# Patient Record
Sex: MALE | Race: BLACK OR AFRICAN AMERICAN | Employment: UNEMPLOYED | URBAN - METROPOLITAN AREA
[De-identification: names, ages, dates, MRNs, and addresses within clinical notes are randomized per-mention and may not be internally consistent; named-entity substitution may affect disease eponyms.]

---

## 2017-01-16 ENCOUNTER — HOSPITAL ENCOUNTER (INPATIENT)
Age: 42
LOS: 3 days | Discharge: HOME | DRG: 812 | End: 2017-01-19
Attending: EMERGENCY MEDICINE | Admitting: INTERNAL MEDICINE
Payer: MEDICARE

## 2017-01-16 ENCOUNTER — APPOINTMENT (OUTPATIENT)
Dept: GENERAL RADIOLOGY | Age: 42
DRG: 812 | End: 2017-01-16
Attending: EMERGENCY MEDICINE
Payer: MEDICARE

## 2017-01-16 DIAGNOSIS — D57.00 SICKLE CELL CRISIS (HCC): Primary | ICD-10-CM

## 2017-01-16 LAB
ALBUMIN SERPL BCP-MCNC: 4.1 G/DL (ref 3.5–5)
ALBUMIN/GLOB SERPL: 1 {RATIO} (ref 1.2–3.5)
ALP SERPL-CCNC: 79 U/L (ref 50–136)
ALT SERPL-CCNC: 65 U/L (ref 12–65)
ANION GAP BLD CALC-SCNC: 12 MMOL/L (ref 7–16)
AST SERPL W P-5'-P-CCNC: 49 U/L (ref 15–37)
ATRIAL RATE: 76 BPM
BILIRUB SERPL-MCNC: 1.2 MG/DL (ref 0.2–1.1)
BLASTS NFR BLD: 4 %
BUN SERPL-MCNC: 9 MG/DL (ref 6–23)
CALCIUM SERPL-MCNC: 9.1 MG/DL (ref 8.3–10.4)
CALCULATED P AXIS, ECG09: 68 DEGREES
CALCULATED R AXIS, ECG10: 32 DEGREES
CALCULATED T AXIS, ECG11: 71 DEGREES
CHLORIDE SERPL-SCNC: 107 MMOL/L (ref 98–107)
CK MB CFR SERPL CALC: ABNORMAL %
CK MB SERPL-MCNC: <0.5 NG/ML (ref 0.5–3.6)
CK SERPL-CCNC: 41 U/L (ref 21–215)
CO2 SERPL-SCNC: 23 MMOL/L (ref 21–32)
CREAT SERPL-MCNC: 0.74 MG/DL (ref 0.8–1.5)
DIAGNOSIS, 93000: NORMAL
DIASTOLIC BP, ECG02: NORMAL MMHG
DIFFERENTIAL METHOD BLD: ABNORMAL
ERYTHROCYTE [DISTWIDTH] IN BLOOD BY AUTOMATED COUNT: 30.7 % (ref 11.9–14.6)
FLUAV AG NPH QL IA: NEGATIVE
FLUBV AG NPH QL IA: NEGATIVE
GLOBULIN SER CALC-MCNC: 4.3 G/DL (ref 2.3–3.5)
GLUCOSE SERPL-MCNC: 145 MG/DL (ref 65–100)
HCT VFR BLD AUTO: 23.4 % (ref 41.1–50.3)
HGB BLD-MCNC: 7.9 G/DL (ref 13.6–17.2)
HGB RETIC QN AUTO: 43 PG (ref 29–35)
IMM RETICS NFR: 25 % (ref 2.3–13.4)
LACTATE SERPL-SCNC: 1.6 MMOL/L (ref 0.4–2)
LDH SERPL L TO P-CCNC: 413 U/L (ref 100–190)
LYMPHOCYTES # BLD: 0.8 K/UL (ref 0.5–4.6)
LYMPHOCYTES NFR BLD MANUAL: 15 % (ref 16–44)
MCH RBC QN AUTO: 33.5 PG (ref 26.1–32.9)
MCHC RBC AUTO-ENTMCNC: 33.8 G/DL (ref 31.4–35)
MCV RBC AUTO: 99.2 FL (ref 79.6–97.8)
MONOCYTES # BLD: 0.1 K/UL (ref 0.1–1.3)
MONOCYTES NFR BLD MANUAL: 1 % (ref 3–9)
NEUTS SEG # BLD: 4.5 K/UL (ref 1.7–8.2)
NEUTS SEG NFR BLD MANUAL: 80 % (ref 47–75)
NRBC BLD-RTO: 6 PER 100 WBC
P-R INTERVAL, ECG05: 168 MS
PLATELET # BLD AUTO: 275 K/UL (ref 150–450)
PLATELET COMMENTS,PCOM: ADEQUATE
PMV BLD AUTO: 9.9 FL (ref 10.8–14.1)
POTASSIUM SERPL-SCNC: 3.6 MMOL/L (ref 3.5–5.1)
PROT SERPL-MCNC: 8.4 G/DL (ref 6.3–8.2)
Q-T INTERVAL, ECG07: 386 MS
QRS DURATION, ECG06: 90 MS
QTC CALCULATION (BEZET), ECG08: 434 MS
RBC # BLD AUTO: 2.36 M/UL (ref 4.23–5.67)
RBC MORPH BLD: ABNORMAL
RETICS # AUTO: 0.1 M/UL (ref 0.03–0.1)
RETICS/RBC NFR AUTO: 4.3 % (ref 0.3–2)
SODIUM SERPL-SCNC: 142 MMOL/L (ref 136–145)
SYSTOLIC BP, ECG01: NORMAL MMHG
TROPONIN I SERPL-MCNC: <0.02 NG/ML (ref 0.02–0.05)
TROPONIN I SERPL-MCNC: <0.02 NG/ML (ref 0.02–0.05)
VENTRICULAR RATE, ECG03: 76 BPM
WBC # BLD AUTO: 5.6 K/UL (ref 4.3–11.1)

## 2017-01-16 PROCEDURE — 74011250637 HC RX REV CODE- 250/637: Performed by: INTERNAL MEDICINE

## 2017-01-16 PROCEDURE — 96374 THER/PROPH/DIAG INJ IV PUSH: CPT | Performed by: EMERGENCY MEDICINE

## 2017-01-16 PROCEDURE — 71020 XR CHEST PA LAT: CPT

## 2017-01-16 PROCEDURE — 87804 INFLUENZA ASSAY W/OPTIC: CPT | Performed by: EMERGENCY MEDICINE

## 2017-01-16 PROCEDURE — 36591 DRAW BLOOD OFF VENOUS DEVICE: CPT

## 2017-01-16 PROCEDURE — 84484 ASSAY OF TROPONIN QUANT: CPT | Performed by: EMERGENCY MEDICINE

## 2017-01-16 PROCEDURE — 36415 COLL VENOUS BLD VENIPUNCTURE: CPT | Performed by: INTERNAL MEDICINE

## 2017-01-16 PROCEDURE — 80053 COMPREHEN METABOLIC PANEL: CPT | Performed by: EMERGENCY MEDICINE

## 2017-01-16 PROCEDURE — 82550 ASSAY OF CK (CPK): CPT | Performed by: INTERNAL MEDICINE

## 2017-01-16 PROCEDURE — 74011250636 HC RX REV CODE- 250/636: Performed by: INTERNAL MEDICINE

## 2017-01-16 PROCEDURE — 96375 TX/PRO/DX INJ NEW DRUG ADDON: CPT | Performed by: EMERGENCY MEDICINE

## 2017-01-16 PROCEDURE — 83615 LACTATE (LD) (LDH) ENZYME: CPT | Performed by: INTERNAL MEDICINE

## 2017-01-16 PROCEDURE — 96361 HYDRATE IV INFUSION ADD-ON: CPT | Performed by: EMERGENCY MEDICINE

## 2017-01-16 PROCEDURE — 74011250636 HC RX REV CODE- 250/636: Performed by: EMERGENCY MEDICINE

## 2017-01-16 PROCEDURE — 81001 URINALYSIS AUTO W/SCOPE: CPT | Performed by: INTERNAL MEDICINE

## 2017-01-16 PROCEDURE — 99285 EMERGENCY DEPT VISIT HI MDM: CPT | Performed by: EMERGENCY MEDICINE

## 2017-01-16 PROCEDURE — 87086 URINE CULTURE/COLONY COUNT: CPT | Performed by: INTERNAL MEDICINE

## 2017-01-16 PROCEDURE — 74011000250 HC RX REV CODE- 250: Performed by: INTERNAL MEDICINE

## 2017-01-16 PROCEDURE — 87641 MR-STAPH DNA AMP PROBE: CPT | Performed by: INTERNAL MEDICINE

## 2017-01-16 PROCEDURE — 83605 ASSAY OF LACTIC ACID: CPT | Performed by: INTERNAL MEDICINE

## 2017-01-16 PROCEDURE — 85025 COMPLETE CBC W/AUTO DIFF WBC: CPT | Performed by: EMERGENCY MEDICINE

## 2017-01-16 PROCEDURE — 85046 RETICYTE/HGB CONCENTRATE: CPT | Performed by: EMERGENCY MEDICINE

## 2017-01-16 PROCEDURE — 96376 TX/PRO/DX INJ SAME DRUG ADON: CPT | Performed by: EMERGENCY MEDICINE

## 2017-01-16 PROCEDURE — 65660000000 HC RM CCU STEPDOWN

## 2017-01-16 PROCEDURE — 93005 ELECTROCARDIOGRAM TRACING: CPT | Performed by: EMERGENCY MEDICINE

## 2017-01-16 PROCEDURE — 87040 BLOOD CULTURE FOR BACTERIA: CPT | Performed by: INTERNAL MEDICINE

## 2017-01-16 RX ORDER — PROMETHAZINE HYDROCHLORIDE 25 MG/1
25 SUPPOSITORY RECTAL
Status: DISCONTINUED | OUTPATIENT
Start: 2017-01-16 | End: 2017-01-19 | Stop reason: HOSPADM

## 2017-01-16 RX ORDER — HYDROMORPHONE HYDROCHLORIDE 1 MG/ML
1 INJECTION, SOLUTION INTRAMUSCULAR; INTRAVENOUS; SUBCUTANEOUS
Status: DISCONTINUED | OUTPATIENT
Start: 2017-01-16 | End: 2017-01-19 | Stop reason: HOSPADM

## 2017-01-16 RX ORDER — OXYCODONE HCL 20 MG/1
80 TABLET, FILM COATED, EXTENDED RELEASE ORAL EVERY 12 HOURS
Status: DISCONTINUED | OUTPATIENT
Start: 2017-01-16 | End: 2017-01-19 | Stop reason: HOSPADM

## 2017-01-16 RX ORDER — HEPARIN SODIUM 5000 [USP'U]/ML
5000 INJECTION, SOLUTION INTRAVENOUS; SUBCUTANEOUS EVERY 8 HOURS
Status: DISCONTINUED | OUTPATIENT
Start: 2017-01-16 | End: 2017-01-19 | Stop reason: HOSPADM

## 2017-01-16 RX ORDER — HYDROMORPHONE HYDROCHLORIDE 1 MG/ML
1 INJECTION, SOLUTION INTRAMUSCULAR; INTRAVENOUS; SUBCUTANEOUS
Status: COMPLETED | OUTPATIENT
Start: 2017-01-16 | End: 2017-01-16

## 2017-01-16 RX ORDER — FAMOTIDINE 10 MG/ML
20 INJECTION INTRAVENOUS EVERY 12 HOURS
Status: DISCONTINUED | OUTPATIENT
Start: 2017-01-16 | End: 2017-01-19 | Stop reason: HOSPADM

## 2017-01-16 RX ORDER — FOLIC ACID 1 MG/1
1 TABLET ORAL DAILY
Status: DISCONTINUED | OUTPATIENT
Start: 2017-01-17 | End: 2017-01-19 | Stop reason: HOSPADM

## 2017-01-16 RX ORDER — OXYCODONE HYDROCHLORIDE 80 MG/1
80 TABLET, FILM COATED, EXTENDED RELEASE ORAL EVERY 12 HOURS
COMMUNITY
End: 2017-05-12

## 2017-01-16 RX ORDER — SODIUM CHLORIDE 9 MG/ML
150 INJECTION, SOLUTION INTRAVENOUS CONTINUOUS
Status: DISCONTINUED | OUTPATIENT
Start: 2017-01-16 | End: 2017-01-17

## 2017-01-16 RX ORDER — ACETAMINOPHEN 325 MG/1
650 TABLET ORAL
Status: DISCONTINUED | OUTPATIENT
Start: 2017-01-16 | End: 2017-01-19 | Stop reason: HOSPADM

## 2017-01-16 RX ORDER — OXYCODONE HYDROCHLORIDE 15 MG/1
30 TABLET ORAL
Status: DISCONTINUED | OUTPATIENT
Start: 2017-01-16 | End: 2017-01-19 | Stop reason: HOSPADM

## 2017-01-16 RX ORDER — HYDROXYUREA 500 MG/1
500 CAPSULE ORAL 2 TIMES DAILY
Status: DISCONTINUED | OUTPATIENT
Start: 2017-01-16 | End: 2017-01-19 | Stop reason: HOSPADM

## 2017-01-16 RX ORDER — PROMETHAZINE HYDROCHLORIDE 25 MG/ML
25 INJECTION, SOLUTION INTRAMUSCULAR; INTRAVENOUS
Status: COMPLETED | OUTPATIENT
Start: 2017-01-16 | End: 2017-01-16

## 2017-01-16 RX ADMIN — HYDROMORPHONE HYDROCHLORIDE 1 MG: 1 INJECTION, SOLUTION INTRAMUSCULAR; INTRAVENOUS; SUBCUTANEOUS at 16:43

## 2017-01-16 RX ADMIN — PROMETHAZINE HYDROCHLORIDE 25 MG: 25 INJECTION INTRAMUSCULAR; INTRAVENOUS at 15:39

## 2017-01-16 RX ADMIN — HEPARIN SODIUM 5000 UNITS: 5000 INJECTION, SOLUTION INTRAVENOUS; SUBCUTANEOUS at 21:00

## 2017-01-16 RX ADMIN — SODIUM CHLORIDE 150 ML/HR: 900 INJECTION, SOLUTION INTRAVENOUS at 20:54

## 2017-01-16 RX ADMIN — SODIUM CHLORIDE 12.5 MG: 9 INJECTION INTRAMUSCULAR; INTRAVENOUS; SUBCUTANEOUS at 20:53

## 2017-01-16 RX ADMIN — HYDROMORPHONE HYDROCHLORIDE 1 MG: 1 INJECTION, SOLUTION INTRAMUSCULAR; INTRAVENOUS; SUBCUTANEOUS at 20:53

## 2017-01-16 RX ADMIN — OXYCODONE HYDROCHLORIDE 80 MG: 20 TABLET, FILM COATED, EXTENDED RELEASE ORAL at 20:53

## 2017-01-16 RX ADMIN — SODIUM CHLORIDE 1000 ML: 900 INJECTION, SOLUTION INTRAVENOUS at 15:39

## 2017-01-16 RX ADMIN — HYDROMORPHONE HYDROCHLORIDE 1 MG: 1 INJECTION, SOLUTION INTRAMUSCULAR; INTRAVENOUS; SUBCUTANEOUS at 16:10

## 2017-01-16 RX ADMIN — FAMOTIDINE 20 MG: 10 INJECTION, SOLUTION INTRAVENOUS at 20:53

## 2017-01-16 RX ADMIN — HYDROXYUREA 500 MG: 500 CAPSULE ORAL at 21:00

## 2017-01-16 RX ADMIN — HYDROMORPHONE HYDROCHLORIDE 1 MG: 1 INJECTION, SOLUTION INTRAMUSCULAR; INTRAVENOUS; SUBCUTANEOUS at 15:39

## 2017-01-16 NOTE — ED PROVIDER NOTES
HPI Comments: Presents complaint of sickle cell crisis. Patient reports he has pain in his chest and his legs. He reports multiple episodes of nausea and vomiting and inability to keep his meds. He reports his oxycodone is not helping. He did try Phenergan without relief. This is typical of his sickle cell. He is unsure what type of sickle cell he has. He sees Dr. Viv Casas. Patient is a 35 y.o. male presenting with chest pain. The history is provided by the patient. Chest Pain (Angina)    This is a new problem. The current episode started yesterday. The problem has been gradually worsening. The problem occurs constantly. The pain is present in the substernal region. The pain is moderate. The quality of the pain is described as dull. The pain does not radiate. The symptoms are aggravated by movement. Associated symptoms include leg pain and shortness of breath. Pertinent negatives include no abdominal pain, no cough, no diaphoresis, no exertional chest pressure, no fever, no lower extremity edema, no malaise/fatigue, no nausea, no numbness and no vomiting. Treatments tried: oxycodone. The treatment provided no relief. Risk factors: sickle cell.         Past Medical History:   Diagnosis Date    Chronic pain     HTN (hypertension)     Ill-defined condition      sickle cell    Paroxysmal SVT [facility]supra[facility]ricular tachycardia) ([facility]) 7/9/2016    [facility]        Past Surgical History:   Procedure Laterality Date    Hx cholecystectomy      Hc port life sngle lumen 5013       to L CW    Hc penile impl dura ii positinble      Hx other surgical       penile inplant    Hx vascular access           Family History:   Problem Relation Age of Onset    Hypertension Other     Diabetes Father     Stroke Father     Sickle Cell Anemia Sister        Social History     Social History    Marital status:      Spouse name: N/A    Number of children: N/A    Years of education: N/A     Occupational History    Not on file. Social History Main Topics    Smoking status: Never Smoker    Smokeless tobacco: Never Used    Alcohol use No    Drug use: No    Sexual activity: Yes     Other Topics Concern    Not on file     Social History Narrative         ALLERGIES: Compazine [prochlorperazine edisylate]; Morphine; Reglan [metoclopramide]; and Zofran [ondansetron hcl (pf)]    Review of Systems   Constitutional: Negative for diaphoresis, fever and malaise/fatigue. Respiratory: Positive for shortness of breath. Negative for cough. Cardiovascular: Positive for chest pain. Gastrointestinal: Negative for abdominal pain, nausea and vomiting. Neurological: Negative for numbness. All other systems reviewed and are negative. Vitals:    01/16/17 1204   BP: 157/82   Pulse: 81   Resp: 16   Temp: 98.7 °F (37.1 °C)   SpO2: 97%   Weight: 71.2 kg (157 lb)   Height: 5' 10\" (1.778 m)            Physical Exam   Constitutional: He is oriented to person, place, and time. He appears well-developed and well-nourished. No distress. HENT:   Head: Normocephalic and atraumatic. Neck: Normal range of motion. Neck supple. Cardiovascular: Normal rate and regular rhythm. Pulmonary/Chest: Effort normal and breath sounds normal. No respiratory distress. He has no wheezes. He has no rales. Abdominal: Soft. He exhibits no distension. There is no tenderness. There is no rebound and no guarding. Musculoskeletal: Normal range of motion. He exhibits no edema. Neurological: He is alert and oriented to person, place, and time. No cranial nerve deficit. Skin: Skin is warm and dry. No rash noted. He is not diaphoretic. No erythema. Nursing note and vitals reviewed. MDM  Number of Diagnoses or Management Options  Sickle cell crisis [facility]):   Diagnosis management comments: No signs of acute chest.   He does have an elevated reticulocyte count.   I reviewed his chart here and at [facility] and he has had multiple blood transfusions in the past.  He has hemoglobin S and beta thalassemia which typically have more severe symptoms. His last admission was in July 2016. I d/w pt that he would get dilaudid 1 mg x3 doses and phenergan and then he would have to decide if he needed admission    4:58 PM  Pt still in pain.   Needs admit       Amount and/or Complexity of Data Reviewed  Clinical lab tests: ordered and reviewed  Review and summarize past medical records: yes  Discuss the patient with other providers: yes  Independent visualization of images, tracings, or specimens: yes (NSR, no ST elevation, nl QRS)    Risk of Complications, Morbidity, and/or Mortality  Presenting problems: high  Diagnostic procedures: moderate  Management options: high    Patient Progress  Patient progress: improved    ED Course       Procedures

## 2017-01-16 NOTE — IP AVS SNAPSHOT
Issac Melendez 
 
 
 [address] [facility] [address] [facility] 
[phone] Patient: Etta Garcia MRN: [id number] BIZ:[id number][id number] You are allergic to the following Allergen Reactions Compazine (Prochlorperazine Edisylate) Other (comments) Also makes him feel funny Morphine Other (comments) Makes him feel funny Reglan (Metoclopramide) Other (comments) \"feel funny\" Zofran (Ondansetron Hcl (Pf)) Other (comments) Make him feel funny Recent Documentation Height Weight BMI Smoking Status 1.778 m 72.1 kg 22.8 kg/m2 Never Smoker Unresulted Labs Order Current Status HEMOGLOBIN FRACTIONATION In process TYPE & CROSSMATCH In process CULTURE, BLOOD Preliminary result CULTURE, BLOOD Preliminary result Emergency Contacts Name Discharge Info Relation Home Work Mobile Joesph Coon  Spouse [[phone] About your hospitalization You were admitted on:  January 16, 2017 You last received care in the:  [address] You were discharged on:  January 19, 2017 Unit phone number:  [phone] Why you were hospitalized Your primary diagnosis was:  Sickle Cell Anemia With Crisis (Hcc) Your diagnoses also included:  Htn (Hypertension), Iron Overload Due To Repeated Red Blood Cell Transfusions Providers Seen During Your Hospitalizations Provider Role Specialty Primary office phone Vale Felix MD Attending Provider Emergency Medicine [phone] Elizabeth Cardoza MD Attending Provider Internal Medicine [phone] Your Primary Care Physician (PCP) Primary Care Physician Office Phone Office Fax OTHER, PHYS ** None ** ** None ** Follow-up Information Follow up With Details Comments Contact Info  Usman García, MD Dr Rui Handy hematology [facility] Patient can only remember the practice name and not the physician Current Discharge Medication List  
  
CONTINUE these medications which have NOT CHANGED Dose & Instructions Dispensing Information Comments Morning Noon Evening Bedtime  
 folic acid 1 mg tablet Commonly known as:  Google Your next dose is: Today, Tomorrow Other:  _________ Dose:  1 mg Take 1 mg by mouth daily. Refills:  0  
     
   
   
   
  
 hydroxyurea 500 mg capsule Commonly known as:  HYDREA Your next dose is: Today, Tomorrow Other:  _________ Dose:  500 mg Take 500 mg by mouth two (2) times a day. Takes 2 tablets in the morning and 1 tablet at night. Indications: Sickle Cell Anemia with Crisis Refills:  0  
     
   
   
   
  
 lisinopril 5 mg tablet Commonly known as:  Nonah Cami Your next dose is: Today, Tomorrow Other:  _________ Dose:  5 mg Take 5 mg by mouth daily. Indications: HYPERTENSION Refills:  0  
     
   
   
   
  
 metoprolol tartrate 25 mg tablet Commonly known as:  LOPRESSOR Your next dose is: Today, Tomorrow Other:  _________ Dose:  25 mg Take 25 mg by mouth two (2) times a day. Indications: HYPERTENSION Refills:  0  
     
   
   
   
  
 * OxyCONTIN 80 mg ER tablet Generic drug:  oxyCODONE ER Your next dose is: Today, Tomorrow Other:  _________ Dose:  80 mg Take 80 mg by mouth every twelve (12) hours. Refills:  0  
     
   
   
   
  
 * oxyCODONE IR 30 mg immediate release tablet Commonly known as:  OXY-IR Your next dose is: Today, Tomorrow Other:  _________ Dose:  30 mg Take 1 Tab by mouth every four (4) hours as needed for Pain. Max Daily Amount: 180 mg.  
 Quantity:  15 Tab Refills:  0  
     
   
   
   
  
 * Notice:   This list has 2 medication(s) that are the same as other medications prescribed for you. Read the directions carefully, and ask your doctor or other care provider to review them with you. Discharge Instructions DISCHARGE SUMMARY from Nurse The following personal items are in your possession at time of discharge: 
 
Dental Appliances: None Clothing: Footwear, Pants, Shirt, Undergarments PATIENT INSTRUCTIONS: 
 
After general anesthesia or intravenous sedation, for 24 hours or while taking prescription Narcotics: · Limit your activities · Do not drive and operate hazardous machinery · Do not make important personal or business decisions · Do  not drink alcoholic beverages · If you have not urinated within 8 hours after discharge, please contact your surgeon on call. Report the following to your surgeon: 
· Excessive pain, swelling, redness or odor of or around the surgical area · Temperature over 100.5 · Nausea and vomiting lasting longer than 4 hours or if unable to take medications · Any signs of decreased circulation or nerve impairment to extremity: change in color, persistent  numbness, tingling, coldness or increase pain · Any questions What to do at Home: 
Recommended activity: Activity as tolerated, per MD instructions If you experience any of the following symptoms fever > 101, nausea, vomiting, pain, chest pain, and shortness of breath please follow up with MD. 
 
 
*  Please give a list of your current medications to your Primary Care Provider. *  Please update this list whenever your medications are discontinued, doses are 
    changed, or new medications (including over-the-counter products) are added. *  Please carry medication information at all times in case of emergency situations. These are general instructions for a healthy lifestyle: No smoking/ No tobacco products/ Avoid exposure to second hand smoke Surgeon General's Warning:  Quitting smoking now greatly reduces serious risk to your health. Obesity, smoking, and sedentary lifestyle greatly increases your risk for illness A healthy diet, regular physical exercise & weight monitoring are important for maintaining a healthy lifestyle You may be retaining fluid if you have a history of heart failure or if you experience any of the following symptoms:  Weight gain of 3 pounds or more overnight or 5 pounds in a week, increased swelling in our hands or feet or shortness of breath while lying flat in bed. Please call your doctor as soon as you notice any of these symptoms; do not wait until your next office visit. Recognize signs and symptoms of STROKE: 
 
F-face looks uneven A-arms unable to move or move unevenly S-speech slurred or non-existent T-time-call 911 as soon as signs and symptoms begin-DO NOT go Back to bed or wait to see if you get better-TIME IS BRAIN. Warning Signs of HEART ATTACK Call 911 if you have these symptoms: 
? Chest discomfort. Most heart attacks involve discomfort in the center of the chest that lasts more than a few minutes, or that goes away and comes back. It can feel like uncomfortable pressure, squeezing, fullness, or pain. ? Discomfort in other areas of the upper body. Symptoms can include pain or discomfort in one or both arms, the back, neck, jaw, or stomach. ? Shortness of breath with or without chest discomfort. ? Other signs may include breaking out in a cold sweat, nausea, or lightheadedness. Don't wait more than five minutes to call [address]! Fast action can save your life. Calling 911 is almost always the fastest way to get lifesaving treatment. Emergency Medical Services staff can begin treatment when they arrive  up to an hour sooner than if someone gets to the hospital by car. The discharge information has been reviewed with the patient.   The patient verbalized understanding. Discharge medications reviewed with the patient and appropriate educational materials and side effects teaching were provided. Sickle Cell Crisis: Care Instructions Your Care Instructions Sickle cell crisis is a painful episode that may begin suddenly in a person with sickle cell disease. Sickle cell disease turns normal, round red blood cells into cells that look like kendall or crescent moons. The sickle-shaped cells can get stuck in blood vessels, blocking blood flow and causing severe pain. The pain can occur in the bones of the spine, the arms and legs, the chest, and the abdomen. An episode may be called a \"painful event\" or \"painful crisis. \" Some people who have sickle cell disease have many painful events, while others have few or none. Treatment depends on the level of pain and how long it lasts. Sometimes taking nonprescription pain relievers can help. Or you may need stronger pain relief medicine that is prescribed or given by a doctor. You may need to be treated in the hospital. 
It isn't always possible to know what sets off a painful event. But triggers include being dehydrated, cold temperatures, infection, stress, and not getting enough oxygen. Follow-up care is a key part of your treatment and safety. Be sure to make and go to all appointments, and call your doctor if you are having problems. It's also a good idea to know your test results and keep a list of the medicines you take. How can you care for yourself at home? · Create a pain management plan with your doctor. This plan should include the types of medicines you can take and other actions you can take at home to relieve pain. · Drink plenty of fluids, enough so that your urine is light yellow or clear like water. If you have kidney, heart, or liver disease and have to limit fluids, talk with your doctor before you increase the amount of fluids you drink. · Take your medicines exactly as prescribed. Call your doctor if you think you are having a problem with your medicine. · Take pain medicines exactly as directed. ¨ If the doctor gave you a prescription medicine for pain, take it as prescribed. ¨ If you are not taking a prescription pain medicine, ask your doctor if you can take an over-the-counter medicine. · Avoid alcohol. It can make you dehydrated. · Dress warmly in cold weather. The cold and windy weather can lead to severe pain. · Do not smoke. Smoking can reduce the amount of oxygen in your blood. · Get plenty of sleep. When should you call for help? Call 911 anytime you think you may need emergency care. For example, call if: 
· You passed out (lost consciousness). Call your doctor now or seek immediate medical care if: 
· You are in severe pain. · You are dizzy or lightheaded, or you feel like you may faint. · You have a fever. · You are short of breath. · Your symptoms are getting worse. Watch closely for changes in your health, and be sure to contact your doctor if you are not getting better as expected. Where can you learn more? Go to http://socorro-rosita.info/. Enter F104 in the search box to learn more about \"Sickle Cell Crisis: Care Instructions. \" Current as of: February 5, 2016 Content Version: 11.1 © 0864-8804 [employer]wise[employer]. Care instructions adapted under license by [employer] (which disclaims liability or warranty for this information). If you have questions about a medical condition or this instruction, always ask your healthcare professional. [place] [address] any warranty or liability for your use of this information. Sickle Cell Disease: Care Instructions Your Care Instructions Sickle cell disease turns normal, round red blood cells into misshaped cells that look like kendall or crescent moons.  The sickle-shaped cells can get stuck in blood vessels, blocking blood flow and causing severe pain. The sickle-shaped cells also can harm organs, muscles, and bones. It is a lifelong condition. Sickle cell disease is passed down in families. You can talk to your doctor about whether to have genetic tests to find out the chance of having a child with the disease. Your doctor also may recommend that your family members get tested for sickle cell disease. Your doctor may treat you with medicines. Some people get blood transfusions or a bone marrow transplant. Managing pain is an important part of your treatment. Follow-up care is a key part of your treatment and safety. Be sure to make and go to all appointments, and call your doctor if you are having problems. It's also a good idea to know your test results and keep a list of the medicines you take. How can you care for yourself at home? · Take your medicines exactly as prescribed. Call your doctor if you think you are having a problem with your medicine. · Take pain medicines exactly as directed. ¨ If the doctor gave you a prescription medicine for pain, take it as prescribed. ¨ If you are not taking a prescription pain medicine, ask your doctor if you can take an over-the-counter medicine. · Try to help ease pain by distracting yourself. Use guided imagery, deep breathing, and relaxation exercises. A pain specialist can teach you pain management skills. · Avoid alcohol. It can make you dehydrated. · Dress warmly in cold weather. The cold and windy weather can lead to severe pain. · Do not smoke. Smoking can reduce the amount of oxygen in your blood. If you need help quitting, talk to your doctor about stop-smoking programs and medicines. These can increase your chances of quitting for good. · Get plenty of sleep. · Get regular eye exams. Sickle cell disease can cause vision problems. · Wear medical alert jewelry that says that you have sickle cell disease. You can buy this at most drugstores. · Avoid colds and flu. Get a pneumococcal vaccine shot. If you have had one before, ask your doctor whether you need another dose. Get a flu shot every year. If you must be around people with colds or flu, wash your hands often. When should you call for help? Call 911 anytime you think you may need emergency care. For example, call if: 
· You passed out (lost consciousness). · You are in severe pain that is not helped by your pain medicines. Call your doctor now or seek immediate medical care if: 
· You have these signs of acute chest syndrome, a problem caused by sickle cell disease: ¨ Cough. ¨ Chest pain. ¨ Fever. ¨ Shortness of breath. · You have vision problems. · You have severe belly pain. · You have a severe headache. · You have less urine than normal or no urine. · You have vomiting or diarrhea that does not go away after 2 hours. · You are dizzy or lightheaded, or you feel like you may faint. · You have sudden numbness or tingling in your hands, feet, fingers, or toes (even if it goes away). · You suddenly have poor balance and coordination when you walk (even if it goes away). · You have an erection that lasts more than 2 to 3 hours or is extremely painful. Watch closely for changes in your health, and be sure to contact your doctor if you have any problems. Where can you learn more? Go to http://socorro-rosita.info/. Enter 375 1097 in the search box to learn more about \"Sickle Cell Disease: Care Instructions. \" Current as of: February 5, 2016 Content Version: 11.1 © 1222-7225 [employer]. Care instructions adapted under license by [employer] (which disclaims liability or warranty for this information). If you have questions about a medical condition or this instruction, always ask your healthcare professional. [address] any warranty or liability for your use of this information. Discharge Orders None MoPals Announcement We are excited to announce that we are making your provider's discharge notes available to you in MoPals. You will see these notes when they are completed and signed by the physician that discharged you from your recent hospital stay. If you have any questions or concerns about any information you see in MoPals, please call the Health Information Department where you were seen or reach out to your Primary Care Provider for more information about your plan of care. Introducing hospitals & HEALTH SERVICES! Tai Valente introduces MoPals patient portal. Now you can access parts of your medical record, email your doctor's office, and request medication refills online. 1. In your internet browser, go to [email or web address] 2. Click on the First Time User? Click Here link in the Sign In box. You will see the New Member Sign Up page. 3. Enter your MoPals Access Code exactly as it appears below. You will not need to use this code after youve completed the sign-up process. If you do not sign up before the expiration date, you must request a new code. · MoPals Access Code: [email or web address] Expires: 4/19/2017 11:07 AM 
 
4. Enter the last four digits of your Social Security Number (xxxx) and Date of Birth (mm/dd/yyyy) as indicated and click Submit. You will be taken to the next sign-up page. 5. Create a MoPals ID. This will be your MoPals login ID and cannot be changed, so think of one that is secure and easy to remember. 6. Create a MoPals password. You can change your password at any time. 7. Enter your Password Reset Question and Answer. This can be used at a later time if you forget your password. 8. Enter your e-mail address. You will receive e-mail notification when new information is available in [address] 9. Click Sign Up. You can now view and download portions of your medical record. 10. Click the Download Summary menu link to download a portable copy of your medical information. If you have questions, please visit the Frequently Asked Questions section of the [employer] website. Remember, MyChart is NOT to be used for urgent needs. For medical emergencies, dial 911. Now available from your iPhone and Android! General Information Please provide this summary of care documentation to your next provider. Patient Signature:  ____________________________________________________________ Date:  ____________________________________________________________  
  
Rachelle Modena Provider Signature:  ____________________________________________________________ Date:  ____________________________________________________________

## 2017-01-16 NOTE — IP AVS SNAPSHOT
Summary of Care Report The Summary of Care report has been created to help improve care coordination. Users with access to [email or web address] or [facility] [address] (Web-based application) may access additional patient information including the Discharge Summary. If you are not currently a [address] user and need more information, please call the number listed below in the [place] [address] section and ask to be connected with Medical Records. Facility Information Name Address Phone [address] [address] [address] [phone] Patient Information Patient Name Sex  Ifeanyi Ardon ([id number]) Male 1975 Discharge Information Admitting Provider Service Area Unit MD Brian / [id number]  [address] / [phone] Discharge Provider Discharge Date/Time Discharge Disposition Destination (none) 2017 (Pending) AHR (none) Patient Language Language ENGLISH [13] Problem List as of 2017  Date Reviewed: 3/5/2012 Codes Priority Class Noted - Resolved Leukocytosis - chronic reactive ICD-10-CM: P78.154 ICD-9-CM: 288.60   2011 - Present Hypokalemia ICD-10-CM: E87.6 ICD-9-CM: 276.8   2016 - Present HTN (hypertension) (Chronic) ICD-10-CM: I10 
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 
ICD-9-CM: 286.6   3/24/2012 - Present Overview Signed 3/24/2012  2:53 PM by Danielle Bobby Sickle cell with anemia RESOLVED: Other chest pain ICD-10-CM: R07.89 ICD-9-CM: 786.59   3/24/2012 - 2016 Overview Signed 3/24/2012  2:53 PM by Danielle Bobby Acute chest syndrome unlikely. leukocytosis - most likely reactive ICD-10-CM: D72.829 ICD-9-CM: 288.60   2016 - Present Essential hypertension, benign ICD-10-CM: I10 
ICD-9-CM: 401.1   6/23/2012 - Present Hyperbilirubinemia ICD-10-CM: E80.6 ICD-9-CM: 782.4   9/20/2012 - Present Overview Signed 9/20/2012  1:37 PM by Patrice Orantes Related to sickle cell crisis Sickle cell pain crisis ([address]) ICD-10-CM: D57.00 ICD-9-CM: 282.62   10/25/2012 - Present RESOLVED: Thrombocytopenia, unspecified ([address]) ICD-10-CM: D69.6 ICD-9-CM: 287.5   10/12/2013 - 1/30/2016 Diarrhea ICD-10-CM: R19.7 ICD-9-CM: 787.91   9/27/2014 - Present RESOLVED: Macrocytosis ICD-10-CM: D75.89 ICD-9-CM: 289.89   9/27/2014 - 1/30/2016 Sickle cell anemia ([facility]) ICD-10-CM: D57.1 ICD-9-CM: 282.60   4/6/2016 - Present Sickle cell crisis ([address]) ICD-10-CM: D57.00 ICD-9-CM: 282.62   4/5/2016 - Present Paroxysmal SVT (supraventricular tachycardia) ([facility]) ICD-10-CM: I47.1 ICD-9-CM: 427.0   7/9/2016 - Present Hypomagnesemia ICD-10-CM: V33.79 
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  
  
CONTINUE these medications which have NOT CHANGED Dose & Instructions Dispensing Information Comments  
 folic acid 1 mg tablet Commonly known as:  Google Dose:  1 mg Take 1 mg by mouth daily. Refills:  0  
   
 hydroxyurea 500 mg capsule Commonly known as:  HYDREA  Dose:  500 mg  
 Take 500 mg by mouth two (2) times a day. Takes 2 tablets in the morning and 1 tablet at night. Indications: Sickle Cell Anemia with Crisis Refills:  0  
   
 lisinopril 5 mg tablet Commonly known as:  Lorenza Primmer Dose:  5 mg Take 5 mg by mouth daily. Indications: HYPERTENSION Refills:  0  
   
 metoprolol tartrate 25 mg tablet Commonly known as:  LOPRESSOR Dose:  25 mg Take 25 mg by mouth two (2) times a day. Indications: HYPERTENSION Refills:  0  
   
 * OxyCONTIN 80 mg ER tablet Generic drug:  oxyCODONE ER Dose:  80 mg Take 80 mg by mouth every twelve (12) hours. Refills:  0  
   
 * oxyCODONE IR 30 mg immediate release tablet Commonly known as:  OXY-IR Dose:  30 mg Take 1 Tab by mouth every four (4) hours as needed for Pain. Max Daily Amount: 180 mg.  
 Quantity:  15 Tab Refills:  0  
   
 * Notice: This list has 2 medication(s) that are the same as other medications prescribed for you. Read the directions carefully, and ask your doctor or other care provider to review them with you. Current Immunizations Name Date Influenza Vaccine 9/10/2015 Influenza Vaccine Split 9/27/2012 Pneumococcal Vaccine (Unspecified Type) 9/21/2010 Follow-up Information Follow up With Details Comments Contact Info Phys Other, MD Dr Bisi Lazo hematology [facility] Patient can only remember the practice name and not the physician Discharge Instructions DISCHARGE SUMMARY from Nurse The following personal items are in your possession at time of discharge: 
 
Dental Appliances: None Clothing: Footwear, Pants, Shirt, Undergarments PATIENT INSTRUCTIONS: 
 
After general anesthesia or intravenous sedation, for 24 hours or while taking prescription Narcotics: · Limit your activities · Do not drive and operate hazardous machinery · Do not make important personal or business decisions · Do  not drink alcoholic beverages · If you have not urinated within 8 hours after discharge, please contact your surgeon on call. Report the following to your surgeon: 
· Excessive pain, swelling, redness or odor of or around the surgical area · Temperature over 100.5 · Nausea and vomiting lasting longer than 4 hours or if unable to take medications · Any signs of decreased circulation or nerve impairment to extremity: change in color, persistent  numbness, tingling, coldness or increase pain · Any questions What to do at Home: 
Recommended activity: Activity as tolerated, per MD instructions If you experience any of the following symptoms fever > 101, nausea, vomiting, pain, chest pain, and shortness of breath please follow up with MD. 
 
 
*  Please give a list of your current medications to your Primary Care Provider. *  Please update this list whenever your medications are discontinued, doses are 
    changed, or new medications (including over-the-counter products) are added. *  Please carry medication information at all times in case of emergency situations. These are general instructions for a healthy lifestyle: No smoking/ No tobacco products/ Avoid exposure to second hand smoke Surgeon General's Warning:  Quitting smoking now greatly reduces serious risk to your health. Obesity, smoking, and sedentary lifestyle greatly increases your risk for illness A healthy diet, regular physical exercise & weight monitoring are important for maintaining a healthy lifestyle You may be retaining fluid if you have a history of heart failure or if you experience any of the following symptoms:  Weight gain of 3 pounds or more overnight or 5 pounds in a week, increased swelling in our hands or feet or shortness of breath while lying flat in bed. Please call your doctor as soon as you notice any of these symptoms; do not wait until your next office visit. Recognize signs and symptoms of STROKE: 
 
F-face looks uneven A-arms unable to move or move unevenly S-speech slurred or non-existent T-time-call 911 as soon as signs and symptoms begin-DO NOT go Back to bed or wait to see if you get better-TIME IS BRAIN. Warning Signs of HEART ATTACK Call 911 if you have these symptoms: 
? Chest discomfort. Most heart attacks involve discomfort in the center of the chest that lasts more than a few minutes, or that goes away and comes back. It can feel like uncomfortable pressure, squeezing, fullness, or pain. ? Discomfort in other areas of the upper body. Symptoms can include pain or discomfort in one or both arms, the back, neck, jaw, or stomach. ? Shortness of breath with or without chest discomfort. ? Other signs may include breaking out in a cold sweat, nausea, or lightheadedness. Don't wait more than five minutes to call [address]! Fast action can save your life. Calling 911 is almost always the fastest way to get lifesaving treatment. Emergency Medical Services staff can begin treatment when they arrive  up to an hour sooner than if someone gets to the hospital by car. The discharge information has been reviewed with the patient. The patient verbalized understanding. Discharge medications reviewed with the patient and appropriate educational materials and side effects teaching were provided. Sickle Cell Crisis: Care Instructions Your Care Instructions Sickle cell crisis is a painful episode that may begin suddenly in a person with sickle cell disease. Sickle cell disease turns normal, round red blood cells into cells that look like kendall or crescent moons. The sickle-shaped cells can get stuck in blood vessels, blocking blood flow and causing severe pain. The pain can occur in the bones of the spine, the arms and legs, the chest, and the abdomen. An episode may be called a \"painful event\" or \"painful crisis. \" Some people who have sickle cell disease have many painful events, while others have few or none. Treatment depends on the level of pain and how long it lasts. Sometimes taking nonprescription pain relievers can help. Or you may need stronger pain relief medicine that is prescribed or given by a doctor. You may need to be treated in the hospital. 
It isn't always possible to know what sets off a painful event. But triggers include being dehydrated, cold temperatures, infection, stress, and not getting enough oxygen. Follow-up care is a key part of your treatment and safety. Be sure to make and go to all appointments, and call your doctor if you are having problems. It's also a good idea to know your test results and keep a list of the medicines you take. How can you care for yourself at home? · Create a pain management plan with your doctor. This plan should include the types of medicines you can take and other actions you can take at home to relieve pain. · Drink plenty of fluids, enough so that your urine is light yellow or clear like water. If you have kidney, heart, or liver disease and have to limit fluids, talk with your doctor before you increase the amount of fluids you drink. · Take your medicines exactly as prescribed. Call your doctor if you think you are having a problem with your medicine. · Take pain medicines exactly as directed. ¨ If the doctor gave you a prescription medicine for pain, take it as prescribed. ¨ If you are not taking a prescription pain medicine, ask your doctor if you can take an over-the-counter medicine. · Avoid alcohol. It can make you dehydrated. · Dress warmly in cold weather. The cold and windy weather can lead to severe pain. · Do not smoke. Smoking can reduce the amount of oxygen in your blood. · Get plenty of sleep. When should you call for help? Call 911 anytime you think you may need emergency care. For example, call if: 
· You passed out (lost consciousness). Call your doctor now or seek immediate medical care if: 
· You are in severe pain. · You are dizzy or lightheaded, or you feel like you may faint. · You have a fever. · You are short of breath. · Your symptoms are getting worse. Watch closely for changes in your health, and be sure to contact your doctor if you are not getting better as expected. Where can you learn more? Go to http://socorro-rosita.info/. Enter F104 in the search box to learn more about \"Sickle Cell Crisis: Care Instructions. \" Current as of: February 5, 2016 Content Version: 11.1 © 1014-5993 [employer]. Care instructions adapted under license by [employer] (which disclaims liability or warranty for this information). If you have questions about a medical condition or this instruction, always ask your healthcare professional. [place] [address] any warranty or liability for your use of this information. Sickle Cell Disease: Care Instructions Your Care Instructions Sickle cell disease turns normal, round red blood cells into misshaped cells that look like kendall or crescent moons. The sickle-shaped cells can get stuck in blood vessels, blocking blood flow and causing severe pain. The sickle-shaped cells also can harm organs, muscles, and bones. It is a lifelong condition. Sickle cell disease is passed down in families. You can talk to your doctor about whether to have genetic tests to find out the chance of having a child with the disease. Your doctor also may recommend that your family members get tested for sickle cell disease. Your doctor may treat you with medicines. Some people get blood transfusions or a bone marrow transplant. Managing pain is an important part of your treatment. Follow-up care is a key part of your treatment and safety. Be sure to make and go to all appointments, and call your doctor if you are having problems. It's also a good idea to know your test results and keep a list of the medicines you take. How can you care for yourself at home? · Take your medicines exactly as prescribed. Call your doctor if you think you are having a problem with your medicine. · Take pain medicines exactly as directed. ¨ If the doctor gave you a prescription medicine for pain, take it as prescribed. ¨ If you are not taking a prescription pain medicine, ask your doctor if you can take an over-the-counter medicine. · Try to help ease pain by distracting yourself. Use guided imagery, deep breathing, and relaxation exercises. A pain specialist can teach you pain management skills. · Avoid alcohol. It can make you dehydrated. · Dress warmly in cold weather. The cold and windy weather can lead to severe pain. · Do not smoke. Smoking can reduce the amount of oxygen in your blood. If you need help quitting, talk to your doctor about stop-smoking programs and medicines. These can increase your chances of quitting for good. · Get plenty of sleep. · Get regular eye exams. Sickle cell disease can cause vision problems. · Wear medical alert jewelry that says that you have sickle cell disease. You can buy this at most drugstores. · Avoid colds and flu. Get a pneumococcal vaccine shot. If you have had one before, ask your doctor whether you need another dose. Get a flu shot every year. If you must be around people with colds or flu, wash your hands often. When should you call for help? Call 911 anytime you think you may need emergency care. For example, call if: 
· You passed out (lost consciousness). · You are in severe pain that is not helped by your pain medicines. Call your doctor now or seek immediate medical care if: · You have these signs of acute chest syndrome, a problem caused by sickle cell disease: ¨ Cough. ¨ Chest pain. ¨ Fever. ¨ Shortness of breath. · You have vision problems. · You have severe belly pain. · You have a severe headache. · You have less urine than normal or no urine. · You have vomiting or diarrhea that does not go away after 2 hours. · You are dizzy or lightheaded, or you feel like you may faint. · You have sudden numbness or tingling in your hands, feet, fingers, or toes (even if it goes away). · You suddenly have poor balance and coordination when you walk (even if it goes away). · You have an erection that lasts more than 2 to 3 hours or is extremely painful. Watch closely for changes in your health, and be sure to contact your doctor if you have any problems. Where can you learn more? Go to http://socorro-rosita.info/. Enter 390 1812 in the search box to learn more about \"Sickle Cell Disease: Care Instructions. \" Current as of: February 5, 2016 Content Version: 11.1 © 5223-6259 [employer]. Care instructions adapted under license by [employer] (which disclaims liability or warranty for this information). If you have questions about a medical condition or this instruction, always ask your healthcare professional. [place] [address] any warranty or liability for your use of this information. Chart Review Routing History Recipient Method Report Sent By Drew Bolton MD  
Fax: [phone] Phone: [phone] Fax IP Auto Routed [employer] [[id number]] 3/28/2012 11:56 AM 03/28/2012 Anne Bolton MD  
Fax: [phone] Phone: [phone] Fax IP Auto Routed [phone] Norris Baez MD [[id number]] 11/28/2012  3:52 PM 11/28/2012 Remberto Justin MD  
Fax: [phone] Phone: [phone]  Fax IP Auto Routed [employer] [place] [[id number]] 1/17/2017 11:35 AM 01/17/2017

## 2017-01-17 LAB
ALBUMIN SERPL BCP-MCNC: 3.1 G/DL (ref 3.5–5)
ALBUMIN/GLOB SERPL: 0.9 {RATIO} (ref 1.2–3.5)
ALP SERPL-CCNC: 60 U/L (ref 50–136)
ALT SERPL-CCNC: 46 U/L (ref 12–65)
ANION GAP BLD CALC-SCNC: 8 MMOL/L (ref 7–16)
APPEARANCE UR: CLEAR
AST SERPL W P-5'-P-CCNC: 28 U/L (ref 15–37)
BACTERIA SPEC CULT: NORMAL
BACTERIA SPEC CULT: NORMAL
BACTERIA URNS QL MICRO: 0 /HPF
BASOPHILS # BLD AUTO: 0.1 K/UL (ref 0–0.2)
BASOPHILS NFR BLD MANUAL: 1 % (ref 0–2)
BILIRUB DIRECT SERPL-MCNC: 0.2 MG/DL
BILIRUB SERPL-MCNC: 0.9 MG/DL (ref 0.2–1.1)
BILIRUB UR QL: NEGATIVE
BUN SERPL-MCNC: 7 MG/DL (ref 6–23)
CALCIUM SERPL-MCNC: 7.8 MG/DL (ref 8.3–10.4)
CASTS URNS QL MICRO: ABNORMAL /LPF
CHLORIDE SERPL-SCNC: 111 MMOL/L (ref 98–107)
CK MB CFR SERPL CALC: 1.5 %
CK MB CFR SERPL CALC: ABNORMAL %
CK MB SERPL-MCNC: 0.6 NG/ML (ref 0.5–3.6)
CK MB SERPL-MCNC: <0.5 NG/ML (ref 0.5–3.6)
CK SERPL-CCNC: 39 U/L (ref 21–215)
CK SERPL-CCNC: 42 U/L (ref 21–215)
CO2 SERPL-SCNC: 25 MMOL/L (ref 21–32)
COLOR UR: YELLOW
CREAT SERPL-MCNC: 0.69 MG/DL (ref 0.8–1.5)
DIFFERENTIAL METHOD BLD: ABNORMAL
EPI CELLS #/AREA URNS HPF: ABNORMAL /HPF
FERRITIN SERPL-MCNC: 3905 NG/ML (ref 8–388)
GLOBULIN SER CALC-MCNC: 3.3 G/DL (ref 2.3–3.5)
GLUCOSE SERPL-MCNC: 129 MG/DL (ref 65–100)
GLUCOSE UR STRIP.AUTO-MCNC: NEGATIVE MG/DL
HAPTOGLOB SERPL-MCNC: <8 MG/DL (ref 30–200)
HCT VFR BLD AUTO: 17.2 % (ref 41.1–50.3)
HCT VFR BLD AUTO: 19.1 % (ref 41.1–50.3)
HGB BLD-MCNC: 5.8 G/DL (ref 13.6–17.2)
HGB BLD-MCNC: 6.4 G/DL (ref 13.6–17.2)
HGB RETIC QN AUTO: 38 PG (ref 29–35)
HGB UR QL STRIP: NEGATIVE
IMM RETICS NFR: 27.9 % (ref 2.3–13.4)
KETONES UR QL STRIP.AUTO: NEGATIVE MG/DL
LDH SERPL L TO P-CCNC: 355 U/L (ref 100–190)
LEUKOCYTE ESTERASE UR QL STRIP.AUTO: NEGATIVE
LYMPHOCYTES # BLD: 7.5 K/UL (ref 0.5–4.6)
LYMPHOCYTES NFR BLD MANUAL: 86 % (ref 16–44)
MAGNESIUM SERPL-MCNC: 2 MG/DL (ref 1.8–2.4)
MCH RBC QN AUTO: 33.7 PG (ref 26.1–32.9)
MCH RBC QN AUTO: 33.9 PG (ref 26.1–32.9)
MCHC RBC AUTO-ENTMCNC: 33.5 G/DL (ref 31.4–35)
MCHC RBC AUTO-ENTMCNC: 33.9 G/DL (ref 31.4–35)
MCV RBC AUTO: 101.1 FL (ref 79.6–97.8)
MCV RBC AUTO: 99.4 FL (ref 79.6–97.8)
NEUTS SEG # BLD: 1.1 K/UL (ref 1.7–8.2)
NEUTS SEG NFR BLD MANUAL: 13 % (ref 47–75)
NITRITE UR QL STRIP.AUTO: NEGATIVE
NRBC BLD-RTO: 20 PER 100 WBC
PH UR STRIP: 6 [PH] (ref 5–9)
PLATELET # BLD AUTO: 209 K/UL (ref 150–450)
PLATELET # BLD AUTO: 238 K/UL (ref 150–450)
PLATELET COMMENTS,PCOM: ADEQUATE
PMV BLD AUTO: 10.5 FL (ref 10.8–14.1)
PMV BLD AUTO: 10.6 FL (ref 10.8–14.1)
POTASSIUM SERPL-SCNC: 3.6 MMOL/L (ref 3.5–5.1)
PROT SERPL-MCNC: 6.4 G/DL (ref 6.3–8.2)
PROT UR STRIP-MCNC: 30 MG/DL
RBC # BLD AUTO: 1.7 M/UL (ref 4.23–5.67)
RBC # BLD AUTO: 1.89 M/UL (ref 4.23–5.67)
RBC #/AREA URNS HPF: ABNORMAL /HPF
RBC MORPH BLD: ABNORMAL
RETICS # AUTO: 0.08 M/UL (ref 0.03–0.1)
RETICS/RBC NFR AUTO: 4.6 % (ref 0.3–2)
SERVICE CMNT-IMP: NORMAL
SODIUM SERPL-SCNC: 144 MMOL/L (ref 136–145)
SP GR UR REFRACTOMETRY: 1.01 (ref 1–1.02)
TROPONIN I SERPL-MCNC: <0.02 NG/ML (ref 0.02–0.05)
TROPONIN I SERPL-MCNC: <0.02 NG/ML (ref 0.02–0.05)
UROBILINOGEN UR QL STRIP.AUTO: 0.2 EU/DL (ref 0.2–1)
WBC # BLD AUTO: 8.4 K/UL (ref 4.3–11.1)
WBC # BLD AUTO: 8.7 K/UL (ref 4.3–11.1)
WBC URNS QL MICRO: ABNORMAL /HPF

## 2017-01-17 PROCEDURE — 83010 ASSAY OF HAPTOGLOBIN QUANT: CPT | Performed by: INTERNAL MEDICINE

## 2017-01-17 PROCEDURE — 85025 COMPLETE CBC W/AUTO DIFF WBC: CPT | Performed by: INTERNAL MEDICINE

## 2017-01-17 PROCEDURE — 74011000258 HC RX REV CODE- 258: Performed by: NURSE PRACTITIONER

## 2017-01-17 PROCEDURE — 85027 COMPLETE CBC AUTOMATED: CPT | Performed by: INTERNAL MEDICINE

## 2017-01-17 PROCEDURE — 65660000000 HC RM CCU STEPDOWN

## 2017-01-17 PROCEDURE — 84484 ASSAY OF TROPONIN QUANT: CPT | Performed by: INTERNAL MEDICINE

## 2017-01-17 PROCEDURE — 36591 DRAW BLOOD OFF VENOUS DEVICE: CPT

## 2017-01-17 PROCEDURE — 82728 ASSAY OF FERRITIN: CPT | Performed by: NURSE PRACTITIONER

## 2017-01-17 PROCEDURE — 80076 HEPATIC FUNCTION PANEL: CPT | Performed by: INTERNAL MEDICINE

## 2017-01-17 PROCEDURE — 74011250636 HC RX REV CODE- 250/636: Performed by: INTERNAL MEDICINE

## 2017-01-17 PROCEDURE — 74011000250 HC RX REV CODE- 250: Performed by: INTERNAL MEDICINE

## 2017-01-17 PROCEDURE — 80048 BASIC METABOLIC PNL TOTAL CA: CPT | Performed by: INTERNAL MEDICINE

## 2017-01-17 PROCEDURE — 83615 LACTATE (LD) (LDH) ENZYME: CPT | Performed by: INTERNAL MEDICINE

## 2017-01-17 PROCEDURE — 85046 RETICYTE/HGB CONCENTRATE: CPT | Performed by: INTERNAL MEDICINE

## 2017-01-17 PROCEDURE — 83735 ASSAY OF MAGNESIUM: CPT | Performed by: INTERNAL MEDICINE

## 2017-01-17 PROCEDURE — 74011250637 HC RX REV CODE- 250/637: Performed by: INTERNAL MEDICINE

## 2017-01-17 PROCEDURE — 83021 HEMOGLOBIN CHROMOTOGRAPHY: CPT | Performed by: NURSE PRACTITIONER

## 2017-01-17 PROCEDURE — 82550 ASSAY OF CK (CPK): CPT | Performed by: INTERNAL MEDICINE

## 2017-01-17 RX ORDER — SODIUM CHLORIDE 450 MG/100ML
175 INJECTION, SOLUTION INTRAVENOUS CONTINUOUS
Status: DISCONTINUED | OUTPATIENT
Start: 2017-01-17 | End: 2017-01-19 | Stop reason: HOSPADM

## 2017-01-17 RX ORDER — MUPIROCIN 20 MG/G
OINTMENT TOPICAL 2 TIMES DAILY
Status: DISCONTINUED | OUTPATIENT
Start: 2017-01-17 | End: 2017-01-19 | Stop reason: HOSPADM

## 2017-01-17 RX ADMIN — HYDROMORPHONE HYDROCHLORIDE 1 MG: 1 INJECTION, SOLUTION INTRAMUSCULAR; INTRAVENOUS; SUBCUTANEOUS at 11:58

## 2017-01-17 RX ADMIN — SODIUM CHLORIDE 150 ML/HR: 900 INJECTION, SOLUTION INTRAVENOUS at 08:33

## 2017-01-17 RX ADMIN — HYDROMORPHONE HYDROCHLORIDE 1 MG: 1 INJECTION, SOLUTION INTRAMUSCULAR; INTRAVENOUS; SUBCUTANEOUS at 17:52

## 2017-01-17 RX ADMIN — SODIUM CHLORIDE 12.5 MG: 9 INJECTION INTRAMUSCULAR; INTRAVENOUS; SUBCUTANEOUS at 17:52

## 2017-01-17 RX ADMIN — OXYCODONE HYDROCHLORIDE 80 MG: 20 TABLET, FILM COATED, EXTENDED RELEASE ORAL at 20:39

## 2017-01-17 RX ADMIN — MUPIROCIN: 20 OINTMENT TOPICAL at 18:12

## 2017-01-17 RX ADMIN — MUPIROCIN: 20 OINTMENT TOPICAL at 07:55

## 2017-01-17 RX ADMIN — HYDROMORPHONE HYDROCHLORIDE 1 MG: 1 INJECTION, SOLUTION INTRAMUSCULAR; INTRAVENOUS; SUBCUTANEOUS at 05:13

## 2017-01-17 RX ADMIN — SODIUM CHLORIDE 12.5 MG: 9 INJECTION INTRAMUSCULAR; INTRAVENOUS; SUBCUTANEOUS at 05:13

## 2017-01-17 RX ADMIN — SODIUM CHLORIDE 12.5 MG: 9 INJECTION INTRAMUSCULAR; INTRAVENOUS; SUBCUTANEOUS at 01:12

## 2017-01-17 RX ADMIN — OXYCODONE HYDROCHLORIDE 80 MG: 20 TABLET, FILM COATED, EXTENDED RELEASE ORAL at 08:33

## 2017-01-17 RX ADMIN — SODIUM CHLORIDE 150 ML/HR: 450 INJECTION, SOLUTION INTRAVENOUS at 22:14

## 2017-01-17 RX ADMIN — SODIUM CHLORIDE 12.5 MG: 9 INJECTION INTRAMUSCULAR; INTRAVENOUS; SUBCUTANEOUS at 22:04

## 2017-01-17 RX ADMIN — FAMOTIDINE 20 MG: 10 INJECTION, SOLUTION INTRAVENOUS at 08:34

## 2017-01-17 RX ADMIN — FOLIC ACID 1 MG: 1 TABLET ORAL at 08:34

## 2017-01-17 RX ADMIN — HYDROMORPHONE HYDROCHLORIDE 1 MG: 1 INJECTION, SOLUTION INTRAMUSCULAR; INTRAVENOUS; SUBCUTANEOUS at 22:04

## 2017-01-17 RX ADMIN — HYDROXYUREA 500 MG: 500 CAPSULE ORAL at 17:57

## 2017-01-17 RX ADMIN — HEPARIN SODIUM 5000 UNITS: 5000 INJECTION, SOLUTION INTRAVENOUS; SUBCUTANEOUS at 05:13

## 2017-01-17 RX ADMIN — SODIUM CHLORIDE 150 ML/HR: 450 INJECTION, SOLUTION INTRAVENOUS at 11:18

## 2017-01-17 RX ADMIN — FAMOTIDINE 20 MG: 10 INJECTION, SOLUTION INTRAVENOUS at 20:39

## 2017-01-17 RX ADMIN — SODIUM CHLORIDE 150 ML/HR: 900 INJECTION, SOLUTION INTRAVENOUS at 01:11

## 2017-01-17 RX ADMIN — HYDROXYUREA 500 MG: 500 CAPSULE ORAL at 08:55

## 2017-01-17 RX ADMIN — HEPARIN SODIUM 5000 UNITS: 5000 INJECTION, SOLUTION INTRAVENOUS; SUBCUTANEOUS at 22:04

## 2017-01-17 RX ADMIN — HYDROMORPHONE HYDROCHLORIDE 1 MG: 1 INJECTION, SOLUTION INTRAMUSCULAR; INTRAVENOUS; SUBCUTANEOUS at 01:12

## 2017-01-17 RX ADMIN — SODIUM CHLORIDE 12.5 MG: 9 INJECTION INTRAMUSCULAR; INTRAVENOUS; SUBCUTANEOUS at 11:58

## 2017-01-17 NOTE — PROGRESS NOTES
Hospitalist Progress Note    2017  Admit Date: 2017  2:19 PM   NAME: Ammon Muro   :  1975   MRN:  [id number]   Attending: Cody Lion, *  PCP:  Usman García MD      Admitted for:sickle cell crisis    SUBJECTIVE:   Patient this morning states pain doing better, still requiring IV dilaudid. Denies any shortness of breath no chest pain no palpitations. 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  No relief initially in the ER    Review of Systems negative with exception of pertinent positives noted above  Past medical history unchanged from H&P    PHYSICAL EXAM     Visit Vitals    /84 (BP 1 Location: Right arm, BP Patient Position: At rest)    Pulse 68    Temp 98.4 °F (36.9 °C)    Resp 18    Ht 5' 10\" (1.778 m)    Wt 66.4 kg (146 lb 4.8 oz)    SpO2 100%    BMI 20.99 kg/m2      Temp (24hrs), Av.7 °F (37.1 °C), Min:98.2 °F (36.8 °C), Max:100 °F (37.8 °C)    Oxygen Therapy  O2 Sat (%): 100 % (17 0904)  Pulse via Oximetry: 81 beats per minute (17 1752)  O2 Device: Room air (patient removed NC) (17 0738)  O2 Flow Rate (L/min): 2 l/min (17 2045)    Intake/Output Summary (Last 24 hours) at 17 1200  Last data filed at 17 2347   Gross per 24 hour   Intake              113 ml   Output              600 ml   Net             -487 ml        General: Mild painful distress  mucous membranes pink and moist acyanotic anicteric  Neck:  Supple full range of motion  Lungs:  Air entry equal bilaterally, no crepitations rales rhonchi   Heart:  Regular rate and rhythm,  No murmur, rub, or gallop  Abdomen: Soft, Non distended, Non tender, no rebound guarding Positive bowel sounds  Extremities: No cyanosis, clubbing or edema  Neurologic:  No focal deficits  Musculoskeletal: no   Joint swelling tenderness erythema  Skin:  No erythema, rashes noted          LAB  No results for input(s): GLUCPOC in the last 72 hours. No lab exists for component: GLPOC   Recent Labs      01/17/17   0500   WBC  8.7   HGB  6.4*   HCT  19.1*   PLT  238     Recent Labs      01/17/17   0335   NA  144   K  3.6   CL  111*   CO2  25   GLU  129*   BUN  7   CREA  0.69*   MG  2.0   CA  7.8*   TROIQ  <0.02*   ALB  3.1*   TBILI  0.9   ALT  46   SGOT  28       EKG and imaging reviewed personally by me  Xr Chest Pa Lat    Result Date: 1/16/2017  Chest 2 view CLINICAL INDICATION: Acute moderate midline chest pain, chills, sickle cell disease COMPARISON: 12/19/2016, 6/9/2016 TECHNIQUE: Upright PA and lateral views of the chest FINDINGS: Lung volumes are well inflated. Cardiomediastinal silhouette and hilar contours are stable with the left portacatheter remaining. The lungs are clear. No acute osseous abnormalities are seen. IMPRESSION: No acute disease. Results for orders placed or performed during the hospital encounter of 01/16/17   EKG, 12 LEAD, INITIAL   Result Value Ref Range    Systolic BP  mmHg    Diastolic BP  mmHg    Ventricular Rate 76 BPM    Atrial Rate 76 BPM    P-R Interval 168 ms    QRS Duration 90 ms    Q-T Interval 386 ms    QTC Calculation (Bezet) 434 ms    Calculated P Axis 68 degrees    Calculated R Axis 32 degrees    Calculated T Axis 71 degrees    Diagnosis       Normal sinus rhythm  Minimal voltage criteria for LVH, may be normal variant  Borderline ECG  Confirmed by ST SENAIT NICHOLS MD (), YOAN SORIA ([phone]) on 1/16/2017 12:36:28 PM       XR Results (most recent):    Results from [place] encounter on 01/16/17   XR CHEST PA LAT   Narrative Chest 2 view    CLINICAL INDICATION: Acute moderate midline chest pain, chills, sickle cell  disease    COMPARISON: 12/19/2016, 6/9/2016    TECHNIQUE: Upright PA and lateral views of the chest     FINDINGS: Lung volumes are well inflated.    Cardiomediastinal silhouette and  hilar contours are stable with the left portacatheter remaining. The lungs are  clear. No acute osseous abnormalities are seen. Impression IMPRESSION: No acute disease.           Active problems  Active Hospital Problems    Diagnosis Date Noted    Sickle cell anemia with crisis ([facility] [address] 01/16/2017       ASSESSMENT  AND PLAN      · Sickle cell anemia with crisis - will continue pain medications, will continue long acting medications and IV pain medications today  · Anemia - lower than normal, with history of IRon overload will defer to hematology need for transfusion with Hb down to 6.4 with baseline of 8-9    DVT Prophylaxis: heparin  Patient remains high risk for decompensation, given     Signed By: hSerry Zamora MD     January 17, 2017

## 2017-01-17 NOTE — MED STUDENT NOTES
*ATTENTION:  This note has been created by a medical student for educational purposes only. Please do not refer to the content of this note for clinical decision-making, billing, or other purposes. Please see attending physicians note to obtain clinical information on this patient. *             Inpatient Hematology / Oncology Consult Note    Reason for Consult:  Sickle cell anemia with crisis [facility])  Referring Physician:  Mary Alice Jain, *    History of Present Illness:  Mr. Jacinto Jacobs is a 35 y.o. male admitted on 1/16/2017 with a primary diagnosis of sickle cell crisis. Patient has a PMH significant for sickle cell anemia and beta thalassemia. He sees Dr. Lio Taylor at [facility] monthly for management of sickle cell- last seen 12/30/2016. Patient was last hospitilzed for a sickle cell crisis that included chest pain syndrome in 06/2017 although he reports having multiple episodes of pain crisis over the last few months requiring ED visits. His last blood transfusion was 12/05/2016 and his baseline hemoglobin is 7-8. Per outpatient visit on 12/21: Paige Fraser is a 35 y.o. Male who presented with sickle cell and pain medication refills. He was last seen by Dr. Lio Taylor 12.05 and transfused 2 units PRBC for hgb of 6.9. His Exjade was also changed to [place]. The plan is to continue to wean down Oxycontin as tolerated. Unfortunately, Mr. Jacinto Jacobs presents today in pain crisis with pain level 8/10. He states that he has been vomiting for the past few days \"off and on\" and has been unable to take his pain medication as scheduled. \"     Patient recently presented to ED on 01/16/2017, with abdominal pain, nausea, non-bloody diarrhea, and several episodes of vomiting to which preventing him taking his PO medications. He also complained of chest pain, dyspnea, chills, and myalgias. EKG showed NSR with mild LVH and troponins were negative. Patient was managed with IVF, dilaudid for pain, and ativan and phenergan for nausea. His hemoglobin was initially 7.9 on admission with increased reticulocyte count of 4.3. He was admitted for sickle cell crisis and intractable vomiting. Review of Systems:  Constitutional Positive for chills and fatigue. Denies fever, weight loss, appetite changes, night sweats. HEENT Denies trauma, blurry vision, hearing loss, ear pain, nosebleeds, sore throat, and neck pain. Skin Denies lesions or rashes. Lungs Positive for dyspnea. Denies cough, sputum production or hemoptysis. Cardiovascular Denies chest pain, palpitations, or lower extremity edema. Gastrointestinal Positive for nausea, vomiting, non-bloody diarrhea, and abdominal pain. Denies bloody or black stools.  Denies dysuria, frequency or hesitancy of urination. Neuro Positive for mild headache and occ dizziness. Denies visual changes or ataxia. Denies tingling, tremors, sensory change, speech change, or focal weakness. Hematology Denies easy bruising or bleeding, denies gingival bleeding or epistaxis. Endo Denies heat/cold intolerance, denies diabetes or thyroid abnormalities. MSK Positive for arthralgias and myalgias. Denies back pain or frequent falls. Psychiatric/Behavioral Denies depression and substance abuse. The patient is mildly anxious.        Allergies   Allergen Reactions    Compazine [Prochlorperazine Edisylate] Other (comments)     Also makes him feel funny    Morphine Other (comments)     Makes him feel funny    Reglan [Metoclopramide] Other (comments)     \"feel funny\"    Zofran [Ondansetron Hcl (Pf)] Other (comments)     Make him feel funny     Past Medical History   Diagnosis Date    Chronic pain     HTN (hypertension)     Ill-defined condition      sickle cell    Paroxysmal SVT [facility]supraventricular tachycardia) ([facility] 7/9/2016    Sickle cell disease ([facility]      Past Surgical History   Procedure Laterality Date    Hx cholecystectomy      Hc port life sngle lumen 5013       to L CW    Hc penile impl dura ii positinble      Hx other surgical       penile inplant    Hx vascular access       Family History   Problem Relation Age of Onset    Hypertension Other     Diabetes Father     Stroke Father     Sickle Cell Anemia Sister      Social History     Social History    Marital status:      Spouse name: N/A    Number of children: N/A    Years of education: N/A     Occupational History    Not on file.      Social History Main Topics    Smoking status: Never Smoker    Smokeless tobacco: Never Used    Alcohol use No    Drug use: No    Sexual activity: Yes     Other Topics Concern    Not on file     Social History Narrative     Current Facility-Administered Medications   Medication Dose Route Frequency Provider Last Rate Last Dose    mupirocin (BACTROBAN) 2 % ointment   Both Nostrils BID Ashish Heart MD        folic acid (FOLVITE) tablet 1 mg  1 mg Oral DAILY Ashish Heart MD        hydroxyurea [facility]) chemo cap 500 mg  500 mg Oral BID Juana Razo MD   500 mg at 01/16/17 2100    oxyCODONE IR (OXY-IR) immediate release tablet 30 mg  30 mg Oral Q4H PRN Juana Razo MD        oxyCODONE ER (OxyCONTIN) tablet 80 mg  80 mg Oral Q12H Ashish Zaidi MD   80 mg at 01/16/17 2053    HYDROmorphone (PF) (DILAUDID) injection 1 mg  1 mg IntraVENous Q4H PRN Juana Razo MD   1 mg at 01/17/17 0513    0.9% sodium chloride infusion  150 mL/hr IntraVENous CONTINUOUS Juana Razo  mL/hr at 01/17/17 0111 150 mL/hr at 01/17/17 0111    promethazine (PHENERGAN) with saline injection 12.5 mg  12.5 mg IntraVENous Q4H PRN Juana Razo MD   12.5 mg at 01/17/17 0513    promethazine (PHENERGAN) suppository 25 mg  25 mg Rectal Q6H PRN Juana Razo MD        famotidine (PF) (PEPCID) injection 20 mg  20 mg IntraVENous Q12H Juana Razo MD   20 mg at 01/16/17 2053    heparin (porcine) injection 5,000 Units 5,000 Units SubCUTAneous Q8H Ashish Esposito MD   5,000 Units at 17 3369    acetaminophen (TYLENOL) tablet 650 mg  650 mg Oral Q6H PRN Ricki Dewitt MD           OBJECTIVE:  Patient Vitals for the past 8 hrs:   BP Temp Pulse Resp SpO2 Weight   17 0335 121/72 98.4 °F (36.9 °C) 92 18 98 % 146 lb 4.8 oz (66.4 kg)     Temp (24hrs), Av.7 °F (37.1 °C), Min:98.2 °F (36.8 °C), Max:100 °F (37.8 °C)         Physical Exam:  Constitutional: Well developed, well nourished male in no acute distress, lying down comfortably in the hospital bed. HEENT: Normocephalic and atraumatic. Oropharynx is clear, mucous membranes are moist.  Pupils are equal, round, and reactive to light. Extraocular muscles are intact. Sclerae anicteric. Neck supple without JVD. No thyromegaly present. Lymph node Deferred. Skin Warm and dry. No bruising and no rash noted. No erythema. No pallor. Respiratory Lungs are clear to auscultation bilaterally without wheezes, rales or rhonchi, normal air exchange without accessory muscle use. CVS Normal rate, regular rhythm and normal S1 and S2. No murmurs, gallops, or rubs. Abdomen Soft, nontender and nondistended, normoactive bowel sounds. No palpable mass. No hepatosplenomegaly. Neuro Alert and oriented. Grossly nonfocal with no obvious sensory or motor deficits. MSK Normal range of motion in general.  No edema and no tenderness. Psych Pleasant. Slightly anxious.        Labs:    Recent Results (from the past 24 hour(s))   EKG, 12 LEAD, INITIAL    Collection Time: 17 12:05 PM   Result Value Ref Range    Systolic BP  mmHg    Diastolic BP  mmHg    Ventricular Rate 76 BPM    Atrial Rate 76 BPM    P-R Interval 168 ms    QRS Duration 90 ms    Q-T Interval 386 ms    QTC Calculation (Bezet) 434 ms    Calculated P Axis 68 degrees    Calculated R Axis 32 degrees    Calculated T Axis 71 degrees    Diagnosis       Normal sinus rhythm  Minimal voltage criteria for LVH, may be normal variant  Borderline ECG  Confirmed by ST SENAIT NICHOLS MD (), YOAN SORIA ([id number]) on 1/16/2017 12:36:28 PM     CBC WITH AUTOMATED DIFF    Collection Time: 01/16/17  2:50 PM   Result Value Ref Range    WBC 5.6 4.3 - 11.1 K/uL    RBC 2.36 (L) 4.23 - 5.67 M/uL    HGB 7.9 (L) 13.6 - 17.2 g/dL    HCT 23.4 (L) 41.1 - 50.3 %    MCV 99.2 (H) 79.6 - 97.8 FL    MCH 33.5 (H) 26.1 - 32.9 PG    MCHC 33.8 31.4 - 35.0 g/dL    RDW 30.7 (H) 11.9 - 14.6 %    PLATELET 528 922 - 010 K/uL    MPV 9.9 (L) 10.8 - 14.1 FL    NEUTROPHILS 80 (H) 47 - 75 %    LYMPHOCYTES 15 (L) 16 - 44 %    MONOCYTES 1 (L) 3 - 9 %    BLASTS 4 %    NRBC 6.0  WBC    ABS. NEUTROPHILS 4.5 1.7 - 8.2 K/UL    ABS. LYMPHOCYTES 0.8 0.5 - 4.6 K/UL    ABS. MONOCYTES 0.1 0.1 - 1.3 K/UL    RBC COMMENTS SLIGHT  ANISOCYTOSIS + POIKILOCYTOSIS        RBC COMMENTS SLIGHT  TEARDROP CELLS        RBC COMMENTS SLIGHT  MACROCYTOSIS        RBC COMMENTS SLIGHT  OVALOCYTES        PLATELET COMMENTS ADEQUATE      DF AUTOMATED     METABOLIC PANEL, COMPREHENSIVE    Collection Time: 01/16/17  2:50 PM   Result Value Ref Range    Sodium 142 136 - 145 mmol/L    Potassium 3.6 3.5 - 5.1 mmol/L    Chloride 107 98 - 107 mmol/L    CO2 23 21 - 32 mmol/L    Anion gap 12 7 - 16 mmol/L    Glucose 145 (H) 65 - 100 mg/dL    BUN 9 6 - 23 MG/DL    Creatinine 0.74 (L) 0.8 - 1.5 MG/DL    GFR est AA >60 >60 ml/min/1.73m2    GFR est non-AA >60 >60 ml/min/1.73m2    Calcium 9.1 8.3 - 10.4 MG/DL    Bilirubin, total 1.2 (H) 0.2 - 1.1 MG/DL    ALT 65 12 - 65 U/L    AST 49 (H) 15 - 37 U/L    Alk.  phosphatase 79 50 - 136 U/L    Protein, total 8.4 (H) 6.3 - 8.2 g/dL    Albumin 4.1 3.5 - 5.0 g/dL    Globulin 4.3 (H) 2.3 - 3.5 g/dL    A-G Ratio 1.0 (L) 1.2 - 3.5     TROPONIN I    Collection Time: 01/16/17  2:50 PM   Result Value Ref Range    Troponin-I, Qt. <0.02 (L) 0.02 - 0.05 NG/ML   RETICULOCYTE COUNT    Collection Time: 01/16/17  3:01 PM   Result Value Ref Range    Reticulocyte count 4.3 (H) 0.3 - 2.0 %    Absolute Retic Cnt. 0.0989 (H) 0.026 - 0.095 M/ul    Immature Retic Fraction 25.0 (H) 2.3 - 13.4 %    Retic Hgb Conc. 43 (H) 29 - 35 pg   INFLUENZA A & B AG (RAPID TEST)    Collection Time: 01/16/17  5:45 PM   Result Value Ref Range    Influenza A Ag NEGATIVE  NEG      Influenza B Ag NEGATIVE  NEG     LD    Collection Time: 01/16/17  9:05 PM   Result Value Ref Range     (H) 100 - 190 U/L   TROPONIN I    Collection Time: 01/16/17  9:05 PM   Result Value Ref Range    Troponin-I, Qt. <0.02 (L) 0.02 - 0.05 NG/ML   CK WITH MB    Collection Time: 01/16/17  9:05 PM   Result Value Ref Range    CK 41 21 - 215 U/L    CK - MB <0.5 (L) 0.5 - 3.6 ng/ml    CK-MB Index CANNOT BE CALCULATED <2.5 %   MRSA SCREEN - PCR (NASAL)    Collection Time: 01/16/17  9:05 PM   Result Value Ref Range    Special Requests: NO SPECIAL REQUESTS      Culture result:        MRSA target DNA is detected (presumptive positive for MRSA colonization). Culture result:        RESULTS VERIFIED, PHONED TO AND READ BACK BY  JEANINE WEEKS AT 0666 ON 1/17/17     LACTIC ACID, PLASMA    Collection Time: 01/16/17  9:34 PM   Result Value Ref Range    Lactic acid 1.6 0.4 - 2.0 MMOL/L   URINALYSIS W/ RFLX MICROSCOPIC    Collection Time: 01/16/17 11:30 PM   Result Value Ref Range    Color YELLOW      Appearance CLEAR      Specific gravity 1.011 1.001 - 1.023      pH (UA) 6.0 5.0 - 9.0      Protein 30 (A) NEG mg/dL    Glucose NEGATIVE  mg/dL    Ketone NEGATIVE  NEG mg/dL    Bilirubin NEGATIVE  NEG      Blood NEGATIVE  NEG      Urobilinogen 0.2 0.2 - 1.0 EU/dL    Nitrites NEGATIVE  NEG      Leukocyte Esterase NEGATIVE  NEG      WBC 0-3 0 /hpf    RBC 5-10 0 /hpf    Epithelial cells 0-3 0 /hpf    Bacteria 0 0 /hpf    Casts 3-5 0 /lpf   CULTURE, URINE    Collection Time: 01/16/17 11:30 PM   Result Value Ref Range    Special Requests: NO SPECIAL REQUESTS      Culture result:        NO GROWTH AFTER SHORT PERIOD OF INCUBATION.  FURTHER RESULTS TO FOLLOW AFTER OVERNIGHT INCUBATION. TROPONIN I    Collection Time: 01/17/17  1:30 AM   Result Value Ref Range    Troponin-I, Qt. <0.02 (L) 0.02 - 0.05 NG/ML   CK WITH MB    Collection Time: 01/17/17  1:30 AM   Result Value Ref Range    CK 42 21 - 215 U/L    CK - MB <0.5 (L) 0.5 - 3.6 ng/ml    CK-MB Index CANNOT BE CALCULATED <2.5 %   LD    Collection Time: 01/17/17  3:35 AM   Result Value Ref Range     (H) 100 - 190 U/L   CBC W/O DIFF    Collection Time: 01/17/17  3:35 AM   Result Value Ref Range    WBC 8.4 4.3 - 11.1 K/uL    RBC 1.70 (L) 4.23 - 5.67 M/uL    HGB 5.8 (LL) 13.6 - 17.2 g/dL    HCT 17.2 (LL) 41.1 - 50.3 %    MCV 99.4 (H) 79.6 - 97.8 FL    MCH 33.7 (H) 26.1 - 32.9 PG    MCHC 33.9 31.4 - 35.0 g/dL    PLATELET 290 426 - 566 K/uL    MPV 10.5 (L) 10.8 - 40.4 FL   METABOLIC PANEL, BASIC    Collection Time: 01/17/17  3:35 AM   Result Value Ref Range    Sodium 144 136 - 145 mmol/L    Potassium 3.6 3.5 - 5.1 mmol/L    Chloride 111 (H) 98 - 107 mmol/L    CO2 25 21 - 32 mmol/L    Anion gap 8 7 - 16 mmol/L    Glucose 129 (H) 65 - 100 mg/dL    BUN 7 6 - 23 MG/DL    Creatinine 0.69 (L) 0.8 - 1.5 MG/DL    GFR est AA >60 >60 ml/min/1.73m2    GFR est non-AA >60 >60 ml/min/1.73m2    Calcium 7.8 (L) 8.3 - 10.4 MG/DL   MAGNESIUM    Collection Time: 01/17/17  3:35 AM   Result Value Ref Range    Magnesium 2.0 1.8 - 2.4 mg/dL   RETICULOCYTE COUNT    Collection Time: 01/17/17  3:35 AM   Result Value Ref Range    Reticulocyte count 4.6 (H) 0.3 - 2.0 %    Absolute Retic Cnt. 0.0780 0.026 - 0.095 M/ul    Immature Retic Fraction 27.9 (H) 2.3 - 13.4 %    Retic Hgb Conc. 38 (H) 29 - 35 pg   HEPATIC FUNCTION PANEL    Collection Time: 01/17/17  3:35 AM   Result Value Ref Range    Protein, total 6.4 6.3 - 8.2 g/dL    Albumin 3.1 (L) 3.5 - 5.0 g/dL    Globulin 3.3 2.3 - 3.5 g/dL    A-G Ratio 0.9 (L) 1.2 - 3.5      Bilirubin, total 0.9 0.2 - 1.1 MG/DL    Bilirubin, direct 0.2 <0.4 MG/DL    Alk.  phosphatase 60 50 - 136 U/L    AST 28 15 - 37 U/L    ALT 46 12 - 65 U/L   HAPTOGLOBIN    Collection Time: 01/17/17  3:35 AM   Result Value Ref Range    Haptoglobin <8 (L) 30 - 200 mg/dL   TROPONIN I    Collection Time: 01/17/17  3:35 AM   Result Value Ref Range    Troponin-I, Qt. <0.02 (L) 0.02 - 0.05 NG/ML   CK WITH MB    Collection Time: 01/17/17  3:35 AM   Result Value Ref Range    CK 39 21 - 215 U/L    CK - MB 0.6 0.5 - 3.6 ng/ml    CK-MB Index 1.5 <2.5 %   CBC WITH AUTOMATED DIFF    Collection Time: 01/17/17  5:00 AM   Result Value Ref Range    WBC 8.7 4.3 - 11.1 K/uL    RBC 1.89 (L) 4.23 - 5.67 M/uL    HGB 6.4 (LL) 13.6 - 17.2 g/dL    HCT 19.1 (LL) 41.1 - 50.3 %    .1 (H) 79.6 - 97.8 FL    MCH 33.9 (H) 26.1 - 32.9 PG    MCHC 33.5 31.4 - 35.0 g/dL    PLATELET 149 269 - 343 K/uL    MPV 10.6 (L) 10.8 - 14.1 FL    NEUTROPHILS 13 (L) 47 - 75 %    LYMPHOCYTES 86 (H) 16 - 44 %    BASOPHILS 1 0 - 2 %    NRBC 20.0  WBC    ABS. NEUTROPHILS 1.1 (L) 1.7 - 8.2 K/UL    ABS. LYMPHOCYTES 7.5 (H) 0.5 - 4.6 K/UL    ABS. BASOPHILS 0.1 0.0 - 0.2 K/UL    RBC COMMENTS MODERATE  ANISOCYTOSIS + POIKILOCYTOSIS        RBC COMMENTS SLIGHT  OVALOCYTES        RBC COMMENTS SLIGHT  POLYCHROMASIA        RBC COMMENTS SLIGHT  MACROCYTOSIS        PLATELET COMMENTS ADEQUATE      DF AUTOMATED         Imaging:    Chest X ray on 01/16/2017     CLINICAL INDICATION: Acute moderate midline chest pain, chills, sickle cell  disease  COMPARISON: 12/19/2016, 6/9/2016     TECHNIQUE: Upright PA and lateral views of the chest      FINDINGS: Lung volumes are well inflated. Cardiomediastinal silhouette and  hilar contours are stable with the left portacatheter remaining. The lungs are  clear.      No acute osseous abnormalities are seen.     IMPRESSION: No acute disease.     ASSESSMENT:  Problem List  Date Reviewed: 3/5/2012          Codes Class Noted    Sickle cell anemia with crisis [facility]) ICD-10-CM: D57.00  ICD-9-CM: 282.62  1/16/2017        Hypokalemia ICD-10-CM: E87.6  ICD-9-CM: 276.8 7/9/2016        Paroxysmal SVT (supraventricular tachycardia) (HCC) ICD-10-CM: I47.1  ICD-9-CM: 427.0  7/9/2016        Hypomagnesemia ICD-10-CM: E83.42  ICD-9-CM: 275.2  7/9/2016        Sickle cell anemia (HCC) ICD-10-CM: D57.1  ICD-9-CM: 282.60  4/6/2016        Sickle cell crisis ([facility] ICD-10-CM: D57.00  ICD-9-CM: 282.62  4/5/2016        leukocytosis - most likely reactive ICD-10-CM: D72.829  ICD-9-CM: 288.60  1/30/2016        Diarrhea ICD-10-CM: R19.7  ICD-9-CM: 787.91  9/27/2014        Sickle cell pain crisis ([place] [address].) ICD-10-CM: D57.00  ICD-9-CM: 282.62  10/25/2012        Hyperbilirubinemia ICD-10-CM: E80.6  ICD-9-CM: 782.4  9/20/2012    Overview Signed 9/20/2012  1:37 PM by Eula Rios     Related to sickle cell crisis             Essential hypertension, benign ICD-10-CM: I10  ICD-9-CM: 401.1  6/23/2012        Hemolytic crisis ([facility] ICD-10-CM: D65  ICD-9-CM: 286.6  3/24/2012    Overview Signed 3/24/2012  2:53 PM by Raghu Porter     Sickle cell with anemia             HTN (hypertension) (Chronic) ICD-10-CM: I10  ICD-9-CM: 401.9  3/2/2012        Leukocytosis - chronic reactive ICD-10-CM: O53.514  ICD-9-CM: 288.60  9/16/2011                RECOMMENDATIONS:    Sickle cell anemia with pain crisis  01/17: Continue hydroxyuria and folic acid. Check ferritin level and hemoglobin electrophoresis. Transfuse packed PBC's if hgb falls below 6. NC oxygen and 1/2 NS for respiratory status and hydration. Control pain with oxycodone and PRN dilaudid. Intractable vomiting and diarrhea  01/17: IV fluids, IV pepcid, and PRN phenergan suppository for supportive management. C-diff stool culture pending. DVT profy  Heparin 5,000 units TID     Lab studies and imaging studies were personally reviewed. Pertinent old records were reviewed from Dr. Drew Hoffmann at [facility]. Patient seen with and Case discussed with Dr. Symone Looney. Thank you for allowing us to participate in the care of Mr. Cabrera.             [facility]  [address] [place] [address]  Office : [phone]  Fax : [phone]     Attending Addendum:  I personally evaluated the patient with Elmer Young,  and agree with the assessment, findings and plan as documented. Please change his IV fluids to 1/2 normal saline and transfuse PRBCs if his Hemoglobin falls below 6 gm/dl. We will continue to follow this patient.               Doretha Sales MD  [address] [place] [address]  Office : [phone]  Fax : [phone]

## 2017-01-17 NOTE — PROGRESS NOTES
END OF SHIFT NOTE:    Intake/Output  01/16 1901 - 01/17 0700  In: 113 [I.V.:113]  Out: 600 [Urine:600]   Voiding: YES  Catheter: NO  Drain:              Stool:  0 occurrences. Emesis:  0 occurrences. VITAL SIGNS  Patient Vitals for the past 12 hrs:   Temp Pulse Resp BP SpO2   01/17/17 0335 98.4 °F (36.9 °C) 92 18 121/72 98 %   01/16/17 2344 98.2 °F (36.8 °C) 64 18 127/74 98 %   01/16/17 1828 100 °F (37.8 °C) 84 18 112/70 96 %       Pain Assessment  Pain 1  Pain Scale 1: Numeric (0 - 10) (01/17/17 0521)  Pain Intensity 1: 7 (01/17/17 0521)  Patient Stated Pain Goal: 0 (01/16/17 2055)  Pain Reassessment 1: Yes (01/17/17 0220)  Pain Onset 1: pta (01/17/17 0521)  Pain Location 1: Arm;Leg (01/17/17 0521)  Pain Orientation 1: Left;Right (01/17/17 0521)  Pain Description 1: Aching (01/17/17 0521)  Pain Intervention(s) 1: Medication (see MAR) (01/17/17 0521)    Ambulating  NO    Additional Information: hgb 5.9 MD notified, blood redraw bc of significant drop. New hgb is 6.4, MD notified, wants to hold off on transfusing. Shift report will be given to oncoming nurse at the bedside.     Brian Foreman RN

## 2017-01-17 NOTE — H&P
[address]   HISTORY AND PHYSICAL       Name:  Gershon Kussmaul   MR#:  [id number]   :  1975   Account #:  [de-identified]   Date of Adm:  2017       TIME OF ADMISSION: 6 p.m. CHIEF COMPLAINT: Chest pain, nausea, and vomiting. HISTORY OF PRESENT ILLNESS: This is a 49-year-old male patient   who has a past medical history of sickle cell disease and   hypertension, who came into the emergency room with a chief   complaint of chest pain, shortness of breath, nausea, and   vomiting. According to the patient, yesterday morning, he started having   persistent abdominal discomfort and multiple episodes of nausea   and vomiting. Because of that, he was unable to take the regular   dose of his pain medication and he started having generalized   pain in his joints. Eventually, he started having shortness of   breath and he developed chest pain. He describes his chest pain   as sharp in nature. He rates it as 9/10 in intensity and denies   any radiation. He says that he has been having chills, but he   denies fever. He denies abdominal pain, difficulty with   urination, cough, sputum production, but he complains of   multiple episodes of watery diarrhea, which according to him,   has happened in the past. He decided to come into the emergency   room where he was found to have a blood pressure of 157/82,   heart rate of 81, respiratory rate of 18, O2 saturation of 95%. He was awake and alert and he was complaining of retrosternal   chest pain. An EKG was done and reported sinus rhythm with   evidence of left ventricular hypertrophy, but no evidence of   acute ischemia. He received IV Dilaudid, IV fluids,   supplementary oxygen. His initial blood workup reported a   hemoglobin of 7.9, with a normal platelet count, a reticulocyte   count of 4.3. His kidney function was within normal limits and   his bilirubin was 1.2. The patient had persistent pain.  He was   considered to have a sickle cell crisis and he was presented to   the hospitalist team for admission. REVIEW OF SYSTEMS: A review of 14 systems was performed and it   was negative except for the findings that are reported in the   HPI. PAST MEDICAL HISTORY   1. 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 2. Transfusional iron overload. 3. Hypertension. PAST SURGICAL HISTORY   1. Port placement. 2. Penis implant. ALLERGIES: THE PATIENT CLAIMS TO BE ALLERGIC TO   1. MORPHINE. 2. ZOFRAN. 3. COMPAZINE. 4. REGLAN. FAMILY HISTORY: Positive for sickle cell anemia, hypertension,   diabetes, and CVA. PHYSICAL EXAMINATION   VITAL SIGNS: Blood pressure 112/70, heart rate 84, respiratory   rate of 18, temperature of 100 Fahrenheit, O2 saturation 96%. EYES: PERRLA. EARS, NOSE, MOUTH, AND THROAT: There is no evidence of   pharyngeal erythema, edema, or purulent exudates. RESPIRATORY: Clear breath sounds bilaterally. CARDIOVASCULAR: Regular rate and rhythm with no murmurs. GASTROINTESTINAL: Abdomen is soft and nontender with positive   bowel sounds. GENITOURINARY: Unremarkable. MUSCULOSKELETAL: The patient has some pain in the proximal part   of his legs   SKIN: No evidence of active skin lesions. NEUROLOGIC: The patient is alert and oriented x3, with no   evidence of focal weakness. PSYCHIATRIC: Normal mood. HEMATOLOGIC/LYMPHATIC/IMMUNOLOGIC: No evidence of active   bleeding. No abnormal lymphadenopathies. LABS AND IMAGE RESULT: White blood cell count 5.6, hemoglobin   7.9, platelet count 999. Sodium 142, potassium 3.6, chloride   107, anion gap 12, glucose 145, BUN 9, creatinine 0.74, calcium   9.1. Total bilirubin 1.2. LDH pending to be done. Troponin I   less than 0.02. Chest x-ray seen and analyzed by me: No evidence of lung   infiltrates, no cardiomegaly. EKG, seen and analyzed by me, normal sinus rhythm.  Evidence of   left ventricular hypertrophy, no evidence of acute ischemia. ASSESSMENT: This is a 42-year-old male patient with past medical   history of sickle cell disease, who came into the emergency room   complaining of chest pain, nausea, vomiting, and diarrhea. He   will be admitted to the hospital and his initial length of stay   is calculated to be more than 2 midnights. He is at high risk of   further complications. PLAN   1. Possible sickle cell crisis. The patient will receive IV   fluids, supplementary oxygen by nasal cannula and IV and his   regular dose of oral pain medication. He is using 80 mg of long-  acting oxycodone twice per day and 30 mg q.4-6 hours of short-  acting oxycodone. He is not requiring a blood transfusion at   this time and we will avoid a blood transfusion unless   completely necessary. He already has a history of iron overload. Hematology will be consulted. I will order LDH level,   haptoglobin, and a new set of blood workup for the morning. 2. Possible gastroenteritis. The patient complains of nausea,   vomiting, and diarrhea. He will receive IV Pepcid, IV Phenergan,   and I will order a stool workup. I will also check his urine to   rule out the possibility of infection. He has had a temperature   of 100.0 when he arrived to the oncology floor. We will monitor   his vital signs. 3. History of hypertension. He is taking lisinopril and   metoprolol at home. We will hold his blood pressure medication   because his vital signs are showing a borderline blood pressure   at the present time. 4. Deep vein thrombosis prophylaxis. Subcutaneous heparin.         MD Susan Alonso / IVAN   D:  01/16/2017   19:44   T:  01/16/2017   20:24   Job #:  [id number]

## 2017-01-18 PROBLEM — E83.111 IRON OVERLOAD DUE TO REPEATED RED BLOOD CELL TRANSFUSIONS: Status: ACTIVE | Noted: 2017-01-18

## 2017-01-18 LAB
ANION GAP BLD CALC-SCNC: 8 MMOL/L (ref 7–16)
BUN SERPL-MCNC: 7 MG/DL (ref 6–23)
CALCIUM SERPL-MCNC: 8 MG/DL (ref 8.3–10.4)
CHLORIDE SERPL-SCNC: 109 MMOL/L (ref 98–107)
CO2 SERPL-SCNC: 25 MMOL/L (ref 21–32)
CREAT SERPL-MCNC: 0.71 MG/DL (ref 0.8–1.5)
GLUCOSE SERPL-MCNC: 105 MG/DL (ref 65–100)
HCT VFR BLD AUTO: 18.1 % (ref 41.1–50.3)
HGB BLD-MCNC: 6.2 G/DL (ref 13.6–17.2)
MCH RBC QN AUTO: 34.6 PG (ref 26.1–32.9)
MCHC RBC AUTO-ENTMCNC: 34.3 G/DL (ref 31.4–35)
MCV RBC AUTO: 101.1 FL (ref 79.6–97.8)
PLATELET # BLD AUTO: 194 K/UL (ref 150–450)
PMV BLD AUTO: 9.9 FL (ref 10.8–14.1)
POTASSIUM SERPL-SCNC: 3.9 MMOL/L (ref 3.5–5.1)
RBC # BLD AUTO: 1.79 M/UL (ref 4.23–5.67)
SODIUM SERPL-SCNC: 142 MMOL/L (ref 136–145)
WBC # BLD AUTO: 7.3 K/UL (ref 4.3–11.1)

## 2017-01-18 PROCEDURE — 36591 DRAW BLOOD OFF VENOUS DEVICE: CPT

## 2017-01-18 PROCEDURE — 74011250637 HC RX REV CODE- 250/637: Performed by: INTERNAL MEDICINE

## 2017-01-18 PROCEDURE — 85027 COMPLETE CBC AUTOMATED: CPT | Performed by: HOSPITALIST

## 2017-01-18 PROCEDURE — 74011000258 HC RX REV CODE- 258: Performed by: NURSE PRACTITIONER

## 2017-01-18 PROCEDURE — 65270000029 HC RM PRIVATE

## 2017-01-18 PROCEDURE — 80048 BASIC METABOLIC PNL TOTAL CA: CPT | Performed by: HOSPITALIST

## 2017-01-18 PROCEDURE — 74011000250 HC RX REV CODE- 250: Performed by: INTERNAL MEDICINE

## 2017-01-18 PROCEDURE — 74011250636 HC RX REV CODE- 250/636: Performed by: INTERNAL MEDICINE

## 2017-01-18 PROCEDURE — 74011250637 HC RX REV CODE- 250/637: Performed by: NURSE PRACTITIONER

## 2017-01-18 RX ORDER — PROMETHAZINE HYDROCHLORIDE 25 MG/1
25 TABLET ORAL
Status: DISCONTINUED | OUTPATIENT
Start: 2017-01-18 | End: 2017-01-19 | Stop reason: HOSPADM

## 2017-01-18 RX ADMIN — SODIUM CHLORIDE 150 ML/HR: 450 INJECTION, SOLUTION INTRAVENOUS at 12:17

## 2017-01-18 RX ADMIN — MUPIROCIN: 20 OINTMENT TOPICAL at 16:39

## 2017-01-18 RX ADMIN — SODIUM CHLORIDE 175 ML/HR: 450 INJECTION, SOLUTION INTRAVENOUS at 19:28

## 2017-01-18 RX ADMIN — HEPARIN SODIUM 5000 UNITS: 5000 INJECTION, SOLUTION INTRAVENOUS; SUBCUTANEOUS at 22:26

## 2017-01-18 RX ADMIN — OXYCODONE HYDROCHLORIDE 80 MG: 20 TABLET, FILM COATED, EXTENDED RELEASE ORAL at 08:25

## 2017-01-18 RX ADMIN — SODIUM CHLORIDE 12.5 MG: 9 INJECTION INTRAMUSCULAR; INTRAVENOUS; SUBCUTANEOUS at 12:17

## 2017-01-18 RX ADMIN — FAMOTIDINE 20 MG: 10 INJECTION, SOLUTION INTRAVENOUS at 20:38

## 2017-01-18 RX ADMIN — HYDROXYUREA 500 MG: 500 CAPSULE ORAL at 10:19

## 2017-01-18 RX ADMIN — HYDROMORPHONE HYDROCHLORIDE 1 MG: 1 INJECTION, SOLUTION INTRAMUSCULAR; INTRAVENOUS; SUBCUTANEOUS at 08:53

## 2017-01-18 RX ADMIN — HEPARIN SODIUM 5000 UNITS: 5000 INJECTION, SOLUTION INTRAVENOUS; SUBCUTANEOUS at 16:37

## 2017-01-18 RX ADMIN — FOLIC ACID 1 MG: 1 TABLET ORAL at 08:25

## 2017-01-18 RX ADMIN — HYDROMORPHONE HYDROCHLORIDE 1 MG: 1 INJECTION, SOLUTION INTRAMUSCULAR; INTRAVENOUS; SUBCUTANEOUS at 04:00

## 2017-01-18 RX ADMIN — SODIUM CHLORIDE 150 ML/HR: 450 INJECTION, SOLUTION INTRAVENOUS at 04:00

## 2017-01-18 RX ADMIN — HYDROXYUREA 500 MG: 500 CAPSULE ORAL at 19:50

## 2017-01-18 RX ADMIN — OXYCODONE HYDROCHLORIDE 30 MG: 15 TABLET ORAL at 12:17

## 2017-01-18 RX ADMIN — OXYCODONE HYDROCHLORIDE 80 MG: 20 TABLET, FILM COATED, EXTENDED RELEASE ORAL at 20:38

## 2017-01-18 RX ADMIN — FAMOTIDINE 20 MG: 10 INJECTION, SOLUTION INTRAVENOUS at 08:25

## 2017-01-18 RX ADMIN — HEPARIN SODIUM 5000 UNITS: 5000 INJECTION, SOLUTION INTRAVENOUS; SUBCUTANEOUS at 05:35

## 2017-01-18 RX ADMIN — PROMETHAZINE HYDROCHLORIDE 25 MG: 25 TABLET ORAL at 20:38

## 2017-01-18 RX ADMIN — MUPIROCIN: 20 OINTMENT TOPICAL at 08:26

## 2017-01-18 RX ADMIN — HYDROMORPHONE HYDROCHLORIDE 1 MG: 1 INJECTION, SOLUTION INTRAMUSCULAR; INTRAVENOUS; SUBCUTANEOUS at 16:37

## 2017-01-18 RX ADMIN — SODIUM CHLORIDE 12.5 MG: 9 INJECTION INTRAMUSCULAR; INTRAVENOUS; SUBCUTANEOUS at 04:00

## 2017-01-18 RX ADMIN — HYDROMORPHONE HYDROCHLORIDE 1 MG: 1 INJECTION, SOLUTION INTRAMUSCULAR; INTRAVENOUS; SUBCUTANEOUS at 20:38

## 2017-01-18 NOTE — PROGRESS NOTES
Hospitalist Progress Note    2017  Admit Date: 2017  2:19 PM   NAME: Lily Fernandez   :  1975   MRN:  [id number]   Attending: Katey Ley, *  PCP:  Usman García MD      Admitted for:Sbeta thal crisis    SUBJECTIVE:   Patient this morning sitting up in chair states feeling better, still having some pain in arms and legs. No further chest pain, no shortness of breath. No nausea no vomiting. Review of Systems negative with exception of pertinent positives noted above  Past medical history unchanged from H&P    PHYSICAL EXAM     Visit Vitals    /88 (BP 1 Location: Right arm, BP Patient Position: Sitting)    Pulse 88    Temp 98.6 °F (37 °C)    Resp 18    Ht 5' 10\" (1.778 m)    Wt 66.4 kg (146 lb 4.8 oz)    SpO2 100%    BMI 20.99 kg/m2      Temp (24hrs), Av.2 °F (36.8 °C), Min:97.6 °F (36.4 °C), Max:98.6 °F (37 °C)    Oxygen Therapy  O2 Sat (%): 100 % (17 1208)  Pulse via Oximetry: 81 beats per minute (17 1752)  O2 Device: Room air (17 0853)  O2 Flow Rate (L/min): 2 l/min (17 2045)    Intake/Output Summary (Last 24 hours) at 17 1252  Last data filed at 17 0853   Gross per 24 hour   Intake             5380 ml   Output             1100 ml   Net             4280 ml        General: No acute distress  mucous membranes pink and moist acyanotic anicteric  Neck:  Supple full range of motion  Lungs:  Air entry equal bilaterally, no crepitations rales rhonchi   Heart:  Regular rate and rhythm,  No murmur, rub, or gallop  Abdomen: Soft, Non distended, Non tender, no rebound guarding Positive bowel sounds  Extremities: No cyanosis, clubbing or edema  Neurologic:  No focal deficits  Musculoskeletal: no   Joint swelling tenderness erythema  Skin:  No erythema, rashes noted          LAB  No results for input(s): GLUCPOC in the last 72 hours.     No lab exists for component: GLPOC   Recent Labs      17   0845   WBC  7.3   HGB  6.2*   HCT 18.1*   PLT  194     Recent Labs      01/18/17   0845  01/17/17   0335   NA  142  144   K  3.9  3.6   CL  109*  111*   CO2  25  25   GLU  105*  129*   BUN  7  7   CREA  0.71*  0.69*   MG   --   2.0   CA  8.0*  7.8*   TROIQ   --   <0.02*   ALB   --   3.1*   TBILI   --   0.9   ALT   --   46   SGOT   --   28       EKG and imaging reviewed personally by me  No results found. Results for orders placed or performed during the hospital encounter of 01/16/17   EKG, 12 LEAD, INITIAL   Result Value Ref Range    Systolic BP  mmHg    Diastolic BP  mmHg    Ventricular Rate 76 BPM    Atrial Rate 76 BPM    P-R Interval 168 ms    QRS Duration 90 ms    Q-T Interval 386 ms    QTC Calculation (Bezet) 434 ms    Calculated P Axis 68 degrees    Calculated R Axis 32 degrees    Calculated T Axis 71 degrees    Diagnosis       Normal sinus rhythm  Minimal voltage criteria for LVH, may be normal variant  Borderline ECG  Confirmed by ST SENAIT NICHOLS MD (), YOAN SORIA ([phone]) on 1/16/2017 12:36:28 PM       XR Results (most recent):    Results from [place] encounter on 01/16/17   XR CHEST PA LAT   Narrative Chest 2 view    CLINICAL INDICATION: Acute moderate midline chest pain, chills, sickle cell  disease    COMPARISON: 12/19/2016, 6/9/2016    TECHNIQUE: Upright PA and lateral views of the chest     FINDINGS: Lung volumes are well inflated. Cardiomediastinal silhouette and  hilar contours are stable with the left portacatheter remaining. The lungs are  clear. No acute osseous abnormalities are seen. Impression IMPRESSION: No acute disease. Active problems  Active Hospital Problems    Diagnosis Date Noted    Iron overload due to repeated red blood cell transfusions 01/18/2017    Sickle cell anemia with crisis ([address] 01/16/2017    HTN (hypertension) 03/02/2012       ASSESSMENT  AND PLAN      · Sickle Cell crisis - doing better slowly improving, continue pain medications.  Likely discharge tomorrow  · Anemia - as per hematology transfuse if Hb < 6 continue to monitor  ·     DVT Prophylaxis: heparin  Dispo home    Signed By: Lani Bush MD     January 18, 2017

## 2017-01-18 NOTE — PROGRESS NOTES
END OF SHIFT NOTE:    Intake/Output  01/17 1901 - 01/18 0700  In: 5203 [I.V.:3334]  Out: 700 [Urine:700]   Voiding: YES  Catheter: NO  Drain:              Stool:  0 occurrences. Emesis:  0 occurrences. VITAL SIGNS  Patient Vitals for the past 12 hrs:   Temp Pulse Resp BP SpO2   01/18/17 0513 98.5 °F (36.9 °C) 76 18 123/74 100 %   01/17/17 2333 98.3 °F (36.8 °C) 91 18 124/72 97 %   01/17/17 2040 97.6 °F (36.4 °C) 75 18 127/74 96 %       Pain Assessment  Pain 1  Pain Scale 1: Visual (01/18/17 0500)  Pain Intensity 1: 0 (01/18/17 0500)  Patient Stated Pain Goal: 0 (01/17/17 0750)  Pain Reassessment 1: Yes (01/18/17 0500)  Pain Onset 1: pta (01/18/17 0400)  Pain Location 1: Arm;Leg (01/18/17 0400)  Pain Orientation 1: Left;Right (01/18/17 0400)  Pain Description 1: Aching (01/18/17 0400)  Pain Intervention(s) 1: Medication (see MAR) (01/18/17 0400)    Ambulating  YES    Additional Information: Pt's pain level improving    Shift report given to oncoming nurse at the bedside.     Lamonte Peabody, RN

## 2017-01-18 NOTE — PROGRESS NOTES
Inpatient Hematology / Oncology Progress Note      Admission Date: 2017  2:19 PM  Reason for Admission/Hospital Course: Sickle cell anemia with crisis ([address]      24 Hour Events:  Feeling better overall. Pain improved in abdomen/chest.  Diarrhea resolved. ROS:  Constitutional: Positive for fatigue. Negative for fever, chills, weakness, malaise. CV: Negative for chest pain, palpitations, edema. Respiratory: Negative for dyspnea, cough, wheezing. GI: Negative for nausea, abdominal pain, diarrhea. MSK: Positive for pain in legs/arms - improving. 10 point review of systems is otherwise negative with the exception of the elements mentioned above in the HPI. Allergies   Allergen Reactions    Compazine [Prochlorperazine Edisylate] Other (comments)     Also makes him feel funny    Morphine Other (comments)     Makes him feel funny    Reglan [Metoclopramide] Other (comments)     \"feel funny\"    Zofran [Ondansetron Hcl (Pf)] Other (comments)     Make him feel funny       OBJECTIVE:  Patient Vitals for the past 8 hrs:   BP Temp Pulse Resp SpO2   17 1208 122/88 98.6 °F (37 °C) 88 18 100 %   17 0844 (!) 143/91 98.3 °F (36.8 °C) 81 18 98 %   17 0513 123/74 98.5 °F (36.9 °C) 76 18 100 %     Temp (24hrs), Av.2 °F (36.8 °C), Min:97.6 °F (36.4 °C), Max:98.6 °F (37 °C)    701 -  1900  In: 1926 [P.O.:237; I.V.:1689]  Out: -     Physical Exam:  Constitutional: Well developed, well nourished male in no acute distress, sitting comfortably in the bedside chair. HEENT: Normocephalic and atraumatic. Oropharynx is clear, mucous membranes are moist.   Extraocular muscles are intact. Sclerae anicteric. Neck supple without JVD. No thyromegaly present. Skin Warm and dry. No bruising and no rash noted. No erythema. No pallor. Respiratory Lungs are clear to auscultation bilaterally without wheezes, rales or rhonchi, normal air exchange without accessory muscle use. CVS Normal rate, regular rhythm and normal S1 and S2. No murmurs, gallops, or rubs. Abdomen Soft, nontender and nondistended, normoactive bowel sounds. No palpable mass. No hepatosplenomegaly. Neuro Grossly nonfocal with no obvious sensory or motor deficits. MSK Normal range of motion in general.  No edema and no tenderness. Psych Appropriate mood and affect. Labs:    Recent Labs      01/18/17   0845  01/17/17   0500  01/17/17   0335  01/16/17   1450   WBC  7.3  8.7  8.4  5.6   RBC  1.79*  1.89*  1.70*  2.36*   HGB  6.2*  6.4*  5.8*  7.9*   HCT  18.1*  19.1*  17.2*  23.4*   MCV  101.1*  101.1*  99.4*  99.2*   MCH  34.6*  33.9*  33.7*  33.5*   MCHC  34.3  33.5  33.9  33.8   RDW   --    --    --   30.7*   PLT  194  238  209  275   GRANS   --   13*   --   80*   LYMPH   --   86*   --   15*   MONOS   --    --    --   1*   BASOS   --   1   --    --    DF   --   AUTOMATED   --   AUTOMATED   ANEU   --   1.1*   --   4.5   ABL   --   7.5*   --   0.8   ABM   --    --    --   0.1   ABB   --   0.1   --    --       Recent Labs      01/18/17   0845  01/17/17   0335  01/16/17   1450   NA  142  144  142   K  3.9  3.6  3.6   CL  109*  111*  107   CO2  25  25  23   AGAP  8  8  12   GLU  105*  129*  145*   BUN  7  7  9   CREA  0.71*  0.69*  0.74*   GFRAA  >60  >60  >60   GFRNA  >60  >60  >60   CA  8.0*  7.8*  9.1   SGOT   --   28  49*   AP   --   60  79   TP   --   6.4  8.4*   ALB   --   3.1*  4.1   GLOB   --   3.3  4.3*   AGRAT   --   0.9*  1.0*   MG   --   2.0   --          Imaging:  Chest 2 view 01/16/17     CLINICAL INDICATION: Acute moderate midline chest pain, chills, sickle cell  disease     COMPARISON: 12/19/2016, 6/9/2016     TECHNIQUE: Upright PA and lateral views of the chest      FINDINGS: Lung volumes are well inflated. Cardiomediastinal silhouette and  hilar contours are stable with the left portacatheter remaining.  The lungs are  clear.      No acute osseous abnormalities are seen.        IMPRESSION: No acute disease.       ASSESSMENT:    Problem List  Date Reviewed: 3/5/2012          Codes Class Noted    Iron overload due to repeated red blood cell transfusions ICD-10-CM: E83.111  ICD-9-CM: 275.02  1/18/2017        * (Principal)Sickle cell anemia with crisis [facility]) ICD-10-CM: D57.00  ICD-9-CM: 282.62  1/16/2017        Hypokalemia ICD-10-CM: E87.6  ICD-9-CM: 276.8  7/9/2016        Paroxysmal SVT (supraventricular tachycardia) (HCC) ICD-10-CM: I47.1  ICD-9-CM: 427.0  7/9/2016        Hypomagnesemia ICD-10-CM: E83.42  ICD-9-CM: 275.2  7/9/2016        Sickle cell anemia ([facility] ICD-10-CM: D57.1  ICD-9-CM: 282.60  4/6/2016        Sickle cell crisis ([facility] ICD-10-CM: D57.00  ICD-9-CM: 282.62  4/5/2016        leukocytosis - most likely reactive ICD-10-CM: D72.829  ICD-9-CM: 288.60  1/30/2016        Diarrhea ICD-10-CM: R19.7  ICD-9-CM: 787.91  9/27/2014        Sickle cell pain crisis ([facility] ICD-10-CM: D57.00  ICD-9-CM: 282.62  10/25/2012        Hyperbilirubinemia ICD-10-CM: E80.6  ICD-9-CM: 782.4  9/20/2012    Overview Signed 9/20/2012  1:37 PM by Patrice Orantes     Related to sickle cell crisis             Essential hypertension, benign ICD-10-CM: I10  ICD-9-CM: 401.1  6/23/2012        Hemolytic crisis ([facility] ICD-10-CM: D65  ICD-9-CM: 286.6  3/24/2012    Overview Signed 3/24/2012  2:53 PM by Salvador Leo     Sickle cell with anemia             HTN (hypertension) (Chronic) ICD-10-CM: I10  ICD-9-CM: 401.9  3/2/2012        Leukocytosis - chronic reactive ICD-10-CM: D34.856  ICD-9-CM: 288.60  9/16/2011                PLAN:    Sickle cell anemia with pain crisis  01/17: Continue hydroxyuria and folic acid. Check ferritin level and hemoglobin electrophoresis. Transfuse packed PBC's if hgb falls below 6. NC oxygen and 1/2 NS for respiratory status and hydration. Control pain with oxycodone and PRN dilaudid. 01/18: Ferritin 3905 with hgb 6.2; Transfuse if hgb below 6.  Hgb 6.2 today.       Intractable vomiting and diarrhea  01/17: IV fluids, IV pepcid, and PRN phenergan suppository for supportive management. C-diff stool culture pending. 01/18: Pt denies vomiting and diarrhea; c-diff negative; continue fluids at 0.45 NS. Change IV phenergan to po for nausea control.       DVT profy  Heparin 5,000 units TID; patient ambulating.     Dispo: Anticipate for tomorrow if patient tolerates po meds.       Lab studies and imaging studies were personally reviewed. Pertinent old records were reviewed from Dr. Wojciech Gracia at [facility]. Patient seen with and Case discussed with Dr. Karan Mariscal. Corinne Sleigh, NP   [address]  Office : [phone]  Fax : [phone]       Attending Addendum:  I personally evaluated the patient with Maral Muniz N.P.,  and agree with the assessment, findings and plan as documented. He should be transfused with PRBCs only if his Hemoglobin falls below 6 gm/dl.               Candelaria Guerin MD  [place] [id number] [address]  Office : [phone]  Fax : [phone]

## 2017-01-18 NOTE — MED STUDENT NOTES
*ATTENTION:  This note has been created by a medical student for educational purposes only. Please do not refer to the content of this note for clinical decision-making, billing, or other purposes. Please see attending physicians note to obtain clinical information on this patient. *               Inpatient Hematology / Oncology Progress Note      Admission Date: 1/16/2017  2:19 PM  Reason for Admission/Hospital Course: Sickle cell anemia with crisis (HCC)      24 Hour Events:  Transfuse if Hgb falls below 6  O2 status improved; tolerating RA  Pain controlled  No episodes of diarrhea; c-diff neg    Subjective: This is 35 y.o. AA male with PMH of SSD who presented with a diagnosis of sickle cell crisis. Per nursing, patient's myalgias are improving and his oxygen requirements have decreased. Per patient, he states he is overall feeling better and denies chest pain or dyspnea. He states he continues to have leg and arm pain rated 6/10. When asked if he's ever used transdermal patches for pain control, he states he used them many years ago and that his doctor decided to take him off of them and use po meds instead. Patient complains of nausea and denies diarrhea, vomiting, chills, and fever. ROS:  Constitutional:  Positive for fatigue. Negative for fever, chills, weakness, malaise. CV: Negative for chest pain, palpitations, edema. Respiratory: Negative for dyspnea, cough, wheezing. GI: Positive for nausea and mild abdominal pain; negative vomiting or diarrhea. 10 point review of systems is otherwise negative with the exception of the elements mentioned above in the HPI.      Allergies   Allergen Reactions    Compazine [Prochlorperazine Edisylate] Other (comments)     Also makes him feel funny    Morphine Other (comments)     Makes him feel funny    Reglan [Metoclopramide] Other (comments)     \"feel funny\"    Zofran [Ondansetron Hcl (Pf)] Other (comments)     Make him feel funny OBJECTIVE:  Patient Vitals for the past 8 hrs:   BP Temp Pulse Resp SpO2   17 0844 (!) 143/91 98.3 °F (36.8 °C) 81 18 98 %   17 0513 123/74 98.5 °F (36.9 °C) 76 18 100 %     Temp (24hrs), Av.2 °F (36.8 °C), Min:97.6 °F (36.4 °C), Max:98.7 °F (37.1 °C)     0701 -  1900  In: 1028 [I.V.:1689]  Out: -     Physical Exam:  Constitutional: Well developed, well nourished male in no acute distress, laying down comfortably in the hospital bed. HEENT: Normocephalic and atraumatic. Oropharynx is clear, mucous membranes are moist.  Pupils are equal, round, and reactive to light. Extraocular muscles are intact. Sclerae anicteric. Neck supple without JVD. No thyromegaly present. Lymph node   No palpable submandibular, cervical, supraclavicular, axillary or inguinal lymph nodes. Skin Warm and dry. No bruising and no rash noted. No erythema. No pallor. Respiratory Lungs are clear to auscultation bilaterally without wheezes, rales or rhonchi, normal air exchange without accessory muscle use. CVS Normal rate, regular rhythm and normal S1 and S2. No murmurs, gallops, or rubs. Peripheral pulses intact bilaterally. Abdomen Soft, mild epigastric tenderness to palpation and nondistended, normoactive bowel sounds. No palpable mass. No hepatosplenomegaly. Neuro Grossly nonfocal with no obvious sensory or motor deficits. MSK Normal range of motion in general.  No edema and no tenderness. Psych Appropriate mood and affect. Patient is pleasant and appropriately concerned.        Labs:    Recent Labs      17   0845  17   0500  17   0335  17   1450   WBC  7.3  8.7  8.4  5.6   RBC  1.79*  1.89*  1.70*  2.36*   HGB  6.2*  6.4*  5.8*  7.9*   HCT  18.1*  19.1*  17.2*  23.4*   MCV  101.1*  101.1*  99.4*  99.2*   MCH  34.6*  33.9*  33.7*  33.5*   MCHC  34.3  33.5  33.9  33.8   RDW   --    --    --   30.7*   PLT  194  238  209  275   GRANS   --   13*   --   80*   LYMPH --   86*   --   15*   MONOS   --    --    --   1*   BASOS   --   1   --    --    DF   --   AUTOMATED   --   AUTOMATED   ANEU   --   1.1*   --   4.5   ABL   --   7.5*   --   0.8   ABM   --    --    --   0.1   ABB   --   0.1   --    --       Recent Labs      01/17/17   0335  01/16/17   1450   NA  144  142   K  3.6  3.6   CL  111*  107   CO2  25  23   AGAP  8  12   GLU  129*  145*   BUN  7  9   CREA  0.69*  0.74*   GFRAA  >60  >60   GFRNA  >60  >60   CA  7.8*  9.1   SGOT  28  49*   AP  60  79   TP  6.4  8.4*   ALB  3.1*  4.1   GLOB  3.3  4.3*   AGRAT  0.9*  1.0*   MG  2.0   --          Imaging:  Chest X ray on 01/16/2017      CLINICAL INDICATION: Acute moderate midline chest pain, chills, sickle cell  disease  COMPARISON: 12/19/2016, 6/9/2016      TECHNIQUE: Upright PA and lateral views of the chest       FINDINGS: Lung volumes are well inflated. Cardiomediastinal silhouette and  hilar contours are stable with the left portacatheter remaining. The lungs are  clear.       No acute osseous abnormalities are seen.      IMPRESSION: No acute disease.     ASSESSMENT:    Problem List  Date Reviewed: 3/5/2012          Codes Class Noted    * (Principal)Sickle cell anemia with crisis [facility]) ICD-10-CM: D57.00  ICD-9-CM: 282.62  1/16/2017        Hypokalemia ICD-10-CM: E87.6  ICD-9-CM: 276.8  7/9/2016        Paroxysmal SVT (supraventricular tachycardia) (HCC) ICD-10-CM: I47.1  ICD-9-CM: 427.0  7/9/2016        Hypomagnesemia ICD-10-CM: E83.42  ICD-9-CM: 275.2  7/9/2016        Sickle cell anemia (HCC) ICD-10-CM: D57.1  ICD-9-CM: 282.60  4/6/2016        Sick[facility] crisis ([facility] ICD-10-CM: D57.00  ICD-9-CM: 282.62  4/5/2016        leukocytosis - most likely reactive ICD-10-CM: D72.829  ICD-9-CM: 288.60  1/30/2016        Diarrhea ICD-10-CM: R19.7  ICD-9-CM: 787.91  9/27/2014        Sickle cell pain crisis ([facility] ICD-10-CM: D57.00  ICD-9-CM: 282.62  10/25/2012        Hyperbilirubinemia ICD-10-CM: E80.6  ICD-9-CM: 782.4  9/20/2012 Overview Signed 9/20/2012  1:37 PM by Yanely Marroquin     Related to sickle cell crisis             Essential hypertension, benign ICD-10-CM: I10  ICD-9-CM: 401.1  6/23/2012        Hemolytic crisis ([address] ICD-10-CM: D65  ICD-9-CM: 286.6  3/24/2012    Overview Signed 3/24/2012  2:53 PM by Gibran Pitcher     Sickle cell with anemia             HTN (hypertension) (Chronic) ICD-10-CM: I10  ICD-9-CM: 401.9  3/2/2012        Leukocytosis - chronic reactive ICD-10-CM: C58.276  ICD-9-CM: 288.60  9/16/2011                PLAN:    Sickle cell anemia with pain crisis  01/17: Continue hydroxyuria and folic acid. Check ferritin level and hemoglobin electrophoresis. Transfuse packed PBC's if hgb falls below 6. NC oxygen and 1/2 NS for respiratory status and hydration. Control pain with oxycodone and PRN dilaudid. 01/18: Ferritin 3905 with hgb 6.2; Transfuse if hgb below 6     Intractable vomiting and diarrhea  01/17: IV fluids, IV pepcid, and PRN phenergan suppository for supportive management. C-diff stool culture pending. 01/18: Pt denies vomiting and diarrhea; c-diff negative; continue fluids at 50% NS. Change IV phenergan to po phergen for nausea control.      DVT profy  Heparin 5,000 units TID; patient ambulating. Dispo: Anticipate for tomorrow if patient tolerates po meds.      Lab studies and imaging studies were personally reviewed. Pertinent old records were reviewed from Dr. Wojciech Gracia at [facility]. Patient seen with and Case discussed with Dr. Karan Mariscal.                Jordyn Patton [facility]  [address] [address]  Office : [phone]  Fax : [phone]

## 2017-01-19 VITALS
BODY MASS INDEX: 22.75 KG/M2 | OXYGEN SATURATION: 100 % | HEART RATE: 72 BPM | SYSTOLIC BLOOD PRESSURE: 125 MMHG | RESPIRATION RATE: 20 BRPM | TEMPERATURE: 98.9 F | DIASTOLIC BLOOD PRESSURE: 85 MMHG | HEIGHT: 70 IN | WEIGHT: 158.9 LBS

## 2017-01-19 LAB
ANION GAP BLD CALC-SCNC: 10 MMOL/L (ref 7–16)
BACTERIA SPEC CULT: NORMAL
BUN SERPL-MCNC: 6 MG/DL (ref 6–23)
CALCIUM SERPL-MCNC: 8.2 MG/DL (ref 8.3–10.4)
CHLORIDE SERPL-SCNC: 108 MMOL/L (ref 98–107)
CO2 SERPL-SCNC: 25 MMOL/L (ref 21–32)
CREAT SERPL-MCNC: 0.62 MG/DL (ref 0.8–1.5)
GLUCOSE SERPL-MCNC: 124 MG/DL (ref 65–100)
HCT VFR BLD AUTO: 17.7 % (ref 41.1–50.3)
HGB BLD-MCNC: 6 G/DL (ref 13.6–17.2)
HGB RETIC QN AUTO: 44 PG (ref 29–35)
IMM RETICS NFR: 26.9 % (ref 2.3–13.4)
MCH RBC QN AUTO: 34.7 PG (ref 26.1–32.9)
MCHC RBC AUTO-ENTMCNC: 33.9 G/DL (ref 31.4–35)
MCV RBC AUTO: 102.3 FL (ref 79.6–97.8)
PLATELET # BLD AUTO: 190 K/UL (ref 150–450)
PMV BLD AUTO: 10.3 FL (ref 10.8–14.1)
POTASSIUM SERPL-SCNC: 3.8 MMOL/L (ref 3.5–5.1)
RBC # BLD AUTO: 1.73 M/UL (ref 4.23–5.67)
RETICS # AUTO: 0.1 M/UL (ref 0.03–0.1)
RETICS/RBC NFR AUTO: 5.6 % (ref 0.3–2)
SERVICE CMNT-IMP: NORMAL
SODIUM SERPL-SCNC: 143 MMOL/L (ref 136–145)
WBC # BLD AUTO: 6.9 K/UL (ref 4.3–11.1)

## 2017-01-19 PROCEDURE — 80048 BASIC METABOLIC PNL TOTAL CA: CPT | Performed by: HOSPITALIST

## 2017-01-19 PROCEDURE — 74011250636 HC RX REV CODE- 250/636: Performed by: INTERNAL MEDICINE

## 2017-01-19 PROCEDURE — 74011250637 HC RX REV CODE- 250/637: Performed by: NURSE PRACTITIONER

## 2017-01-19 PROCEDURE — 74011250637 HC RX REV CODE- 250/637: Performed by: INTERNAL MEDICINE

## 2017-01-19 PROCEDURE — P9016 RBC LEUKOCYTES REDUCED: HCPCS | Performed by: HOSPITALIST

## 2017-01-19 PROCEDURE — 36430 TRANSFUSION BLD/BLD COMPNT: CPT

## 2017-01-19 PROCEDURE — 36591 DRAW BLOOD OFF VENOUS DEVICE: CPT

## 2017-01-19 PROCEDURE — 30233N1 TRANSFUSION OF NONAUTOLOGOUS RED BLOOD CELLS INTO PERIPHERAL VEIN, PERCUTANEOUS APPROACH: ICD-10-PCS | Performed by: INTERNAL MEDICINE

## 2017-01-19 PROCEDURE — 74011000250 HC RX REV CODE- 250: Performed by: INTERNAL MEDICINE

## 2017-01-19 PROCEDURE — 74011000258 HC RX REV CODE- 258: Performed by: NURSE PRACTITIONER

## 2017-01-19 PROCEDURE — 77030013131 HC IV BLD ST ICUM -A

## 2017-01-19 PROCEDURE — 86902 BLOOD TYPE ANTIGEN DONOR EA: CPT | Performed by: HOSPITALIST

## 2017-01-19 PROCEDURE — 85046 RETICYTE/HGB CONCENTRATE: CPT | Performed by: HOSPITALIST

## 2017-01-19 PROCEDURE — 85027 COMPLETE CBC AUTOMATED: CPT | Performed by: HOSPITALIST

## 2017-01-19 PROCEDURE — 86900 BLOOD TYPING SEROLOGIC ABO: CPT | Performed by: HOSPITALIST

## 2017-01-19 PROCEDURE — 86920 COMPATIBILITY TEST SPIN: CPT | Performed by: HOSPITALIST

## 2017-01-19 RX ORDER — HEPARIN 100 UNIT/ML
300 SYRINGE INTRAVENOUS
Status: DISCONTINUED | OUTPATIENT
Start: 2017-01-19 | End: 2017-01-19 | Stop reason: HOSPADM

## 2017-01-19 RX ORDER — SODIUM CHLORIDE 9 MG/ML
250 INJECTION, SOLUTION INTRAVENOUS AS NEEDED
Status: DISCONTINUED | OUTPATIENT
Start: 2017-01-19 | End: 2017-01-19 | Stop reason: HOSPADM

## 2017-01-19 RX ADMIN — HYDROMORPHONE HYDROCHLORIDE 1 MG: 1 INJECTION, SOLUTION INTRAMUSCULAR; INTRAVENOUS; SUBCUTANEOUS at 01:24

## 2017-01-19 RX ADMIN — HEPARIN SODIUM 5000 UNITS: 5000 INJECTION, SOLUTION INTRAVENOUS; SUBCUTANEOUS at 05:40

## 2017-01-19 RX ADMIN — FOLIC ACID 1 MG: 1 TABLET ORAL at 10:29

## 2017-01-19 RX ADMIN — PROMETHAZINE HYDROCHLORIDE 25 MG: 25 TABLET ORAL at 10:29

## 2017-01-19 RX ADMIN — OXYCODONE HYDROCHLORIDE 80 MG: 20 TABLET, FILM COATED, EXTENDED RELEASE ORAL at 10:29

## 2017-01-19 RX ADMIN — ACETAMINOPHEN 650 MG: 325 TABLET, FILM COATED ORAL at 16:21

## 2017-01-19 RX ADMIN — HYDROXYUREA 500 MG: 500 CAPSULE ORAL at 10:46

## 2017-01-19 RX ADMIN — FAMOTIDINE 20 MG: 10 INJECTION, SOLUTION INTRAVENOUS at 10:29

## 2017-01-19 RX ADMIN — MUPIROCIN: 20 OINTMENT TOPICAL at 10:39

## 2017-01-19 RX ADMIN — HYDROMORPHONE HYDROCHLORIDE 1 MG: 1 INJECTION, SOLUTION INTRAMUSCULAR; INTRAVENOUS; SUBCUTANEOUS at 10:29

## 2017-01-19 RX ADMIN — HYDROMORPHONE HYDROCHLORIDE 1 MG: 1 INJECTION, SOLUTION INTRAMUSCULAR; INTRAVENOUS; SUBCUTANEOUS at 06:04

## 2017-01-19 RX ADMIN — OXYCODONE HYDROCHLORIDE 30 MG: 15 TABLET ORAL at 16:57

## 2017-01-19 RX ADMIN — PROMETHAZINE HYDROCHLORIDE 25 MG: 25 TABLET ORAL at 16:57

## 2017-01-19 RX ADMIN — PROMETHAZINE HYDROCHLORIDE 25 MG: 25 TABLET ORAL at 01:24

## 2017-01-19 RX ADMIN — SODIUM CHLORIDE 175 ML/HR: 450 INJECTION, SOLUTION INTRAVENOUS at 07:00

## 2017-01-19 RX ADMIN — SODIUM CHLORIDE 175 ML/HR: 450 INJECTION, SOLUTION INTRAVENOUS at 01:10

## 2017-01-19 NOTE — PROGRESS NOTES
Inpatient Hematology / Oncology Progress Note      Admission Date: 2017  2:19 PM  Reason for Admission/Hospital Course: Sickle cell anemia with crisis ([address]      24 Hour Events:  Feeling better overall. Pain improved in abdomen/chest.  Diarrhea resolved. Ready for discharge after PRBC transfusion. ROS:  Constitutional: Positive for fatigue. Negative for fever, chills, weakness, malaise. CV: Negative for chest pain, palpitations, edema. Respiratory: Negative for dyspnea, cough, wheezing. GI: Negative for nausea, abdominal pain, diarrhea. MSK: Positive for pain in legs/arms - improving. 10 point review of systems is otherwise negative with the exception of the elements mentioned above in the HPI. Allergies   Allergen Reactions    Compazine [Prochlorperazine Edisylate] Other (comments)     Also makes him feel funny    Morphine Other (comments)     Makes him feel funny    Reglan [Metoclopramide] Other (comments)     \"feel funny\"    Zofran [Ondansetron Hcl (Pf)] Other (comments)     Make him feel funny       OBJECTIVE:  Patient Vitals for the past 8 hrs:   BP Temp Pulse Resp SpO2   17 1205 124/66 98.3 °F (36.8 °C) 77 20 98 %   17 0821 137/86 98.6 °F (37 °C) 77 20 96 %     Temp (24hrs), Av.6 °F (37 °C), Min:98.3 °F (36.8 °C), Max:98.9 °F (37.2 °C)     0701 -  1900  In: 240 [P.O.:240]  Out: -     Physical Exam:  Constitutional: Well developed, well nourished male in no acute distress, sitting comfortably in the bedside chair. HEENT: Normocephalic and atraumatic. Oropharynx is clear, mucous membranes are moist.   Extraocular muscles are intact. Sclerae anicteric. Neck supple without JVD. No thyromegaly present. Skin Warm and dry. No bruising and no rash noted. No erythema. No pallor. Respiratory Lungs are clear to auscultation bilaterally without wheezes, rales or rhonchi, normal air exchange without accessory muscle use.     CVS Normal rate, regular rhythm and normal S1 and S2. No murmurs, gallops, or rubs. Abdomen Soft, nontender and nondistended, normoactive bowel sounds. No palpable mass. No hepatosplenomegaly. Neuro Grossly nonfocal with no obvious sensory or motor deficits. MSK Normal range of motion in general.  No edema and no tenderness. Psych Appropriate mood and affect. Labs:      Recent Labs      01/19/17   0440  01/18/17   0845  01/17/17   0500   01/16/17   1450   WBC  6.9  7.3  8.7   < >  5.6   RBC  1.73*  1.79*  1.89*   < >  2.36*   HGB  6.0*  6.2*  6.4*   < >  7.9*   HCT  17.7*  18.1*  19.1*   < >  23.4*   MCV  102.3*  101.1*  101.1*   < >  99.2*   MCH  34.7*  34.6*  33.9*   < >  33.5*   MCHC  33.9  34.3  33.5   < >  33.8   RDW   --    --    --    --   30.7*   PLT  190  194  238   < >  275   GRANS   --    --   13*   --   80*   LYMPH   --    --   86*   --   15*   MONOS   --    --    --    --   1*   BASOS   --    --   1   --    --    DF   --    --   AUTOMATED   --   AUTOMATED   ANEU   --    --   1.1*   --   4.5   ABL   --    --   7.5*   --   0.8   ABM   --    --    --    --   0.1   ABB   --    --   0.1   --    --     < > = values in this interval not displayed.         Recent Labs      01/19/17   0440  01/18/17   0845  01/17/17   0335  01/16/17   1450   NA  143  142  144  142   K  3.8  3.9  3.6  3.6   CL  108*  109*  111*  107   CO2  25  25  25  23   AGAP  10  8  8  12   GLU  124*  105*  129*  145*   BUN  6  7  7  9   CREA  0.62*  0.71*  0.69*  0.74*   GFRAA  >60  >60  >60  >60   GFRNA  >60  >60  >60  >60   CA  8.2*  8.0*  7.8*  9.1   SGOT   --    --   28  49*   AP   --    --   60  79   TP   --    --   6.4  8.4*   ALB   --    --   3.1*  4.1   GLOB   --    --   3.3  4.3*   AGRAT   --    --   0.9*  1.0*   MG   --    --   2.0   --          Imaging:  Chest 2 view 01/16/17     CLINICAL INDICATION: Acute moderate midline chest pain, chills, sickle cell  disease     COMPARISON: 12/19/2016, 6/9/2016     TECHNIQUE: Upright PA and lateral views of the chest      FINDINGS: Lung volumes are well inflated. Cardiomediastinal silhouette and  hilar contours are stable with the left portacatheter remaining. The lungs are  clear.      No acute osseous abnormalities are seen.        IMPRESSION: No acute disease.       ASSESSMENT:    Problem List  Date Reviewed: 3/5/2012          Codes Class Noted    Iron overload due to repeated red blood cell transfusions ICD-10-CM: E83.111  ICD-9-CM: 275.02  1/18/2017        * (Principal)Sickle cell anemia with crisis ([facility] ICD-10-CM: D57.00  ICD-9-CM: 282.62  1/16/2017        Hypokalemia ICD-10-CM: E87.6  ICD-9-CM: 276.8  7/9/2016        Paroxysmal SVT (supraventricular tachycardia) (HCC) ICD-10-CM: I47.1  ICD-9-CM: 427.0  7/9/2016        Hypomagnesemia ICD-10-CM: E83.42  ICD-9-CM: 275.2  7/9/2016        Sickle cell anemia ([facility] ICD-10-CM: D57.1  ICD-9-CM: 282.60  4/6/2016        Sickle cell crisis ([facility] ICD-10-CM: D57.00  ICD-9-CM: 282.62  4/5/2016        leukocytosis - most likely reactive ICD-10-CM: D72.829  ICD-9-CM: 288.60  1/30/2016        Diarrhea ICD-10-CM: R19.7  ICD-9-CM: 787.91  9/27/2014        Sickle cell pain crisis ([facility] ICD-10-CM: D57.00  ICD-9-CM: 282.62  10/25/2012        Hyperbilirubinemia ICD-10-CM: E80.6  ICD-9-CM: 782.4  9/20/2012    Overview Signed 9/20/2012  1:37 PM by Ivone Wilkins     Related to sickle cell crisis             Essential hypertension, benign ICD-10-CM: I10  ICD-9-CM: 401.1  6/23/2012        Hemolytic crisis ([facility] ICD-10-CM: D65  ICD-9-CM: 286.6  3/24/2012    Overview Signed 3/24/2012  2:53 PM by Ирина Miller     Sickle cell with anemia             HTN (hypertension) (Chronic) ICD-10-CM: I10  ICD-9-CM: 401.9  3/2/2012        Leukocytosis - chronic reactive ICD-10-CM: I33.201  ICD-9-CM: 288.60  9/16/2011                PLAN:    Sickle cell anemia with pain crisis  01/17: Continue hydroxyuria and folic acid. Check ferritin level and hemoglobin electrophoresis. Transfuse packed PBC's if hgb falls below 6. NC oxygen and 1/2 NS for respiratory status and hydration. Control pain with oxycodone and PRN dilaudid. 01/18: Ferritin 3905 with hgb 6.2; Transfuse if hgb below 6. Hgb 6.2 today. 01/19: Hgb 6.0 - transfuse one unit PRBC's prior to discharge.       Intractable vomiting and diarrhea  01/17: IV fluids, IV pepcid, and PRN phenergan suppository for supportive management. C-diff stool culture pending. 01/18: Pt denies vomiting and diarrhea; c-diff negative; continue fluids at 0.45 NS. Change IV phenergan to po for nausea control.       DVT profy  Heparin 5,000 units TID; patient ambulating.     Dispo: Discharge today per primary team - follow up with Dr. Edil Moulton at discharge. Continue folic acid, Hydrea and Jadenu as prescribed.       Lab studies and imaging studies were personally reviewed. Pertinent old records were reviewed from Dr. Edil Moulton at [facility]. Patient seen with and Case discussed with Dr. Parvez Wilder. Jesika Cisneros NP   [address]  Office : [phone]  Fax : [phone]     Attending Addendum:  I personally evaluated the patient with [facility]  and agree with the assessment, findings and plan as documented. Appears stable, heart regular without murmur, lungs clear, abdomen benign, OK to transfuse 1 unit of PRBCs prior to discharge.                 Theresa Klein MD  [address]  Office : [phone]  Fax : [phone]

## 2017-01-19 NOTE — DISCHARGE INSTRUCTIONS
DISCHARGE SUMMARY from Nurse    The following personal items are in your possession at time of discharge:    Dental Appliances: None              Clothing: Footwear, Pants, Shirt, Undergarments                PATIENT INSTRUCTIONS:    After general anesthesia or intravenous sedation, for 24 hours or while taking prescription Narcotics:  · Limit your activities  · Do not drive and operate hazardous machinery  · Do not make important personal or business decisions  · Do  not drink alcoholic beverages  · If you have not urinated within 8 hours after discharge, please contact your surgeon on call. Report the following to your surgeon:  · Excessive pain, swelling, redness or odor of or around the surgical area  · Temperature over 100.5  · Nausea and vomiting lasting longer than 4 hours or if unable to take medications  · Any signs of decreased circulation or nerve impairment to extremity: change in color, persistent  numbness, tingling, coldness or increase pain  · Any questions        What to do at Home:  Recommended activity: Activity as tolerated, per MD instructions    If you experience any of the following symptoms fever > 101, nausea, vomiting, pain, chest pain, and shortness of breath please follow up with MD.      *  Please give a list of your current medications to your Primary Care Provider. *  Please update this list whenever your medications are discontinued, doses are      changed, or new medications (including over-the-counter products) are added. *  Please carry medication information at all times in case of emergency situations. These are general instructions for a healthy lifestyle:    No smoking/ No tobacco products/ Avoid exposure to second hand smoke    Surgeon General's Warning:  Quitting smoking now greatly reduces serious risk to your health.     Obesity, smoking, and sedentary lifestyle greatly increases your risk for illness    A healthy diet, regular physical exercise & weight monitoring are important for maintaining a healthy lifestyle    You may be retaining fluid if you have a history of heart failure or if you experience any of the following symptoms:  Weight gain of 3 pounds or more overnight or 5 pounds in a week, increased swelling in our hands or feet or shortness of breath while lying flat in bed. Please call your doctor as soon as you notice any of these symptoms; do not wait until your next office visit. Recognize signs and symptoms of STROKE:    F-face looks uneven    A-arms unable to move or move unevenly    S-speech slurred or non-existent    T-time-call 911 as soon as signs and symptoms begin-DO NOT go       Back to bed or wait to see if you get better-TIME IS BRAIN. Warning Signs of HEART ATTACK     Call 911 if you have these symptoms:   Chest discomfort. Most heart attacks involve discomfort in the center of the chest that lasts more than a few minutes, or that goes away and comes back. It can feel like uncomfortable pressure, squeezing, fullness, or pain.  Discomfort in other areas of the upper body. Symptoms can include pain or discomfort in one or both arms, the back, neck, jaw, or stomach.  Shortness of breath with or without chest discomfort.  Other signs may include breaking out in a cold sweat, nausea, or lightheadedness. Don't wait more than five minutes to call 911 - MINUTES MATTER! Fast action can save your life. Calling 911 is almost always the fastest way to get lifesaving treatment. Emergency Medical Services staff can begin treatment when they arrive -- up to an hour sooner than if someone gets to the hospital by car. The discharge information has been reviewed with the patient. The patient verbalized understanding. Discharge medications reviewed with the patient and appropriate educational materials and side effects teaching were provided.                Sickle Cell Crisis: Care Instructions  Your Care Instructions    Sickle cell crisis is a painful episode that may begin suddenly in a person with sickle cell disease. Sickle cell disease turns normal, round red blood cells into cells that look like kendall or crescent moons. The sickle-shaped cells can get stuck in blood vessels, blocking blood flow and causing severe pain. The pain can occur in the bones of the spine, the arms and legs, the chest, and the abdomen. An episode may be called a \"painful event\" or \"painful crisis. \" Some people who have sickle cell disease have many painful events, while others have few or none. Treatment depends on the level of pain and how long it lasts. Sometimes taking nonprescription pain relievers can help. Or you may need stronger pain relief medicine that is prescribed or given by a doctor. You may need to be treated in the hospital.  It isn't always possible to know what sets off a painful event. But triggers include being dehydrated, cold temperatures, infection, stress, and not getting enough oxygen. Follow-up care is a key part of your treatment and safety. Be sure to make and go to all appointments, and call your doctor if you are having problems. It's also a good idea to know your test results and keep a list of the medicines you take. How can you care for yourself at home? · Create a pain management plan with your doctor. This plan should include the types of medicines you can take and other actions you can take at home to relieve pain. · Drink plenty of fluids, enough so that your urine is light yellow or clear like water. If you have kidney, heart, or liver disease and have to limit fluids, talk with your doctor before you increase the amount of fluids you drink. · Take your medicines exactly as prescribed. Call your doctor if you think you are having a problem with your medicine. · Take pain medicines exactly as directed. ¨ If the doctor gave you a prescription medicine for pain, take it as prescribed.   ¨ If you are not taking a prescription pain medicine, ask your doctor if you can take an over-the-counter medicine. · Avoid alcohol. It can make you dehydrated. · Dress warmly in cold weather. The cold and windy weather can lead to severe pain. · Do not smoke. Smoking can reduce the amount of oxygen in your blood. · Get plenty of sleep. When should you call for help? Call 911 anytime you think you may need emergency care. For example, call if:  · You passed out (lost consciousness). Call your doctor now or seek immediate medical care if:  · You are in severe pain. · You are dizzy or lightheaded, or you feel like you may faint. · You have a fever. · You are short of breath. · Your symptoms are getting worse. Watch closely for changes in your health, and be sure to contact your doctor if you are not getting better as expected. Where can you learn more? Go to http://socorro-rosita.info/. Enter F104 in the search box to learn more about \"Sickle Cell Crisis: Care Instructions. \"  Current as of: February 5, 2016  Content Version: 11.1  © 1591-2548 [employer]. Care instructions adapted under license by [employer] (which disclaims liability or warranty for this information). If you have questions about a medical condition or this instruction, always ask your healthcare professional. [address] any warranty or liability for your use of this information. Sickle Cell Disease: Care Instructions  Your Care Instructions    Sickle cell disease turns normal, round red blood cells into misshaped cells that look like kendall or crescent moons. The sickle-shaped cells can get stuck in blood vessels, blocking blood flow and causing severe pain. The sickle-shaped cells also can harm organs, muscles, and bones. It is a lifelong condition. Sickle cell disease is passed down in families.  You can talk to your doctor about whether to have genetic tests to find out the chance of having a child with the disease. Your doctor also may recommend that your family members get tested for sickle cell disease. Your doctor may treat you with medicines. Some people get blood transfusions or a bone marrow transplant. Managing pain is an important part of your treatment. Follow-up care is a key part of your treatment and safety. Be sure to make and go to all appointments, and call your doctor if you are having problems. It's also a good idea to know your test results and keep a list of the medicines you take. How can you care for yourself at home? · Take your medicines exactly as prescribed. Call your doctor if you think you are having a problem with your medicine. · Take pain medicines exactly as directed. ¨ If the doctor gave you a prescription medicine for pain, take it as prescribed. ¨ If you are not taking a prescription pain medicine, ask your doctor if you can take an over-the-counter medicine. · Try to help ease pain by distracting yourself. Use guided imagery, deep breathing, and relaxation exercises. A pain specialist can teach you pain management skills. · Avoid alcohol. It can make you dehydrated. · Dress warmly in cold weather. The cold and windy weather can lead to severe pain. · Do not smoke. Smoking can reduce the amount of oxygen in your blood. If you need help quitting, talk to your doctor about stop-smoking programs and medicines. These can increase your chances of quitting for good. · Get plenty of sleep. · Get regular eye exams. Sickle cell disease can cause vision problems. · Wear medical alert jewelry that says that you have sickle cell disease. You can buy this at most drugstores. · Avoid colds and flu. Get a pneumococcal vaccine shot. If you have had one before, ask your doctor whether you need another dose. Get a flu shot every year. If you must be around people with colds or flu, wash your hands often. When should you call for help?   Call 911 anytime you think you may need emergency care. For example, call if:  · You passed out (lost consciousness). · You are in severe pain that is not helped by your pain medicines. Call your doctor now or seek immediate medical care if:  · You have these signs of acute chest syndrome, a problem caused by sickle cell disease:  ¨ Cough. ¨ Chest pain. ¨ Fever. ¨ Shortness of breath. · You have vision problems. · You have severe belly pain. · You have a severe headache. · You have less urine than normal or no urine. · You have vomiting or diarrhea that does not go away after 2 hours. · You are dizzy or lightheaded, or you feel like you may faint. · You have sudden numbness or tingling in your hands, feet, fingers, or toes (even if it goes away). · You suddenly have poor balance and coordination when you walk (even if it goes away). · You have an erection that lasts more than 2 to 3 hours or is extremely painful. Watch closely for changes in your health, and be sure to contact your doctor if you have any problems. Where can you learn more? Go to http://socorro-rosita.info/. Enter 486 0059 in the search box to learn more about \"Sickle Cell Disease: Care Instructions. \"  Current as of: February 5, 2016  Content Version: 11.1  © 8245-1291 [employer] Care instructions adapted under license by [employer] (which disclaims liability or warranty for this information). If you have questions about a medical condition or this instruction, always ask your healthcare professional. [place] [address] any warranty or liability for your use of this information.

## 2017-01-19 NOTE — PROGRESS NOTES
Discharge instructions and prescriptions given and reviewed with pt, verbalizes understanding, medication side effect sheet reviewed with pt, pt discharged home with family. Patient to receive blood products prior to discharge and has follow up appointment with Dr Fabian Le first of next week.

## 2017-01-19 NOTE — MED STUDENT NOTES
*ATTENTION:  This note has been created by a medical student for educational purposes only. Please do not refer to the content of this note for clinical decision-making, billing, or other purposes. Please see attending physicians note to obtain clinical information on this patient. *             Inpatient Hematology / Oncology Progress Note      Admission Date: 2017  2:19 PM  Reason for Admission/Hospital Course: Sickle cell anemia with crisis (HCC)      24 Hour Events:  Weakness and hgb 6  Switch to po pain meds  Abdominal complaints resolved     Subjective: This is 35 y.o. AA male with PMH of SSD who presented with a diagnosis of sickle cell crisis. Pt seen at bedside resting. He complains of weakness and expressed concerns about whether he is getting a transfusion since he states he can feel that his hgb is low. He denies cp, dyspnea, nausea, vomiting or diarrhea. He complains of leg and arm pain rated 5/10 which he states is improving.      ROS:  Constitutional: Positive for fatigue. Negative for fever, chills, weakness, malaise. CV: Negative for chest pain, palpitations, edema. Respiratory: Negative for dyspnea, cough, wheezing. GI: Negative for nausea and mild abdominal pain; negative vomiting or diarrhea. 10 point review of systems is otherwise negative with the exception of the elements mentioned above in the HPI.      Allergies   Allergen Reactions    Compazine [Prochlorperazine Edisylate] Other (comments)     Also makes him feel funny    Morphine Other (comments)     Makes him feel funny    Reglan [Metoclopramide] Other (comments)     \"feel funny\"    Zofran [Ondansetron Hcl (Pf)] Other (comments)     Make him feel funny       OBJECTIVE:  Patient Vitals for the past 8 hrs:   BP Temp Pulse Resp SpO2 Weight   17 0821 137/86 98.6 °F (37 °C) 77 20 96 % -   17 0345 118/73 98.3 °F (36.8 °C) 72 20 96 % 158 lb 14.4 oz (72.1 kg)     Temp (24hrs), Av.6 °F (37 °C), Min:98.3 °F (36.8 °C), Max:98.9 °F (37.2 °C)         Physical Exam:  Constitutional: Well developed, well nourished male in no acute distress, laying comfortably in the hospital bed. HEENT: Normocephalic and atraumatic. Oropharynx is clear, mucous membranes are moist.  Pupils are equal, round, and reactive to light. Extraocular muscles are intact. Sclerae anicteric. Neck supple without JVD. No thyromegaly present. Lymph node   No palpable submandibular, cervical, supraclavicular, axillary or inguinal lymph nodes. Skin Warm and dry. No bruising and no rash noted. No erythema. No pallor. Respiratory Lungs are clear to auscultation bilaterally without wheezes, rales or rhonchi, normal air exchange without accessory muscle use. CVS Normal rate, regular rhythm and normal S1 and S2. No murmurs, gallops, or rubs. Peripheral pulses intact bilaterally. Abdomen Soft, nontender and nondistended, normoactive bowel sounds. No palpable mass. No hepatosplenomegaly. Neuro Grossly nonfocal with no obvious sensory or motor deficits. MSK Normal range of motion in general.  No edema and no tenderness. Psych Appropriate mood and affect.         Labs:    Recent Labs      01/19/17   0440  01/18/17   0845  01/17/17   0500   01/16/17   1450   WBC  6.9  7.3  8.7   < >  5.6   RBC  1.73*  1.79*  1.89*   < >  2.36*   HGB  6.0*  6.2*  6.4*   < >  7.9*   HCT  17.7*  18.1*  19.1*   < >  23.4*   MCV  102.3*  101.1*  101.1*   < >  99.2*   MCH  34.7*  34.6*  33.9*   < >  33.5*   MCHC  33.9  34.3  33.5   < >  33.8   RDW   --    --    --    --   30.7*   PLT  190  194  238   < >  275   GRANS   --    --   13*   --   80*   LYMPH   --    --   86*   --   15*   MONOS   --    --    --    --   1*   BASOS   --    --   1   --    --    DF   --    --   AUTOMATED   --   AUTOMATED   ANEU   --    --   1.1*   --   4.5   ABL   --    --   7.5*   --   0.8   ABM   --    --    --    --   0.1   ABB   --    --   0.1   --    --     < > = values in this interval not displayed. Recent Labs      01/19/17   0440  01/18/17   0845  01/17/17   0335  01/16/17   1450   NA  143  142  144  142   K  3.8  3.9  3.6  3.6   CL  108*  109*  111*  107   CO2  25  25  25  23   AGAP  10  8  8  12   GLU  124*  105*  129*  145*   BUN  6  7  7  9   CREA  0.62*  0.71*  0.69*  0.74*   GFRAA  >60  >60  >60  >60   GFRNA  >60  >60  >60  >60   CA  8.2*  8.0*  7.8*  9.1   SGOT   --    --   28  49*   AP   --    --   60  79   TP   --    --   6.4  8.4*   ALB   --    --   3.1*  4.1   GLOB   --    --   3.3  4.3*   AGRAT   --    --   0.9*  1.0*   MG   --    --   2.0   --          Imaging:  Chest 2 view 01/16/17      CLINICAL INDICATION: Acute moderate midline chest pain, chills, sickle cell  disease      COMPARISON: 12/19/2016, 6/9/2016      TECHNIQUE: Upright PA and lateral views of the chest       FINDINGS: Lung volumes are well inflated. Cardiomediastinal silhouette and  hilar contours are stable with the left portacatheter remaining.  The lungs are  clear.       No acute osseous abnormalities are seen.          IMPRESSION: No acute disease.       ASSESSMENT:    Problem List  Date Reviewed: 3/5/2012          Codes Class Noted    Iron overload due to repeated red blood cell transfusions ICD-10-CM: E83.111  ICD-9-CM: 275.02  1/18/2017        * (Principal)Sickle cell anemia with crisis [facility]) ICD-10-CM: D57.00  ICD-9-CM: 282.62  1/16/2017        Hypokalemia ICD-10-CM: E87.6  ICD-9-CM: 276.8  7/9/2016        Paroxysmal SVT (supraventricular tachycardia) (HCC) ICD-10-CM: I47.1  ICD-9-CM: 427.0  7/9/2016        Hypomagnesemia ICD-10-CM: E83.42  ICD-9-CM: 275.2  7/9/2016        Sickle cell anemia ([facility] ICD-10-CM: D57.1  ICD-9-CM: 282.60  4/6/2016        Sickle cell crisis ([facility] ICD-10-CM: D57.00  ICD-9-CM: 282.62  4/5/2016        leukocytosis - most likely reactive ICD-10-CM: D72.829  ICD-9-CM: 288.60  1/30/2016        Diarrhea ICD-10-CM: R19.7  ICD-9-CM: 787.91  9/27/2014        Sickle cell pain crisis ([facility] ICD-10-CM: D57.00  ICD-9-CM: 282.62  10/25/2012        Hyperbilirubinemia ICD-10-CM: E80.6  ICD-9-CM: 782.4  9/20/2012    Overview Signed 9/20/2012  1:37 PM by Jose Oviedo     Related to sickle cell crisis             Essential hypertension, benign ICD-10-CM: I10  ICD-9-CM: 401.1  6/23/2012        Hemolytic crisis ([address] ICD-10-CM: D65  ICD-9-CM: 286.6  3/24/2012    Overview Signed 3/24/2012  2:53 PM by Ariana Stone     Sickle cell with anemia             HTN (hypertension) (Chronic) ICD-10-CM: I10  ICD-9-CM: 401.9  3/2/2012        Leukocytosis - chronic reactive ICD-10-CM: J76.753  ICD-9-CM: 288.60  9/16/2011                PLAN:    Sickle cell anemia with pain crisis  01/17: Continue hydroxyuria and folic acid. Check ferritin level and hemoglobin electrophoresis. Transfuse packed PBC's if hgb falls below 6. NC oxygen and 1/2 NS for respiratory status and hydration. Control pain with oxycodone and PRN dilaudid. 01/18: Ferritin 3905 with hgb 6.2; Transfuse if hgb below 6. Hgb 6.2 today. 01/19: Hgb of 6.0 with complaints of increased fatigue. Transfuse 2 units pRBC and d/c on pain meds po with follow up to blood work Monday outpatient. Iron overload  Ferritin 3905; pt on Jadenu at home. Continue upon d/c. HTN  Home meds are Lopressor 25 mg and Lisinopril 5mg; On admission, b/p meds were on hold due to decreased n/v/d. Restart upon d/c.      Intractable vomiting and diarrhea  01/17: IV fluids, IV pepcid, and PRN phenergan suppository for supportive management. C-diff stool culture pending. 01/18: Pt denies vomiting and diarrhea; c-diff negative; continue fluids at 0.45 NS. Change IV phenergan to po for nausea control. 01/19: abdominal complaints resolved. Phenergan po for nausea as needed.      DVT profy  Heparin 5,000 units TID; patient ambulating.      Dispo:  IM plans to d/c today following transfusion of pRBCs with short course of po pain meds and phergan for nausea control to follow up with outpatient SS doctor on Monday. Thanks for allowing to follow in care.      Lab studies and imaging studies were personally reviewed. Pertinent old records were reviewed from Dr. Joselin Peterson at [address]                   Jordyn Patton  [facility]  [address] [place] [address]  Office : [phone]  Fax : [phone]

## 2017-01-19 NOTE — DISCHARGE SUMMARY
Patient ID:  Flash Kinney  [id number]  2 y.o.  1975  Admit date: 1/16/2017  2:19 PM  Discharge date and time: 1/19/2017  Attending: Mauri Manning, *  PCP:  Miquel Gomes MD  Treatment Team: Attending Provider: Mauri Manning MD; Consulting Provider: Eulalio Carmona MD; Utilization Review: Marla Lara    Principal Diagnosis Sickle cell anemia with crisis [facility])   Principal Problem:    Sickle cell anemia with crisis ([address] (1/16/2017)    Active Problems:    HTN (hypertension) (3/2/2012)      Iron overload due to repeated red blood cell transfusions (1/18/2017)             Hospital Course:  Please refer to the admission H&P for details of presentation. In summary, the patient is a very pleasant gentleman with history of Sickle B thal and iron overlaod who was admitted with worsening chest pain shortness of breath, palpitations. He was noted to be anemic, as well as in sickle cell crises. No evidence of infection. His sickle cell pain did improve with IV dilauded, and has been transitioned over to oral medications. We resisted transfusion given his history of Iron overload, however as it has drifted down to 6 we will give him 1 unit here have him follow up in 3-5 days with hematology for repeat CBC. He has pain medications at home, we will discharge him today after transfusion. He feels otherwise well.      Significant Diagnostic Studies:       Labs: Results:       Chemistry Recent Labs      01/19/17   0440  01/18/17   0845  01/17/17   0335  01/16/17   1450   GLU  124*  105*  129*  145*   NA  143  142  144  142   K  3.8  3.9  3.6  3.6   CL  108*  109*  111*  107   CO2  25  25  25  23   BUN  6  7  7  9   CREA  0.62*  0.71*  0.69*  0.74*   CA  8.2*  8.0*  7.8*  9.1   AGAP  10  8  8  12   AP   --    --   60  79   TP   --    --   6.4  8.4*   ALB   --    --   3.1*  4.1   GLOB   --    --   3.3  4.3*   AGRAT   --    --   0.9*  1.0*      CBC w/Diff Recent Labs      01/19/17   0440  01/18/17   0857 01/17/17   0500   01/16/17   1450   WBC  6.9  7.3  8.7   < >  5.6   RBC  1.73*  1.79*  1.89*   < >  2.36*   HGB  6.0*  6.2*  6.4*   < >  7.9*   HCT  17.7*  18.1*  19.1*   < >  23.4*   PLT  190  194  238   < >  275   GRANS   --    --   13*   --   80*   LYMPH   --    --   86*   --   15*    < > = values in this interval not displayed. Cardiac Enzymes Recent Labs      01/17/17   0335  01/17/17   0130   CPK  39  42   CKND1  1.5  CANNOT BE CALCULATED      Coagulation No results for input(s): PTP, INR, APTT in the last 72 hours. No lab exists for component: INREXT    Lipid Panel No results found for: CHOL, CHOLPOCT, CHOLX, CHLST, CHOLV, [id number], HDL, LDL, NLDLCT, DLDL, LDLC, DLDLP, [id number], VLDLC, VLDL, TGL, TGLX, TRIGL, [id number], TRIGP, TGLPOCT, [id number], CHHD, CHHDX   BNP No results for input(s): BNPP in the last 72 hours. Liver Enzymes Recent Labs      01/17/17   0335   TP  6.4   ALB  3.1*   AP  60   SGOT  28      Thyroid Studies No results found for: T4, T3U, TSH, TSHEXT       Results for orders placed or performed during the hospital encounter of 01/16/17   EKG, 12 LEAD, INITIAL   Result Value Ref Range    Systolic BP  mmHg    Diastolic BP  mmHg    Ventricular Rate 76 BPM    Atrial Rate 76 BPM    P-R Interval 168 ms    QRS Duration 90 ms    Q-T Interval 386 ms    QTC Calculation (Bezet) 434 ms    Calculated P Axis 68 degrees    Calculated R Axis 32 degrees    Calculated T Axis 71 degrees    Diagnosis       Normal sinus rhythm  Minimal voltage criteria for LVH, may be normal variant  Borderline ECG  Confirmed by ST SENAIT NICHOLS MD (), YOAN SORIA ([id number]) on 1/16/2017 12:36:28 PM       CT Results (most recent):  No results found for this or any previous visit. VAS/US Results (most recent):  No results found for this or any previous visit.   XR Results (most recent):    Results from Hospital Encounter encounter on 01/16/17   XR CHEST PA LAT   Narrative Chest 2 view    CLINICAL INDICATION: Acute moderate midline chest pain, chills, sickle cell  disease    COMPARISON: 12/19/2016, 6/9/2016    TECHNIQUE: Upright PA and lateral views of the chest     FINDINGS: Lung volumes are well inflated. Cardiomediastinal silhouette and  hilar contours are stable with the left portacatheter remaining. The lungs are  clear. No acute osseous abnormalities are seen. Impression IMPRESSION: No acute disease. Discharge Exam:  Visit Vitals    /86 (BP 1 Location: Right arm, BP Patient Position: At rest)    Pulse 77    Temp 98.6 °F (37 °C)    Resp 20    Ht 5' 10\" (1.778 m)    Wt 72.1 kg (158 lb 14.4 oz)    SpO2 96%    BMI 22.8 kg/m2     General appearance: alert, cooperative, no distress, appears stated age  Lungs: clear to auscultation bilaterally  Heart: regular rate and rhythm, S1, S2 normal, no murmur, click, rub or gallop  Abdomen: soft, non-tender. Bowel sounds normal. No masses,  no organomegaly  Extremities: no cyanosis or edema  Neurologic: Grossly normal    Disposition: home  Discharge Condition: stable  Patient Instructions:   Current Discharge Medication List      CONTINUE these medications which have NOT CHANGED    Details   oxyCODONE ER (OXYCONTIN) 80 mg ER tablet Take 80 mg by mouth every twelve (12) hours. oxyCODONE IR (OXY-IR) 30 mg immediate release tablet Take 1 Tab by mouth every four (4) hours as needed for Pain. Max Daily Amount: 180 mg.  Qty: 15 Tab, Refills: 0      hydroxyurea (HYDREA) 500 mg capsule Take 500 mg by mouth two (2) times a day. Takes 2 tablets in the morning and 1 tablet at night. Indications: Sickle Cell Anemia with Crisis      metoprolol (LOPRESSOR) 25 mg tablet Take 25 mg by mouth two (2) times a day. Indications: HYPERTENSION      lisinopril (PRINIVIL, ZESTRIL) 5 mg tablet Take 5 mg by mouth daily. Indications: HYPERTENSION      folic acid (FOLVITE) 1 mg tablet Take 1 mg by mouth daily.              Activity: Activity as tolerated  Diet: Regular Diet  Wound Care: None needed    Follow-up  ·   [facility] with repeat CBC  Time spent to discharge patient 35 minutes  Signed:  Sherry Zamora MD  1/19/2017  10:31 AM

## 2017-01-19 NOTE — PROGRESS NOTES
END OF SHIFT NOTE:    Intake/Output  01/18 1901 - 01/19 0700  In: 1911 [I.V.:1911]  Out: 1300 [Urine:1300]   Voiding: YES  Catheter: NO  Drain:              Stool:  0 occurrences. Emesis:  0 occurrences. VITAL SIGNS  Patient Vitals for the past 12 hrs:   Temp Pulse Resp BP SpO2   01/19/17 0345 98.3 °F (36.8 °C) 72 20 118/73 96 %   01/18/17 2338 98.9 °F (37.2 °C) 83 18 (!) 145/92 98 %   01/18/17 1931 98.8 °F (37.1 °C) 88 20 123/73 96 %       Pain Assessment  Pain 1  Pain Scale 1: Numeric (0 - 10) (01/19/17 0619)  Pain Intensity 1: 7 (01/19/17 0619)  Patient Stated Pain Goal: 0 (01/17/17 0750)  Pain Reassessment 1: Yes (01/19/17 0211)  Pain Onset 1: pta (01/19/17 4732)  Pain Location 1: Leg (01/19/17 0619)  Pain Orientation 1: Left;Right (01/19/17 6441)  Pain Description 1: Aching (01/19/17 3029)  Pain Intervention(s) 1: Medication (see MAR) (01/19/17 0732)    Ambulating  YES    Additional Information: hgb 6, note from heme not to transfuse unless <6    Shift report will be given to oncoming nurse at the bedside.     Rodolfo Godoy RN

## 2017-01-20 LAB
ABO + RH BLD: NORMAL
ANTIGENS PRESENT RBC DONR: NORMAL
BLD PROD TYP BPU: NORMAL
BLOOD GROUP ANTIBODIES SERPL: NORMAL
BPU ID: NORMAL
CROSSMATCH RESULT,%XM: NORMAL
SPECIMEN EXP DATE BLD: NORMAL
STATUS OF UNIT,%ST: NORMAL
UNIT DIVISION, %UDIV: 0

## 2017-01-21 LAB
BACTERIA SPEC CULT: NORMAL
BACTERIA SPEC CULT: NORMAL
SERVICE CMNT-IMP: NORMAL
SERVICE CMNT-IMP: NORMAL

## 2017-01-23 LAB
HGB A MFR BLD: 52.4 % (ref 94–98)
HGB A2 MFR BLD COLUMN CHROM: 3.2 % (ref 0.7–3.1)
HGB C MFR BLD: 0 %
HGB F MFR BLD: 14.2 % (ref 0–2)
HGB FRACT BLD-IMP: ABNORMAL
HGB S BLD QL SOLY: POSITIVE
HGB S MFR BLD: 30.2 %

## 2017-02-14 ENCOUNTER — HOSPITAL ENCOUNTER (EMERGENCY)
Age: 42
Discharge: HOME | End: 2017-02-14
Attending: EMERGENCY MEDICINE
Payer: MEDICARE

## 2017-02-14 VITALS
SYSTOLIC BLOOD PRESSURE: 121 MMHG | HEIGHT: 70 IN | DIASTOLIC BLOOD PRESSURE: 71 MMHG | RESPIRATION RATE: 16 BRPM | HEART RATE: 78 BPM | BODY MASS INDEX: 22.62 KG/M2 | WEIGHT: 158 LBS | OXYGEN SATURATION: 96 % | TEMPERATURE: 97.6 F

## 2017-02-14 DIAGNOSIS — D57.00 SICKLE CELL ANEMIA WITH CRISIS (HCC): Primary | ICD-10-CM

## 2017-02-14 LAB
ALBUMIN SERPL BCP-MCNC: 3.6 G/DL (ref 3.5–5)
ALBUMIN/GLOB SERPL: 1 {RATIO} (ref 1.2–3.5)
ALP SERPL-CCNC: 72 U/L (ref 50–136)
ALT SERPL-CCNC: 82 U/L (ref 12–65)
ANION GAP BLD CALC-SCNC: 9 MMOL/L (ref 7–16)
AST SERPL W P-5'-P-CCNC: 41 U/L (ref 15–37)
BASOPHILS # BLD AUTO: 0 K/UL (ref 0–0.2)
BASOPHILS # BLD: 0 % (ref 0–2)
BILIRUB SERPL-MCNC: 1 MG/DL (ref 0.2–1.1)
BUN SERPL-MCNC: 7 MG/DL (ref 6–23)
CALCIUM SERPL-MCNC: 7.9 MG/DL (ref 8.3–10.4)
CHLORIDE SERPL-SCNC: 110 MMOL/L (ref 98–107)
CO2 SERPL-SCNC: 26 MMOL/L (ref 21–32)
CREAT SERPL-MCNC: 0.66 MG/DL (ref 0.8–1.5)
DIFFERENTIAL METHOD BLD: ABNORMAL
EOSINOPHIL # BLD: 0 K/UL (ref 0–0.8)
EOSINOPHIL NFR BLD: 0 % (ref 0.5–7.8)
GLOBULIN SER CALC-MCNC: 3.6 G/DL (ref 2.3–3.5)
GLUCOSE SERPL-MCNC: 109 MG/DL (ref 65–100)
HCT VFR BLD AUTO: 19.2 % (ref 41.1–50.3)
HGB BLD-MCNC: 6.6 G/DL (ref 13.6–17.2)
IMM GRANULOCYTES # BLD: 0 K/UL (ref 0–0.5)
IMM GRANULOCYTES NFR BLD AUTO: 0 % (ref 0–5)
LYMPHOCYTES # BLD AUTO: 52 % (ref 13–44)
LYMPHOCYTES # BLD: 2.9 K/UL (ref 0.5–4.6)
MCH RBC QN AUTO: 34.4 PG (ref 26.1–32.9)
MCHC RBC AUTO-ENTMCNC: 34.4 G/DL (ref 31.4–35)
MCV RBC AUTO: 100 FL (ref 79.6–97.8)
MONOCYTES # BLD: 0.3 K/UL (ref 0.1–1.3)
MONOCYTES NFR BLD AUTO: 6 % (ref 4–12)
NEUTS SEG # BLD: 2.3 K/UL (ref 1.7–8.2)
NEUTS SEG NFR BLD AUTO: 42 % (ref 43–78)
PLATELET # BLD AUTO: 201 K/UL (ref 150–450)
PLATELET COMMENTS,PCOM: ADEQUATE
PMV BLD AUTO: 10.4 FL (ref 10.8–14.1)
POTASSIUM SERPL-SCNC: 3.3 MMOL/L (ref 3.5–5.1)
PROT SERPL-MCNC: 7.2 G/DL (ref 6.3–8.2)
RBC # BLD AUTO: 1.92 M/UL (ref 4.23–5.67)
RBC MORPH BLD: ABNORMAL
SODIUM SERPL-SCNC: 145 MMOL/L (ref 136–145)
WBC # BLD AUTO: 5.5 K/UL (ref 4.3–11.1)
WBC MORPH BLD: ABNORMAL

## 2017-02-14 PROCEDURE — 36415 COLL VENOUS BLD VENIPUNCTURE: CPT | Performed by: EMERGENCY MEDICINE

## 2017-02-14 PROCEDURE — 96361 HYDRATE IV INFUSION ADD-ON: CPT | Performed by: EMERGENCY MEDICINE

## 2017-02-14 PROCEDURE — 80053 COMPREHEN METABOLIC PANEL: CPT | Performed by: EMERGENCY MEDICINE

## 2017-02-14 PROCEDURE — 96376 TX/PRO/DX INJ SAME DRUG ADON: CPT | Performed by: EMERGENCY MEDICINE

## 2017-02-14 PROCEDURE — 96374 THER/PROPH/DIAG INJ IV PUSH: CPT | Performed by: EMERGENCY MEDICINE

## 2017-02-14 PROCEDURE — 85025 COMPLETE CBC W/AUTO DIFF WBC: CPT | Performed by: EMERGENCY MEDICINE

## 2017-02-14 PROCEDURE — 74011250637 HC RX REV CODE- 250/637: Performed by: EMERGENCY MEDICINE

## 2017-02-14 PROCEDURE — 99283 EMERGENCY DEPT VISIT LOW MDM: CPT | Performed by: EMERGENCY MEDICINE

## 2017-02-14 PROCEDURE — 74011250636 HC RX REV CODE- 250/636: Performed by: EMERGENCY MEDICINE

## 2017-02-14 RX ORDER — PROMETHAZINE HYDROCHLORIDE 25 MG/1
25 TABLET ORAL
Status: COMPLETED | OUTPATIENT
Start: 2017-02-14 | End: 2017-02-14

## 2017-02-14 RX ORDER — ONDANSETRON 4 MG/1
4 TABLET, ORALLY DISINTEGRATING ORAL
Status: DISCONTINUED | OUTPATIENT
Start: 2017-02-14 | End: 2017-02-14 | Stop reason: HOSPADM

## 2017-02-14 RX ORDER — HYDROMORPHONE HYDROCHLORIDE 1 MG/ML
2 INJECTION, SOLUTION INTRAMUSCULAR; INTRAVENOUS; SUBCUTANEOUS
Status: COMPLETED | OUTPATIENT
Start: 2017-02-14 | End: 2017-02-14

## 2017-02-14 RX ORDER — SODIUM CHLORIDE 9 MG/ML
250 INJECTION, SOLUTION INTRAVENOUS ONCE
Status: COMPLETED | OUTPATIENT
Start: 2017-02-14 | End: 2017-02-14

## 2017-02-14 RX ADMIN — HYDROMORPHONE HYDROCHLORIDE 2 MG: 1 INJECTION, SOLUTION INTRAMUSCULAR; INTRAVENOUS; SUBCUTANEOUS at 17:35

## 2017-02-14 RX ADMIN — SODIUM CHLORIDE 250 ML: 900 INJECTION, SOLUTION INTRAVENOUS at 17:35

## 2017-02-14 RX ADMIN — PROMETHAZINE HYDROCHLORIDE 25 MG: 25 TABLET ORAL at 17:46

## 2017-02-14 RX ADMIN — HYDROMORPHONE HYDROCHLORIDE 2 MG: 1 INJECTION, SOLUTION INTRAMUSCULAR; INTRAVENOUS; SUBCUTANEOUS at 18:55

## 2017-02-14 NOTE — ED PROVIDER NOTES
HPI Comments: 53-year-old male presents to the emergency department with vaso-occlusive pain in arms and legs. Patient has known sickle cell. Frequent crises. Was seen at [address] [place] [address] center yesterday had IV fluids and pain medicine. Was at the cancer center again today. Received IV fluids and pain medicine. Is scheduled for a transfusion of 2 units tomorrow    Took his home pain medication without relief    No aggravating    No fever    Chest pain            Patient is a 38 y.o. male presenting with sickle cell disease. Sickle Cell Crisis    Pertinent negatives include no chest pain and no fever. Past Medical History:   Diagnosis Date    Chronic pain     HTN (hypertension)     Ill-defined condition      sickle cell    Iron overload due to repeated red blood cell transfusions 1/18/2017    Paroxysmal SV[facility]ra[facility] 7/9/2016    Sickle cell disease [address] [address]        Past Surgical History:   Procedure Laterality Date    Hx cholecystectomy      Hc port life sngle lumen 5013       to L CW    Hc penile impl dura ii positinble      Hx other surgical       penile inplant    Hx vascular access           Family History:   Problem Relation Age of Onset    Hypertension Other     Diabetes Father     Stroke Father     Sickle Cell Anemia Sister        Social History     Social History    Marital status:      Spouse name: N/A    Number of children: N/A    Years of education: N/A     Occupational History    Not on file. Social History Main Topics    Smoking status: Never Smoker    Smokeless tobacco: Never Used    Alcohol use No    Drug use: No    Sexual activity: Yes     Other Topics Concern    Not on file     Social History Narrative         ALLERGIES: Compazine [prochlorperazine edisylate]; Morphine; Reglan [metoclopramide]; and Zofran [ondansetron hcl (pf)]    Review of Systems   Constitutional: Negative for chills, fatigue and fever.    HENT: Negative. Eyes: Negative. Respiratory: Negative for chest tightness and shortness of breath. Cardiovascular: Negative for chest pain. Gastrointestinal: Negative for nausea and vomiting. Genitourinary: Negative. Musculoskeletal: Negative. Neurological: Negative. Vitals:    02/14/17 1556   BP: 132/41   Pulse: 98   Temp: 98.8 °F (37.1 °C)   SpO2: 98%   Weight: 71.7 kg (158 lb)   Height: 5' 10\" (1.778 m)            Physical Exam   Constitutional: He is oriented to person, place, and time. He appears well-developed. HENT:   Head: Normocephalic and atraumatic. Eyes: EOM are normal. Pupils are equal, round, and reactive to light. Cardiovascular: Normal rate and regular rhythm. Pulmonary/Chest: Breath sounds normal. No respiratory distress. He has no wheezes. Abdominal: Soft. He exhibits no distension. There is no tenderness. Musculoskeletal: Normal range of motion. Neurological: He is alert and oriented to person, place, and time. Skin: Skin is warm and dry. Psychiatric: He has a normal mood and affect. His behavior is normal.   Nursing note and vitals reviewed. MDM  Number of Diagnoses or Management Options  Diagnosis management comments: 40-year-old male with sickle cell presents with basically a pain crisis. Started several days ago. Seen in the infusion center at [facility] yesterday and again today    Presents tonight with persistent pain    Scheduled for a transfusion tomorrow at [facility]. I have ordered opiate pain medication.     ED Course       Procedures

## 2017-02-14 NOTE — ED TRIAGE NOTES
Pt. Complains of bilateral leg pain, states he is having sickle cell pain. Pt. Has a port. States port is accessed, started today and is due to have a transfusion tomorrow.

## 2017-02-15 NOTE — DISCHARGE INSTRUCTIONS
Sickle Cell Crisis: Care Instructions  Your Care Instructions    Sickle cell crisis is a painful episode that may begin suddenly in a person with sickle cell disease. Sickle cell disease turns normal, round red blood cells into cells that look like kendall or crescent moons. The sickle-shaped cells can get stuck in blood vessels, blocking blood flow and causing severe pain. The pain can occur in the bones of the spine, the arms and legs, the chest, and the abdomen. An episode may be called a \"painful event\" or \"painful crisis. \" Some people who have sickle cell disease have many painful events, while others have few or none. Treatment depends on the level of pain and how long it lasts. Sometimes taking nonprescription pain relievers can help. Or you may need stronger pain relief medicine that is prescribed or given by a doctor. You may need to be treated in the hospital.  It isn't always possible to know what sets off a painful event. But triggers include being dehydrated, cold temperatures, infection, stress, and not getting enough oxygen. Follow-up care is a key part of your treatment and safety. Be sure to make and go to all appointments, and call your doctor if you are having problems. It's also a good idea to know your test results and keep a list of the medicines you take. How can you care for yourself at home? · Create a pain management plan with your doctor. This plan should include the types of medicines you can take and other actions you can take at home to relieve pain. · Drink plenty of fluids, enough so that your urine is light yellow or clear like water. If you have kidney, heart, or liver disease and have to limit fluids, talk with your doctor before you increase the amount of fluids you drink. · Take your medicines exactly as prescribed. Call your doctor if you think you are having a problem with your medicine. · Take pain medicines exactly as directed.   ¨ If the doctor gave you a prescription medicine for pain, take it as prescribed. ¨ If you are not taking a prescription pain medicine, ask your doctor if you can take an over-the-counter medicine. · Avoid alcohol. It can make you dehydrated. · Dress warmly in cold weather. The cold and windy weather can lead to severe pain. · Do not smoke. Smoking can reduce the amount of oxygen in your blood. · Get plenty of sleep. When should you call for help? Call 911 anytime you think you may need emergency care. For example, call if:  · You passed out (lost consciousness). Call your doctor now or seek immediate medical care if:  · You are in severe pain. · You are dizzy or lightheaded, or you feel like you may faint. · You have a fever. · You are short of breath. · Your symptoms are getting worse. Watch closely for changes in your health, and be sure to contact your doctor if you are not getting better as expected. Where can you learn more? Go to http://socorro-rosita.info/. Enter F104 in the search box to learn more about \"Sickle Cell Crisis: Care Instructions. \"  Current as of: February 5, 2016  Content Version: 11.1  © 2080-2847 [employer]. Care instructions adapted under license by [employer] (which disclaims liability or warranty for this information). If you have questions about a medical condition or this instruction, always ask your healthcare professional. [address] any warranty or liability for your use of this information.

## 2017-05-12 ENCOUNTER — APPOINTMENT (OUTPATIENT)
Dept: GENERAL RADIOLOGY | Age: 42
DRG: 812 | End: 2017-05-12
Attending: EMERGENCY MEDICINE
Payer: MEDICARE

## 2017-05-12 ENCOUNTER — HOSPITAL ENCOUNTER (INPATIENT)
Age: 42
LOS: 4 days | Discharge: HOME | DRG: 812 | End: 2017-05-16
Attending: EMERGENCY MEDICINE | Admitting: HOSPITALIST
Payer: MEDICARE

## 2017-05-12 ENCOUNTER — APPOINTMENT (OUTPATIENT)
Dept: CT IMAGING | Age: 42
DRG: 812 | End: 2017-05-12
Attending: HOSPITALIST
Payer: MEDICARE

## 2017-05-12 DIAGNOSIS — D57.00 SICKLE CELL ANEMIA WITH CRISIS (HCC): Primary | ICD-10-CM

## 2017-05-12 DIAGNOSIS — R50.9 TEMPERATURE ELEVATION: ICD-10-CM

## 2017-05-12 LAB
ALBUMIN SERPL BCP-MCNC: 3.8 G/DL (ref 3.5–5)
ALBUMIN/GLOB SERPL: 0.9 {RATIO} (ref 1.2–3.5)
ALP SERPL-CCNC: 97 U/L (ref 50–136)
ALT SERPL-CCNC: 72 U/L (ref 12–65)
ANION GAP BLD CALC-SCNC: 6 MMOL/L (ref 7–16)
AST SERPL W P-5'-P-CCNC: 50 U/L (ref 15–37)
BASOPHILS # BLD AUTO: 0.2 K/UL (ref 0–0.2)
BASOPHILS # BLD: 1 % (ref 0–2)
BILIRUB SERPL-MCNC: 1.3 MG/DL (ref 0.2–1.1)
BUN SERPL-MCNC: 7 MG/DL (ref 6–23)
CALCIUM SERPL-MCNC: 9.5 MG/DL (ref 8.3–10.4)
CHLORIDE SERPL-SCNC: 109 MMOL/L (ref 98–107)
CO2 SERPL-SCNC: 28 MMOL/L (ref 21–32)
CREAT SERPL-MCNC: 0.74 MG/DL (ref 0.8–1.5)
DEPRECATED S PYO AG THROAT QL EIA: NEGATIVE
DIFFERENTIAL METHOD BLD: ABNORMAL
EOSINOPHIL # BLD: 0.3 K/UL (ref 0–0.8)
EOSINOPHIL NFR BLD: 2 % (ref 0.5–7.8)
ERYTHROCYTE [DISTWIDTH] IN BLOOD BY AUTOMATED COUNT: 19.2 % (ref 11.9–14.6)
GLOBULIN SER CALC-MCNC: 4.4 G/DL (ref 2.3–3.5)
GLUCOSE SERPL-MCNC: 119 MG/DL (ref 65–100)
HCT VFR BLD AUTO: 26.1 % (ref 41.1–50.3)
HGB BLD-MCNC: 9.1 G/DL (ref 13.6–17.2)
HGB RETIC QN AUTO: 36 PG (ref 29–35)
IMM GRANULOCYTES # BLD: 0 K/UL (ref 0–0.5)
IMM GRANULOCYTES NFR BLD AUTO: 0 % (ref 0–5)
IMM RETICS NFR: 34.5 % (ref 2.3–13.4)
LACTATE BLD-SCNC: 1.4 MMOL/L (ref 0.5–1.9)
LYMPHOCYTES # BLD AUTO: 18 % (ref 13–44)
LYMPHOCYTES # BLD: 2.9 K/UL (ref 0.5–4.6)
MCH RBC QN AUTO: 29.5 PG (ref 26.1–32.9)
MCHC RBC AUTO-ENTMCNC: 34.9 G/DL (ref 31.4–35)
MCV RBC AUTO: 84.7 FL (ref 79.6–97.8)
MONOCYTES # BLD: 1.1 K/UL (ref 0.1–1.3)
MONOCYTES NFR BLD AUTO: 7 % (ref 4–12)
NEUTS SEG # BLD: 11.4 K/UL (ref 1.7–8.2)
NEUTS SEG NFR BLD AUTO: 72 % (ref 43–78)
PLATELET # BLD AUTO: 164 K/UL (ref 150–450)
PLATELET COMMENTS,PCOM: ADEQUATE
PMV BLD AUTO: 10.4 FL (ref 10.8–14.1)
POTASSIUM SERPL-SCNC: 4.1 MMOL/L (ref 3.5–5.1)
PROT SERPL-MCNC: 8.2 G/DL (ref 6.3–8.2)
RBC # BLD AUTO: 3.08 M/UL (ref 4.23–5.67)
RBC MORPH BLD: ABNORMAL
RETICS # AUTO: 0.21 M/UL (ref 0.03–0.1)
RETICS/RBC NFR AUTO: 6.8 % (ref 0.3–2)
SODIUM SERPL-SCNC: 143 MMOL/L (ref 136–145)
WBC # BLD AUTO: 15.9 K/UL (ref 4.3–11.1)
WBC MORPH BLD: ABNORMAL

## 2017-05-12 PROCEDURE — 74011250637 HC RX REV CODE- 250/637: Performed by: HOSPITALIST

## 2017-05-12 PROCEDURE — 74011000250 HC RX REV CODE- 250: Performed by: EMERGENCY MEDICINE

## 2017-05-12 PROCEDURE — 87081 CULTURE SCREEN ONLY: CPT | Performed by: EMERGENCY MEDICINE

## 2017-05-12 PROCEDURE — 74011636320 HC RX REV CODE- 636/320: Performed by: EMERGENCY MEDICINE

## 2017-05-12 PROCEDURE — 74011250636 HC RX REV CODE- 250/636: Performed by: EMERGENCY MEDICINE

## 2017-05-12 PROCEDURE — 81003 URINALYSIS AUTO W/O SCOPE: CPT | Performed by: EMERGENCY MEDICINE

## 2017-05-12 PROCEDURE — 85046 RETICYTE/HGB CONCENTRATE: CPT | Performed by: EMERGENCY MEDICINE

## 2017-05-12 PROCEDURE — 96372 THER/PROPH/DIAG INJ SC/IM: CPT | Performed by: EMERGENCY MEDICINE

## 2017-05-12 PROCEDURE — 65270000029 HC RM PRIVATE

## 2017-05-12 PROCEDURE — 87040 BLOOD CULTURE FOR BACTERIA: CPT | Performed by: EMERGENCY MEDICINE

## 2017-05-12 PROCEDURE — 87880 STREP A ASSAY W/OPTIC: CPT | Performed by: EMERGENCY MEDICINE

## 2017-05-12 PROCEDURE — 80053 COMPREHEN METABOLIC PANEL: CPT | Performed by: EMERGENCY MEDICINE

## 2017-05-12 PROCEDURE — 83605 ASSAY OF LACTIC ACID: CPT

## 2017-05-12 PROCEDURE — 85025 COMPLETE CBC W/AUTO DIFF WBC: CPT | Performed by: EMERGENCY MEDICINE

## 2017-05-12 PROCEDURE — 99284 EMERGENCY DEPT VISIT MOD MDM: CPT | Performed by: EMERGENCY MEDICINE

## 2017-05-12 PROCEDURE — 74011000250 HC RX REV CODE- 250: Performed by: HOSPITALIST

## 2017-05-12 PROCEDURE — 71020 XR CHEST PA LAT: CPT

## 2017-05-12 PROCEDURE — 74011000258 HC RX REV CODE- 258: Performed by: EMERGENCY MEDICINE

## 2017-05-12 PROCEDURE — 74011250636 HC RX REV CODE- 250/636: Performed by: HOSPITALIST

## 2017-05-12 PROCEDURE — 96374 THER/PROPH/DIAG INJ IV PUSH: CPT | Performed by: EMERGENCY MEDICINE

## 2017-05-12 PROCEDURE — 96375 TX/PRO/DX INJ NEW DRUG ADDON: CPT | Performed by: EMERGENCY MEDICINE

## 2017-05-12 PROCEDURE — 71260 CT THORAX DX C+: CPT

## 2017-05-12 PROCEDURE — 96361 HYDRATE IV INFUSION ADD-ON: CPT | Performed by: EMERGENCY MEDICINE

## 2017-05-12 PROCEDURE — 36591 DRAW BLOOD OFF VENOUS DEVICE: CPT | Performed by: EMERGENCY MEDICINE

## 2017-05-12 RX ORDER — ONDANSETRON 2 MG/ML
4 INJECTION INTRAMUSCULAR; INTRAVENOUS
Status: DISCONTINUED | OUTPATIENT
Start: 2017-05-12 | End: 2017-05-13

## 2017-05-12 RX ORDER — FOLIC ACID 1 MG/1
1 TABLET ORAL DAILY
Status: DISCONTINUED | OUTPATIENT
Start: 2017-05-13 | End: 2017-05-16 | Stop reason: HOSPADM

## 2017-05-12 RX ORDER — SODIUM CHLORIDE 9 MG/ML
100 INJECTION, SOLUTION INTRAVENOUS CONTINUOUS
Status: DISCONTINUED | OUTPATIENT
Start: 2017-05-12 | End: 2017-05-13

## 2017-05-12 RX ORDER — METOPROLOL TARTRATE 25 MG/1
25 TABLET, FILM COATED ORAL 2 TIMES DAILY
Status: DISCONTINUED | OUTPATIENT
Start: 2017-05-12 | End: 2017-05-16 | Stop reason: HOSPADM

## 2017-05-12 RX ORDER — LORAZEPAM 2 MG/ML
1 INJECTION INTRAMUSCULAR
Status: COMPLETED | OUTPATIENT
Start: 2017-05-12 | End: 2017-05-12

## 2017-05-12 RX ORDER — ENOXAPARIN SODIUM 100 MG/ML
40 INJECTION SUBCUTANEOUS EVERY 24 HOURS
Status: DISCONTINUED | OUTPATIENT
Start: 2017-05-12 | End: 2017-05-16 | Stop reason: HOSPADM

## 2017-05-12 RX ORDER — HYDROMORPHONE HYDROCHLORIDE 1 MG/ML
2 INJECTION, SOLUTION INTRAMUSCULAR; INTRAVENOUS; SUBCUTANEOUS
Status: COMPLETED | OUTPATIENT
Start: 2017-05-12 | End: 2017-05-12

## 2017-05-12 RX ORDER — OXYCODONE AND ACETAMINOPHEN 10; 325 MG/1; MG/1
1 TABLET ORAL
Status: DISCONTINUED | OUTPATIENT
Start: 2017-05-12 | End: 2017-05-13

## 2017-05-12 RX ORDER — NALOXONE HYDROCHLORIDE 0.4 MG/ML
0.4 INJECTION, SOLUTION INTRAMUSCULAR; INTRAVENOUS; SUBCUTANEOUS AS NEEDED
Status: DISCONTINUED | OUTPATIENT
Start: 2017-05-12 | End: 2017-05-16 | Stop reason: HOSPADM

## 2017-05-12 RX ORDER — SODIUM CHLORIDE 0.9 % (FLUSH) 0.9 %
5-10 SYRINGE (ML) INJECTION EVERY 8 HOURS
Status: DISCONTINUED | OUTPATIENT
Start: 2017-05-12 | End: 2017-05-16 | Stop reason: HOSPADM

## 2017-05-12 RX ORDER — AMOXICILLIN 250 MG
1 CAPSULE ORAL DAILY
Status: DISCONTINUED | OUTPATIENT
Start: 2017-05-13 | End: 2017-05-16 | Stop reason: HOSPADM

## 2017-05-12 RX ORDER — LISINOPRIL 5 MG/1
5 TABLET ORAL DAILY
Status: DISCONTINUED | OUTPATIENT
Start: 2017-05-13 | End: 2017-05-16 | Stop reason: HOSPADM

## 2017-05-12 RX ORDER — HYDROMORPHONE HYDROCHLORIDE 1 MG/ML
1 INJECTION, SOLUTION INTRAMUSCULAR; INTRAVENOUS; SUBCUTANEOUS
Status: DISCONTINUED | OUTPATIENT
Start: 2017-05-12 | End: 2017-05-13

## 2017-05-12 RX ORDER — HYDROMORPHONE HYDROCHLORIDE 2 MG/1
2 TABLET ORAL
Qty: 8 TAB | Refills: 0 | Status: SHIPPED | OUTPATIENT
Start: 2017-05-12 | End: 2017-05-16

## 2017-05-12 RX ORDER — DIPHENHYDRAMINE HYDROCHLORIDE 50 MG/ML
12.5 INJECTION, SOLUTION INTRAMUSCULAR; INTRAVENOUS
Status: DISCONTINUED | OUTPATIENT
Start: 2017-05-12 | End: 2017-05-16 | Stop reason: HOSPADM

## 2017-05-12 RX ORDER — HYDROXYUREA 500 MG/1
500 CAPSULE ORAL 2 TIMES DAILY
Status: DISCONTINUED | OUTPATIENT
Start: 2017-05-12 | End: 2017-05-16 | Stop reason: HOSPADM

## 2017-05-12 RX ORDER — LORAZEPAM 2 MG/ML
1 INJECTION INTRAMUSCULAR
Status: DISCONTINUED | OUTPATIENT
Start: 2017-05-12 | End: 2017-05-16 | Stop reason: HOSPADM

## 2017-05-12 RX ORDER — SODIUM CHLORIDE 0.9 % (FLUSH) 0.9 %
5-10 SYRINGE (ML) INJECTION AS NEEDED
Status: DISCONTINUED | OUTPATIENT
Start: 2017-05-12 | End: 2017-05-16 | Stop reason: HOSPADM

## 2017-05-12 RX ORDER — LEVOFLOXACIN 500 MG/1
500 TABLET, FILM COATED ORAL DAILY
Qty: 7 TAB | Refills: 0 | Status: ON HOLD | OUTPATIENT
Start: 2017-05-12 | End: 2017-07-13 | Stop reason: CLARIF

## 2017-05-12 RX ORDER — SODIUM CHLORIDE 0.9 % (FLUSH) 0.9 %
10 SYRINGE (ML) INJECTION
Status: COMPLETED | OUTPATIENT
Start: 2017-05-12 | End: 2017-05-12

## 2017-05-12 RX ORDER — PROMETHAZINE HYDROCHLORIDE 25 MG/1
25 TABLET ORAL
Qty: 6 TAB | Refills: 0 | Status: SHIPPED | OUTPATIENT
Start: 2017-05-12 | End: 2017-05-16

## 2017-05-12 RX ADMIN — SODIUM CHLORIDE 12.5 MG: 9 INJECTION INTRAMUSCULAR; INTRAVENOUS; SUBCUTANEOUS at 17:06

## 2017-05-12 RX ADMIN — SODIUM CHLORIDE 100 ML/HR: 900 INJECTION, SOLUTION INTRAVENOUS at 23:10

## 2017-05-12 RX ADMIN — ENOXAPARIN SODIUM 40 MG: 40 INJECTION SUBCUTANEOUS at 23:14

## 2017-05-12 RX ADMIN — SODIUM CHLORIDE 1000 ML: 900 INJECTION, SOLUTION INTRAVENOUS at 14:09

## 2017-05-12 RX ADMIN — OXYCODONE HYDROCHLORIDE AND ACETAMINOPHEN 1 TABLET: 10; 325 TABLET ORAL at 23:46

## 2017-05-12 RX ADMIN — HYDROMORPHONE HYDROCHLORIDE 2 MG: 1 INJECTION, SOLUTION INTRAMUSCULAR; INTRAVENOUS; SUBCUTANEOUS at 17:06

## 2017-05-12 RX ADMIN — METOPROLOL TARTRATE 25 MG: 25 TABLET ORAL at 23:18

## 2017-05-12 RX ADMIN — SODIUM CHLORIDE 1000 ML: 900 INJECTION, SOLUTION INTRAVENOUS at 17:07

## 2017-05-12 RX ADMIN — IOPAMIDOL 100 ML: 755 INJECTION, SOLUTION INTRAVENOUS at 21:16

## 2017-05-12 RX ADMIN — HYDROMORPHONE HYDROCHLORIDE 1 MG: 1 INJECTION, SOLUTION INTRAMUSCULAR; INTRAVENOUS; SUBCUTANEOUS at 21:03

## 2017-05-12 RX ADMIN — SODIUM CHLORIDE 100 ML: 900 INJECTION, SOLUTION INTRAVENOUS at 21:16

## 2017-05-12 RX ADMIN — Medication 10 ML: at 23:08

## 2017-05-12 RX ADMIN — LORAZEPAM 1 MG: 2 INJECTION INTRAMUSCULAR; INTRAVENOUS at 14:09

## 2017-05-12 RX ADMIN — SODIUM CHLORIDE 12.5 MG: 9 INJECTION INTRAMUSCULAR; INTRAVENOUS; SUBCUTANEOUS at 23:04

## 2017-05-12 RX ADMIN — Medication 10 ML: at 21:16

## 2017-05-12 RX ADMIN — HYDROMORPHONE HYDROCHLORIDE 2 MG: 1 INJECTION, SOLUTION INTRAMUSCULAR; INTRAVENOUS; SUBCUTANEOUS at 14:09

## 2017-05-12 NOTE — IP AVS SNAPSHOT
Current Discharge Medication List  
  
START taking these medications Dose & Instructions Dispensing Information Comments Morning Noon Evening Bedtime  
 amLODIPine 2.5 mg tablet Commonly known as:  Wing Whatley Your next dose is:  5/17/2017 Dose:  2.5 mg Take 1 Tab by mouth daily for 30 days. Quantity:  30 Tab Refills:  1  
     
  
   
   
   
  
 levoFLOXacin 500 mg tablet Commonly known as:  Harpreet Driscoll Your last dose was: Today, after filling your prescription Your next dose is:  5/17/2017 Dose:  500 mg Take 1 Tab by mouth daily. Quantity:  7 Tab Refills:  0  
     
  
   
   
   
  
 promethazine 25 mg tablet Commonly known as:  PHENERGAN Your next dose is:  As needed for nausea Dose:  25 mg Take 1 Tab by mouth every six (6) hours as needed. May substitute suppository if vomiting Quantity:  20 Tab Refills:  0  
     
   
   
   
  
 senna-docusate 8.6-50 mg per tablet Commonly known as:  Wava Dun Your next dose is:  5/17/2017 Dose:  1 Tab Take 1 Tab by mouth daily for 15 days. Quantity:  15 Tab Refills:  0 CONTINUE these medications which have CHANGED Dose & Instructions Dispensing Information Comments Morning Noon Evening Bedtime * oxyCODONE IR 30 mg immediate release tablet Commonly known as:  OXY-IR What changed:  Another medication with the same name was removed. Continue taking this medication, and follow the directions you see here. Your next dose is: Today as needed for pain, but please use CAUTION when taking newly prescribed Dilaudid as both are strong medicines. Dose:  30 mg Take 1 Tab by mouth every four (4) hours as needed for Pain. Max Daily Amount: 180 mg.  
 Quantity:  12 Tab Refills:  0  
     
   
   
   
  
 * oxyCODONE ER 80 mg ER tablet Commonly known as:  OxyCONTIN What changed:  Another medication with the same name was removed.  Continue taking this medication, and follow the directions you see here. Your next dose is:  EVENING 5/16/2017 Dose:  80 mg Take 1 Tab by mouth every twelve (12) hours. Max Daily Amount: 160 mg.  
 Quantity:  6 Tab Refills:  0  
     
  
   
   
  
   
  
 * Notice: This list has 2 medication(s) that are the same as other medications prescribed for you. Read the directions carefully, and ask your doctor or other care provider to review them with you. CONTINUE these medications which have NOT CHANGED Dose & Instructions Dispensing Information Comments Morning Noon Evening Bedtime  
 folic acid 1 mg tablet Commonly known as:  Google Your next dose is:  5/17/2017 Dose:  1 mg Take 1 mg by mouth daily. Refills:  0  
     
  
   
   
   
  
 hydroxyurea 500 mg capsule Commonly known as:  HYDREA Your last dose was:  5/16/2017 Your next dose is:  EVENING 5/16/2017 Dose:  500 mg Take 1 Cap by mouth two (2) times a day for 15 days. Indications: Sickle Cell Anemia with Crisis Quantity:  30 Cap Refills:  0  
     
  
   
   
  
   
  
 lisinopril 5 mg tablet Commonly known as:  Yaneth Feil Your next dose is:  5/17/2017 Dose:  5 mg Take 5 mg by mouth daily. Indications: HYPERTENSION Refills:  0  
     
  
   
   
   
  
 metoprolol tartrate 25 mg tablet Commonly known as:  LOPRESSOR Your next dose is:  EVENING 5/16/2017 Dose:  25 mg Take 25 mg by mouth two (2) times a day. Indications: HYPERTENSION Refills:  0 Where to Get Your Medications These medications were sent to [address] [place], [place] [address] [facility] [address] Phone:  [phone]  
  amLODIPine 2.5 mg tablet  
 hydroxyurea 500 mg capsule  
 levoFLOXacin 500 mg tablet  
 promethazine 25 mg tablet  
 senna-docusate 8.6-50 mg per tablet Information on where to get these meds will be given to you by the nurse or doctor. ! Ask your nurse or doctor about these medications  
  oxyCODONE ER 80 mg ER tablet  
 oxyCODONE IR 30 mg immediate release tablet

## 2017-05-12 NOTE — ED TRIAGE NOTES
Pt arrive c/o sickle cell crisis that started two days ago. Pt c/o bilateral upper and lower extremity pain.

## 2017-05-12 NOTE — H&P
Hospitalist Admission History and Physical     NAME:  Rodney Champagne   Age:  38 y.o.  :   1975   MRN:   [id number]  PCP: Bg Díaz MD  Consulting MD:  Treatment Team: Attending Provider: Janee Perez MD; Primary Nurse: rTessa Renee RN; Primary Nurse: Burke Alcaraz RN    Chief Complaint   Patient presents with    Sickle Cell Crisis         HPI:   Patient is a 38 y.o. male with history of Sickle B thal and iron overlaod who is admitted for sickle cell crisis that started two days ago. Pt c/o bilateral upper and lower extremity pain. No evidence of infection. His sickle cell pain did improve with IV dilauded, and has been transitioned over to oral medications in the past . H/o conservation with blood transfusion given his history of Iron overload . He denies sob, chest pain and palpitation. He denies dysuria , coughing up phlegm. He does have fever with chills.    Past Medical History:   Diagnosis Date    Chronic pain     HTN (hypertension)     Ill-defined condition     sickle cell    Iron overload due to repeated red blood cell transfusions 2017    [facility]ox[facility]) 2016    Sick[facility] cell disease ([facility] [address]         Past Surgical History:   Procedure Laterality Date    HC PENILE IMPL DURA II POSITINBLE      HC PORT LIFE SNGLE LUMEN 5013      to L CW    HX CHOLECYSTECTOMY      HX OTHER SURGICAL      penile inplant    HX VASCULAR ACCESS          Family History   Problem Relation Age of Onset    Hypertension Other     Diabetes Father     Stroke Father     Sickle Cell Anemia Sister        Social History     Social History Narrative        Social History   Substance Use Topics    Smoking status: Never Smoker    Smokeless tobacco: Never Used    Alcohol use No        History   Drug Use No         Allergies   Allergen Reactions    Compazine [Prochlorperazine Edisylate] Other (comments)     Also makes him feel funny    Morphine Other (comments) Makes him feel funny    Reglan [Metoclopramide] Other (comments)     \"feel funny\"    Zofran [Ondansetron Hcl (Pf)] Other (comments)     Make him feel funny       Prior to Admission medications    Medication Sig Start Date End Date Taking? Authorizing Provider   levoFLOXacin (LEVAQUIN) 500 mg tablet Take 1 Tab by mouth daily. 5/12/17  Yes Gladys Power MD   promethazine (PHENERGAN) 25 mg tablet Take 1 Tab by mouth every six (6) hours as needed. May substitute suppository if vomiting 5/12/17  Yes Gladys Power MD   HYDROmorphone (DILAUDID) 2 mg tablet Take 1 Tab by mouth every four (4) hours as needed for Pain. Max Daily Amount: 12 mg. 5/12/17  Yes Gladys Power MD   hydroxyurea (HYDREA) 500 mg capsule Take 500 mg by mouth two (2) times a day. Takes 2 tablets in the morning and 1 tablet at night. Indications: Sickle Cell Anemia with Crisis    Historical Provider   metoprolol (LOPRESSOR) 25 mg tablet Take 25 mg by mouth two (2) times a day. Indications: HYPERTENSION    Historical Provider   lisinopril (PRINIVIL, ZESTRIL) 5 mg tablet Take 5 mg by mouth daily. Indications: HYPERTENSION    Historical Provider   folic acid (FOLVITE) 1 mg tablet Take 1 mg by mouth daily.     Usman García MD           Review of Systems    Rest of all 14 system review is negative except as outlined above     Objective:     Visit Vitals    /72    Pulse 62    Temp 100.1 °F (37.8 °C)    Resp 18    Ht 5' 10\" (1.778 m)    Wt 66.2 kg (146 lb)    SpO2 97%    BMI 20.95 kg/m2                Data Review:   Recent Results (from the past 24 hour(s))   CBC WITH AUTOMATED DIFF    Collection Time: 05/12/17  2:00 PM   Result Value Ref Range    WBC 15.9 (H) 4.3 - 11.1 K/uL    RBC 3.08 (L) 4.23 - 5.67 M/uL    HGB 9.1 (L) 13.6 - 17.2 g/dL    HCT 26.1 (L) 41.1 - 50.3 %    MCV 84.7 79.6 - 97.8 FL    MCH 29.5 26.1 - 32.9 PG    MCHC 34.9 31.4 - 35.0 g/dL    RDW 19.2 (H) 11.9 - 14.6 %    PLATELET 975 320 - 252 K/uL    MPV 10.4 (L) 10.8 - 14.1 FL    NEUTROPHILS 72 43 - 78 %    LYMPHOCYTES 18 13 - 44 %    MONOCYTES 7 4.0 - 12.0 %    EOSINOPHILS 2 0.5 - 7.8 %    BASOPHILS 1 0.0 - 2.0 %    IMMATURE GRANULOCYTES 0 0.0 - 5.0 %    ABS. NEUTROPHILS 11.4 (H) 1.7 - 8.2 K/UL    ABS. LYMPHOCYTES 2.9 0.5 - 4.6 K/UL    ABS. MONOCYTES 1.1 0.1 - 1.3 K/UL    ABS. EOSINOPHILS 0.3 0.0 - 0.8 K/UL    ABS. BASOPHILS 0.2 0.0 - 0.2 K/UL    ABS. IMM. GRANS. 0.0 0.0 - 0.5 K/UL    RBC COMMENTS OCCASIONAL  TARGET CELLS        RBC COMMENTS OCCASIONAL  SCHISTOCYTES        RBC COMMENTS SLIGHT  POLYCHROMASIA        RBC COMMENTS OCCASIONAL  OVALOCYTES        WBC COMMENTS Result Confirmed By Smear      PLATELET COMMENTS ADEQUATE      DF AUTOMATED     METABOLIC PANEL, COMPREHENSIVE    Collection Time: 05/12/17  2:00 PM   Result Value Ref Range    Sodium 143 136 - 145 mmol/L    Potassium 4.1 3.5 - 5.1 mmol/L    Chloride 109 (H) 98 - 107 mmol/L    CO2 28 21 - 32 mmol/L    Anion gap 6 (L) 7 - 16 mmol/L    Glucose 119 (H) 65 - 100 mg/dL    BUN 7 6 - 23 MG/DL    Creatinine 0.74 (L) 0.8 - 1.5 MG/DL    GFR est AA >60 >60 ml/min/1.73m2    GFR est non-AA >60 >60 ml/min/1.73m2    Calcium 9.5 8.3 - 10.4 MG/DL    Bilirubin, total 1.3 (H) 0.2 - 1.1 MG/DL    ALT (SGPT) 72 (H) 12 - 65 U/L    AST (SGOT) 50 (H) 15 - 37 U/L    Alk.  phosphatase 97 50 - 136 U/L    Protein, total 8.2 6.3 - 8.2 g/dL    Albumin 3.8 3.5 - 5.0 g/dL    Globulin 4.4 (H) 2.3 - 3.5 g/dL    A-G Ratio 0.9 (L) 1.2 - 3.5     RETICULOCYTE COUNT    Collection Time: 05/12/17  2:00 PM   Result Value Ref Range    Reticulocyte count 6.8 (H) 0.3 - 2.0 %    Absolute Retic Cnt. 0.2087 (H) 0.026 - 0.095 M/ul    Immature Retic Fraction 34.5 (H) 2.3 - 13.4 %    Retic Hgb Conc. 36 (H) 29 - 35 pg   CULTURE, BLOOD    Collection Time: 05/12/17  2:50 PM   Result Value Ref Range    Special Requests: PORT      Culture result: PENDING    POC LACTIC ACID    Collection Time: 05/12/17  2:54 PM   Result Value Ref Range    Lactic Acid (POC) 1.4 0.5 - 1.9 mmol/L   STREP AG SCREEN, GROUP A    Collection Time: 05/12/17  2:55 PM   Result Value Ref Range    Group A Strep Ag ID NEGATIVE  NEG         Physical Exam:     General:  Alert, cooperative, no distress, appears stated age. Eyes:  Conjunctivae/corneas clear. PERRL, EOMs intact. Fundi benign   Ears:  Normal TMs and external ear canals both ears. Nose: Nares normal. Septum midline. Mucosa normal. No drainage or sinus tenderness. Mouth/Throat: Lips, mucosa, and tongue normal. Teeth and gums normal.   Neck: Supple, symmetrical, trachea midline, no adenopathy, thyroid: no enlargment/tenderness/nodules, no carotid bruit and no JVD. Back:   Symmetric, no curvature. ROM normal. No CVA tenderness. Lungs:   Clear to auscultation bilaterally. Heart:  Regular rate and rhythm, S1, S2 normal, no murmur, click, rub or gallop. Abdomen:   Soft, non-tender. Bowel sounds normal. No masses,  No organomegaly. Extremities: Extremities normal, atraumatic, no cyanosis or edema. Pulses: 2+ and symmetric all extremities. Skin: Skin color, texture, turgor normal. No rashes or lesions   Lymph nodes: Cervical, supraclavicular, and axillary nodes normal.   Neurologic: CNII-XII intact. Normal strength, sensation and reflexes throughout. Labs: Results:       Chemistry Recent Labs      05/12/17   1400   GLU  119*   NA  143   K  4.1   CL  109*   CO2  28   BUN  7   CREA  0.74*   CA  9.5   AGAP  6*   AP  97   TP  8.2   ALB  3.8   GLOB  4.4*   AGRAT  0.9*      CBC w/Diff Recent Labs      05/12/17   1400   WBC  15.9*   RBC  3.08*   HGB  9.1*   HCT  26.1*   PLT  164   GRANS  72   LYMPH  18   EOS  2      Cardiac Enzymes No results for input(s): CPK, CKND1, ELO in the last 72 hours. No lab exists for component: CKRMB, TROIP   Coagulation No results for input(s): PTP, INR, APTT in the last 72 hours.     No lab exists for component: INREXT    Lipid Panel No results found for: CHOL, [address], [address] [facility], [address] [facility], [address] [place] [address] Rd, [id number], HDL, LDL, NLDLCT, DLDL, LDLC, DLDLP, 699320, VLDLC, VLDL, TGL, TGLX, TRIGL, [id number], TRIGP, TGLPOCT, [id number], CHHD, CHHDX   BNP No results for input(s): BNPP in the last 72 hours. 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      Signed By: Braydon Bain MD   May 12, 2017

## 2017-05-12 NOTE — IP AVS SNAPSHOT
Gibran Dose 
 
 
 [address] 
[phone] Patient: Abdulaziz Montano MRN: [id number] KSV:[id number] You are allergic to the following Allergen Reactions Compazine (Prochlorperazine Edisylate) Other (comments) Also makes him feel funny Morphine Other (comments) Makes him feel funny Reglan (Metoclopramide) Other (comments) \"feel funny\" Zofran (Ondansetron Hcl (Pf)) Other (comments) Make him feel funny Recent Documentation Height Weight BMI Smoking Status 1.778 m 66.2 kg 20.95 kg/m2 Never Smoker Unresulted Labs Order Current Status CULTURE, BLOOD Preliminary result CULTURE, BLOOD Preliminary result Emergency Contacts Name Discharge Info Relation Home Work Mobile Carlos Lion  Spouse [[phone]] [phone] About your hospitalization You were admitted on:  May 12, 2017 You last received care in the:  [facility] 7 MED SURG You were discharged on:  May 16, 2017 Unit phone number:  [phone] Why you were hospitalized Your primary diagnosis was:  Not on File Your diagnoses also included:  Sickle Cell Crisis (Hcc) Providers Seen During Your Hospitalizations Provider Role Specialty Primary office phone Jose Briseno MD Attending Provider Emergency Medicine [phone] Brandan Cruz MD Attending Provider Internal Medicine [phone] Your Primary Care Physician (PCP) Primary Care Physician Office Phone Office Fax [facility] [phone] Follow-up Information Follow up With Details Comments Contact Info Chris Tran MD  Office will call you with follow-up appointment Hussein Handley MD Call As needed [address] [facility] 
[phone] Current Discharge Medication List  
  
 START taking these medications Dose & Instructions Dispensing Information Comments Morning Noon Evening Bedtime  
 amLODIPine 2.5 mg tablet Commonly known as:  Barabara Nile Your next dose is:  5/17/2017 Dose:  2.5 mg Take 1 Tab by mouth daily for 30 days. Quantity:  30 Tab Refills:  1  
     
  
   
   
   
  
 levoFLOXacin 500 mg tablet Commonly known as:  Roselia Chavarriawiley Your last dose was: Today, after filling your prescription Your next dose is:  5/17/2017 Dose:  500 mg Take 1 Tab by mouth daily. Quantity:  7 Tab Refills:  0  
     
  
   
   
   
  
 promethazine 25 mg tablet Commonly known as:  PHENERGAN Your next dose is:  As needed for nausea Dose:  25 mg Take 1 Tab by mouth every six (6) hours as needed. May substitute suppository if vomiting Quantity:  20 Tab Refills:  0  
     
   
   
   
  
 senna-docusate 8.6-50 mg per tablet Commonly known as:  Lee Anastasia Your next dose is:  5/17/2017 Dose:  1 Tab Take 1 Tab by mouth daily for 15 days. Quantity:  15 Tab Refills:  0 CONTINUE these medications which have CHANGED Dose & Instructions Dispensing Information Comments Morning Noon Evening Bedtime * oxyCODONE IR 30 mg immediate release tablet Commonly known as:  OXY-IR What changed:  Another medication with the same name was removed. Continue taking this medication, and follow the directions you see here. Your next dose is: Today as needed for pain, but please use CAUTION when taking newly prescribed Dilaudid as both are strong medicines. Dose:  30 mg Take 1 Tab by mouth every four (4) hours as needed for Pain. Max Daily Amount: 180 mg.  
 Quantity:  12 Tab Refills:  0  
     
   
   
   
  
 * oxyCODONE ER 80 mg ER tablet Commonly known as:  OxyCONTIN What changed:  Another medication with the same name was removed.  Continue taking this medication, and follow the directions you see here. Your next dose is:  EVENING 5/16/2017 Dose:  80 mg Take 1 Tab by mouth every twelve (12) hours. Max Daily Amount: 160 mg.  
 Quantity:  6 Tab Refills:  0  
     
  
   
   
  
   
  
 * Notice: This list has 2 medication(s) that are the same as other medications prescribed for you. Read the directions carefully, and ask your doctor or other care provider to review them with you. CONTINUE these medications which have NOT CHANGED Dose & Instructions Dispensing Information Comments Morning Noon Evening Bedtime  
 folic acid 1 mg tablet Commonly known as:  Google Your next dose is:  5/17/2017 Dose:  1 mg Take 1 mg by mouth daily. Refills:  0  
     
  
   
   
   
  
 hydroxyurea 500 mg capsule Commonly known as:  HYDREA Your last dose was:  5/16/2017 Your next dose is:  EVENING 5/16/2017 Dose:  500 mg Take 1 Cap by mouth two (2) times a day for 15 days. Indications: Sickle Cell Anemia with Crisis Quantity:  30 Cap Refills:  0  
     
  
   
   
  
   
  
 lisinopril 5 mg tablet Commonly known as:  Carolina Barrier Your next dose is:  5/17/2017 Dose:  5 mg Take 5 mg by mouth daily. Indications: HYPERTENSION Refills:  0  
     
  
   
   
   
  
 metoprolol tartrate 25 mg tablet Commonly known as:  LOPRESSOR Your next dose is:  EVENING 5/16/2017 Dose:  25 mg Take 25 mg by mouth two (2) times a day. Indications: HYPERTENSION Refills:  0 Where to Get Your Medications These medications were sent to [address] [place], [place] [address] [facility] [address] Phone:  [phone]  
  amLODIPine 2.5 mg tablet  
 hydroxyurea 500 mg capsule  
 levoFLOXacin 500 mg tablet  
 promethazine 25 mg tablet  
 senna-docusate 8.6-50 mg per tablet Information on where to get these meds will be given to you by the nurse or doctor. ! Ask your nurse or doctor about these medications  
  oxyCODONE ER 80 mg ER tablet  
 oxyCODONE IR 30 mg immediate release tablet Discharge Instructions Sickle Cell Crisis: Care Instructions Your Care Instructions Sickle cell crisis is a painful episode that may begin suddenly in a person with sickle cell disease. Sickle cell disease turns normal, round red blood cells into cells that look like kendall or crescent moons. The sickle-shaped cells can get stuck in blood vessels, blocking blood flow and causing severe pain. The pain can occur in the bones of the spine, the arms and legs, the chest, and the abdomen. An episode may be called a \"painful event\" or \"painful crisis. \" Some people who have sickle cell disease have many painful events, while others have few or none. Treatment depends on the level of pain and how long it lasts. Sometimes taking nonprescription pain relievers can help. Or you may need stronger pain relief medicine that is prescribed or given by a doctor. You may need to be treated in the hospital. 
It isn't always possible to know what sets off a painful event. But triggers include being dehydrated, cold temperatures, infection, stress, and not getting enough oxygen. Follow-up care is a key part of your treatment and safety. Be sure to make and go to all appointments, and call your doctor if you are having problems. It's also a good idea to know your test results and keep a list of the medicines you take. How can you care for yourself at home? · Create a pain management plan with your doctor. This plan should include the types of medicines you can take and other actions you can take at home to relieve pain. · Drink plenty of fluids, enough so that your urine is light yellow or clear like water.  If you have kidney, heart, or liver disease and have to limit fluids, talk with your doctor before you increase the amount of fluids you drink. · Take your medicines exactly as prescribed. Call your doctor if you think you are having a problem with your medicine. · Take pain medicines exactly as directed. ¨ If the doctor gave you a prescription medicine for pain, take it as prescribed. ¨ If you are not taking a prescription pain medicine, ask your doctor if you can take an over-the-counter medicine. · Avoid alcohol. It can make you dehydrated. · Dress warmly in cold weather. The cold and windy weather can lead to severe pain. · Do not smoke. Smoking can reduce the amount of oxygen in your blood. · Get plenty of sleep. When should you call for help? Call 911 anytime you think you may need emergency care. For example, call if: 
· You passed out (lost consciousness). Call your doctor now or seek immediate medical care if: 
· You are in severe pain. · You are dizzy or lightheaded, or you feel like you may faint. · You have a fever. · You are short of breath. · Your symptoms are getting worse. Watch closely for changes in your health, and be sure to contact your doctor if you are not getting better as expected. Where can you learn more? Go to http://socorro-rosita.info/. Enter F104 in the search box to learn more about \"Sickle Cell Crisis: Care Instructions. \" Current as of: October 13, 2016 Content Version: 11.2 © 3600-9868 [employer]. Care instructions adapted under license by [employer] (which disclaims liability or warranty for this information). If you have questions about a medical condition or this instruction, always ask your healthcare professional. [address] any warranty or liability for your use of this information. DISCHARGE SUMMARY from Nurse The following personal items are in your possession at time of discharge: 
 
Dental Appliances: None Home Medications: None Jewelry: Ring Clothing: Undergarments, At bedside, Shirt, Socks Other Valuables: None PATIENT INSTRUCTIONS: 
 
 
F-face looks uneven A-arms unable to move or move unevenly S-speech slurred or non-existent T-time-call 911 as soon as signs and symptoms begin-DO NOT go Back to bed or wait to see if you get better-TIME IS BRAIN. Warning Signs of HEART ATTACK Call 911 if you have these symptoms: 
? Chest discomfort. Most heart attacks involve discomfort in the center of the chest that lasts more than a few minutes, or that goes away and comes back. It can feel like uncomfortable pressure, squeezing, fullness, or pain. ? Discomfort in other areas of the upper body. Symptoms can include pain or discomfort in one or both arms, the back, neck, jaw, or stomach. ? Shortness of breath with or without chest discomfort. ? Other signs may include breaking out in a cold sweat, nausea, or lightheadedness. Don't wait more than five minutes to call [address]! Fast action can save your life. Calling 911 is almost always the fastest way to get lifesaving treatment. Emergency Medical Services staff can begin treatment when they arrive  up to an hour sooner than if someone gets to the hospital by car. The discharge information has been reviewed with the patient. The patient verbalized understanding. Discharge medications reviewed with the patient and appropriate educational materials and side effects teaching were provided. Discharge Orders None [facility] Announcement We are excited to announce that we are making your provider's discharge notes available to you in [facility].   You will see these notes when they are completed and signed by the physician that discharged you from your recent hospital stay. If you have any questions or concerns about any information you see in Zhaogang, please call the Health Information Department where you were seen or reach out to your Primary Care Provider for more information about your plan of care. Introducing [facility]! Alvaro Jovel introduces Zhaogang patient portal. Now you can access parts of your medical record, email your doctor's office, and request medication refills online. 1. In your internet browser, go to [email or web address] 2. Click on the First Time User? Click Here link in the Sign In box. You will see the New Member Sign Up page. 3. Enter your Zhaogang Access Code exactly as it appears below. You will not need to use this code after youve completed the sign-up process. If you do not sign up before the expiration date, you must request a new code. · Zhaogang Access Code: [email or web address] Expires: 8/10/2017 11:53 AM 
 
4. Enter the last four digits of your Social Security Number (xxxx) and Date of Birth (mm/dd/yyyy) as indicated and click Submit. You will be taken to the next sign-up page. 5. Create a Zhaogang ID. This will be your Zhaogang login ID and cannot be changed, so think of one that is secure and easy to remember. 6. Create a Zhaogang password. You can change your password at any time. 7. Enter your Password Reset Question and Answer. This can be used at a later time if you forget your password. 8. Enter your e-mail address. You will receive e-mail notification when new information is available in [address] 9. Click Sign Up. You can now view and download portions of your medical record. 10. Click the Download Summary menu link to download a portable copy of your medical information. If you have questions, please visit the Frequently Asked Questions section of the Zhaogang website. Remember, Zhaogang is NOT to be used for urgent needs. For medical emergencies, dial 911. Now available from your iPhone and Android! General Information Please provide this summary of care documentation to your next provider. Patient Signature:  ____________________________________________________________ Date:  ____________________________________________________________  
  
[place] Provider Signature:  ____________________________________________________________ Date:  ____________________________________________________________

## 2017-05-12 NOTE — ED PROVIDER NOTES
HPI Comments: Patient comes in with acute sickle cell type pain. He has diffuse discomfort. He yesterday was seen at the hematology oncology group at [facility].  He was given IV fluids there with hopes of improving his crisis. A informed him by his report that he should go to the emergency room with any worsening. He has continued pain and is coming into our department. His wife had noticed strep throat 3-4 days ago. He maybe has a slight sore throat. Denies any cough. He denies any urinary symptoms. He has a port in his left shoulder. No vomiting or diarrhea. No sores or skin lesions. Has his \"normal temperature in the 99 range\". No urinary changes. Patient is a 38 y.o. male presenting with sickle cell disease. The history is provided by the patient and a relative. Sickle Cell Crisis    This is a new problem. The current episode started yesterday. The pain is associated with no known injury. Pain location: generalized achiness. The pain is moderate. Pertinent negatives include no fever, no abdominal pain, no abdominal swelling and no dysuria. He has tried nothing for the symptoms.         Past Medical History:   Diagnosis Date    Chronic pain     HTN (hypertension)     Ill-defined condition     sickle cell    Iron overload due to repeated red blood cell transfusions 1/18/2017    Paroxysmal SVT [facility]ra[facility]ricular [facility] ([facility]) 7/9/2016    Sickle cell disease ([facility] [address]        Past Surgical History:   Procedure Laterality Date    HC PENILE IMPL DURA II POSITINBLE      HC PORT LIFE SNGLE LUMEN 5013      to L CW    HX CHOLECYSTECTOMY      HX OTHER SURGICAL      penile inplant    HX VASCULAR ACCESS           Family History:   Problem Relation Age of Onset    Hypertension Other     Diabetes Father     Stroke Father     Sickle Cell Anemia Sister        Social History     Social History    Marital status:      Spouse name: N/A    Number of children: N/A    Years of education: N/A     Occupational History    Not on file. Social History Main Topics    Smoking status: Never Smoker    Smokeless tobacco: Never Used    Alcohol use No    Drug use: No    Sexual activity: Yes     Other Topics Concern    Not on file     Social History Narrative         ALLERGIES: Compazine [prochlorperazine edisylate]; Morphine; Reglan [metoclopramide]; and Zofran [ondansetron hcl (pf)]    Review of Systems   Constitutional: Negative for fever. HENT: Negative. Respiratory: Negative for cough, shortness of breath and wheezing. Cardiovascular: Negative. Gastrointestinal: Negative for abdominal pain. Genitourinary: Negative for dysuria and flank pain. Musculoskeletal: Negative for arthralgias and myalgias. Skin: Negative. Neurological: Negative. All other systems reviewed and are negative. Vitals:    05/12/17 1800 05/12/17 1820 05/12/17 1840 05/12/17 1900   BP: 147/68 138/70 132/70 143/72   Pulse:  63 61 62   Resp:       Temp:       SpO2:  100% 98% 97%   Weight:       Height:                Physical Exam   Constitutional: He appears well-developed and well-nourished. No distress. Uncomfortable but not toxic or septic   HENT:   Head: Atraumatic. Eyes: No scleral icterus. Neck: Neck supple. Cardiovascular: Normal rate and intact distal pulses. Exam reveals no friction rub. No murmur heard. Pulmonary/Chest: Effort normal. No respiratory distress. He has no wheezes. Port site looks OK   Abdominal: Soft. He exhibits no distension. There is no tenderness. Musculoskeletal: He exhibits no edema or tenderness. Neurological: He is alert. Coordination normal.   Skin: Skin is warm and dry. Psychiatric: Thought content normal.   Nursing note and vitals reviewed. MDM  Number of Diagnoses or Management Options  Sickle cell anemia with crisis [facility]):   Temperature elevation:   Diagnosis management comments: Sickle Thal crisis but also with low grade temp. Patient with concern over strep throat exposure. BP is OK. Does not appear septic but is cultured. With continued low grade temp and less than adequate pain relief will admit    No hospital beds for part of shift so managed longer in ER       Amount and/or Complexity of Data Reviewed  Clinical lab tests: ordered and reviewed  Tests in the radiology section of CPT®: reviewed and ordered  Decide to obtain previous medical records or to obtain history from someone other than the patient: yes  Obtain history from someone other than the patient: yes  Discuss the patient with other providers: yes  Independent visualization of images, tracings, or specimens: yes    Risk of Complications, Morbidity, and/or Mortality  Presenting problems: high  Diagnostic procedures: low  Management options: high    Patient Progress  Patient progress: stable    ED Course       Procedures      Recent Results (from the past 12 hour(s))   CBC WITH AUTOMATED DIFF    Collection Time: 05/12/17  2:00 PM   Result Value Ref Range    WBC 15.9 (H) 4.3 - 11.1 K/uL    RBC 3.08 (L) 4.23 - 5.67 M/uL    HGB 9.1 (L) 13.6 - 17.2 g/dL    HCT 26.1 (L) 41.1 - 50.3 %    MCV 84.7 79.6 - 97.8 FL    MCH 29.5 26.1 - 32.9 PG    MCHC 34.9 31.4 - 35.0 g/dL    RDW 19.2 (H) 11.9 - 14.6 %    PLATELET 977 142 - 103 K/uL    MPV 10.4 (L) 10.8 - 14.1 FL    NEUTROPHILS 72 43 - 78 %    LYMPHOCYTES 18 13 - 44 %    MONOCYTES 7 4.0 - 12.0 %    EOSINOPHILS 2 0.5 - 7.8 %    BASOPHILS 1 0.0 - 2.0 %    IMMATURE GRANULOCYTES 0 0.0 - 5.0 %    ABS. NEUTROPHILS 11.4 (H) 1.7 - 8.2 K/UL    ABS. LYMPHOCYTES 2.9 0.5 - 4.6 K/UL    ABS. MONOCYTES 1.1 0.1 - 1.3 K/UL    ABS. EOSINOPHILS 0.3 0.0 - 0.8 K/UL    ABS. BASOPHILS 0.2 0.0 - 0.2 K/UL    ABS. IMM.  GRANS. 0.0 0.0 - 0.5 K/UL    RBC COMMENTS OCCASIONAL  TARGET CELLS        RBC COMMENTS OCCASIONAL  SCHISTOCYTES        RBC COMMENTS SLIGHT  POLYCHROMASIA        RBC COMMENTS OCCASIONAL  OVALOCYTES        WBC COMMENTS Result Confirmed By Smear PLATELET COMMENTS ADEQUATE      DF AUTOMATED     METABOLIC PANEL, COMPREHENSIVE    Collection Time: 05/12/17  2:00 PM   Result Value Ref Range    Sodium 143 136 - 145 mmol/L    Potassium 4.1 3.5 - 5.1 mmol/L    Chloride 109 (H) 98 - 107 mmol/L    CO2 28 21 - 32 mmol/L    Anion gap 6 (L) 7 - 16 mmol/L    Glucose 119 (H) 65 - 100 mg/dL    BUN 7 6 - 23 MG/DL    Creatinine 0.74 (L) 0.8 - 1.5 MG/DL    GFR est AA >60 >60 ml/min/1.73m2    GFR est non-AA >60 >60 ml/min/1.73m2    Calcium 9.5 8.3 - 10.4 MG/DL    Bilirubin, total 1.3 (H) 0.2 - 1.1 MG/DL    ALT (SGPT) 72 (H) 12 - 65 U/L    AST (SGOT) 50 (H) 15 - 37 U/L    Alk.  phosphatase 97 50 - 136 U/L    Protein, total 8.2 6.3 - 8.2 g/dL    Albumin 3.8 3.5 - 5.0 g/dL    Globulin 4.4 (H) 2.3 - 3.5 g/dL    A-G Ratio 0.9 (L) 1.2 - 3.5     RETICULOCYTE COUNT    Collection Time: 05/12/17  2:00 PM   Result Value Ref Range    Reticulocyte count 6.8 (H) 0.3 - 2.0 %    Absolute Retic Cnt. 0.2087 (H) 0.026 - 0.095 M/ul    Immature Retic Fraction 34.5 (H) 2.3 - 13.4 %    Retic Hgb Conc. 36 (H) 29 - 35 pg   CULTURE, BLOOD    Collection Time: 05/12/17  2:50 PM   Result Value Ref Range    Special Requests: PORT      Culture result: PENDING    POC LACTIC ACID    Collection Time: 05/12/17  2:54 PM   Result Value Ref Range    Lactic Acid (POC) 1.4 0.5 - 1.9 mmol/L   STREP AG SCREEN, GROUP A    Collection Time: 05/12/17  2:55 PM   Result Value Ref Range    Group A Strep Ag ID NEGATIVE  NEG

## 2017-05-12 NOTE — Clinical Note
Follow temperature closely Nausea : Phenergan Pain : hold present meds for pain, use Dilaudid Antibiotic : Levaquin , begin with ANY fever or chills Contact Dr Sara Chavis group with any worsening

## 2017-05-13 LAB
ALBUMIN SERPL BCP-MCNC: 3.2 G/DL (ref 3.5–5)
ALBUMIN SERPL BCP-MCNC: 3.4 G/DL (ref 3.5–5)
ALBUMIN/GLOB SERPL: 0.8 {RATIO} (ref 1.2–3.5)
ALBUMIN/GLOB SERPL: 0.9 {RATIO} (ref 1.2–3.5)
ALP SERPL-CCNC: 78 U/L (ref 50–136)
ALP SERPL-CCNC: 83 U/L (ref 50–136)
ALT SERPL-CCNC: 53 U/L (ref 12–65)
ALT SERPL-CCNC: 58 U/L (ref 12–65)
ANION GAP BLD CALC-SCNC: 8 MMOL/L (ref 7–16)
ANION GAP BLD CALC-SCNC: 9 MMOL/L (ref 7–16)
AST SERPL W P-5'-P-CCNC: 35 U/L (ref 15–37)
AST SERPL W P-5'-P-CCNC: 35 U/L (ref 15–37)
BACTERIA SPEC CULT: ABNORMAL
BACTERIA SPEC CULT: ABNORMAL
BASOPHILS # BLD AUTO: 0 K/UL (ref 0–0.2)
BASOPHILS # BLD AUTO: 0 K/UL (ref 0–0.2)
BASOPHILS # BLD: 0 % (ref 0–2)
BASOPHILS # BLD: 0 % (ref 0–2)
BILIRUB SERPL-MCNC: 1.4 MG/DL (ref 0.2–1.1)
BILIRUB SERPL-MCNC: 1.6 MG/DL (ref 0.2–1.1)
BUN SERPL-MCNC: 6 MG/DL (ref 6–23)
BUN SERPL-MCNC: 7 MG/DL (ref 6–23)
CALCIUM SERPL-MCNC: 8.4 MG/DL (ref 8.3–10.4)
CALCIUM SERPL-MCNC: 8.8 MG/DL (ref 8.3–10.4)
CHLORIDE SERPL-SCNC: 108 MMOL/L (ref 98–107)
CHLORIDE SERPL-SCNC: 109 MMOL/L (ref 98–107)
CO2 SERPL-SCNC: 26 MMOL/L (ref 21–32)
CO2 SERPL-SCNC: 27 MMOL/L (ref 21–32)
CREAT SERPL-MCNC: 0.67 MG/DL (ref 0.8–1.5)
CREAT SERPL-MCNC: 0.7 MG/DL (ref 0.8–1.5)
DIFFERENTIAL METHOD BLD: ABNORMAL
DIFFERENTIAL METHOD BLD: ABNORMAL
EOSINOPHIL # BLD: 0 K/UL (ref 0–0.8)
EOSINOPHIL # BLD: 0 K/UL (ref 0–0.8)
EOSINOPHIL NFR BLD: 0 % (ref 0.5–7.8)
EOSINOPHIL NFR BLD: 0 % (ref 0.5–7.8)
ERYTHROCYTE [DISTWIDTH] IN BLOOD BY AUTOMATED COUNT: 19.1 % (ref 11.9–14.6)
ERYTHROCYTE [DISTWIDTH] IN BLOOD BY AUTOMATED COUNT: 19.2 % (ref 11.9–14.6)
GLOBULIN SER CALC-MCNC: 3.8 G/DL (ref 2.3–3.5)
GLOBULIN SER CALC-MCNC: 4 G/DL (ref 2.3–3.5)
GLUCOSE SERPL-MCNC: 112 MG/DL (ref 65–100)
GLUCOSE SERPL-MCNC: 123 MG/DL (ref 65–100)
HCT VFR BLD AUTO: 21.3 % (ref 41.1–50.3)
HCT VFR BLD AUTO: 22.7 % (ref 41.1–50.3)
HGB BLD-MCNC: 7.2 G/DL (ref 13.6–17.2)
HGB BLD-MCNC: 7.9 G/DL (ref 13.6–17.2)
IMM GRANULOCYTES # BLD: 0 K/UL (ref 0–0.5)
IMM GRANULOCYTES # BLD: 0 K/UL (ref 0–0.5)
IMM GRANULOCYTES NFR BLD AUTO: 0 % (ref 0–5)
IMM GRANULOCYTES NFR BLD AUTO: 0 % (ref 0–5)
LYMPHOCYTES # BLD AUTO: 18 % (ref 13–44)
LYMPHOCYTES # BLD AUTO: 22 % (ref 13–44)
LYMPHOCYTES # BLD: 4.2 K/UL (ref 0.5–4.6)
LYMPHOCYTES # BLD: 4.8 K/UL (ref 0.5–4.6)
MCH RBC QN AUTO: 28.8 PG (ref 26.1–32.9)
MCH RBC QN AUTO: 29.5 PG (ref 26.1–32.9)
MCHC RBC AUTO-ENTMCNC: 33.8 G/DL (ref 31.4–35)
MCHC RBC AUTO-ENTMCNC: 34.8 G/DL (ref 31.4–35)
MCV RBC AUTO: 84.7 FL (ref 79.6–97.8)
MCV RBC AUTO: 85.2 FL (ref 79.6–97.8)
MONOCYTES # BLD: 2.1 K/UL (ref 0.1–1.3)
MONOCYTES # BLD: 2.2 K/UL (ref 0.1–1.3)
MONOCYTES NFR BLD AUTO: 10 % (ref 4–12)
MONOCYTES NFR BLD AUTO: 9 % (ref 4–12)
NEUTS SEG # BLD: 14.6 K/UL (ref 1.7–8.2)
NEUTS SEG # BLD: 16.9 K/UL (ref 1.7–8.2)
NEUTS SEG NFR BLD AUTO: 68 % (ref 43–78)
NEUTS SEG NFR BLD AUTO: 73 % (ref 43–78)
PLATELET # BLD AUTO: 141 K/UL (ref 150–450)
PLATELET # BLD AUTO: 158 K/UL (ref 150–450)
PLATELET COMMENTS,PCOM: ADEQUATE
PLATELET COMMENTS,PCOM: ADEQUATE
PMV BLD AUTO: 10.8 FL (ref 10.8–14.1)
PMV BLD AUTO: 10.8 FL (ref 10.8–14.1)
POTASSIUM SERPL-SCNC: 3.8 MMOL/L (ref 3.5–5.1)
POTASSIUM SERPL-SCNC: 3.8 MMOL/L (ref 3.5–5.1)
PROT SERPL-MCNC: 7 G/DL (ref 6.3–8.2)
PROT SERPL-MCNC: 7.4 G/DL (ref 6.3–8.2)
RBC # BLD AUTO: 2.5 M/UL (ref 4.23–5.67)
RBC # BLD AUTO: 2.68 M/UL (ref 4.23–5.67)
RBC MORPH BLD: ABNORMAL
SERVICE CMNT-IMP: ABNORMAL
SODIUM SERPL-SCNC: 143 MMOL/L (ref 136–145)
SODIUM SERPL-SCNC: 144 MMOL/L (ref 136–145)
WBC # BLD AUTO: 21.6 K/UL (ref 4.3–11.1)
WBC # BLD AUTO: 23.2 K/UL (ref 4.3–11.1)
WBC MORPH BLD: ABNORMAL
WBC MORPH BLD: ABNORMAL

## 2017-05-13 PROCEDURE — 87641 MR-STAPH DNA AMP PROBE: CPT | Performed by: HOSPITALIST

## 2017-05-13 PROCEDURE — 74011000258 HC RX REV CODE- 258: Performed by: HOSPITALIST

## 2017-05-13 PROCEDURE — 74011250636 HC RX REV CODE- 250/636: Performed by: HOSPITALIST

## 2017-05-13 PROCEDURE — 74011250637 HC RX REV CODE- 250/637: Performed by: HOSPITALIST

## 2017-05-13 PROCEDURE — 65270000029 HC RM PRIVATE

## 2017-05-13 PROCEDURE — 80053 COMPREHEN METABOLIC PANEL: CPT | Performed by: HOSPITALIST

## 2017-05-13 PROCEDURE — 85025 COMPLETE CBC W/AUTO DIFF WBC: CPT | Performed by: HOSPITALIST

## 2017-05-13 PROCEDURE — 74011000250 HC RX REV CODE- 250: Performed by: HOSPITALIST

## 2017-05-13 PROCEDURE — 87086 URINE CULTURE/COLONY COUNT: CPT | Performed by: EMERGENCY MEDICINE

## 2017-05-13 RX ORDER — METOCLOPRAMIDE 10 MG/1
10 TABLET ORAL EVERY 6 HOURS
Status: DISCONTINUED | OUTPATIENT
Start: 2017-05-13 | End: 2017-05-16 | Stop reason: HOSPADM

## 2017-05-13 RX ORDER — MUPIROCIN 20 MG/G
OINTMENT TOPICAL 2 TIMES DAILY
Status: COMPLETED | OUTPATIENT
Start: 2017-05-13 | End: 2017-05-15

## 2017-05-13 RX ORDER — SODIUM CHLORIDE 450 MG/100ML
100 INJECTION, SOLUTION INTRAVENOUS CONTINUOUS
Status: DISCONTINUED | OUTPATIENT
Start: 2017-05-13 | End: 2017-05-16 | Stop reason: HOSPADM

## 2017-05-13 RX ORDER — OXYCODONE HCL 20 MG/ML
20 CONCENTRATE, ORAL ORAL
Status: DISCONTINUED | OUTPATIENT
Start: 2017-05-13 | End: 2017-05-15

## 2017-05-13 RX ORDER — HYDROMORPHONE HYDROCHLORIDE 1 MG/ML
1 INJECTION, SOLUTION INTRAMUSCULAR; INTRAVENOUS; SUBCUTANEOUS
Status: DISCONTINUED | OUTPATIENT
Start: 2017-05-13 | End: 2017-05-16 | Stop reason: HOSPADM

## 2017-05-13 RX ORDER — OXYCODONE HYDROCHLORIDE 80 MG/1
80 TABLET, FILM COATED, EXTENDED RELEASE ORAL EVERY 12 HOURS
Status: ON HOLD | COMMUNITY
End: 2017-05-16

## 2017-05-13 RX ORDER — OXYCODONE HYDROCHLORIDE 30 MG/1
30 TABLET ORAL
Status: ON HOLD | COMMUNITY
End: 2017-05-16

## 2017-05-13 RX ADMIN — SODIUM CHLORIDE 100 ML/HR: 450 INJECTION, SOLUTION INTRAVENOUS at 18:30

## 2017-05-13 RX ADMIN — HYDROMORPHONE HYDROCHLORIDE 1 MG: 1 INJECTION, SOLUTION INTRAMUSCULAR; INTRAVENOUS; SUBCUTANEOUS at 00:57

## 2017-05-13 RX ADMIN — Medication 10 ML: at 20:17

## 2017-05-13 RX ADMIN — HYDROMORPHONE HYDROCHLORIDE 1 MG: 1 INJECTION, SOLUTION INTRAMUSCULAR; INTRAVENOUS; SUBCUTANEOUS at 11:20

## 2017-05-13 RX ADMIN — LORAZEPAM 1 MG: 2 INJECTION INTRAMUSCULAR; INTRAVENOUS at 00:55

## 2017-05-13 RX ADMIN — OXYCODONE HYDROCHLORIDE 20 MG: 100 SOLUTION ORAL at 18:00

## 2017-05-13 RX ADMIN — SODIUM CHLORIDE 12.5 MG: 9 INJECTION INTRAMUSCULAR; INTRAVENOUS; SUBCUTANEOUS at 11:20

## 2017-05-13 RX ADMIN — Medication 10 ML: at 04:48

## 2017-05-13 RX ADMIN — SODIUM CHLORIDE 12.5 MG: 9 INJECTION INTRAMUSCULAR; INTRAVENOUS; SUBCUTANEOUS at 04:58

## 2017-05-13 RX ADMIN — ONDANSETRON 4 MG: 2 INJECTION INTRAMUSCULAR; INTRAVENOUS at 09:17

## 2017-05-13 RX ADMIN — ENOXAPARIN SODIUM 40 MG: 40 INJECTION SUBCUTANEOUS at 20:22

## 2017-05-13 RX ADMIN — HYDROMORPHONE HYDROCHLORIDE 1 MG: 1 INJECTION, SOLUTION INTRAMUSCULAR; INTRAVENOUS; SUBCUTANEOUS at 07:00

## 2017-05-13 RX ADMIN — FOLIC ACID 1 MG: 1 TABLET ORAL at 09:03

## 2017-05-13 RX ADMIN — MUPIROCIN: 20 OINTMENT TOPICAL at 18:01

## 2017-05-13 RX ADMIN — HYDROMORPHONE HYDROCHLORIDE 1 MG: 1 INJECTION, SOLUTION INTRAMUSCULAR; INTRAVENOUS; SUBCUTANEOUS at 20:14

## 2017-05-13 RX ADMIN — OXYCODONE HYDROCHLORIDE AND ACETAMINOPHEN 1 TABLET: 10; 325 TABLET ORAL at 04:56

## 2017-05-13 RX ADMIN — MUPIROCIN: 20 OINTMENT TOPICAL at 09:03

## 2017-05-13 RX ADMIN — METOPROLOL TARTRATE 25 MG: 25 TABLET ORAL at 09:03

## 2017-05-13 RX ADMIN — SODIUM CHLORIDE 12.5 MG: 9 INJECTION INTRAMUSCULAR; INTRAVENOUS; SUBCUTANEOUS at 17:59

## 2017-05-13 RX ADMIN — HYDROMORPHONE HYDROCHLORIDE 1 MG: 1 INJECTION, SOLUTION INTRAMUSCULAR; INTRAVENOUS; SUBCUTANEOUS at 23:14

## 2017-05-13 RX ADMIN — METOCLOPRAMIDE 10 MG: 10 TABLET ORAL at 20:21

## 2017-05-13 RX ADMIN — STANDARDIZED SENNA CONCENTRATE AND DOCUSATE SODIUM 1 TABLET: 8.6; 5 TABLET, FILM COATED ORAL at 09:17

## 2017-05-13 RX ADMIN — METOCLOPRAMIDE 10 MG: 10 TABLET ORAL at 23:16

## 2017-05-13 RX ADMIN — METOPROLOL TARTRATE 25 MG: 25 TABLET ORAL at 18:02

## 2017-05-13 RX ADMIN — LISINOPRIL 5 MG: 5 TABLET ORAL at 09:03

## 2017-05-13 RX ADMIN — ONDANSETRON 4 MG: 2 INJECTION INTRAMUSCULAR; INTRAVENOUS at 15:25

## 2017-05-13 RX ADMIN — HYDROMORPHONE HYDROCHLORIDE 1 MG: 1 INJECTION, SOLUTION INTRAMUSCULAR; INTRAVENOUS; SUBCUTANEOUS at 15:24

## 2017-05-13 RX ADMIN — Medication 10 ML: at 09:18

## 2017-05-13 RX ADMIN — SODIUM CHLORIDE 100 ML/HR: 450 INJECTION, SOLUTION INTRAVENOUS at 09:04

## 2017-05-13 NOTE — ROUTINE PROCESS
Notified Arnoldo Traore MD patient wanted phenergan instead of Zofran see allergies list. See MAR for new medication order.

## 2017-05-13 NOTE — PROGRESS NOTES
TRANSFER - IN REPORT:    Verbal report received from Mik(name) on Eddi Fuchs  being received from ED(unit) for ordered procedure      Report consisted of patients Situation, Background, Assessment and   Recommendations(SBAR). Information from the following report(s) SBAR was reviewed with the receiving nurse. Opportunity for questions and clarification was provided. Assessment completed upon patients arrival to unit and care assumed.

## 2017-05-13 NOTE — H&P
Hospitalist Progress Note    2017  Admit Date: 2017 12:56 PM   NAME: Nuno De Anda   :  1975   MRN:  [id number]   Attending: Juan David Duran*  PCP:  Usman García MD    SUBJECTIVE:     Nuno De Anda is a 38years old male with pmhx of Sickle B thal and iron overload who is currently being managed for sickle cell crisis. He has been complaining of bilateral upper and lower extremity pain. He denies any SOB, chest pain, cough with phlegm. :   Seen at bedside. Doing well but pain control is suboptimal.  States that percocet is not helping much as at home he take oxycodone 30 mg q4h prn. Also complains of  Nausea and poor appetite, some LUQ pain. Review of Systems negative with exception of pertinent positives noted above      PHYSICAL EXAM       Visit Vitals    /89 (BP 1 Location: Left arm, BP Patient Position: At rest)    Pulse 69    Temp 100 °F (37.8 °C)    Resp 17    Ht 5' 10\" (1.778 m)    Wt 66.2 kg (146 lb)    SpO2 100%    BMI 20.95 kg/m2      Temp (24hrs), Av °F (37.8 °C), Min:99.1 °F (37.3 °C), Max:100.4 °F (38 °C)    Oxygen Therapy  O2 Sat (%): 100 % (17 0732)  Pulse via Oximetry: 72 beats per minute (17 1920)  O2 Device: Room air (17 1104)    Intake/Output Summary (Last 24 hours) at 17 0748  Last data filed at 17   Gross per 24 hour   Intake                0 ml   Output              200 ml   Net             -200 ml          General: No acute distress. Head:  Atraumatic Normocephalic. Eyes:  PERRLA, EOMI, Anicteric. ENT:  No discharges/lesions. Lungs:  CTA Bilaterally. CVS:  Regular rate and rhythm,  No murmur, rub, or gallop, No JVD, No lower   extremity edema. Abdomen: Soft, Non distended, Non tender, Positive bowel sounds. MSK:  No deformities, lesions, Spontaneously moves extremities. Neurologic:  AOx3. No focal deficits  Psychiatry:      No anxiety/Depression  Skin:   No rash/lesions.  Good skin turgor  Heme/Lymph/Immune:  No petechiae, ecchymoses, overt signs of bleeding or    lymphadenopathy noted. Recent Results (from the past 24 hour(s))   CBC WITH AUTOMATED DIFF    Collection Time: 05/12/17  2:00 PM   Result Value Ref Range    WBC 15.9 (H) 4.3 - 11.1 K/uL    RBC 3.08 (L) 4.23 - 5.67 M/uL    HGB 9.1 (L) 13.6 - 17.2 g/dL    HCT 26.1 (L) 41.1 - 50.3 %    MCV 84.7 79.6 - 97.8 FL    MCH 29.5 26.1 - 32.9 PG    MCHC 34.9 31.4 - 35.0 g/dL    RDW 19.2 (H) 11.9 - 14.6 %    PLATELET 126 372 - 443 K/uL    MPV 10.4 (L) 10.8 - 14.1 FL    NEUTROPHILS 72 43 - 78 %    LYMPHOCYTES 18 13 - 44 %    MONOCYTES 7 4.0 - 12.0 %    EOSINOPHILS 2 0.5 - 7.8 %    BASOPHILS 1 0.0 - 2.0 %    IMMATURE GRANULOCYTES 0 0.0 - 5.0 %    ABS. NEUTROPHILS 11.4 (H) 1.7 - 8.2 K/UL    ABS. LYMPHOCYTES 2.9 0.5 - 4.6 K/UL    ABS. MONOCYTES 1.1 0.1 - 1.3 K/UL    ABS. EOSINOPHILS 0.3 0.0 - 0.8 K/UL    ABS. BASOPHILS 0.2 0.0 - 0.2 K/UL    ABS. IMM. GRANS. 0.0 0.0 - 0.5 K/UL    RBC COMMENTS OCCASIONAL  TARGET CELLS        RBC COMMENTS OCCASIONAL  SCHISTOCYTES        RBC COMMENTS SLIGHT  POLYCHROMASIA        RBC COMMENTS OCCASIONAL  OVALOCYTES        WBC COMMENTS Result Confirmed By Smear      PLATELET COMMENTS ADEQUATE      DF AUTOMATED     METABOLIC PANEL, COMPREHENSIVE    Collection Time: 05/12/17  2:00 PM   Result Value Ref Range    Sodium 143 136 - 145 mmol/L    Potassium 4.1 3.5 - 5.1 mmol/L    Chloride 109 (H) 98 - 107 mmol/L    CO2 28 21 - 32 mmol/L    Anion gap 6 (L) 7 - 16 mmol/L    Glucose 119 (H) 65 - 100 mg/dL    BUN 7 6 - 23 MG/DL    Creatinine 0.74 (L) 0.8 - 1.5 MG/DL    GFR est AA >60 >60 ml/min/1.73m2    GFR est non-AA >60 >60 ml/min/1.73m2    Calcium 9.5 8.3 - 10.4 MG/DL    Bilirubin, total 1.3 (H) 0.2 - 1.1 MG/DL    ALT (SGPT) 72 (H) 12 - 65 U/L    AST (SGOT) 50 (H) 15 - 37 U/L    Alk.  phosphatase 97 50 - 136 U/L    Protein, total 8.2 6.3 - 8.2 g/dL    Albumin 3.8 3.5 - 5.0 g/dL    Globulin 4.4 (H) 2.3 - 3.5 g/dL    A-G Ratio 0.9 (L) 1.2 - 3.5     RETICULOCYTE COUNT    Collection Time: 05/12/17  2:00 PM   Result Value Ref Range    Reticulocyte count 6.8 (H) 0.3 - 2.0 %    Absolute Retic Cnt. 0.2087 (H) 0.026 - 0.095 M/ul    Immature Retic Fraction 34.5 (H) 2.3 - 13.4 %    Retic Hgb Conc. 36 (H) 29 - 35 pg   CULTURE, BLOOD    Collection Time: 05/12/17  2:50 PM   Result Value Ref Range    Special Requests: PORT      Culture result: NO GROWTH AFTER 14 HOURS     POC LACTIC ACID    Collection Time: 05/12/17  2:54 PM   Result Value Ref Range    Lactic Acid (POC) 1.4 0.5 - 1.9 mmol/L   STREP AG SCREEN, GROUP A    Collection Time: 05/12/17  2:55 PM   Result Value Ref Range    Group A Strep Ag ID NEGATIVE  NEG     CULTURE, BLOOD    Collection Time: 05/12/17  4:39 PM   Result Value Ref Range    Special Requests: RIGHT ARM      Culture result: NO GROWTH AFTER 13 HOURS     METABOLIC PANEL, COMPREHENSIVE    Collection Time: 05/13/17 12:08 AM   Result Value Ref Range    Sodium 144 136 - 145 mmol/L    Potassium 3.8 3.5 - 5.1 mmol/L    Chloride 109 (H) 98 - 107 mmol/L    CO2 27 21 - 32 mmol/L    Anion gap 8 7 - 16 mmol/L    Glucose 112 (H) 65 - 100 mg/dL    BUN 7 6 - 23 MG/DL    Creatinine 0.67 (L) 0.8 - 1.5 MG/DL    GFR est AA >60 >60 ml/min/1.73m2    GFR est non-AA >60 >60 ml/min/1.73m2    Calcium 8.4 8.3 - 10.4 MG/DL    Bilirubin, total 1.6 (H) 0.2 - 1.1 MG/DL    ALT (SGPT) 58 12 - 65 U/L    AST (SGOT) 35 15 - 37 U/L    Alk.  phosphatase 83 50 - 136 U/L    Protein, total 7.4 6.3 - 8.2 g/dL    Albumin 3.4 (L) 3.5 - 5.0 g/dL    Globulin 4.0 (H) 2.3 - 3.5 g/dL    A-G Ratio 0.9 (L) 1.2 - 3.5     CBC WITH AUTOMATED DIFF    Collection Time: 05/13/17 12:08 AM   Result Value Ref Range    WBC 23.2 (H) 4.3 - 11.1 K/uL    RBC 2.68 (L) 4.23 - 5.67 M/uL    HGB 7.9 (L) 13.6 - 17.2 g/dL    HCT 22.7 (L) 41.1 - 50.3 %    MCV 84.7 79.6 - 97.8 FL    MCH 29.5 26.1 - 32.9 PG    MCHC 34.8 31.4 - 35.0 g/dL    RDW 19.2 (H) 11.9 - 14.6 %    PLATELET 300 997 - 184 K/uL    MPV 10.8 10.8 - 14.1 FL    NEUTROPHILS 73 43 - 78 %    LYMPHOCYTES 18 13 - 44 %    MONOCYTES 9 4.0 - 12.0 %    EOSINOPHILS 0 (L) 0.5 - 7.8 %    BASOPHILS 0 0.0 - 2.0 %    IMMATURE GRANULOCYTES 0 0.0 - 5.0 %    ABS. NEUTROPHILS 16.9 (H) 1.7 - 8.2 K/UL    ABS. LYMPHOCYTES 4.2 0.5 - 4.6 K/UL    ABS. MONOCYTES 2.1 (H) 0.1 - 1.3 K/UL    ABS. EOSINOPHILS 0.0 0.0 - 0.8 K/UL    ABS. BASOPHILS 0.0 0.0 - 0.2 K/UL    ABS. IMM. GRANS. 0.0 0.0 - 0.5 K/UL    RBC COMMENTS OCCASIONAL  OVALOCYTES        RBC COMMENTS RARE  SICKLE CELLS        RBC COMMENTS OCCASIONAL  TARGET CELLS        RBC COMMENTS SLIGHT  ANISOCYTOSIS        RBC COMMENTS OCCASIONAL  POLYCHROMASIA        RBC COMMENTS OCCASIONAL  STOMATOCYTES        WBC COMMENTS Result Confirmed By Smear      PLATELET COMMENTS ADEQUATE      DF AUTOMATED     MRSA SCREEN - PCR (NASAL)    Collection Time: 05/13/17  1:00 AM   Result Value Ref Range    Special Requests: NO SPECIAL REQUESTS      Culture result: (A)       MRSA target DNA is detected (presumptive positive for MRSA colonization). Culture result:        RESULTS VERIFIED, PHONED TO AND READ BACK BY  KEMAR MAXWELL RN AT [id number] ON [id number] BY [facility]     METABOLIC PANEL, COMPREHENSIVE    Collection Time: 05/13/17  4:45 AM   Result Value Ref Range    Sodium 143 136 - 145 mmol/L    Potassium 3.8 3.5 - 5.1 mmol/L    Chloride 108 (H) 98 - 107 mmol/L    CO2 26 21 - 32 mmol/L    Anion gap 9 7 - 16 mmol/L    Glucose 123 (H) 65 - 100 mg/dL    BUN 6 6 - 23 MG/DL    Creatinine 0.70 (L) 0.8 - 1.5 MG/DL    GFR est AA >60 >60 ml/min/1.73m2    GFR est non-AA >60 >60 ml/min/1.73m2    Calcium 8.8 8.3 - 10.4 MG/DL    Bilirubin, total 1.4 (H) 0.2 - 1.1 MG/DL    ALT (SGPT) 53 12 - 65 U/L    AST (SGOT) 35 15 - 37 U/L    Alk.  phosphatase 78 50 - 136 U/L    Protein, total 7.0 6.3 - 8.2 g/dL    Albumin 3.2 (L) 3.5 - 5.0 g/dL    Globulin 3.8 (H) 2.3 - 3.5 g/dL    A-G Ratio 0.8 (L) 1.2 - 3.5     CBC WITH AUTOMATED DIFF    Collection Time: 05/13/17  4:45 AM   Result Value Ref Range    WBC 21.6 (H) 4.3 - 11.1 K/uL    RBC 2.50 (L) 4.23 - 5.67 M/uL    HGB 7.2 (L) 13.6 - 17.2 g/dL    HCT 21.3 (L) 41.1 - 50.3 %    MCV 85.2 79.6 - 97.8 FL    MCH 28.8 26.1 - 32.9 PG    MCHC 33.8 31.4 - 35.0 g/dL    RDW 19.1 (H) 11.9 - 14.6 %    PLATELET 969 (L) 819 - 450 K/uL    MPV 10.8 10.8 - 14.1 FL    NEUTROPHILS 68 43 - 78 %    LYMPHOCYTES 22 13 - 44 %    MONOCYTES 10 4.0 - 12.0 %    EOSINOPHILS 0 (L) 0.5 - 7.8 %    BASOPHILS 0 0.0 - 2.0 %    IMMATURE GRANULOCYTES 0 0.0 - 5.0 %    ABS. NEUTROPHILS 14.6 (H) 1.7 - 8.2 K/UL    ABS. LYMPHOCYTES 4.8 (H) 0.5 - 4.6 K/UL    ABS. MONOCYTES 2.2 (H) 0.1 - 1.3 K/UL    ABS. EOSINOPHILS 0.0 0.0 - 0.8 K/UL    ABS. BASOPHILS 0.0 0.0 - 0.2 K/UL    ABS. IMM. GRANS. 0.0 0.0 - 0.5 K/UL    RBC COMMENTS SLIGHT  ANISOCYTOSIS + POIKILOCYTOSIS        RBC COMMENTS OCCASIONAL  STOMATOCYTES        RBC COMMENTS OVALOCYTES      RBC COMMENTS RARE  SICKLE CELLS        RBC COMMENTS OCCASIONAL  TARGET CELLS        RBC COMMENTS OCCASIONAL  POLYCHROMASIA        WBC COMMENTS Result Confirmed By Smear      PLATELET COMMENTS ADEQUATE      DF AUTOMATED           Imaging /Procedures [address] Problems as of 5/13/2017  Date Reviewed: 3/5/2012          Codes Class Noted - Resolved POA    Sickle cell crisis ([facility] ICD-10-CM: D57.00  ICD-9-CM: 282.62  4/5/2016 - Present Unknown                  Plan:  - IV fluids switched from NS to half NS @ 100 cc/hr  - increased dialudid from q4h to q3h prn, d'mayelin percocet, started on Oxycodone 20 mg po q4h prn  - CXR and CT chest unremarkable for findings suggestive of acute chest syndrome. Maintaining saturations well. - chronic anemia: no urgent simple or exchange transfusion needed at this time. Will continue with folic acid, hydroxyurea. Hb is 7.2.  - BP suboptimally controlled, likely due to pain. Will continue Lisinopril and Lopressor at current dose. - Leukocytosis is reactive and due to chronic inflammation.   - will get CT abdomen and pelvis with and without oral contrast to look for any acute abdominal pathology. - CBC in AM.      DVT Prophylaxis: Lovenox. Risk: high from opioid medications.     Lee Limon MD

## 2017-05-13 NOTE — ROUTINE PROCESS
Dual skin assessment with Jyothi Abreu RN   - abdominal, and chest scar  - left chest port access  - dry, flaky skin  Medication side effects fact sheet provided and reviewed with patient/family.

## 2017-05-14 ENCOUNTER — APPOINTMENT (OUTPATIENT)
Dept: CT IMAGING | Age: 42
DRG: 812 | End: 2017-05-14
Attending: HOSPITALIST
Payer: MEDICARE

## 2017-05-14 LAB
ALBUMIN SERPL BCP-MCNC: 3.5 G/DL (ref 3.5–5)
ALBUMIN/GLOB SERPL: 0.9 {RATIO} (ref 1.2–3.5)
ALP SERPL-CCNC: 80 U/L (ref 50–136)
ALT SERPL-CCNC: 54 U/L (ref 12–65)
ANION GAP BLD CALC-SCNC: 6 MMOL/L (ref 7–16)
AST SERPL W P-5'-P-CCNC: 35 U/L (ref 15–37)
BACTERIA SPEC CULT: NORMAL
BASOPHILS # BLD AUTO: 0 K/UL (ref 0–0.2)
BASOPHILS # BLD: 0 % (ref 0–2)
BILIRUB SERPL-MCNC: 2.2 MG/DL (ref 0.2–1.1)
BUN SERPL-MCNC: 11 MG/DL (ref 6–23)
CALCIUM SERPL-MCNC: 8.7 MG/DL (ref 8.3–10.4)
CHLORIDE SERPL-SCNC: 105 MMOL/L (ref 98–107)
CO2 SERPL-SCNC: 30 MMOL/L (ref 21–32)
CREAT SERPL-MCNC: 0.75 MG/DL (ref 0.8–1.5)
DIFFERENTIAL METHOD BLD: ABNORMAL
EOSINOPHIL # BLD: 0.5 K/UL (ref 0–0.8)
EOSINOPHIL NFR BLD: 2 % (ref 0.5–7.8)
ERYTHROCYTE [DISTWIDTH] IN BLOOD BY AUTOMATED COUNT: 18.9 % (ref 11.9–14.6)
GLOBULIN SER CALC-MCNC: 3.8 G/DL (ref 2.3–3.5)
GLUCOSE SERPL-MCNC: 127 MG/DL (ref 65–100)
HCT VFR BLD AUTO: 22.2 % (ref 41.1–50.3)
HGB BLD-MCNC: 7.5 G/DL (ref 13.6–17.2)
IMM GRANULOCYTES # BLD: 0.2 K/UL (ref 0–0.5)
LYMPHOCYTES # BLD AUTO: 27 % (ref 13–44)
LYMPHOCYTES # BLD: 6.3 K/UL (ref 0.5–4.6)
MCH RBC QN AUTO: 28.8 PG (ref 26.1–32.9)
MCHC RBC AUTO-ENTMCNC: 33.8 G/DL (ref 31.4–35)
MCV RBC AUTO: 85.4 FL (ref 79.6–97.8)
MONOCYTES # BLD: 2.8 K/UL (ref 0.1–1.3)
MONOCYTES NFR BLD AUTO: 12 % (ref 4–12)
NEUTS SEG # BLD: 13.7 K/UL (ref 1.7–8.2)
NEUTS SEG NFR BLD AUTO: 59 % (ref 43–78)
PLATELET # BLD AUTO: 146 K/UL (ref 150–450)
PLATELET COMMENTS,PCOM: SLIGHT
PMV BLD AUTO: 10.6 FL (ref 10.8–14.1)
POTASSIUM SERPL-SCNC: 3.5 MMOL/L (ref 3.5–5.1)
PROT SERPL-MCNC: 7.3 G/DL (ref 6.3–8.2)
RBC # BLD AUTO: 2.6 M/UL (ref 4.23–5.67)
RBC MORPH BLD: ABNORMAL
SERVICE CMNT-IMP: NORMAL
SODIUM SERPL-SCNC: 141 MMOL/L (ref 136–145)
WBC # BLD AUTO: 23.3 K/UL (ref 4.3–11.1)
WBC MORPH BLD: ABNORMAL

## 2017-05-14 PROCEDURE — 74011250636 HC RX REV CODE- 250/636: Performed by: HOSPITALIST

## 2017-05-14 PROCEDURE — 74011000258 HC RX REV CODE- 258: Performed by: HOSPITALIST

## 2017-05-14 PROCEDURE — 74011250637 HC RX REV CODE- 250/637: Performed by: HOSPITALIST

## 2017-05-14 PROCEDURE — 36591 DRAW BLOOD OFF VENOUS DEVICE: CPT

## 2017-05-14 PROCEDURE — 74011000250 HC RX REV CODE- 250: Performed by: HOSPITALIST

## 2017-05-14 PROCEDURE — 74011636320 HC RX REV CODE- 636/320: Performed by: HOSPITALIST

## 2017-05-14 PROCEDURE — 65270000029 HC RM PRIVATE

## 2017-05-14 PROCEDURE — 74177 CT ABD & PELVIS W/CONTRAST: CPT

## 2017-05-14 PROCEDURE — 85025 COMPLETE CBC W/AUTO DIFF WBC: CPT | Performed by: HOSPITALIST

## 2017-05-14 PROCEDURE — 80053 COMPREHEN METABOLIC PANEL: CPT | Performed by: HOSPITALIST

## 2017-05-14 RX ORDER — SODIUM CHLORIDE 0.9 % (FLUSH) 0.9 %
10 SYRINGE (ML) INJECTION
Status: COMPLETED | OUTPATIENT
Start: 2017-05-14 | End: 2017-05-14

## 2017-05-14 RX ADMIN — HYDROMORPHONE HYDROCHLORIDE 1 MG: 1 INJECTION, SOLUTION INTRAMUSCULAR; INTRAVENOUS; SUBCUTANEOUS at 14:16

## 2017-05-14 RX ADMIN — LISINOPRIL 5 MG: 5 TABLET ORAL at 08:47

## 2017-05-14 RX ADMIN — HYDROMORPHONE HYDROCHLORIDE 1 MG: 1 INJECTION, SOLUTION INTRAMUSCULAR; INTRAVENOUS; SUBCUTANEOUS at 03:09

## 2017-05-14 RX ADMIN — METOPROLOL TARTRATE 25 MG: 25 TABLET ORAL at 17:33

## 2017-05-14 RX ADMIN — LORAZEPAM 1 MG: 2 INJECTION INTRAMUSCULAR; INTRAVENOUS at 03:22

## 2017-05-14 RX ADMIN — Medication 10 ML: at 14:00

## 2017-05-14 RX ADMIN — Medication 5 ML: at 22:01

## 2017-05-14 RX ADMIN — STANDARDIZED SENNA CONCENTRATE AND DOCUSATE SODIUM 1 TABLET: 8.6; 5 TABLET, FILM COATED ORAL at 08:47

## 2017-05-14 RX ADMIN — METOCLOPRAMIDE 10 MG: 10 TABLET ORAL at 17:33

## 2017-05-14 RX ADMIN — SODIUM CHLORIDE 25 MG: 9 INJECTION INTRAMUSCULAR; INTRAVENOUS; SUBCUTANEOUS at 01:14

## 2017-05-14 RX ADMIN — METOPROLOL TARTRATE 25 MG: 25 TABLET ORAL at 08:47

## 2017-05-14 RX ADMIN — HYDROMORPHONE HYDROCHLORIDE 1 MG: 1 INJECTION, SOLUTION INTRAMUSCULAR; INTRAVENOUS; SUBCUTANEOUS at 10:50

## 2017-05-14 RX ADMIN — DIATRIZOATE MEGLUMINE AND DIATRIZOATE SODIUM 15 ML: 600; 100 SOLUTION ORAL; RECTAL at 08:46

## 2017-05-14 RX ADMIN — SODIUM CHLORIDE 25 MG: 9 INJECTION INTRAMUSCULAR; INTRAVENOUS; SUBCUTANEOUS at 17:34

## 2017-05-14 RX ADMIN — METOCLOPRAMIDE 10 MG: 10 TABLET ORAL at 07:02

## 2017-05-14 RX ADMIN — ENOXAPARIN SODIUM 40 MG: 40 INJECTION SUBCUTANEOUS at 21:59

## 2017-05-14 RX ADMIN — MUPIROCIN: 20 OINTMENT TOPICAL at 18:00

## 2017-05-14 RX ADMIN — HYDROMORPHONE HYDROCHLORIDE 1 MG: 1 INJECTION, SOLUTION INTRAMUSCULAR; INTRAVENOUS; SUBCUTANEOUS at 20:26

## 2017-05-14 RX ADMIN — HYDROMORPHONE HYDROCHLORIDE 1 MG: 1 INJECTION, SOLUTION INTRAMUSCULAR; INTRAVENOUS; SUBCUTANEOUS at 07:02

## 2017-05-14 RX ADMIN — ACETAMINOPHEN 650 MG: 160 SOLUTION ORAL at 22:00

## 2017-05-14 RX ADMIN — FOLIC ACID 1 MG: 1 TABLET ORAL at 08:47

## 2017-05-14 RX ADMIN — SODIUM CHLORIDE 25 MG: 9 INJECTION INTRAMUSCULAR; INTRAVENOUS; SUBCUTANEOUS at 14:15

## 2017-05-14 RX ADMIN — MUPIROCIN: 20 OINTMENT TOPICAL at 09:00

## 2017-05-14 RX ADMIN — HYDROMORPHONE HYDROCHLORIDE 1 MG: 1 INJECTION, SOLUTION INTRAMUSCULAR; INTRAVENOUS; SUBCUTANEOUS at 23:29

## 2017-05-14 RX ADMIN — Medication 10 ML: at 07:02

## 2017-05-14 RX ADMIN — IOPAMIDOL 100 ML: 755 INJECTION, SOLUTION INTRAVENOUS at 12:48

## 2017-05-14 RX ADMIN — SODIUM CHLORIDE 100 ML: 900 INJECTION, SOLUTION INTRAVENOUS at 12:49

## 2017-05-14 RX ADMIN — Medication 10 ML: at 12:49

## 2017-05-14 RX ADMIN — SODIUM CHLORIDE 25 MG: 9 INJECTION INTRAMUSCULAR; INTRAVENOUS; SUBCUTANEOUS at 22:00

## 2017-05-14 RX ADMIN — SODIUM CHLORIDE 100 ML/HR: 450 INJECTION, SOLUTION INTRAVENOUS at 04:18

## 2017-05-14 RX ADMIN — METOCLOPRAMIDE 10 MG: 10 TABLET ORAL at 23:34

## 2017-05-14 RX ADMIN — HYDROMORPHONE HYDROCHLORIDE 1 MG: 1 INJECTION, SOLUTION INTRAMUSCULAR; INTRAVENOUS; SUBCUTANEOUS at 17:34

## 2017-05-14 RX ADMIN — METOCLOPRAMIDE 10 MG: 10 TABLET ORAL at 12:00

## 2017-05-14 RX ADMIN — SODIUM CHLORIDE 25 MG: 9 INJECTION INTRAMUSCULAR; INTRAVENOUS; SUBCUTANEOUS at 07:04

## 2017-05-14 RX ADMIN — OXYCODONE HYDROCHLORIDE 20 MG: 100 SOLUTION ORAL at 04:16

## 2017-05-14 NOTE — PROGRESS NOTES
Problem: Nutrition Deficit  Goal: *Optimize nutritional status  Nutrition  Reason for assessment: Referral received from nursing admission Malnutrition Screening Tool for EN/TPN PTA and decreased appetite. Assessment:   Diet order(s): Regular  Food/Nutrition Patient History:  Patient admitted with sickle cell crisis, LUQ pain, nausea/vomiting since admission. Patient denies any recent weight loss or receiving EN/TPN PTA. Patient is receiving Reglan and phenergan. He denies RD offer of ensure clears, however, requests juice. Anthropometrics:Height: 5' 10\" (177.8 cm),  Weight: 66.2 kg (146 lb), unstated  , Body mass index is 20.95 kg/(m^2). BMI class of normal weight. Macronutrient needs:  EER:  3757-8068 kcal /day (25-30 kcal/kg listed BW)  EPR:  60-75 grams protein/day (0.8-1 grams/kg IBW)  Intake/Comparative Standards: Per RD meal rounds: 0% of breakfast d/t nausea. No recorded meal intakes. Nutrition Diagnosis: Inadequate oral intake r/t decreased ability to consume sufficient oral intake as evidenced by patient with nausea and vomiting since admission, consuming 0% of breakfast this morning. Intervention:  Meals and snacks: Continue current diet.      Becki Cespedes [address], [address] [place] [address], [facility] [address], [phone]

## 2017-05-14 NOTE — PROGRESS NOTES
Spoke to [address] [place] [address] concerning phenergan order. New orders for phenergan 25 mg q4 hrs as needed for n/v.Discontinue Zofran. New orders for Reglan 10 mg q 6 hours.

## 2017-05-14 NOTE — H&P
Hospitalist Progress Note    2017  Admit Date: 2017 12:56 PM   NAME: Reji Tyler   :  1975   MRN:  [id number]   Attending: Rachna Sawant*  PCP:  Usman García MD    SUBJECTIVE:     Reji Tyler is a 38years old male with pmhx of Sickle B thal and iron overload who is currently being managed for sickle cell crisis. He has been complaining of bilateral upper and lower extremity pain. He denies any SOB, chest pain, cough with phlegm. :   Seen at bedside. Doing well but pain control is better today    Still complains of poor appetite. Review of Systems negative with exception of pertinent positives noted above      PHYSICAL EXAM       Visit Vitals    /64 (BP 1 Location: Right arm, BP Patient Position: At rest)    Pulse 79    Temp 99.3 °F (37.4 °C)    Resp 16    Ht 5' 10\" (1.778 m)    Wt 66.2 kg (146 lb)    SpO2 100%    BMI 20.95 kg/m2      Temp (24hrs), Av.5 °F (37.5 °C), Min:99.1 °F (37.3 °C), Max:100.2 °F (37.9 °C)    Oxygen Therapy  O2 Sat (%): 100 % (17 0445)  Pulse via Oximetry: 72 beats per minute (17 1920)  O2 Device: Room air (17 1104)    Intake/Output Summary (Last 24 hours) at 17 0739  Last data filed at 17 0321   Gross per 24 hour   Intake                0 ml   Output             1200 ml   Net            -1200 ml          General: No acute distress. Head:  Atraumatic Normocephalic. Eyes:  PERRLA, EOMI, Anicteric. ENT:  No discharges/lesions. Lungs:  CTA Bilaterally. CVS:  Regular rate and rhythm,  No murmur, rub, or gallop, No JVD, No lower   extremity edema. Abdomen: Soft, Non distended, Non tender, Positive bowel sounds. MSK:  No deformities, lesions, Spontaneously moves extremities. Neurologic:  AOx3. No focal deficits  Psychiatry:      No anxiety/Depression  Skin:   No rash/lesions. Good skin turgor  Heme/Lymph/Immune:  No petechiae, ecchymoses, overt signs of bleeding or    lymphadenopathy noted.     No results found for this or any previous visit (from the past 24 hour(s)). Imaging /Procedures [address] Problems as of 5/14/2017  Date Reviewed: 3/5/2012          Codes Class Noted - Resolved POA    Sickle cell crisis [facility]) ICD-10-CM: D57.00  ICD-9-CM: 282.62  4/5/2016 - Present Unknown                  Plan:  - IV fluids switched from NS to half NS @ 100 cc/hr  - continue dilaudid and oxycodone IR.  - chronic anemia: no urgent simple or exchange transfusion needed at this time. Will continue with folic acid, hydroxyurea. Hb is 7.5.  - BP optimally controlled, likely due to pain. Will continue Lisinopril and Lopressor at current dose. - Leukocytosis is reactive and due to chronic inflammation. - CT abdomen and pelvis pending  - CBC in AM.      DVT Prophylaxis: Lovenox. Risk: high from opioid medications.     Leny Rock MD

## 2017-05-15 LAB
ANION GAP BLD CALC-SCNC: 8 MMOL/L (ref 7–16)
BACTERIA SPEC CULT: NORMAL
BASOPHILS # BLD AUTO: 0.1 K/UL (ref 0–0.2)
BASOPHILS # BLD: 0 % (ref 0–2)
BUN SERPL-MCNC: 10 MG/DL (ref 6–23)
CALCIUM SERPL-MCNC: 8.8 MG/DL (ref 8.3–10.4)
CHLORIDE SERPL-SCNC: 104 MMOL/L (ref 98–107)
CO2 SERPL-SCNC: 28 MMOL/L (ref 21–32)
CREAT SERPL-MCNC: 0.74 MG/DL (ref 0.8–1.5)
DIFFERENTIAL METHOD BLD: ABNORMAL
EOSINOPHIL # BLD: 0.6 K/UL (ref 0–0.8)
EOSINOPHIL NFR BLD: 3 % (ref 0.5–7.8)
ERYTHROCYTE [DISTWIDTH] IN BLOOD BY AUTOMATED COUNT: 18.6 % (ref 11.9–14.6)
GLUCOSE SERPL-MCNC: 133 MG/DL (ref 65–100)
HCT VFR BLD AUTO: 21.9 % (ref 41.1–50.3)
HGB BLD-MCNC: 7.5 G/DL (ref 13.6–17.2)
HGB RETIC QN AUTO: 35 PG (ref 29–35)
IMM GRANULOCYTES # BLD: 0.1 K/UL (ref 0–0.5)
IMM GRANULOCYTES NFR BLD AUTO: 0.4 % (ref 0–5)
IMM RETICS NFR: 49.2 % (ref 2.3–13.4)
LYMPHOCYTES # BLD AUTO: 21 % (ref 13–44)
LYMPHOCYTES # BLD: 4.5 K/UL (ref 0.5–4.6)
MCH RBC QN AUTO: 29.5 PG (ref 26.1–32.9)
MCHC RBC AUTO-ENTMCNC: 34.2 G/DL (ref 31.4–35)
MCV RBC AUTO: 86.2 FL (ref 79.6–97.8)
MONOCYTES # BLD: 2.3 K/UL (ref 0.1–1.3)
MONOCYTES NFR BLD AUTO: 11 % (ref 4–12)
NEUTS SEG # BLD: 14 K/UL (ref 1.7–8.2)
NEUTS SEG NFR BLD AUTO: 65 % (ref 43–78)
PLATELET # BLD AUTO: 137 K/UL (ref 150–450)
PMV BLD AUTO: 11 FL (ref 10.8–14.1)
POTASSIUM SERPL-SCNC: 3.3 MMOL/L (ref 3.5–5.1)
RBC # BLD AUTO: 2.54 M/UL (ref 4.23–5.67)
RETICS # AUTO: 0.21 M/UL (ref 0.03–0.1)
RETICS/RBC NFR AUTO: 8.4 % (ref 0.3–2)
SERVICE CMNT-IMP: NORMAL
SODIUM SERPL-SCNC: 140 MMOL/L (ref 136–145)
WBC # BLD AUTO: 21.6 K/UL (ref 4.3–11.1)

## 2017-05-15 PROCEDURE — 74011250637 HC RX REV CODE- 250/637: Performed by: HOSPITALIST

## 2017-05-15 PROCEDURE — 65270000029 HC RM PRIVATE

## 2017-05-15 PROCEDURE — 80048 BASIC METABOLIC PNL TOTAL CA: CPT | Performed by: HOSPITALIST

## 2017-05-15 PROCEDURE — 74011000258 HC RX REV CODE- 258: Performed by: HOSPITALIST

## 2017-05-15 PROCEDURE — 85025 COMPLETE CBC W/AUTO DIFF WBC: CPT | Performed by: HOSPITALIST

## 2017-05-15 PROCEDURE — 74011000250 HC RX REV CODE- 250: Performed by: HOSPITALIST

## 2017-05-15 PROCEDURE — 85046 RETICYTE/HGB CONCENTRATE: CPT | Performed by: HOSPITALIST

## 2017-05-15 PROCEDURE — 74011250636 HC RX REV CODE- 250/636: Performed by: HOSPITALIST

## 2017-05-15 RX ORDER — AMLODIPINE BESYLATE 5 MG/1
2.5 TABLET ORAL DAILY
Status: DISCONTINUED | OUTPATIENT
Start: 2017-05-15 | End: 2017-05-16 | Stop reason: HOSPADM

## 2017-05-15 RX ADMIN — METOCLOPRAMIDE 10 MG: 10 TABLET ORAL at 12:19

## 2017-05-15 RX ADMIN — Medication 10 ML: at 16:15

## 2017-05-15 RX ADMIN — SODIUM CHLORIDE 100 ML/HR: 450 INJECTION, SOLUTION INTRAVENOUS at 05:34

## 2017-05-15 RX ADMIN — OXYCODONE HYDROCHLORIDE 20 MG: 15 TABLET ORAL at 18:26

## 2017-05-15 RX ADMIN — HYDROMORPHONE HYDROCHLORIDE 1 MG: 1 INJECTION, SOLUTION INTRAMUSCULAR; INTRAVENOUS; SUBCUTANEOUS at 02:37

## 2017-05-15 RX ADMIN — SODIUM CHLORIDE 25 MG: 9 INJECTION INTRAMUSCULAR; INTRAVENOUS; SUBCUTANEOUS at 02:51

## 2017-05-15 RX ADMIN — HYDROMORPHONE HYDROCHLORIDE 1 MG: 1 INJECTION, SOLUTION INTRAMUSCULAR; INTRAVENOUS; SUBCUTANEOUS at 20:42

## 2017-05-15 RX ADMIN — ENOXAPARIN SODIUM 40 MG: 40 INJECTION SUBCUTANEOUS at 20:39

## 2017-05-15 RX ADMIN — Medication 5 ML: at 20:45

## 2017-05-15 RX ADMIN — METOPROLOL TARTRATE 25 MG: 25 TABLET ORAL at 08:31

## 2017-05-15 RX ADMIN — HYDROMORPHONE HYDROCHLORIDE 1 MG: 1 INJECTION, SOLUTION INTRAMUSCULAR; INTRAVENOUS; SUBCUTANEOUS at 12:19

## 2017-05-15 RX ADMIN — HYDROMORPHONE HYDROCHLORIDE 1 MG: 1 INJECTION, SOLUTION INTRAMUSCULAR; INTRAVENOUS; SUBCUTANEOUS at 05:27

## 2017-05-15 RX ADMIN — LISINOPRIL 5 MG: 5 TABLET ORAL at 08:31

## 2017-05-15 RX ADMIN — SODIUM CHLORIDE 100 ML/HR: 450 INJECTION, SOLUTION INTRAVENOUS at 17:22

## 2017-05-15 RX ADMIN — Medication 5 ML: at 05:30

## 2017-05-15 RX ADMIN — MUPIROCIN: 20 OINTMENT TOPICAL at 08:35

## 2017-05-15 RX ADMIN — SODIUM CHLORIDE 25 MG: 9 INJECTION INTRAMUSCULAR; INTRAVENOUS; SUBCUTANEOUS at 20:39

## 2017-05-15 RX ADMIN — METOPROLOL TARTRATE 25 MG: 25 TABLET ORAL at 17:20

## 2017-05-15 RX ADMIN — SODIUM CHLORIDE 25 MG: 9 INJECTION INTRAMUSCULAR; INTRAVENOUS; SUBCUTANEOUS at 16:14

## 2017-05-15 RX ADMIN — AMLODIPINE BESYLATE 2.5 MG: 5 TABLET ORAL at 08:58

## 2017-05-15 RX ADMIN — METOCLOPRAMIDE 10 MG: 10 TABLET ORAL at 05:27

## 2017-05-15 RX ADMIN — STANDARDIZED SENNA CONCENTRATE AND DOCUSATE SODIUM 1 TABLET: 8.6; 5 TABLET, FILM COATED ORAL at 08:31

## 2017-05-15 RX ADMIN — HYDROMORPHONE HYDROCHLORIDE 1 MG: 1 INJECTION, SOLUTION INTRAMUSCULAR; INTRAVENOUS; SUBCUTANEOUS at 16:14

## 2017-05-15 RX ADMIN — HYDROMORPHONE HYDROCHLORIDE 1 MG: 1 INJECTION, SOLUTION INTRAMUSCULAR; INTRAVENOUS; SUBCUTANEOUS at 08:32

## 2017-05-15 RX ADMIN — SODIUM CHLORIDE 25 MG: 9 INJECTION INTRAMUSCULAR; INTRAVENOUS; SUBCUTANEOUS at 08:58

## 2017-05-15 RX ADMIN — FOLIC ACID 1 MG: 1 TABLET ORAL at 08:31

## 2017-05-15 RX ADMIN — METOCLOPRAMIDE 10 MG: 10 TABLET ORAL at 17:20

## 2017-05-15 NOTE — PROGRESS NOTES
Patient states pain not being managed well with just dilaudid, refused Oxycodone  Intensol 20 mg/ml because he does not like the taste. Dr. Rosita Maxwell notified, new orders for Roxicodone 20 mg q 4 prn.

## 2017-05-15 NOTE — H&P
Hospitalist Progress Note    5/15/2017  Admit Date: 2017 12:56 PM   NAME: Montse Barnes   :  1975   MRN:  [id number]   Attending: Isidra Dooley*  PCP:  Usman García MD    SUBJECTIVE:     Montse Barnes is a 38years old male with pmhx of Sickle B thal and iron overload who is currently being managed for sickle cell crisis. He has been complaining of bilateral upper and lower extremity pain. He denies any SOB, chest pain, cough with phlegm. 5/15:   Seen at bedside. Doing well but pain control is better today  Encouraged to eat more. Review of Systems negative with exception of pertinent positives noted above      PHYSICAL EXAM       Visit Vitals    BP (!) 152/99 (BP 1 Location: Left arm, BP Patient Position: At rest)    Pulse 80    Temp 99.6 °F (37.6 °C)    Resp 18    Ht 5' 10\" (1.778 m)    Wt 66.2 kg (146 lb)    SpO2 (!) 18%    BMI 20.95 kg/m2      Temp (24hrs), Av.2 °F (37.3 °C), Min:97.8 °F (36.6 °C), Max:100.5 °F (38.1 °C)    Oxygen Therapy  O2 Sat (%): (!) 18 % (05/15/17 0803)  Pulse via Oximetry: 72 beats per minute (17 1920)  O2 Device: Room air (17 1104)  ETCO2 (mmHg): 94 mmHg (05/15/17 0803)  No intake or output data in the 24 hours ending 05/15/17 0817       General: No acute distress. Head:  Atraumatic Normocephalic. Eyes:  PERRLA, EOMI, Anicteric. ENT:  No discharges/lesions. Lungs:  CTA Bilaterally. CVS:  Regular rate and rhythm,  No murmur, rub, or gallop, No JVD, No lower   extremity edema. Abdomen: Soft, Non distended, Non tender, Positive bowel sounds. MSK:  No deformities, lesions, Spontaneously moves extremities. Neurologic:  AOx3. No focal deficits  Psychiatry:      No anxiety/Depression  Skin:   No rash/lesions. Good skin turgor  Heme/Lymph/Immune:  No petechiae, ecchymoses, overt signs of bleeding or    lymphadenopathy noted. No results found for this or any previous visit (from the past 24 hour(s)).       Imaging /Procedures [address] Problems as of 5/15/2017  Date Reviewed: 3/5/2012          Codes Class Noted - Resolved POA    Sickle cell crisis [facility]) ICD-10-CM: D57.00  ICD-9-CM: 282.62  4/5/2016 - Present Unknown                  Plan:  - continue IV fluids  - continue dilaudid and oxycodone IR.  - chronic anemia: no urgent simple or exchange transfusion needed at this time. Will continue with folic acid, hydroxyurea. Hb is 7.5.  - BP optimally controlled, likely due to pain. Will continue Lisinopril and Lopressor at current dose. - Leukocytosis is reactive and due to chronic inflammation. - CT abdomen and pelvis is unremarkable for any acute abdominal pathology. Disposition: discharge to home in AM.      DVT Prophylaxis: Lovenox. Risk: high from opioid medications.     Shell Reina MD

## 2017-05-16 VITALS
DIASTOLIC BLOOD PRESSURE: 78 MMHG | TEMPERATURE: 97.6 F | HEART RATE: 86 BPM | RESPIRATION RATE: 20 BRPM | OXYGEN SATURATION: 96 % | HEIGHT: 70 IN | WEIGHT: 146 LBS | SYSTOLIC BLOOD PRESSURE: 123 MMHG | BODY MASS INDEX: 20.9 KG/M2

## 2017-05-16 PROCEDURE — 74011250636 HC RX REV CODE- 250/636: Performed by: HOSPITALIST

## 2017-05-16 PROCEDURE — 74011000250 HC RX REV CODE- 250: Performed by: HOSPITALIST

## 2017-05-16 PROCEDURE — 74011250637 HC RX REV CODE- 250/637: Performed by: HOSPITALIST

## 2017-05-16 RX ORDER — OXYCODONE HYDROCHLORIDE 30 MG/1
30 TABLET ORAL
Qty: 12 TAB | Refills: 0 | Status: SHIPPED | OUTPATIENT
Start: 2017-05-16 | End: 2018-07-25

## 2017-05-16 RX ORDER — HEPARIN 100 UNIT/ML
300 SYRINGE INTRAVENOUS AS NEEDED
Status: DISCONTINUED | OUTPATIENT
Start: 2017-05-16 | End: 2017-05-16 | Stop reason: HOSPADM

## 2017-05-16 RX ORDER — OXYCODONE HYDROCHLORIDE 80 MG/1
80 TABLET, FILM COATED, EXTENDED RELEASE ORAL EVERY 12 HOURS
Qty: 6 TAB | Refills: 0 | Status: SHIPPED | OUTPATIENT
Start: 2017-05-16 | End: 2018-07-25

## 2017-05-16 RX ORDER — HYDROXYUREA 500 MG/1
500 CAPSULE ORAL 2 TIMES DAILY
Qty: 30 CAP | Refills: 0 | Status: SHIPPED | OUTPATIENT
Start: 2017-05-16 | End: 2017-05-31

## 2017-05-16 RX ORDER — AMLODIPINE BESYLATE 2.5 MG/1
2.5 TABLET ORAL DAILY
Qty: 30 TAB | Refills: 1 | Status: SHIPPED | OUTPATIENT
Start: 2017-05-16 | End: 2017-06-15

## 2017-05-16 RX ORDER — PROMETHAZINE HYDROCHLORIDE 25 MG/1
25 TABLET ORAL
Qty: 20 TAB | Refills: 0 | Status: SHIPPED | OUTPATIENT
Start: 2017-05-16

## 2017-05-16 RX ORDER — AMOXICILLIN 250 MG
1 CAPSULE ORAL DAILY
Qty: 15 TAB | Refills: 0 | Status: SHIPPED | OUTPATIENT
Start: 2017-05-16 | End: 2017-05-31

## 2017-05-16 RX ADMIN — METOCLOPRAMIDE 10 MG: 10 TABLET ORAL at 00:27

## 2017-05-16 RX ADMIN — HYDROMORPHONE HYDROCHLORIDE 1 MG: 1 INJECTION, SOLUTION INTRAMUSCULAR; INTRAVENOUS; SUBCUTANEOUS at 10:31

## 2017-05-16 RX ADMIN — HYDROMORPHONE HYDROCHLORIDE 1 MG: 1 INJECTION, SOLUTION INTRAMUSCULAR; INTRAVENOUS; SUBCUTANEOUS at 06:59

## 2017-05-16 RX ADMIN — SODIUM CHLORIDE 25 MG: 9 INJECTION INTRAMUSCULAR; INTRAVENOUS; SUBCUTANEOUS at 00:29

## 2017-05-16 RX ADMIN — HYDROMORPHONE HYDROCHLORIDE 1 MG: 1 INJECTION, SOLUTION INTRAMUSCULAR; INTRAVENOUS; SUBCUTANEOUS at 00:23

## 2017-05-16 RX ADMIN — STANDARDIZED SENNA CONCENTRATE AND DOCUSATE SODIUM 1 TABLET: 8.6; 5 TABLET, FILM COATED ORAL at 08:49

## 2017-05-16 RX ADMIN — FOLIC ACID 1 MG: 1 TABLET ORAL at 08:50

## 2017-05-16 RX ADMIN — METOCLOPRAMIDE 10 MG: 10 TABLET ORAL at 05:35

## 2017-05-16 RX ADMIN — SODIUM CHLORIDE 25 MG: 9 INJECTION INTRAMUSCULAR; INTRAVENOUS; SUBCUTANEOUS at 09:15

## 2017-05-16 RX ADMIN — METOPROLOL TARTRATE 25 MG: 25 TABLET ORAL at 08:50

## 2017-05-16 RX ADMIN — METOCLOPRAMIDE 10 MG: 10 TABLET ORAL at 11:31

## 2017-05-16 RX ADMIN — HYDROMORPHONE HYDROCHLORIDE 1 MG: 1 INJECTION, SOLUTION INTRAMUSCULAR; INTRAVENOUS; SUBCUTANEOUS at 04:09

## 2017-05-16 RX ADMIN — AMLODIPINE BESYLATE 2.5 MG: 5 TABLET ORAL at 08:50

## 2017-05-16 RX ADMIN — LISINOPRIL 5 MG: 5 TABLET ORAL at 08:50

## 2017-05-16 RX ADMIN — SODIUM CHLORIDE, PRESERVATIVE FREE 300 UNITS: 5 INJECTION INTRAVENOUS at 13:04

## 2017-05-16 RX ADMIN — Medication 5 ML: at 05:35

## 2017-05-16 RX ADMIN — OXYCODONE HYDROCHLORIDE 20 MG: 15 TABLET ORAL at 08:49

## 2017-05-16 NOTE — PROGRESS NOTES
Discharge instructions and prescriptions given and explained to pt. Pt verbalized understanding. Medication side effects sheet reviewed with pt. Pt to be discharged home, waiting on a ride who is not available until 1300.

## 2017-05-16 NOTE — DISCHARGE SUMMARY
Hospitalist Discharge Summary     Patient ID:  Adrian Queen  [id number]  70 y.o.  1975  Admit date: 5/12/2017 12:56 PM  Discharge date and time: 5/16/2017  Attending: Alexis Miranda*  PCP:  Maria Elena Huang MD  Treatment Team: Attending Provider: Alexis Miranda MD; Utilization Review: Delma Pozo RN    Principal Diagnosis:  Acute sickle cell crisis; resolved  Dehydration; resolved  HTN; optimally controlled. Active Problems:    Sickle cell crisis ([address] (4/5/2016)             Hospital Course:  Please refer to the admission H&P for details of presentation. In summary, the patient is 38years old  [place] male with pmhx of Sickle B thalassemia with iron overload who was admitted on 5/12 with chief complaints of bilateral upper and lower extremity pain. He denied chest pain SOB, cough with phlegm. He did complain of nausea and vomiting. As per the patient these pain are typical of his sickle cell crisis pain, and his last crisis was about 2 months ago. While in hospital, he was treated with IV fluids, IV pain medications and IV anti emetics. He did complain of abdominal discomfort, for which CT abdomen and pelvis was done which was unremarkable for any acute intracranial abnormality. His pain was optimally controlled after continuing his home pain medications with IV pain medications. He was clinically improving and was discharged to home with hematology out patient follow up. Significant Diagnostic Studies:   CT abdomen and pelvis with contrast: unremarkable for acute intracranial abnormality.     Labs: Results:       Chemistry Recent Labs      05/15/17   0940  05/14/17   0656   GLU  133*  127*   NA  140  141   K  3.3*  3.5   CL  104  105   CO2  28  30   BUN  10  11   CREA  0.74*  0.75*   CA  8.8  8.7   AGAP  8  6*   AP   --   80   TP   --   7.3   ALB   --   3.5   GLOB   --   3.8*   AGRAT   --   0.9*      CBC w/Diff Recent Labs      05/15/17   0940  05/14/17   0656   WBC 21.6*  23.3*   RBC  2.54*  2.60*   HGB  7.5*  7.5*   HCT  21.9*  22.2*   PLT  137*  146*   GRANS  65  59   LYMPH  21  27   EOS  3  2      Cardiac Enzymes No results for input(s): CPK, CKND1, ELO in the last 72 hours. No lab exists for component: CKRMB, TROIP   Coagulation No results for input(s): PTP, INR, APTT in the last 72 hours. No lab exists for component: INREXT    Lipid Panel No results found for: CHOL, CHOLPOCT, CHOLX, CHLST, CHOLV, [id number], HDL, LDL, NLDLCT, DLDL, LDLC, DLDLP, [id number], VLDLC, VLDL, TGL, TGLX, TRIGL, [id number], TRIGP, TGLPOCT, [id number], CHHD, CHHDX   BNP No results for input(s): BNPP in the last 72 hours. Liver Enzymes Recent Labs      05/14/17   0656   TP  7.3   ALB  3.5   AP  80   SGOT  35      Thyroid Studies No results found for: T4, T3U, TSH, TSHEXT         Discharge Exam:  Visit Vitals    BP (!) 147/96 (BP 1 Location: Left arm, BP Patient Position: At rest)    Pulse 84    Temp 99.4 °F (37.4 °C)    Resp 17    Ht 5' 10\" (1.778 m)    Wt 66.2 kg (146 lb)    SpO2 93%    BMI 20.95 kg/m2     General appearance: alert, cooperative, no distress, appears stated age  Lungs: clear to auscultation bilaterally  Heart: regular rate and rhythm, S1, S2 normal, no murmur, click, rub or gallop  Abdomen: soft, non-tender. Bowel sounds normal. No masses,  no organomegaly  Extremities: no cyanosis or edema  Neurologic: Grossly normal    Disposition: home  Discharge Condition: stable  Patient Instructions:   Current Discharge Medication List      START taking these medications    Details   senna-docusate (PERICOLACE) 8.6-50 mg per tablet Take 1 Tab by mouth daily for 15 days. Qty: 15 Tab, Refills: 0      amLODIPine (NORVASC) 2.5 mg tablet Take 1 Tab by mouth daily for 30 days. Qty: 30 Tab, Refills: 1      promethazine (PHENERGAN) 25 mg tablet Take 1 Tab by mouth every six (6) hours as needed.  May substitute suppository if vomiting  Qty: 20 Tab, Refills: 0      levoFLOXacin (LEVAQUIN) 500 mg tablet Take 1 Tab by mouth daily. Qty: 7 Tab, Refills: 0      HYDROmorphone (DILAUDID) 2 mg tablet Take 1 Tab by mouth every four (4) hours as needed for Pain. Max Daily Amount: 12 mg.  Qty: 8 Tab, Refills: 0         CONTINUE these medications which have CHANGED    Details   oxyCODONE IR (OXY-IR) 30 mg immediate release tablet Take 1 Tab by mouth every four (4) hours as needed for Pain. Max Daily Amount: 180 mg.  Qty: 12 Tab, Refills: 0      oxyCODONE ER (OXYCONTIN) 80 mg ER tablet Take 1 Tab by mouth every twelve (12) hours. Max Daily Amount: 160 mg.  Qty: 6 Tab, Refills: 0      hydroxyurea (HYDREA) 500 mg capsule Take 1 Cap by mouth two (2) times a day for 15 days. Indications: Sickle Cell Anemia with Crisis  Qty: 30 Cap, Refills: 0         CONTINUE these medications which have NOT CHANGED    Details   metoprolol (LOPRESSOR) 25 mg tablet Take 25 mg by mouth two (2) times a day. Indications: HYPERTENSION      lisinopril (PRINIVIL, ZESTRIL) 5 mg tablet Take 5 mg by mouth daily. Indications: HYPERTENSION      folic acid (FOLVITE) 1 mg tablet Take 1 mg by mouth daily. Activity: Activity as tolerated  Diet: Regular Cardiac  Wound Care: None needed    Follow-up  · Follow up with Hematology as scheduled. · Follow up with pcp in 1 week.     Time spent to discharge patient 35 minutes  Signed:  Barb Bartlett MD  5/16/2017  7:27 AM

## 2017-05-16 NOTE — DISCHARGE INSTRUCTIONS
Sickle Cell Crisis: Care Instructions  Your Care Instructions    Sickle cell crisis is a painful episode that may begin suddenly in a person with sickle cell disease. Sickle cell disease turns normal, round red blood cells into cells that look like kendall or crescent moons. The sickle-shaped cells can get stuck in blood vessels, blocking blood flow and causing severe pain. The pain can occur in the bones of the spine, the arms and legs, the chest, and the abdomen. An episode may be called a \"painful event\" or \"painful crisis. \" Some people who have sickle cell disease have many painful events, while others have few or none. Treatment depends on the level of pain and how long it lasts. Sometimes taking nonprescription pain relievers can help. Or you may need stronger pain relief medicine that is prescribed or given by a doctor. You may need to be treated in the hospital.  It isn't always possible to know what sets off a painful event. But triggers include being dehydrated, cold temperatures, infection, stress, and not getting enough oxygen. Follow-up care is a key part of your treatment and safety. Be sure to make and go to all appointments, and call your doctor if you are having problems. It's also a good idea to know your test results and keep a list of the medicines you take. How can you care for yourself at home? · Create a pain management plan with your doctor. This plan should include the types of medicines you can take and other actions you can take at home to relieve pain. · Drink plenty of fluids, enough so that your urine is light yellow or clear like water. If you have kidney, heart, or liver disease and have to limit fluids, talk with your doctor before you increase the amount of fluids you drink. · Take your medicines exactly as prescribed. Call your doctor if you think you are having a problem with your medicine. · Take pain medicines exactly as directed.   ¨ If the doctor gave you a prescription medicine for pain, take it as prescribed. ¨ If you are not taking a prescription pain medicine, ask your doctor if you can take an over-the-counter medicine. · Avoid alcohol. It can make you dehydrated. · Dress warmly in cold weather. The cold and windy weather can lead to severe pain. · Do not smoke. Smoking can reduce the amount of oxygen in your blood. · Get plenty of sleep. When should you call for help? Call 911 anytime you think you may need emergency care. For example, call if:  · You passed out (lost consciousness). Call your doctor now or seek immediate medical care if:  · You are in severe pain. · You are dizzy or lightheaded, or you feel like you may faint. · You have a fever. · You are short of breath. · Your symptoms are getting worse. Watch closely for changes in your health, and be sure to contact your doctor if you are not getting better as expected. Where can you learn more? Go to http://socorro-rosita.info/. Enter F104 in the search box to learn more about \"Sickle Cell Crisis: Care Instructions. \"  Current as of: October 13, 2016  Content Version: 11.2  © 9118-1221 [employer] Care instructions adapted under license by [employer] (which disclaims liability or warranty for this information). If you have questions about a medical condition or this instruction, always ask your healthcare professional. [place] [address] any warranty or liability for your use of this information.     DISCHARGE SUMMARY from Nurse    The following personal items are in your possession at time of discharge:    Dental Appliances: None        Home Medications: None  Jewelry: Ring  Clothing: Undergarments, At bedside, Shirt, Socks  Other Valuables: None             PATIENT INSTRUCTIONS:    After general anesthesia or intravenous sedation, for 24 hours or while taking prescription Narcotics:  · Limit your activities  · Do not drive and operate hazardous machinery  · Do not make important personal or business decisions  · Do  not drink alcoholic beverages  · If you have not urinated within 8 hours after discharge, please contact your surgeon on call. Report the following to your surgeon:  · Excessive pain, swelling, redness or odor of or around the surgical area  · Temperature over 100.5  · Nausea and vomiting lasting longer than 4 hours or if unable to take medications  · Any signs of decreased circulation or nerve impairment to extremity: change in color, persistent  numbness, tingling, coldness or increase pain  · Any questions        What to do at Home:  Recommended activity: Activity as tolerated, diet as tolerated, stay hydrated. *  Please give a list of your current medications to your Primary Care Provider. *  Please update this list whenever your medications are discontinued, doses are      changed, or new medications (including over-the-counter products) are added. *  Please carry medication information at all times in case of emergency situations. These are general instructions for a healthy lifestyle:    No smoking/ No tobacco products/ Avoid exposure to second hand smoke    Surgeon General's Warning:  Quitting smoking now greatly reduces serious risk to your health. Obesity, smoking, and sedentary lifestyle greatly increases your risk for illness    A healthy diet, regular physical exercise & weight monitoring are important for maintaining a healthy lifestyle    You may be retaining fluid if you have a history of heart failure or if you experience any of the following symptoms:  Weight gain of 3 pounds or more overnight or 5 pounds in a week, increased swelling in our hands or feet or shortness of breath while lying flat in bed. Please call your doctor as soon as you notice any of these symptoms; do not wait until your next office visit.     Recognize signs and symptoms of STROKE:    F-face looks uneven    A-arms unable to move or move unevenly    S-speech slurred or non-existent    T-time-call 911 as soon as signs and symptoms begin-DO NOT go       Back to bed or wait to see if you get better-TIME IS BRAIN. Warning Signs of HEART ATTACK     Call 911 if you have these symptoms:   Chest discomfort. Most heart attacks involve discomfort in the center of the chest that lasts more than a few minutes, or that goes away and comes back. It can feel like uncomfortable pressure, squeezing, fullness, or pain.  Discomfort in other areas of the upper body. Symptoms can include pain or discomfort in one or both arms, the back, neck, jaw, or stomach.  Shortness of breath with or without chest discomfort.  Other signs may include breaking out in a cold sweat, nausea, or lightheadedness. Don't wait more than five minutes to call 911 - MINUTES MATTER! Fast action can save your life. Calling 911 is almost always the fastest way to get lifesaving treatment. Emergency Medical Services staff can begin treatment when they arrive -- up to an hour sooner than if someone gets to the hospital by car. The discharge information has been reviewed with the patient. The patient verbalized understanding. Discharge medications reviewed with the patient and appropriate educational materials and side effects teaching were provided.

## 2017-05-16 NOTE — PROGRESS NOTES
Pt also needs prescriptions signed before he can leave. [id number] I have attempted to notify MD multiple time. Primary RN and Charge RN aware of need for the prescriptions to be signed before he leaves.

## 2017-05-16 NOTE — PROGRESS NOTES
Patient provided with prescriptions. Patient's port deaccessed. Patient's wife at bedside. Patient to call when he is ready for a wheelchair.

## 2017-07-13 ENCOUNTER — APPOINTMENT (OUTPATIENT)
Dept: GENERAL RADIOLOGY | Age: 42
DRG: 812 | End: 2017-07-13
Attending: INTERNAL MEDICINE
Payer: MEDICARE

## 2017-07-13 ENCOUNTER — HOSPITAL ENCOUNTER (INPATIENT)
Age: 42
LOS: 4 days | Discharge: HOME | DRG: 812 | End: 2017-07-17
Attending: EMERGENCY MEDICINE | Admitting: INTERNAL MEDICINE
Payer: MEDICARE

## 2017-07-13 DIAGNOSIS — D57.00 SICKLE CELL DISEASE WITH CRISIS (HCC): Primary | ICD-10-CM

## 2017-07-13 PROBLEM — D57.1 SICKLE CELL DISEASE (HCC): Status: ACTIVE | Noted: 2017-07-13

## 2017-07-13 LAB
ALBUMIN SERPL BCP-MCNC: 4 G/DL (ref 3.5–5)
ALBUMIN/GLOB SERPL: 1 {RATIO} (ref 1.2–3.5)
ALP SERPL-CCNC: 77 U/L (ref 50–136)
ALT SERPL-CCNC: 66 U/L (ref 12–65)
ANION GAP BLD CALC-SCNC: 9 MMOL/L (ref 7–16)
AST SERPL W P-5'-P-CCNC: 64 U/L (ref 15–37)
ATRIAL RATE: 59 BPM
BACTERIA SPEC CULT: NORMAL
BASOPHILS # BLD AUTO: 0 K/UL (ref 0–0.2)
BASOPHILS # BLD: 0 % (ref 0–2)
BILIRUB SERPL-MCNC: 2 MG/DL (ref 0.2–1.1)
BUN SERPL-MCNC: 7 MG/DL (ref 6–23)
CALCIUM SERPL-MCNC: 9.2 MG/DL (ref 8.3–10.4)
CALCULATED P AXIS, ECG09: 64 DEGREES
CALCULATED R AXIS, ECG10: 23 DEGREES
CALCULATED T AXIS, ECG11: 83 DEGREES
CHLORIDE SERPL-SCNC: 105 MMOL/L (ref 98–107)
CO2 SERPL-SCNC: 26 MMOL/L (ref 21–32)
CREAT SERPL-MCNC: 0.76 MG/DL (ref 0.8–1.5)
DIAGNOSIS, 93000: NORMAL
DIFFERENTIAL METHOD BLD: ABNORMAL
EOSINOPHIL # BLD: 0 K/UL (ref 0–0.8)
EOSINOPHIL NFR BLD: 0 % (ref 0.5–7.8)
ERYTHROCYTE [DISTWIDTH] IN BLOOD BY AUTOMATED COUNT: 23.7 % (ref 11.9–14.6)
GLOBULIN SER CALC-MCNC: 4 G/DL (ref 2.3–3.5)
GLUCOSE SERPL-MCNC: 116 MG/DL (ref 65–100)
HCT VFR BLD AUTO: 21.1 % (ref 41.1–50.3)
HGB BLD-MCNC: 7.4 G/DL (ref 13.6–17.2)
HGB RETIC QN AUTO: 40 PG (ref 29–35)
IMM GRANULOCYTES # BLD: 0 K/UL (ref 0–0.5)
IMM GRANULOCYTES NFR BLD AUTO: 0.2 % (ref 0–5)
IMM RETICS NFR: 34.4 % (ref 2.3–13.4)
LYMPHOCYTES # BLD AUTO: 20 % (ref 13–44)
LYMPHOCYTES # BLD: 2.1 K/UL (ref 0.5–4.6)
MCH RBC QN AUTO: 31.2 PG (ref 26.1–32.9)
MCHC RBC AUTO-ENTMCNC: 35.1 G/DL (ref 31.4–35)
MCV RBC AUTO: 89 FL (ref 79.6–97.8)
MONOCYTES # BLD: 0.7 K/UL (ref 0.1–1.3)
MONOCYTES NFR BLD AUTO: 7 % (ref 4–12)
NEUTS SEG # BLD: 7.7 K/UL (ref 1.7–8.2)
NEUTS SEG NFR BLD AUTO: 73 % (ref 43–78)
P-R INTERVAL, ECG05: 168 MS
PLATELET # BLD AUTO: 196 K/UL (ref 150–450)
PMV BLD AUTO: 10.1 FL (ref 10.8–14.1)
POTASSIUM SERPL-SCNC: 3.8 MMOL/L (ref 3.5–5.1)
PROT SERPL-MCNC: 8 G/DL (ref 6.3–8.2)
Q-T INTERVAL, ECG07: 418 MS
QRS DURATION, ECG06: 76 MS
QTC CALCULATION (BEZET), ECG08: 413 MS
RBC # BLD AUTO: 2.37 M/UL (ref 4.23–5.67)
RETICS # AUTO: 0.17 M/UL (ref 0.03–0.1)
RETICS/RBC NFR AUTO: 7.3 % (ref 0.3–2)
SERVICE CMNT-IMP: NORMAL
SODIUM SERPL-SCNC: 140 MMOL/L (ref 136–145)
VENTRICULAR RATE, ECG03: 59 BPM
WBC # BLD AUTO: 10.5 K/UL (ref 4.3–11.1)

## 2017-07-13 PROCEDURE — 93005 ELECTROCARDIOGRAM TRACING: CPT | Performed by: INTERNAL MEDICINE

## 2017-07-13 PROCEDURE — 74011250636 HC RX REV CODE- 250/636: Performed by: EMERGENCY MEDICINE

## 2017-07-13 PROCEDURE — 94760 N-INVAS EAR/PLS OXIMETRY 1: CPT

## 2017-07-13 PROCEDURE — 74011000250 HC RX REV CODE- 250: Performed by: INTERNAL MEDICINE

## 2017-07-13 PROCEDURE — 74011250637 HC RX REV CODE- 250/637: Performed by: INTERNAL MEDICINE

## 2017-07-13 PROCEDURE — 85046 RETICYTE/HGB CONCENTRATE: CPT | Performed by: EMERGENCY MEDICINE

## 2017-07-13 PROCEDURE — 74011000250 HC RX REV CODE- 250: Performed by: EMERGENCY MEDICINE

## 2017-07-13 PROCEDURE — 99285 EMERGENCY DEPT VISIT HI MDM: CPT | Performed by: EMERGENCY MEDICINE

## 2017-07-13 PROCEDURE — 74011250636 HC RX REV CODE- 250/636: Performed by: INTERNAL MEDICINE

## 2017-07-13 PROCEDURE — 96374 THER/PROPH/DIAG INJ IV PUSH: CPT | Performed by: EMERGENCY MEDICINE

## 2017-07-13 PROCEDURE — 71010 XR CHEST PORT: CPT

## 2017-07-13 PROCEDURE — 80053 COMPREHEN METABOLIC PANEL: CPT | Performed by: EMERGENCY MEDICINE

## 2017-07-13 PROCEDURE — 77010033678 HC OXYGEN DAILY

## 2017-07-13 PROCEDURE — 65270000029 HC RM PRIVATE

## 2017-07-13 PROCEDURE — 85025 COMPLETE CBC W/AUTO DIFF WBC: CPT | Performed by: EMERGENCY MEDICINE

## 2017-07-13 PROCEDURE — 96376 TX/PRO/DX INJ SAME DRUG ADON: CPT | Performed by: EMERGENCY MEDICINE

## 2017-07-13 PROCEDURE — 87641 MR-STAPH DNA AMP PROBE: CPT | Performed by: INTERNAL MEDICINE

## 2017-07-13 PROCEDURE — 96375 TX/PRO/DX INJ NEW DRUG ADDON: CPT | Performed by: EMERGENCY MEDICINE

## 2017-07-13 RX ORDER — OXYCODONE HYDROCHLORIDE 80 MG/1
80 TABLET, FILM COATED, EXTENDED RELEASE ORAL EVERY 12 HOURS
Status: DISCONTINUED | OUTPATIENT
Start: 2017-07-13 | End: 2017-07-17 | Stop reason: HOSPADM

## 2017-07-13 RX ORDER — METOPROLOL TARTRATE 25 MG/1
25 TABLET, FILM COATED ORAL EVERY 12 HOURS
Status: DISCONTINUED | OUTPATIENT
Start: 2017-07-13 | End: 2017-07-13

## 2017-07-13 RX ORDER — LISINOPRIL 5 MG/1
5 TABLET ORAL DAILY
Status: DISCONTINUED | OUTPATIENT
Start: 2017-07-14 | End: 2017-07-13

## 2017-07-13 RX ORDER — HYDROXYUREA 500 MG/1
500 CAPSULE ORAL 2 TIMES DAILY
Status: ON HOLD | COMMUNITY
End: 2018-05-03

## 2017-07-13 RX ORDER — HEPARIN SODIUM 5000 [USP'U]/ML
5000 INJECTION, SOLUTION INTRAVENOUS; SUBCUTANEOUS EVERY 12 HOURS
Status: DISCONTINUED | OUTPATIENT
Start: 2017-07-13 | End: 2017-07-17 | Stop reason: HOSPADM

## 2017-07-13 RX ORDER — HYDROXYUREA 500 MG/1
500 CAPSULE ORAL 2 TIMES DAILY
Status: DISCONTINUED | OUTPATIENT
Start: 2017-07-13 | End: 2017-07-17 | Stop reason: HOSPADM

## 2017-07-13 RX ORDER — HYDROMORPHONE HYDROCHLORIDE 1 MG/ML
1 INJECTION, SOLUTION INTRAMUSCULAR; INTRAVENOUS; SUBCUTANEOUS
Status: DISCONTINUED | OUTPATIENT
Start: 2017-07-13 | End: 2017-07-13

## 2017-07-13 RX ORDER — OXYCODONE HYDROCHLORIDE 15 MG/1
30 TABLET ORAL
Status: DISCONTINUED | OUTPATIENT
Start: 2017-07-13 | End: 2017-07-17 | Stop reason: HOSPADM

## 2017-07-13 RX ORDER — SODIUM CHLORIDE 9 MG/ML
150 INJECTION, SOLUTION INTRAVENOUS CONTINUOUS
Status: DISCONTINUED | OUTPATIENT
Start: 2017-07-13 | End: 2017-07-14

## 2017-07-13 RX ORDER — HYDROMORPHONE HYDROCHLORIDE 1 MG/ML
2 INJECTION, SOLUTION INTRAMUSCULAR; INTRAVENOUS; SUBCUTANEOUS
Status: COMPLETED | OUTPATIENT
Start: 2017-07-13 | End: 2017-07-13

## 2017-07-13 RX ORDER — HYDROMORPHONE HYDROCHLORIDE 1 MG/ML
1 INJECTION, SOLUTION INTRAMUSCULAR; INTRAVENOUS; SUBCUTANEOUS
Status: DISCONTINUED | OUTPATIENT
Start: 2017-07-13 | End: 2017-07-17 | Stop reason: HOSPADM

## 2017-07-13 RX ORDER — FOLIC ACID 1 MG/1
1 TABLET ORAL DAILY
Status: DISCONTINUED | OUTPATIENT
Start: 2017-07-14 | End: 2017-07-17 | Stop reason: HOSPADM

## 2017-07-13 RX ADMIN — PROMETHAZINE HYDROCHLORIDE 12.5 MG: 25 INJECTION INTRAMUSCULAR; INTRAVENOUS at 13:00

## 2017-07-13 RX ADMIN — HYDROMORPHONE HYDROCHLORIDE 1 MG: 1 INJECTION, SOLUTION INTRAMUSCULAR; INTRAVENOUS; SUBCUTANEOUS at 20:59

## 2017-07-13 RX ADMIN — HYDROXYUREA 500 MG: 500 CAPSULE ORAL at 21:00

## 2017-07-13 RX ADMIN — HEPARIN SODIUM 5000 UNITS: 5000 INJECTION, SOLUTION INTRAVENOUS; SUBCUTANEOUS at 21:00

## 2017-07-13 RX ADMIN — HYDROMORPHONE HYDROCHLORIDE 2 MG: 1 INJECTION, SOLUTION INTRAMUSCULAR; INTRAVENOUS; SUBCUTANEOUS at 14:50

## 2017-07-13 RX ADMIN — SODIUM CHLORIDE 1000 ML: 900 INJECTION, SOLUTION INTRAVENOUS at 13:00

## 2017-07-13 RX ADMIN — HYDROMORPHONE HYDROCHLORIDE 1 MG: 1 INJECTION, SOLUTION INTRAMUSCULAR; INTRAVENOUS; SUBCUTANEOUS at 17:50

## 2017-07-13 RX ADMIN — OXYCODONE HYDROCHLORIDE 80 MG: 80 TABLET, FILM COATED, EXTENDED RELEASE ORAL at 23:56

## 2017-07-13 RX ADMIN — SODIUM CHLORIDE 150 ML/HR: 900 INJECTION, SOLUTION INTRAVENOUS at 17:41

## 2017-07-13 RX ADMIN — SODIUM CHLORIDE 150 ML/HR: 900 INJECTION, SOLUTION INTRAVENOUS at 23:51

## 2017-07-13 RX ADMIN — FAMOTIDINE 20 MG: 10 INJECTION INTRAVENOUS at 20:59

## 2017-07-13 RX ADMIN — HYDROMORPHONE HYDROCHLORIDE 2 MG: 1 INJECTION, SOLUTION INTRAMUSCULAR; INTRAVENOUS; SUBCUTANEOUS at 13:00

## 2017-07-13 NOTE — ED NOTES
Patient taken to room 3 to access port. Port accessed using sterile procedure. Port flushed with normal saline. Patient reports tasting saline. Blood drawn for labs at this time.

## 2017-07-13 NOTE — PROGRESS NOTES
Pt admitted to unit and oriented to room. Pt is alert and oriented with family at bedside. Dual skin assessment with Judith DE SANTIAGO. Pt has no skin breakdown noted.  Will continue to monitor

## 2017-07-13 NOTE — ED PROVIDER NOTES
HPI Comments: Patient is a 26-year-old male presenting with multiple pain complaints he attributes to sickle cell crisis. He has had this problem before. He receives Dilaudid and IV fluid infusions from [facility] infusion center. States yesterday he had an infusion of Dilaudid but this morning his pain increased primarily in his legs. No significant chest pain, shortness of breath or abdominal pain. Patient has been taking home oxycodone as well as Phenergan without significant relief. No recent fevers, new medications her other inciting events. He believes the heat has caused him to have a sickle cell crisis. Patient is a 38 y.o. male presenting with sickle cell disease. The history is provided by the patient. No  was used. Sickle Cell Crisis    Pertinent negatives include no fever, no headaches, no abdominal pain and no dysuria. Past Medical History:   Diagnosis Date    Chronic pain     HTN (hypertension)     Ill-defined condition     sickle cell    Iron overload due to repeated red blood cell transfusions 1/18/2017    Paroxysmal SV[facility]ra[facility]ricular [facility]cardia[facility]) 7/9/2016    Sickle cell disease ([facility] [address]        Past Surgical History:   Procedure Laterality Date    HC PENILE IMPL DURA II POSITINBLE      HC PORT LIFE SNGLE LUMEN 5013      to L CW    HX CHOLECYSTECTOMY      HX OTHER SURGICAL      penile inplant    HX VASCULAR ACCESS           Family History:   Problem Relation Age of Onset    Hypertension Other     Diabetes Father     Stroke Father     Sickle Cell Anemia Sister        Social History     Social History    Marital status:      Spouse name: N/A    Number of children: N/A    Years of education: N/A     Occupational History    Not on file.      Social History Main Topics    Smoking status: Never Smoker    Smokeless tobacco: Never Used    Alcohol use No    Drug use: No    Sexual activity: Yes     Other Topics Concern    Not on file     Social History Narrative         ALLERGIES: Compazine [prochlorperazine edisylate]; Morphine; Reglan [metoclopramide]; and Zofran [ondansetron hcl (pf)]    Review of Systems   Constitutional: Negative for fever. HENT: Negative for sore throat. Eyes: Negative for pain. Respiratory: Negative for cough, chest tightness and shortness of breath. Cardiovascular: Negative for leg swelling. Gastrointestinal: Positive for nausea. Negative for abdominal pain and vomiting. Genitourinary: Negative for dysuria. Musculoskeletal: Negative for back pain. Bilateral lower extremity pain   Neurological: Negative for syncope and headaches. Vitals:    07/13/17 0950   BP: 137/82   Pulse: 93   Resp: 18   Temp: 98.4 °F (36.9 °C)   SpO2: 98%            Physical Exam   Constitutional: He is oriented to person, place, and time. He appears well-developed and well-nourished. No distress. HENT:   Head: Normocephalic and atraumatic. Eyes: Conjunctivae and EOM are normal. Pupils are equal, round, and reactive to light. Neck: Normal range of motion. Neck supple. Cardiovascular: Normal rate, regular rhythm and normal heart sounds. Pulmonary/Chest: Effort normal and breath sounds normal. No respiratory distress. He has no wheezes. He has no rales. Abdominal: Soft. He exhibits no distension. There is no tenderness. There is no rebound. Musculoskeletal: Normal range of motion. He exhibits no edema or tenderness. Neurological: He is alert and oriented to person, place, and time. Skin: Skin is warm and dry. No rash noted. He is not diaphoretic. Psychiatric: He has a normal mood and affect. His behavior is normal.   Vitals reviewed. MDM  Number of Diagnoses or Management Options  Sickle cell disease with crisis [facility]): new and requires workup  Diagnosis management comments: Patient still considerable pain despite fluid resuscitation, oxygen and Dilaudid. Elevated reticulocyte count.   We'll admit to hospitalist for further pain management. I discussed the patient with  at [facility] who is familiar with the patient and recommends admission as well. Admitted in stable condition.        Amount and/or Complexity of Data Reviewed  Clinical lab tests: ordered and reviewed (Results for orders placed or performed during the hospital encounter of 07/13/17  -CBC WITH AUTOMATED DIFF       Result                                            Value                         Ref Range                       WBC                                               10.5                          4.3 - 11.1 K/uL                 RBC                                               2.37 (L)                      4.23 - 5.67 M/uL                HGB                                               7.4 (L)                       13.6 - 17.2 g/dL                HCT                                               21.1 (L)                      41.1 - 50.3 %                   MCV                                               89.0                          79.6 - 97.8 FL                  MCH                                               31.2                          26.1 - 32.9 PG                  MCHC                                              35.1 (H)                      31.4 - 35.0 g/dL                RDW                                               23.7 (H)                      11.9 - 14.6 %                   PLATELET                                          196                           150 - 450 K/uL                  MPV                                               10.1 (L)                      10.8 - 14.1 FL                  DF                                                AUTOMATED                                                     NEUTROPHILS                                       73                            43 - 78 %                       LYMPHOCYTES                                       20                            13 - 44 % MONOCYTES                                         7                             4.0 - 12.0 %                    EOSINOPHILS                                       0 (L)                         0.5 - 7.8 %                     BASOPHILS                                         0                             0.0 - 2.0 %                     IMMATURE GRANULOCYTES                             0.2                           0.0 - 5.0 %                     ABS. NEUTROPHILS                                  7.7                           1.7 - 8.2 K/UL                  ABS. LYMPHOCYTES                                  2.1                           0.5 - 4.6 K/UL                  ABS. MONOCYTES                                    0.7                           0.1 - 1.3 K/UL                  ABS. EOSINOPHILS                                  0.0                           0.0 - 0.8 K/UL                  ABS. BASOPHILS                                    0.0                           0.0 - 0.2 K/UL                  ABS. IMM.  GRANS.                                  0.0                           0.0 - 0.5 K/UL             -METABOLIC PANEL, COMPREHENSIVE       Result                                            Value                         Ref Range                       Sodium                                            140                           136 - 145 mmol/L                Potassium                                         3.8                           3.5 - 5.1 mmol/L                Chloride                                          105                           98 - 107 mmol/L                 CO2                                               26                            21 - 32 mmol/L                  Anion gap                                         9                             7 - 16 mmol/L                   Glucose                                           116 (H)                       65 - 100 mg/dL                  BUN 7                             6 - 23 MG/DL                    Creatinine                                        0.76 (L)                      0.8 - 1.5 MG/DL                 GFR est AA                                        >60                           >60 ml/min/1.73m2               GFR est non-AA                                    >60                           >60 ml/min/1.73m2               Calcium                                           9.2                           8.3 - 10.4 MG/DL                Bilirubin, total                                  2.0 (H)                       0.2 - 1.1 MG/DL                 ALT (SGPT)                                        66 (H)                        12 - 65 U/L                     AST (SGOT)                                        64 (H)                        15 - 37 U/L                     Alk. phosphatase                                  77                            50 - 136 U/L                    Protein, total                                    8.0                           6.3 - 8.2 g/dL                  Albumin                                           4.0                           3.5 - 5.0 g/dL                  Globulin                                          4.0 (H)                       2.3 - 3.5 g/dL                  A-G Ratio                                         1.0 (L)                       1.2 - 3.5                  -RETICULOCYTE COUNT       Result                                            Value                         Ref Range                       Reticulocyte count                                7.3 (H)                       0.3 - 2.0 %                     Absolute Retic Cnt.                               0.1732 (H)                    0.026 - 0.095 M/ul              Immature Retic Fraction                           34.4 (H)                      2.3 - 13.4 %                    Retic Hgb Conc. 40 (H)                        29 - 35 pg                 )  Review and summarize past medical records: yes  Discuss the patient with other providers: yes  Independent visualization of images, tracings, or specimens: yes    Risk of Complications, Morbidity, and/or Mortality  Presenting problems: high  Diagnostic procedures: moderate  Management options: moderate    Patient Progress  Patient progress: stable    ED Course       Procedures

## 2017-07-13 NOTE — IP AVS SNAPSHOT
[address] 
[phone] Patient: Dalton Baarjas MRN: [id number] You are allergic to the following Allergen Reactions Compazine (Prochlorperazine Edisylate) Other (comments) Also makes him feel funny Morphine Other (comments) Makes him feel funny Reglan (Metoclopramide) Other (comments) \"feel funny\" Zofran (Ondansetron Hcl (Pf)) Other (comments) Make him feel funny Recent Documentation Smoking Status Never Smoker Emergency Contacts Name Discharge Info Relation Home Work Mobile Yovani Smith  Spouse [phone] About your hospitalization You were admitted on:  July 13, 2017 You last received care in the:  [facility] 6 MED SURG You were discharged on:  July 17, 2017 Unit phone number:  [phone] Why you were hospitalized Your primary diagnosis was:  Not on File Your diagnoses also included:  Sickle Cell Disease (Hcc) Providers Seen During Your Hospitalizations Provider Role Specialty Primary office phone Soy Gamez MD Attending Provider Emergency Medicine [phone] Juan Henson MD Attending Provider Internal Medicine [phone] Your Primary Care Physician (PCP) Primary Care Physician Office Phone Office Fax Dilip Gandhi [phone] Follow-up Information Follow up With Details Comments Contact Info Rex Robles MD   [address] [place] [address] 
[phone] Dr. Reuben Roche  patient would like to schedule follow up appt himself 3-5 days Current Discharge Medication List  
  
START taking these medications Dose & Instructions Dispensing Information Comments Morning Noon Evening Bedtime  
 deferasirox 360 mg Tab Commonly known as:  Raul Beyer  
 Your next dose is: Take today when you get prescription filled Dose:  5 Tab Take 5 Tabs by mouth daily. Quantity:  150 Tab Refills:  0 CONTINUE these medications which have CHANGED Dose & Instructions Dispensing Information Comments Morning Noon Evening Bedtime * oxyCODONE IR 30 mg immediate release tablet Commonly known as:  OXY-IR What changed:  Another medication with the same name was added. Make sure you understand how and when to take each. Your next dose is:  As needed Dose:  30 mg Take 1 Tab by mouth every four (4) hours as needed for Pain. Max Daily Amount: 180 mg.  
 Quantity:  12 Tab Refills:  0  
     
   
   
   
  
 * oxyCODONE ER 80 mg ER tablet Commonly known as:  OxyCONTIN What changed:  Another medication with the same name was added. Make sure you understand how and when to take each. Your next dose is: Tonight at 9 pm  
   
 Dose:  80 mg Take 1 Tab by mouth every twelve (12) hours. Max Daily Amount: 160 mg.  
 Quantity:  6 Tab Refills:  0  
     
   
   
   
  
  
 * oxyCODONE ER 80 mg ER tablet Commonly known as:  OxyCONTIN What changed: You were already taking a medication with the same name, and this prescription was added. Make sure you understand how and when to take each. Your next dose is:  See above Dose:  80 mg Take 1 Tab by mouth every twelve (12) hours. Max Daily Amount: 160 mg.  
 Quantity:  10 Tab Refills:  0  
     
   
   
   
  
 * oxyCODONE IR 30 mg immediate release tablet Commonly known as:  OXY-IR What changed: You were already taking a medication with the same name, and this prescription was added. Make sure you understand how and when to take each. Your next dose is:  See above Dose:  30 mg Take 1 Tab by mouth every six (6) hours as needed. Max Daily Amount: 120 mg.  
 Quantity:  20 Tab Refills:  0 * Notice: This list has 4 medication(s) that are the same as other medications prescribed for you. Read the directions carefully, and ask your doctor or other care provider to review them with you. CONTINUE these medications which have NOT CHANGED Dose & Instructions Dispensing Information Comments Morning Noon Evening Bedtime  
 folic acid 1 mg tablet Commonly known as:  [employer] Your next dose is:  7/18 Dose:  1 mg Take 1 mg by mouth daily. Refills:  0  
     
   
   
   
  
 hydroxyurea 500 mg capsule Commonly known as:  HYDREA Your next dose is: Today around supper time Dose:  500 mg Take 500 mg by mouth two (2) times a day. Refills:  0  
     
   
   
  
   
  
 lisinopril 5 mg tablet Commonly known as:  Therisa Semen Your next dose is:  7/18 Dose:  5 mg Take 5 mg by mouth daily. Indications: HYPERTENSION Refills:  0  
     
   
   
   
  
 metoprolol tartrate 25 mg tablet Commonly known as:  LOPRESSOR Your next dose is: Today around supper time Dose:  25 mg Take 25 mg by mouth two (2) times a day. Indications: HYPERTENSION Refills:  0  
     
   
   
  
   
  
 promethazine 25 mg tablet Commonly known as:  PHENERGAN Your next dose is:  As needed Dose:  25 mg Take 1 Tab by mouth every six (6) hours as needed. May substitute suppository if vomiting Quantity:  20 Tab Refills:  0 Where to Get Your Medications Information on where to get these meds will be given to you by the nurse or doctor. ! Ask your nurse or doctor about these medications  
  deferasirox 360 mg Tab  
 oxyCODONE ER 80 mg ER tablet  
 oxyCODONE IR 30 mg immediate release tablet Discharge Instructions DISCHARGE SUMMARY from Nurse The following personal items are in your possession at time of discharge: 
 
Dental Appliances: None Visual Aid: Glasses, With patient Home Medications: None Jewelry: Ring Clothing: At bedside Other Valuables: Cell Phone PATIENT INSTRUCTIONS: 
 
 
F-face looks uneven A-arms unable to move or move unevenly S-speech slurred or non-existent T-time-call 911 as soon as signs and symptoms begin-DO NOT go Back to bed or wait to see if you get better-TIME IS BRAIN. Warning Signs of HEART ATTACK Call 911 if you have these symptoms: 
? Chest discomfort. Most heart attacks involve discomfort in the center of the chest that lasts more than a few minutes, or that goes away and comes back. It can feel like uncomfortable pressure, squeezing, fullness, or pain. ? Discomfort in other areas of the upper body. Symptoms can include pain or discomfort in one or both arms, the back, neck, jaw, or stomach. ? Shortness of breath with or without chest discomfort. ? Other signs may include breaking out in a cold sweat, nausea, or lightheadedness. Don't wait more than five minutes to call [address]! Fast action can save your life. Calling 911 is almost always the fastest way to get lifesaving treatment. Emergency Medical Services staff can begin treatment when they arrive  up to an hour sooner than if someone gets to the hospital by car. The discharge information has been reviewed with the patient. The patient verbalized understanding. Discharge medications reviewed with the patient and appropriate educational materials and side effects teaching were provided. Sickle Cell Crisis: Care Instructions Your Care Instructions Sickle cell crisis is a painful episode that may begin suddenly in a person with sickle cell disease. Sickle cell disease turns normal, round red blood cells into cells that look like kendall or crescent moons. The sickle-shaped cells can get stuck in blood vessels, blocking blood flow and causing severe pain. The pain can occur in the bones of the spine, the arms and legs, the chest, and the abdomen. An episode may be called a \"painful event\" or \"painful crisis. \" Some people who have sickle cell disease have many painful events, while others have few or none. Treatment depends on the level of pain and how long it lasts. Sometimes taking nonprescription pain relievers can help. Or you may need stronger pain relief medicine that is prescribed or given by a doctor. You may need to be treated in the hospital. 
It isn't always possible to know what sets off a painful event. But triggers include being dehydrated, cold temperatures, infection, stress, and not getting enough oxygen. Follow-up care is a key part of your treatment and safety. Be sure to make and go to all appointments, and call your doctor if you are having problems. It's also a good idea to know your test results and keep a list of the medicines you take. How can you care for yourself at home? · Create a pain management plan with your doctor. This plan should include the types of medicines you can take and other actions you can take at home to relieve pain. · Drink plenty of fluids, enough so that your urine is light yellow or clear like water. If you have kidney, heart, or liver disease and have to limit fluids, talk with your doctor before you increase the amount of fluids you drink. · Take your medicines exactly as prescribed. Call your doctor if you think you are having a problem with your medicine. · Take pain medicines exactly as directed. ¨ If the doctor gave you a prescription medicine for pain, take it as prescribed.  
¨ If you are not taking a prescription pain medicine, ask your doctor if you can take an over-the-counter medicine. · Avoid alcohol. It can make you dehydrated. · Dress warmly in cold weather. The cold and windy weather can lead to severe pain. · Do not smoke. Smoking can reduce the amount of oxygen in your blood. · Get plenty of sleep. When should you call for help? Call 911 anytime you think you may need emergency care. For example, call if: 
· You passed out (lost consciousness). Call your doctor now or seek immediate medical care if: 
· You are in severe pain. · You are dizzy or lightheaded, or you feel like you may faint. · You have a fever. · You are short of breath. · Your symptoms are getting worse. Watch closely for changes in your health, and be sure to contact your doctor if you are not getting better as expected. Where can you learn more? Go to http://socorro-rosita.info/. Enter F104 in the search box to learn more about \"Sickle Cell Crisis: Care Instructions. \" Current as of: October 13, 2016 Content Version: 11.3 © 0059-9560 [employer]. Care instructions adapted under license by [employer] (which disclaims liability or warranty for this information). If you have questions about a medical condition or this instruction, always ask your healthcare professional. [address] any warranty or liability for your use of this information. Discharge Orders None Introducing [facility]! [facility] introduces PureHistory patient portal. Now you can access parts of your medical record, email your doctor's office, and request medication refills online. 1. In your internet browser, go to [email or web address] 2. Click on the First Time User? Click Here link in the Sign In box. You will see the New Member Sign Up page. 3. Enter your PureHistory Access Code exactly as it appears below. You will not need to use this code after youve completed the sign-up process.  If you do not sign up before the expiration date, you must request a new code. · [employer] Access Code: [email or web address] Expires: 8/10/2017 11:53 AM 
 
4. Enter the last four digits of your Social Security Number (xxxx) and Date of Birth (mm/dd/yyyy) as indicated and click Submit. You will be taken to the next sign-up page. 5. Create a [employer] ID. This will be your [employer] login ID and cannot be changed, so think of one that is secure and easy to remember. 6. Create a [employer] password. You can change your password at any time. 7. Enter your Password Reset Question and Answer. This can be used at a later time if you forget your password. 8. Enter your e-mail address. You will receive e-mail notification when new information is available in [address] 9. Click Sign Up. You can now view and download portions of your medical record. 10. Click the Download Summary menu link to download a portable copy of your medical information. If you have questions, please visit the Frequently Asked Questions section of the [employer] website. Remember, [employer] is NOT to be used for urgent needs. For medical emergencies, dial 911. Now available from your iPhone and Android! General Information Please provide this summary of care documentation to your next provider. Patient Signature:  ____________________________________________________________ Date:  ____________________________________________________________  
  
Mey Silva Provider Signature:  ____________________________________________________________ Date:  ____________________________________________________________

## 2017-07-13 NOTE — ED NOTES
TRANSFER - OUT REPORT:    Verbal report given to [employer]) on Anastasiya Berger  being transferred to [facility](unit) for routine progression of care       Report consisted of patients Situation, Background, Assessment and   Recommendations(SBAR). Information from the following report(s) ED Summary was reviewed with the receiving nurse. Lines:   Venous Access Device 07/13/17 Upper chest (subclavicular area), left (Active)        Opportunity for questions and clarification was provided.       Patient transported with:   O2 @ 2 liters

## 2017-07-14 LAB
ALBUMIN SERPL BCP-MCNC: 3 G/DL (ref 3.5–5)
ALBUMIN/GLOB SERPL: 0.9 {RATIO} (ref 1.2–3.5)
ALP SERPL-CCNC: 62 U/L (ref 50–136)
ALT SERPL-CCNC: 51 U/L (ref 12–65)
ANION GAP BLD CALC-SCNC: 8 MMOL/L (ref 7–16)
APPEARANCE UR: CLEAR
AST SERPL W P-5'-P-CCNC: 50 U/L (ref 15–37)
BILIRUB DIRECT SERPL-MCNC: 0.2 MG/DL
BILIRUB SERPL-MCNC: 1.9 MG/DL (ref 0.2–1.1)
BILIRUB UR QL: NEGATIVE
BUN SERPL-MCNC: 6 MG/DL (ref 6–23)
CALCIUM SERPL-MCNC: 8 MG/DL (ref 8.3–10.4)
CHLORIDE SERPL-SCNC: 109 MMOL/L (ref 98–107)
CO2 SERPL-SCNC: 25 MMOL/L (ref 21–32)
COLOR UR: YELLOW
CREAT SERPL-MCNC: 0.7 MG/DL (ref 0.8–1.5)
ERYTHROCYTE [DISTWIDTH] IN BLOOD BY AUTOMATED COUNT: 24 % (ref 11.9–14.6)
GLOBULIN SER CALC-MCNC: 3.4 G/DL (ref 2.3–3.5)
GLUCOSE SERPL-MCNC: 120 MG/DL (ref 65–100)
GLUCOSE UR STRIP.AUTO-MCNC: NEGATIVE MG/DL
HCT VFR BLD AUTO: 17.1 % (ref 41.1–50.3)
HCT VFR BLD AUTO: 17.3 % (ref 41.1–50.3)
HGB BLD-MCNC: 5.9 G/DL (ref 13.6–17.2)
HGB BLD-MCNC: 6 G/DL (ref 13.6–17.2)
HGB RETIC QN AUTO: 39 PG (ref 29–35)
HGB UR QL STRIP: NEGATIVE
IMM RETICS NFR: 38 % (ref 2.3–13.4)
KETONES UR QL STRIP.AUTO: NEGATIVE MG/DL
LEUKOCYTE ESTERASE UR QL STRIP.AUTO: NEGATIVE
MAGNESIUM SERPL-MCNC: 1.7 MG/DL (ref 1.8–2.4)
MCH RBC QN AUTO: 31.9 PG (ref 26.1–32.9)
MCHC RBC AUTO-ENTMCNC: 34.5 G/DL (ref 31.4–35)
MCV RBC AUTO: 92.4 FL (ref 79.6–97.8)
NITRITE UR QL STRIP.AUTO: NEGATIVE
PH UR STRIP: 6 [PH] (ref 5–9)
PLATELET # BLD AUTO: 161 K/UL (ref 150–450)
PMV BLD AUTO: 10.1 FL (ref 10.8–14.1)
POTASSIUM SERPL-SCNC: 4.1 MMOL/L (ref 3.5–5.1)
PROT SERPL-MCNC: 6.4 G/DL (ref 6.3–8.2)
PROT UR STRIP-MCNC: NEGATIVE MG/DL
RBC # BLD AUTO: 1.85 M/UL (ref 4.23–5.67)
RETICS # AUTO: 0.17 M/UL (ref 0.03–0.1)
RETICS/RBC NFR AUTO: 8.9 % (ref 0.3–2)
SODIUM SERPL-SCNC: 142 MMOL/L (ref 136–145)
SP GR UR REFRACTOMETRY: 1.01 (ref 1–1.02)
UROBILINOGEN UR QL STRIP.AUTO: 1 EU/DL (ref 0.2–1)
VIT B12 SERPL-MCNC: 480 PG/ML (ref 193–986)
WBC # BLD AUTO: 12.3 K/UL (ref 4.3–11.1)

## 2017-07-14 PROCEDURE — 85046 RETICYTE/HGB CONCENTRATE: CPT | Performed by: INTERNAL MEDICINE

## 2017-07-14 PROCEDURE — 36415 COLL VENOUS BLD VENIPUNCTURE: CPT | Performed by: NURSE PRACTITIONER

## 2017-07-14 PROCEDURE — 65270000029 HC RM PRIVATE

## 2017-07-14 PROCEDURE — 82607 VITAMIN B-12: CPT | Performed by: NURSE PRACTITIONER

## 2017-07-14 PROCEDURE — 85018 HEMOGLOBIN: CPT | Performed by: INTERNAL MEDICINE

## 2017-07-14 PROCEDURE — 86905 BLOOD TYPING RBC ANTIGENS: CPT | Performed by: NURSE PRACTITIONER

## 2017-07-14 PROCEDURE — 80076 HEPATIC FUNCTION PANEL: CPT | Performed by: INTERNAL MEDICINE

## 2017-07-14 PROCEDURE — 74011250636 HC RX REV CODE- 250/636: Performed by: INTERNAL MEDICINE

## 2017-07-14 PROCEDURE — 74011000250 HC RX REV CODE- 250: Performed by: INTERNAL MEDICINE

## 2017-07-14 PROCEDURE — 81003 URINALYSIS AUTO W/O SCOPE: CPT | Performed by: NURSE PRACTITIONER

## 2017-07-14 PROCEDURE — 83735 ASSAY OF MAGNESIUM: CPT | Performed by: INTERNAL MEDICINE

## 2017-07-14 PROCEDURE — 86920 COMPATIBILITY TEST SPIN: CPT | Performed by: NURSE PRACTITIONER

## 2017-07-14 PROCEDURE — 30233N1 TRANSFUSION OF NONAUTOLOGOUS RED BLOOD CELLS INTO PERIPHERAL VEIN, PERCUTANEOUS APPROACH: ICD-10-PCS | Performed by: INTERNAL MEDICINE

## 2017-07-14 PROCEDURE — 74011250637 HC RX REV CODE- 250/637: Performed by: INTERNAL MEDICINE

## 2017-07-14 PROCEDURE — 86900 BLOOD TYPING SEROLOGIC ABO: CPT | Performed by: NURSE PRACTITIONER

## 2017-07-14 PROCEDURE — 74011000258 HC RX REV CODE- 258: Performed by: NURSE PRACTITIONER

## 2017-07-14 PROCEDURE — 80048 BASIC METABOLIC PNL TOTAL CA: CPT | Performed by: INTERNAL MEDICINE

## 2017-07-14 PROCEDURE — 85027 COMPLETE CBC AUTOMATED: CPT | Performed by: INTERNAL MEDICINE

## 2017-07-14 RX ORDER — SODIUM CHLORIDE 9 MG/ML
250 INJECTION, SOLUTION INTRAVENOUS AS NEEDED
Status: DISCONTINUED | OUTPATIENT
Start: 2017-07-14 | End: 2017-07-17 | Stop reason: HOSPADM

## 2017-07-14 RX ORDER — SODIUM CHLORIDE 450 MG/100ML
150 INJECTION, SOLUTION INTRAVENOUS CONTINUOUS
Status: DISCONTINUED | OUTPATIENT
Start: 2017-07-14 | End: 2017-07-17 | Stop reason: HOSPADM

## 2017-07-14 RX ADMIN — OXYCODONE HYDROCHLORIDE 80 MG: 80 TABLET, FILM COATED, EXTENDED RELEASE ORAL at 09:25

## 2017-07-14 RX ADMIN — FOLIC ACID 1 MG: 1 TABLET ORAL at 09:25

## 2017-07-14 RX ADMIN — OXYCODONE HYDROCHLORIDE 30 MG: 15 TABLET ORAL at 18:40

## 2017-07-14 RX ADMIN — HEPARIN SODIUM 5000 UNITS: 5000 INJECTION, SOLUTION INTRAVENOUS; SUBCUTANEOUS at 21:02

## 2017-07-14 RX ADMIN — FAMOTIDINE 20 MG: 10 INJECTION INTRAVENOUS at 21:02

## 2017-07-14 RX ADMIN — SODIUM CHLORIDE 150 ML/HR: 450 INJECTION, SOLUTION INTRAVENOUS at 22:36

## 2017-07-14 RX ADMIN — HYDROXYUREA 500 MG: 500 CAPSULE ORAL at 22:50

## 2017-07-14 RX ADMIN — OXYCODONE HYDROCHLORIDE 30 MG: 15 TABLET ORAL at 11:02

## 2017-07-14 RX ADMIN — FAMOTIDINE 20 MG: 10 INJECTION INTRAVENOUS at 09:25

## 2017-07-14 RX ADMIN — SODIUM CHLORIDE 150 ML/HR: 900 INJECTION, SOLUTION INTRAVENOUS at 06:34

## 2017-07-14 RX ADMIN — HYDROXYUREA 500 MG: 500 CAPSULE ORAL at 09:36

## 2017-07-14 RX ADMIN — HYDROMORPHONE HYDROCHLORIDE 1 MG: 1 INJECTION, SOLUTION INTRAMUSCULAR; INTRAVENOUS; SUBCUTANEOUS at 06:44

## 2017-07-14 RX ADMIN — SODIUM CHLORIDE 150 ML/HR: 450 INJECTION, SOLUTION INTRAVENOUS at 15:12

## 2017-07-14 RX ADMIN — HYDROMORPHONE HYDROCHLORIDE 1 MG: 1 INJECTION, SOLUTION INTRAMUSCULAR; INTRAVENOUS; SUBCUTANEOUS at 21:02

## 2017-07-14 RX ADMIN — HEPARIN SODIUM 5000 UNITS: 5000 INJECTION, SOLUTION INTRAVENOUS; SUBCUTANEOUS at 09:25

## 2017-07-14 RX ADMIN — HYDROMORPHONE HYDROCHLORIDE 1 MG: 1 INJECTION, SOLUTION INTRAMUSCULAR; INTRAVENOUS; SUBCUTANEOUS at 02:03

## 2017-07-14 RX ADMIN — OXYCODONE HYDROCHLORIDE 80 MG: 80 TABLET, FILM COATED, EXTENDED RELEASE ORAL at 22:36

## 2017-07-14 RX ADMIN — HYDROMORPHONE HYDROCHLORIDE 1 MG: 1 INJECTION, SOLUTION INTRAMUSCULAR; INTRAVENOUS; SUBCUTANEOUS at 14:54

## 2017-07-14 NOTE — PROGRESS NOTES
Progress Note    Patient: Starlett Landau MRN: [id number]  SSN: [id number]    YOB: 1975  Age: 38 y.o. Sex: male      Admit Date: 7/13/2017    LOS: 1 day     Subjective:     37 YO male patient admitted with sickle cell crises. Hgb down to 5.9 today. Will transfuse 1 U of pRBCs. Objective:     Vitals:    07/13/17 1950 07/14/17 0041 07/14/17 0433 07/14/17 0730   BP: 107/47 121/61 121/58 118/80   Pulse: 69 60 70 62   Resp: 18 18 18 20   Temp: 99 °F (37.2 °C) 98.2 °F (36.8 °C) 97.8 °F (36.6 °C) 98.2 °F (36.8 °C)   SpO2: 98% 99% 94% 98%        Intake and Output:  Current Shift:    Last three shifts: 07/12 1901 - 07/14 0700  In: 240 [P.O.:240]  Out: -     Physical Exam:   GENERAL: alert, cooperative, no distress, appears stated age  EYE: conjunctivae/corneas clear. PERRL, EOM's intact. Fundi benign  LYMPHATIC: Cervical, supraclavicular, and axillary nodes normal.   THROAT & NECK: normal and no erythema or exudates noted. LUNG: clear to auscultation bilaterally  HEART: regular rate and rhythm, S1, S2 normal, no murmur, click, rub or gallop  ABDOMEN: soft, non-tender. Bowel sounds normal. No masses,  no organomegaly  EXTREMITIES:  Tenderness of the upper and lower extremities   SKIN: Normal.  NEUROLOGIC: AOx3. Gait normal. Reflexes and motor strength normal and symmetric. Cranial nerves 2-12 and sensation grossly intact. PSYCHIATRIC: non focal    Lab/Data Review:  Lab results reviewed. For significant abnormal values and values requiring intervention, see assessment and plan.          Assessment:     Active Problems:    Sickle cell disease ([facility] [address] (7/13/2017)        Plan:     # continue IV fluids   #continue O2 by nasal cannula   #appreciate hem/onc recommendations   #1 U of pRBCs transfused today     Signed By: Remy Clark MD     July 14, 2017

## 2017-07-14 NOTE — CONSULTS
[facility]        Inpatient Hematology / Oncology Consult Note    Reason for Consult:  Sickle cell disease [facility])  Referring Physician:  Raheem Galarza, *    History of Present Illness:  Mr. Ross Sutherland is 37 yo male patient admitted 7/13/17 with PMH of hemoglobin S/beta plus thalassemia, HTN,  transfusional iron overload. His primary hematologist is Dr. Francesca Wing at [address] and he is on hydrea 500mg daily and jadenu. He takes oxycontin and oxycodone for home pain regimen. He presented to the ER on 7/13 with complaints of severe upper and mainly lower extremities rated 10/10 in intensity which is consistent with his typical crisis pain. In ER hgb was at 7.4. This am hgb 5.9. No dyspnea or sign of infection or recent fevers although he had \"chills\" PTA. CXR negative. We are consulted for transfusion recommendations.        Review of Systems:  Constitutional Denies fever, chills, weight loss, appetite changes, fatigue, night sweats. HEENT Denies trauma, blurry vision, hearing loss, ear pain, nosebleeds, sore throat, neck pain     Skin Denies lesions or rashes. Lungs Denies dyspnea, cough, sputum production or hemoptysis. Cardiovascular Denies chest pain, palpitations, or lower extremity edema. Gastrointestinal Denies nausea, vomiting, changes in bowel habits, bloody or black stools, abdominal pain.  Denies dysuria, frequency or hesitancy of urination. Neuro Denies headaches, visual changes or ataxia. Denies dizziness, tingling, tremors, sensory change, speech change, focal weakness      Hematology Denies easy bruising or bleeding, denies gingival bleeding or epistaxis. Endo Denies heat/cold intolerance, denies diabetes or thyroid abnormalities. MSK + BUE/BLE pain      Psychiatric/Behavioral Denies depression and substance abuse. The patient is not nervous/anxious.          Allergies   Allergen Reactions    Compazine [Prochlorperazine Edisylate] Other (comments)     Also makes him feel funny    Morphine Other (comments)     Makes him feel funny    Reglan [Metoclopramide] Other (comments)     \"feel funny\"    Zofran [Ondansetron Hcl (Pf)] Other (comments)     Make him feel funny     Past Medical History:   Diagnosis Date    Chronic pain     HTN (hypertension)     Ill-defined condition     sickle cell    Iron overload due to repeated red blood cell transfusions 1/18/2017    Paroxysmal SVT (supraventricular tachycardia) ([facility] [address] 7/9/2016    Sickle cell disease ([facility] [address]      Past Surgical History:   Procedure Laterality Date    HC PENILE IMPL DURA II POSITINBLE      HC PORT LIFE SNGLE LUMEN 5013      to L CW    HX CHOLECYSTECTOMY      HX OTHER SURGICAL      penile inplant    HX VASCULAR ACCESS       Family History   Problem Relation Age of Onset    Hypertension Other     Diabetes Father     Stroke Father     Sickle Cell Anemia Sister      Social History     Social History    Marital status:      Spouse name: N/A    Number of children: N/A    Years of education: N/A     Occupational History    Not on file.      Social History Main Topics    Smoking status: Never Smoker    Smokeless tobacco: Never Used    Alcohol use No    Drug use: No    Sexual activity: Yes     Other Topics Concern    Not on file     Social History Narrative     Current Facility-Administered Medications   Medication Dose Route Frequency Provider Last Rate Last Dose    famotidine (PF) (PEPCID) 20 mg in sodium chloride 0.9 % 10 mL injection  20 mg IntraVENous Q12H Ashish Stewart MD   20 mg at 07/14/17 0925    0.9% sodium chloride infusion  150 mL/hr IntraVENous CONTINUOUS Clarke Turner  mL/hr at 07/14/17 0634 150 mL/hr at 07/14/17 0634    oxyCODONE ER (OxyCONTIN) tablet 80 mg  80 mg Oral Q12H Ashish Stewart MD   80 mg at 07/14/17 0925    oxyCODONE IR (OXY-IR) immediate release tablet 30 mg  30 mg Oral Q6H PRN Clarke Turner MD   30 mg at 07/14/17 1102    HYDROmorphone (PF) (DILAUDID) injection 1 mg  1 mg IntraVENous Q4H PRN Yi Anne MD   1 mg at  8029    folic acid (FOLVITE) tablet 1 mg  1 mg Oral DAILY Yi Anne MD   1 mg at 17 0925    hydroxyurea (HYDREA) chemo cap 500 mg  500 mg Oral BID Yi Anne MD   500 mg at 17 0934    heparin (porcine) injection 5,000 Units  5,000 Units SubCUTAneous Q12H Yi Anne MD   5,000 Units at 17 2387       OBJECTIVE:  Patient Vitals for the past 8 hrs:   BP Temp Pulse Resp SpO2   17 1110 130/80 98.7 °F (37.1 °C) 76 20 95 %   17 0730 118/80 98.2 °F (36.8 °C) 62 20 98 %   17 0433 121/58 97.8 °F (36.6 °C) 70 18 94 %     Temp (24hrs), Av.4 °F (36.9 °C), Min:97.8 °F (36.6 °C), Max:99 °F (37.2 °C)         Physical Exam:  Constitutional: Well developed, well nourished male in no acute distress, sitting comfortably in the hospital bed. HEENT: Normocephalic and atraumatic. Oropharynx is clear, mucous membranes are moist.    Neck supple    Lymph node   Deferred   Skin Warm and dry. No bruising and no rash noted. No erythema. No pallor. Respiratory Lungs are clear to auscultation bilaterally without wheezes, rales or rhonchi, normal air exchange without accessory muscle use. CVS Normal rate, regular rhythm and normal S1 and S2. No murmurs, gallops, or rubs. Abdomen Soft, nontender and nondistended, normoactive bowel sounds. No palpable mass. No hepatosplenomegaly. Neuro Grossly nonfocal with no obvious sensory or motor deficits. MSK Normal range of motion in general.  No edema and no tenderness. Psych Appropriate mood and affect.         Labs:    Recent Results (from the past 24 hour(s))   CBC WITH AUTOMATED DIFF    Collection Time: 17 12:45 PM   Result Value Ref Range    WBC 10.5 4.3 - 11.1 K/uL    RBC 2.37 (L) 4.23 - 5.67 M/uL    HGB 7.4 (L) 13.6 - 17.2 g/dL    HCT 21.1 (L) 41.1 - 50.3 %    MCV 89.0 79.6 - 97.8 FL    MCH 31.2 26.1 - 32.9 PG    MCHC 35.1 (H) 31.4 - 35.0 g/dL    RDW 23.7 (H) 11.9 - 14.6 %    PLATELET 353 932 - 500 K/uL    MPV 10.1 (L) 10.8 - 14.1 FL    DF AUTOMATED      NEUTROPHILS 73 43 - 78 %    LYMPHOCYTES 20 13 - 44 %    MONOCYTES 7 4.0 - 12.0 %    EOSINOPHILS 0 (L) 0.5 - 7.8 %    BASOPHILS 0 0.0 - 2.0 %    IMMATURE GRANULOCYTES 0.2 0.0 - 5.0 %    ABS. NEUTROPHILS 7.7 1.7 - 8.2 K/UL    ABS. LYMPHOCYTES 2.1 0.5 - 4.6 K/UL    ABS. MONOCYTES 0.7 0.1 - 1.3 K/UL    ABS. EOSINOPHILS 0.0 0.0 - 0.8 K/UL    ABS. BASOPHILS 0.0 0.0 - 0.2 K/UL    ABS. IMM. GRANS. 0.0 0.0 - 0.5 K/UL   METABOLIC PANEL, COMPREHENSIVE    Collection Time: 07/13/17 12:45 PM   Result Value Ref Range    Sodium 140 136 - 145 mmol/L    Potassium 3.8 3.5 - 5.1 mmol/L    Chloride 105 98 - 107 mmol/L    CO2 26 21 - 32 mmol/L    Anion gap 9 7 - 16 mmol/L    Glucose 116 (H) 65 - 100 mg/dL    BUN 7 6 - 23 MG/DL    Creatinine 0.76 (L) 0.8 - 1.5 MG/DL    GFR est AA >60 >60 ml/min/1.73m2    GFR est non-AA >60 >60 ml/min/1.73m2    Calcium 9.2 8.3 - 10.4 MG/DL    Bilirubin, total 2.0 (H) 0.2 - 1.1 MG/DL    ALT (SGPT) 66 (H) 12 - 65 U/L    AST (SGOT) 64 (H) 15 - 37 U/L    Alk.  phosphatase 77 50 - 136 U/L    Protein, total 8.0 6.3 - 8.2 g/dL    Albumin 4.0 3.5 - 5.0 g/dL    Globulin 4.0 (H) 2.3 - 3.5 g/dL    A-G Ratio 1.0 (L) 1.2 - 3.5     RETICULOCYTE COUNT    Collection Time: 07/13/17 12:45 PM   Result Value Ref Range    Reticulocyte count 7.3 (H) 0.3 - 2.0 %    Absolute Retic Cnt. 0.1732 (H) 0.026 - 0.095 M/ul    Immature Retic Fraction 34.4 (H) 2.3 - 13.4 %    Retic Hgb Conc. 40 (H) 29 - 35 pg   EKG, 12 LEAD, INITIAL    Collection Time: 07/13/17  4:44 PM   Result Value Ref Range    Ventricular Rate 59 BPM    Atrial Rate 59 BPM    P-R Interval 168 ms    QRS Duration 76 ms    Q-T Interval 418 ms    QTC Calculation (Bezet) 413 ms    Calculated P Axis 64 degrees    Calculated R Axis 23 degrees    Calculated T Axis 83 degrees    Diagnosis       !! AGE AND GENDER SPECIFIC ECG ANALYSIS !!   Sinus bradycardia  Nonspecific T wave abnormality  Abnormal ECG  When compared with ECG of 16-JAN-2017 12:05,  Nonspecific T wave abnormality, worse in Anterolateral leads  Confirmed by ST SENAIT NICHOLS MD (), YOAN SORIA ([id number]) on 7/13/2017 5:43:54 PM     MRSA SCREEN - PCR (NASAL)    Collection Time: 07/13/17  5:55 PM   Result Value Ref Range    Special Requests: NO SPECIAL REQUESTS      Culture result:        MRSA target DNA is not detected (presumptive not colonized with MRSA)   CBC W/O DIFF    Collection Time: 07/14/17  6:45 AM   Result Value Ref Range    WBC 12.3 (H) 4.3 - 11.1 K/uL    RBC 1.85 (L) 4.23 - 5.67 M/uL    HGB 5.9 (LL) 13.6 - 17.2 g/dL    HCT 17.1 (LL) 41.1 - 50.3 %    MCV 92.4 79.6 - 97.8 FL    MCH 31.9 26.1 - 32.9 PG    MCHC 34.5 31.4 - 35.0 g/dL    RDW 24.0 (H) 11.9 - 14.6 %    PLATELET 653 671 - 980 K/uL    MPV 10.1 (L) 10.8 - 97.7 FL   METABOLIC PANEL, BASIC    Collection Time: 07/14/17  6:45 AM   Result Value Ref Range    Sodium 142 136 - 145 mmol/L    Potassium 4.1 3.5 - 5.1 mmol/L    Chloride 109 (H) 98 - 107 mmol/L    CO2 25 21 - 32 mmol/L    Anion gap 8 7 - 16 mmol/L    Glucose 120 (H) 65 - 100 mg/dL    BUN 6 6 - 23 MG/DL    Creatinine 0.70 (L) 0.8 - 1.5 MG/DL    GFR est AA >60 >60 ml/min/1.73m2    GFR est non-AA >60 >60 ml/min/1.73m2    Calcium 8.0 (L) 8.3 - 10.4 MG/DL   MAGNESIUM    Collection Time: 07/14/17  6:45 AM   Result Value Ref Range    Magnesium 1.7 (L) 1.8 - 2.4 mg/dL   RETICULOCYTE COUNT    Collection Time: 07/14/17  6:45 AM   Result Value Ref Range    Reticulocyte count 8.9 (H) 0.3 - 2.0 %    Absolute Retic Cnt. 0.1654 (H) 0.026 - 0.095 M/ul    Immature Retic Fraction 38.0 (H) 2.3 - 13.4 %    Retic Hgb Conc. 39 (H) 29 - 35 pg   HEPATIC FUNCTION PANEL    Collection Time: 07/14/17  6:45 AM   Result Value Ref Range    Protein, total 6.4 6.3 - 8.2 g/dL    Albumin 3.0 (L) 3.5 - 5.0 g/dL    Globulin 3.4 2.3 - 3.5 g/dL    A-G Ratio 0.9 (L) 1.2 - 3.5 Bilirubin, total 1.9 (H) 0.2 - 1.1 MG/DL    Bilirubin, direct 0.2 <0.4 MG/DL    Alk.  phosphatase 62 50 - 136 U/L    AST (SGOT) 50 (H) 15 - 37 U/L    ALT (SGPT) 51 12 - 65 U/L   HGB & HCT    Collection Time: 07/14/17  8:03 AM   Result Value Ref Range    HGB 6.0 (LL) 13.6 - 17.2 g/dL    HCT 17.3 (LL) 41.1 - 50.3 %           ASSESSMENT:  Problem List  Date Reviewed: 3/5/2012          Codes Class Noted    Sickle cell disease ([place] [address] ICD-10-CM: D57.1  ICD-9-CM: 282.60  7/13/2017        Iron overload due to repeated red blood cell transfusions ICD-10-CM: E83.111  ICD-9-CM: 275.02  1/18/2017        Sickle cell anemia with crisis ([place] [address] ICD-10-CM: D57.00  ICD-9-CM: 282.62  1/16/2017        Hypokalemia ICD-10-CM: E87.6  ICD-9-CM: 276.8  7/9/2016        Paroxysmal SVT (supraventricular tachycardia) (HCC) ICD-10-CM: I47.1  ICD-9-CM: 427.0  7/9/2016        Hypomagnesemia ICD-10-CM: E83.42  ICD-9-CM: 275.2  7/9/2016        Sickle cell anemia (HCC) ICD-10-CM: D57.1  ICD-9-CM: 282.60  4/6/2016        Sickle cell crisis ([place] [address] ICD-10-CM: D57.00  ICD-9-CM: 282.62  4/5/2016        leukocytosis - most likely reactive ICD-10-CM: D72.829  ICD-9-CM: 288.60  1/30/2016        Diarrhea ICD-10-CM: R19.7  ICD-9-CM: 787.91  9/27/2014        Sickle cell pain crisis ([place] [address] ICD-10-CM: D57.00  ICD-9-CM: 282.62  10/25/2012        Hyperbilirubinemia ICD-10-CM: E80.6  ICD-9-CM: 782.4  9/20/2012    Overview Signed 9/20/2012  1:37 PM by Madiha Rios     Related to sickle cell crisis             Essential hypertension, benign ICD-10-CM: I10  ICD-9-CM: 401.1  6/23/2012        Hemolytic crisis ([address] ICD-10-CM: D65  ICD-9-CM: 286.6  3/24/2012    Overview Signed 3/24/2012  2:53 PM by Chelo Baker     Sickle cell with anemia             HTN (hypertension) (Chronic) ICD-10-CM: I10  ICD-9-CM: 401.9  3/2/2012        Leukocytosis - chronic reactive ICD-10-CM: V36.294  ICD-9-CM: 288.60  9/16/2011                RECOMMENDATIONS:  Sickle Cell Crisis and anemia  - Transfuse 1 unit PRBCs today and continue jadenu outpatient  - Continue folic acid replacement  - Check B12 level  - Change fluids to 1/2 NS  - Continue O2 and encourage IS use  - Check UA    Lab studies and imaging studies  were personally reviewed. Pertinent old records were reviewed. Thank you for allowing us to participate in the care of Mr. Cabrera. For follow up, he will return to his primary hematologist, Dr. Estrella Joe, at [facility], [facility]  [address] [place] [address]  Office : [phone]  Fax : [phone]         Attending Addendum:  I personally evaluated the patient with Lauro Razo N.P.,  and agree with the assessment, findings and plan as documented. Appears stable, change fluids to 1/2 normal saline.               Abram Wilde MD  [address] [place] [address]  Office : [phone]  Fax : [phone]

## 2017-07-14 NOTE — PROGRESS NOTES
Assured the patient of the availability of pastoral care during hospitalization  Patient expressed appreciation for an initial visit from the valentina Sky III, PhD  Bebe Phillips  [phone]

## 2017-07-14 NOTE — H&P
HOSPITALIST H&P/CONSULT  NAME:  Starlett Landau   Age:  38 y.o.  :   1975   MRN:   [id number]  PCP: Danilo Paul MD  Consulting MD:  Treatment Team: Attending Provider: Remy Clark MD  HPI:   This is a 39 YO male patient very well known to our service with multiple previous admissions. He has a PMH of hemoglobin S/beta plus thalassemia, HTN,  transfusional iron overload and recent E.coli bacteriemia in 2017 who came into the ER with a CC of 2 days of severe throbbing pain of the upper and mainly lower extremities rated 10/10 in intensity. According to him the pain is typical of previous crises. Denies fever, chest pain, SOB, sputum production or any other symptom. In the ER he was found with stable VS, Hgb was 7,4 ( about his baseline) and bilirubin was around 2.0. He received IV fluids, IV dilaudid and O2 by nasal canula with NO resolution of symptoms and for that reason was presented to the hospitalist for admission. 10 point ROS done and is negative except as noted in HPI. Past Medical History:   Diagnosis Date    Chronic pain     HTN (hypertension)     Ill-defined condition     sickle cell    Iron overload due to repeated red blood cell transfusions 2017    Paroxysmal SVT (supraventricular tachycardia) (HCC) 2016    [facility]       Past Surgical History:   Procedure Laterality Date    HC PENILE IMPL DURA II POSITINBLE      HC PORT LIFE SNGLE LUMEN 5013      to L CW    HX CHOLECYSTECTOMY      HX OTHER SURGICAL      penile inplant    HX VASCULAR ACCESS        Prior to Admission Medications   Prescriptions Last Dose Informant Patient Reported? Taking?   folic acid (FOLVITE) 1 mg tablet  Self Yes No   Sig: Take 1 mg by mouth daily. lisinopril (PRINIVIL, ZESTRIL) 5 mg tablet   Yes No   Sig: Take 5 mg by mouth daily. Indications: HYPERTENSION   metoprolol (LOPRESSOR) 25 mg tablet   Yes No   Sig: Take 25 mg by mouth two (2) times a day. Indications: HYPERTENSION   oxyCODONE ER (OXYCONTIN) 80 mg ER tablet   No No   Sig: Take 1 Tab by mouth every twelve (12) hours. Max Daily Amount: 160 mg.   oxyCODONE IR (OXY-IR) 30 mg immediate release tablet   No No   Sig: Take 1 Tab by mouth every four (4) hours as needed for Pain. Max Daily Amount: 180 mg.   promethazine (PHENERGAN) 25 mg tablet   No No   Sig: Take 1 Tab by mouth every six (6) hours as needed. May substitute suppository if vomiting      Facility-Administered Medications: None     Home meds reconciled. Allergies   Allergen Reactions    Compazine [Prochlorperazine Edisylate] Other (comments)     Also makes him feel funny    Morphine Other (comments)     Makes him feel funny    Reglan [Metoclopramide] Other (comments)     \"feel funny\"    Zofran [Ondansetron Hcl (Pf)] Other (comments)     Make him feel funny      Social History   Substance Use Topics    Smoking status: Never Smoker    Smokeless tobacco: Never Used    Alcohol use No      Family History   Problem Relation Age of Onset    Hypertension Other     Diabetes Father     Stroke Father     Sickle Cell Anemia Sister       Immunization History   Administered Date(s) Administered    Influenza Vaccine 09/10/2015    Influenza Vaccine Split 2012    Pneumococcal Vaccine (Unspecified Type) 2010     Objective:     Visit Vitals    /47 (BP 1 Location: Left arm, BP Patient Position: At rest)    Pulse 69    Temp 99 °F (37.2 °C)    Resp 18    SpO2 98%      Temp (24hrs), Av.6 °F (37 °C), Min:98.4 °F (36.9 °C), Max:99 °F (37.2 °C)    Oxygen Therapy  O2 Sat (%): 98 % (17 1950)  Pulse via Oximetry: 58 beats per minute (17 1656)  O2 Device: Nasal cannula (17 175)  O2 Flow Rate (L/min): 2 l/min (17 175)  Physical Exam:  General:    Alert, cooperative, mild to moderate distress   Head:   NCAT. No obvious deformity  Nose:  Nares normal. No drainage  Lungs:   CTABL.    No wheezing/rhonchi/rales  Heart:   RRR. No m/r/g. Abdomen:   S/nt/nd. Bowel sounds normal.   Extremities: Tenderness to palpation   Skin:     No rashes or lesions. Not Jaundiced  Neurologic: Moves all extremities. no gross focal deficits      Data Review:   Recent Results (from the past 24 hour(s))   CBC WITH AUTOMATED DIFF    Collection Time: 07/13/17 12:45 PM   Result Value Ref Range    WBC 10.5 4.3 - 11.1 K/uL    RBC 2.37 (L) 4.23 - 5.67 M/uL    HGB 7.4 (L) 13.6 - 17.2 g/dL    HCT 21.1 (L) 41.1 - 50.3 %    MCV 89.0 79.6 - 97.8 FL    MCH 31.2 26.1 - 32.9 PG    MCHC 35.1 (H) 31.4 - 35.0 g/dL    RDW 23.7 (H) 11.9 - 14.6 %    PLATELET 512 985 - 806 K/uL    MPV 10.1 (L) 10.8 - 14.1 FL    DF AUTOMATED      NEUTROPHILS 73 43 - 78 %    LYMPHOCYTES 20 13 - 44 %    MONOCYTES 7 4.0 - 12.0 %    EOSINOPHILS 0 (L) 0.5 - 7.8 %    BASOPHILS 0 0.0 - 2.0 %    IMMATURE GRANULOCYTES 0.2 0.0 - 5.0 %    ABS. NEUTROPHILS 7.7 1.7 - 8.2 K/UL    ABS. LYMPHOCYTES 2.1 0.5 - 4.6 K/UL    ABS. MONOCYTES 0.7 0.1 - 1.3 K/UL    ABS. EOSINOPHILS 0.0 0.0 - 0.8 K/UL    ABS. BASOPHILS 0.0 0.0 - 0.2 K/UL    ABS. IMM. GRANS. 0.0 0.0 - 0.5 K/UL   METABOLIC PANEL, COMPREHENSIVE    Collection Time: 07/13/17 12:45 PM   Result Value Ref Range    Sodium 140 136 - 145 mmol/L    Potassium 3.8 3.5 - 5.1 mmol/L    Chloride 105 98 - 107 mmol/L    CO2 26 21 - 32 mmol/L    Anion gap 9 7 - 16 mmol/L    Glucose 116 (H) 65 - 100 mg/dL    BUN 7 6 - 23 MG/DL    Creatinine 0.76 (L) 0.8 - 1.5 MG/DL    GFR est AA >60 >60 ml/min/1.73m2    GFR est non-AA >60 >60 ml/min/1.73m2    Calcium 9.2 8.3 - 10.4 MG/DL    Bilirubin, total 2.0 (H) 0.2 - 1.1 MG/DL    ALT (SGPT) 66 (H) 12 - 65 U/L    AST (SGOT) 64 (H) 15 - 37 U/L    Alk.  phosphatase 77 50 - 136 U/L    Protein, total 8.0 6.3 - 8.2 g/dL    Albumin 4.0 3.5 - 5.0 g/dL    Globulin 4.0 (H) 2.3 - 3.5 g/dL    A-G Ratio 1.0 (L) 1.2 - 3.5     RETICULOCYTE COUNT    Collection Time: 07/13/17 12:45 PM   Result Value Ref Range Reticulocyte count 7.3 (H) 0.3 - 2.0 %    Absolute Retic Cnt. 0.1732 (H) 0.026 - 0.095 M/ul    Immature Retic Fraction 34.4 (H) 2.3 - 13.4 %    Retic Hgb Conc. 40 (H) 29 - 35 pg   EKG, 12 LEAD, INITIAL    Collection Time: 07/13/17  4:44 PM   Result Value Ref Range    Ventricular Rate 59 BPM    Atrial Rate 59 BPM    P-R Interval 168 ms    QRS Duration 76 ms    Q-T Interval 418 ms    QTC Calculation (Bezet) 413 ms    Calculated P Axis 64 degrees    Calculated R Axis 23 degrees    Calculated T Axis 83 degrees    Diagnosis       !! AGE AND GENDER SPECIFIC ECG ANALYSIS !! Sinus bradycardia  Nonspecific T wave abnormality  Abnormal ECG  When compared with ECG of 16-JAN-2017 12:05,  Nonspecific T wave abnormality, worse in Anterolateral leads  Confirmed by ST SENAIT NICHOLS MD (), YOAN SORIA ([id number]) on 7/13/2017 5:43:54 PM       Imaging Dominguezmiesha Ramirez /Studies:  I personally reviewed all labs, imaging, and other studies this admission:  CXR Results  (Last 48 hours)               07/13/17 1622  XR CHEST PORT Final result    Impression:  Impression:  No significant interval change. Narrative:  AP chest radiograph       History: admission, 38 years Male SICKLE CELL CRISIS    ADMISSION        Comparison: Chest radiograph May 12, 2017       Findings:  Left chest wall brigitte catheter appears to remain in anatomic   position. Normal cardiomediastinal silhouette. Persistent low lung volumes. Mild subsegmental atelectasis bilateral lung bases. No evidence of   pneumothorax, pleural effusion, or air space consolidation. Evidence of   cholecystectomy. Visualized soft tissue and osseous structures otherwise   unremarkable. CT Results  (Last 48 hours)    None          Assessment and Plan:      Active Hospital Problems    Diagnosis Date Noted    Sickle cell disease ([facility] [address] 07/13/2017       PLAN    # Sickle cell crises   -IV fluids   -O2 by nasal cannula   -Continue hydrea ( 500 mg bid as per pharmacy)   -Oral and IV pain medication.  Patient has high tolerance to narcotics  -We do not carry Jadenu in the hospital. Patient will need to bring his own.   -hematology consulted     #HTN   -BP is borderline low now.   -hold BP meds      DVT ppx:  SQ heparin   Code status:  Full   Estimated LOS:  > 2 MN   Risk assessment:  High   Plan of care discussed with: patient     Signed By: MD Brian     July 13, 2017

## 2017-07-15 LAB
ANION GAP BLD CALC-SCNC: 8 MMOL/L (ref 7–16)
BUN SERPL-MCNC: 6 MG/DL (ref 6–23)
CALCIUM SERPL-MCNC: 7.9 MG/DL (ref 8.3–10.4)
CHLORIDE SERPL-SCNC: 107 MMOL/L (ref 98–107)
CO2 SERPL-SCNC: 25 MMOL/L (ref 21–32)
CREAT SERPL-MCNC: 0.8 MG/DL (ref 0.8–1.5)
ERYTHROCYTE [DISTWIDTH] IN BLOOD BY AUTOMATED COUNT: 22 % (ref 11.9–14.6)
GLUCOSE SERPL-MCNC: 111 MG/DL (ref 65–100)
HCT VFR BLD AUTO: 22.2 % (ref 41.1–50.3)
HGB BLD-MCNC: 7.7 G/DL (ref 13.6–17.2)
MCH RBC QN AUTO: 31 PG (ref 26.1–32.9)
MCHC RBC AUTO-ENTMCNC: 34.7 G/DL (ref 31.4–35)
MCV RBC AUTO: 89.5 FL (ref 79.6–97.8)
PLATELET # BLD AUTO: 144 K/UL (ref 150–450)
PMV BLD AUTO: 10.1 FL (ref 10.8–14.1)
POTASSIUM SERPL-SCNC: 3.7 MMOL/L (ref 3.5–5.1)
RBC # BLD AUTO: 2.48 M/UL (ref 4.23–5.67)
SODIUM SERPL-SCNC: 140 MMOL/L (ref 136–145)
WBC # BLD AUTO: 11.4 K/UL (ref 4.3–11.1)

## 2017-07-15 PROCEDURE — 74011250636 HC RX REV CODE- 250/636: Performed by: INTERNAL MEDICINE

## 2017-07-15 PROCEDURE — P9016 RBC LEUKOCYTES REDUCED: HCPCS | Performed by: NURSE PRACTITIONER

## 2017-07-15 PROCEDURE — 74011250637 HC RX REV CODE- 250/637: Performed by: INTERNAL MEDICINE

## 2017-07-15 PROCEDURE — 74011000258 HC RX REV CODE- 258: Performed by: NURSE PRACTITIONER

## 2017-07-15 PROCEDURE — 77030013131 HC IV BLD ST ICUM -A

## 2017-07-15 PROCEDURE — 36430 TRANSFUSION BLD/BLD COMPNT: CPT

## 2017-07-15 PROCEDURE — 74011000250 HC RX REV CODE- 250: Performed by: INTERNAL MEDICINE

## 2017-07-15 PROCEDURE — 65270000029 HC RM PRIVATE

## 2017-07-15 PROCEDURE — 86902 BLOOD TYPE ANTIGEN DONOR EA: CPT | Performed by: NURSE PRACTITIONER

## 2017-07-15 PROCEDURE — 85027 COMPLETE CBC AUTOMATED: CPT | Performed by: INTERNAL MEDICINE

## 2017-07-15 PROCEDURE — 80048 BASIC METABOLIC PNL TOTAL CA: CPT | Performed by: INTERNAL MEDICINE

## 2017-07-15 RX ADMIN — FAMOTIDINE 20 MG: 10 INJECTION INTRAVENOUS at 08:32

## 2017-07-15 RX ADMIN — HYDROXYUREA 500 MG: 500 CAPSULE ORAL at 08:32

## 2017-07-15 RX ADMIN — HYDROMORPHONE HYDROCHLORIDE 1 MG: 1 INJECTION, SOLUTION INTRAMUSCULAR; INTRAVENOUS; SUBCUTANEOUS at 02:16

## 2017-07-15 RX ADMIN — HEPARIN SODIUM 5000 UNITS: 5000 INJECTION, SOLUTION INTRAVENOUS; SUBCUTANEOUS at 20:06

## 2017-07-15 RX ADMIN — SODIUM CHLORIDE 150 ML/HR: 450 INJECTION, SOLUTION INTRAVENOUS at 13:51

## 2017-07-15 RX ADMIN — SODIUM CHLORIDE 150 ML/HR: 450 INJECTION, SOLUTION INTRAVENOUS at 07:17

## 2017-07-15 RX ADMIN — FAMOTIDINE 20 MG: 10 INJECTION INTRAVENOUS at 20:06

## 2017-07-15 RX ADMIN — HYDROXYUREA 500 MG: 500 CAPSULE ORAL at 20:04

## 2017-07-15 RX ADMIN — OXYCODONE HYDROCHLORIDE 80 MG: 80 TABLET, FILM COATED, EXTENDED RELEASE ORAL at 10:27

## 2017-07-15 RX ADMIN — HYDROMORPHONE HYDROCHLORIDE 1 MG: 1 INJECTION, SOLUTION INTRAMUSCULAR; INTRAVENOUS; SUBCUTANEOUS at 08:32

## 2017-07-15 RX ADMIN — OXYCODONE HYDROCHLORIDE 30 MG: 15 TABLET ORAL at 06:55

## 2017-07-15 RX ADMIN — SODIUM CHLORIDE 150 ML/HR: 450 INJECTION, SOLUTION INTRAVENOUS at 20:06

## 2017-07-15 RX ADMIN — HEPARIN SODIUM 5000 UNITS: 5000 INJECTION, SOLUTION INTRAVENOUS; SUBCUTANEOUS at 08:32

## 2017-07-15 RX ADMIN — FOLIC ACID 1 MG: 1 TABLET ORAL at 08:32

## 2017-07-15 RX ADMIN — HYDROMORPHONE HYDROCHLORIDE 1 MG: 1 INJECTION, SOLUTION INTRAMUSCULAR; INTRAVENOUS; SUBCUTANEOUS at 20:06

## 2017-07-15 RX ADMIN — OXYCODONE HYDROCHLORIDE 80 MG: 80 TABLET, FILM COATED, EXTENDED RELEASE ORAL at 20:06

## 2017-07-15 RX ADMIN — HYDROMORPHONE HYDROCHLORIDE 1 MG: 1 INJECTION, SOLUTION INTRAMUSCULAR; INTRAVENOUS; SUBCUTANEOUS at 13:49

## 2017-07-15 NOTE — PROGRESS NOTES
Progress Note    Patient: Fannie Weber MRN: [id number]  SSN: [id number]    YOB: 1975  Age: 38 y.o. Sex: male      Admit Date: 7/13/2017    LOS: 2 days     Subjective:     37 YO male patient admitted with sickle cell crises. Hgb down to 5.9 yesterday. Received 1 U of pRBCs. Hgb went up to 7.7  Objective:     Vitals:    07/15/17 0208 07/15/17 0300 07/15/17 0406 07/15/17 0750   BP: 127/67 124/73 174/75 134/70   Pulse: 63 65 65 62   Resp:  18 18 20   Temp: 98.9 °F (37.2 °C) 98.3 °F (36.8 °C) 98.1 °F (36.7 °C) 98.5 °F (36.9 °C)   SpO2: 97% 98% 95% 97%        Intake and Output:  Current Shift:    Last three shifts: 07/13 1901 - 07/15 0700  In: 4157 [I.V.:4157]  Out: -     Physical Exam:   GENERAL: alert, cooperative, no distress, appears stated age  EYE: conjunctivae/corneas clear. PERRL, EOM's intact. Fundi benign  LYMPHATIC: Cervical, supraclavicular, and axillary nodes normal.   THROAT & NECK: normal and no erythema or exudates noted. LUNG: clear to auscultation bilaterally  HEART: regular rate and rhythm, S1, S2 normal, no murmur, click, rub or gallop  ABDOMEN: soft, non-tender. Bowel sounds normal. No masses,  no organomegaly  EXTREMITIES:  Tenderness of the upper and lower extremities   SKIN: Normal.  NEUROLOGIC: AOx3. Gait normal. Reflexes and motor strength normal and symmetric. Cranial nerves 2-12 and sensation grossly intact. PSYCHIATRIC: non focal    Lab/Data Review:  Lab results reviewed. For significant abnormal values and values requiring intervention, see assessment and plan. Assessment:     Active Problems:    Sickle cell disease ([facility] [address] (7/13/2017)        Plan:     # continue IV fluids   #continue O2 by nasal cannula   #appreciate hem/onc recommendations   #1 U of pRBCs transfused yesterday.  Hgb went up to 7.7    Signed By: Daniel Lynn MD     July 15, 2017

## 2017-07-16 LAB
ANION GAP BLD CALC-SCNC: 9 MMOL/L (ref 7–16)
BUN SERPL-MCNC: 6 MG/DL (ref 6–23)
CALCIUM SERPL-MCNC: 8.5 MG/DL (ref 8.3–10.4)
CHLORIDE SERPL-SCNC: 105 MMOL/L (ref 98–107)
CO2 SERPL-SCNC: 26 MMOL/L (ref 21–32)
COLLECTION COMMENT, COLCM: NORMAL
CREAT SERPL-MCNC: 0.89 MG/DL (ref 0.8–1.5)
ERYTHROCYTE [DISTWIDTH] IN BLOOD BY AUTOMATED COUNT: 22.7 % (ref 11.9–14.6)
GLUCOSE SERPL-MCNC: 138 MG/DL (ref 65–100)
HCT VFR BLD AUTO: 23.5 % (ref 41.1–50.3)
HGB BLD-MCNC: 8.1 G/DL (ref 13.6–17.2)
MCH RBC QN AUTO: 30.8 PG (ref 26.1–32.9)
MCHC RBC AUTO-ENTMCNC: 34.5 G/DL (ref 31.4–35)
MCV RBC AUTO: 89.4 FL (ref 79.6–97.8)
PLATELET # BLD AUTO: 184 K/UL (ref 150–450)
PMV BLD AUTO: 10.6 FL (ref 10.8–14.1)
POTASSIUM SERPL-SCNC: 3.7 MMOL/L (ref 3.5–5.1)
RBC # BLD AUTO: 2.63 M/UL (ref 4.23–5.67)
SODIUM SERPL-SCNC: 140 MMOL/L (ref 136–145)
WBC # BLD AUTO: 9 K/UL (ref 4.3–11.1)

## 2017-07-16 PROCEDURE — 77010033678 HC OXYGEN DAILY

## 2017-07-16 PROCEDURE — 36591 DRAW BLOOD OFF VENOUS DEVICE: CPT

## 2017-07-16 PROCEDURE — 74011000258 HC RX REV CODE- 258: Performed by: NURSE PRACTITIONER

## 2017-07-16 PROCEDURE — 74011000250 HC RX REV CODE- 250: Performed by: INTERNAL MEDICINE

## 2017-07-16 PROCEDURE — 94760 N-INVAS EAR/PLS OXIMETRY 1: CPT

## 2017-07-16 PROCEDURE — 74011250637 HC RX REV CODE- 250/637: Performed by: INTERNAL MEDICINE

## 2017-07-16 PROCEDURE — 85027 COMPLETE CBC AUTOMATED: CPT | Performed by: INTERNAL MEDICINE

## 2017-07-16 PROCEDURE — 65270000029 HC RM PRIVATE

## 2017-07-16 PROCEDURE — 77030027138 HC INCENT SPIROMETER -A

## 2017-07-16 PROCEDURE — 74011250636 HC RX REV CODE- 250/636: Performed by: INTERNAL MEDICINE

## 2017-07-16 PROCEDURE — 80048 BASIC METABOLIC PNL TOTAL CA: CPT | Performed by: INTERNAL MEDICINE

## 2017-07-16 RX ORDER — METOPROLOL TARTRATE 25 MG/1
25 TABLET, FILM COATED ORAL 2 TIMES DAILY
Status: DISCONTINUED | OUTPATIENT
Start: 2017-07-16 | End: 2017-07-17 | Stop reason: HOSPADM

## 2017-07-16 RX ORDER — LISINOPRIL 5 MG/1
5 TABLET ORAL DAILY
Status: DISCONTINUED | OUTPATIENT
Start: 2017-07-16 | End: 2017-07-17 | Stop reason: HOSPADM

## 2017-07-16 RX ORDER — FOLIC ACID 1 MG/1
1 TABLET ORAL DAILY
Status: DISCONTINUED | OUTPATIENT
Start: 2017-07-16 | End: 2017-07-16 | Stop reason: SDUPTHER

## 2017-07-16 RX ORDER — OXYCODONE HYDROCHLORIDE 80 MG/1
80 TABLET, FILM COATED, EXTENDED RELEASE ORAL EVERY 12 HOURS
Status: DISCONTINUED | OUTPATIENT
Start: 2017-07-16 | End: 2017-07-16 | Stop reason: SDUPTHER

## 2017-07-16 RX ADMIN — HYDROMORPHONE HYDROCHLORIDE 1 MG: 1 INJECTION, SOLUTION INTRAMUSCULAR; INTRAVENOUS; SUBCUTANEOUS at 01:58

## 2017-07-16 RX ADMIN — OXYCODONE HYDROCHLORIDE 80 MG: 80 TABLET, FILM COATED, EXTENDED RELEASE ORAL at 09:54

## 2017-07-16 RX ADMIN — METOPROLOL TARTRATE 25 MG: 25 TABLET ORAL at 17:16

## 2017-07-16 RX ADMIN — HYDROMORPHONE HYDROCHLORIDE 1 MG: 1 INJECTION, SOLUTION INTRAMUSCULAR; INTRAVENOUS; SUBCUTANEOUS at 09:49

## 2017-07-16 RX ADMIN — OXYCODONE HYDROCHLORIDE 30 MG: 15 TABLET ORAL at 18:48

## 2017-07-16 RX ADMIN — HEPARIN SODIUM 5000 UNITS: 5000 INJECTION, SOLUTION INTRAVENOUS; SUBCUTANEOUS at 20:57

## 2017-07-16 RX ADMIN — HYDROMORPHONE HYDROCHLORIDE 1 MG: 1 INJECTION, SOLUTION INTRAMUSCULAR; INTRAVENOUS; SUBCUTANEOUS at 20:55

## 2017-07-16 RX ADMIN — FAMOTIDINE 20 MG: 10 INJECTION INTRAVENOUS at 10:01

## 2017-07-16 RX ADMIN — HYDROMORPHONE HYDROCHLORIDE 1 MG: 1 INJECTION, SOLUTION INTRAMUSCULAR; INTRAVENOUS; SUBCUTANEOUS at 05:47

## 2017-07-16 RX ADMIN — HYDROMORPHONE HYDROCHLORIDE 1 MG: 1 INJECTION, SOLUTION INTRAMUSCULAR; INTRAVENOUS; SUBCUTANEOUS at 15:52

## 2017-07-16 RX ADMIN — HEPARIN SODIUM 5000 UNITS: 5000 INJECTION, SOLUTION INTRAVENOUS; SUBCUTANEOUS at 09:55

## 2017-07-16 RX ADMIN — SODIUM CHLORIDE 150 ML/HR: 450 INJECTION, SOLUTION INTRAVENOUS at 17:19

## 2017-07-16 RX ADMIN — SODIUM CHLORIDE 150 ML/HR: 450 INJECTION, SOLUTION INTRAVENOUS at 12:36

## 2017-07-16 RX ADMIN — FOLIC ACID 1 MG: 1 TABLET ORAL at 09:54

## 2017-07-16 RX ADMIN — FAMOTIDINE 20 MG: 10 INJECTION INTRAVENOUS at 20:57

## 2017-07-16 RX ADMIN — HYDROXYUREA 500 MG: 500 CAPSULE ORAL at 09:55

## 2017-07-16 RX ADMIN — OXYCODONE HYDROCHLORIDE 30 MG: 15 TABLET ORAL at 12:36

## 2017-07-16 RX ADMIN — LISINOPRIL 5 MG: 5 TABLET ORAL at 09:54

## 2017-07-16 RX ADMIN — OXYCODONE HYDROCHLORIDE 80 MG: 80 TABLET, FILM COATED, EXTENDED RELEASE ORAL at 21:02

## 2017-07-16 RX ADMIN — METOPROLOL TARTRATE 25 MG: 25 TABLET ORAL at 09:54

## 2017-07-16 RX ADMIN — HYDROXYUREA 500 MG: 500 CAPSULE ORAL at 20:58

## 2017-07-16 NOTE — PROGRESS NOTES
Hourly rounds completed on pt this shift. Will report to Nightshift RN. Pt given IV dilaudid and PO pain medication around beginning of shift. Currently resting quietly in room.  Will continue to monitor

## 2017-07-16 NOTE — PROGRESS NOTES
Roxicodone 30 mg orally IR given for pain. Instructed on incentive spirometry, voices understanding and demonstrates return instruction.

## 2017-07-16 NOTE — PROGRESS NOTES
Tolerating diet, pain appears managed, ambulating halls, oxygen with humidity, still not keeping oxygen on, encouraged to use IS, continue to encourage PO fluids. Hourly rounds this shift.

## 2017-07-17 VITALS
HEART RATE: 57 BPM | DIASTOLIC BLOOD PRESSURE: 73 MMHG | OXYGEN SATURATION: 98 % | RESPIRATION RATE: 18 BRPM | SYSTOLIC BLOOD PRESSURE: 133 MMHG | TEMPERATURE: 98.2 F

## 2017-07-17 LAB
ABO + RH BLD: NORMAL
ALBUMIN SERPL BCP-MCNC: 3.3 G/DL (ref 3.5–5)
ALBUMIN/GLOB SERPL: 0.9 {RATIO} (ref 1.2–3.5)
ALP SERPL-CCNC: 62 U/L (ref 50–136)
ALT SERPL-CCNC: 49 U/L (ref 12–65)
ANION GAP BLD CALC-SCNC: 9 MMOL/L (ref 7–16)
ANTIGENS PRESENT BLD: NORMAL
ANTIGENS PRESENT RBC DONR: NORMAL
AST SERPL W P-5'-P-CCNC: 49 U/L (ref 15–37)
BILIRUB DIRECT SERPL-MCNC: 0.3 MG/DL
BILIRUB SERPL-MCNC: 1.9 MG/DL (ref 0.2–1.1)
BLD PROD TYP BPU: NORMAL
BLOOD GROUP ANTIBODIES SERPL: NORMAL
BPU ID: NORMAL
BUN SERPL-MCNC: 7 MG/DL (ref 6–23)
CALCIUM SERPL-MCNC: 8.7 MG/DL (ref 8.3–10.4)
CHLORIDE SERPL-SCNC: 106 MMOL/L (ref 98–107)
CO2 SERPL-SCNC: 25 MMOL/L (ref 21–32)
CREAT SERPL-MCNC: 0.81 MG/DL (ref 0.8–1.5)
CROSSMATCH RESULT,%XM: NORMAL
ERYTHROCYTE [DISTWIDTH] IN BLOOD BY AUTOMATED COUNT: 21.9 % (ref 11.9–14.6)
GLOBULIN SER CALC-MCNC: 3.6 G/DL (ref 2.3–3.5)
GLUCOSE SERPL-MCNC: 115 MG/DL (ref 65–100)
HCT VFR BLD AUTO: 21.5 % (ref 41.1–50.3)
HGB BLD-MCNC: 7.4 G/DL (ref 13.6–17.2)
HGB RETIC QN AUTO: 37 PG (ref 29–35)
IMM RETICS NFR: 27.3 % (ref 2.3–13.4)
MCH RBC QN AUTO: 31.4 PG (ref 26.1–32.9)
MCHC RBC AUTO-ENTMCNC: 34.4 G/DL (ref 31.4–35)
MCV RBC AUTO: 91.1 FL (ref 79.6–97.8)
PLATELET # BLD AUTO: 155 K/UL (ref 150–450)
PMV BLD AUTO: 10.9 FL (ref 10.8–14.1)
POTASSIUM SERPL-SCNC: 3.9 MMOL/L (ref 3.5–5.1)
PROT SERPL-MCNC: 6.9 G/DL (ref 6.3–8.2)
RBC # BLD AUTO: 2.36 M/UL (ref 4.23–5.67)
RETICS # AUTO: 0.16 M/UL (ref 0.03–0.1)
RETICS/RBC NFR AUTO: 6.9 % (ref 0.3–2)
SODIUM SERPL-SCNC: 140 MMOL/L (ref 136–145)
SPECIMEN EXP DATE BLD: NORMAL
STATUS OF UNIT,%ST: NORMAL
UNIT DIVISION, %UDIV: 0
WBC # BLD AUTO: 9.7 K/UL (ref 4.3–11.1)

## 2017-07-17 PROCEDURE — 74011250637 HC RX REV CODE- 250/637: Performed by: INTERNAL MEDICINE

## 2017-07-17 PROCEDURE — 77010033678 HC OXYGEN DAILY

## 2017-07-17 PROCEDURE — 80076 HEPATIC FUNCTION PANEL: CPT | Performed by: INTERNAL MEDICINE

## 2017-07-17 PROCEDURE — 74011000250 HC RX REV CODE- 250: Performed by: INTERNAL MEDICINE

## 2017-07-17 PROCEDURE — 74011000258 HC RX REV CODE- 258: Performed by: NURSE PRACTITIONER

## 2017-07-17 PROCEDURE — 85027 COMPLETE CBC AUTOMATED: CPT | Performed by: INTERNAL MEDICINE

## 2017-07-17 PROCEDURE — 74011250636 HC RX REV CODE- 250/636: Performed by: INTERNAL MEDICINE

## 2017-07-17 PROCEDURE — 94760 N-INVAS EAR/PLS OXIMETRY 1: CPT

## 2017-07-17 PROCEDURE — 80048 BASIC METABOLIC PNL TOTAL CA: CPT | Performed by: INTERNAL MEDICINE

## 2017-07-17 PROCEDURE — 85046 RETICYTE/HGB CONCENTRATE: CPT | Performed by: INTERNAL MEDICINE

## 2017-07-17 RX ORDER — OXYCODONE HYDROCHLORIDE 80 MG/1
80 TABLET, FILM COATED, EXTENDED RELEASE ORAL EVERY 12 HOURS
Qty: 10 TAB | Refills: 0 | Status: SHIPPED | OUTPATIENT
Start: 2017-07-17 | End: 2017-11-12

## 2017-07-17 RX ORDER — HEPARIN 100 UNIT/ML
300 SYRINGE INTRAVENOUS AS NEEDED
Status: DISCONTINUED | OUTPATIENT
Start: 2017-07-17 | End: 2017-07-17 | Stop reason: HOSPADM

## 2017-07-17 RX ORDER — DEFERASIROX 360 MG/1
5 TABLET, FILM COATED ORAL DAILY
Qty: 150 TAB | Refills: 0 | Status: SHIPPED | OUTPATIENT
Start: 2017-07-17

## 2017-07-17 RX ORDER — OXYCODONE HYDROCHLORIDE 30 MG/1
30 TABLET ORAL
Qty: 20 TAB | Refills: 0 | Status: SHIPPED | OUTPATIENT
Start: 2017-07-17 | End: 2017-11-12

## 2017-07-17 RX ADMIN — LISINOPRIL 5 MG: 5 TABLET ORAL at 08:40

## 2017-07-17 RX ADMIN — FAMOTIDINE 20 MG: 10 INJECTION INTRAVENOUS at 08:41

## 2017-07-17 RX ADMIN — HYDROXYUREA 500 MG: 500 CAPSULE ORAL at 08:42

## 2017-07-17 RX ADMIN — SODIUM CHLORIDE 150 ML/HR: 450 INJECTION, SOLUTION INTRAVENOUS at 03:12

## 2017-07-17 RX ADMIN — METOPROLOL TARTRATE 25 MG: 25 TABLET ORAL at 08:40

## 2017-07-17 RX ADMIN — FOLIC ACID 1 MG: 1 TABLET ORAL at 08:40

## 2017-07-17 RX ADMIN — HYDROMORPHONE HYDROCHLORIDE 1 MG: 1 INJECTION, SOLUTION INTRAMUSCULAR; INTRAVENOUS; SUBCUTANEOUS at 04:24

## 2017-07-17 RX ADMIN — SODIUM CHLORIDE, PRESERVATIVE FREE 300 UNITS: 5 INJECTION INTRAVENOUS at 09:36

## 2017-07-17 RX ADMIN — HEPARIN SODIUM 5000 UNITS: 5000 INJECTION, SOLUTION INTRAVENOUS; SUBCUTANEOUS at 08:42

## 2017-07-17 RX ADMIN — OXYCODONE HYDROCHLORIDE 80 MG: 80 TABLET, FILM COATED, EXTENDED RELEASE ORAL at 08:40

## 2017-07-17 NOTE — DISCHARGE INSTRUCTIONS
DISCHARGE SUMMARY from Nurse    The following personal items are in your possession at time of discharge:    Dental Appliances: None  Visual Aid: Glasses, With patient     Home Medications: None  Jewelry: Ring  Clothing: At bedside  Other Valuables: Cell Phone             PATIENT INSTRUCTIONS:    After general anesthesia or intravenous sedation, for 24 hours or while taking prescription Narcotics:  · Limit your activities  · Do not drive and operate hazardous machinery  · Do not make important personal or business decisions  · Do  not drink alcoholic beverages  · If you have not urinated within 8 hours after discharge, please contact your surgeon on call. Report the following to your surgeon:  · Excessive pain, swelling, redness or odor of or around the surgical area  · Temperature over 100.5  · Nausea and vomiting lasting longer than 4 hours or if unable to take medications  · Any signs of decreased circulation or nerve impairment to extremity: change in color, persistent  numbness, tingling, coldness or increase pain  · Any questions        What to do at Home:  Recommended activity: Activity as tolerated    If you experience any of the following symptoms worsening of pain, feeling dizzy/faint, or shortness of breath, please follow up with your primary care physician      *  Please give a list of your current medications to your Primary Care Provider. *  Please update this list whenever your medications are discontinued, doses are      changed, or new medications (including over-the-counter products) are added. *  Please carry medication information at all times in case of emergency situations. These are general instructions for a healthy lifestyle:    No smoking/ No tobacco products/ Avoid exposure to second hand smoke    Surgeon General's Warning:  Quitting smoking now greatly reduces serious risk to your health.     Obesity, smoking, and sedentary lifestyle greatly increases your risk for illness    A healthy diet, regular physical exercise & weight monitoring are important for maintaining a healthy lifestyle    You may be retaining fluid if you have a history of heart failure or if you experience any of the following symptoms:  Weight gain of 3 pounds or more overnight or 5 pounds in a week, increased swelling in our hands or feet or shortness of breath while lying flat in bed. Please call your doctor as soon as you notice any of these symptoms; do not wait until your next office visit. Recognize signs and symptoms of STROKE:    F-face looks uneven    A-arms unable to move or move unevenly    S-speech slurred or non-existent    T-time-call 911 as soon as signs and symptoms begin-DO NOT go       Back to bed or wait to see if you get better-TIME IS BRAIN. Warning Signs of HEART ATTACK     Call 911 if you have these symptoms:   Chest discomfort. Most heart attacks involve discomfort in the center of the chest that lasts more than a few minutes, or that goes away and comes back. It can feel like uncomfortable pressure, squeezing, fullness, or pain.  Discomfort in other areas of the upper body. Symptoms can include pain or discomfort in one or both arms, the back, neck, jaw, or stomach.  Shortness of breath with or without chest discomfort.  Other signs may include breaking out in a cold sweat, nausea, or lightheadedness. Don't wait more than five minutes to call 911 - MINUTES MATTER! Fast action can save your life. Calling 911 is almost always the fastest way to get lifesaving treatment. Emergency Medical Services staff can begin treatment when they arrive -- up to an hour sooner than if someone gets to the hospital by car. The discharge information has been reviewed with the patient. The patient verbalized understanding. Discharge medications reviewed with the patient and appropriate educational materials and side effects teaching were provided.                Sickle Cell Crisis: Care Instructions  Your Care Instructions    Sickle cell crisis is a painful episode that may begin suddenly in a person with sickle cell disease. Sickle cell disease turns normal, round red blood cells into cells that look like kendall or crescent moons. The sickle-shaped cells can get stuck in blood vessels, blocking blood flow and causing severe pain. The pain can occur in the bones of the spine, the arms and legs, the chest, and the abdomen. An episode may be called a \"painful event\" or \"painful crisis. \" Some people who have sickle cell disease have many painful events, while others have few or none. Treatment depends on the level of pain and how long it lasts. Sometimes taking nonprescription pain relievers can help. Or you may need stronger pain relief medicine that is prescribed or given by a doctor. You may need to be treated in the hospital.  It isn't always possible to know what sets off a painful event. But triggers include being dehydrated, cold temperatures, infection, stress, and not getting enough oxygen. Follow-up care is a key part of your treatment and safety. Be sure to make and go to all appointments, and call your doctor if you are having problems. It's also a good idea to know your test results and keep a list of the medicines you take. How can you care for yourself at home? · Create a pain management plan with your doctor. This plan should include the types of medicines you can take and other actions you can take at home to relieve pain. · Drink plenty of fluids, enough so that your urine is light yellow or clear like water. If you have kidney, heart, or liver disease and have to limit fluids, talk with your doctor before you increase the amount of fluids you drink. · Take your medicines exactly as prescribed. Call your doctor if you think you are having a problem with your medicine. · Take pain medicines exactly as directed.   ¨ If the doctor gave you a prescription medicine for pain, take it as prescribed. ¨ If you are not taking a prescription pain medicine, ask your doctor if you can take an over-the-counter medicine. · Avoid alcohol. It can make you dehydrated. · Dress warmly in cold weather. The cold and windy weather can lead to severe pain. · Do not smoke. Smoking can reduce the amount of oxygen in your blood. · Get plenty of sleep. When should you call for help? Call 911 anytime you think you may need emergency care. For example, call if:  · You passed out (lost consciousness). Call your doctor now or seek immediate medical care if:  · You are in severe pain. · You are dizzy or lightheaded, or you feel like you may faint. · You have a fever. · You are short of breath. · Your symptoms are getting worse. Watch closely for changes in your health, and be sure to contact your doctor if you are not getting better as expected. Where can you learn more? Go to http://socorro-rosita.info/. Enter F104 in the search box to learn more about \"Sickle Cell Crisis: Care Instructions. \"  Current as of: October 13, 2016  Content Version: 11.3  © 2246-4494 [employer]. Care instructions adapted under license by [employer] (which disclaims liability or warranty for this information). If you have questions about a medical condition or this instruction, always ask your healthcare professional. [address] any warranty or liability for your use of this information.

## 2017-07-17 NOTE — PROGRESS NOTES
Discharge instructions and prescriptions given and reviewed with pt, verbalizes understanding, medication side effect sheet reviewed with pt, pt to be discharged home, waiting on ride.

## 2017-07-17 NOTE — PROGRESS NOTES
Care Management Interventions  PCP Verified by CM: Yes  Transition of Care Consult (CM Consult): Discharge Planning  Confirm Follow Up Transport: Family  Plan discussed with Pt/Family/Caregiver: Yes  Freedom of Choice Offered: Yes  Discharge Location  Discharge Placement: Home   No home needs. + PCP and insurance. Austyn Damon

## 2017-07-17 NOTE — PROGRESS NOTES
Rounded hourly on patient throughout the shift. Needs met at this time.  Will continue to monitor patient and report off to day shift RN

## 2017-07-18 NOTE — DISCHARGE SUMMARY
Discharge Summary     Patient: Juan Lake MRN: [id number]  SSN: [id number]    YOB: 1975  Age: 38 y.o.   Sex: male       Admit Date: 7/13/2017    Discharge Date: 7/17/2017    Admission Diagnoses: Sickle cell disease ([address])    Discharge Diagnoses:   Problem List as of 7/17/2017  Date Reviewed: 3/5/2012          Codes Class Noted - Resolved    Sickle cell disease ([address]) ICD-10-CM: D57.1  ICD-9-CM: 282.60  7/13/2017 - Present        Iron overload due to repeated red blood cell transfusions ICD-10-CM: E83.111  ICD-9-CM: 275.02  1/18/2017 - Present        Sickle cell anemia with crisis ([address]) ICD-10-CM: D57.00  ICD-9-CM: 282.62  1/16/2017 - Present        Hypokalemia ICD-10-CM: E87.6  ICD-9-CM: 276.8  7/9/2016 - Present        Paroxysmal SVT (supraventricular tachycardia) ([facility]) ICD-10-CM: I47.1  ICD-9-CM: 427.0  7/9/2016 - Present        Hypomagnesemia ICD-10-CM: E83.42  ICD-9-CM: 275.2  7/9/2016 - Present        Sickle cell anemia ([address]) ICD-10-CM: D57.1  ICD-9-CM: 282.60  4/6/2016 - Present        Sickle cell crisis ([address]) ICD-10-CM: D57.00  ICD-9-CM: 282.62  4/5/2016 - Present        leukocytosis - most likely reactive ICD-10-CM: D72.829  ICD-9-CM: 288.60  1/30/2016 - Present        Diarrhea ICD-10-CM: R19.7  ICD-9-CM: 787.91  9/27/2014 - Present        Sickle cell pain crisis ([address]) ICD-10-CM: D57.00  ICD-9-CM: 282.62  10/25/2012 - Present        Hyperbilirubinemia ICD-10-CM: E80.6  ICD-9-CM: 782.4  9/20/2012 - Present    Overview Signed 9/20/2012  1:37 PM by Christine Westbrook     Related to sickle cell crisis             Essential hypertension, benign ICD-10-CM: I10  ICD-9-CM: 401.1  6/23/2012 - Present        Hemolytic crisis ([address] ICD-10-CM: D65  ICD-9-CM: 286.6  3/24/2012 - Present    Overview Signed 3/24/2012  2:53 PM by Rebeka Thomas     Sickle cell with anemia             HTN (hypertension) (Chronic) ICD-10-CM: I10  ICD-9-CM: 401.9  3/2/2012 - Present        Leukocytosis - chronic reactive ICD-10-CM: R90.246  ICD-9-CM: 288.60  9/16/2011 - Present        RESOLVED: Macrocytosis ICD-10-CM: D75.89  ICD-9-CM: 289.89  9/27/2014 - 1/30/2016        RESOLVED: Thrombocytopenia, unspecified ([facility] ICD-10-CM: D69.6  ICD-9-CM: 287.5  10/12/2013 - 1/30/2016        RESOLVED: Other chest pain ICD-10-CM: R07.89  ICD-9-CM: 786.59  3/24/2012 - 1/30/2016    Overview Signed 3/24/2012  2:53 PM by Divya Bowie     Acute chest syndrome unlikely. Discharge Condition: [facility] Course: This is a 39 YO male patient very well known to our service with multiple previous admissions. He has a PMH of hemoglobin S/beta plus thalassemia, HTN,  transfusional iron overload and recent E.coli bacteriemia in March 2017 who came into the ER with a CC of 2 days of severe throbbing pain of the upper and mainly lower extremities rated 10/10 in intensity. According to him the pain is typical of previous crises. Denied fever, chest pain, SOB, sputum production or any other symptom. In the ER he was found with stable VS, Hgb was 7,4 ( about his baseline) and bilirubin was around 2.0. He received IV fluids, IV dilaudid and O2 by nasal canula with NO resolution of symptoms and for that reason was presented to the hospitalist for admission. He was admitted to our service receiving IV fluids and IV pain meds. He continued taking his regular  dose of Hydrea. His Hgb dropped to 5.9. Hematology saw him and recommended 1 U of pRBCs. He is taking Jadenu as an outpatient for  iron chelation. After several days his symptoms improved and he was considered stable to go home. He will follow up with his regular hematologist.     GENERAL: alert, cooperative, no distress, appears stated age  EYE: conjunctivae/corneas clear. PERRL, EOM's intact. Fundi benign  LYMPHATIC: Cervical, supraclavicular, and axillary nodes normal.   THROAT & NECK: normal and no erythema or exudates noted.    LUNG: clear to auscultation bilaterally  HEART: regular rate and rhythm, S1, S2 normal, no murmur, click, rub or gallop  ABDOMEN: soft, non-tender. Bowel sounds normal. No masses,  no organomegaly  EXTREMITIES:  Tenderness of the upper and lower extremities   SKIN: Normal.  NEUROLOGIC: AOx3. Gait normal. Reflexes and motor strength normal and symmetric. Cranial nerves 2-12 and sensation grossly intact. PSYCHIATRIC: non focal       Consults: Hematology/Oncology    Significant Diagnostic Studies: see hospital course     Disposition: home    Discharge Medications:   No current facility-administered medications for this encounter. Current Outpatient Prescriptions:     oxyCODONE ER (OXYCONTIN) 80 mg ER tablet, Take 1 Tab by mouth every twelve (12) hours. Max Daily Amount: 160 mg., Disp: 10 Tab, Rfl: 0    oxyCODONE IR (OXY-IR) 30 mg immediate release tablet, Take 1 Tab by mouth every six (6) hours as needed. Max Daily Amount: 120 mg., Disp: 20 Tab, Rfl: 0    deferasirox (JADENU) 360 mg tab, Take 5 Tabs by mouth daily. , Disp: 150 Tab, Rfl: 0    hydroxyurea (HYDREA) 500 mg capsule, Take 500 mg by mouth two (2) times a day., Disp: , Rfl:     promethazine (PHENERGAN) 25 mg tablet, Take 1 Tab by mouth every six (6) hours as needed. May substitute suppository if vomiting, Disp: 20 Tab, Rfl: 0    oxyCODONE IR (OXY-IR) 30 mg immediate release tablet, Take 1 Tab by mouth every four (4) hours as needed for Pain. Max Daily Amount: 180 mg., Disp: 12 Tab, Rfl: 0    oxyCODONE ER (OXYCONTIN) 80 mg ER tablet, Take 1 Tab by mouth every twelve (12) hours. Max Daily Amount: 160 mg., Disp: 6 Tab, Rfl: 0    metoprolol (LOPRESSOR) 25 mg tablet, Take 25 mg by mouth two (2) times a day. Indications: HYPERTENSION, Disp: , Rfl:     lisinopril (PRINIVIL, ZESTRIL) 5 mg tablet, Take 5 mg by mouth daily. Indications: HYPERTENSION, Disp: , Rfl:     folic acid (FOLVITE) 1 mg tablet, Take 1 mg by mouth daily. , Disp: , Rfl:       Activity: Activity as tolerated  Diet: Regular Diet  Wound Care: None needed    Follow-up Appointments   Procedures    FOLLOW UP VISIT Appointment in: 3 - 5 Days Hematologist     Hematologist     Standing Status:   Standing     Number of Occurrences:   1     Order Specific Question:   Appointment in     Answer:   3 - 5 Days       Signed By: Clarke Turner MD     July 18, 2017

## 2017-08-10 ENCOUNTER — APPOINTMENT (OUTPATIENT)
Dept: CT IMAGING | Age: 42
End: 2017-08-10
Attending: EMERGENCY MEDICINE
Payer: MEDICARE

## 2017-08-10 ENCOUNTER — HOSPITAL ENCOUNTER (EMERGENCY)
Age: 42
Discharge: HOME | End: 2017-08-10
Attending: EMERGENCY MEDICINE
Payer: MEDICARE

## 2017-08-10 ENCOUNTER — APPOINTMENT (OUTPATIENT)
Dept: GENERAL RADIOLOGY | Age: 42
End: 2017-08-10
Attending: EMERGENCY MEDICINE
Payer: MEDICARE

## 2017-08-10 VITALS
DIASTOLIC BLOOD PRESSURE: 74 MMHG | OXYGEN SATURATION: 100 % | WEIGHT: 164 LBS | RESPIRATION RATE: 18 BRPM | HEIGHT: 70 IN | HEART RATE: 61 BPM | SYSTOLIC BLOOD PRESSURE: 131 MMHG | BODY MASS INDEX: 23.48 KG/M2 | TEMPERATURE: 99 F

## 2017-08-10 DIAGNOSIS — R07.9 CHEST PAIN, UNSPECIFIED TYPE: Primary | ICD-10-CM

## 2017-08-10 LAB
ALBUMIN SERPL BCP-MCNC: 3.9 G/DL (ref 3.5–5)
ALBUMIN/GLOB SERPL: 0.9 {RATIO} (ref 1.2–3.5)
ALP SERPL-CCNC: 58 U/L (ref 50–136)
ALT SERPL-CCNC: 65 U/L (ref 12–65)
ANION GAP BLD CALC-SCNC: 8 MMOL/L (ref 7–16)
AST SERPL W P-5'-P-CCNC: 73 U/L (ref 15–37)
BASOPHILS # BLD AUTO: 0 K/UL (ref 0–0.2)
BASOPHILS # BLD: 0 % (ref 0–2)
BILIRUB SERPL-MCNC: 2 MG/DL (ref 0.2–1.1)
BUN SERPL-MCNC: 10 MG/DL (ref 6–23)
CALCIUM SERPL-MCNC: 9.2 MG/DL (ref 8.3–10.4)
CHLORIDE SERPL-SCNC: 103 MMOL/L (ref 98–107)
CO2 SERPL-SCNC: 26 MMOL/L (ref 21–32)
CREAT SERPL-MCNC: 0.99 MG/DL (ref 0.8–1.5)
D DIMER PPP FEU-MCNC: 2.2 UG/ML(FEU)
DIFFERENTIAL METHOD BLD: ABNORMAL
EOSINOPHIL # BLD: 0.1 K/UL (ref 0–0.8)
EOSINOPHIL NFR BLD: 1 % (ref 0.5–7.8)
ERYTHROCYTE [DISTWIDTH] IN BLOOD BY AUTOMATED COUNT: 25.1 % (ref 11.9–14.6)
GLOBULIN SER CALC-MCNC: 4.4 G/DL (ref 2.3–3.5)
GLUCOSE SERPL-MCNC: 125 MG/DL (ref 65–100)
HCT VFR BLD AUTO: 23.4 % (ref 41.1–50.3)
HGB BLD-MCNC: 8 G/DL (ref 13.6–17.2)
HGB RETIC QN AUTO: 36 PG (ref 29–35)
IMM GRANULOCYTES # BLD: 0 K/UL (ref 0–0.5)
IMM GRANULOCYTES NFR BLD AUTO: 0 % (ref 0–5)
IMM RETICS NFR: 22.9 % (ref 2.3–13.4)
LACTATE BLD-SCNC: 0.8 MMOL/L (ref 0.5–1.9)
LACTATE BLD-SCNC: 1.2 MMOL/L (ref 0.5–1.9)
LYMPHOCYTES # BLD AUTO: 31 % (ref 13–44)
LYMPHOCYTES # BLD: 3.1 K/UL (ref 0.5–4.6)
MCH RBC QN AUTO: 32.3 PG (ref 26.1–32.9)
MCHC RBC AUTO-ENTMCNC: 34.2 G/DL (ref 31.4–35)
MCV RBC AUTO: 94.4 FL (ref 79.6–97.8)
MONOCYTES # BLD: 0.9 K/UL (ref 0.1–1.3)
MONOCYTES NFR BLD AUTO: 9 % (ref 4–12)
NEUTS SEG # BLD: 5.8 K/UL (ref 1.7–8.2)
NEUTS SEG NFR BLD AUTO: 59 % (ref 43–78)
PLATELET # BLD AUTO: 198 K/UL (ref 150–450)
PLATELET COMMENTS,PCOM: ADEQUATE
PMV BLD AUTO: 10.7 FL (ref 10.8–14.1)
POTASSIUM SERPL-SCNC: 4.2 MMOL/L (ref 3.5–5.1)
PROT SERPL-MCNC: 8.3 G/DL (ref 6.3–8.2)
RBC # BLD AUTO: 2.48 M/UL (ref 4.23–5.67)
RBC MORPH BLD: ABNORMAL
RBC MORPH BLD: ABNORMAL
RETICS # AUTO: 0.14 M/UL (ref 0.03–0.1)
RETICS/RBC NFR AUTO: 5.6 % (ref 0.3–2)
SODIUM SERPL-SCNC: 137 MMOL/L (ref 136–145)
TROPONIN I BLD-MCNC: 0 NG/ML (ref 0–0.08)
TROPONIN I SERPL-MCNC: <0.02 NG/ML (ref 0.02–0.05)
WBC # BLD AUTO: 9.9 K/UL (ref 4.3–11.1)
WBC MORPH BLD: ABNORMAL

## 2017-08-10 PROCEDURE — 85379 FIBRIN DEGRADATION QUANT: CPT | Performed by: EMERGENCY MEDICINE

## 2017-08-10 PROCEDURE — 85025 COMPLETE CBC W/AUTO DIFF WBC: CPT | Performed by: EMERGENCY MEDICINE

## 2017-08-10 PROCEDURE — 80053 COMPREHEN METABOLIC PANEL: CPT | Performed by: EMERGENCY MEDICINE

## 2017-08-10 PROCEDURE — 74011250636 HC RX REV CODE- 250/636: Performed by: EMERGENCY MEDICINE

## 2017-08-10 PROCEDURE — 71020 XR CHEST PA LAT: CPT

## 2017-08-10 PROCEDURE — 96375 TX/PRO/DX INJ NEW DRUG ADDON: CPT | Performed by: EMERGENCY MEDICINE

## 2017-08-10 PROCEDURE — 99285 EMERGENCY DEPT VISIT HI MDM: CPT | Performed by: EMERGENCY MEDICINE

## 2017-08-10 PROCEDURE — 96376 TX/PRO/DX INJ SAME DRUG ADON: CPT | Performed by: EMERGENCY MEDICINE

## 2017-08-10 PROCEDURE — 83605 ASSAY OF LACTIC ACID: CPT

## 2017-08-10 PROCEDURE — 74011636320 HC RX REV CODE- 636/320: Performed by: EMERGENCY MEDICINE

## 2017-08-10 PROCEDURE — 74011000250 HC RX REV CODE- 250: Performed by: EMERGENCY MEDICINE

## 2017-08-10 PROCEDURE — 94762 N-INVAS EAR/PLS OXIMTRY CONT: CPT | Performed by: EMERGENCY MEDICINE

## 2017-08-10 PROCEDURE — 93005 ELECTROCARDIOGRAM TRACING: CPT | Performed by: EMERGENCY MEDICINE

## 2017-08-10 PROCEDURE — 84484 ASSAY OF TROPONIN QUANT: CPT | Performed by: EMERGENCY MEDICINE

## 2017-08-10 PROCEDURE — 96374 THER/PROPH/DIAG INJ IV PUSH: CPT | Performed by: EMERGENCY MEDICINE

## 2017-08-10 PROCEDURE — 71260 CT THORAX DX C+: CPT

## 2017-08-10 PROCEDURE — 85046 RETICYTE/HGB CONCENTRATE: CPT | Performed by: EMERGENCY MEDICINE

## 2017-08-10 PROCEDURE — 96361 HYDRATE IV INFUSION ADD-ON: CPT | Performed by: EMERGENCY MEDICINE

## 2017-08-10 PROCEDURE — 74011000258 HC RX REV CODE- 258: Performed by: EMERGENCY MEDICINE

## 2017-08-10 RX ORDER — HYDROMORPHONE HYDROCHLORIDE 1 MG/ML
1 INJECTION, SOLUTION INTRAMUSCULAR; INTRAVENOUS; SUBCUTANEOUS
Status: COMPLETED | OUTPATIENT
Start: 2017-08-10 | End: 2017-08-10

## 2017-08-10 RX ORDER — SODIUM CHLORIDE 0.9 % (FLUSH) 0.9 %
10 SYRINGE (ML) INJECTION
Status: COMPLETED | OUTPATIENT
Start: 2017-08-10 | End: 2017-08-10

## 2017-08-10 RX ORDER — HEPARIN 100 UNIT/ML
300 SYRINGE INTRAVENOUS AS NEEDED
Status: DISCONTINUED | OUTPATIENT
Start: 2017-08-10 | End: 2017-08-10 | Stop reason: HOSPADM

## 2017-08-10 RX ADMIN — HYDROMORPHONE HYDROCHLORIDE 1 MG: 1 INJECTION, SOLUTION INTRAMUSCULAR; INTRAVENOUS; SUBCUTANEOUS at 18:13

## 2017-08-10 RX ADMIN — SODIUM CHLORIDE 100 ML: 900 INJECTION, SOLUTION INTRAVENOUS at 18:23

## 2017-08-10 RX ADMIN — IOPAMIDOL 100 ML: 755 INJECTION, SOLUTION INTRAVENOUS at 18:23

## 2017-08-10 RX ADMIN — SODIUM CHLORIDE 1000 ML: 900 INJECTION, SOLUTION INTRAVENOUS at 16:53

## 2017-08-10 RX ADMIN — HYDROMORPHONE HYDROCHLORIDE 1 MG: 1 INJECTION, SOLUTION INTRAMUSCULAR; INTRAVENOUS; SUBCUTANEOUS at 16:53

## 2017-08-10 RX ADMIN — SODIUM CHLORIDE, PRESERVATIVE FREE 300 UNITS: 5 INJECTION INTRAVENOUS at 20:36

## 2017-08-10 RX ADMIN — SODIUM CHLORIDE 12.5 MG: 9 INJECTION INTRAMUSCULAR; INTRAVENOUS; SUBCUTANEOUS at 16:53

## 2017-08-10 RX ADMIN — Medication 10 ML: at 18:23

## 2017-08-10 NOTE — ED TRIAGE NOTES
Pt arrive c/o chest pain that started this morning with nausea, denies v/d. Denies sob. Pt has a port and wishes to have the port accessed for blood, refuses peripheral IV In triage.

## 2017-08-10 NOTE — ED PROVIDER NOTES
HPI Comments: Patient is a 39 yo male with history of sickle of sickle cell anemia presents with chest pain. States the pain began this morning, located in the middle of his chest.  States mild SOB, describes the pain as a \"dull and aching\" pain without radiation. Denies any abdominal pain, no vomiting. States no fevers however endorses some chills. Patient is a 38 y.o. male presenting with chest pain. The history is provided by the patient. No  was used. Chest Pain (Angina)    Pertinent negatives include no abdominal pain, no back pain, no cough, no fever, no headaches, no nausea, no shortness of breath, no vomiting and no weakness. Past Medical History:   Diagnosis Date    Chronic pain     HTN (hypertension)     Ill-defined condition     sickle cell    Iron overload due to repeated red blood cell transfusions 1/18/2017    Paroxysmal SVT [facility]ra[facility]ricular tachycardia) ([facility]) 7/9/2016    Sickle cell disease ([facility] [address]        Past Surgical History:   Procedure Laterality Date    HC PENILE IMPL DURA II POSITINBLE      HC PORT LIFE SNGLE LUMEN 5013      to L CW    HX CHOLECYSTECTOMY      HX OTHER SURGICAL      penile inplant    HX VASCULAR ACCESS           Family History:   Problem Relation Age of Onset    Hypertension Other     Diabetes Father     Stroke Father     Sickle Cell Anemia Sister        Social History     Social History    Marital status:      Spouse name: N/A    Number of children: N/A    Years of education: N/A     Occupational History    Not on file. Social History Main Topics    Smoking status: Never Smoker    Smokeless tobacco: Never Used    Alcohol use No    Drug use: No    Sexual activity: Yes     Other Topics Concern    Not on file     Social History Narrative         ALLERGIES: Compazine [prochlorperazine edisylate];  Morphine; Reglan [metoclopramide]; and Zofran [ondansetron hcl (pf)]    Review of Systems   Constitutional: Negative for chills and fever. HENT: Negative for rhinorrhea and sore throat. Eyes: Negative for visual disturbance. Respiratory: Negative for cough and shortness of breath. Cardiovascular: Positive for chest pain. Negative for leg swelling. Gastrointestinal: Negative for abdominal pain, diarrhea, nausea and vomiting. Genitourinary: Negative for dysuria. Musculoskeletal: Negative for back pain and neck pain. Skin: Negative for rash. Neurological: Negative for weakness and headaches. Psychiatric/Behavioral: The patient is not nervous/anxious. Vitals:    08/10/17 1521   BP: 131/55   Pulse: 74   Resp: 18   Temp: 99.3 °F (37.4 °C)   SpO2: 96%   Weight: 74.4 kg (164 lb)   Height: 5' 10\" (1.778 m)            Physical Exam   Constitutional: He is oriented to person, place, and time. He appears well-developed and well-nourished. HENT:   Head: Normocephalic. Right Ear: External ear normal.   Left Ear: External ear normal.   Eyes: Conjunctivae and EOM are normal. Pupils are equal, round, and reactive to light. Neck: Normal range of motion. Neck supple. No tracheal deviation present. Cardiovascular: Normal rate, regular rhythm, normal heart sounds and intact distal pulses. No murmur heard. Pulmonary/Chest: Effort normal and breath sounds normal. No respiratory distress. Abdominal: Soft. There is no tenderness. Musculoskeletal: Normal range of motion. Neurological: He is alert and oriented to person, place, and time. No cranial nerve deficit. Skin: No rash noted. Nursing note and vitals reviewed.        MDM  Number of Diagnoses or Management Options  Chest pain, unspecified type: new and requires workup     Amount and/or Complexity of Data Reviewed  Clinical lab tests: ordered and reviewed  Tests in the radiology section of CPT®: ordered and reviewed  Tests in the medicine section of CPT®: ordered and reviewed  Review and summarize past medical records: yes    Risk of Complications, Morbidity, and/or Mortality  Presenting problems: high  Diagnostic procedures: high  Management options: high    Patient Progress  Patient progress: stable    ED Course       Procedures    Recent Results (from the past 12 hour(s))   EKG, 12 LEAD, INITIAL    Collection Time: 08/10/17  3:21 PM   Result Value Ref Range    Ventricular Rate 62 BPM    Atrial Rate 62 BPM    P-R Interval 156 ms    QRS Duration 80 ms    Q-T Interval 384 ms    QTC Calculation (Bezet) 389 ms    Calculated P Axis 25 degrees    Calculated R Axis 33 degrees    Calculated T Axis 64 degrees    Diagnosis       Sinus rhythm with Premature atrial complexes  Nonspecific T wave abnormality  Abnormal ECG  When compared with ECG of 13-JUL-2017 16:44,  Premature atrial complexes are now Present  Nonspecific T wave abnormality no longer evident in Anterior leads     CBC WITH AUTOMATED DIFF    Collection Time: 08/10/17  4:36 PM   Result Value Ref Range    WBC 9.9 4.3 - 11.1 K/uL    RBC 2.48 (L) 4.23 - 5.67 M/uL    HGB 8.0 (L) 13.6 - 17.2 g/dL    HCT 23.4 (L) 41.1 - 50.3 %    MCV 94.4 79.6 - 97.8 FL    MCH 32.3 26.1 - 32.9 PG    MCHC 34.2 31.4 - 35.0 g/dL    RDW 25.1 (H) 11.9 - 14.6 %    PLATELET 258 010 - 156 K/uL    MPV 10.7 (L) 10.8 - 14.1 FL    NEUTROPHILS 59 43 - 78 %    LYMPHOCYTES 31 13 - 44 %    MONOCYTES 9 4.0 - 12.0 %    EOSINOPHILS 1 0.5 - 7.8 %    BASOPHILS 0 0.0 - 2.0 %    IMMATURE GRANULOCYTES 0 0.0 - 5.0 %    ABS. NEUTROPHILS 5.8 1.7 - 8.2 K/UL    ABS. LYMPHOCYTES 3.1 0.5 - 4.6 K/UL    ABS. MONOCYTES 0.9 0.1 - 1.3 K/UL    ABS. EOSINOPHILS 0.1 0.0 - 0.8 K/UL    ABS. BASOPHILS 0.0 0.0 - 0.2 K/UL    ABS. IMM.  GRANS. 0.0 0.0 - 0.5 K/UL    RBC COMMENTS SLIGHT  ANISOCYTOSIS + POIKILOCYTOSIS        RBC COMMENTS SLIGHT  TEARDROP CELLS        WBC COMMENTS Result Confirmed By Smear      PLATELET COMMENTS ADEQUATE      DF AUTOMATED     METABOLIC PANEL, COMPREHENSIVE    Collection Time: 08/10/17  4:36 PM   Result Value Ref Range    Sodium 137 136 - 145 mmol/L    Potassium 4.2 3.5 - 5.1 mmol/L    Chloride 103 98 - 107 mmol/L    CO2 26 21 - 32 mmol/L    Anion gap 8 7 - 16 mmol/L    Glucose 125 (H) 65 - 100 mg/dL    BUN 10 6 - 23 MG/DL    Creatinine 0.99 0.8 - 1.5 MG/DL    GFR est AA >60 >60 ml/min/1.73m2    GFR est non-AA >60 >60 ml/min/1.73m2    Calcium 9.2 8.3 - 10.4 MG/DL    Bilirubin, total 2.0 (H) 0.2 - 1.1 MG/DL    ALT (SGPT) 65 12 - 65 U/L    AST (SGOT) 73 (H) 15 - 37 U/L    Alk. phosphatase 58 50 - 136 U/L    Protein, total 8.3 (H) 6.3 - 8.2 g/dL    Albumin 3.9 3.5 - 5.0 g/dL    Globulin 4.4 (H) 2.3 - 3.5 g/dL    A-G Ratio 0.9 (L) 1.2 - 3.5     TROPONIN I    Collection Time: 08/10/17  4:36 PM   Result Value Ref Range    Troponin-I, Qt. <0.02 (L) 0.02 - 0.05 NG/ML   D DIMER    Collection Time: 08/10/17  4:36 PM   Result Value Ref Range    D DIMER 2.20 (HH) <0.55 ug/ml(FEU)   RETICULOCYTE COUNT    Collection Time: 08/10/17  4:36 PM   Result Value Ref Range    Reticulocyte count 5.6 (H) 0.3 - 2.0 %    Absolute Retic Cnt. 0.1387 (H) 0.026 - 0.095 M/ul    Immature Retic Fraction 22.9 (H) 2.3 - 13.4 %    Retic Hgb Conc. 36 (H) 29 - 35 pg   POC LACTIC ACID    Collection Time: 08/10/17  5:18 PM   Result Value Ref Range    Lactic Acid (POC) 1.2 0.5 - 1.9 mmol/L   POC TROPONIN-I    Collection Time: 08/10/17  7:48 PM   Result Value Ref Range    Troponin-I (POC) 0 0.0 - 0.08 ng/ml   POC LACTIC ACID    Collection Time: 08/10/17  7:52 PM   Result Value Ref Range    Lactic Acid (POC) 0.8 0.5 - 1.9 mmol/L     Xr Chest Pa Lat    Result Date: 8/10/2017  PA and lateral chest radiographs History: Chest pain since this morning. Comparison: 07/13/2017 Findings:  A left IJ [id number] is present with its tip near the superior atriocaval junction. The cardiac silhouette is normal. The lungs are clear. There is no pleural effusion or pneumothorax. Impression:  No acute cardiopulmonary process.     Ct Chest W Cont    Result Date: 8/10/2017  CT OF THE CHEST WITH INTRAVENOUS CONTRAST. INDICATION: Severe chest pain and history of sickle cell disease. COMPARISON: 12 May 2017. TECHNIQUE:   2.5 mm axial scans from above the aortic arch to the lung bases with 100 cc nonionic intravenous contrast without acute complication. Intravenous contrast was given to evaluate for pulmonary embolism. FINDINGS:  The degree of opacification of the pulmonary arteries is adequate. No intraluminal filling defects within the pulmonary arterial tree to suggest pulmonary embolism. Aorta is normal caliber with a uniform lumen without evidence of dissection. Lungs are clear. Right-sided superior vena cava is small. There is a prominent left-sided hemiazygos vein that is large. Included portion of the upper abdomen is unremarkable. Spleen is very small atrophic and calcified. IMPRESSION:  Negative for pulmonary embolism. Lungs are clear. Xr Chest Port    Result Date: 7/13/2017  AP chest radiograph History: admission, 38 years Male SICKLE CELL CRISIS ADMISSION Comparison: Chest radiograph May 12, 2017 Findings:  Left chest wall brigitte catheter appears to remain in anatomic position. Normal cardiomediastinal silhouette. Persistent low lung volumes. Mild subsegmental atelectasis bilateral lung bases. No evidence of pneumothorax, pleural effusion, or air space consolidation. Evidence of cholecystectomy. Visualized soft tissue and osseous structures otherwise unremarkable. Impression:  No significant interval change. 37 yo male with history of sickle cell anemia with pain:     Patient very well appearing, states pain completely resolved, negative CT, delta troponin negative. Discussed follow-up with primary care provider as well as cardiology and return with any Return or worsening chest pain, Any nausea or vomiting, any abdominal pain, any fevers or chills, or any further Concerns.

## 2017-08-11 LAB
ATRIAL RATE: 62 BPM
CALCULATED P AXIS, ECG09: 25 DEGREES
CALCULATED R AXIS, ECG10: 33 DEGREES
CALCULATED T AXIS, ECG11: 64 DEGREES
DIAGNOSIS, 93000: NORMAL
P-R INTERVAL, ECG05: 156 MS
Q-T INTERVAL, ECG07: 384 MS
QRS DURATION, ECG06: 80 MS
QTC CALCULATION (BEZET), ECG08: 389 MS
VENTRICULAR RATE, ECG03: 62 BPM

## 2017-08-11 NOTE — DISCHARGE INSTRUCTIONS
Chest Pain: Care Instructions  Your Care Instructions  There are many things that can cause chest pain. Some are not serious and will get better on their own in a few days. But some kinds of chest pain need more testing and treatment. Your doctor may have recommended a follow-up visit in the next 8 to 12 hours. If you are not getting better, you may need more tests or treatment. Even though your doctor has released you, you still need to watch for any problems. The doctor carefully checked you, but sometimes problems can develop later. If you have new symptoms or if your symptoms do not get better, get medical care right away. If you have worse or different chest pain or pressure that lasts more than 5 minutes or you passed out (lost consciousness), call 911 or seek other emergency help right away. A medical visit is only one step in your treatment. Even if you feel better, you still need to do what your doctor recommends, such as going to all suggested follow-up appointments and taking medicines exactly as directed. This will help you recover and help prevent future problems. How can you care for yourself at home? · Rest until you feel better. · Take your medicine exactly as prescribed. Call your doctor if you think you are having a problem with your medicine. · Do not drive after taking a prescription pain medicine. When should you call for help? Call 911 if:  · You passed out (lost consciousness). · You have severe difficulty breathing. · You have symptoms of a heart attack. These may include:  ¨ Chest pain or pressure, or a strange feeling in your chest.  ¨ Sweating. ¨ Shortness of breath. ¨ Nausea or vomiting. ¨ Pain, pressure, or a strange feeling in your back, neck, jaw, or upper belly or in one or both shoulders or arms. ¨ Lightheadedness or sudden weakness. ¨ A fast or irregular heartbeat.   After you call 911, the  may tell you to chew 1 adult-strength or 2 to 4 low-dose aspirin. Wait for an ambulance. Do not try to drive yourself. Call your doctor today if:  · You have any trouble breathing. · Your chest pain gets worse. · You are dizzy or lightheaded, or you feel like you may faint. · You are not getting better as expected. · You are having new or different chest pain. Where can you learn more? Go to http://socorro-rosita.info/. Enter A120 in the search box to learn more about \"Chest Pain: Care Instructions. \"  Current as of: March 20, 2017  Content Version: 11.3  © 7406-0446 [employer]. Care instructions adapted under license by [employer] (which disclaims liability or warranty for this information). If you have questions about a medical condition or this instruction, always ask your healthcare professional. [address] any warranty or liability for your use of this information. Sickle Cell Crisis: Care Instructions  Your Care Instructions    Sickle cell crisis is a painful episode that may begin suddenly in a person with sickle cell disease. Sickle cell disease turns normal, round red blood cells into cells that look like kendall or crescent moons. The sickle-shaped cells can get stuck in blood vessels, blocking blood flow and causing severe pain. The pain can occur in the bones of the spine, the arms and legs, the chest, and the abdomen. An episode may be called a \"painful event\" or \"painful crisis. \" Some people who have sickle cell disease have many painful events, while others have few or none. Treatment depends on the level of pain and how long it lasts. Sometimes taking nonprescription pain relievers can help. Or you may need stronger pain relief medicine that is prescribed or given by a doctor. You may need to be treated in the hospital.  It isn't always possible to know what sets off a painful event.  But triggers include being dehydrated, cold temperatures, infection, stress, and not getting enough oxygen. Follow-up care is a key part of your treatment and safety. Be sure to make and go to all appointments, and call your doctor if you are having problems. It's also a good idea to know your test results and keep a list of the medicines you take. How can you care for yourself at home? · Create a pain management plan with your doctor. This plan should include the types of medicines you can take and other actions you can take at home to relieve pain. · Drink plenty of fluids, enough so that your urine is light yellow or clear like water. If you have kidney, heart, or liver disease and have to limit fluids, talk with your doctor before you increase the amount of fluids you drink. · Take your medicines exactly as prescribed. Call your doctor if you think you are having a problem with your medicine. · Take pain medicines exactly as directed. ¨ If the doctor gave you a prescription medicine for pain, take it as prescribed. ¨ If you are not taking a prescription pain medicine, ask your doctor if you can take an over-the-counter medicine. · Avoid alcohol. It can make you dehydrated. · Dress warmly in cold weather. The cold and windy weather can lead to severe pain. · Do not smoke. Smoking can reduce the amount of oxygen in your blood. · Get plenty of sleep. When should you call for help? Call 911 anytime you think you may need emergency care. For example, call if:  · You passed out (lost consciousness). Call your doctor now or seek immediate medical care if:  · You are in severe pain. · You are dizzy or lightheaded, or you feel like you may faint. · You have a fever. · You are short of breath. · Your symptoms are getting worse. Watch closely for changes in your health, and be sure to contact your doctor if you are not getting better as expected. Where can you learn more? Go to http://socorro-rosita.info/.   Enter F104 in the search box to learn more about \"Sickle Cell Crisis: Care Instructions. \"  Current as of: October 13, 2016  Content Version: 11.3  © 9002-5115 [employer]. Care instructions adapted under license by [employer] (which disclaims liability or warranty for this information). If you have questions about a medical condition or this instruction, always ask your healthcare professional. [place] [address] any warranty or liability for your use of this information.

## 2017-11-11 ENCOUNTER — HOSPITAL ENCOUNTER (INPATIENT)
Age: 42
LOS: 1 days | Discharge: HOME | DRG: 812 | End: 2017-11-12
Attending: EMERGENCY MEDICINE | Admitting: INTERNAL MEDICINE
Payer: MEDICARE

## 2017-11-11 ENCOUNTER — APPOINTMENT (OUTPATIENT)
Dept: GENERAL RADIOLOGY | Age: 42
DRG: 812 | End: 2017-11-11
Attending: INTERNAL MEDICINE
Payer: MEDICARE

## 2017-11-11 DIAGNOSIS — D57.00 SICKLE CELL ANEMIA WITH CRISIS (HCC): ICD-10-CM

## 2017-11-11 DIAGNOSIS — I47.1 SVT (SUPRAVENTRICULAR TACHYCARDIA) (HCC): Primary | ICD-10-CM

## 2017-11-11 LAB
ALBUMIN SERPL-MCNC: 3.7 G/DL (ref 3.5–5)
ALBUMIN/GLOB SERPL: 0.8 {RATIO} (ref 1.2–3.5)
ALP SERPL-CCNC: 64 U/L (ref 50–136)
ALT SERPL-CCNC: 51 U/L (ref 12–65)
ANION GAP SERPL CALC-SCNC: 6 MMOL/L (ref 7–16)
AST SERPL-CCNC: 41 U/L (ref 15–37)
BASOPHILS # BLD: 0 K/UL (ref 0–0.2)
BASOPHILS NFR BLD: 0 % (ref 0–2)
BILIRUB SERPL-MCNC: 1.8 MG/DL (ref 0.2–1.1)
BUN SERPL-MCNC: 8 MG/DL (ref 6–23)
CALCIUM SERPL-MCNC: 8.7 MG/DL (ref 8.3–10.4)
CHLORIDE SERPL-SCNC: 105 MMOL/L (ref 98–107)
CO2 SERPL-SCNC: 27 MMOL/L (ref 21–32)
CREAT SERPL-MCNC: 0.81 MG/DL (ref 0.8–1.5)
DIFFERENTIAL METHOD BLD: ABNORMAL
EOSINOPHIL # BLD: 0 K/UL (ref 0–0.8)
EOSINOPHIL NFR BLD: 0 % (ref 0.5–7.8)
ERYTHROCYTE [DISTWIDTH] IN BLOOD BY AUTOMATED COUNT: 29 % (ref 11.9–14.6)
GLOBULIN SER CALC-MCNC: 4.4 G/DL (ref 2.3–3.5)
GLUCOSE SERPL-MCNC: 129 MG/DL (ref 65–100)
HAPTOGLOB SERPL-MCNC: <8 MG/DL (ref 30–200)
HCT VFR BLD AUTO: 19.8 % (ref 41.1–50.3)
HGB BLD-MCNC: 7 G/DL (ref 13.6–17.2)
HGB RETIC QN AUTO: 42 PG (ref 29–35)
IMM GRANULOCYTES # BLD: 0 K/UL (ref 0–0.5)
IMM GRANULOCYTES NFR BLD AUTO: 0 % (ref 0–5)
IMM RETICS NFR: 36.3 % (ref 2.3–13.4)
LDH SERPL L TO P-CCNC: 473 U/L (ref 100–190)
LYMPHOCYTES # BLD: 0.9 K/UL (ref 0.5–4.6)
LYMPHOCYTES NFR BLD: 13 % (ref 13–44)
MCH RBC QN AUTO: 34.8 PG (ref 26.1–32.9)
MCHC RBC AUTO-ENTMCNC: 35.4 G/DL (ref 31.4–35)
MCV RBC AUTO: 98.5 FL (ref 79.6–97.8)
MONOCYTES # BLD: 0.7 K/UL (ref 0.1–1.3)
MONOCYTES NFR BLD: 10 % (ref 4–12)
NEUTS SEG # BLD: 5.3 K/UL (ref 1.7–8.2)
NEUTS SEG NFR BLD: 77 % (ref 43–78)
PLATELET # BLD AUTO: 206 K/UL (ref 150–450)
PMV BLD AUTO: 9.8 FL (ref 10.8–14.1)
POTASSIUM SERPL-SCNC: 3.7 MMOL/L (ref 3.5–5.1)
PROCALCITONIN SERPL-MCNC: 0.2 NG/ML
PROT SERPL-MCNC: 8.1 G/DL (ref 6.3–8.2)
RBC # BLD AUTO: 2.01 M/UL (ref 4.23–5.67)
RETICS # AUTO: 0.12 M/UL (ref 0.03–0.1)
RETICS/RBC NFR AUTO: 6.1 % (ref 0.3–2)
SODIUM SERPL-SCNC: 138 MMOL/L (ref 136–145)
WBC # BLD AUTO: 7 K/UL (ref 4.3–11.1)

## 2017-11-11 PROCEDURE — 96374 THER/PROPH/DIAG INJ IV PUSH: CPT | Performed by: EMERGENCY MEDICINE

## 2017-11-11 PROCEDURE — 96376 TX/PRO/DX INJ SAME DRUG ADON: CPT | Performed by: EMERGENCY MEDICINE

## 2017-11-11 PROCEDURE — 77030013131 HC IV BLD ST ICUM -A

## 2017-11-11 PROCEDURE — 86920 COMPATIBILITY TEST SPIN: CPT | Performed by: EMERGENCY MEDICINE

## 2017-11-11 PROCEDURE — 84145 PROCALCITONIN (PCT): CPT | Performed by: INTERNAL MEDICINE

## 2017-11-11 PROCEDURE — 74011250636 HC RX REV CODE- 250/636: Performed by: EMERGENCY MEDICINE

## 2017-11-11 PROCEDURE — 96361 HYDRATE IV INFUSION ADD-ON: CPT | Performed by: EMERGENCY MEDICINE

## 2017-11-11 PROCEDURE — 74011250636 HC RX REV CODE- 250/636: Performed by: INTERNAL MEDICINE

## 2017-11-11 PROCEDURE — 30233N1 TRANSFUSION OF NONAUTOLOGOUS RED BLOOD CELLS INTO PERIPHERAL VEIN, PERCUTANEOUS APPROACH: ICD-10-PCS | Performed by: INTERNAL MEDICINE

## 2017-11-11 PROCEDURE — 74011250636 HC RX REV CODE- 250/636

## 2017-11-11 PROCEDURE — 99284 EMERGENCY DEPT VISIT MOD MDM: CPT | Performed by: EMERGENCY MEDICINE

## 2017-11-11 PROCEDURE — 86902 BLOOD TYPE ANTIGEN DONOR EA: CPT | Performed by: EMERGENCY MEDICINE

## 2017-11-11 PROCEDURE — 74011250637 HC RX REV CODE- 250/637: Performed by: INTERNAL MEDICINE

## 2017-11-11 PROCEDURE — 94760 N-INVAS EAR/PLS OXIMETRY 1: CPT

## 2017-11-11 PROCEDURE — 86900 BLOOD TYPING SEROLOGIC ABO: CPT | Performed by: EMERGENCY MEDICINE

## 2017-11-11 PROCEDURE — 83010 ASSAY OF HAPTOGLOBIN QUANT: CPT | Performed by: INTERNAL MEDICINE

## 2017-11-11 PROCEDURE — 83615 LACTATE (LD) (LDH) ENZYME: CPT | Performed by: INTERNAL MEDICINE

## 2017-11-11 PROCEDURE — 71010 XR CHEST SNGL V: CPT

## 2017-11-11 PROCEDURE — 85046 RETICYTE/HGB CONCENTRATE: CPT | Performed by: EMERGENCY MEDICINE

## 2017-11-11 PROCEDURE — P9016 RBC LEUKOCYTES REDUCED: HCPCS | Performed by: EMERGENCY MEDICINE

## 2017-11-11 PROCEDURE — 80053 COMPREHEN METABOLIC PANEL: CPT | Performed by: EMERGENCY MEDICINE

## 2017-11-11 PROCEDURE — 65660000000 HC RM CCU STEPDOWN

## 2017-11-11 PROCEDURE — 85025 COMPLETE CBC W/AUTO DIFF WBC: CPT | Performed by: EMERGENCY MEDICINE

## 2017-11-11 PROCEDURE — 96375 TX/PRO/DX INJ NEW DRUG ADDON: CPT | Performed by: EMERGENCY MEDICINE

## 2017-11-11 PROCEDURE — 36430 TRANSFUSION BLD/BLD COMPNT: CPT

## 2017-11-11 PROCEDURE — 74011000250 HC RX REV CODE- 250: Performed by: INTERNAL MEDICINE

## 2017-11-11 PROCEDURE — 93005 ELECTROCARDIOGRAM TRACING: CPT | Performed by: INTERNAL MEDICINE

## 2017-11-11 RX ORDER — SODIUM CHLORIDE 0.9 % (FLUSH) 0.9 %
5-10 SYRINGE (ML) INJECTION EVERY 8 HOURS
Status: DISCONTINUED | OUTPATIENT
Start: 2017-11-11 | End: 2017-11-12 | Stop reason: HOSPADM

## 2017-11-11 RX ORDER — LISINOPRIL 5 MG/1
5 TABLET ORAL DAILY
Status: DISCONTINUED | OUTPATIENT
Start: 2017-11-12 | End: 2017-11-12 | Stop reason: HOSPADM

## 2017-11-11 RX ORDER — ONDANSETRON 2 MG/ML
4 INJECTION INTRAMUSCULAR; INTRAVENOUS
Status: DISCONTINUED | OUTPATIENT
Start: 2017-11-11 | End: 2017-11-12 | Stop reason: HOSPADM

## 2017-11-11 RX ORDER — HEPARIN SODIUM 5000 [USP'U]/ML
5000 INJECTION, SOLUTION INTRAVENOUS; SUBCUTANEOUS EVERY 8 HOURS
Status: DISCONTINUED | OUTPATIENT
Start: 2017-11-11 | End: 2017-11-12 | Stop reason: HOSPADM

## 2017-11-11 RX ORDER — HYDROXYUREA 500 MG/1
500 CAPSULE ORAL 2 TIMES DAILY
Status: DISCONTINUED | OUTPATIENT
Start: 2017-11-11 | End: 2017-11-12 | Stop reason: HOSPADM

## 2017-11-11 RX ORDER — METOPROLOL TARTRATE 5 MG/5ML
5 INJECTION INTRAVENOUS
Status: DISCONTINUED | OUTPATIENT
Start: 2017-11-11 | End: 2017-11-12 | Stop reason: HOSPADM

## 2017-11-11 RX ORDER — ACETAMINOPHEN 325 MG/1
650 TABLET ORAL
Status: DISCONTINUED | OUTPATIENT
Start: 2017-11-11 | End: 2017-11-12 | Stop reason: HOSPADM

## 2017-11-11 RX ORDER — OXYCODONE HYDROCHLORIDE 15 MG/1
30 TABLET ORAL
Status: DISCONTINUED | OUTPATIENT
Start: 2017-11-11 | End: 2017-11-12 | Stop reason: HOSPADM

## 2017-11-11 RX ORDER — SODIUM CHLORIDE 9 MG/ML
200 INJECTION, SOLUTION INTRAVENOUS CONTINUOUS
Status: DISCONTINUED | OUTPATIENT
Start: 2017-11-11 | End: 2017-11-12 | Stop reason: HOSPADM

## 2017-11-11 RX ORDER — HYDROCODONE BITARTRATE AND ACETAMINOPHEN 7.5; 325 MG/1; MG/1
1 TABLET ORAL
Status: DISCONTINUED | OUTPATIENT
Start: 2017-11-11 | End: 2017-11-11

## 2017-11-11 RX ORDER — HYDROMORPHONE HYDROCHLORIDE 1 MG/ML
1 INJECTION, SOLUTION INTRAMUSCULAR; INTRAVENOUS; SUBCUTANEOUS
Status: COMPLETED | OUTPATIENT
Start: 2017-11-11 | End: 2017-11-11

## 2017-11-11 RX ORDER — DEFERASIROX 360 MG/1
1800 TABLET, FILM COATED ORAL DAILY
Status: DISCONTINUED | OUTPATIENT
Start: 2017-11-12 | End: 2017-11-12 | Stop reason: HOSPADM

## 2017-11-11 RX ORDER — HYDROMORPHONE HYDROCHLORIDE 1 MG/ML
2 INJECTION, SOLUTION INTRAMUSCULAR; INTRAVENOUS; SUBCUTANEOUS
Status: COMPLETED | OUTPATIENT
Start: 2017-11-11 | End: 2017-11-11

## 2017-11-11 RX ORDER — OXYCODONE HCL 20 MG/1
80 TABLET, FILM COATED, EXTENDED RELEASE ORAL EVERY 12 HOURS
Status: DISCONTINUED | OUTPATIENT
Start: 2017-11-11 | End: 2017-11-12 | Stop reason: HOSPADM

## 2017-11-11 RX ORDER — SODIUM CHLORIDE 0.9 % (FLUSH) 0.9 %
5-10 SYRINGE (ML) INJECTION AS NEEDED
Status: DISCONTINUED | OUTPATIENT
Start: 2017-11-11 | End: 2017-11-12 | Stop reason: HOSPADM

## 2017-11-11 RX ORDER — HYDROMORPHONE HYDROCHLORIDE 1 MG/ML
1 INJECTION, SOLUTION INTRAMUSCULAR; INTRAVENOUS; SUBCUTANEOUS
Status: DISCONTINUED | OUTPATIENT
Start: 2017-11-11 | End: 2017-11-12 | Stop reason: HOSPADM

## 2017-11-11 RX ORDER — FOLIC ACID 1 MG/1
1 TABLET ORAL DAILY
Status: DISCONTINUED | OUTPATIENT
Start: 2017-11-12 | End: 2017-11-12 | Stop reason: HOSPADM

## 2017-11-11 RX ORDER — SODIUM CHLORIDE 9 MG/ML
250 INJECTION, SOLUTION INTRAVENOUS AS NEEDED
Status: DISCONTINUED | OUTPATIENT
Start: 2017-11-11 | End: 2017-11-12 | Stop reason: HOSPADM

## 2017-11-11 RX ORDER — ADENOSINE 3 MG/ML
INJECTION, SOLUTION INTRAVENOUS
Status: COMPLETED
Start: 2017-11-11 | End: 2017-11-11

## 2017-11-11 RX ORDER — ADENOSINE 3 MG/ML
6 INJECTION, SOLUTION INTRAVENOUS
Status: COMPLETED | OUTPATIENT
Start: 2017-11-11 | End: 2017-11-11

## 2017-11-11 RX ORDER — PROMETHAZINE HYDROCHLORIDE 25 MG/ML
25 INJECTION, SOLUTION INTRAMUSCULAR; INTRAVENOUS
Status: COMPLETED | OUTPATIENT
Start: 2017-11-11 | End: 2017-11-11

## 2017-11-11 RX ORDER — METOPROLOL TARTRATE 25 MG/1
25 TABLET, FILM COATED ORAL 2 TIMES DAILY
Status: DISCONTINUED | OUTPATIENT
Start: 2017-11-11 | End: 2017-11-12 | Stop reason: HOSPADM

## 2017-11-11 RX ADMIN — SODIUM CHLORIDE 1000 ML: 900 INJECTION, SOLUTION INTRAVENOUS at 15:22

## 2017-11-11 RX ADMIN — ADENOSINE 6 MG: 3 INJECTION, SOLUTION INTRAVENOUS at 13:40

## 2017-11-11 RX ADMIN — OXYCODONE HYDROCHLORIDE 80 MG: 20 TABLET, FILM COATED, EXTENDED RELEASE ORAL at 21:15

## 2017-11-11 RX ADMIN — HEPARIN SODIUM 5000 UNITS: 5000 INJECTION, SOLUTION INTRAVENOUS; SUBCUTANEOUS at 17:20

## 2017-11-11 RX ADMIN — HYDROXYUREA 500 MG: 500 CAPSULE ORAL at 20:31

## 2017-11-11 RX ADMIN — HYDROMORPHONE HYDROCHLORIDE 1 MG: 1 INJECTION, SOLUTION INTRAMUSCULAR; INTRAVENOUS; SUBCUTANEOUS at 17:20

## 2017-11-11 RX ADMIN — SODIUM CHLORIDE 1000 ML: 900 INJECTION, SOLUTION INTRAVENOUS at 12:20

## 2017-11-11 RX ADMIN — Medication 10 ML: at 21:01

## 2017-11-11 RX ADMIN — PROMETHAZINE HYDROCHLORIDE 25 MG: 25 INJECTION INTRAMUSCULAR; INTRAVENOUS at 12:28

## 2017-11-11 RX ADMIN — SODIUM CHLORIDE 200 ML/HR: 900 INJECTION, SOLUTION INTRAVENOUS at 17:23

## 2017-11-11 RX ADMIN — SODIUM CHLORIDE 250 ML: 900 INJECTION, SOLUTION INTRAVENOUS at 20:56

## 2017-11-11 RX ADMIN — OXYCODONE HYDROCHLORIDE 30 MG: 15 TABLET ORAL at 20:31

## 2017-11-11 RX ADMIN — Medication 1 AMPULE: at 20:30

## 2017-11-11 RX ADMIN — SODIUM CHLORIDE 1000 ML: 900 INJECTION, SOLUTION INTRAVENOUS at 13:45

## 2017-11-11 RX ADMIN — HYDROMORPHONE HYDROCHLORIDE 2 MG: 1 INJECTION, SOLUTION INTRAMUSCULAR; INTRAVENOUS; SUBCUTANEOUS at 13:29

## 2017-11-11 RX ADMIN — METOPROLOL TARTRATE 25 MG: 25 TABLET, FILM COATED ORAL at 18:00

## 2017-11-11 RX ADMIN — HYDROMORPHONE HYDROCHLORIDE 1 MG: 1 INJECTION, SOLUTION INTRAMUSCULAR; INTRAVENOUS; SUBCUTANEOUS at 12:21

## 2017-11-11 RX ADMIN — Medication 10 ML: at 17:23

## 2017-11-11 NOTE — IP AVS SNAPSHOT
[facility] 
 
 
 [address] [facility] [address] [facility] 
[phone] Patient: Santos Pratt MRN: [id number] About your hospitalization You were admitted on:  November 11, 2017 You last received care in the:  [address] You were discharged on:  November 12, 2017 Why you were hospitalized Your primary diagnosis was:  Sickle Cell Pain Crisis (Hcc) Things You Need To Do (next 8 weeks) Follow up with Lubna Bui MD  
  
Phone:  [phone] Where:  [facility] Discharge Orders Procedure Order Date Status Priority Quantity Spec Type Associated Dx NURSING-MISCELLANEOUS: discharge instructions Cbc in am Bmp and magnesium in a week. F/u with cardiology in a week. 11/12/17 1453 Normal Routine 1 Comments:  Cbc in am 
Bmp and magnesium in a week. F/u with cardiology in a week. Questions: Description of Order:  discharge instructions A check cristina indicates which time of day the medication should be taken. My Medications TAKE these medications as instructed Instructions Each Dose to Equal  
 Morning Noon Evening Bedtime  
 deferasirox 360 mg Tab Commonly known as:  Deanna Giraldo Your last dose was: Your next dose is: Take 5 Tabs by mouth daily. 5 Tab  
    
   
   
   
  
 folic acid 1 mg tablet Commonly known as:  Sajan Your last dose was: Your next dose is: Take 1 mg by mouth daily. 1 mg  
    
   
   
   
  
 hydroxyurea 500 mg capsule Commonly known as:  HYDREA Your last dose was: Your next dose is: Take 500 mg by mouth two (2) times a day. 500 mg  
    
   
   
   
  
 lisinopril 5 mg tablet Commonly known as:  Lien Pittman Your last dose was: Your next dose is: Take 5 mg by mouth daily. Indications: HYPERTENSION  
 5 mg  
    
   
   
   
  
 magnesium oxide 400 mg tablet Commonly known as:  MAG-OX Your last dose was: Your next dose is: Take 1 Tab by mouth daily. 400 mg  
    
   
   
   
  
 metoprolol tartrate 25 mg tablet Commonly known as:  LOPRESSOR Your last dose was: Your next dose is: Take 25 mg by mouth two (2) times a day. Indications: HYPERTENSION  
 25 mg  
    
   
   
   
  
 * oxyCODONE IR 30 mg immediate release tablet Commonly known as:  Mabeline Matheus Your last dose was: Your next dose is: Take 1 Tab by mouth every four (4) hours as needed for Pain. Max Daily Amount: 180 mg.  
 30 mg  
    
   
   
   
  
 * oxyCODONE ER 80 mg ER tablet Commonly known as:  OxyCONTIN Your last dose was: Your next dose is: Take 1 Tab by mouth every twelve (12) hours. Max Daily Amount: 160 mg.  
 80 mg  
    
   
   
   
  
 promethazine 25 mg tablet Commonly known as:  PHENERGAN Your last dose was: Your next dose is: Take 1 Tab by mouth every six (6) hours as needed. May substitute suppository if vomiting 25 mg  
    
   
   
   
  
 * Notice: This list has 2 medication(s) that are the same as other medications prescribed for you. Read the directions carefully, and ask your doctor or other care provider to review them with you. Where to Get Your Medications Information on where to get these meds will be given to you by the nurse or doctor. ! Ask your nurse or doctor about these medications  
  magnesium oxide 400 mg tablet Discharge Instructions DISCHARGE SUMMARY from Nurse PATIENT INSTRUCTIONS: 
 
What to do at Home: 
Recommended activity: Activity as tolerated *  Please give a list of your current medications to your Primary Care Provider. *  Please update this list whenever your medications are discontinued, doses are 
    changed, or new medications (including over-the-counter products) are added. *  Please carry medication information at all times in case of emergency situations. These are general instructions for a healthy lifestyle: No smoking/ No tobacco products/ Avoid exposure to second hand smoke Surgeon General's Warning:  Quitting smoking now greatly reduces serious risk to your health. Obesity, smoking, and sedentary lifestyle greatly increases your risk for illness A healthy diet, regular physical exercise & weight monitoring are important for maintaining a healthy lifestyle You may be retaining fluid if you have a history of heart failure or if you experience any of the following symptoms:  Weight gain of 3 pounds or more overnight or 5 pounds in a week, increased swelling in our hands or feet or shortness of breath while lying flat in bed. Please call your doctor as soon as you notice any of these symptoms; do not wait until your next office visit. Recognize signs and symptoms of STROKE: 
 
F-face looks uneven A-arms unable to move or move unevenly S-speech slurred or non-existent T-time-call 911 as soon as signs and symptoms begin-DO NOT go Back to bed or wait to see if you get better-TIME IS BRAIN. Warning Signs of HEART ATTACK Call 911 if you have these symptoms: 
? Chest discomfort. Most heart attacks involve discomfort in the center of the chest that lasts more than a few minutes, or that goes away and comes back. It can feel like uncomfortable pressure, squeezing, fullness, or pain. ? Discomfort in other areas of the upper body. Symptoms can include pain or discomfort in one or both arms, the back, neck, jaw, or stomach. ? Shortness of breath with or without chest discomfort. ? Other signs may include breaking out in a cold sweat, nausea, or lightheadedness. Don't wait more than five minutes to call [address]! Fast action can save your life. Calling 911 is almost always the fastest way to get lifesaving treatment. Emergency Medical Services staff can begin treatment when they arrive  up to an hour sooner than if someone gets to the hospital by car. The discharge information has been reviewed with the patient. The patient verbalized understanding. Discharge medications reviewed with the patient and appropriate educational materials and side effects teaching were provided. Please call physician after your discharge if the following symptoms present: 1. Temperature greater than 100.5 2. Heart rate greater than 100 beats per minute 3. Increased respirations 4. Chills 5. Cough with any sputum (color: yellow, white, green, or bloody?) 6. Shortness of breath or change in breathing pattern 7. Bleeding:  Any prolonged bleeding presented from skin tears, cuts, bleeding from brushing teeth, nose bleed, bleeding noted in stool 8. Tenderness, swelling, or drainage from IV site or central line catheter Diet:Regular [employer] Announcement We are excited to announce that we are making your provider's discharge notes available to you in [employer]. You will see these notes when they are completed and signed by the physician that discharged you from your recent hospital stay. If you have any questions or concerns about any information you see in [employer], please call the Health Information Department where you were seen or reach out to your Primary Care Provider for more information about your plan of care. Introducing [facility]! Elina Lyon introduces [employer] patient portal. Now you can access parts of your medical record, email your doctor's office, and request medication refills online. 1. In your internet browser, go to [email or web address] 2. Click on the First Time User? Click Here link in the Sign In box. You will see the New Member Sign Up page. 3. Enter your [employer] Access Code exactly as it appears below. You will not need to use this code after youve completed the sign-up process. If you do not sign up before the expiration date, you must request a new code. · [employer] Access Code: [email or web address] Expires: 2/9/2018 11:09 AM 
 
4. Enter the last four digits of your Social Security Number (xxxx) and Date of Birth (mm/dd/yyyy) as indicated and click Submit. You will be taken to the next sign-up page. 5. Create a [employer] ID. This will be your [employer] login ID and cannot be changed, so think of one that is secure and easy to remember. 6. Create a [employer] password. You can change your password at any time. 7. Enter your Password Reset Question and Answer. This can be used at a later time if you forget your password. 8. Enter your e-mail address. You will receive e-mail notification when new information is available in [address] 9. Click Sign Up. You can now view and download portions of your medical record. 10. Click the Download Summary menu link to download a portable copy of your medical information. If you have questions, please visit the Frequently Asked Questions section of the [employer] website. Remember, [employer] is NOT to be used for urgent needs. For medical emergencies, dial 911. Now available from your iPhone and Android! Providers Seen During Your Hospitalization Provider Specialty Primary office phone Ajit Noonan MD Emergency Medicine [phone] Felicitas Arrieta MD Internal Medicine [phone] Your Primary Care Physician (PCP) Primary Care Physician Office Phone Office Fax Joshua Cary [phone] You are allergic to the following Allergen Reactions Compazine (Prochlorperazine Edisylate) Other (comments) Also makes him feel funny Morphine Other (comments) Makes him feel funny Reglan (Metoclopramide) Other (comments) \"feel funny\" Zofran (Ondansetron Hcl (Pf)) Other (comments) Make him feel funny Recent Documentation Height Weight BMI Smoking Status 1.778 m 73 kg 23.1 kg/m2 Never Smoker Emergency Contacts Name Discharge Info Relation Home Work Mobile Christa Vera  Spouse [phone] Patient Belongings The following personal items are in your possession at time of discharge: 
  Dental Appliances: None         Home Medications: None   Jewelry: Ring (wedding ring)  Clothing: Jacket/Coat, At bedside, Footwear, Pants, Undergarments    Other Valuables: Cell Phone, Usman Cornellner Please provide this summary of care documentation to your next provider. Signatures-by signing, you are acknowledging that this After Visit Summary has been reviewed with you and you have received a copy. Patient Signature:  ____________________________________________________________ Date:  ____________________________________________________________  
  
Danielle Gamble Provider Signature:  ____________________________________________________________ Date:  ____________________________________________________________

## 2017-11-11 NOTE — H&P
History and Physical    Subjective: Balbir Paul is a 38 y.o. AAM, with a pmh of HTN and sickle cell anemia, who presents to the ED today for severe pain in his shoulders, elbows, wrists and legs that began yesterday morning and is consistent with a sickle cell pain crisis. He states he feels the cold weather change brought on his symptoms. Denies cough, sore throat, dysuria, vomiting, abdominal pain, fever, chills. Started having watery diarrhea this morning with some nausea. Endorses compliance with medications. In the ED he had an episode of SVT with HR in 180s for which he was given adenosine that broke the SVT. Past Medical History:   Diagnosis Date    Chronic pain     HTN (hypertension)     Ill-defined condition     sickle cell    Iron overload due to repeated red blood cell transfusions 1/18/2017    Paroxysmal SVT (supraventricular tachycardia) (HCC) 7/9/2016    Sickle cell disease ([facility] [address]       Past Surgical History:   Procedure Laterality Date    HC PENILE IMPL DURA II POSITINBLE      HC PORT LIFE SNGLE LUMEN 5013      to L CW    HX CHOLECYSTECTOMY      HX OTHER SURGICAL      penile inplant    HX VASCULAR ACCESS       Family History   Problem Relation Age of Onset    Hypertension Other     Diabetes Father     Stroke Father     Sickle Cell Anemia Sister       Social History   Substance Use Topics    Smoking status: Never Smoker    Smokeless tobacco: Never Used    Alcohol use No       Prior to Admission medications    Medication Sig Start Date End Date Taking? Authorizing Provider   oxyCODONE ER (OXYCONTIN) 80 mg ER tablet Take 1 Tab by mouth every twelve (12) hours. Max Daily Amount: 160 mg. 7/17/17   Lila Loza MD   oxyCODONE IR (OXY-IR) 30 mg immediate release tablet Take 1 Tab by mouth every six (6) hours as needed. Max Daily Amount: 120 mg. 7/17/17   Lila Loza MD   deferasirox (JADENU) 360 mg tab Take 5 Tabs by mouth daily.  7/17/17   Ashish CHA Perico Almeida MD   hydroxyurea (HYDREA) 500 mg capsule Take 500 mg by mouth two (2) times a day. Historical Provider   promethazine (PHENERGAN) 25 mg tablet Take 1 Tab by mouth every six (6) hours as needed. May substitute suppository if vomiting 17   Ankit Mixon MD   oxyCODONE IR (OXY-IR) 30 mg immediate release tablet Take 1 Tab by mouth every four (4) hours as needed for Pain. Max Daily Amount: 180 mg. 17   Ankit Mixon MD   oxyCODONE ER (OXYCONTIN) 80 mg ER tablet Take 1 Tab by mouth every twelve (12) hours. Max Daily Amount: 160 mg. 17   Ankit Mixon MD   metoprolol (LOPRESSOR) 25 mg tablet Take 25 mg by mouth two (2) times a day. Indications: HYPERTENSION    Historical Provider   lisinopril (PRINIVIL, ZESTRIL) 5 mg tablet Take 5 mg by mouth daily. Indications: HYPERTENSION    Historical Provider   folic acid (FOLVITE) 1 mg tablet Take 1 mg by mouth daily. Usman García MD     Allergies   Allergen Reactions    Compazine [Prochlorperazine Edisylate] Other (comments)     Also makes him feel funny    Morphine Other (comments)     Makes him feel funny    Reglan [Metoclopramide] Other (comments)     \"feel funny\"    Zofran [Ondansetron Hcl (Pf)] Other (comments)     Make him feel funny        Review of Systems:  A comprehensive review of systems was negative except for that written in the History of Present Illness. Lives with wife, Barbara Parks and pet dog  Denies alcohol, tobacco, illicit drug use    Objective: Intake and Output:            Physical Exam:   General: awake, alert, oriented, no acute distress  Eyes; non icteric, EOMI  Neck; supple  CV: regular, tachycardic  Pulm; CTAB, no wheezing, non labored  Abd; soft, non tender, active bowel sounds  Neuro: cranial nerves II-XII grossly intact, no focal deficits of extremities  Skin/ext: warm, dry, left chest port with no surrounding erythema nor drainage    ECst EKG: SVT  2nd EKG:  Sinus tachycardia  Reviewed by me. Data Review:   Recent Results (from the past 24 hour(s))   CBC WITH AUTOMATED DIFF    Collection Time: 11/11/17 12:11 PM   Result Value Ref Range    WBC 7.0 4.3 - 11.1 K/uL    RBC 2.01 (L) 4.23 - 5.67 M/uL    HGB 7.0 (L) 13.6 - 17.2 g/dL    HCT 19.8 (LL) 41.1 - 50.3 %    MCV 98.5 (H) 79.6 - 97.8 FL    MCH 34.8 (H) 26.1 - 32.9 PG    MCHC 35.4 (H) 31.4 - 35.0 g/dL    RDW 29.0 (H) 11.9 - 14.6 %    PLATELET 420 077 - 388 K/uL    MPV 9.8 (L) 10.8 - 14.1 FL    DF AUTOMATED      NEUTROPHILS 77 43 - 78 %    LYMPHOCYTES 13 13 - 44 %    MONOCYTES 10 4.0 - 12.0 %    EOSINOPHILS 0 (L) 0.5 - 7.8 %    BASOPHILS 0 0.0 - 2.0 %    IMMATURE GRANULOCYTES 0 0.0 - 5.0 %    ABS. NEUTROPHILS 5.3 1.7 - 8.2 K/UL    ABS. LYMPHOCYTES 0.9 0.5 - 4.6 K/UL    ABS. MONOCYTES 0.7 0.1 - 1.3 K/UL    ABS. EOSINOPHILS 0.0 0.0 - 0.8 K/UL    ABS. BASOPHILS 0.0 0.0 - 0.2 K/UL    ABS. IMM. GRANS. 0.0 0.0 - 0.5 K/UL   METABOLIC PANEL, COMPREHENSIVE    Collection Time: 11/11/17 12:11 PM   Result Value Ref Range    Sodium 138 136 - 145 mmol/L    Potassium 3.7 3.5 - 5.1 mmol/L    Chloride 105 98 - 107 mmol/L    CO2 27 21 - 32 mmol/L    Anion gap 6 (L) 7 - 16 mmol/L    Glucose 129 (H) 65 - 100 mg/dL    BUN 8 6 - 23 MG/DL    Creatinine 0.81 0.8 - 1.5 MG/DL    GFR est AA >60 >60 ml/min/1.73m2    GFR est non-AA >60 >60 ml/min/1.73m2    Calcium 8.7 8.3 - 10.4 MG/DL    Bilirubin, total 1.8 (H) 0.2 - 1.1 MG/DL    ALT (SGPT) 51 12 - 65 U/L    AST (SGOT) 41 (H) 15 - 37 U/L    Alk.  phosphatase 64 50 - 136 U/L    Protein, total 8.1 6.3 - 8.2 g/dL    Albumin 3.7 3.5 - 5.0 g/dL    Globulin 4.4 (H) 2.3 - 3.5 g/dL    A-G Ratio 0.8 (L) 1.2 - 3.5     RETICULOCYTE COUNT    Collection Time: 11/11/17 12:11 PM   Result Value Ref Range    Reticulocyte count 6.1 (H) 0.3 - 2.0 %    Absolute Retic Cnt. 0.1230 (H) 0.026 - 0.095 M/ul    Immature Retic Fraction 36.3 (H) 2.3 - 13.4 %    Retic Hgb Conc. 42 (H) 29 - 35 pg         Assessment:     Active Problems:    Sickle cell pain crisis [facility]) (10/25/2012)    SVT    Plan:     Admit to remote telemetry. SVT seems controlled after dose of adenosine in ED but will have prn metoprolol ordered for HR > 110  NS IVFs running in ED- will bolus additional NS liter bolus then run  cc's/hr  Transfuse one unit PRBCs  Trend LDH, haptoglobin. Pain control with IV dilaudid. Check procalcitonin.      Full code  Ppx: subq heparin  Anticipated date of dc: > 48 hours    Signed By: Bong Swanson MD     November 11, 2017

## 2017-11-11 NOTE — ED PROVIDER NOTES
HPI Comments: Patient is a 64-year-old male who is coming in with pain in his arms and legs bilaterally. He has a history of sickle cell anemia and feels like he is in a crisis. He denies having any chest pain shortness of breath or fevers. He states this feels typical of a crisis. He is on oxycodone 30 mg 3 times a day at home when his sickle cell flares up and this was not working for him. Patient is a 38 y.o. male presenting with sickle cell disease. The history is provided by the patient. Sickle Cell Crisis    Pertinent negatives include no fever. Past Medical History:   Diagnosis Date    Chronic pain     HTN (hypertension)     Ill-defined condition     sickle cell    Iron overload due to repeated red blood cell transfusions 1/18/2017    Paroxysmal SV[facility]ra[facility]) ([facility]) 7/9/2016    Sickle cell disease ([address]        Past Surgical History:   Procedure Laterality Date    HC PENILE IMPL DURA II POSITINBLE      HC PORT LIFE SNGLE LUMEN 5013      to L CW    HX CHOLECYSTECTOMY      HX OTHER SURGICAL      penile inplant    HX VASCULAR ACCESS           Family History:   Problem Relation Age of Onset    Hypertension Other     Diabetes Father     Stroke Father     Sickle Cell Anemia Sister        Social History     Social History    Marital status:      Spouse name: N/A    Number of children: N/A    Years of education: N/A     Occupational History    Not on file. Social History Main Topics    Smoking status: Never Smoker    Smokeless tobacco: Never Used    Alcohol use No    Drug use: No    Sexual activity: Yes     Other Topics Concern    Not on file     Social History Narrative         ALLERGIES: Compazine [prochlorperazine edisylate]; Morphine; Reglan [metoclopramide]; and Zofran [ondansetron hcl (pf)]    Review of Systems   Constitutional: Negative for chills and fever.    Respiratory: Negative for chest tightness, shortness of breath, wheezing and stridor. Gastrointestinal: Negative for constipation, diarrhea, rectal pain and vomiting. Skin: Negative. All other systems reviewed and are negative. Vitals:    11/11/17 1126   BP: 137/89   Pulse: (!) 101   Resp: 18   Temp: 99.3 °F (37.4 °C)   SpO2: 100%   Weight: 70.8 kg (156 lb)   Height: 5' 10\" (1.778 m)            Physical Exam   Constitutional: He is oriented to person, place, and time. He appears well-developed and well-nourished. No distress. HENT:   Head: Normocephalic and atraumatic. Eyes: Conjunctivae are normal. No scleral icterus. Neck: Normal range of motion. Neck supple. Cardiovascular: Normal rate, regular rhythm and normal heart sounds. Pulmonary/Chest: Effort normal and breath sounds normal. No stridor. No respiratory distress. He has no wheezes. He has no rales. He exhibits no tenderness. Abdominal: Soft. He exhibits no distension. There is no tenderness. There is no rebound and no guarding. Neurological: He is alert and oriented to person, place, and time. No cranial nerve deficit. Coordination normal.   No focal weakness   Skin: Skin is warm and dry. No rash noted. He is not diaphoretic. No erythema. Psychiatric: He has a normal mood and affect. His behavior is normal.   Nursing note and vitals reviewed. MDM  Number of Diagnoses or Management Options  Sickle cell anemia with crisis [facility]):   SVT (supraventricular tachycardia) [facility]):   Diagnosis management comments: Patient was feeling better after second his Dilaudid his sickle cell crisis his hemoglobin is slightly lower than baseline. However when I went back in the room his heart rate had increased and he was found to be in SVT I gave him 6 mg of adenosine which broke the rhythm and he has now been in a sinus tachycardia and is trended down to a heart rate about 105 I have spoken to hospitalist for admission.       Cresencio Gonzalez MD; 11/11/2017 @2:16 PM Voice dictation software was used during the making of this note. This software is not perfect and grammatical and other typographical errors may be present.   This note has not been proofread for errors.  ===================================================================        Amount and/or Complexity of Data Reviewed  Clinical lab tests: ordered and reviewed (Results for orders placed or performed during the hospital encounter of 11/11/17  -CBC WITH AUTOMATED DIFF       Result                                            Value                         Ref Range                       WBC                                               7.0                           4.3 - 11.1 K/uL                 RBC                                               2.01 (L)                      4.23 - 5.67 M/uL                HGB                                               7.0 (L)                       13.6 - 17.2 g/dL                HCT                                               19.8 (LL)                     41.1 - 50.3 %                   MCV                                               98.5 (H)                      79.6 - 97.8 FL                  MCH                                               34.8 (H)                      26.1 - 32.9 PG                  MCHC                                              35.4 (H)                      31.4 - 35.0 g/dL                RDW                                               29.0 (H)                      11.9 - 14.6 %                   PLATELET                                          206                           150 - 450 K/uL                  MPV                                               9.8 (L)                       10.8 - 14.1 FL                  DF                                                AUTOMATED                                                     NEUTROPHILS                                       77                            43 - 78 %                       LYMPHOCYTES                                       13 13 - 44 %                       MONOCYTES                                         10                            4.0 - 12.0 %                    EOSINOPHILS                                       0 (L)                         0.5 - 7.8 %                     BASOPHILS                                         0                             0.0 - 2.0 %                     IMMATURE GRANULOCYTES                             0                             0.0 - 5.0 %                     ABS. NEUTROPHILS                                  5.3                           1.7 - 8.2 K/UL                  ABS. LYMPHOCYTES                                  0.9                           0.5 - 4.6 K/UL                  ABS. MONOCYTES                                    0.7                           0.1 - 1.3 K/UL                  ABS. EOSINOPHILS                                  0.0                           0.0 - 0.8 K/UL                  ABS. BASOPHILS                                    0.0                           0.0 - 0.2 K/UL                  ABS. IMM.  GRANS.                                  0.0                           0.0 - 0.5 K/UL             -METABOLIC PANEL, COMPREHENSIVE       Result                                            Value                         Ref Range                       Sodium                                            138                           136 - 145 mmol/L                Potassium                                         3.7                           3.5 - 5.1 mmol/L                Chloride                                          105                           98 - 107 mmol/L                 CO2                                               27                            21 - 32 mmol/L                  Anion gap                                         6 (L)                         7 - 16 mmol/L                   Glucose                                           129 (H)                       65 - 100 mg/dL                  BUN                                               8                             6 - 23 MG/DL                    Creatinine                                        0.81                          0.8 - 1.5 MG/DL                 GFR est AA                                        >60                           >60 ml/min/1.73m2               GFR est non-AA                                    >60                           >60 ml/min/1.73m2               Calcium                                           8.7                           8.3 - 10.4 MG/DL                Bilirubin, total                                  1.8 (H)                       0.2 - 1.1 MG/DL                 ALT (SGPT)                                        51                            12 - 65 U/L                     AST (SGOT)                                        41 (H)                        15 - 37 U/L                     Alk. phosphatase                                  64                            50 - 136 U/L                    Protein, total                                    8.1                           6.3 - 8.2 g/dL                  Albumin                                           3.7                           3.5 - 5.0 g/dL                  Globulin                                          4.4 (H)                       2.3 - 3.5 g/dL                  A-G Ratio                                         0.8 (L)                       1.2 - 3.5                  -RETICULOCYTE COUNT       Result                                            Value                         Ref Range                       Reticulocyte count                                6.1 (H)                       0.3 - 2.0 %                     Absolute Retic Cnt.                               0.1230 (H)                    0.026 - 0.095 M/ul              Immature Retic Fraction                           36.3 (H)                      2.3 - 13.4 % Retic Hgb Conc.                                   42 (H)                        29 - 35 pg                )      ED Course       Procedures

## 2017-11-11 NOTE — PROGRESS NOTES
TRANSFER - IN REPORT:    Verbal report received from ER on Sonido Graham  being received from ER for routine progression of care      Report consisted of patients Situation, Background, Assessment and   Recommendations(SBAR). Information from the following report(s) SBAR, Kardex, ED Summary, [facility] and Cardiac Rhythm sinus tach was reviewed with the receiving nurse. Opportunity for questions and clarification was provided. Assessment completed upon patients arrival to unit and care assumed. Skin assessment completed. Pt has no pressure ulcers or other areas of breakdown. Pt is able to move self around in bed.

## 2017-11-11 NOTE — ED NOTES
TRANSFER - OUT REPORT:    Verbal report given to Stanford(name) on Ivelisse Given  being transferred to 526(unit) for routine progression of care       Report consisted of patients Situation, Background, Assessment and   Recommendations(SBAR). Information from the following report(s) SBAR, ED Summary, [facility] and Recent Results was reviewed with the receiving nurse. Lines:   Venous Access Device Port 11/11/17 Upper chest (subclavicular area), left (Active)        Opportunity for questions and clarification was provided.

## 2017-11-11 NOTE — ED NOTES
Pt placed on monitor when HR was seen above 150. Pt was placed on zoll with pads on body. Dr. Scott Jane was notified. Pt was given adenosine while Dr. Scott Jane was in the room. Pt tolerated adenosine well.

## 2017-11-11 NOTE — IP AVS SNAPSHOT
[facility] ([facility] 
 
 
 [address] [place] [address] 
[phone] Patient: Darrian Man MRN: [id number] ZOP:[birth year] My Medications TAKE these medications as instructed Instructions Each Dose to Equal  
 Morning Noon Evening Bedtime  
 deferasirox 360 mg Tab Commonly known as:  Sabra Wilcox Your last dose was: Your next dose is: Take 5 Tabs by mouth daily. 5 Tab  
    
   
   
   
  
 folic acid 1 mg tablet Commonly known as:  Google Your last dose was: Your next dose is: Take 1 mg by mouth daily. 1 mg  
    
   
   
   
  
 hydroxyurea 500 mg capsule Commonly known as:  HYDREA Your last dose was: Your next dose is: Take 500 mg by mouth two (2) times a day. 500 mg  
    
   
   
   
  
 lisinopril 5 mg tablet Commonly known as:  Astrid Shamjody Your last dose was: Your next dose is: Take 5 mg by mouth daily. Indications: HYPERTENSION  
 5 mg  
    
   
   
   
  
 magnesium oxide 400 mg tablet Commonly known as:  MAG-OX Your last dose was: Your next dose is: Take 1 Tab by mouth daily. 400 mg  
    
   
   
   
  
 metoprolol tartrate 25 mg tablet Commonly known as:  LOPRESSOR Your last dose was: Your next dose is: Take 25 mg by mouth two (2) times a day. Indications: HYPERTENSION  
 25 mg  
    
   
   
   
  
 * oxyCODONE IR 30 mg immediate release tablet Commonly known as:  Charjody Giovanna Your last dose was: Your next dose is: Take 1 Tab by mouth every four (4) hours as needed for Pain. Max Daily Amount: 180 mg.  
 30 mg  
    
   
   
   
  
 * oxyCODONE ER 80 mg ER tablet Commonly known as:  OxyCONTIN Your last dose was: Your next dose is: Take 1 Tab by mouth every twelve (12) hours.  Max Daily Amount: 160 mg.  
 80 mg  
 promethazine 25 mg tablet Commonly known as:  PHENERGAN Your last dose was: Your next dose is: Take 1 Tab by mouth every six (6) hours as needed. May substitute suppository if vomiting 25 mg  
    
   
   
   
  
 * Notice: This list has 2 medication(s) that are the same as other medications prescribed for you. Read the directions carefully, and ask your doctor or other care provider to review them with you. Where to Get Your Medications Information on where to get these meds will be given to you by the nurse or doctor. ! Ask your nurse or doctor about these medications  
  magnesium oxide 400 mg tablet

## 2017-11-12 VITALS
HEART RATE: 60 BPM | DIASTOLIC BLOOD PRESSURE: 87 MMHG | HEIGHT: 70 IN | TEMPERATURE: 98.3 F | BODY MASS INDEX: 23.05 KG/M2 | OXYGEN SATURATION: 97 % | WEIGHT: 161 LBS | RESPIRATION RATE: 12 BRPM | SYSTOLIC BLOOD PRESSURE: 158 MMHG

## 2017-11-12 PROBLEM — D64.9 ANEMIA: Status: ACTIVE | Noted: 2017-11-12

## 2017-11-12 LAB
ANION GAP SERPL CALC-SCNC: 8 MMOL/L (ref 7–16)
ATRIAL RATE: 187 BPM
BASOPHILS # BLD: 0.1 K/UL (ref 0–0.2)
BASOPHILS NFR BLD: 1 % (ref 0–2)
BUN SERPL-MCNC: 6 MG/DL (ref 6–23)
CALCIUM SERPL-MCNC: 7.8 MG/DL (ref 8.3–10.4)
CALCULATED P AXIS, ECG09: 88 DEGREES
CALCULATED R AXIS, ECG10: 38 DEGREES
CALCULATED T AXIS, ECG11: 74 DEGREES
CHLORIDE SERPL-SCNC: 110 MMOL/L (ref 98–107)
CO2 SERPL-SCNC: 23 MMOL/L (ref 21–32)
CREAT SERPL-MCNC: 0.75 MG/DL (ref 0.8–1.5)
DIAGNOSIS, 93000: NORMAL
DIFFERENTIAL METHOD BLD: ABNORMAL
EOSINOPHIL # BLD: 0.2 K/UL (ref 0–0.8)
EOSINOPHIL NFR BLD: 2 % (ref 0.5–7.8)
ERYTHROCYTE [DISTWIDTH] IN BLOOD BY AUTOMATED COUNT: 26 % (ref 11.9–14.6)
GLUCOSE SERPL-MCNC: 127 MG/DL (ref 65–100)
HAPTOGLOB SERPL-MCNC: <8 MG/DL (ref 30–200)
HCT VFR BLD AUTO: 20.1 % (ref 41.1–50.3)
HGB BLD-MCNC: 7.1 G/DL (ref 13.6–17.2)
IMM GRANULOCYTES # BLD: 0 K/UL (ref 0–0.5)
IMM GRANULOCYTES NFR BLD AUTO: 0 % (ref 0–5)
LDH SERPL L TO P-CCNC: 430 U/L (ref 100–190)
LYMPHOCYTES # BLD: 4.7 K/UL (ref 0.5–4.6)
LYMPHOCYTES NFR BLD: 49 % (ref 13–44)
MAGNESIUM SERPL-MCNC: 1.7 MG/DL (ref 1.8–2.4)
MCH RBC QN AUTO: 35 PG (ref 26.1–32.9)
MCHC RBC AUTO-ENTMCNC: 35.3 G/DL (ref 31.4–35)
MCV RBC AUTO: 99 FL (ref 79.6–97.8)
MONOCYTES # BLD: 0.9 K/UL (ref 0.1–1.3)
MONOCYTES NFR BLD: 10 % (ref 4–12)
NEUTS SEG # BLD: 3.6 K/UL (ref 1.7–8.2)
NEUTS SEG NFR BLD: 38 % (ref 43–78)
PHOSPHATE SERPL-MCNC: 3.5 MG/DL (ref 2.5–4.5)
PLATELET # BLD AUTO: 151 K/UL (ref 150–450)
PMV BLD AUTO: 9.3 FL (ref 10.8–14.1)
POTASSIUM SERPL-SCNC: 4.1 MMOL/L (ref 3.5–5.1)
Q-T INTERVAL, ECG07: 246 MS
QRS DURATION, ECG06: 72 MS
QTC CALCULATION (BEZET), ECG08: 428 MS
RBC # BLD AUTO: 2.03 M/UL (ref 4.23–5.67)
SODIUM SERPL-SCNC: 141 MMOL/L (ref 136–145)
VENTRICULAR RATE, ECG03: 182 BPM
WBC # BLD AUTO: 9.6 K/UL (ref 4.3–11.1)

## 2017-11-12 PROCEDURE — 84100 ASSAY OF PHOSPHORUS: CPT | Performed by: INTERNAL MEDICINE

## 2017-11-12 PROCEDURE — 74011250637 HC RX REV CODE- 250/637: Performed by: INTERNAL MEDICINE

## 2017-11-12 PROCEDURE — 93306 TTE W/DOPPLER COMPLETE: CPT

## 2017-11-12 PROCEDURE — 83010 ASSAY OF HAPTOGLOBIN QUANT: CPT | Performed by: INTERNAL MEDICINE

## 2017-11-12 PROCEDURE — 86902 BLOOD TYPE ANTIGEN DONOR EA: CPT | Performed by: EMERGENCY MEDICINE

## 2017-11-12 PROCEDURE — 85025 COMPLETE CBC W/AUTO DIFF WBC: CPT | Performed by: INTERNAL MEDICINE

## 2017-11-12 PROCEDURE — P9016 RBC LEUKOCYTES REDUCED: HCPCS | Performed by: EMERGENCY MEDICINE

## 2017-11-12 PROCEDURE — 74011000250 HC RX REV CODE- 250: Performed by: INTERNAL MEDICINE

## 2017-11-12 PROCEDURE — 77030013131 HC IV BLD ST ICUM -A

## 2017-11-12 PROCEDURE — 36430 TRANSFUSION BLD/BLD COMPNT: CPT

## 2017-11-12 PROCEDURE — 74011250636 HC RX REV CODE- 250/636: Performed by: INTERNAL MEDICINE

## 2017-11-12 PROCEDURE — 83735 ASSAY OF MAGNESIUM: CPT | Performed by: INTERNAL MEDICINE

## 2017-11-12 PROCEDURE — 74011250636 HC RX REV CODE- 250/636: Performed by: FAMILY MEDICINE

## 2017-11-12 PROCEDURE — 36591 DRAW BLOOD OFF VENOUS DEVICE: CPT

## 2017-11-12 PROCEDURE — 80048 BASIC METABOLIC PNL TOTAL CA: CPT | Performed by: INTERNAL MEDICINE

## 2017-11-12 PROCEDURE — 83615 LACTATE (LD) (LDH) ENZYME: CPT | Performed by: INTERNAL MEDICINE

## 2017-11-12 RX ORDER — LANOLIN ALCOHOL/MO/W.PET/CERES
400 CREAM (GRAM) TOPICAL DAILY
Qty: 10 TAB | Refills: 0 | Status: SHIPPED | OUTPATIENT
Start: 2017-11-12

## 2017-11-12 RX ORDER — HEPARIN 100 UNIT/ML
300 SYRINGE INTRAVENOUS AS NEEDED
Status: DISCONTINUED | OUTPATIENT
Start: 2017-11-12 | End: 2017-11-12 | Stop reason: HOSPADM

## 2017-11-12 RX ORDER — MAGNESIUM SULFATE HEPTAHYDRATE 40 MG/ML
2 INJECTION, SOLUTION INTRAVENOUS ONCE
Status: COMPLETED | OUTPATIENT
Start: 2017-11-12 | End: 2017-11-12

## 2017-11-12 RX ORDER — SODIUM CHLORIDE 9 MG/ML
250 INJECTION, SOLUTION INTRAVENOUS AS NEEDED
Status: DISCONTINUED | OUTPATIENT
Start: 2017-11-12 | End: 2017-11-12 | Stop reason: HOSPADM

## 2017-11-12 RX ADMIN — OXYCODONE HYDROCHLORIDE 80 MG: 20 TABLET, FILM COATED, EXTENDED RELEASE ORAL at 07:42

## 2017-11-12 RX ADMIN — HYDROXYUREA 500 MG: 500 CAPSULE ORAL at 09:15

## 2017-11-12 RX ADMIN — OXYCODONE HYDROCHLORIDE 30 MG: 15 TABLET ORAL at 13:59

## 2017-11-12 RX ADMIN — Medication 1 AMPULE: at 07:42

## 2017-11-12 RX ADMIN — FOLIC ACID 1 MG: 1 TABLET ORAL at 07:42

## 2017-11-12 RX ADMIN — SODIUM CHLORIDE 200 ML/HR: 900 INJECTION, SOLUTION INTRAVENOUS at 11:17

## 2017-11-12 RX ADMIN — HYDROMORPHONE HYDROCHLORIDE 1 MG: 1 INJECTION, SOLUTION INTRAMUSCULAR; INTRAVENOUS; SUBCUTANEOUS at 05:30

## 2017-11-12 RX ADMIN — HYDROMORPHONE HYDROCHLORIDE 1 MG: 1 INJECTION, SOLUTION INTRAMUSCULAR; INTRAVENOUS; SUBCUTANEOUS at 00:33

## 2017-11-12 RX ADMIN — SODIUM CHLORIDE 200 ML/HR: 900 INJECTION, SOLUTION INTRAVENOUS at 05:30

## 2017-11-12 RX ADMIN — HEPARIN SODIUM 5000 UNITS: 5000 INJECTION, SOLUTION INTRAVENOUS; SUBCUTANEOUS at 00:37

## 2017-11-12 RX ADMIN — LISINOPRIL 5 MG: 5 TABLET ORAL at 07:42

## 2017-11-12 RX ADMIN — OXYCODONE HYDROCHLORIDE 30 MG: 15 TABLET ORAL at 07:45

## 2017-11-12 RX ADMIN — MAGNESIUM SULFATE IN WATER 2 G: 40 INJECTION, SOLUTION INTRAVENOUS at 09:17

## 2017-11-12 RX ADMIN — Medication 10 ML: at 05:30

## 2017-11-12 RX ADMIN — HYDROMORPHONE HYDROCHLORIDE 1 MG: 1 INJECTION, SOLUTION INTRAMUSCULAR; INTRAVENOUS; SUBCUTANEOUS at 11:15

## 2017-11-12 RX ADMIN — METOPROLOL TARTRATE 25 MG: 25 TABLET, FILM COATED ORAL at 07:42

## 2017-11-12 RX ADMIN — SODIUM CHLORIDE 200 ML/HR: 900 INJECTION, SOLUTION INTRAVENOUS at 00:29

## 2017-11-12 RX ADMIN — Medication 10 ML: at 15:01

## 2017-11-12 RX ADMIN — SODIUM CHLORIDE, PRESERVATIVE FREE 300 UNITS: 5 INJECTION INTRAVENOUS at 15:01

## 2017-11-12 RX ADMIN — HEPARIN SODIUM 5000 UNITS: 5000 INJECTION, SOLUTION INTRAVENOUS; SUBCUTANEOUS at 07:43

## 2017-11-12 NOTE — PROGRESS NOTES
END OF SHIFT NOTE:    Intake/Output  11/11 1901 - 11/12 0700  In: 3951 [P.O.:480; I.V.:1200]  Out: 500 [Urine:500]   Voiding: YES  Catheter: NO  Drain:              Stool:  0 occurrences. Emesis:  0 occurrences. VITAL SIGNS  Patient Vitals for the past 12 hrs:   Temp Pulse Resp BP SpO2   11/11/17 2315 99 °F (37.2 °C) 69 18 119/75 94 %   11/11/17 2209 99.1 °F (37.3 °C) 61 16 112/75 96 %   11/11/17 2124 98.3 °F (36.8 °C) 60 15 109/65 95 %   11/11/17 2055 98.3 °F (36.8 °C) 71 16 110/62 96 %   11/11/17 2046 - - - - 96 %   11/11/17 1938 98.9 °F (37.2 °C) 66 14 127/57 100 %       Pain Assessment  Pain 1  Pain Scale 1: Numeric (0 - 10) (11/12/17 0103)  Pain Intensity 1: 2 (11/12/17 0103)  Patient Stated Pain Goal: 2 (11/12/17 0103)  Pain Reassessment 1: Yes (11/12/17 0103)  Pain Onset 1: prior to admit (11/12/17 0033)  Pain Location 1: Generalized (11/12/17 0033)  Pain Orientation 1: Other (comment) (generalized) (11/12/17 0033)  Pain Description 1: Aching (11/12/17 0033)  Pain Intervention(s) 1: Medication (see MAR); Environmental changes (11/12/17 0033)    Ambulating  Yes;to BR    Additional Information:   Patient was transfused one unit of PRBC during the night for hgb of 7.0;monitored;tolerated well. Hospitalist contacted last night re: pain med orders. Medicated x 3 prn for pain  Shift report given to oncoming nurse at the bedside.     Dionisio Jeans, RN

## 2017-11-12 NOTE — PROGRESS NOTES
0655[facility]ing in bed. No needs voiced. No s/s of acute distress. 1506-I have reviewed discharge instructions with the patient. The patient verbalized understanding.

## 2017-11-12 NOTE — DISCHARGE INSTRUCTIONS
DISCHARGE SUMMARY from Nurse    PATIENT INSTRUCTIONS:    What to do at Home:  Recommended activity: Activity as tolerated    *  Please give a list of your current medications to your Primary Care Provider. *  Please update this list whenever your medications are discontinued, doses are      changed, or new medications (including over-the-counter products) are added. *  Please carry medication information at all times in case of emergency situations. These are general instructions for a healthy lifestyle:    No smoking/ No tobacco products/ Avoid exposure to second hand smoke  Surgeon General's Warning:  Quitting smoking now greatly reduces serious risk to your health. Obesity, smoking, and sedentary lifestyle greatly increases your risk for illness    A healthy diet, regular physical exercise & weight monitoring are important for maintaining a healthy lifestyle    You may be retaining fluid if you have a history of heart failure or if you experience any of the following symptoms:  Weight gain of 3 pounds or more overnight or 5 pounds in a week, increased swelling in our hands or feet or shortness of breath while lying flat in bed. Please call your doctor as soon as you notice any of these symptoms; do not wait until your next office visit. Recognize signs and symptoms of STROKE:    F-face looks uneven    A-arms unable to move or move unevenly    S-speech slurred or non-existent    T-time-call 911 as soon as signs and symptoms begin-DO NOT go       Back to bed or wait to see if you get better-TIME IS BRAIN. Warning Signs of HEART ATTACK     Call 911 if you have these symptoms:   Chest discomfort. Most heart attacks involve discomfort in the center of the chest that lasts more than a few minutes, or that goes away and comes back. It can feel like uncomfortable pressure, squeezing, fullness, or pain.  Discomfort in other areas of the upper body.  Symptoms can include pain or discomfort in one or both arms, the back, neck, jaw, or stomach.  Shortness of breath with or without chest discomfort.  Other signs may include breaking out in a cold sweat, nausea, or lightheadedness. Don't wait more than five minutes to call 911 - MINUTES MATTER! Fast action can save your life. Calling 911 is almost always the fastest way to get lifesaving treatment. Emergency Medical Services staff can begin treatment when they arrive -- up to an hour sooner than if someone gets to the hospital by car. The discharge information has been reviewed with the patient. The patient verbalized understanding. Discharge medications reviewed with the patient and appropriate educational materials and side effects teaching were provided. Please call physician after your discharge if the following symptoms present:    1. Temperature greater than 100.5   2. Heart rate greater than 100 beats per minute  3. Increased respirations   4. Chills  5. Cough with any sputum (color: yellow, white, green, or bloody?)  6. Shortness of breath or change in breathing pattern  7. Bleeding:  Any prolonged bleeding presented from skin tears, cuts, bleeding from brushing teeth, nose bleed, bleeding noted in stool  8.   Tenderness, swelling, or drainage from IV site or central line catheter    Diet:Regular

## 2017-11-12 NOTE — DISCHARGE SUMMARY
Hospitalist Discharge Summary     Admit Date:  2017 11:27 AM   Name:  Abby Kingsley   Age:  38 y.o.  :  1975   MRN:  [id number]   PCP:  Clara Garcia MD  Treatment Team: Attending Provider: Marie Nunes MD; Utilization Review: Claudell Banana    Problem List for this Hospitalization:  Hospital Problems as of 2017  Date Reviewed: 3/5/2012          Codes Class Noted - Resolved POA    * (Principal)Sickle cell pain crisis [facility]) ICD-10-CM: D57.00  ICD-9-CM: 282.62  10/25/2012 - Present Unknown                Admission HPI from 2017: Abby Kingsley is a 38 y.o. AAM, with a pmh of HTN and sickle cell anemia, who presents to the ED today for severe pain in his shoulders, elbows, wrists and legs that began yesterday morning and is consistent with a sickle cell pain crisis. He states he feels the cold weather change brought on his symptoms. Denies cough, sore throat, dysuria, vomiting, abdominal pain, fever, chills. Started having watery diarrhea this morning with some nausea. Endorses compliance with medications. In the ED he had an episode of SVT with HR in 180s for which he was given adenosine that broke the SVT. Hospital Course:  Pt did not have any more episodes of svt. Cont on pain medication ,fluids for sickle cell crisis. Pt was given 1 unit of prbc hb went from 7 to 7.1,pt was given one more unit of prbc.pt pain resolved. pt is stable for discharge. pt was advised to f/u with hematologist in am and f/u with cardiologist in 1 week. Follow up instructions below. Plan was discussed with patient. All questions answered. Patient was stable at time of discharge and was instructed to call or return if there are any concerns or recurrence of symptoms. Diagnostic Imaging/Tests:   Xr Chest Sngl V    Result Date: 2017  AP chest radiograph History: sickle cell crisis, 38 years Male  ER pt reports is having a sickle cell crisis.  ?Pain to arms and legs x 1 day Comparison: Chest radiograph August 10, 2017 Findings:  Left chest wall brigitte catheter appears to remain in anatomic position. Normal cardiomediastinal silhouette. Mild subsegmental atelectasis bilateral lung bases. No evidence of pneumothorax, pleural effusion, or air space consolidation. Visualized soft tissue and osseous structures otherwise unremarkable. Pt had low magnesium which was replaced. Impression:  No significant interval change. Echocardiogram results:  No results found for this visit on 11/11/17.       All Micro Results     None          Labs: Results:       BMP, Mg, Phos Recent Labs      11/12/17   0336  11/11/17   1211   NA  141  138   K  4.1  3.7   CL  110*  105   CO2  23  27   AGAP  8  6*   BUN  6  8   CREA  0.75*  0.81   CA  7.8*  8.7   GLU  127*  129*   MG  1.7*   --    PHOS  3.5   --       CBC Recent Labs      11/12/17   0336  11/11/17   1211   WBC  9.6  7.0   RBC  2.03*  2.01*   HGB  7.1*  7.0*   HCT  20.1*  19.8*   PLT  151  206   GRANS  38*  77   LYMPH  49*  13   EOS  2  0*   MONOS  10  10   BASOS  1  0   IG  0  0   ANEU  3.6  5.3   ABL  4.7*  0.9   TENZIN  0.2  0.0   ABM  0.9  0.7   ABB  0.1  0.0   AIG  0.0  0.0      LFT Recent Labs      11/11/17   1211   SGOT  41*   ALT  51   AP  64   TP  8.1   ALB  3.7   GLOB  4.4*   AGRAT  0.8*      Cardiac Testing Lab Results   Component Value Date/Time     04/26/2012 03:17 PM    CK 39 01/17/2017 03:35 AM    CK 42 01/17/2017 01:30 AM    CK 41 01/16/2017 09:05 PM    CK - MB 0.6 01/17/2017 03:35 AM    CK - MB <0.5 01/17/2017 01:30 AM    CK - MB <0.5 01/16/2017 09:05 PM    CK-MB Index 1.5 01/17/2017 03:35 AM    CK-MB Index CANNOT BE CALCULATED 01/17/2017 01:30 AM    CK-MB Index CANNOT BE CALCULATED 01/16/2017 09:05 PM    Troponin-I <0.05 04/26/2012 03:17 PM    Troponin-I, Qt. <0.02 08/10/2017 04:36 PM    Troponin-I, Qt. <0.02 01/17/2017 03:35 AM    Troponin-I, Qt. <0.02 01/17/2017 01:30 AM      Coagulation Tests Lab Results   Component Value Date/Time    Prothrombin time 11.6 02/08/2015 12:05 PM    Prothrombin time 11.9 10/24/2012 02:45 PM    INR 1.1 02/08/2015 12:05 PM    INR 1.1 10/24/2012 02:45 PM    aPTT 26.0 10/24/2012 02:45 PM      A1c No results found for: HBA1C, HGBE8, TTQ0WZXE   Lipid Panel No results found for: CHOL, CHOLPOCT, CHOLX, CHLST, CHOLV, 110560, HDL, LDL, LDLC, DLDLP, 846772, VLDLC, VLDL, TGLX, TRIGL, TRIGP, TGLPOCT, CHHD, CHHDX   Thyroid Panel No results found for: T4, T3U, TSH, TSHEXT     Most Recent UA Lab Results   Component Value Date/Time    Color YELLOW 07/14/2017 03:25 PM    Appearance CLEAR 07/14/2017 03:25 PM    Specific gravity 1.006 07/14/2017 03:25 PM    pH (UA) 6.0 07/14/2017 03:25 PM    Protein NEGATIVE  07/14/2017 03:25 PM    Glucose NEGATIVE  07/14/2017 03:25 PM    Ketone NEGATIVE  07/14/2017 03:25 PM    Bilirubin NEGATIVE  07/14/2017 03:25 PM    Blood NEGATIVE  07/14/2017 03:25 PM    Urobilinogen 1.0 07/14/2017 03:25 PM    Nitrites NEGATIVE  07/14/2017 03:25 PM    Leukocyte Esterase NEGATIVE  07/14/2017 03:25 PM        Allergies   Allergen Reactions    Compazine [Prochlorperazine Edisylate] Other (comments)     Also makes him feel funny    Morphine Other (comments)     Makes him feel funny    Reglan [Metoclopramide] Other (comments)     \"feel funny\"    Zofran [Ondansetron Hcl (Pf)] Other (comments)     Make him feel funny     Immunization History   Administered Date(s) Administered    Influenza Vaccine 09/10/2015    Influenza Vaccine Split 09/27/2012    ZZZ-RETIRED (DO NOT USE) Pneumococcal Vaccine (Unspecified Type) 09/21/2010       All Labs from Last 24 Hrs:  Recent Results (from the past 24 hour(s))   LD    Collection Time: 11/11/17  3:39 PM   Result Value Ref Range     (H) 100 - 190 U/L   HAPTOGLOBIN    Collection Time: 11/11/17  3:39 PM   Result Value Ref Range    Haptoglobin <8 (L) 30 - 200 mg/dL   PROCALCITONIN    Collection Time: 11/11/17  3:39 PM   Result Value Ref Range    Procalcitonin 0.2 ng/mL   LD    Collection Time: 11/12/17  3:36 AM   Result Value Ref Range     (H) 100 - 190 U/L   HAPTOGLOBIN    Collection Time: 11/12/17  3:36 AM   Result Value Ref Range    Haptoglobin <8 (L) 30 - 200 mg/dL   CBC WITH AUTOMATED DIFF    Collection Time: 11/12/17  3:36 AM   Result Value Ref Range    WBC 9.6 4.3 - 11.1 K/uL    RBC 2.03 (L) 4.23 - 5.67 M/uL    HGB 7.1 (L) 13.6 - 17.2 g/dL    HCT 20.1 (LL) 41.1 - 50.3 %    MCV 99.0 (H) 79.6 - 97.8 FL    MCH 35.0 (H) 26.1 - 32.9 PG    MCHC 35.3 (H) 31.4 - 35.0 g/dL    RDW 26.0 (H) 11.9 - 14.6 %    PLATELET 741 256 - 088 K/uL    MPV 9.3 (L) 10.8 - 14.1 FL    DF AUTOMATED      NEUTROPHILS 38 (L) 43 - 78 %    LYMPHOCYTES 49 (H) 13 - 44 %    MONOCYTES 10 4.0 - 12.0 %    EOSINOPHILS 2 0.5 - 7.8 %    BASOPHILS 1 0.0 - 2.0 %    IMMATURE GRANULOCYTES 0 0.0 - 5.0 %    ABS. NEUTROPHILS 3.6 1.7 - 8.2 K/UL    ABS. LYMPHOCYTES 4.7 (H) 0.5 - 4.6 K/UL    ABS. MONOCYTES 0.9 0.1 - 1.3 K/UL    ABS. EOSINOPHILS 0.2 0.0 - 0.8 K/UL    ABS. BASOPHILS 0.1 0.0 - 0.2 K/UL    ABS. IMM.  GRANS. 0.0 0.0 - 0.5 K/UL   METABOLIC PANEL, BASIC    Collection Time: 11/12/17  3:36 AM   Result Value Ref Range    Sodium 141 136 - 145 mmol/L    Potassium 4.1 3.5 - 5.1 mmol/L    Chloride 110 (H) 98 - 107 mmol/L    CO2 23 21 - 32 mmol/L    Anion gap 8 7 - 16 mmol/L    Glucose 127 (H) 65 - 100 mg/dL    BUN 6 6 - 23 MG/DL    Creatinine 0.75 (L) 0.8 - 1.5 MG/DL    GFR est AA >60 >60 ml/min/1.73m2    GFR est non-AA >60 >60 ml/min/1.73m2    Calcium 7.8 (L) 8.3 - 10.4 MG/DL   MAGNESIUM    Collection Time: 11/12/17  3:36 AM   Result Value Ref Range    Magnesium 1.7 (L) 1.8 - 2.4 mg/dL   PHOSPHORUS    Collection Time: 11/12/17  3:36 AM   Result Value Ref Range    Phosphorus 3.5 2.5 - 4.5 MG/DL       Discharge Exam:  Patient Vitals for the past 24 hrs:   Temp Pulse Resp BP SpO2   11/12/17 1450 98.3 °F (36.8 °C) 60 12 158/87 97 %   11/12/17 1354 98.2 °F (36.8 °C) 71 12 120/80 98 %   11/12/17 1254 98.3 °F (36.8 °C) 67 16 113/71 96 %   11/12/17 1117 98.6 °F (37 °C) 71 16 103/53 94 %   11/12/17 0723 98.2 °F (36.8 °C) 70 16 140/87 94 %   11/12/17 0434 98.4 °F (36.9 °C) (!) 54 14 113/71 97 %   11/11/17 2315 99 °F (37.2 °C) 69 18 119/75 94 %   11/11/17 2209 99.1 °F (37.3 °C) 61 16 112/75 96 %   11/11/17 2124 98.3 °F (36.8 °C) 60 15 109/65 95 %   11/11/17 2055 98.3 °F (36.8 °C) 71 16 110/62 96 %   11/11/17 2046 - - - - 96 %   11/11/17 1938 98.9 °F (37.2 °C) 66 14 127/57 100 %   11/11/17 1530 99.5 °F (37.5 °C) 99 20 127/77 94 %     Oxygen Therapy  O2 Sat (%): 97 % (11/12/17 1450)  Pulse via Oximetry: 64 beats per minute (11/11/17 2046)  O2 Device: Room air (11/12/17 1117)    Intake/Output Summary (Last 24 hours) at 11/12/17 1500  Last data filed at 11/12/17 1450   Gross per 24 hour   Intake             4544 ml   Output              500 ml   Net             4044 ml       General:    Well nourished. Alert. No distress. Eyes:   Normal sclera. Extraocular movements intact. ENT:  Normocephalic, atraumatic. Moist mucous membranes  CV:   Regular rate and rhythm. No murmur, rub, or gallop. Lungs:  Clear to auscultation bilaterally. No wheezing, rhonchi, or rales. Abdomen: Soft, nontender, nondistended. Bowel sounds normal.   Extremities: Warm and dry. No cyanosis or edema. Neurologic: CN II-XII grossly intact. Sensation intact. Skin:     No rashes or jaundice. Psych:  Normal mood and affect. Discharge Info:   Current Discharge Medication List      START taking these medications    Details   magnesium oxide (MAG-OX) 400 mg tablet Take 1 Tab by mouth daily. Qty: 10 Tab, Refills: 0         CONTINUE these medications which have NOT CHANGED    Details   deferasirox (JADENU) 360 mg tab Take 5 Tabs by mouth daily. Qty: 150 Tab, Refills: 0      hydroxyurea (HYDREA) 500 mg capsule Take 500 mg by mouth two (2) times a day. oxyCODONE ER (OXYCONTIN) 80 mg ER tablet Take 1 Tab by mouth every twelve (12) hours.  Max Daily Amount: 160 mg.  Qty: 6 Tab, Refills: 0      metoprolol (LOPRESSOR) 25 mg tablet Take 25 mg by mouth two (2) times a day. Indications: HYPERTENSION      lisinopril (PRINIVIL, ZESTRIL) 5 mg tablet Take 5 mg by mouth daily. Indications: HYPERTENSION      folic acid (FOLVITE) 1 mg tablet Take 1 mg by mouth daily. promethazine (PHENERGAN) 25 mg tablet Take 1 Tab by mouth every six (6) hours as needed. May substitute suppository if vomiting  Qty: 20 Tab, Refills: 0      oxyCODONE IR (OXY-IR) 30 mg immediate release tablet Take 1 Tab by mouth every four (4) hours as needed for Pain. Max Daily Amount: 180 mg.  Qty: 12 Tab, Refills: 0               Disposition: home    Activity: normal  Diet: DIET CARDIAC Regular    Follow-up Appointments   Procedures    FOLLOW UP VISIT Appointment in: Other (Specify) Pt has appointment with hematologist /sickle cell doctor tomorrow-advised to keep it. Cbc 11/12/17. Pt has appointment with hematologist /sickle cell doctor tomorrow-advised to keep it. Cbc 11/12/17. Standing Status:   Standing     Number of Occurrences:   1     Order Specific Question:   Appointment in     Answer: Other (Specify)       F/u with cardiology in 1 week. Cbc and magnesium in1 week with pcp  Follow-up Information     Follow up With Details Comments [address] [place] [address] III, MD   [address] [facility]  [phone]            Time spent in patient discharge planning and coordination 25 minutes.     Signed:  Stephanie Terrell MD

## 2017-11-13 LAB
ABO + RH BLD: NORMAL
ANTIGENS PRESENT RBC DONR: NORMAL
ANTIGENS PRESENT RBC DONR: NORMAL
BLD PROD TYP BPU: NORMAL
BLD PROD TYP BPU: NORMAL
BLOOD GROUP ANTIBODIES SERPL: NORMAL
BPU ID: NORMAL
BPU ID: NORMAL
CROSSMATCH RESULT,%XM: NORMAL
CROSSMATCH RESULT,%XM: NORMAL
SPECIMEN EXP DATE BLD: NORMAL
STATUS OF UNIT,%ST: NORMAL
STATUS OF UNIT,%ST: NORMAL
UNIT DIVISION, %UDIV: 0
UNIT DIVISION, %UDIV: 0

## 2018-02-07 ENCOUNTER — HOSPITAL ENCOUNTER (EMERGENCY)
Age: 43
Discharge: HOME | End: 2018-02-07
Attending: EMERGENCY MEDICINE
Payer: MEDICARE

## 2018-02-07 VITALS
WEIGHT: 157 LBS | HEIGHT: 70 IN | RESPIRATION RATE: 18 BRPM | OXYGEN SATURATION: 98 % | TEMPERATURE: 99.2 F | DIASTOLIC BLOOD PRESSURE: 76 MMHG | HEART RATE: 104 BPM | BODY MASS INDEX: 22.48 KG/M2 | SYSTOLIC BLOOD PRESSURE: 139 MMHG

## 2018-02-07 DIAGNOSIS — D57.00 SICKLE CELL ANEMIA WITH CRISIS (HCC): Primary | ICD-10-CM

## 2018-02-07 LAB
ALBUMIN SERPL-MCNC: 3.6 G/DL (ref 3.5–5)
ALBUMIN/GLOB SERPL: 0.6 {RATIO} (ref 1.2–3.5)
ALP SERPL-CCNC: 102 U/L (ref 50–136)
ALT SERPL-CCNC: 243 U/L (ref 12–65)
ANION GAP SERPL CALC-SCNC: 9 MMOL/L (ref 7–16)
AST SERPL-CCNC: 186 U/L (ref 15–37)
BASOPHILS # BLD: 0 K/UL (ref 0–0.2)
BASOPHILS NFR BLD: 0 % (ref 0–2)
BILIRUB SERPL-MCNC: 0.7 MG/DL (ref 0.2–1.1)
BUN SERPL-MCNC: 13 MG/DL (ref 6–23)
CALCIUM SERPL-MCNC: 8.9 MG/DL (ref 8.3–10.4)
CHLORIDE SERPL-SCNC: 104 MMOL/L (ref 98–107)
CO2 SERPL-SCNC: 26 MMOL/L (ref 21–32)
CREAT SERPL-MCNC: 0.84 MG/DL (ref 0.8–1.5)
DIFFERENTIAL METHOD BLD: ABNORMAL
EOSINOPHIL # BLD: 0 K/UL (ref 0–0.8)
EOSINOPHIL NFR BLD: 0 % (ref 0.5–7.8)
ERYTHROCYTE [DISTWIDTH] IN BLOOD BY AUTOMATED COUNT: 19.3 % (ref 11.9–14.6)
GLOBULIN SER CALC-MCNC: 5.6 G/DL (ref 2.3–3.5)
GLUCOSE SERPL-MCNC: 126 MG/DL (ref 65–100)
HCT VFR BLD AUTO: 29.4 % (ref 41.1–50.3)
HGB BLD-MCNC: 10 G/DL (ref 13.6–17.2)
HGB RETIC QN AUTO: 35 PG (ref 29–35)
IMM GRANULOCYTES # BLD: 0 K/UL (ref 0–0.5)
IMM GRANULOCYTES NFR BLD AUTO: 0 % (ref 0–5)
IMM RETICS NFR: 14.2 % (ref 2.3–13.4)
LYMPHOCYTES # BLD: 1.3 K/UL (ref 0.5–4.6)
LYMPHOCYTES NFR BLD: 19 % (ref 13–44)
MCH RBC QN AUTO: 28.6 PG (ref 26.1–32.9)
MCHC RBC AUTO-ENTMCNC: 34 G/DL (ref 31.4–35)
MCV RBC AUTO: 84 FL (ref 79.6–97.8)
MONOCYTES # BLD: 0.6 K/UL (ref 0.1–1.3)
MONOCYTES NFR BLD: 9 % (ref 4–12)
NEUTS SEG # BLD: 4.9 K/UL (ref 1.7–8.2)
NEUTS SEG NFR BLD: 72 % (ref 43–78)
PLATELET # BLD AUTO: 218 K/UL (ref 150–450)
PMV BLD AUTO: 10.7 FL (ref 10.8–14.1)
POTASSIUM SERPL-SCNC: 4 MMOL/L (ref 3.5–5.1)
PROT SERPL-MCNC: 9.2 G/DL (ref 6.3–8.2)
RBC # BLD AUTO: 3.5 M/UL (ref 4.23–5.67)
RETICS # AUTO: 0.06 M/UL (ref 0.03–0.1)
RETICS/RBC NFR AUTO: 1.8 % (ref 0.3–2)
SODIUM SERPL-SCNC: 139 MMOL/L (ref 136–145)
WBC # BLD AUTO: 6.8 K/UL (ref 4.3–11.1)

## 2018-02-07 PROCEDURE — 96376 TX/PRO/DX INJ SAME DRUG ADON: CPT | Performed by: EMERGENCY MEDICINE

## 2018-02-07 PROCEDURE — 96375 TX/PRO/DX INJ NEW DRUG ADDON: CPT | Performed by: EMERGENCY MEDICINE

## 2018-02-07 PROCEDURE — 74011250636 HC RX REV CODE- 250/636: Performed by: EMERGENCY MEDICINE

## 2018-02-07 PROCEDURE — 96374 THER/PROPH/DIAG INJ IV PUSH: CPT | Performed by: EMERGENCY MEDICINE

## 2018-02-07 PROCEDURE — 96361 HYDRATE IV INFUSION ADD-ON: CPT | Performed by: EMERGENCY MEDICINE

## 2018-02-07 PROCEDURE — 80053 COMPREHEN METABOLIC PANEL: CPT | Performed by: EMERGENCY MEDICINE

## 2018-02-07 PROCEDURE — 85025 COMPLETE CBC W/AUTO DIFF WBC: CPT | Performed by: EMERGENCY MEDICINE

## 2018-02-07 PROCEDURE — 85046 RETICYTE/HGB CONCENTRATE: CPT | Performed by: EMERGENCY MEDICINE

## 2018-02-07 PROCEDURE — 99283 EMERGENCY DEPT VISIT LOW MDM: CPT | Performed by: EMERGENCY MEDICINE

## 2018-02-07 PROCEDURE — 96372 THER/PROPH/DIAG INJ SC/IM: CPT | Performed by: EMERGENCY MEDICINE

## 2018-02-07 RX ORDER — DIPHENHYDRAMINE HYDROCHLORIDE 50 MG/ML
12.5 INJECTION, SOLUTION INTRAMUSCULAR; INTRAVENOUS
Status: COMPLETED | OUTPATIENT
Start: 2018-02-07 | End: 2018-02-07

## 2018-02-07 RX ORDER — HYDROMORPHONE HYDROCHLORIDE 2 MG/ML
1 INJECTION, SOLUTION INTRAMUSCULAR; INTRAVENOUS; SUBCUTANEOUS ONCE
Status: COMPLETED | OUTPATIENT
Start: 2018-02-07 | End: 2018-02-07

## 2018-02-07 RX ORDER — PROMETHAZINE HYDROCHLORIDE 25 MG/ML
25 INJECTION, SOLUTION INTRAMUSCULAR; INTRAVENOUS
Status: COMPLETED | OUTPATIENT
Start: 2018-02-07 | End: 2018-02-07

## 2018-02-07 RX ADMIN — SODIUM CHLORIDE 1000 ML: 900 INJECTION, SOLUTION INTRAVENOUS at 17:25

## 2018-02-07 RX ADMIN — HYDROMORPHONE HYDROCHLORIDE 1 MG: 2 INJECTION, SOLUTION INTRAMUSCULAR; INTRAVENOUS; SUBCUTANEOUS at 17:25

## 2018-02-07 RX ADMIN — DIPHENHYDRAMINE HYDROCHLORIDE 12.5 MG: 50 INJECTION, SOLUTION INTRAMUSCULAR; INTRAVENOUS at 17:25

## 2018-02-07 RX ADMIN — HYDROMORPHONE HYDROCHLORIDE 1 MG: 2 INJECTION, SOLUTION INTRAMUSCULAR; INTRAVENOUS; SUBCUTANEOUS at 18:08

## 2018-02-07 RX ADMIN — PROMETHAZINE HYDROCHLORIDE 25 MG: 25 INJECTION INTRAMUSCULAR; INTRAVENOUS at 17:24

## 2018-02-07 NOTE — ED TRIAGE NOTES
Pt states he is here for a sickle cell crisis. Pt states his arms and legs are hurting since yesterday.,   Pt reports being at [facility] yesterday and was told they could not get an iv on him and they did not have any pain meds.

## 2018-02-07 NOTE — ED NOTES
I have reviewed discharge instructions with the patient and spouse. The patient and spouse verbalized understanding. Patient left ED via Discharge Method: ambulatory to Home with wife. Opportunity for questions and clarification provided. Patient given 0 scripts. To continue your aftercare when you leave the hospital, you may receive an automated call from our care team to check in on how you are doing. This is a free service and part of our promise to provide the best care and service to meet your aftercare needs.  If you have questions, or wish to unsubscribe from this service please call [phone]. Thank you for Choosing our [facility] Emergency Department.

## 2018-02-07 NOTE — DISCHARGE INSTRUCTIONS
Sickle Cell Crisis: Care Instructions  Your Care Instructions    Sickle cell crisis is a painful episode that may begin suddenly in a person with sickle cell disease. Sickle cell disease turns normal, round red blood cells into cells that look like kendall or crescent moons. The sickle-shaped cells can get stuck in blood vessels, blocking blood flow and causing severe pain. The pain can occur in the bones of the spine, the arms and legs, the chest, and the abdomen. An episode may be called a \"painful event\" or \"painful crisis. \" Some people who have sickle cell disease have many painful events, while others have few or none. Treatment depends on the level of pain and how long it lasts. Sometimes taking nonprescription pain relievers can help. Or you may need stronger pain relief medicine that is prescribed or given by a doctor. You may need to be treated in the hospital.  It isn't always possible to know what sets off a painful event. But triggers include being dehydrated, cold temperatures, infection, stress, and not getting enough oxygen. Follow-up care is a key part of your treatment and safety. Be sure to make and go to all appointments, and call your doctor if you are having problems. It's also a good idea to know your test results and keep a list of the medicines you take. How can you care for yourself at home? · Create a pain management plan with your doctor. This plan should include the types of medicines you can take and other actions you can take at home to relieve pain. · Drink plenty of fluids, enough so that your urine is light yellow or clear like water. If you have kidney, heart, or liver disease and have to limit fluids, talk with your doctor before you increase the amount of fluids you drink. · Take your medicines exactly as prescribed. Call your doctor if you think you are having a problem with your medicine. · Take pain medicines exactly as directed.   ¨ If the doctor gave you a prescription medicine for pain, take it as prescribed. ¨ If you are not taking a prescription pain medicine, ask your doctor if you can take an over-the-counter medicine. · Avoid alcohol. It can make you dehydrated. · Dress warmly in cold weather. The cold and windy weather can lead to severe pain. · Do not smoke. Smoking can reduce the amount of oxygen in your blood. · Get plenty of sleep. When should you call for help? Call 911 anytime you think you may need emergency care. For example, call if:  ? · You have symptoms of a severe problem from sickle cell. ? · You have symptoms of a stroke. These may include:  ¨ Sudden numbness, tingling, weakness, or loss of movement in your face, arm, or leg, especially on only one side of your body. ¨ Sudden vision changes. ¨ Sudden trouble speaking. ¨ Sudden confusion or trouble understanding simple statements. ¨ Sudden problems with walking or balance. ¨ A sudden, severe headache that is different from past headaches. ? · You are in severe pain. ? · You have symptoms of a heart attack. These may include:  ¨ Chest pain or pressure, or a strange feeling in the chest.  ¨ Sweating. ¨ Shortness of breath. ¨ Nausea or vomiting. ¨ Pain, pressure, or a strange feeling in the back, neck, jaw, or upper belly or in one or both shoulders or arms. ¨ Lightheadedness or sudden weakness. ¨ A fast or irregular heartbeat. After you call 911, the  may tell you to chew 1 adult-strength or 2 to 4 low-dose aspirin. Wait for an ambulance. Do not try to drive yourself. ?Call your doctor now or seek immediate medical care if:  ? · You have a fever. ? Watch closely for changes in your health, and be sure to contact your doctor if you have any problems. Where can you learn more? Go to http://socorro-rosita.info/. Enter F104 in the search box to learn more about \"Sickle Cell Crisis: Care Instructions. \"  Current as of: October 13, 2016  Content Version: 11.4  © 6788-8601 [employer]. Care instructions adapted under license by [employer] (which disclaims liability or warranty for this information). If you have questions about a medical condition or this instruction, always ask your healthcare professional. [address] any warranty or liability for your use of this information. Sickle Cell Disease: Care Instructions  Your Care Instructions    Sickle cell disease turns normal, round red blood cells into misshaped cells that look like kednall or crescent moons. The sickle-shaped cells can get stuck in blood vessels, blocking blood flow and causing severe pain. The sickle-shaped cells also can harm organs, muscles, and bones. It is a lifelong condition. Sickle cell disease is passed down in families. You can talk to your doctor about whether to have genetic tests to find out the chance of having a child with the disease. Your doctor also may recommend that your family members get tested for sickle cell disease. Your doctor may treat you with medicines. Some people get blood transfusions or a bone marrow transplant. Managing pain is an important part of your treatment. Follow-up care is a key part of your treatment and safety. Be sure to make and go to all appointments, and call your doctor if you are having problems. It's also a good idea to know your test results and keep a list of the medicines you take. How can you care for yourself at home? · Take your medicines exactly as prescribed. Call your doctor if you think you are having a problem with your medicine. · Take pain medicines exactly as directed. ¨ If the doctor gave you a prescription medicine for pain, take it as prescribed. ¨ If you are not taking a prescription pain medicine, ask your doctor if you can take an over-the-counter medicine. · Try to help ease pain by distracting yourself.  Use guided imagery, deep breathing, and relaxation exercises. A pain specialist can teach you pain management skills. · Avoid alcohol. It can make you dehydrated. · Dress warmly in cold weather. The cold and windy weather can lead to severe pain. · Do not smoke. Smoking can reduce the amount of oxygen in your blood. If you need help quitting, talk to your doctor about stop-smoking programs and medicines. These can increase your chances of quitting for good. · Get plenty of sleep. · Get regular eye exams. Sickle cell disease can cause vision problems. · Wear medical alert jewelry that says that you have sickle cell disease. You can buy this at most [employer]. · Avoid colds and flu. Get a pneumococcal vaccine shot. If you have had one before, ask your doctor whether you need another dose. Get a flu shot every year. If you must be around people with colds or flu, wash your hands often. When should you call for help? Call 911 anytime you think you may need emergency care. For example, call if:  · You have symptoms of a severe problem from sickle cell. · You have symptoms of a stroke. These may include:  ¨ Sudden numbness, tingling, weakness, or loss of movement in your face, arm, or leg, especially on only one side of your body. ¨ Sudden vision changes. ¨ Sudden trouble speaking. ¨ Sudden confusion or trouble understanding simple statements. ¨ Sudden problems with walking or balance. ¨ A sudden, severe headache that is different from past headaches. · You are in severe pain. · You have symptoms of a heart attack. These may include:  ¨ Chest pain or pressure, or a strange feeling in the chest.  ¨ Sweating. ¨ Shortness of breath. ¨ Nausea or vomiting. ¨ Pain, pressure, or a strange feeling in the back, neck, jaw, or upper belly or in one or both shoulders or arms. ¨ Lightheadedness or sudden weakness. ¨ A fast or irregular heartbeat. After you call 911, the  may tell you to chew 1 adult-strength or 2 to 4 low-dose aspirin.  Wait for an ambulance. Do not try to drive yourself. Call your doctor now or seek immediate medical care if:  · You have a fever. Watch closely for changes in your health, and be sure to contact your doctor if you have any problems. Where can you learn more? Go to http://socorro-rosita.info/. Enter 486 2915 in the search box to learn more about \"Sickle Cell Disease: Care Instructions. \"  Current as of: October 13, 2016  Content Version: 11.4  © 3315-5911 [employer]. Care instructions adapted under license by [employer] (which disclaims liability or warranty for this information). If you have questions about a medical condition or this instruction, always ask your healthcare professional. [address] any warranty or liability for your use of this information.

## 2018-02-07 NOTE — ED PROVIDER NOTES
HPI Comments: 49-year-old male with history of hemoglobin SS presents with complaint of pain crisis ×2 days. States that pain is affecting both his arms and legs. States his last pain crisis from one month ago. States that he's been taking oxycodone 30 mg every 4 hours without improvement. Patient states he is on hydroxyurea and folic acid as well. Denies fever, chills, nausea, vomiting, chest pain, shortness of breath, abdominal pain, headache, neck pain. Rates pain as mild to moderate. Patient denies radiation of pain. Patient is a 37 y.o. male presenting with sickle cell disease. The history is provided by the patient. No  was used. Sickle Cell Crisis    This is a new problem. The current episode started 2 days ago. The problem has not changed since onset. The problem occurs constantly. Patient reports not work related injury. The pain is associated with no known injury. The quality of the pain is described as sharp. The pain is at a severity of 4/10. The pain is mild. The pain is the same all the time. Stiffness is present all day. Pertinent negatives include no chest pain, no fever, no numbness, no headaches, no abdominal pain, no abdominal swelling, no bowel incontinence, no perianal numbness, no bladder incontinence, no dysuria, no pelvic pain, no paresthesias, no paresis, no tingling and no weakness. Treatments tried: Oxycodone 30mg q4hrs  The treatment provided no relief.         Past Medical History:   Diagnosis Date    Chronic pain     HTN (hypertension)     Ill-defined condition     sickle cell    Iron overload due to repeated red blood cell transfusions 1/18/2017    Paroxysmal SVT [facility]supra[facility]ricular tachycardia) ([facility] 7/9/2016    [facility]        Past Surgical History:   Procedure Laterality Date    HC PENILE IMPL DURA II POSITINBLE      HC PORT LIFE SNGLE LUMEN 5013      to L CW    HX CHOLECYSTECTOMY      HX OTHER SURGICAL      penile inplant    HX VASCULAR ACCESS           Family History:   Problem Relation Age of Onset    Hypertension Other     Diabetes Father     Stroke Father     Sickle Cell Anemia Sister        Social History     Social History    Marital status:      Spouse name: N/A    Number of children: N/A    Years of education: N/A     Occupational History    Not on file. Social History Main Topics    Smoking status: Never Smoker    Smokeless tobacco: Never Used    Alcohol use No    Drug use: No    Sexual activity: Yes     Other Topics Concern    Not on file     Social History Narrative         ALLERGIES: Compazine [prochlorperazine edisylate]; Morphine; Reglan [metoclopramide]; and Zofran [ondansetron hcl (pf)]    Review of Systems   Constitutional: Negative for chills and fever. HENT: Negative for congestion, facial swelling and sore throat. Respiratory: Negative for cough and shortness of breath. Cardiovascular: Negative for chest pain, palpitations and leg swelling. Gastrointestinal: Negative for abdominal pain, bowel incontinence, constipation, nausea and vomiting. Genitourinary: Negative for bladder incontinence, dysuria, hematuria and pelvic pain. Musculoskeletal: Positive for arthralgias and myalgias. Negative for back pain, gait problem, joint swelling and neck pain. Skin: Negative for pallor and wound. Neurological: Negative for dizziness, tingling, weakness, numbness, headaches and paresthesias. Psychiatric/Behavioral: Negative for agitation and confusion. Vitals:    02/07/18 1629   BP: 148/80   Pulse: (!) 104   Resp: 18   Temp: 99.2 °F (37.3 °C)   SpO2: 98%   Weight: 71.2 kg (157 lb)   Height: 5' 10\" (1.778 m)            Physical Exam   Constitutional: He is oriented to person, place, and time. He appears well-developed and well-nourished. HENT:   Head: Normocephalic. Mouth/Throat: Oropharynx is clear and moist. No oropharyngeal exudate.    Eyes: Conjunctivae and EOM are normal. Pupils are equal, round, and reactive to light. Neck: Normal range of motion. No JVD present. No tracheal deviation present. Cardiovascular: Normal rate, regular rhythm, normal heart sounds and intact distal pulses. No murmur heard. Radial pulse 2+ and equal bilaterally. Pulmonary/Chest: Effort normal and breath sounds normal. No respiratory distress. He has no wheezes. He has no rales. He exhibits no tenderness. Abdominal: Soft. There is no tenderness. There is no rebound and no guarding. Musculoskeletal: Normal range of motion. He exhibits no edema, tenderness or deformity. FROM of all extremities. Neurological: He is alert and oriented to person, place, and time. No cranial nerve deficit. Coordination normal.   No nuchal rigidity. Skin: Skin is warm and dry. No rash noted. No erythema. Nursing note and vitals reviewed. MDM  Number of Diagnoses or Management Options  Sickle cell anemia with crisis [facility]): new and requires workup  Diagnosis management comments: Labs stable. Vital signs stable. Pain resolved. Patient well-appearing. Will discharge home with close follow-up with primary care physician.        Amount and/or Complexity of Data Reviewed  Clinical lab tests: ordered and reviewed  Tests in the medicine section of CPT®: ordered and reviewed  Review and summarize past medical records: yes    Risk of Complications, Morbidity, and/or Mortality  Presenting problems: moderate  Diagnostic procedures: low  Management options: low    Patient Progress  Patient progress: stable        ED Course       Procedures

## 2018-03-06 ENCOUNTER — HOSPITAL ENCOUNTER (INPATIENT)
Age: 43
LOS: 3 days | Discharge: HOME | DRG: 812 | End: 2018-03-09
Attending: EMERGENCY MEDICINE | Admitting: INTERNAL MEDICINE
Payer: MEDICARE

## 2018-03-06 ENCOUNTER — APPOINTMENT (OUTPATIENT)
Dept: GENERAL RADIOLOGY | Age: 43
DRG: 812 | End: 2018-03-06
Attending: EMERGENCY MEDICINE
Payer: MEDICARE

## 2018-03-06 DIAGNOSIS — E83.111 IRON OVERLOAD DUE TO REPEATED RED BLOOD CELL TRANSFUSIONS: ICD-10-CM

## 2018-03-06 DIAGNOSIS — E80.6 HYPERBILIRUBINEMIA: ICD-10-CM

## 2018-03-06 DIAGNOSIS — D57.00 SICKLE CELL ANEMIA WITH CRISIS (HCC): Primary | ICD-10-CM

## 2018-03-06 LAB
ALBUMIN SERPL-MCNC: 4 G/DL (ref 3.5–5)
ALBUMIN/GLOB SERPL: 0.8 {RATIO} (ref 1.2–3.5)
ALP SERPL-CCNC: 90 U/L (ref 50–136)
ALT SERPL-CCNC: 70 U/L (ref 12–65)
ANION GAP SERPL CALC-SCNC: 6 MMOL/L (ref 7–16)
APPEARANCE UR: CLEAR
AST SERPL-CCNC: 80 U/L (ref 15–37)
BASOPHILS # BLD: 0 K/UL (ref 0–0.2)
BASOPHILS NFR BLD: 0 % (ref 0–2)
BILIRUB SERPL-MCNC: 1.5 MG/DL (ref 0.2–1.1)
BILIRUB UR QL: NEGATIVE
BUN SERPL-MCNC: 9 MG/DL (ref 6–23)
CALCIUM SERPL-MCNC: 9.1 MG/DL (ref 8.3–10.4)
CHLORIDE SERPL-SCNC: 106 MMOL/L (ref 98–107)
CO2 SERPL-SCNC: 27 MMOL/L (ref 21–32)
COLOR UR: YELLOW
CREAT SERPL-MCNC: 0.89 MG/DL (ref 0.8–1.5)
DIFFERENTIAL METHOD BLD: ABNORMAL
EOSINOPHIL # BLD: 0.1 K/UL (ref 0–0.8)
EOSINOPHIL NFR BLD: 1 % (ref 0.5–7.8)
ERYTHROCYTE [DISTWIDTH] IN BLOOD BY AUTOMATED COUNT: 26.9 % (ref 11.9–14.6)
GLOBULIN SER CALC-MCNC: 5.2 G/DL (ref 2.3–3.5)
GLUCOSE SERPL-MCNC: 112 MG/DL (ref 65–100)
GLUCOSE UR STRIP.AUTO-MCNC: NEGATIVE MG/DL
HCT VFR BLD AUTO: 27.9 % (ref 41.1–50.3)
HGB BLD-MCNC: 9.6 G/DL (ref 13.6–17.2)
HGB RETIC QN AUTO: 39 PG (ref 29–35)
HGB UR QL STRIP: NEGATIVE
IMM GRANULOCYTES # BLD: 0 K/UL (ref 0–0.5)
IMM GRANULOCYTES NFR BLD AUTO: 0 % (ref 0–5)
IMM RETICS NFR: 39.1 % (ref 2.3–13.4)
KETONES UR QL STRIP.AUTO: NEGATIVE MG/DL
LEUKOCYTE ESTERASE UR QL STRIP.AUTO: NEGATIVE
LYMPHOCYTES # BLD: 3 K/UL (ref 0.5–4.6)
LYMPHOCYTES NFR BLD: 26 % (ref 13–44)
MAGNESIUM SERPL-MCNC: 1.7 MG/DL (ref 1.8–2.4)
MCH RBC QN AUTO: 30.9 PG (ref 26.1–32.9)
MCHC RBC AUTO-ENTMCNC: 34.4 G/DL (ref 31.4–35)
MCV RBC AUTO: 89.7 FL (ref 79.6–97.8)
MONOCYTES # BLD: 0.8 K/UL (ref 0.1–1.3)
MONOCYTES NFR BLD: 7 % (ref 4–12)
NEUTS SEG # BLD: 7.7 K/UL (ref 1.7–8.2)
NEUTS SEG NFR BLD: 66 % (ref 43–78)
NITRITE UR QL STRIP.AUTO: NEGATIVE
PH UR STRIP: 7.5 [PH] (ref 5–9)
PLATELET # BLD AUTO: 243 K/UL (ref 150–450)
PLATELET COMMENTS,PCOM: ADEQUATE
PMV BLD AUTO: 10.4 FL (ref 10.8–14.1)
POTASSIUM SERPL-SCNC: 4.8 MMOL/L (ref 3.5–5.1)
PROT SERPL-MCNC: 9.2 G/DL (ref 6.3–8.2)
PROT UR STRIP-MCNC: NEGATIVE MG/DL
RBC # BLD AUTO: 3.11 M/UL (ref 4.23–5.67)
RBC MORPH BLD: ABNORMAL
RBC MORPH BLD: ABNORMAL
RETICS # AUTO: 0.22 M/UL (ref 0.03–0.1)
RETICS/RBC NFR AUTO: 7.2 % (ref 0.3–2)
SODIUM SERPL-SCNC: 139 MMOL/L (ref 136–145)
SP GR UR REFRACTOMETRY: 1.01 (ref 1–1.02)
UROBILINOGEN UR QL STRIP.AUTO: 1 EU/DL (ref 0.2–1)
WBC # BLD AUTO: 11.6 K/UL (ref 4.3–11.1)
WBC MORPH BLD: ABNORMAL

## 2018-03-06 PROCEDURE — 81003 URINALYSIS AUTO W/O SCOPE: CPT | Performed by: INTERNAL MEDICINE

## 2018-03-06 PROCEDURE — 74011250636 HC RX REV CODE- 250/636: Performed by: EMERGENCY MEDICINE

## 2018-03-06 PROCEDURE — 36415 COLL VENOUS BLD VENIPUNCTURE: CPT | Performed by: INTERNAL MEDICINE

## 2018-03-06 PROCEDURE — 96374 THER/PROPH/DIAG INJ IV PUSH: CPT | Performed by: EMERGENCY MEDICINE

## 2018-03-06 PROCEDURE — 99284 EMERGENCY DEPT VISIT MOD MDM: CPT | Performed by: EMERGENCY MEDICINE

## 2018-03-06 PROCEDURE — 83735 ASSAY OF MAGNESIUM: CPT | Performed by: INTERNAL MEDICINE

## 2018-03-06 PROCEDURE — 65270000029 HC RM PRIVATE

## 2018-03-06 PROCEDURE — 74011250636 HC RX REV CODE- 250/636: Performed by: HOSPITALIST

## 2018-03-06 PROCEDURE — 85046 RETICYTE/HGB CONCENTRATE: CPT | Performed by: EMERGENCY MEDICINE

## 2018-03-06 PROCEDURE — 96361 HYDRATE IV INFUSION ADD-ON: CPT | Performed by: EMERGENCY MEDICINE

## 2018-03-06 PROCEDURE — 80053 COMPREHEN METABOLIC PANEL: CPT | Performed by: EMERGENCY MEDICINE

## 2018-03-06 PROCEDURE — 74011250637 HC RX REV CODE- 250/637: Performed by: INTERNAL MEDICINE

## 2018-03-06 PROCEDURE — 74011000250 HC RX REV CODE- 250: Performed by: EMERGENCY MEDICINE

## 2018-03-06 PROCEDURE — 71045 X-RAY EXAM CHEST 1 VIEW: CPT

## 2018-03-06 PROCEDURE — 96376 TX/PRO/DX INJ SAME DRUG ADON: CPT | Performed by: EMERGENCY MEDICINE

## 2018-03-06 PROCEDURE — 96375 TX/PRO/DX INJ NEW DRUG ADDON: CPT | Performed by: EMERGENCY MEDICINE

## 2018-03-06 PROCEDURE — 74011250636 HC RX REV CODE- 250/636: Performed by: INTERNAL MEDICINE

## 2018-03-06 PROCEDURE — 77010033678 HC OXYGEN DAILY

## 2018-03-06 PROCEDURE — 85025 COMPLETE CBC W/AUTO DIFF WBC: CPT | Performed by: EMERGENCY MEDICINE

## 2018-03-06 RX ORDER — MAGNESIUM SULFATE 1 G/100ML
1 INJECTION INTRAVENOUS ONCE
Status: COMPLETED | OUTPATIENT
Start: 2018-03-06 | End: 2018-03-07

## 2018-03-06 RX ORDER — LISINOPRIL 5 MG/1
5 TABLET ORAL DAILY
Status: DISCONTINUED | OUTPATIENT
Start: 2018-03-07 | End: 2018-03-09 | Stop reason: HOSPADM

## 2018-03-06 RX ORDER — HYDROXYUREA 500 MG/1
500 CAPSULE ORAL 2 TIMES DAILY
Status: DISCONTINUED | OUTPATIENT
Start: 2018-03-06 | End: 2018-03-09 | Stop reason: HOSPADM

## 2018-03-06 RX ORDER — HYDROMORPHONE HYDROCHLORIDE 2 MG/ML
1 INJECTION, SOLUTION INTRAMUSCULAR; INTRAVENOUS; SUBCUTANEOUS
Status: COMPLETED | OUTPATIENT
Start: 2018-03-06 | End: 2018-03-06

## 2018-03-06 RX ORDER — ACETAMINOPHEN 325 MG/1
650 TABLET ORAL
Status: DISCONTINUED | OUTPATIENT
Start: 2018-03-06 | End: 2018-03-09 | Stop reason: HOSPADM

## 2018-03-06 RX ORDER — SODIUM CHLORIDE 9 MG/ML
150 INJECTION, SOLUTION INTRAVENOUS CONTINUOUS
Status: DISCONTINUED | OUTPATIENT
Start: 2018-03-06 | End: 2018-03-09 | Stop reason: HOSPADM

## 2018-03-06 RX ORDER — DEFERASIROX 360 MG/1
1800 TABLET, FILM COATED ORAL DAILY
Status: DISCONTINUED | OUTPATIENT
Start: 2018-03-07 | End: 2018-03-09 | Stop reason: HOSPADM

## 2018-03-06 RX ORDER — LANOLIN ALCOHOL/MO/W.PET/CERES
400 CREAM (GRAM) TOPICAL DAILY
Status: DISCONTINUED | OUTPATIENT
Start: 2018-03-07 | End: 2018-03-09 | Stop reason: HOSPADM

## 2018-03-06 RX ORDER — FOLIC ACID 1 MG/1
1 TABLET ORAL DAILY
Status: DISCONTINUED | OUTPATIENT
Start: 2018-03-07 | End: 2018-03-09 | Stop reason: HOSPADM

## 2018-03-06 RX ORDER — NALOXONE HYDROCHLORIDE 0.4 MG/ML
0.4 INJECTION, SOLUTION INTRAMUSCULAR; INTRAVENOUS; SUBCUTANEOUS AS NEEDED
Status: DISCONTINUED | OUTPATIENT
Start: 2018-03-06 | End: 2018-03-09 | Stop reason: HOSPADM

## 2018-03-06 RX ORDER — OXYCODONE HYDROCHLORIDE 15 MG/1
30 TABLET ORAL
Status: DISCONTINUED | OUTPATIENT
Start: 2018-03-06 | End: 2018-03-07

## 2018-03-06 RX ORDER — METOPROLOL TARTRATE 25 MG/1
25 TABLET, FILM COATED ORAL 2 TIMES DAILY
Status: DISCONTINUED | OUTPATIENT
Start: 2018-03-06 | End: 2018-03-09 | Stop reason: HOSPADM

## 2018-03-06 RX ORDER — SODIUM CHLORIDE 0.9 % (FLUSH) 0.9 %
5-10 SYRINGE (ML) INJECTION EVERY 8 HOURS
Status: DISCONTINUED | OUTPATIENT
Start: 2018-03-06 | End: 2018-03-09 | Stop reason: HOSPADM

## 2018-03-06 RX ORDER — HYDROMORPHONE HYDROCHLORIDE 2 MG/ML
1 INJECTION, SOLUTION INTRAMUSCULAR; INTRAVENOUS; SUBCUTANEOUS
Status: DISCONTINUED | OUTPATIENT
Start: 2018-03-06 | End: 2018-03-07

## 2018-03-06 RX ORDER — DIPHENHYDRAMINE HYDROCHLORIDE 50 MG/ML
25 INJECTION, SOLUTION INTRAMUSCULAR; INTRAVENOUS
Status: COMPLETED | OUTPATIENT
Start: 2018-03-06 | End: 2018-03-06

## 2018-03-06 RX ORDER — SODIUM CHLORIDE 0.9 % (FLUSH) 0.9 %
5-10 SYRINGE (ML) INJECTION AS NEEDED
Status: DISCONTINUED | OUTPATIENT
Start: 2018-03-06 | End: 2018-03-09 | Stop reason: HOSPADM

## 2018-03-06 RX ADMIN — HYDROMORPHONE HYDROCHLORIDE 1 MG: 2 INJECTION, SOLUTION INTRAMUSCULAR; INTRAVENOUS; SUBCUTANEOUS at 22:30

## 2018-03-06 RX ADMIN — Medication 1 AMPULE: at 20:04

## 2018-03-06 RX ADMIN — HYDROMORPHONE HYDROCHLORIDE 1 MG: 2 INJECTION, SOLUTION INTRAMUSCULAR; INTRAVENOUS; SUBCUTANEOUS at 17:48

## 2018-03-06 RX ADMIN — SODIUM CHLORIDE 150 ML/HR: 900 INJECTION, SOLUTION INTRAVENOUS at 21:02

## 2018-03-06 RX ADMIN — METOPROLOL TARTRATE 25 MG: 25 TABLET ORAL at 21:01

## 2018-03-06 RX ADMIN — OXYCODONE HYDROCHLORIDE 30 MG: 15 TABLET ORAL at 20:57

## 2018-03-06 RX ADMIN — Medication 10 ML: at 21:04

## 2018-03-06 RX ADMIN — DIPHENHYDRAMINE HYDROCHLORIDE 25 MG: 50 INJECTION, SOLUTION INTRAMUSCULAR; INTRAVENOUS at 16:37

## 2018-03-06 RX ADMIN — SODIUM CHLORIDE 1000 ML: 900 INJECTION, SOLUTION INTRAVENOUS at 15:10

## 2018-03-06 RX ADMIN — HYDROMORPHONE HYDROCHLORIDE 1 MG: 2 INJECTION, SOLUTION INTRAMUSCULAR; INTRAVENOUS; SUBCUTANEOUS at 16:37

## 2018-03-06 RX ADMIN — HYDROXYUREA 500 MG: 500 CAPSULE ORAL at 22:32

## 2018-03-06 RX ADMIN — PROMETHAZINE HYDROCHLORIDE 25 MG: 25 INJECTION INTRAMUSCULAR; INTRAVENOUS at 16:37

## 2018-03-06 RX ADMIN — MAGNESIUM SULFATE HEPTAHYDRATE 1 G: 1 INJECTION, SOLUTION INTRAVENOUS at 23:39

## 2018-03-06 RX ADMIN — DIPHENHYDRAMINE HYDROCHLORIDE 25 MG: 50 INJECTION, SOLUTION INTRAMUSCULAR; INTRAVENOUS at 17:49

## 2018-03-06 NOTE — IP AVS SNAPSHOT
[facility] 
 
 
 [address] 
[phone] Patient: Frank Lau MRN: [id number] JMY:[id number] About your hospitalization You were admitted on:  March 6, 2018 You last received care in the:  [facility] 2 SURGICAL You were discharged on:  March 9, 2018 Why you were hospitalized Your primary diagnosis was:  Sickle Cell Crisis (Hcc) Your diagnoses also included:  Essential Hypertension, Benign Follow-up Information Follow up With Details Comments Contact Info Maranda Loomis MD  PATIENT TO CALL AND SCHEDULE A FOLLOW UP APPOINTMENT I WAS UNABLE TO REACH ANYONE [address] [facility] 
[phone] In 1 week Discharge Orders None A check cristina indicates which time of day the medication should be taken. My Medications CONTINUE taking these medications Instructions Each Dose to Equal  
 Morning Noon Evening Bedtime  
 deferasirox 360 mg Tab Commonly known as:  Snehal Frohlich Take 5 Tabs by mouth daily. 5 Tab  
    
  
   
   
   
  
 folic acid 1 mg tablet Commonly known as:  Google Take 1 mg by mouth daily. 1 mg  
    
  
   
   
   
  
 hydroxyurea 500 mg capsule Commonly known as:  HYDREA Take 500 mg by mouth two (2) times a day. Takes in am at 0900  
 500 mg  
    
  
   
   
  
   
  
 lisinopril 5 mg tablet Commonly known as:  Lakshmi Soto Take 5 mg by mouth daily. Indications: HYPERTENSION  
 5 mg  
    
  
   
   
   
  
 magnesium oxide 400 mg tablet Commonly known as:  MAG-OX Take 1 Tab by mouth daily. 400 mg  
    
  
   
   
   
  
 metoprolol tartrate 25 mg tablet Commonly known as:  LOPRESSOR Take 25 mg by mouth two (2) times a day. Indications: HYPERTENSION  
 25 mg  
    
  
   
   
  
   
  
 * oxyCODONE IR 30 mg immediate release tablet Commonly known as:  Radha El  
 Notes to Patient:  Take on as needed schedule Take 1 Tab by mouth every four (4) hours as needed for Pain. Max Daily Amount: 180 mg.  
 30 mg  
    
   
   
   
  
 * oxyCODONE ER 80 mg ER tablet Commonly known as:  OxyCONTIN Take 1 Tab by mouth every twelve (12) hours. Max Daily Amount: 160 mg.  
 80 mg  
    
  
   
   
   
  
  
 promethazine 25 mg tablet Commonly known as:  PHENERGAN Notes to Patient:  Take on as needed schedule Take 1 Tab by mouth every six (6) hours as needed. May substitute suppository if vomiting 25 mg  
    
   
   
   
  
 * Notice: This list has 2 medication(s) that are the same as other medications prescribed for you. Read the directions carefully, and ask your doctor or other care provider to review them with you. Discharge Instructions DISCHARGE SUMMARY from Nurse PATIENT INSTRUCTIONS: 
 
After general anesthesia or intravenous sedation, for 24 hours or while taking prescription Narcotics: · Limit your activities · Do not drive and operate hazardous machinery · Do not make important personal or business decisions · Do  not drink alcoholic beverages · If you have not urinated within 8 hours after discharge, please contact your surgeon on call. Report the following to your surgeon: 
· Excessive pain, swelling, redness or odor of or around the surgical area · Temperature over 100.5 · Nausea and vomiting lasting longer than 4 hours or if unable to take medications · Any signs of decreased circulation or nerve impairment to extremity: change in color, persistent  numbness, tingling, coldness or increase pain · Any questions What to do at Home: 
Recommended activity: Activity as tolerated, per MD instructions If you experience any of the following symptoms fever > 100.5, nausea, vomiting, pain, chest pain and/or shortness of breath please follow up with MD. 
 
 *  Please give a list of your current medications to your Primary Care Provider. *  Please update this list whenever your medications are discontinued, doses are 
    changed, or new medications (including over-the-counter products) are added. *  Please carry medication information at all times in case of emergency situations. These are general instructions for a healthy lifestyle: No smoking/ No tobacco products/ Avoid exposure to second hand smoke Surgeon General's Warning:  Quitting smoking now greatly reduces serious risk to your health. Obesity, smoking, and sedentary lifestyle greatly increases your risk for illness A healthy diet, regular physical exercise & weight monitoring are important for maintaining a healthy lifestyle You may be retaining fluid if you have a history of heart failure or if you experience any of the following symptoms:  Weight gain of 3 pounds or more overnight or 5 pounds in a week, increased swelling in our hands or feet or shortness of breath while lying flat in bed. Please call your doctor as soon as you notice any of these symptoms; do not wait until your next office visit. Recognize signs and symptoms of STROKE: 
 
F-face looks uneven A-arms unable to move or move unevenly S-speech slurred or non-existent T-time-call 911 as soon as signs and symptoms begin-DO NOT go Back to bed or wait to see if you get better-TIME IS BRAIN. Warning Signs of HEART ATTACK Call 911 if you have these symptoms: 
? Chest discomfort. Most heart attacks involve discomfort in the center of the chest that lasts more than a few minutes, or that goes away and comes back. It can feel like uncomfortable pressure, squeezing, fullness, or pain. ? Discomfort in other areas of the upper body. Symptoms can include pain or discomfort in one or both arms, the back, neck, jaw, or stomach. ? Shortness of breath with or without chest discomfort. ? Other signs may include breaking out in a cold sweat, nausea, or lightheadedness. Don't wait more than five minutes to call [address]! Fast action can save your life. Calling 911 is almost always the fastest way to get lifesaving treatment. Emergency Medical Services staff can begin treatment when they arrive  up to an hour sooner than if someone gets to the hospital by car. The discharge information has been reviewed with the patient. The patient verbalized understanding. Discharge medications reviewed with the patient and appropriate educational materials and side effects teaching were provided. ___________________________________________________________________________________________________________________________________ Sickle Cell Crisis: Care Instructions Your Care Instructions Sickle cell crisis is a painful episode that may begin suddenly in a person with sickle cell disease. Sickle cell disease turns normal, round red blood cells into cells that look like kendall or crescent moons. The sickle-shaped cells can get stuck in blood vessels, blocking blood flow and causing severe pain. The pain can occur in the bones of the spine, the arms and legs, the chest, and the abdomen. An episode may be called a \"painful event\" or \"painful crisis. \" Some people who have sickle cell disease have many painful events, while others have few or none. Treatment depends on the level of pain and how long it lasts. Sometimes taking nonprescription pain relievers can help. Or you may need stronger pain relief medicine that is prescribed or given by a doctor. You may need to be treated in the hospital. 
It isn't always possible to know what sets off a painful event. But triggers include being dehydrated, cold temperatures, infection, stress, and not getting enough oxygen. Follow-up care is a key part of your treatment and safety.  Be sure to make and go to all appointments, and call your doctor if you are having problems. It's also a good idea to know your test results and keep a list of the medicines you take. How can you care for yourself at home? · Create a pain management plan with your doctor. This plan should include the types of medicines you can take and other actions you can take at home to relieve pain. · Drink plenty of fluids, enough so that your urine is light yellow or clear like water. If you have kidney, heart, or liver disease and have to limit fluids, talk with your doctor before you increase the amount of fluids you drink. · Take your medicines exactly as prescribed. Call your doctor if you think you are having a problem with your medicine. · Take pain medicines exactly as directed. ¨ If the doctor gave you a prescription medicine for pain, take it as prescribed. ¨ If you are not taking a prescription pain medicine, ask your doctor if you can take an over-the-counter medicine. · Avoid alcohol. It can make you dehydrated. · Dress warmly in cold weather. The cold and windy weather can lead to severe pain. · Do not smoke. Smoking can reduce the amount of oxygen in your blood. · Get plenty of sleep. When should you call for help? Call 911 anytime you think you may need emergency care. For example, call if: 
? · You have symptoms of a severe problem from sickle cell. ? · You have symptoms of a stroke. These may include: 
¨ Sudden numbness, tingling, weakness, or loss of movement in your face, arm, or leg, especially on only one side of your body. ¨ Sudden vision changes. ¨ Sudden trouble speaking. ¨ Sudden confusion or trouble understanding simple statements. ¨ Sudden problems with walking or balance. ¨ A sudden, severe headache that is different from past headaches. ? · You are in severe pain. ? · You have symptoms of a heart attack. These may include: ¨ Chest pain or pressure, or a strange feeling in the chest. 
 ¨ Sweating. ¨ Shortness of breath. ¨ Nausea or vomiting. ¨ Pain, pressure, or a strange feeling in the back, neck, jaw, or upper belly or in one or both shoulders or arms. ¨ Lightheadedness or sudden weakness. ¨ A fast or irregular heartbeat. After you call 911, the  may tell you to chew 1 adult-strength or 2 to 4 low-dose aspirin. Wait for an ambulance. Do not try to drive yourself. ?Call your doctor now or seek immediate medical care if: 
? · You have a fever. ? Watch closely for changes in your health, and be sure to contact your doctor if you have any problems. Where can you learn more? Go to http://socorro-rosita.info/. Enter F104 in the search box to learn more about \"Sickle Cell Crisis: Care Instructions. \" Current as of: October 13, 2016 Content Version: 11.4 © 3028-7106 [employer]. Care instructions adapted under license by [employer] (which disclaims liability or warranty for this information). If you have questions about a medical condition or this instruction, always ask your healthcare professional. [place] [address] any warranty or liability for your use of this information. Sickle Cell Disease: Care Instructions Your Care Instructions Sickle cell disease turns normal, round red blood cells into misshaped cells that look like kendall or crescent moons. The sickle-shaped cells can get stuck in blood vessels, blocking blood flow and causing severe pain. The sickle-shaped cells also can harm organs, muscles, and bones. It is a lifelong condition. Sickle cell disease is passed down in families. You can talk to your doctor about whether to have genetic tests to find out the chance of having a child with the disease. Your doctor also may recommend that your family members get tested for sickle cell disease. Your doctor may treat you with medicines.  Some people get blood transfusions or a bone marrow transplant. Managing pain is an important part of your treatment. Follow-up care is a key part of your treatment and safety. Be sure to make and go to all appointments, and call your doctor if you are having problems. It's also a good idea to know your test results and keep a list of the medicines you take. How can you care for yourself at home? · Take your medicines exactly as prescribed. Call your doctor if you think you are having a problem with your medicine. · Take pain medicines exactly as directed. ¨ If the doctor gave you a prescription medicine for pain, take it as prescribed. ¨ If you are not taking a prescription pain medicine, ask your doctor if you can take an over-the-counter medicine. · Try to help ease pain by distracting yourself. Use guided imagery, deep breathing, and relaxation exercises. A pain specialist can teach you pain management skills. · Avoid alcohol. It can make you dehydrated. · Dress warmly in cold weather. The cold and windy weather can lead to severe pain. · Do not smoke. Smoking can reduce the amount of oxygen in your blood. If you need help quitting, talk to your doctor about stop-smoking programs and medicines. These can increase your chances of quitting for good. · Get plenty of sleep. · Get regular eye exams. Sickle cell disease can cause vision problems. · Wear medical alert jewelry that says that you have sickle cell disease. You can buy this at most drugstores. · Avoid colds and flu. Get a pneumococcal vaccine shot. If you have had one before, ask your doctor whether you need another dose. Get a flu shot every year. If you must be around people with colds or flu, wash your hands often. When should you call for help? Call 911 anytime you think you may need emergency care. For example, call if: 
· You have symptoms of a severe problem from sickle cell. · You have symptoms of a stroke. These may include: ¨ Sudden numbness, tingling, weakness, or loss of movement in your face, arm, or leg, especially on only one side of your body. ¨ Sudden vision changes. ¨ Sudden trouble speaking. ¨ Sudden confusion or trouble understanding simple statements. ¨ Sudden problems with walking or balance. ¨ A sudden, severe headache that is different from past headaches. · You are in severe pain. · You have symptoms of a heart attack. These may include: ¨ Chest pain or pressure, or a strange feeling in the chest. 
¨ Sweating. ¨ Shortness of breath. ¨ Nausea or vomiting. ¨ Pain, pressure, or a strange feeling in the back, neck, jaw, or upper belly or in one or both shoulders or arms. ¨ Lightheadedness or sudden weakness. ¨ A fast or irregular heartbeat. After you call 911, the  may tell you to chew 1 adult-strength or 2 to 4 low-dose aspirin. Wait for an ambulance. Do not try to drive yourself. Call your doctor now or seek immediate medical care if: 
· You have a fever. Watch closely for changes in your health, and be sure to contact your doctor if you have any problems. Where can you learn more? Go to http://socorro-rosita.info/. Enter 486 5371 in the search box to learn more about \"Sickle Cell Disease: Care Instructions. \" Current as of: October 13, 2016 Content Version: 11.4 © 1088-6281 [employer]. Care instructions adapted under license by [employer] (which disclaims liability or warranty for this information). If you have questions about a medical condition or this instruction, always ask your healthcare professional. [place] [address] any warranty or liability for your use of this information. [employer] Announcement We are excited to announce that we are making your provider's discharge notes available to you in [employer].   You will see these notes when they are completed and signed by the physician that discharged you from your recent hospital stay. If you have any questions or concerns about any information you see in [employer], please call the Health Information Department where you were seen or reach out to your Primary Care Provider for more information about your plan of care. Introducing [facility] SERVICES! Arturo Gillespie introduces [employer] patient portal. Now you can access parts of your medical record, email your doctor's office, and request medication refills online. 1. In your internet browser, go to [email or web address] 2. Click on the First Time User? Click Here link in the Sign In box. You will see the New Member Sign Up page. 3. Enter your [employer] Access Code exactly as it appears below. You will not need to use this code after youve completed the sign-up process. If you do not sign up before the expiration date, you must request a new code. · [employer] Access Code: [facility] Expires: 6/7/2018 10:11 AM 
 
4. Enter the last four digits of your Social Security Number (xxxx) and Date of Birth (mm/dd/yyyy) as indicated and click Submit. You will be taken to the next sign-up page. 5. Create a [employer] ID. This will be your [employer] login ID and cannot be changed, so think of one that is secure and easy to remember. 6. Create a [employer] password. You can change your password at any time. 7. Enter your Password Reset Question and Answer. This can be used at a later time if you forget your password. 8. Enter your e-mail address. You will receive e-mail notification when new information is available in [address] 9. Click Sign Up. You can now view and download portions of your medical record. 10. Click the Download Summary menu link to download a portable copy of your medical information.  
 
If you have questions, please visit the Frequently Asked Questions section of the [employer]. Remember, MyChart is NOT to be used for urgent needs. For medical emergencies, dial 911. Now available from your iPhone and Android! Providers Seen During Your Hospitalization Provider Specialty Primary office phone Arnoldo Grajeda MD Emergency Medicine [phone] Ant Blevins MD Emergency Medicine [phone] Darcia Sandhoff, MD Internal Medicine [phone] Your Primary Care Physician (PCP) Primary Care Physician Office Phone Office Fax Kimberly [facility] [phone] You are allergic to the following Allergen Reactions Compazine (Prochlorperazine Edisylate) Other (comments) Also makes him feel funny Morphine Other (comments) Makes him feel funny Reglan (Metoclopramide) Other (comments) \"feel funny\" Zofran (Ondansetron Hcl (Pf)) Other (comments) Make him feel funny Recent Documentation Height Weight BMI Smoking Status 1.778 m 62.6 kg 19.8 kg/m2 Never Smoker Emergency Contacts Name Discharge Info Relation Home Work Mobile Karla Small  Spouse [phone] Patient Belongings The following personal items are in your possession at time of discharge: 
  Dental Appliances: None  Visual Aid: Glasses, With patient      Home Medications: None   Jewelry: Ring (yellow toned wedding band)  Clothing: Footwear, Pants, Undergarments, Shirt, Jacket/Coat    Other Valuables: Cell Phone  Personal Items Sent to Safe: none Please provide this summary of care documentation to your next provider. Signatures-by signing, you are acknowledging that this After Visit Summary has been reviewed with you and you have received a copy. Patient Signature:  ____________________________________________________________  Date:  ____________________________________________________________  
  
Shoshoni Favorite    
    
 Provider Signature:  ____________________________________________________________ Date:  ____________________________________________________________

## 2018-03-06 NOTE — ED PROVIDER NOTES
HPI Comments: Dipesh Soler. Pt seen by me in triage. Patient with history of sickle cell disease. States he hurts all over but mainly in his arms and legs. Started last night. Normally takes oxycodone at home for pain. Said some nausea recently. No diarrhea or vomiting. No SOB. Patient is a 37 y.o. male presenting with sickle cell disease. The history is provided by the patient. No  was used. Sickle Cell Crisis    This is a new problem. The current episode started yesterday. The problem has been gradually worsening. The problem occurs constantly. Patient reports not work related injury. The pain is associated with no known injury. Pain location: arms and legs. The quality of the pain is described as aching. The pain is moderate. Associated symptoms include chest pain (very mild) and leg pain. Pertinent negatives include no fever, no numbness, no headaches, no abdominal pain, no abdominal swelling, no bowel incontinence, no perianal numbness, no bladder incontinence, no dysuria, no paresthesias and no weakness. Past Medical History:   Diagnosis Date    Chronic pain     HTN (hypertension)     Ill-defined condition     sickle cell    Iron overload due to repeated red blood cell transfusions 1/18/2017    Paroxysmal SV[facility]ra[facility]ricular [facility]) 7/9/2016    Sickle cell disease ([facility] [address]        Past Surgical History:   Procedure Laterality Date    HC PENILE IMPL DURA II POSITINBLE      HC PORT LIFE SNGLE LUMEN 5013      to L CW    HX CHOLECYSTECTOMY      HX OTHER SURGICAL      penile inplant    HX VASCULAR ACCESS           Family History:   Problem Relation Age of Onset    Hypertension Other     Diabetes Father     Stroke Father     Sickle Cell Anemia Sister        Social History     Social History    Marital status:      Spouse name: N/A    Number of children: N/A    Years of education: N/A     Occupational History    Not on file.      Social History Main Topics    Smoking status: Never Smoker    Smokeless tobacco: Never Used    Alcohol use No    Drug use: No    Sexual activity: Yes     Other Topics Concern    Not on file     Social History Narrative         ALLERGIES: Compazine [prochlorperazine edisylate]; Morphine; Reglan [metoclopramide]; and Zofran [ondansetron hcl (pf)]    Review of Systems   Constitutional: Negative for chills and fever. HENT: Negative for rhinorrhea and sore throat. Eyes: Negative for pain and redness. Respiratory: Negative for chest tightness, shortness of breath and wheezing. Cardiovascular: Positive for chest pain (very mild). Negative for leg swelling. Gastrointestinal: Positive for nausea. Negative for abdominal pain, bowel incontinence, diarrhea and vomiting. Genitourinary: Negative for bladder incontinence, dysuria and hematuria. Musculoskeletal: Positive for arthralgias and myalgias. Negative for back pain, gait problem, neck pain and neck stiffness. Skin: Negative for color change and rash. Neurological: Negative for weakness, numbness, headaches and paresthesias. Vitals:    03/06/18 1506   BP: 136/77   Pulse: 75   Resp: 18   Temp: 98.7 °F (37.1 °C)   SpO2: 94%   Weight: 62.6 kg (138 lb)   Height: 5' 10\" (1.778 m)            Physical Exam   Constitutional: He is oriented to person, place, and time. He appears well-developed and well-nourished. HENT:   Head: Normocephalic and atraumatic. Neck: Normal range of motion. Neck supple. Cardiovascular: Normal rate and regular rhythm. No murmur heard. Pulmonary/Chest: Effort normal and breath sounds normal. He has no wheezes. He exhibits no tenderness. Abdominal: Soft. Bowel sounds are normal. There is no tenderness. Musculoskeletal: Normal range of motion. He exhibits no edema. Neurological: He is alert and oriented to person, place, and time. Skin: Skin is warm and dry. Nursing note and vitals reviewed.        MDM  Number of Diagnoses or Management Options  Sickle cell anemia with crisis [facility]):   Diagnosis management comments: 5:43 PM minimal relief with initial pain medication, will repeat  6:32 PM patient still has significant amount of pain, states he feels he needs to be admitted. His reticulocyte count is up, hemoglobin and chest x-ray looked good.   I spoke with Dr. Rosette Terrell, to see patient for admission       Amount and/or Complexity of Data Reviewed  Clinical lab tests: ordered and reviewed  Tests in the radiology section of CPT®: ordered and reviewed  Discuss the patient with other providers: yes  Independent visualization of images, tracings, or specimens: yes    Risk of Complications, Morbidity, and/or Mortality  Presenting problems: moderate  Diagnostic procedures: moderate  Management options: moderate    Patient Progress  Patient progress: stable        ED Course       Procedures

## 2018-03-06 NOTE — ED TRIAGE NOTES
Pt arrives to the ER stating he thinks his is in sickle cell crisis. Pt states he hurts all over, but mainly in his legs and arms. Pt also reports nausea. Pt denies diarrhea, denies shob.

## 2018-03-07 LAB
ALBUMIN SERPL-MCNC: 3.2 G/DL (ref 3.5–5)
ALBUMIN/GLOB SERPL: 0.8 {RATIO} (ref 1.2–3.5)
ALP SERPL-CCNC: 72 U/L (ref 50–136)
ALT SERPL-CCNC: 49 U/L (ref 12–65)
ANION GAP SERPL CALC-SCNC: 7 MMOL/L (ref 7–16)
AST SERPL-CCNC: 49 U/L (ref 15–37)
BASOPHILS # BLD: 0 K/UL (ref 0–0.2)
BASOPHILS NFR BLD: 0 % (ref 0–2)
BILIRUB SERPL-MCNC: 1.3 MG/DL (ref 0.2–1.1)
BUN SERPL-MCNC: 9 MG/DL (ref 6–23)
CALCIUM SERPL-MCNC: 8.4 MG/DL (ref 8.3–10.4)
CHLORIDE SERPL-SCNC: 105 MMOL/L (ref 98–107)
CO2 SERPL-SCNC: 27 MMOL/L (ref 21–32)
CREAT SERPL-MCNC: 0.79 MG/DL (ref 0.8–1.5)
DIFFERENTIAL METHOD BLD: ABNORMAL
EOSINOPHIL # BLD: 0.2 K/UL (ref 0–0.8)
EOSINOPHIL NFR BLD: 1 % (ref 0.5–7.8)
ERYTHROCYTE [DISTWIDTH] IN BLOOD BY AUTOMATED COUNT: 26.4 % (ref 11.9–14.6)
GLOBULIN SER CALC-MCNC: 4.2 G/DL (ref 2.3–3.5)
GLUCOSE SERPL-MCNC: 103 MG/DL (ref 65–100)
HCT VFR BLD AUTO: 22.5 % (ref 41.1–50.3)
HGB BLD-MCNC: 7.5 G/DL (ref 13.6–17.2)
IMM GRANULOCYTES # BLD: 0 K/UL (ref 0–0.5)
IMM GRANULOCYTES NFR BLD AUTO: 0 % (ref 0–5)
LYMPHOCYTES # BLD: 5 K/UL (ref 0.5–4.6)
LYMPHOCYTES NFR BLD: 40 % (ref 13–44)
MCH RBC QN AUTO: 29.9 PG (ref 26.1–32.9)
MCHC RBC AUTO-ENTMCNC: 33.3 G/DL (ref 31.4–35)
MCV RBC AUTO: 89.6 FL (ref 79.6–97.8)
MONOCYTES # BLD: 1.6 K/UL (ref 0.1–1.3)
MONOCYTES NFR BLD: 12 % (ref 4–12)
NEUTS SEG # BLD: 5.9 K/UL (ref 1.7–8.2)
NEUTS SEG NFR BLD: 47 % (ref 43–78)
PLATELET # BLD AUTO: 198 K/UL (ref 150–450)
PMV BLD AUTO: 10.1 FL (ref 10.8–14.1)
POTASSIUM SERPL-SCNC: 4.3 MMOL/L (ref 3.5–5.1)
PROT SERPL-MCNC: 7.4 G/DL (ref 6.3–8.2)
RBC # BLD AUTO: 2.51 M/UL (ref 4.23–5.67)
SODIUM SERPL-SCNC: 139 MMOL/L (ref 136–145)
WBC # BLD AUTO: 12.7 K/UL (ref 4.3–11.1)

## 2018-03-07 PROCEDURE — 36415 COLL VENOUS BLD VENIPUNCTURE: CPT | Performed by: INTERNAL MEDICINE

## 2018-03-07 PROCEDURE — 74011250636 HC RX REV CODE- 250/636: Performed by: NURSE PRACTITIONER

## 2018-03-07 PROCEDURE — 74011250637 HC RX REV CODE- 250/637: Performed by: INTERNAL MEDICINE

## 2018-03-07 PROCEDURE — 74011250636 HC RX REV CODE- 250/636: Performed by: INTERNAL MEDICINE

## 2018-03-07 PROCEDURE — 77030032490 HC SLV COMPR SCD KNE COVD -B

## 2018-03-07 PROCEDURE — 85025 COMPLETE CBC W/AUTO DIFF WBC: CPT | Performed by: INTERNAL MEDICINE

## 2018-03-07 PROCEDURE — 80053 COMPREHEN METABOLIC PANEL: CPT | Performed by: INTERNAL MEDICINE

## 2018-03-07 PROCEDURE — 99223 1ST HOSP IP/OBS HIGH 75: CPT | Performed by: INTERNAL MEDICINE

## 2018-03-07 PROCEDURE — 65270000029 HC RM PRIVATE

## 2018-03-07 PROCEDURE — 74011250637 HC RX REV CODE- 250/637: Performed by: NURSE PRACTITIONER

## 2018-03-07 RX ORDER — HYDROMORPHONE HYDROCHLORIDE 2 MG/ML
2 INJECTION, SOLUTION INTRAMUSCULAR; INTRAVENOUS; SUBCUTANEOUS
Status: DISCONTINUED | OUTPATIENT
Start: 2018-03-07 | End: 2018-03-09

## 2018-03-07 RX ORDER — OXYCODONE HYDROCHLORIDE 80 MG/1
80 TABLET, FILM COATED, EXTENDED RELEASE ORAL EVERY 12 HOURS
Status: DISCONTINUED | OUTPATIENT
Start: 2018-03-07 | End: 2018-03-09 | Stop reason: HOSPADM

## 2018-03-07 RX ADMIN — Medication 10 ML: at 13:53

## 2018-03-07 RX ADMIN — FOLIC ACID 1 MG: 1 TABLET ORAL at 09:16

## 2018-03-07 RX ADMIN — OXYCODONE HYDROCHLORIDE 30 MG: 15 TABLET ORAL at 10:46

## 2018-03-07 RX ADMIN — OXYCODONE HYDROCHLORIDE 80 MG: 80 TABLET, FILM COATED, EXTENDED RELEASE ORAL at 21:04

## 2018-03-07 RX ADMIN — HYDROMORPHONE HYDROCHLORIDE 1 MG: 2 INJECTION, SOLUTION INTRAMUSCULAR; INTRAVENOUS; SUBCUTANEOUS at 09:17

## 2018-03-07 RX ADMIN — SODIUM CHLORIDE 150 ML/HR: 900 INJECTION, SOLUTION INTRAVENOUS at 10:32

## 2018-03-07 RX ADMIN — METOPROLOL TARTRATE 25 MG: 25 TABLET ORAL at 09:17

## 2018-03-07 RX ADMIN — SODIUM CHLORIDE 150 ML/HR: 900 INJECTION, SOLUTION INTRAVENOUS at 17:41

## 2018-03-07 RX ADMIN — LISINOPRIL 5 MG: 5 TABLET ORAL at 09:17

## 2018-03-07 RX ADMIN — OXYCODONE HYDROCHLORIDE 30 MG: 15 TABLET ORAL at 04:26

## 2018-03-07 RX ADMIN — HYDROMORPHONE HYDROCHLORIDE 2 MG: 2 INJECTION, SOLUTION INTRAMUSCULAR; INTRAVENOUS; SUBCUTANEOUS at 20:22

## 2018-03-07 RX ADMIN — HYDROXYUREA 500 MG: 500 CAPSULE ORAL at 10:32

## 2018-03-07 RX ADMIN — METOPROLOL TARTRATE 25 MG: 25 TABLET ORAL at 17:40

## 2018-03-07 RX ADMIN — Medication 1 AMPULE: at 21:11

## 2018-03-07 RX ADMIN — HYDROMORPHONE HYDROCHLORIDE 2 MG: 2 INJECTION, SOLUTION INTRAMUSCULAR; INTRAVENOUS; SUBCUTANEOUS at 15:35

## 2018-03-07 RX ADMIN — Medication 1 AMPULE: at 09:21

## 2018-03-07 RX ADMIN — Medication 5 ML: at 21:11

## 2018-03-07 RX ADMIN — HYDROMORPHONE HYDROCHLORIDE 1 MG: 2 INJECTION, SOLUTION INTRAMUSCULAR; INTRAVENOUS; SUBCUTANEOUS at 03:35

## 2018-03-07 RX ADMIN — Medication 400 MG: at 09:16

## 2018-03-07 RX ADMIN — SODIUM CHLORIDE 150 ML/HR: 900 INJECTION, SOLUTION INTRAVENOUS at 04:20

## 2018-03-07 RX ADMIN — HYDROXYUREA 500 MG: 500 CAPSULE ORAL at 21:05

## 2018-03-07 NOTE — CONSULTS
[facility] Hematology & Oncology        Inpatient Hematology / Oncology Consult Note    Reason for Consult:  Sickle cell crisis [facility])  Referring Physician:  Dewey Barnes MD    History of Present Illness:  Mr. Ninette Bence is a 38 yo male with PMH of sickle cell and B-thalassemia followed by oncology [facility] that came to ED 3/6 for limb pain refractory to chronic narcotics. His hgb was 9.6, retic 7.2. 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 He is on Jadenu 1800 mg daily and Hydrea 500 mg bid at home. His home narcotics consist of OxyContin 80 bid and Roxicodone 30 1-1.5 mg q 4 hours. We are consulted for recommendations. Review of Systems:  Constitutional Denies fever, chills, weight loss, appetite changes, fatigue   HEENT Denies trauma, blurry vision, hearing loss, ear pain, nosebleeds, sore throat, neck pain   Skin Denies lesions or rashes. Lungs Denies dyspnea, cough, sputum production or hemoptysis. Cardiovascular Denies chest pain, palpitations, or lower extremity edema. Neuro Denies headaches, visual changes or ataxia. Denies dizziness, tingling, tremors, sensory change, speech change, focal weakness or headaches. MSK Positive for limb pain     Psychiatric/Behavioral The patient is not nervous/anxious.          Allergies   Allergen Reactions    Compazine [Prochlorperazine Edisylate] Other (comments)     Also makes him feel funny    Morphine Other (comments)     Makes him feel funny    Reglan [Metoclopramide] Other (comments)     \"feel funny\"    Zofran [Ondansetron Hcl (Pf)] Other (comments)     Make him feel funny     Past Medical History:   Diagnosis Date    Chronic pain     HTN (hypertension)     Ill-defined condition     sickle cell    Iron overload due to repeated red blood cell transfusions 1/18/2017    Paroxysmal SVT (supraventricular tachycardia) ([facility] [address] 7/9/2016    Sickle cell disease ([facility] [address]      Past Surgical History:   Procedure Laterality Date    HC PENILE IMPL DURA II POSITINBLE      HC PORT LIFE SNGLE LUMEN 5013      to L CW    HX CHOLECYSTECTOMY      HX OTHER SURGICAL      penile inplant    HX VASCULAR ACCESS       Family History   Problem Relation Age of Onset    Hypertension Other     Diabetes Father     Stroke Father     Sickle Cell Anemia Sister      Social History     Social History    Marital status:      Spouse name: N/A    Number of children: N/A    Years of education: N/A     Occupational History    Not on file.      Social History Main Topics    Smoking status: Never Smoker    Smokeless tobacco: Never Used    Alcohol use No    Drug use: No    Sexual activity: Yes     Other Topics Concern    Not on file     Social History Narrative     Current Facility-Administered Medications   Medication Dose Route Frequency Provider Last Rate Last Dose    alcohol 62% (NOZIN) nasal  1 Ampule  1 Ampule Topical Q12H Ketty Marie MD   1 Ampule at 03/07/18 0921    deferasirox (JADENU) tab 1,800 mg tab (Patient Supplied)  1,800 mg Oral DAILY Ketty Marie MD        folic acid (FOLVITE) tablet 1 mg  1 mg Oral DAILY Ketty Marie MD   1 mg at 03/07/18 0916    hydroxyurea (HYDREA) chemo cap 500 mg  500 mg Oral BID Ketty Marie MD   500 mg at 03/06/18 2232    lisinopril (PRINIVIL, ZESTRIL) tablet 5 mg  5 mg Oral DAILY Ketty Marie MD   5 mg at 03/07/18 0917    magnesium oxide (MAG-OX) tablet 400 mg  400 mg Oral DAILY Flo Sicard Davis-Pachter, MD   400 mg at 03/07/18 0916    metoprolol tartrate (LOPRESSOR) tablet 25 mg  25 mg Oral BID Ketty Marie MD   25 mg at 03/07/18 0917    oxyCODONE IR (OXY-IR) immediate release tablet 30 mg  30 mg Oral Q6H PRN Ketty Marie MD   30 mg at 03/07/18 0426    sodium chloride (NS) flush 5-10 mL  5-10 mL IntraVENous Giovanna Crum MD   Stopped at 03/07/18 0528    sodium chloride (NS) flush 5-10 mL  5-10 mL IntraVENous PRN Ivonne Alba, MD        acetaminophen (TYLENOL) tablet 650 mg  650 mg Oral Q6H PRN Rachelle Abarca MD        HYDROmorphone (PF) (DILAUDID) injection 1 mg  1 mg IntraVENous Q4H PRN Rachelle Abarca MD   1 mg at 18 0917    naloxone (NARCAN) injection 0.4 mg  0.4 mg IntraVENous PRN Rachelle Abarca MD        promethazine (PHENERGAN) with saline injection 12.5 mg  12.5 mg IntraVENous Q6H PRN Rachelle Abarca MD        0.9% sodium chloride infusion  150 mL/hr IntraVENous CONTINUOUS Rachelle Abarca  mL/hr at 18 0420 150 mL/hr at 18 0420       OBJECTIVE:  Patient Vitals for the past 8 hrs:   BP Temp Pulse Resp SpO2   18 0727 119/75 98.4 °F (36.9 °C) 70 18 94 %   18 0300 132/84 98.4 °F (36.9 °C) 74 16 95 %     Temp (24hrs), Av.6 °F (37 °C), Min:98.4 °F (36.9 °C), Max:98.9 °F (37.2 °C)    701 -  1900  In: 564 [I.V.:564]  Out: -     Physical Exam:  Constitutional: Well developed, well nourished male in no acute distress, sitting in the hospital bed. HEENT: Normocephalic and atraumatic. Oropharynx is clear, mucous membranes are moist. Extraocular muscles are intact. Sclerae anicteric. Skin Warm and dry. No bruising and no rash noted. No erythema. No pallor. Respiratory Lungs are clear to auscultation bilaterally without wheezes, rales or rhonchi, normal air exchange without accessory muscle use. CVS Normal rate, regular rhythm and normal S1 and S2. No murmurs, gallops, or rubs. Abdomen Soft, nontender and nondistended, normoactive bowel sounds. No palpable mass. No hepatosplenomegaly. Neuro Grossly nonfocal with no obvious sensory or motor deficits. MSK Normal range of motion in general.  No edema and no tenderness. Psych Appropriate mood and affect.         Labs:    Recent Results (from the past 24 hour(s))   CBC WITH AUTOMATED DIFF    Collection Time: 18  3:15 PM   Result Value Ref Range    WBC 11.6 (H) 4.3 - 11.1 K/uL RBC 3.11 (L) 4.23 - 5.67 M/uL    HGB 9.6 (L) 13.6 - 17.2 g/dL    HCT 27.9 (L) 41.1 - 50.3 %    MCV 89.7 79.6 - 97.8 FL    MCH 30.9 26.1 - 32.9 PG    MCHC 34.4 31.4 - 35.0 g/dL    RDW 26.9 (H) 11.9 - 14.6 %    PLATELET 692 133 - 689 K/uL    MPV 10.4 (L) 10.8 - 14.1 FL    NEUTROPHILS 66 43 - 78 %    LYMPHOCYTES 26 13 - 44 %    MONOCYTES 7 4.0 - 12.0 %    EOSINOPHILS 1 0.5 - 7.8 %    BASOPHILS 0 0.0 - 2.0 %    IMMATURE GRANULOCYTES 0 0.0 - 5.0 %    ABS. NEUTROPHILS 7.7 1.7 - 8.2 K/UL    ABS. LYMPHOCYTES 3.0 0.5 - 4.6 K/UL    ABS. MONOCYTES 0.8 0.1 - 1.3 K/UL    ABS. EOSINOPHILS 0.1 0.0 - 0.8 K/UL    ABS. BASOPHILS 0.0 0.0 - 0.2 K/UL    ABS. IMM. GRANS. 0.0 0.0 - 0.5 K/UL    RBC COMMENTS MODERATE  SICKLE CELLS        RBC COMMENTS SLIGHT  ANISOCYTOSIS + POIKILOCYTOSIS        WBC COMMENTS Result Confirmed By Smear      PLATELET COMMENTS ADEQUATE      DF AUTOMATED     METABOLIC PANEL, COMPREHENSIVE    Collection Time: 03/06/18  3:15 PM   Result Value Ref Range    Sodium 139 136 - 145 mmol/L    Potassium 4.8 3.5 - 5.1 mmol/L    Chloride 106 98 - 107 mmol/L    CO2 27 21 - 32 mmol/L    Anion gap 6 (L) 7 - 16 mmol/L    Glucose 112 (H) 65 - 100 mg/dL    BUN 9 6 - 23 MG/DL    Creatinine 0.89 0.8 - 1.5 MG/DL    GFR est AA >60 >60 ml/min/1.73m2    GFR est non-AA >60 >60 ml/min/1.73m2    Calcium 9.1 8.3 - 10.4 MG/DL    Bilirubin, total 1.5 (H) 0.2 - 1.1 MG/DL    ALT (SGPT) 70 (H) 12 - 65 U/L    AST (SGOT) 80 (H) 15 - 37 U/L    Alk.  phosphatase 90 50 - 136 U/L    Protein, total 9.2 (H) 6.3 - 8.2 g/dL    Albumin 4.0 3.5 - 5.0 g/dL    Globulin 5.2 (H) 2.3 - 3.5 g/dL    A-G Ratio 0.8 (L) 1.2 - 3.5     RETICULOCYTE COUNT    Collection Time: 03/06/18  3:15 PM   Result Value Ref Range    Reticulocyte count 7.2 (H) 0.3 - 2.0 %    Absolute Retic Cnt. 0.2245 (H) 0.026 - 0.095 M/ul    Immature Retic Fraction 39.1 (H) 2.3 - 13.4 %    Retic Hgb Conc. 39 (H) 29 - 35 pg   URINALYSIS W/ RFLX MICROSCOPIC    Collection Time: 03/06/18  8:15 PM Result Value Ref Range    Color YELLOW      Appearance CLEAR      Specific gravity 1.009 1.001 - 1.023      pH (UA) 7.5 5.0 - 9.0      Protein NEGATIVE  NEG mg/dL    Glucose NEGATIVE  mg/dL    Ketone NEGATIVE  NEG mg/dL    Bilirubin NEGATIVE  NEG      Blood NEGATIVE  NEG      Urobilinogen 1.0 0.2 - 1.0 EU/dL    Nitrites NEGATIVE  NEG      Leukocyte Esterase NEGATIVE  NEG     MAGNESIUM    Collection Time: 03/06/18  8:30 PM   Result Value Ref Range    Magnesium 1.7 (L) 1.8 - 2.4 mg/dL   METABOLIC PANEL, COMPREHENSIVE    Collection Time: 03/07/18  4:07 AM   Result Value Ref Range    Sodium 139 136 - 145 mmol/L    Potassium 4.3 3.5 - 5.1 mmol/L    Chloride 105 98 - 107 mmol/L    CO2 27 21 - 32 mmol/L    Anion gap 7 7 - 16 mmol/L    Glucose 103 (H) 65 - 100 mg/dL    BUN 9 6 - 23 MG/DL    Creatinine 0.79 (L) 0.8 - 1.5 MG/DL    GFR est AA >60 >60 ml/min/1.73m2    GFR est non-AA >60 >60 ml/min/1.73m2    Calcium 8.4 8.3 - 10.4 MG/DL    Bilirubin, total 1.3 (H) 0.2 - 1.1 MG/DL    ALT (SGPT) 49 12 - 65 U/L    AST (SGOT) 49 (H) 15 - 37 U/L    Alk. phosphatase 72 50 - 136 U/L    Protein, total 7.4 6.3 - 8.2 g/dL    Albumin 3.2 (L) 3.5 - 5.0 g/dL    Globulin 4.2 (H) 2.3 - 3.5 g/dL    A-G Ratio 0.8 (L) 1.2 - 3.5     CBC WITH AUTOMATED DIFF    Collection Time: 03/07/18  4:07 AM   Result Value Ref Range    WBC 12.7 (H) 4.3 - 11.1 K/uL    RBC 2.51 (L) 4.23 - 5.67 M/uL    HGB 7.5 (L) 13.6 - 17.2 g/dL    HCT 22.5 (L) 41.1 - 50.3 %    MCV 89.6 79.6 - 97.8 FL    MCH 29.9 26.1 - 32.9 PG    MCHC 33.3 31.4 - 35.0 g/dL    RDW 26.4 (H) 11.9 - 14.6 %    PLATELET 365 258 - 863 K/uL    MPV 10.1 (L) 10.8 - 14.1 FL    DF AUTOMATED      NEUTROPHILS 47 43 - 78 %    LYMPHOCYTES 40 13 - 44 %    MONOCYTES 12 4.0 - 12.0 %    EOSINOPHILS 1 0.5 - 7.8 %    BASOPHILS 0 0.0 - 2.0 %    IMMATURE GRANULOCYTES 0 0.0 - 5.0 %    ABS. NEUTROPHILS 5.9 1.7 - 8.2 K/UL    ABS. LYMPHOCYTES 5.0 (H) 0.5 - 4.6 K/UL    ABS. MONOCYTES 1.6 (H) 0.1 - 1.3 K/UL    ABS. EOSINOPHILS 0.2 0.0 - 0.8 K/UL    ABS. BASOPHILS 0.0 0.0 - 0.2 K/UL    ABS. IMM. GRANS. 0.0 0.0 - 0.5 K/UL       Imaging:  [unfilled]    ASSESSMENT:  Problem List  Date Reviewed: 3/5/2012          Codes Class Noted    Anemia ICD-10-CM: D64.9  ICD-9-CM: 285.9  11/12/2017        Sickle cell disease ([place] [address] ICD-10-CM: D57.1  ICD-9-CM: 282.60  7/13/2017        Iron overload due to repeated red blood cell transfusions ICD-10-CM: E83.111  ICD-9-CM: 275.02  1/18/2017        Sickle cell anemia with crisis [facility]) ICD-10-CM: D57.00  ICD-9-CM: 282.62  1/16/2017        Paroxysmal SVT (supraventricular tachycardia) (HCC) ICD-10-CM: I47.1  ICD-9-CM: 427.0  7/9/2016        Hypomagnesemia ICD-10-CM: E83.42  ICD-9-CM: 275.2  7/9/2016        Sickle cell anemia ([place] [address] ICD-10-CM: D57.1  ICD-9-CM: 282.60  4/6/2016        * (Principal)Sickle cell crisis ([place] [address] ICD-10-CM: D57.00  ICD-9-CM: 282.62  4/5/2016        leukocytosis - most likely reactive ICD-10-CM: D72.829  ICD-9-CM: 288.60  1/30/2016        Diarrhea ICD-10-CM: R19.7  ICD-9-CM: 787.91  9/27/2014        Sickle cell pain crisis ([place] [address] ICD-10-CM: D57.00  ICD-9-CM: 282.62  10/25/2012        Hyperbilirubinemia ICD-10-CM: E80.6  ICD-9-CM: 782.4  9/20/2012    Overview Signed 9/20/2012  1:37 PM by Eudelia Morning     Related to sickle cell crisis             Essential hypertension, benign ICD-10-CM: I10  ICD-9-CM: 401.1  6/23/2012        Hemolytic crisis ([address] ICD-10-CM: D65  ICD-9-CM: 286.6  3/24/2012    Overview Signed 3/24/2012  2:53 PM by Adams Homans     Sickle cell with anemia             HTN (hypertension) (Chronic) ICD-10-CM: I10  ICD-9-CM: 401.9  3/2/2012        Leukocytosis - chronic reactive ICD-10-CM: D72.829  ICD-9-CM: 288.60  9/16/2011                RECOMMENDATIONS:  Sickle Cell/B thalassemia  3/7 Continue with aggressive hydration, oxygen, hydrea, folic acid. Okay to take Terese Poser if wife does bring from home. Hgb stable at 7.5.  Retic 7.2    Sickle cell crisis pain  3/7 Pain primarily in lower limbs. Stopping oxy IR 30 q 6. Increasing IV Dilaudid from 1 mg to 2 mg q 4. Adding OxyContin 80 mg bid. Lab studies and imaging studies were personally reviewed. Pertinent old records were reviewed. Thank you for allowing us to participate in the care of Mr. Cabrera. We will sign off and be available as needed. Arlene Garcia NP   [facility]  [address] [place] [address]  Office : [phone]  Fax : [phone]       Attending Addendum:  I personally evaluated the patient with Arlene Garcia NJERARDO,  and agree with the assessment, findings and plan as documented. Appears stable, heart regular without murmur, lungs clear, abdomen benign. 43M h/o Hemoglobin S-Beta Thal (under care of Dr Nissa oFrbes at [facility]) now admitted w pain crises. No obvious s/s of infection. Supportive care as outlined above including hydration, IV pain control, as well as continuation of his hydrea therapy. Will need f/u w [facility] hematology Dr Nissa Forbes on discharge. Thank you for allowing us to participate in care of this pleasant patient. Please call w any questions.                 MD Jonelle Becerra Mai [address]  Office : [phone]  Fax : [phone]

## 2018-03-07 NOTE — PROGRESS NOTES
Confusion over how pt takes hydrea pt takes differently than last script dispensed in February. States hematologist decreased dose confirmed  Dosage with spouse, Paulino Dwyer, listed as his primary care contact.  States he now takes 500 mg BID

## 2018-03-07 NOTE — PROGRESS NOTES
made initial visit. Pt was alert and verbal.  Pt appeared comfortable. No pain level was expressed or observed.  welcomed pt to DT and shared about  services.  provided spiritual care through presence, pastoral conversation, and assurance of prayer.

## 2018-03-07 NOTE — H&P
Hospitalist H&P Note     Admit Date:  3/6/2018  3:07 PM   Name:  Nicci Santos   Age:  37 y.o.  :  1975   MRN:  [id number]   PCP:  Nikki Solares MD  Treatment Team: Attending Provider: Kwasi Garcia MD    HPI:       Mr. Alexis Mendiola is a 36 yo male with PMH of sickle cell and B-thalassemia followed by oncology [facility] evaluated with limb pain for 24 hours refractory to chronic narcotics. Denies chest pain, dyspnea, fever, BM change or dysuria. CXR negative, HGB 9.6.       10 systems reviewed and negative except as noted in HPI. - has paresthesia         Past Medical History:   Diagnosis Date    Chronic pain     HTN (hypertension)     Ill-defined condition     sickle cell    Iron overload due to repeated red blood cell transfusions 2017    Paroxysmal SVT (supraventricular tachycardia) (HCC) 2016    [facility] [address]       Past Surgical History:   Procedure Laterality Date    HC PENILE IMPL DURA II POSITINBLE      HC PORT LIFE SNGLE LUMEN 5013      to L CW    HX CHOLECYSTECTOMY      HX OTHER SURGICAL      penile inplant    HX VASCULAR ACCESS        Allergies   Allergen Reactions    Compazine [Prochlorperazine Edisylate] Other (comments)     Also makes him feel funny    Morphine Other (comments)     Makes him feel funny    Reglan [Metoclopramide] Other (comments)     \"feel funny\"    Zofran [Ondansetron Hcl (Pf)] Other (comments)     Make him feel funny      Social History   Substance Use Topics    Smoking status: Never Smoker    Smokeless tobacco: Never Used    Alcohol use No      Family History   Problem Relation Age of Onset    Hypertension Other     Diabetes Father     Stroke Father     Sickle Cell Anemia Sister       Immunization History   Administered Date(s) Administered    Influenza Vaccine 09/10/2015    Influenza Vaccine Split 2012    ZZZ-RETIRED (DO NOT USE) Pneumococcal Vaccine (Unspecified Type) 2010     PTA Medications:  Prior to Admission Medications   Prescriptions Last Dose Informant Patient Reported? Taking? deferasirox (JADENU) 360 mg tab   No No   Sig: Take 5 Tabs by mouth daily. folic acid (FOLVITE) 1 mg tablet  Self Yes No   Sig: Take 1 mg by mouth daily. hydroxyurea (HYDREA) 500 mg capsule   Yes No   Sig: Take 500 mg by mouth two (2) times a day. lisinopril (PRINIVIL, ZESTRIL) 5 mg tablet   Yes No   Sig: Take 5 mg by mouth daily. Indications: HYPERTENSION   magnesium oxide (MAG-OX) 400 mg tablet   No No   Sig: Take 1 Tab by mouth daily. metoprolol (LOPRESSOR) 25 mg tablet   Yes No   Sig: Take 25 mg by mouth two (2) times a day. Indications: HYPERTENSION   oxyCODONE ER (OXYCONTIN) 80 mg ER tablet   No No   Sig: Take 1 Tab by mouth every twelve (12) hours. Max Daily Amount: 160 mg.   oxyCODONE IR (OXY-IR) 30 mg immediate release tablet   No No   Sig: Take 1 Tab by mouth every four (4) hours as needed for Pain. Max Daily Amount: 180 mg.   promethazine (PHENERGAN) 25 mg tablet   No No   Sig: Take 1 Tab by mouth every six (6) hours as needed. May substitute suppository if vomiting      Facility-Administered Medications: None       Objective:   Patient Vitals for the past 24 hrs:   Temp Pulse Resp BP SpO2   03/06/18 1940 98.9 °F (37.2 °C) 82 17 124/82 96 %   03/06/18 1839 - 78 - 118/66 94 %   03/06/18 1808 - 83 - 130/74 92 %   03/06/18 1739 - 79 - 129/82 94 %   03/06/18 1709 - 80 - (!) 124/92 94 %   03/06/18 1639 - - - 156/84 -   03/06/18 1506 98.7 °F (37.1 °C) 75 18 136/77 94 %     Oxygen Therapy  O2 Sat (%): 96 % (03/06/18 1940)  Pulse via Oximetry: 78 beats per minute (03/06/18 1839)  O2 Device: Room air (03/06/18 1506)  No intake or output data in the 24 hours ending 03/06/18 2004    Physical Exam:  General:    Well nourished. Alert. No distress   Eyes:   Normal sclera. Extraocular movements intact. PERRLA  ENT:  Normocephalic, atraumatic. Moist mucous membranes  CV:   RRR. No m/r/g. Lungs:  CTAB.   No wheezing, rhonchi, or rales. Abdomen: Soft, nontender, nondistended. Bowel sounds normal.   Extremities: Warm and dry. No cyanosis or edema. Neurologic: grossly intact. Skin:     No rashes or jaundice. Normal coloration  Psych:  Normal mood and affect. I reviewed the labs, imaging, EKGs, telemetry, and other studies done this admission. Data Review:   Recent Results (from the past 24 hour(s))   CBC WITH AUTOMATED DIFF    Collection Time: 03/06/18  3:15 PM   Result Value Ref Range    WBC 11.6 (H) 4.3 - 11.1 K/uL    RBC 3.11 (L) 4.23 - 5.67 M/uL    HGB 9.6 (L) 13.6 - 17.2 g/dL    HCT 27.9 (L) 41.1 - 50.3 %    MCV 89.7 79.6 - 97.8 FL    MCH 30.9 26.1 - 32.9 PG    MCHC 34.4 31.4 - 35.0 g/dL    RDW 26.9 (H) 11.9 - 14.6 %    PLATELET 382 218 - 029 K/uL    MPV 10.4 (L) 10.8 - 14.1 FL    NEUTROPHILS 66 43 - 78 %    LYMPHOCYTES 26 13 - 44 %    MONOCYTES 7 4.0 - 12.0 %    EOSINOPHILS 1 0.5 - 7.8 %    BASOPHILS 0 0.0 - 2.0 %    IMMATURE GRANULOCYTES 0 0.0 - 5.0 %    ABS. NEUTROPHILS 7.7 1.7 - 8.2 K/UL    ABS. LYMPHOCYTES 3.0 0.5 - 4.6 K/UL    ABS. MONOCYTES 0.8 0.1 - 1.3 K/UL    ABS. EOSINOPHILS 0.1 0.0 - 0.8 K/UL    ABS. BASOPHILS 0.0 0.0 - 0.2 K/UL    ABS. IMM.  GRANS. 0.0 0.0 - 0.5 K/UL    RBC COMMENTS MODERATE  SICKLE CELLS        RBC COMMENTS SLIGHT  ANISOCYTOSIS + POIKILOCYTOSIS        WBC COMMENTS Result Confirmed By Smear      PLATELET COMMENTS ADEQUATE      DF AUTOMATED     METABOLIC PANEL, COMPREHENSIVE    Collection Time: 03/06/18  3:15 PM   Result Value Ref Range    Sodium 139 136 - 145 mmol/L    Potassium 4.8 3.5 - 5.1 mmol/L    Chloride 106 98 - 107 mmol/L    CO2 27 21 - 32 mmol/L    Anion gap 6 (L) 7 - 16 mmol/L    Glucose 112 (H) 65 - 100 mg/dL    BUN 9 6 - 23 MG/DL    Creatinine 0.89 0.8 - 1.5 MG/DL    GFR est AA >60 >60 ml/min/1.73m2    GFR est non-AA >60 >60 ml/min/1.73m2    Calcium 9.1 8.3 - 10.4 MG/DL    Bilirubin, total 1.5 (H) 0.2 - 1.1 MG/DL    ALT (SGPT) 70 (H) 12 - 65 U/L    AST (SGOT) 80 (H) 15 - 37 U/L    Alk. phosphatase 90 50 - 136 U/L    Protein, total 9.2 (H) 6.3 - 8.2 g/dL    Albumin 4.0 3.5 - 5.0 g/dL    Globulin 5.2 (H) 2.3 - 3.5 g/dL    A-G Ratio 0.8 (L) 1.2 - 3.5     RETICULOCYTE COUNT    Collection Time: 03/06/18  3:15 PM   Result Value Ref Range    Reticulocyte count 7.2 (H) 0.3 - 2.0 %    Absolute Retic Cnt. 0.2245 (H) 0.026 - 0.095 M/ul    Immature Retic Fraction 39.1 (H) 2.3 - 13.4 %    Retic Hgb Conc. 39 (H) 29 - 35 pg       All Micro Results     None          Other Studies:  Xr Chest Port    Result Date: 3/6/2018  EXAM:  XR CHEST PORT INDICATION:  chest pain COMPARISON:  11/11/2017 FINDINGS: A portable AP radiograph of the chest was obtained at 1505 hours. The patient is on a cardiac monitor. The lungs are clear. The cardiac and mediastinal contours and pulmonary vascularity are normal.  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 Lilibeth Buchanan IMPRESSION: No acute cardiomegaly disease. 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        Assessment and Plan:     Hospital Problems as of 3/6/2018  Date Reviewed: 3/5/2012          Codes Class Noted - Resolved POA    * (Principal)Sickle cell crisis [facility]) ICD-10-CM: D57.00  ICD-9-CM: 282.62  4/5/2016 - Present Unknown        Essential hypertension, benign ICD-10-CM: I10  ICD-9-CM: 401.1  6/23/2012 - Present Yes              PLAN:  · Admit to medical bed  · IVF, pain control, supplemental O2  · followup labs, magnesium   · Continue antihypertensives     Discharge planning:    DVT ppx: SCD  Code status:  Full  Estimated LOS:  Greater than 2 midnights  Risk:  high  Care plan: wife Momo Sosa, [phone]   Signed:  Deandre Reddy MD

## 2018-03-07 NOTE — PROGRESS NOTES
Hospitalist Progress Note    3/7/2018  Admit Date: 3/6/2018  3:07 PM   NAME: Dhara Machado   :  1975   MRN:  [id number]   Attending: Zane Medina MD  PCP:  Sheryl Cintron MD    SUBJECTIVE:    Elizabeth Falcon is a 36 yo male with PMH of sickle cell and B-thalassemia followed by oncology [facility] evaluated with limb pain for 24 hours refractory to chronic narcotics. Admitted for sickle cell crisis. 3/7 - still with report of pain in LE's. Review of Systems negative with exception of pertinent positives noted above  PHYSICAL EXAM     Visit Vitals    /86    Pulse 70    Temp 98.9 °F (37.2 °C)    Resp 18    Ht 5' 10\" (1.778 m)    Wt 62.6 kg (138 lb)    SpO2 92%    BMI 19.8 kg/m2      Temp (24hrs), Av.6 °F (37 °C), Min:98.4 °F (36.9 °C), Max:98.9 °F (37.2 °C)    Oxygen Therapy  O2 Sat (%): 92 % (18 1155)  Pulse via Oximetry: 78 beats per minute (18 1839)  O2 Device: Nasal cannula (18)  O2 Flow Rate (L/min): 2 l/min (18 2220)    Intake/Output Summary (Last 24 hours) at 18 1442  Last data filed at 18 1155   Gross per 24 hour   Intake             3055 ml   Output             1650 ml   Net             1405 ml      General: No acute distress    Lungs:  CTA Bilaterally. Heart:  Regular rate and rhythm,  No murmur, rub, or gallop  Abdomen: Soft, Non distended, Non tender, Positive bowel sounds  Extremities: No cyanosis, clubbing or edema  Neurologic:  No focal deficits    ASSESSMENT      Active Hospital Problems    Diagnosis Date Noted    [facility]    Essential hypertension, benign 2012     Plan:  · Continue IVF, IV px mgmt, Folic acid, hydrea. Jadenu patient supplied. · Monitor associated anemia. Labs in AM  · Appreciate hematology input - increasing IV morphine. Cont pulse ox started.      DVT Prophylaxis: scds    Signed By: Shaun Zepeda DO     2018

## 2018-03-07 NOTE — PROGRESS NOTES
Problem: Falls - Risk of  Goal: *Absence of Falls  Document Toy Fall Risk and appropriate interventions in the flowsheet.    Outcome: Progressing Towards Goal  Fall Risk Interventions:            Medication Interventions: Evaluate medications/consider consulting pharmacy, Patient to call before getting OOB, Teach patient to arise slowly

## 2018-03-07 NOTE — PROGRESS NOTES
38 yo male admitted to room 202 from ED. Alert and oriented x 4. Oriented to room and nurse call system/ plan of care reviewed. Dual skin assessment completed with Ej Mcgill, no skin breakdown noted.

## 2018-03-08 LAB
ALBUMIN SERPL-MCNC: 3 G/DL (ref 3.5–5)
ALBUMIN/GLOB SERPL: 0.7 {RATIO} (ref 1.2–3.5)
ALP SERPL-CCNC: 73 U/L (ref 50–136)
ALT SERPL-CCNC: 43 U/L (ref 12–65)
ANION GAP SERPL CALC-SCNC: 8 MMOL/L (ref 7–16)
AST SERPL-CCNC: 40 U/L (ref 15–37)
BASOPHILS # BLD: 0 K/UL (ref 0–0.2)
BASOPHILS NFR BLD: 0 % (ref 0–2)
BILIRUB SERPL-MCNC: 1.1 MG/DL (ref 0.2–1.1)
BUN SERPL-MCNC: 8 MG/DL (ref 6–23)
CALCIUM SERPL-MCNC: 8.2 MG/DL (ref 8.3–10.4)
CHLORIDE SERPL-SCNC: 108 MMOL/L (ref 98–107)
CO2 SERPL-SCNC: 25 MMOL/L (ref 21–32)
CREAT SERPL-MCNC: 0.69 MG/DL (ref 0.8–1.5)
DIFFERENTIAL METHOD BLD: ABNORMAL
EOSINOPHIL # BLD: 0.2 K/UL (ref 0–0.8)
EOSINOPHIL NFR BLD: 2 % (ref 0.5–7.8)
ERYTHROCYTE [DISTWIDTH] IN BLOOD BY AUTOMATED COUNT: 27.4 % (ref 11.9–14.6)
GLOBULIN SER CALC-MCNC: 4.1 G/DL (ref 2.3–3.5)
GLUCOSE SERPL-MCNC: 107 MG/DL (ref 65–100)
HCT VFR BLD AUTO: 22.4 % (ref 41.1–50.3)
HGB BLD-MCNC: 7.3 G/DL (ref 13.6–17.2)
IMM GRANULOCYTES # BLD: 0 K/UL (ref 0–0.5)
IMM GRANULOCYTES NFR BLD AUTO: 0 % (ref 0–5)
LYMPHOCYTES # BLD: 5.3 K/UL (ref 0.5–4.6)
LYMPHOCYTES NFR BLD: 44 % (ref 13–44)
MCH RBC QN AUTO: 29.7 PG (ref 26.1–32.9)
MCHC RBC AUTO-ENTMCNC: 32.6 G/DL (ref 31.4–35)
MCV RBC AUTO: 91.1 FL (ref 79.6–97.8)
MONOCYTES # BLD: 1.5 K/UL (ref 0.1–1.3)
MONOCYTES NFR BLD: 13 % (ref 4–12)
NEUTS SEG # BLD: 5 K/UL (ref 1.7–8.2)
NEUTS SEG NFR BLD: 41 % (ref 43–78)
PLATELET # BLD AUTO: 200 K/UL (ref 150–450)
PMV BLD AUTO: 9.9 FL (ref 10.8–14.1)
POTASSIUM SERPL-SCNC: 4.1 MMOL/L (ref 3.5–5.1)
PROT SERPL-MCNC: 7.1 G/DL (ref 6.3–8.2)
RBC # BLD AUTO: 2.46 M/UL (ref 4.23–5.67)
SODIUM SERPL-SCNC: 141 MMOL/L (ref 136–145)
WBC # BLD AUTO: 12.1 K/UL (ref 4.3–11.1)

## 2018-03-08 PROCEDURE — 74011250636 HC RX REV CODE- 250/636: Performed by: NURSE PRACTITIONER

## 2018-03-08 PROCEDURE — 74011250637 HC RX REV CODE- 250/637: Performed by: NURSE PRACTITIONER

## 2018-03-08 PROCEDURE — 65270000029 HC RM PRIVATE

## 2018-03-08 PROCEDURE — 74011250636 HC RX REV CODE- 250/636: Performed by: INTERNAL MEDICINE

## 2018-03-08 PROCEDURE — 80053 COMPREHEN METABOLIC PANEL: CPT | Performed by: INTERNAL MEDICINE

## 2018-03-08 PROCEDURE — 85025 COMPLETE CBC W/AUTO DIFF WBC: CPT | Performed by: INTERNAL MEDICINE

## 2018-03-08 PROCEDURE — 74011000250 HC RX REV CODE- 250: Performed by: INTERNAL MEDICINE

## 2018-03-08 PROCEDURE — 74011250637 HC RX REV CODE- 250/637: Performed by: INTERNAL MEDICINE

## 2018-03-08 PROCEDURE — 36415 COLL VENOUS BLD VENIPUNCTURE: CPT | Performed by: INTERNAL MEDICINE

## 2018-03-08 RX ADMIN — PROMETHAZINE HYDROCHLORIDE 12.5 MG: 25 INJECTION INTRAMUSCULAR; INTRAVENOUS at 06:30

## 2018-03-08 RX ADMIN — FOLIC ACID 1 MG: 1 TABLET ORAL at 08:31

## 2018-03-08 RX ADMIN — HYDROMORPHONE HYDROCHLORIDE 2 MG: 2 INJECTION, SOLUTION INTRAMUSCULAR; INTRAVENOUS; SUBCUTANEOUS at 01:18

## 2018-03-08 RX ADMIN — HYDROMORPHONE HYDROCHLORIDE 2 MG: 2 INJECTION, SOLUTION INTRAMUSCULAR; INTRAVENOUS; SUBCUTANEOUS at 06:40

## 2018-03-08 RX ADMIN — LISINOPRIL 5 MG: 5 TABLET ORAL at 08:31

## 2018-03-08 RX ADMIN — Medication 400 MG: at 08:31

## 2018-03-08 RX ADMIN — HYDROXYUREA 500 MG: 500 CAPSULE ORAL at 22:46

## 2018-03-08 RX ADMIN — PROMETHAZINE HYDROCHLORIDE 12.5 MG: 25 INJECTION INTRAMUSCULAR; INTRAVENOUS at 14:40

## 2018-03-08 RX ADMIN — HYDROMORPHONE HYDROCHLORIDE 2 MG: 2 INJECTION, SOLUTION INTRAMUSCULAR; INTRAVENOUS; SUBCUTANEOUS at 14:36

## 2018-03-08 RX ADMIN — SODIUM CHLORIDE 150 ML/HR: 900 INJECTION, SOLUTION INTRAVENOUS at 01:24

## 2018-03-08 RX ADMIN — PROMETHAZINE HYDROCHLORIDE 12.5 MG: 25 INJECTION INTRAMUSCULAR; INTRAVENOUS at 22:52

## 2018-03-08 RX ADMIN — HYDROMORPHONE HYDROCHLORIDE 2 MG: 2 INJECTION, SOLUTION INTRAMUSCULAR; INTRAVENOUS; SUBCUTANEOUS at 22:52

## 2018-03-08 RX ADMIN — METOPROLOL TARTRATE 25 MG: 25 TABLET ORAL at 06:43

## 2018-03-08 RX ADMIN — HYDROXYUREA 500 MG: 500 CAPSULE ORAL at 08:33

## 2018-03-08 RX ADMIN — Medication 1 AMPULE: at 22:46

## 2018-03-08 RX ADMIN — OXYCODONE HYDROCHLORIDE 80 MG: 80 TABLET, FILM COATED, EXTENDED RELEASE ORAL at 08:31

## 2018-03-08 RX ADMIN — Medication 1 AMPULE: at 08:31

## 2018-03-08 RX ADMIN — OXYCODONE HYDROCHLORIDE 80 MG: 80 TABLET, FILM COATED, EXTENDED RELEASE ORAL at 21:10

## 2018-03-08 RX ADMIN — HYDROMORPHONE HYDROCHLORIDE 2 MG: 2 INJECTION, SOLUTION INTRAMUSCULAR; INTRAVENOUS; SUBCUTANEOUS at 18:24

## 2018-03-08 RX ADMIN — HYDROMORPHONE HYDROCHLORIDE 2 MG: 2 INJECTION, SOLUTION INTRAMUSCULAR; INTRAVENOUS; SUBCUTANEOUS at 10:32

## 2018-03-08 RX ADMIN — METOPROLOL TARTRATE 25 MG: 25 TABLET ORAL at 18:24

## 2018-03-08 RX ADMIN — SODIUM CHLORIDE 150 ML/HR: 900 INJECTION, SOLUTION INTRAVENOUS at 22:45

## 2018-03-08 NOTE — PROGRESS NOTES
END OF SHIFT NOTE:    INTAKE/OUTPUT  03/06 0701 - 03/07 0700  In: 1873 [P.O.:960; I.V.:913]  Out: 1100 [Urine:1100]  Voiding: YES  Catheter: NO  Drain:              Flatus: Patient does have flatus present. Stool:  0 occurrences. Characteristics:       Emesis: 0 occurrences. Characteristics:        VITAL SIGNS  Patient Vitals for the past 12 hrs:   Temp Pulse Resp BP SpO2   03/07/18 1445 99 °F (37.2 °C) 75 18 143/88 95 %   03/07/18 1155 98.9 °F (37.2 °C) 70 18 126/86 92 %       Pain Assessment  Pain Intensity 1: 8 (03/07/18 1054)  Pain Location 1: Generalized  Pain Intervention(s) 1: Medication (see MAR)  Patient Stated Pain Goal: 3    Ambulating  Yes    Shift report given to oncoming nurse at the bedside.     Fidel Stuart, RN

## 2018-03-08 NOTE — PROGRESS NOTES
Hospitalist Progress Note    3/8/2018  Admit Date: 3/6/2018  3:07 PM   NAME: Bryce Urias   :  1975   MRN:  [id number]   Attending: Leonor Adam MD  PCP:  Yann Mariscal MD    SUBJECTIVE:   Mr. Dylan Wen is a 36 yo male with PMH of sickle cell and B-thalassemia followed by oncology [facility] evaluated with limb pain for 24 hours refractory to chronic narcotics. He was admitted for sickle cell crisis. His analgesia was increased to Dilaudid 2 Q4H and oxycodone 80 BID while inpatient. Overnight, he had 4 mg IV Dilaudid. Today, he continues to report bilateral LL pain and reports that he needs 1 more day of his current pain regimen. He denies any dyspnea or cough. Review of Systems negative with exception of pertinent positives noted above  PHYSICAL EXAM     Visit Vitals    /75    Pulse (!) 133    Temp 98.4 °F (36.9 °C)    Resp 19    Ht 5' 10\" (1.778 m)    Wt 62.6 kg (138 lb)    SpO2 97%    BMI 19.8 kg/m2      Temp (24hrs), Av.9 °F (37.2 °C), Min:98.4 °F (36.9 °C), Max:99.8 °F (37.7 °C)    Oxygen Therapy  O2 Sat (%): 97 % (18)  Pulse via Oximetry: 78 beats per minute (18 1839)  O2 Device: Nasal cannula (18)  O2 Flow Rate (L/min): 2 l/min (18)    Intake/Output Summary (Last 24 hours) at 18 07  Last data filed at 18 034   Gross per 24 hour   Intake             3414 ml   Output             2525 ml   Net              889 ml      General: No acute distress    Lungs:  CTA Bilaterally. Heart:  Regular rate and rhythm,  No murmur, rub, or gallop  Abdomen: Soft, Non distended, Non tender, Positive bowel sounds  Extremities: No cyanosis, clubbing or edema  Neurologic:  No focal deficits    ASSESSMENT      Active Hospital Problems    Diagnosis Date Noted    Sickle cell crisis ([facility] [address] 2016    Essential hypertension, benign 2012     Plan:  · Sickle cell pain crisis:Continue IVF, IV px mgmt, Folic acid, hydrea.  Jadenu patient supplied. WIll decrease analgesia in the AM  · Normocytic anemia: Monitor associated anemia.  Labs in AM  · Appreciate hematology input    DVT Prophylaxis: scds    Signed By: Gregoria Santoro MD     March 8, 2018

## 2018-03-09 VITALS
WEIGHT: 138 LBS | HEIGHT: 70 IN | OXYGEN SATURATION: 98 % | TEMPERATURE: 98.5 F | BODY MASS INDEX: 19.76 KG/M2 | HEART RATE: 67 BPM | SYSTOLIC BLOOD PRESSURE: 142 MMHG | RESPIRATION RATE: 18 BRPM | DIASTOLIC BLOOD PRESSURE: 80 MMHG

## 2018-03-09 LAB
ALBUMIN SERPL-MCNC: 3.2 G/DL (ref 3.5–5)
ALBUMIN/GLOB SERPL: 0.7 {RATIO} (ref 1.2–3.5)
ALP SERPL-CCNC: 79 U/L (ref 50–136)
ALT SERPL-CCNC: 51 U/L (ref 12–65)
ANION GAP SERPL CALC-SCNC: 7 MMOL/L (ref 7–16)
AST SERPL-CCNC: 54 U/L (ref 15–37)
BASOPHILS # BLD: 0.1 K/UL (ref 0–0.2)
BASOPHILS NFR BLD: 1 % (ref 0–2)
BILIRUB SERPL-MCNC: 1.2 MG/DL (ref 0.2–1.1)
BUN SERPL-MCNC: 9 MG/DL (ref 6–23)
CALCIUM SERPL-MCNC: 8.5 MG/DL (ref 8.3–10.4)
CHLORIDE SERPL-SCNC: 105 MMOL/L (ref 98–107)
CO2 SERPL-SCNC: 26 MMOL/L (ref 21–32)
CREAT SERPL-MCNC: 0.84 MG/DL (ref 0.8–1.5)
DIFFERENTIAL METHOD BLD: ABNORMAL
EOSINOPHIL # BLD: 0.4 K/UL (ref 0–0.8)
EOSINOPHIL NFR BLD: 3 % (ref 0.5–7.8)
ERYTHROCYTE [DISTWIDTH] IN BLOOD BY AUTOMATED COUNT: 27.8 % (ref 11.9–14.6)
GLOBULIN SER CALC-MCNC: 4.3 G/DL (ref 2.3–3.5)
GLUCOSE SERPL-MCNC: 172 MG/DL (ref 65–100)
HCT VFR BLD AUTO: 23 % (ref 41.1–50.3)
HGB BLD-MCNC: 7.7 G/DL (ref 13.6–17.2)
IMM GRANULOCYTES # BLD: 0 K/UL (ref 0–0.5)
IMM GRANULOCYTES NFR BLD AUTO: 0 % (ref 0–5)
LYMPHOCYTES # BLD: 4.7 K/UL (ref 0.5–4.6)
LYMPHOCYTES NFR BLD: 38 % (ref 13–44)
MCH RBC QN AUTO: 30.4 PG (ref 26.1–32.9)
MCHC RBC AUTO-ENTMCNC: 33.5 G/DL (ref 31.4–35)
MCV RBC AUTO: 90.9 FL (ref 79.6–97.8)
MONOCYTES # BLD: 1.3 K/UL (ref 0.1–1.3)
MONOCYTES NFR BLD: 11 % (ref 4–12)
NEUTS SEG # BLD: 5.8 K/UL (ref 1.7–8.2)
NEUTS SEG NFR BLD: 47 % (ref 43–78)
PLATELET # BLD AUTO: 223 K/UL (ref 150–450)
PMV BLD AUTO: 9.9 FL (ref 10.8–14.1)
POTASSIUM SERPL-SCNC: 4 MMOL/L (ref 3.5–5.1)
PROT SERPL-MCNC: 7.5 G/DL (ref 6.3–8.2)
RBC # BLD AUTO: 2.53 M/UL (ref 4.23–5.67)
SODIUM SERPL-SCNC: 138 MMOL/L (ref 136–145)
WBC # BLD AUTO: 12.3 K/UL (ref 4.3–11.1)

## 2018-03-09 PROCEDURE — 74011250636 HC RX REV CODE- 250/636: Performed by: NURSE PRACTITIONER

## 2018-03-09 PROCEDURE — 74011250637 HC RX REV CODE- 250/637: Performed by: INTERNAL MEDICINE

## 2018-03-09 PROCEDURE — 85025 COMPLETE CBC W/AUTO DIFF WBC: CPT | Performed by: INTERNAL MEDICINE

## 2018-03-09 PROCEDURE — 80053 COMPREHEN METABOLIC PANEL: CPT | Performed by: INTERNAL MEDICINE

## 2018-03-09 PROCEDURE — 74011250637 HC RX REV CODE- 250/637: Performed by: NURSE PRACTITIONER

## 2018-03-09 PROCEDURE — 36415 COLL VENOUS BLD VENIPUNCTURE: CPT | Performed by: INTERNAL MEDICINE

## 2018-03-09 PROCEDURE — 74011250636 HC RX REV CODE- 250/636: Performed by: INTERNAL MEDICINE

## 2018-03-09 RX ORDER — HYDROMORPHONE HYDROCHLORIDE 2 MG/ML
1 INJECTION, SOLUTION INTRAMUSCULAR; INTRAVENOUS; SUBCUTANEOUS
Status: DISCONTINUED | OUTPATIENT
Start: 2018-03-09 | End: 2018-03-09 | Stop reason: HOSPADM

## 2018-03-09 RX ORDER — HYDROMORPHONE HCL IN 0.9% NACL 50 MG/50ML
1 PLASTIC BAG, INJECTION (ML) INJECTION
Status: DISCONTINUED | OUTPATIENT
Start: 2018-03-09 | End: 2018-03-09

## 2018-03-09 RX ORDER — MAGNESIUM SULFATE HEPTAHYDRATE 40 MG/ML
2 INJECTION, SOLUTION INTRAVENOUS ONCE
Status: COMPLETED | OUTPATIENT
Start: 2018-03-09 | End: 2018-03-09

## 2018-03-09 RX ADMIN — LISINOPRIL 5 MG: 5 TABLET ORAL at 09:20

## 2018-03-09 RX ADMIN — Medication 1 AMPULE: at 09:20

## 2018-03-09 RX ADMIN — Medication 400 MG: at 09:20

## 2018-03-09 RX ADMIN — OXYCODONE HYDROCHLORIDE 80 MG: 80 TABLET, FILM COATED, EXTENDED RELEASE ORAL at 09:20

## 2018-03-09 RX ADMIN — SODIUM CHLORIDE 150 ML/HR: 900 INJECTION, SOLUTION INTRAVENOUS at 05:54

## 2018-03-09 RX ADMIN — MAGNESIUM SULFATE HEPTAHYDRATE 2 G: 40 INJECTION, SOLUTION INTRAVENOUS at 09:20

## 2018-03-09 RX ADMIN — METOPROLOL TARTRATE 25 MG: 25 TABLET ORAL at 09:20

## 2018-03-09 RX ADMIN — HYDROXYUREA 500 MG: 500 CAPSULE ORAL at 09:55

## 2018-03-09 RX ADMIN — HYDROMORPHONE HYDROCHLORIDE 2 MG: 2 INJECTION, SOLUTION INTRAMUSCULAR; INTRAVENOUS; SUBCUTANEOUS at 06:48

## 2018-03-09 RX ADMIN — FOLIC ACID 1 MG: 1 TABLET ORAL at 09:20

## 2018-03-09 NOTE — DISCHARGE INSTRUCTIONS
DISCHARGE SUMMARY from Nurse    PATIENT INSTRUCTIONS:    After general anesthesia or intravenous sedation, for 24 hours or while taking prescription Narcotics:  · Limit your activities  · Do not drive and operate hazardous machinery  · Do not make important personal or business decisions  · Do  not drink alcoholic beverages  · If you have not urinated within 8 hours after discharge, please contact your surgeon on call. Report the following to your surgeon:  · Excessive pain, swelling, redness or odor of or around the surgical area  · Temperature over 100.5  · Nausea and vomiting lasting longer than 4 hours or if unable to take medications  · Any signs of decreased circulation or nerve impairment to extremity: change in color, persistent  numbness, tingling, coldness or increase pain  · Any questions    What to do at Home:  Recommended activity: Activity as tolerated, per MD instructions    If you experience any of the following symptoms fever > 100.5, nausea, vomiting, pain, chest pain and/or shortness of breath please follow up with MD.    *  Please give a list of your current medications to your Primary Care Provider. *  Please update this list whenever your medications are discontinued, doses are      changed, or new medications (including over-the-counter products) are added. *  Please carry medication information at all times in case of emergency situations. These are general instructions for a healthy lifestyle:    No smoking/ No tobacco products/ Avoid exposure to second hand smoke  Surgeon General's Warning:  Quitting smoking now greatly reduces serious risk to your health.     Obesity, smoking, and sedentary lifestyle greatly increases your risk for illness    A healthy diet, regular physical exercise & weight monitoring are important for maintaining a healthy lifestyle    You may be retaining fluid if you have a history of heart failure or if you experience any of the following symptoms: Weight gain of 3 pounds or more overnight or 5 pounds in a week, increased swelling in our hands or feet or shortness of breath while lying flat in bed. Please call your doctor as soon as you notice any of these symptoms; do not wait until your next office visit. Recognize signs and symptoms of STROKE:    F-face looks uneven    A-arms unable to move or move unevenly    S-speech slurred or non-existent    T-time-call 911 as soon as signs and symptoms begin-DO NOT go       Back to bed or wait to see if you get better-TIME IS BRAIN. Warning Signs of HEART ATTACK     Call 911 if you have these symptoms:   Chest discomfort. Most heart attacks involve discomfort in the center of the chest that lasts more than a few minutes, or that goes away and comes back. It can feel like uncomfortable pressure, squeezing, fullness, or pain.  Discomfort in other areas of the upper body. Symptoms can include pain or discomfort in one or both arms, the back, neck, jaw, or stomach.  Shortness of breath with or without chest discomfort.  Other signs may include breaking out in a cold sweat, nausea, or lightheadedness. Don't wait more than five minutes to call 911 - MINUTES MATTER! Fast action can save your life. Calling 911 is almost always the fastest way to get lifesaving treatment. Emergency Medical Services staff can begin treatment when they arrive -- up to an hour sooner than if someone gets to the hospital by car. The discharge information has been reviewed with the patient. The patient verbalized understanding. Discharge medications reviewed with the patient and appropriate educational materials and side effects teaching were provided.   ___________________________________________________________________________________________________________________________________       Sickle Cell Crisis: Care Instructions  Your Care Instructions    Sickle cell crisis is a painful episode that may begin suddenly in a person with sickle cell disease. Sickle cell disease turns normal, round red blood cells into cells that look like kendall or crescent moons. The sickle-shaped cells can get stuck in blood vessels, blocking blood flow and causing severe pain. The pain can occur in the bones of the spine, the arms and legs, the chest, and the abdomen. An episode may be called a \"painful event\" or \"painful crisis. \" Some people who have sickle cell disease have many painful events, while others have few or none. Treatment depends on the level of pain and how long it lasts. Sometimes taking nonprescription pain relievers can help. Or you may need stronger pain relief medicine that is prescribed or given by a doctor. You may need to be treated in the hospital.  It isn't always possible to know what sets off a painful event. But triggers include being dehydrated, cold temperatures, infection, stress, and not getting enough oxygen. Follow-up care is a key part of your treatment and safety. Be sure to make and go to all appointments, and call your doctor if you are having problems. It's also a good idea to know your test results and keep a list of the medicines you take. How can you care for yourself at home? · Create a pain management plan with your doctor. This plan should include the types of medicines you can take and other actions you can take at home to relieve pain. · Drink plenty of fluids, enough so that your urine is light yellow or clear like water. If you have kidney, heart, or liver disease and have to limit fluids, talk with your doctor before you increase the amount of fluids you drink. · Take your medicines exactly as prescribed. Call your doctor if you think you are having a problem with your medicine. · Take pain medicines exactly as directed. ¨ If the doctor gave you a prescription medicine for pain, take it as prescribed.   ¨ If you are not taking a prescription pain medicine, ask your doctor if you can take an over-the-counter medicine. · Avoid alcohol. It can make you dehydrated. · Dress warmly in cold weather. The cold and windy weather can lead to severe pain. · Do not smoke. Smoking can reduce the amount of oxygen in your blood. · Get plenty of sleep. When should you call for help? Call 911 anytime you think you may need emergency care. For example, call if:  ? · You have symptoms of a severe problem from sickle cell. ? · You have symptoms of a stroke. These may include:  ¨ Sudden numbness, tingling, weakness, or loss of movement in your face, arm, or leg, especially on only one side of your body. ¨ Sudden vision changes. ¨ Sudden trouble speaking. ¨ Sudden confusion or trouble understanding simple statements. ¨ Sudden problems with walking or balance. ¨ A sudden, severe headache that is different from past headaches. ? · You are in severe pain. ? · You have symptoms of a heart attack. These may include:  ¨ Chest pain or pressure, or a strange feeling in the chest.  ¨ Sweating. ¨ Shortness of breath. ¨ Nausea or vomiting. ¨ Pain, pressure, or a strange feeling in the back, neck, jaw, or upper belly or in one or both shoulders or arms. ¨ Lightheadedness or sudden weakness. ¨ A fast or irregular heartbeat. After you call 911, the  may tell you to chew 1 adult-strength or 2 to 4 low-dose aspirin. Wait for an ambulance. Do not try to drive yourself. ?Call your doctor now or seek immediate medical care if:  ? · You have a fever. ? Watch closely for changes in your health, and be sure to contact your doctor if you have any problems. Where can you learn more? Go to http://socorro-rosita.info/. Enter F104 in the search box to learn more about \"Sickle Cell Crisis: Care Instructions. \"  Current as of: October 13, 2016  Content Version: 11.4  © 4793-3900 [employer].  Care instructions adapted under license by [employer] (which disclaims liability or warranty for this information). If you have questions about a medical condition or this instruction, always ask your healthcare professional. [address] any warranty or liability for your use of this information. Sickle Cell Disease: Care Instructions  Your Care Instructions    Sickle cell disease turns normal, round red blood cells into misshaped cells that look like kendall or crescent moons. The sickle-shaped cells can get stuck in blood vessels, blocking blood flow and causing severe pain. The sickle-shaped cells also can harm organs, muscles, and bones. It is a lifelong condition. Sickle cell disease is passed down in families. You can talk to your doctor about whether to have genetic tests to find out the chance of having a child with the disease. Your doctor also may recommend that your family members get tested for sickle cell disease. Your doctor may treat you with medicines. Some people get blood transfusions or a bone marrow transplant. Managing pain is an important part of your treatment. Follow-up care is a key part of your treatment and safety. Be sure to make and go to all appointments, and call your doctor if you are having problems. It's also a good idea to know your test results and keep a list of the medicines you take. How can you care for yourself at home? · Take your medicines exactly as prescribed. Call your doctor if you think you are having a problem with your medicine. · Take pain medicines exactly as directed. ¨ If the doctor gave you a prescription medicine for pain, take it as prescribed. ¨ If you are not taking a prescription pain medicine, ask your doctor if you can take an over-the-counter medicine. · Try to help ease pain by distracting yourself. Use guided imagery, deep breathing, and relaxation exercises. A pain specialist can teach you pain management skills. · Avoid alcohol. It can make you dehydrated. · Dress warmly in cold weather.  The cold and windy weather can lead to severe pain. · Do not smoke. Smoking can reduce the amount of oxygen in your blood. If you need help quitting, talk to your doctor about stop-smoking programs and medicines. These can increase your chances of quitting for good. · Get plenty of sleep. · Get regular eye exams. Sickle cell disease can cause vision problems. · Wear medical alert jewelry that says that you have sickle cell disease. You can buy this at most drugstores. · Avoid colds and flu. Get a pneumococcal vaccine shot. If you have had one before, ask your doctor whether you need another dose. Get a flu shot every year. If you must be around people with colds or flu, wash your hands often. When should you call for help? Call 911 anytime you think you may need emergency care. For example, call if:  · You have symptoms of a severe problem from sickle cell. · You have symptoms of a stroke. These may include:  ¨ Sudden numbness, tingling, weakness, or loss of movement in your face, arm, or leg, especially on only one side of your body. ¨ Sudden vision changes. ¨ Sudden trouble speaking. ¨ Sudden confusion or trouble understanding simple statements. ¨ Sudden problems with walking or balance. ¨ A sudden, severe headache that is different from past headaches. · You are in severe pain. · You have symptoms of a heart attack. These may include:  ¨ Chest pain or pressure, or a strange feeling in the chest.  ¨ Sweating. ¨ Shortness of breath. ¨ Nausea or vomiting. ¨ Pain, pressure, or a strange feeling in the back, neck, jaw, or upper belly or in one or both shoulders or arms. ¨ Lightheadedness or sudden weakness. ¨ A fast or irregular heartbeat. After you call 911, the  may tell you to chew 1 adult-strength or 2 to 4 low-dose aspirin. Wait for an ambulance. Do not try to drive yourself. Call your doctor now or seek immediate medical care if:  · You have a fever.   Watch closely for changes in your health, and be sure to contact your doctor if you have any problems. Where can you learn more? Go to http://socorro-rosita.info/. Enter 486 0216 in the search box to learn more about \"Sickle Cell Disease: Care Instructions. \"  Current as of: October 13, 2016  Content Version: 11.4  © 7241-5093 [employer]. Care instructions adapted under license by [email or web address] (which disclaims liability or warranty for this information). If you have questions about a medical condition or this instruction, always ask your healthcare professional. [address] any warranty or liability for your use of this information.

## 2018-03-09 NOTE — PROGRESS NOTES
END OF SHIFT NOTE:    INTAKE/OUTPUT  03/08 0701 - 03/09 0700  In: 2241 [I.V.:2241]  Out: 2650 [Urine:2650]  Voiding: YES  Catheter: NO  Drain:              Flatus: Patient does have flatus present. Stool:  0 occurrences. Characteristics:       Emesis: 0 occurrences. Characteristics:        VITAL SIGNS  Patient Vitals for the past 12 hrs:   Temp Pulse Resp BP SpO2   03/09/18 0335 98.5 °F (36.9 °C) 73 18 121/74 94 %   03/09/18 0033 99 °F (37.2 °C) 67 18 118/73 95 %   03/08/18 1935 99.2 °F (37.3 °C) 76 18 135/70 95 %       Pain Assessment  Pain Intensity 1: 0 (03/09/18 0130)  Pain Location 1: Generalized  Pain Intervention(s) 1: Medication (see MAR)  Patient Stated Pain Goal: 0    Ambulating  Yes    Shift report given to oncoming nurse at the bedside.     Criselda Martinez RN

## 2018-03-09 NOTE — PROGRESS NOTES
Hospitalist Progress Note    3/9/2018  Admit Date: 3/6/2018  3:07 PM   NAME: Yanira Floyd   :  1975   MRN:  [id number]   Attending: Patrice Fu MD  PCP:  Rhonda Bdeoya MD    SUBJECTIVE:   Mr. Murray Jacob is a 36 yo male with PMH of sickle cell and B-thalassemia followed by oncology [facility] evaluated with limb pain for 24 hours refractory to chronic narcotics. He was admitted for sickle cell crisis. His analgesia was increased to Dilaudid 2 Q4H and oxycodone 80 BID while inpatient. Overnight, he had 4 mg IV Dilaudid. Today, he reports improvement in generalized pain. Review of Systems negative with exception of pertinent positives noted above  PHYSICAL EXAM     Visit Vitals    /80    Pulse 67    Temp 98.5 °F (36.9 °C)    Resp 18    Ht 5' 10\" (1.778 m)    Wt 62.6 kg (138 lb)    SpO2 98%    BMI 19.8 kg/m2      Temp (24hrs), Av.8 °F (37.1 °C), Min:98.2 °F (36.8 °C), Max:99.3 °F (37.4 °C)    Oxygen Therapy  O2 Sat (%): 98 % (18)  Pulse via Oximetry: 78 beats per minute (18 1839)  O2 Device: Nasal cannula (18)  O2 Flow Rate (L/min): 2 l/min (18)    Intake/Output Summary (Last 24 hours) at 18  Last data filed at 18   Gross per 24 hour   Intake             1809 ml   Output             1875 ml   Net              -66 ml      General: No acute distress    Lungs:  CTA Bilaterally. Heart:  Regular rate and rhythm,  No murmur, rub, or gallop  Abdomen: Soft, Non distended, Non tender, Positive bowel sounds  Extremities: No cyanosis, clubbing or edema  Neurologic:  No focal deficits    ASSESSMENT      Active Hospital Problems    Diagnosis Date Noted    Sickle cell crisis ([facility] [address] 2016    Essential hypertension, benign 2012     Plan:  · Sickle cell pain crisis:s/p IV fluids and analgesia, Folic acid, hydrea. Jadenu patient supplied. Discharge home today  · Normocytic anemia: Monitor associated anemia.    · Appreciate hematology input    DVT Prophylaxis: scds    Signed By: Roger Ruth MD     March 9, 2018

## 2018-04-01 ENCOUNTER — HOSPITAL ENCOUNTER (EMERGENCY)
Age: 43
Discharge: HOME | DRG: 812 | End: 2018-04-02
Attending: EMERGENCY MEDICINE
Payer: MEDICARE

## 2018-04-01 DIAGNOSIS — D64.9 ANEMIA, UNSPECIFIED TYPE: ICD-10-CM

## 2018-04-01 DIAGNOSIS — D57.00 SICKLE CELL CRISIS (HCC): Primary | ICD-10-CM

## 2018-04-01 LAB
ALBUMIN SERPL-MCNC: 3.5 G/DL (ref 3.5–5)
ALBUMIN/GLOB SERPL: 0.8 {RATIO} (ref 1.2–3.5)
ALP SERPL-CCNC: 72 U/L (ref 50–136)
ALT SERPL-CCNC: 45 U/L (ref 12–65)
ANION GAP SERPL CALC-SCNC: 5 MMOL/L (ref 7–16)
AST SERPL-CCNC: 33 U/L (ref 15–37)
BILIRUB SERPL-MCNC: 1.2 MG/DL (ref 0.2–1.1)
BUN SERPL-MCNC: 9 MG/DL (ref 6–23)
CALCIUM SERPL-MCNC: 8.5 MG/DL (ref 8.3–10.4)
CHLORIDE SERPL-SCNC: 107 MMOL/L (ref 98–107)
CO2 SERPL-SCNC: 28 MMOL/L (ref 21–32)
CREAT SERPL-MCNC: 0.92 MG/DL (ref 0.8–1.5)
DIFFERENTIAL METHOD BLD: ABNORMAL
GLOBULIN SER CALC-MCNC: 4.3 G/DL (ref 2.3–3.5)
GLUCOSE SERPL-MCNC: 111 MG/DL (ref 65–100)
HCT VFR BLD AUTO: 19.1 % (ref 41.1–50.3)
HGB BLD-MCNC: 6.4 G/DL (ref 13.6–17.2)
HGB RETIC QN AUTO: 35 PG (ref 29–35)
IMM RETICS NFR: 22.6 % (ref 2.3–13.4)
LYMPHOCYTES # BLD: 3.7 K/UL (ref 0.5–4.6)
LYMPHOCYTES NFR BLD MANUAL: 52 % (ref 16–44)
MCH RBC QN AUTO: 35.2 PG (ref 26.1–32.9)
MCHC RBC AUTO-ENTMCNC: 33.5 G/DL (ref 31.4–35)
MCV RBC AUTO: 104.9 FL (ref 79.6–97.8)
MONOCYTES # BLD: 0.3 K/UL (ref 0.1–1.3)
MONOCYTES NFR BLD MANUAL: 4 % (ref 3–9)
MYELOCYTES NFR BLD MANUAL: 3 %
NEUTS BAND NFR BLD MANUAL: 1 % (ref 0–10)
NEUTS SEG # BLD: 3.1 K/UL (ref 1.7–8.2)
NEUTS SEG NFR BLD MANUAL: 40 % (ref 47–75)
NRBC BLD-RTO: 15 PER 100 WBC
PLATELET # BLD AUTO: 196 K/UL (ref 150–450)
PLATELET COMMENTS,PCOM: ADEQUATE
PMV BLD AUTO: 11 FL (ref 10.8–14.1)
POTASSIUM SERPL-SCNC: 4.1 MMOL/L (ref 3.5–5.1)
PROT SERPL-MCNC: 7.8 G/DL (ref 6.3–8.2)
RBC # BLD AUTO: 1.82 M/UL (ref 4.23–5.67)
RBC MORPH BLD: ABNORMAL
RETICS # AUTO: 0.09 M/UL (ref 0.03–0.1)
RETICS/RBC NFR AUTO: 4.9 % (ref 0.3–2)
SODIUM SERPL-SCNC: 140 MMOL/L (ref 136–145)
WBC # BLD AUTO: 7.1 K/UL (ref 4.3–11.1)
WBC MORPH BLD: ABNORMAL

## 2018-04-01 PROCEDURE — 96361 HYDRATE IV INFUSION ADD-ON: CPT | Performed by: EMERGENCY MEDICINE

## 2018-04-01 PROCEDURE — 85046 RETICYTE/HGB CONCENTRATE: CPT | Performed by: EMERGENCY MEDICINE

## 2018-04-01 PROCEDURE — 80053 COMPREHEN METABOLIC PANEL: CPT | Performed by: EMERGENCY MEDICINE

## 2018-04-01 PROCEDURE — 86900 BLOOD TYPING SEROLOGIC ABO: CPT | Performed by: EMERGENCY MEDICINE

## 2018-04-01 PROCEDURE — 85025 COMPLETE CBC W/AUTO DIFF WBC: CPT | Performed by: EMERGENCY MEDICINE

## 2018-04-01 PROCEDURE — 74011250636 HC RX REV CODE- 250/636: Performed by: EMERGENCY MEDICINE

## 2018-04-01 PROCEDURE — 96374 THER/PROPH/DIAG INJ IV PUSH: CPT | Performed by: EMERGENCY MEDICINE

## 2018-04-01 PROCEDURE — 86920 COMPATIBILITY TEST SPIN: CPT | Performed by: EMERGENCY MEDICINE

## 2018-04-01 PROCEDURE — 86902 BLOOD TYPE ANTIGEN DONOR EA: CPT | Performed by: EMERGENCY MEDICINE

## 2018-04-01 PROCEDURE — 99284 EMERGENCY DEPT VISIT MOD MDM: CPT | Performed by: EMERGENCY MEDICINE

## 2018-04-01 PROCEDURE — 96376 TX/PRO/DX INJ SAME DRUG ADON: CPT | Performed by: EMERGENCY MEDICINE

## 2018-04-01 PROCEDURE — 74011000250 HC RX REV CODE- 250: Performed by: EMERGENCY MEDICINE

## 2018-04-01 PROCEDURE — 96375 TX/PRO/DX INJ NEW DRUG ADDON: CPT | Performed by: EMERGENCY MEDICINE

## 2018-04-01 PROCEDURE — P9016 RBC LEUKOCYTES REDUCED: HCPCS | Performed by: EMERGENCY MEDICINE

## 2018-04-01 PROCEDURE — 36430 TRANSFUSION BLD/BLD COMPNT: CPT

## 2018-04-01 RX ORDER — SODIUM CHLORIDE 9 MG/ML
250 INJECTION, SOLUTION INTRAVENOUS AS NEEDED
Status: DISCONTINUED | OUTPATIENT
Start: 2018-04-01 | End: 2018-04-01

## 2018-04-01 RX ORDER — HYDROMORPHONE HYDROCHLORIDE 2 MG/ML
1 INJECTION, SOLUTION INTRAMUSCULAR; INTRAVENOUS; SUBCUTANEOUS
Status: COMPLETED | OUTPATIENT
Start: 2018-04-01 | End: 2018-04-01

## 2018-04-01 RX ORDER — DIPHENHYDRAMINE HYDROCHLORIDE 50 MG/ML
25 INJECTION, SOLUTION INTRAMUSCULAR; INTRAVENOUS
Status: COMPLETED | OUTPATIENT
Start: 2018-04-01 | End: 2018-04-01

## 2018-04-01 RX ORDER — SODIUM CHLORIDE 9 MG/ML
250 INJECTION, SOLUTION INTRAVENOUS AS NEEDED
Status: DISCONTINUED | OUTPATIENT
Start: 2018-04-01 | End: 2018-04-02 | Stop reason: HOSPADM

## 2018-04-01 RX ORDER — HYDROMORPHONE HYDROCHLORIDE 2 MG/ML
2 INJECTION, SOLUTION INTRAMUSCULAR; INTRAVENOUS; SUBCUTANEOUS
Status: COMPLETED | OUTPATIENT
Start: 2018-04-01 | End: 2018-04-01

## 2018-04-01 RX ADMIN — PROMETHAZINE HYDROCHLORIDE 12.5 MG: 25 INJECTION INTRAMUSCULAR; INTRAVENOUS at 15:10

## 2018-04-01 RX ADMIN — DIPHENHYDRAMINE HYDROCHLORIDE 25 MG: 50 INJECTION, SOLUTION INTRAMUSCULAR; INTRAVENOUS at 16:14

## 2018-04-01 RX ADMIN — DIPHENHYDRAMINE HYDROCHLORIDE 25 MG: 50 INJECTION, SOLUTION INTRAMUSCULAR; INTRAVENOUS at 15:11

## 2018-04-01 RX ADMIN — HYDROMORPHONE HYDROCHLORIDE 2 MG: 2 INJECTION, SOLUTION INTRAMUSCULAR; INTRAVENOUS; SUBCUTANEOUS at 16:14

## 2018-04-01 RX ADMIN — HYDROMORPHONE HYDROCHLORIDE 1 MG: 2 INJECTION, SOLUTION INTRAMUSCULAR; INTRAVENOUS; SUBCUTANEOUS at 19:51

## 2018-04-01 RX ADMIN — SODIUM CHLORIDE 1000 ML: 900 INJECTION, SOLUTION INTRAVENOUS at 16:14

## 2018-04-01 RX ADMIN — HYDROMORPHONE HYDROCHLORIDE 2 MG: 2 INJECTION, SOLUTION INTRAMUSCULAR; INTRAVENOUS; SUBCUTANEOUS at 15:11

## 2018-04-01 RX ADMIN — SODIUM CHLORIDE 1000 ML: 900 INJECTION, SOLUTION INTRAVENOUS at 15:23

## 2018-04-01 NOTE — ED TRIAGE NOTES
Pt states he has sickle cell pain right now and he just went to the doctor this week and they told him his Hgb was 6.8. Pt reports pain to both legs that is 10/10 on the numeric scale and describes the pain as achy.  Pt states his legs are usually what is affected the most. Pt states he took prescribed pain meds about 3 hours ago

## 2018-04-01 NOTE — ED NOTES
Spoke with Kaushal from blood bank to follow up on the status of MD ordered PRBC for patient. Blood bank states that blood is not available yet due to blood coming from [facility] and should be available in about an hour or so. Kaushal states blood bank will notify ED when blood is ready for ED to . Notified Dr. Regan Townsend of status.

## 2018-04-01 NOTE — DISCHARGE INSTRUCTIONS
Follow up with dr quigley Monday (tomorrow)  Regular medicines per routine  Return to the e.r. If worse           Learning About Blood Transfusions  What is a blood transfusion? Blood transfusion is a medical treatment to replace the blood or parts of blood that your body has lost. The blood goes through a tube from a bag to an intravenous (IV) catheter and into your vein. You may need a blood transfusion after losing blood from an injury, a major surgery, an illness that causes bleeding, or an illness that destroys blood cells. Transfusions are also used to give you the parts of blood-such as platelets, plasma, or substances that cause clotting-that your body needs to fight an illness or stop bleeding. How is a blood transfusion done? Before you receive a blood transfusion, your blood is tested to find out what your blood type is. Blood or blood parts that are a match with your blood type are ordered by your doctor. Blood is typed as A, B, AB, or O. It is also typed as Rh-positive or Rh-negative. Your blood is also screened to look for antibodies that might react with the blood that is given to you. The blood you are getting is checked and rechecked to make sure that it's the right type for you. A sample of your blood is mixed with a sample of the blood you will receive to check for problems. Before actually giving you the transfusion, a doctor and nurses will look at the label on the package of blood and compare it to your hospital ID bracelet and medical records. The transfusion begins only when all agree that this is the correct blood and that you are the correct person to receive it. To receive the transfusion, you will have an intravenous (IV) catheter inserted into a vein. A tube connects the catheter to the bag containing the blood, which is placed higher than your body. The blood then flows slowly into your vein.  A doctor or nurse will check you several times during the transfusion to watch for a reaction or other problems. What are the possible risks? Blood transfusions have many benefits and are often life-saving. But they also have a few risks. Possible risks include:  · Your body's reaction to receiving new blood. This may include:  ¨ Fever. ¨ Allergic reactions. ¨ Breathing problems. · An infection from the blood. This risk is small because of the strict rules placed on handling and storing blood. Getting a viral infection, such as HIV or hepatitis B or C, through blood transfusions has become very rare. The U[place]. Food and Drug Administration (FDA) enforces strict guidelines on the collection, testing, storage, and use of blood. · Getting the wrong blood type by accident. Severe reactions, which can be life-threatening, are very rare. What can you expect after a blood transfusion? Here are some things you can do at home to help prevent infection at the transfusion site:  · Wash the area daily with warm, soapy water, and pat it dry. Don't use hydrogen peroxide or alcohol, which can slow healing. You may cover the area with a gauze bandage if it weeps or rubs against clothing. Change the bandage every day. · Keep the area clean and dry. When should you call for help? Call 911 anytime you think you may need emergency care. For example, call if:  · You have severe trouble breathing. Call your doctor now or seek immediate medical care if:  · You have a fever. · You feel weaker or more tired than usual.  · You have a yellow tint to your skin or the whites of your eyes. Watch closely for changes in your health, and be sure to contact your doctor if you have any problems. Follow-up care is a key part of your treatment and safety. Be sure to make and go to all appointments, and call your doctor if you are having problems. It's also a good idea to know your test results and keep a list of the medicines you take. Where can you learn more? Go to http://socorro-rosita.info/.   Enter V588 in the search box to learn more about \"Learning About Blood Transfusions. \"  Current as of: October 13, 2016  Content Version: 11.4  © 3059-2516 [employer]. Care instructions adapted under license by [employer] (which disclaims liability or warranty for this information). If you have questions about a medical condition or this instruction, always ask your healthcare professional. [address] any warranty or liability for your use of this information. Sickle Cell Disease: Care Instructions  Your Care Instructions    Sickle cell disease turns normal, round red blood cells into misshaped cells that look like kendall or crescent moons. The sickle-shaped cells can get stuck in blood vessels, blocking blood flow and causing severe pain. The sickle-shaped cells also can harm organs, muscles, and bones. It is a lifelong condition. Sickle cell disease is passed down in families. You can talk to your doctor about whether to have genetic tests to find out the chance of having a child with the disease. Your doctor also may recommend that your family members get tested for sickle cell disease. Your doctor may treat you with medicines. Some people get blood transfusions or a bone marrow transplant. Managing pain is an important part of your treatment. Follow-up care is a key part of your treatment and safety. Be sure to make and go to all appointments, and call your doctor if you are having problems. It's also a good idea to know your test results and keep a list of the medicines you take. How can you care for yourself at home? · Take your medicines exactly as prescribed. Call your doctor if you think you are having a problem with your medicine. · Take pain medicines exactly as directed. ¨ If the doctor gave you a prescription medicine for pain, take it as prescribed.   ¨ If you are not taking a prescription pain medicine, ask your doctor if you can take an over-the-counter medicine. · Try to help ease pain by distracting yourself. Use guided imagery, deep breathing, and relaxation exercises. A pain specialist can teach you pain management skills. · Avoid alcohol. It can make you dehydrated. · Dress warmly in cold weather. The cold and windy weather can lead to severe pain. · Do not smoke. Smoking can reduce the amount of oxygen in your blood. If you need help quitting, talk to your doctor about stop-smoking programs and medicines. These can increase your chances of quitting for good. · Get plenty of sleep. · Get regular eye exams. Sickle cell disease can cause vision problems. · Wear medical alert jewelry that says that you have sickle cell disease. You can buy this at most drugstores. · Avoid colds and flu. Get a pneumococcal vaccine shot. If you have had one before, ask your doctor whether you need another dose. Get a flu shot every year. If you must be around people with colds or flu, wash your hands often. When should you call for help? Call 911 anytime you think you may need emergency care. For example, call if:  · You have symptoms of a severe problem from sickle cell. · You have symptoms of a stroke. These may include:  ¨ Sudden numbness, tingling, weakness, or loss of movement in your face, arm, or leg, especially on only one side of your body. ¨ Sudden vision changes. ¨ Sudden trouble speaking. ¨ Sudden confusion or trouble understanding simple statements. ¨ Sudden problems with walking or balance. ¨ A sudden, severe headache that is different from past headaches. · You are in severe pain. · You have symptoms of a heart attack. These may include:  ¨ Chest pain or pressure, or a strange feeling in the chest.  ¨ Sweating. ¨ Shortness of breath. ¨ Nausea or vomiting. ¨ Pain, pressure, or a strange feeling in the back, neck, jaw, or upper belly or in one or both shoulders or arms. ¨ Lightheadedness or sudden weakness.   ¨ A fast or irregular heartbeat. After you call 911, the  may tell you to chew 1 adult-strength or 2 to 4 low-dose aspirin. Wait for an ambulance. Do not try to drive yourself. Call your doctor now or seek immediate medical care if:  · You have a fever. Watch closely for changes in your health, and be sure to contact your doctor if you have any problems. Where can you learn more? Go to http://socorro-rosita.info/. Enter 486 6811 in the search box to learn more about \"Sickle Cell Disease: Care Instructions. \"  Current as of: October 13, 2016  Content Version: 11.4  © 6120-4151 [employer]. Care instructions adapted under license by [employer] (which disclaims liability or warranty for this information). If you have questions about a medical condition or this instruction, always ask your healthcare professional. [address] any warranty or liability for your use of this information.

## 2018-04-01 NOTE — ED PROVIDER NOTES
Patient is a 37 y.o. male presenting with sickle cell disease. The history is provided by the patient. Sickle Cell Crisis    This is a new problem. The current episode started 3 to 5 hours ago. The problem has not changed since onset. The problem occurs constantly. Patient reports not work related injury. The pain is associated with no known injury. Pain location: both legs. The quality of the pain is described as aching. The pain does not radiate. The pain is moderate. The pain is the same all the time. Pertinent negatives include no chest pain, no fever, no headaches and no abdominal pain. Treatments tried: oxycodone. The treatment provided no relief. The patient's surgical history non-contributory        Past Medical History:   Diagnosis Date    Chronic pain     HTN (hypertension)     Ill-defined condition     sickle cell    Iron overload due to repeated red blood cell transfusions 1/18/2017    Paroxysmal SV[facility]cardia[facility] 7/9/2016    Sickle cell disease ([facility] [address]        Past Surgical History:   Procedure Laterality Date    HC PENILE IMPL DURA II POSITINBLE      HC PORT LIFE SNGLE LUMEN 5013      to L CW    HX CHOLECYSTECTOMY      HX OTHER SURGICAL      penile inplant    HX VASCULAR ACCESS           Family History:   Problem Relation Age of Onset    Hypertension Other     Diabetes Father     Stroke Father     Sickle Cell Anemia Sister        Social History     Social History    Marital status:      Spouse name: N/A    Number of children: N/A    Years of education: N/A     Occupational History    Not on file. Social History Main Topics    Smoking status: Never Smoker    Smokeless tobacco: Never Used    Alcohol use No    Drug use: No    Sexual activity: Yes     Other Topics Concern    Not on file     Social History Narrative         ALLERGIES: Compazine [prochlorperazine edisylate];  Morphine; Reglan [metoclopramide]; and Zofran [ondansetron hcl (pf)]    Review of Systems   Constitutional: Negative for chills and fever. HENT: Negative for rhinorrhea and sore throat. Eyes: Negative for discharge and redness. Respiratory: Negative for cough and shortness of breath. Cardiovascular: Negative for chest pain and palpitations. Gastrointestinal: Negative for abdominal pain, nausea and vomiting. Musculoskeletal: Positive for arthralgias and myalgias. Negative for back pain. Skin: Negative for rash. Neurological: Negative for dizziness and headaches. All other systems reviewed and are negative. Vitals:    04/01/18 1420 04/01/18 1426 04/01/18 1428   BP: 139/57  146/74   Pulse: 67  71   Resp: 16  18   Temp: 99.5 °F (37.5 °C)  99 °F (37.2 °C)   SpO2: 96% 93% 93%   Weight: 70.8 kg (156 lb)  74.4 kg (164 lb)   Height: 5' 10\" (1.778 m)  5' 10\" (1.778 m)            Physical Exam   Constitutional: He is oriented to person, place, and time. He appears well-developed and well-nourished. No distress. HENT:   Head: Normocephalic and atraumatic. Eyes: Conjunctivae are normal. Pupils are equal, round, and reactive to light. Right eye exhibits no discharge. Left eye exhibits no discharge. No scleral icterus. Neck: Normal range of motion. Neck supple. Cardiovascular: Normal rate, regular rhythm and normal heart sounds. Exam reveals no gallop. No murmur heard. Pulmonary/Chest: Effort normal and breath sounds normal. No respiratory distress. He has no wheezes. He has no rales. Abdominal: Soft. There is no tenderness. There is no guarding. Musculoskeletal: Normal range of motion. He exhibits no edema. Neurological: He is alert and oriented to person, place, and time. He exhibits normal muscle tone. cni 2-12 grossly   Skin: Skin is warm and dry. He is not diaphoretic. Psychiatric: He has a normal mood and affect. His behavior is normal.   Nursing note and vitals reviewed.        MDM  Number of Diagnoses or Management Options  Anemia, unspecified type: Sickle cell crisis [facility]):   Diagnosis management comments: Medical decision making note:  Vaso-occlusive pain crisis in legs,  hgb a bit lower as well - retic count pending    4:37 PM  Case discussed with Dr. Lakeisha Dick, oncologist on-call for Dr. Toni quigley  His preference is to go ahead and transfuse 1 unit of red cells today they will follow the patient up tomorrow in the office if he is able to be discharged from a vaso-occlusive pain crisis, otherwise patient will be admitted to the hospitalist for ongoing pain control  This concludes the \"medical decision making note\" part of this emergency department visit note.           ED Course       Procedures

## 2018-04-02 ENCOUNTER — HOSPITAL ENCOUNTER (INPATIENT)
Age: 43
LOS: 3 days | Discharge: HOME | DRG: 812 | End: 2018-04-05
Attending: EMERGENCY MEDICINE | Admitting: HOSPITALIST
Payer: MEDICARE

## 2018-04-02 VITALS
TEMPERATURE: 98.9 F | BODY MASS INDEX: 23.48 KG/M2 | HEIGHT: 70 IN | HEART RATE: 67 BPM | RESPIRATION RATE: 16 BRPM | WEIGHT: 164 LBS | SYSTOLIC BLOOD PRESSURE: 146 MMHG | DIASTOLIC BLOOD PRESSURE: 91 MMHG | OXYGEN SATURATION: 96 %

## 2018-04-02 DIAGNOSIS — D57.00 SICKLE CELL CRISIS (HCC): Primary | ICD-10-CM

## 2018-04-02 LAB
ABO + RH BLD: NORMAL
ALBUMIN SERPL-MCNC: 3.6 G/DL (ref 3.5–5)
ALBUMIN/GLOB SERPL: 0.9 {RATIO} (ref 1.2–3.5)
ALP SERPL-CCNC: 71 U/L (ref 50–136)
ALT SERPL-CCNC: 42 U/L (ref 12–65)
ANION GAP SERPL CALC-SCNC: 8 MMOL/L (ref 7–16)
ANTIGENS PRESENT RBC DONR: NORMAL
AST SERPL-CCNC: 36 U/L (ref 15–37)
BILIRUB SERPL-MCNC: 1.4 MG/DL (ref 0.2–1.1)
BLD PROD TYP BPU: NORMAL
BLOOD GROUP ANTIBODIES SERPL: NORMAL
BPU ID: NORMAL
BUN SERPL-MCNC: 7 MG/DL (ref 6–23)
CALCIUM SERPL-MCNC: 8.7 MG/DL (ref 8.3–10.4)
CHLORIDE SERPL-SCNC: 111 MMOL/L (ref 98–107)
CO2 SERPL-SCNC: 23 MMOL/L (ref 21–32)
CREAT SERPL-MCNC: 0.79 MG/DL (ref 0.8–1.5)
CROSSMATCH RESULT,%XM: NORMAL
DIFFERENTIAL METHOD BLD: ABNORMAL
ERYTHROCYTE [DISTWIDTH] IN BLOOD BY AUTOMATED COUNT: ABNORMAL % (ref 11.9–14.6)
GLOBULIN SER CALC-MCNC: 4.2 G/DL (ref 2.3–3.5)
GLUCOSE SERPL-MCNC: 126 MG/DL (ref 65–100)
HCT VFR BLD AUTO: 23.7 % (ref 41.1–50.3)
HGB BLD-MCNC: 8.1 G/DL (ref 13.6–17.2)
HGB RETIC QN AUTO: 41 PG (ref 29–35)
IMM RETICS NFR: 29.6 % (ref 2.3–13.4)
LYMPHOCYTES # BLD: 2 K/UL (ref 0.5–4.6)
LYMPHOCYTES NFR BLD MANUAL: 25 % (ref 16–44)
MCH RBC QN AUTO: 32.4 PG (ref 26.1–32.9)
MCHC RBC AUTO-ENTMCNC: 34.2 G/DL (ref 31.4–35)
MCV RBC AUTO: 94.8 FL (ref 79.6–97.8)
MONOCYTES # BLD: 0.9 K/UL (ref 0.1–1.3)
MONOCYTES NFR BLD MANUAL: 11 % (ref 3–9)
NEUTS SEG # BLD: 5 K/UL (ref 1.7–8.2)
NEUTS SEG NFR BLD MANUAL: 64 % (ref 47–75)
NRBC BLD-RTO: 12 PER 100 WBC
PLATELET # BLD AUTO: 188 K/UL (ref 150–450)
PLATELET COMMENTS,PCOM: ADEQUATE
PMV BLD AUTO: 11.1 FL (ref 10.8–14.1)
POTASSIUM SERPL-SCNC: 3.7 MMOL/L (ref 3.5–5.1)
PROT SERPL-MCNC: 7.8 G/DL (ref 6.3–8.2)
RBC # BLD AUTO: 2.5 M/UL (ref 4.23–5.67)
RBC MORPH BLD: ABNORMAL
RETICS # AUTO: 0.12 M/UL (ref 0.03–0.1)
RETICS/RBC NFR AUTO: 4.9 % (ref 0.3–2)
SODIUM SERPL-SCNC: 142 MMOL/L (ref 136–145)
SPECIMEN EXP DATE BLD: NORMAL
STATUS OF UNIT,%ST: NORMAL
UNIT DIVISION, %UDIV: 0
WBC # BLD AUTO: 7.9 K/UL (ref 4.3–11.1)
WBC MORPH BLD: SLIGHT

## 2018-04-02 PROCEDURE — 96361 HYDRATE IV INFUSION ADD-ON: CPT | Performed by: EMERGENCY MEDICINE

## 2018-04-02 PROCEDURE — 81001 URINALYSIS AUTO W/SCOPE: CPT | Performed by: HOSPITALIST

## 2018-04-02 PROCEDURE — 85046 RETICYTE/HGB CONCENTRATE: CPT | Performed by: EMERGENCY MEDICINE

## 2018-04-02 PROCEDURE — 74011250636 HC RX REV CODE- 250/636: Performed by: EMERGENCY MEDICINE

## 2018-04-02 PROCEDURE — 65270000029 HC RM PRIVATE

## 2018-04-02 PROCEDURE — 96374 THER/PROPH/DIAG INJ IV PUSH: CPT | Performed by: EMERGENCY MEDICINE

## 2018-04-02 PROCEDURE — 74011000250 HC RX REV CODE- 250: Performed by: EMERGENCY MEDICINE

## 2018-04-02 PROCEDURE — 99284 EMERGENCY DEPT VISIT MOD MDM: CPT | Performed by: EMERGENCY MEDICINE

## 2018-04-02 PROCEDURE — 96376 TX/PRO/DX INJ SAME DRUG ADON: CPT | Performed by: EMERGENCY MEDICINE

## 2018-04-02 PROCEDURE — 74011250637 HC RX REV CODE- 250/637: Performed by: HOSPITALIST

## 2018-04-02 PROCEDURE — 96375 TX/PRO/DX INJ NEW DRUG ADDON: CPT | Performed by: EMERGENCY MEDICINE

## 2018-04-02 PROCEDURE — 80053 COMPREHEN METABOLIC PANEL: CPT | Performed by: EMERGENCY MEDICINE

## 2018-04-02 PROCEDURE — 94762 N-INVAS EAR/PLS OXIMTRY CONT: CPT

## 2018-04-02 PROCEDURE — 74011250636 HC RX REV CODE- 250/636: Performed by: HOSPITALIST

## 2018-04-02 PROCEDURE — 85025 COMPLETE CBC W/AUTO DIFF WBC: CPT | Performed by: EMERGENCY MEDICINE

## 2018-04-02 RX ORDER — HYDROMORPHONE HYDROCHLORIDE 2 MG/ML
1 INJECTION, SOLUTION INTRAMUSCULAR; INTRAVENOUS; SUBCUTANEOUS
Status: COMPLETED | OUTPATIENT
Start: 2018-04-02 | End: 2018-04-02

## 2018-04-02 RX ORDER — HYDROMORPHONE HYDROCHLORIDE 2 MG/ML
2 INJECTION, SOLUTION INTRAMUSCULAR; INTRAVENOUS; SUBCUTANEOUS
Status: COMPLETED | OUTPATIENT
Start: 2018-04-02 | End: 2018-04-02

## 2018-04-02 RX ORDER — OXYCODONE HYDROCHLORIDE 15 MG/1
30 TABLET ORAL
Status: DISCONTINUED | OUTPATIENT
Start: 2018-04-02 | End: 2018-04-05 | Stop reason: HOSPADM

## 2018-04-02 RX ORDER — PROMETHAZINE HYDROCHLORIDE 25 MG/1
25 TABLET ORAL
Status: DISCONTINUED | OUTPATIENT
Start: 2018-04-02 | End: 2018-04-05 | Stop reason: HOSPADM

## 2018-04-02 RX ORDER — HEPARIN SODIUM 5000 [USP'U]/ML
5000 INJECTION, SOLUTION INTRAVENOUS; SUBCUTANEOUS EVERY 8 HOURS
Status: DISCONTINUED | OUTPATIENT
Start: 2018-04-02 | End: 2018-04-05 | Stop reason: HOSPADM

## 2018-04-02 RX ORDER — DIPHENHYDRAMINE HYDROCHLORIDE 50 MG/ML
25 INJECTION, SOLUTION INTRAMUSCULAR; INTRAVENOUS
Status: COMPLETED | OUTPATIENT
Start: 2018-04-02 | End: 2018-04-02

## 2018-04-02 RX ORDER — DEFERASIROX 360 MG/1
1800 TABLET, FILM COATED ORAL DAILY
Status: DISCONTINUED | OUTPATIENT
Start: 2018-04-03 | End: 2018-04-05 | Stop reason: HOSPADM

## 2018-04-02 RX ORDER — FOLIC ACID 1 MG/1
1 TABLET ORAL DAILY
Status: DISCONTINUED | OUTPATIENT
Start: 2018-04-03 | End: 2018-04-05 | Stop reason: HOSPADM

## 2018-04-02 RX ORDER — LANOLIN ALCOHOL/MO/W.PET/CERES
400 CREAM (GRAM) TOPICAL DAILY
Status: DISCONTINUED | OUTPATIENT
Start: 2018-04-03 | End: 2018-04-05 | Stop reason: HOSPADM

## 2018-04-02 RX ORDER — OXYCODONE HYDROCHLORIDE 80 MG/1
80 TABLET, FILM COATED, EXTENDED RELEASE ORAL EVERY 12 HOURS
Status: DISCONTINUED | OUTPATIENT
Start: 2018-04-02 | End: 2018-04-05 | Stop reason: HOSPADM

## 2018-04-02 RX ORDER — HYDROMORPHONE HYDROCHLORIDE 2 MG/ML
1 INJECTION, SOLUTION INTRAMUSCULAR; INTRAVENOUS; SUBCUTANEOUS
Status: DISCONTINUED | OUTPATIENT
Start: 2018-04-02 | End: 2018-04-05 | Stop reason: HOSPADM

## 2018-04-02 RX ORDER — METOPROLOL TARTRATE 25 MG/1
25 TABLET, FILM COATED ORAL 2 TIMES DAILY
Status: DISCONTINUED | OUTPATIENT
Start: 2018-04-02 | End: 2018-04-05 | Stop reason: HOSPADM

## 2018-04-02 RX ORDER — HYDROXYUREA 500 MG/1
500 CAPSULE ORAL 2 TIMES DAILY
Status: DISCONTINUED | OUTPATIENT
Start: 2018-04-02 | End: 2018-04-05 | Stop reason: HOSPADM

## 2018-04-02 RX ORDER — HYDROMORPHONE HYDROCHLORIDE 2 MG/ML
1 INJECTION, SOLUTION INTRAMUSCULAR; INTRAVENOUS; SUBCUTANEOUS ONCE
Status: COMPLETED | OUTPATIENT
Start: 2018-04-02 | End: 2018-04-02

## 2018-04-02 RX ORDER — SODIUM CHLORIDE 9 MG/ML
150 INJECTION, SOLUTION INTRAVENOUS CONTINUOUS
Status: DISCONTINUED | OUTPATIENT
Start: 2018-04-02 | End: 2018-04-03

## 2018-04-02 RX ORDER — LISINOPRIL 5 MG/1
5 TABLET ORAL DAILY
Status: DISCONTINUED | OUTPATIENT
Start: 2018-04-03 | End: 2018-04-05 | Stop reason: HOSPADM

## 2018-04-02 RX ADMIN — SODIUM CHLORIDE 1000 ML: 900 INJECTION, SOLUTION INTRAVENOUS at 17:41

## 2018-04-02 RX ADMIN — HYDROMORPHONE HYDROCHLORIDE 1 MG: 2 INJECTION, SOLUTION INTRAMUSCULAR; INTRAVENOUS; SUBCUTANEOUS at 19:02

## 2018-04-02 RX ADMIN — PROMETHAZINE HYDROCHLORIDE 25 MG: 25 TABLET ORAL at 23:28

## 2018-04-02 RX ADMIN — HYDROMORPHONE HYDROCHLORIDE 2 MG: 2 INJECTION, SOLUTION INTRAMUSCULAR; INTRAVENOUS; SUBCUTANEOUS at 17:42

## 2018-04-02 RX ADMIN — HEPARIN SODIUM 5000 UNITS: 5000 INJECTION, SOLUTION INTRAVENOUS; SUBCUTANEOUS at 23:28

## 2018-04-02 RX ADMIN — METOPROLOL TARTRATE 25 MG: 25 TABLET ORAL at 23:28

## 2018-04-02 RX ADMIN — HYDROMORPHONE HYDROCHLORIDE 1 MG: 2 INJECTION, SOLUTION INTRAMUSCULAR; INTRAVENOUS; SUBCUTANEOUS at 02:52

## 2018-04-02 RX ADMIN — OXYCODONE HYDROCHLORIDE 30 MG: 15 TABLET ORAL at 22:06

## 2018-04-02 RX ADMIN — DIPHENHYDRAMINE HYDROCHLORIDE 25 MG: 50 INJECTION, SOLUTION INTRAMUSCULAR; INTRAVENOUS at 17:42

## 2018-04-02 RX ADMIN — HYDROMORPHONE HYDROCHLORIDE 1 MG: 2 INJECTION, SOLUTION INTRAMUSCULAR; INTRAVENOUS; SUBCUTANEOUS at 23:27

## 2018-04-02 RX ADMIN — PROMETHAZINE HYDROCHLORIDE 25 MG: 25 INJECTION INTRAMUSCULAR; INTRAVENOUS at 17:42

## 2018-04-02 RX ADMIN — SODIUM CHLORIDE 150 ML/HR: 900 INJECTION, SOLUTION INTRAVENOUS at 22:06

## 2018-04-02 NOTE — IP AVS SNAPSHOT
[address] [facility], [place] 
 
 
 145 [facility] [address] [facility] 
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 
[phone] 
  
   left message for office to call patient with appointment date and time. DR. Whitney Andrade OFFICE Discharge Orders None A check cristina indicates which time of day the medication should be taken. My Medications CONTINUE taking these medications Instructions Each Dose to Equal  
 Morning Noon Evening Bedtime  
 deferasirox 360 mg Tab Commonly known as:  Lawyer Perdomo Your next dose is:  Tomorrow Morning Take 5 Tabs by mouth daily. 5 Tab  
    
  
   
   
   
  
 folic acid 1 mg tablet Commonly known as:  Google Your next dose is:  Tomorrow Morning Take 1 mg by mouth daily. 1 mg  
    
  
   
   
   
  
 hydroxyurea 500 mg capsule Commonly known as:  HYDREA Your next dose is: This evening Take 500 mg by mouth two (2) times a day. Takes in am at 0900  
 500 mg  
    
  
   
   
  
   
  
 lisinopril 5 mg tablet Commonly known as:  Burma Fairy Your next dose is:  Tomorrow Morning Take 5 mg by mouth daily. Indications: HYPERTENSION  
 5 mg  
    
  
   
   
   
  
 magnesium oxide 400 mg tablet Commonly known as:  MAG-OX Your next dose is:  Tomorrow Morning Take 1 Tab by mouth daily.   
 400 mg  
    
  
 metoprolol tartrate 25 mg tablet Commonly known as:  LOPRESSOR Your next dose is: This evening Take 25 mg by mouth two (2) times a day. Indications: HYPERTENSION  
 25 mg  
    
  
   
   
  
   
  
 * oxyCODONE IR 30 mg immediate release tablet Commonly known as:  Santo Bers Your last dose was:  4/5 4:30 am  
  
Your next dose is: Take on as needed schedule Take 1 Tab by mouth every four (4) hours as needed for Pain. Max Daily Amount: 180 mg.  
 30 mg  
    
   
   
   
  
 * oxyCODONE ER 80 mg ER tablet Commonly known as:  OxyCONTIN Your next dose is: This evening Take 1 Tab by mouth every twelve (12) hours. Max Daily Amount: 160 mg.  
 80 mg  
    
  
   
   
  
   
  
 promethazine 25 mg tablet Commonly known as:  PHENERGAN Your next dose is: Take on as needed schedule Take 1 Tab by mouth every six (6) hours as needed. May substitute suppository if vomiting 25 mg  
    
   
   
   
  
 * Notice: This list has 2 medication(s) that are the same as other medications prescribed for you. Read the directions carefully, and ask your doctor or other care provider to review them with you. Opioid Education Prescription Opioids: What You Need to Know: 
 
Prescription opioids can be used to help relieve moderate-to-severe pain and are often prescribed following a surgery or injury, or for certain health conditions. These medications can be an important part of treatment but also come with serious risks. Opioids are strong pain medicines. Examples include hydrocodone, oxycodone, fentanyl, and morphine. Heroin is an example of an illegal opioid. It is important to work with your health care provider to make sure you are getting the safest, most effective care. WHAT ARE THE RISKS AND SIDE EFFECTS OF OPIOID USE?  
Prescription opioids carry serious risks of addiction and overdose, especially with prolonged use. An opioid overdose, often marked by slow breathing, can cause sudden death. The use of prescription opioids can have a number of side effects as well, even when taken as directed. · Tolerance-meaning you might need to take more of a medication for the same pain relief · Physical dependence-meaning you have symptoms of withdrawal when the medication is stopped. Withdrawal symptoms can include nausea, sweating, chills, diarrhea, stomach cramps, and muscle aches. Withdrawal can last up to several weeks, depending on which drug you took and how long you took it. · Increased sensitivity to pain · Constipation · Nausea, vomiting, and dry mouth · Sleepiness and dizziness · Confusion · Depression · Low levels of testosterone that can result in lower sex drive, energy, and strength · Itching and sweating RISKS ARE GREATER WITH:      
· History of drug misuse, substance use disorder, or overdose · Mental health conditions (such as depression or anxiety) · Sleep apnea · Older age (72 years or older) · Pregnancy Avoid alcohol while taking prescription opioids. Also, unless specifically advised by your health care provider, medications to avoid include: · Benzodiazepines (such as Xanax or Valium) · Muscle relaxants (such as Soma or Flexeril) · Hypnotics (such as Ambien or Lunesta) · Other prescription opioids KNOW YOUR OPTIONS Talk to your health care provider about ways to manage your pain that don't involve prescription opioids. Some of these options may actually work better and have fewer risks and side effects. Options may include: 
· Pain relievers such as acetaminophen, ibuprofen, and naproxen · Some medications that are also used for depression or seizures · Physical therapy and exercise · Counseling to help patients learn how to cope better with triggers of pain and stress. · Application of heat or cold compress · Massage therapy · Relaxation techniques Be Informed Make sure you know the name of your medication, how much and how often to take it, and its potential risks & side effects. IF YOU ARE PRESCRIBED OPIOIDS FOR PAIN: 
· Never take opioids in greater amounts or more often than prescribed. Remember the goal is not to be pain-free but to manage your pain at a tolerable level. · Follow up with your primary care provider to: · Work together to create a plan on how to manage your pain. · Talk about ways to help manage your pain that don't involve prescription opioids. · Talk about any and all concerns and side effects. · Help prevent misuse and abuse. · Never sell or share prescription opioids · Help prevent misuse and abuse. · Store prescription opioids in a secure place and out of reach of others (this may include visitors, children, friends, and family). · Safely dispose of unused/unwanted prescription opioids: Find your community drug take-back program or your pharmacy mail-back program, or flush them down the toilet, following guidance from the Food and Drug Administration (www.fda.gov/Drugs/ResourcesForYou). · Visit www.cdc.gov/drugoverdose to learn about the risks of opioid abuse and overdose. · If you believe you may be struggling with addiction, tell your health care provider and ask for guidance or call [facility] [employer] at 0-256-539-WRRF. Discharge Instructions Sickle Cell Crisis: Care Instructions Your Care Instructions Sickle cell crisis is a painful episode that may begin suddenly in a person with sickle cell disease. Sickle cell disease turns normal, round red blood cells into cells that look like kendall or crescent moons. The sickle-shaped cells can get stuck in blood vessels, blocking blood flow and causing severe pain.  The pain can occur in the bones of the spine, the arms and legs, the chest, and the abdomen. An episode may be called a \"painful event\" or \"painful crisis. \" Some people who have sickle cell disease have many painful events, while others have few or none. Treatment depends on the level of pain and how long it lasts. Sometimes taking nonprescription pain relievers can help. Or you may need stronger pain relief medicine that is prescribed or given by a doctor. You may need to be treated in the hospital. 
It isn't always possible to know what sets off a painful event. But triggers include being dehydrated, cold temperatures, infection, stress, and not getting enough oxygen. Follow-up care is a key part of your treatment and safety. Be sure to make and go to all appointments, and call your doctor if you are having problems. It's also a good idea to know your test results and keep a list of the medicines you take. How can you care for yourself at home? · Create a pain management plan with your doctor. This plan should include the types of medicines you can take and other actions you can take at home to relieve pain. · Drink plenty of fluids, enough so that your urine is light yellow or clear like water. If you have kidney, heart, or liver disease and have to limit fluids, talk with your doctor before you increase the amount of fluids you drink. · Take your medicines exactly as prescribed. Call your doctor if you think you are having a problem with your medicine. · Take pain medicines exactly as directed. ¨ If the doctor gave you a prescription medicine for pain, take it as prescribed. ¨ If you are not taking a prescription pain medicine, ask your doctor if you can take an over-the-counter medicine. · Avoid alcohol. It can make you dehydrated. · Dress warmly in cold weather. The cold and windy weather can lead to severe pain. · Do not smoke. Smoking can reduce the amount of oxygen in your blood. · Get plenty of sleep. When should you call for help? Call 911 anytime you think you may need emergency care. For example, call if: 
? · You have symptoms of a severe problem from sickle cell. ? · You have symptoms of a stroke. These may include: 
¨ Sudden numbness, tingling, weakness, or loss of movement in your face, arm, or leg, especially on only one side of your body. ¨ Sudden vision changes. ¨ Sudden trouble speaking. ¨ Sudden confusion or trouble understanding simple statements. ¨ Sudden problems with walking or balance. ¨ A sudden, severe headache that is different from past headaches. ? · You are in severe pain. ? · You have symptoms of a heart attack. These may include: ¨ Chest pain or pressure, or a strange feeling in the chest. 
¨ Sweating. ¨ Shortness of breath. ¨ Nausea or vomiting. ¨ Pain, pressure, or a strange feeling in the back, neck, jaw, or upper belly or in one or both shoulders or arms. ¨ Lightheadedness or sudden weakness. ¨ A fast or irregular heartbeat. After you call 911, the  may tell you to chew 1 adult-strength or 2 to 4 low-dose aspirin. Wait for an ambulance. Do not try to drive yourself. ?Call your doctor now or seek immediate medical care if: 
? · You have a fever. ? Watch closely for changes in your health, and be sure to contact your doctor if you have any problems. Where can you learn more? Go to http://socorro-rosita.info/. Enter F104 in the search box to learn more about \"Sickle Cell Crisis: Care Instructions. \" Current as of: October 13, 2016 Content Version: 11.4 © 5870-0578 [employer]. Care instructions adapted under license by [employer] (which disclaims liability or warranty for this information). If you have questions about a medical condition or this instruction, always ask your healthcare professional. [address] any warranty or liability for your use of this information. DISCHARGE SUMMARY from Nurse PATIENT INSTRUCTIONS: 
 
 
F-face looks uneven A-arms unable to move or move unevenly S-speech slurred or non-existent T-time-call 911 as soon as signs and symptoms begin-DO NOT go Back to bed or wait to see if you get better-TIME IS BRAIN. Warning Signs of HEART ATTACK Call 911 if you have these symptoms: 
? Chest discomfort. Most heart attacks involve discomfort in the center of the chest that lasts more than a few minutes, or that goes away and comes back. It can feel like uncomfortable pressure, squeezing, fullness, or pain. ? Discomfort in other areas of the upper body. Symptoms can include pain or discomfort in one or both arms, the back, neck, jaw, or stomach. ? Shortness of breath with or without chest discomfort. ? Other signs may include breaking out in a cold sweat, nausea, or lightheadedness. Don't wait more than five minutes to call [address]! Fast action can save your life. Calling 911 is almost always the fastest way to get lifesaving treatment. Emergency Medical Services staff can begin treatment when they arrive  up to an hour sooner than if someone gets to the hospital by car. The discharge information has been reviewed with the patient. The patient verbalized understanding. Discharge medications reviewed with the patient and appropriate educational materials and side effects teaching were provided. ___________________________________________________________________________________________________________________________________ MyChart Announcement  We are excited to announce that we are making your provider's discharge notes available to you in CueThink. You will see these notes when they are completed and signed by the physician that discharged you from your recent hospital stay. If you have any questions or concerns about any information you see in CueThink, please call the Health Information Department where you were seen or reach out to your Primary Care Provider for more information about your plan of care. Introducing [facility]! [facility] introduces CueThink patient portal. Now you can access parts of your medical record, email your doctor's office, and request medication refills online. 1. In your internet browser, go to [email or web address] 2. Click on the First Time User? Click Here link in the Sign In box. You will see the New Member Sign Up page. 3. Enter your CueThink Access Code exactly as it appears below. You will not need to use this code after youve completed the sign-up process. If you do not sign up before the expiration date, you must request a new code. · CueThink Access Code: [facility] Expires: 6/7/2018 11:11 AM 
 
4. Enter the last four digits of your Social Security Number (xxxx) and Date of Birth (mm/dd/yyyy) as indicated and click Submit. You will be taken to the next sign-up page. 5. Create a CueThink ID. This will be your CueThink login ID and cannot be changed, so think of one that is secure and easy to remember. 6. Create a CueThink password. You can change your password at any time. 7. Enter your Password Reset Question and Answer. This can be used at a later time if you forget your password. 8. Enter your e-mail address. You will receive e-mail notification when new information is available in [address] 9. Click Sign Up. You can now view and download portions of your medical record. 10. Click the Download Summary menu link to download a portable copy of your medical information. If you have questions, please visit the Frequently Asked Questions section of the MyChart website. Remember, Metabolon is NOT to be used for urgent needs. For medical emergencies, dial 911. Now available from your iPhone and Android! Introducing Israel Bravo As a [facility] patient, I wanted to make you aware of our electronic visit tool called Israel Bravo. Sandy  24/7 allows you to connect within minutes with a medical provider 24 hours a day, seven days a week via a mobile device or tablet or logging into a secure website from your computer. You can access Israel Bravo from anywhere in the [place]. A virtual visit might be right for you when you have a simple condition and feel like you just dont want to get out of bed, or cant get away from work for an appointment, when your regular [facility] provider is not available (evenings, weekends or holidays), or when youre out of town and need minor care. Electronic visits cost only $49 and if the [facility] 24/7 provider determines a prescription is needed to treat your condition, one can be electronically transmitted to a nearby pharmacy*. Please take a moment to enroll today if you have not already done so. The enrollment process is free and takes just a few minutes. To enroll, please download the KONUX 24/7 jhony to your tablet or phone, or visit [email or web address] to enroll on your computer. And, as an [address] [place] [address] patient with a [employer] account, the results of your visits will be scanned into your electronic medical record and your primary care provider will be able to view the scanned results. We urge you to continue to see your regular [facility] provider for your ongoing medical care.   And while your primary care provider may not be the one available when you seek a Israel Bravo virtual visit, the peace of mind you get from getting a real diagnosis real time can be priceless. For more information on Israel Bravo, view our Frequently Asked Questions (FAQs) at [email or web address]. Sincerely, 
 
Carissa Menendez MD 
Chief Medical Officer [employer] *:  certain medications cannot be prescribed via [place] Unresulted Labs-Please follow up with your PCP about these lab tests Order Current Status CBC WITH AUTOMATED DIFF Preliminary result Providers Seen During Your Hospitalization Provider Specialty Primary office phone Dora Collier MD Emergency Medicine [phone] Raheem Gandhi MD Internal Medicine [phone] Your Primary Care Physician (PCP) Primary Care Physician Office Phone Office Fax Di Gibbons [phone] You are allergic to the following Allergen Reactions Compazine (Prochlorperazine Edisylate) Other (comments) Also makes him feel funny Morphine Other (comments) Makes him feel funny Reglan (Metoclopramide) Other (comments) \"feel funny\" Zofran (Ondansetron Hcl (Pf)) Other (comments) Make him feel funny Recent Documentation Height Weight BMI Smoking Status 1.778 m 74.8 kg 23.68 kg/m2 Never Smoker Emergency Contacts Name Discharge Info Relation Home Work Mobile Ziggy King  Spouse [[phone] Patient Belongings The following personal items are in your possession at time of discharge: 
  Dental Appliances: None  Visual Aid: Glasses      Home Medications: None   Jewelry: None  Clothing: Footwear, Jacket/Coat, Pants, Shirt, Socks, Undergarments, With patient    Other Valuables: Laura Please provide this summary of care documentation to your next provider.  
  
  
 
  
Signatures-by signing, you are acknowledging that this After Visit Summary has been reviewed with you and you have received a copy. Patient Signature:  ____________________________________________________________ Date:  ____________________________________________________________  
  
Krzysztof Brake Provider Signature:  ____________________________________________________________ Date:  ____________________________________________________________

## 2018-04-02 NOTE — ED PROVIDER NOTES
HPI Comments: 61-year-old male history of sickle cell S and beta thalassemia presents with severe pain in bilateral legs for the past 2 days. Also reports some mild lower abdominal pain with vomiting. Was seen in the emergency department last night and given fluids, pain medication, and a unit of blood. Was feeling somewhat better upon discharge, then the pain returned more severe. Not improved with oxycodone at home. Followed by Dr. Lilian Ayala. No fever, chest pain, shortness of breath. No urinary symptoms. Patient is a 37 y.o. male presenting with sickle cell disease. The history is provided by the patient. Sickle Cell Crisis    Associated symptoms include abdominal pain. Pertinent negatives include no chest pain, no fever, no headaches, no dysuria and no weakness. Past Medical History:   Diagnosis Date    Chronic pain     HTN (hypertension)     Ill-defined condition     sickle cell    Iron overload due to repeated red blood cell transfusions 1/18/2017    Paroxysmal SVT [facility]supraventricular tachycardia) ([facility]) 7/9/2016    Sickle cell disease ([facility] [address]        Past Surgical History:   Procedure Laterality Date    HC PENILE IMPL DURA II POSITINBLE      HC PORT LIFE SNGLE LUMEN 5013      to L CW    HX CHOLECYSTECTOMY      HX OTHER SURGICAL      penile inplant    HX VASCULAR ACCESS           Family History:   Problem Relation Age of Onset    Hypertension Other     Diabetes Father     Stroke Father     Sickle Cell Anemia Sister        Social History     Social History    Marital status:      Spouse name: N/A    Number of children: N/A    Years of education: N/A     Occupational History    Not on file.      Social History Main Topics    Smoking status: Never Smoker    Smokeless tobacco: Never Used    Alcohol use No    Drug use: No    Sexual activity: Yes     Other Topics Concern    Not on file     Social History Narrative         ALLERGIES: Compazine [prochlorperazine edisylate]; Morphine; Reglan [metoclopramide]; and Zofran [ondansetron hcl (pf)]    Review of Systems   Constitutional: Positive for fatigue. Negative for chills and fever. HENT: Negative for hearing loss. Eyes: Negative for visual disturbance. Respiratory: Negative for cough and shortness of breath. Cardiovascular: Negative for chest pain and palpitations. Gastrointestinal: Positive for abdominal pain, nausea and vomiting. Negative for diarrhea. Genitourinary: Negative for dysuria. Musculoskeletal: Positive for arthralgias and myalgias. Negative for back pain. Skin: Negative for rash. Neurological: Negative for weakness and headaches. Psychiatric/Behavioral: Negative for confusion. Vitals:    04/02/18 1634   BP: 151/83   Pulse: 70   Resp: 18   Temp: 99.5 °F (37.5 °C)   SpO2: 96%   Weight: 74.4 kg (164 lb)   Height: 5' 10\" (1.778 m)            Physical Exam   Constitutional: He appears well-developed and well-nourished. HENT:   Head: Normocephalic and atraumatic. Right Ear: External ear normal.   Left Ear: External ear normal.   Nose: Nose normal.   Mouth/Throat: Oropharynx is clear and moist.   Eyes: Conjunctivae are normal. Pupils are equal, round, and reactive to light. Neck: Normal range of motion. Neck supple. Cardiovascular: Regular rhythm, normal heart sounds and intact distal pulses. Pulmonary/Chest: Effort normal and breath sounds normal. No respiratory distress. He has no wheezes. Abdominal: Soft. Bowel sounds are normal. He exhibits no distension. There is tenderness in the suprapubic area and left lower quadrant. There is no rigidity, no rebound and no guarding. Musculoskeletal: Normal range of motion. He exhibits no edema. Neurological: He is alert. Skin: Skin is warm and dry. Psychiatric: Judgment normal.   Nursing note and vitals reviewed.        MDM  Number of Diagnoses or Management Options  Diagnosis management comments: Parts of this document were created using [employer] voice recognition software. The chart has been reviewed but errors may still be present. 7:49 PM  After 3 mg of Dilaudid, patient still in pain and thinks he needs to be admitted. Hospitalist paged.        Amount and/or Complexity of Data Reviewed  Clinical lab tests: ordered and reviewed (Results for orders placed or performed during the hospital encounter of 04/02/18  -CBC WITH AUTOMATED DIFF       Result                                            Value                         Ref Range                       WBC                                               7.9                           4.3 - 11.1 K/uL                 RBC                                               2.50 (L)                      4.23 - 5.67 M/uL                HGB                                               8.1 (L)                       13.6 - 17.2 g/dL                HCT                                               23.7 (L)                      41.1 - 50.3 %                   MCV                                               94.8                          79.6 - 97.8 FL                  MCH                                               32.4                          26.1 - 32.9 PG                  MCHC                                              34.2                          31.4 - 35.0 g/dL                RDW                                               Cannot be calculated          11.9 - 14.6 %                   PLATELET                                          188                           150 - 450 K/uL                  MPV                                               11.1                          10.8 - 14.1 FL                  NEUTROPHILS                                       64                            47 - 75 %                       LYMPHOCYTES                                       25                            16 - 44 %                       MONOCYTES                                         11 (H) 3 - 9 %                         NRBC                                              12.0                           WBC                     ABS. NEUTROPHILS                                  5.0                           1.7 - 8.2 K/UL                  ABS.  LYMPHOCYTES                                  2.0                           0.5 - 4.6 K/UL                  ABS. MONOCYTES                                    0.9                           0.1 - 1.3 K/UL                  RBC COMMENTS                                                                                                MARKED   ANISOCYTOSIS + POIKILOCYTOSIS          RBC COMMENTS                                      MODERATE MACROCYTOSIS                                         RBC COMMENTS                                      MODERATE MICROCYTOSIS                                         RBC COMMENTS                                      MODERATE POLYCHROMASIA                                        WBC COMMENTS                                      SLIGHT                                                        PLATELET COMMENTS                                 ADEQUATE                                                      DF                                                MANUAL                                                   -METABOLIC PANEL, COMPREHENSIVE       Result                                            Value                         Ref Range                       Sodium                                            142                           136 - 145 mmol/L                Potassium                                         3.7                           3.5 - 5.1 mmol/L                Chloride                                          111 (H)                       98 - 107 mmol/L                 CO2                                               23                            21 - 32 mmol/L                  Anion gap 8                             7 - 16 mmol/L                   Glucose                                           126 (H)                       65 - 100 mg/dL                  BUN                                               7                             6 - 23 MG/DL                    Creatinine                                        0.79 (L)                      0.8 - 1.5 MG/DL                 GFR est AA                                        >60                           >60 ml/min/1.73m2               GFR est non-AA                                    >60                           >60 ml/min/1.73m2               Calcium                                           8.7                           8.3 - 10.4 MG/DL                Bilirubin, total                                  1.4 (H)                       0.2 - 1.1 MG/DL                 ALT (SGPT)                                        42                            12 - 65 U/L                     AST (SGOT)                                        36                            15 - 37 U/L                     Alk. phosphatase                                  71                            50 - 136 U/L                    Protein, total                                    7.8                           6.3 - 8.2 g/dL                  Albumin                                           3.6                           3.5 - 5.0 g/dL                  Globulin                                          4.2 (H)                       2.3 - 3.5 g/dL                  A-G Ratio                                         0.9 (L)                       1.2 - 3.5                  -RETICULOCYTE COUNT       Result                                            Value                         Ref Range                       Reticulocyte count                                4.9 (H)                       0.3 - 2.0 %                     Absolute Retic Cnt.                               0.1225 (H) 0.026 - 0.095 M/ul              Immature Retic Fraction                           29.6 (H)                      2.3 - 13.4 %                    Retic Hgb Conc.                                   41 (H)                        29 - 35 pg                 )  Tests in the medicine section of CPT®: ordered and reviewed          ED Course       Procedures

## 2018-04-02 NOTE — ED NOTES
Patient arrives from home complaining of sickle cell crisis. Patient reports was here yesterday, given 1 blood transfusion for hgb 6.8. Patient reports severe leg pain, 10/10. Patient reports home pain meds are not helping.

## 2018-04-02 NOTE — ED NOTES
I have reviewed discharge instructions with the patient. The patient verbalized understanding. Patient left ED via Discharge Method: ambulatory to Home with self, patient's wife coming to drive him home. Opportunity for questions and clarification provided. Patient given 0 scripts. To continue your aftercare when you leave the hospital, you may receive an automated call from our care team to check in on how you are doing. This is a free service and part of our promise to provide the best care and service to meet your aftercare needs.  If you have questions, or wish to unsubscribe from this service please call [phone]. Thank you for Choosing our [facility] Emergency Department.

## 2018-04-03 LAB
ABO + RH BLD: NORMAL
ALBUMIN SERPL-MCNC: 3.2 G/DL (ref 3.5–5)
ALBUMIN/GLOB SERPL: 0.8 {RATIO} (ref 1.2–3.5)
ALP SERPL-CCNC: 63 U/L (ref 50–136)
ALT SERPL-CCNC: 36 U/L (ref 12–65)
ANION GAP SERPL CALC-SCNC: 6 MMOL/L (ref 7–16)
APPEARANCE UR: CLEAR
AST SERPL-CCNC: 32 U/L (ref 15–37)
BACTERIA URNS QL MICRO: ABNORMAL /HPF
BILIRUB SERPL-MCNC: 1.7 MG/DL (ref 0.2–1.1)
BILIRUB UR QL: NEGATIVE
BLOOD GROUP ANTIBODIES SERPL: NORMAL
BUN SERPL-MCNC: 7 MG/DL (ref 6–23)
CALCIUM SERPL-MCNC: 8.1 MG/DL (ref 8.3–10.4)
CASTS URNS QL MICRO: ABNORMAL /LPF
CHLORIDE SERPL-SCNC: 111 MMOL/L (ref 98–107)
CO2 SERPL-SCNC: 25 MMOL/L (ref 21–32)
COLOR UR: YELLOW
CREAT SERPL-MCNC: 0.81 MG/DL (ref 0.8–1.5)
DIFFERENTIAL METHOD BLD: ABNORMAL
EPI CELLS #/AREA URNS HPF: ABNORMAL /HPF
GLOBULIN SER CALC-MCNC: 3.9 G/DL (ref 2.3–3.5)
GLUCOSE SERPL-MCNC: 112 MG/DL (ref 65–100)
GLUCOSE UR STRIP.AUTO-MCNC: NEGATIVE MG/DL
HCT VFR BLD AUTO: 21.8 % (ref 41.1–50.3)
HGB BLD-MCNC: 7.4 G/DL (ref 13.6–17.2)
HGB UR QL STRIP: ABNORMAL
KETONES UR QL STRIP.AUTO: NEGATIVE MG/DL
LEUKOCYTE ESTERASE UR QL STRIP.AUTO: NEGATIVE
LYMPHOCYTES # BLD: 4.4 K/UL (ref 0.5–4.6)
LYMPHOCYTES NFR BLD MANUAL: 55 % (ref 16–44)
MAGNESIUM SERPL-MCNC: 1.9 MG/DL (ref 1.8–2.4)
MCH RBC QN AUTO: 32.6 PG (ref 26.1–32.9)
MCHC RBC AUTO-ENTMCNC: 33.9 G/DL (ref 31.4–35)
MCV RBC AUTO: 96 FL (ref 79.6–97.8)
MONOCYTES # BLD: 0.2 K/UL (ref 0.1–1.3)
MONOCYTES NFR BLD MANUAL: 2 % (ref 3–9)
NEUTS SEG # BLD: 3.4 K/UL (ref 1.7–8.2)
NEUTS SEG NFR BLD MANUAL: 43 % (ref 47–75)
NITRITE UR QL STRIP.AUTO: NEGATIVE
NRBC BLD-RTO: 33 PER 100 WBC
PH UR STRIP: 7 [PH] (ref 5–9)
PHOSPHATE SERPL-MCNC: 3.2 MG/DL (ref 2.5–4.5)
PLATELET # BLD AUTO: 174 K/UL (ref 150–450)
PLATELET COMMENTS,PCOM: ADEQUATE
PMV BLD AUTO: 11.5 FL (ref 10.8–14.1)
POTASSIUM SERPL-SCNC: 3.6 MMOL/L (ref 3.5–5.1)
PROT SERPL-MCNC: 7.1 G/DL (ref 6.3–8.2)
PROT UR STRIP-MCNC: ABNORMAL MG/DL
RBC # BLD AUTO: 2.27 M/UL (ref 4.23–5.67)
RBC #/AREA URNS HPF: ABNORMAL /HPF
RBC MORPH BLD: ABNORMAL
RBC MORPH BLD: ABNORMAL
SODIUM SERPL-SCNC: 142 MMOL/L (ref 136–145)
SP GR UR REFRACTOMETRY: 1.01 (ref 1–1.02)
SPECIMEN EXP DATE BLD: NORMAL
URATE SERPL-MCNC: 3.3 MG/DL (ref 2.6–6)
UROBILINOGEN UR QL STRIP.AUTO: 0.2 EU/DL (ref 0.2–1)
WBC # BLD AUTO: 8 K/UL (ref 4.3–11.1)
WBC MORPH BLD: ABNORMAL
WBC URNS QL MICRO: ABNORMAL /HPF

## 2018-04-03 PROCEDURE — 85025 COMPLETE CBC W/AUTO DIFF WBC: CPT | Performed by: HOSPITALIST

## 2018-04-03 PROCEDURE — 84100 ASSAY OF PHOSPHORUS: CPT | Performed by: HOSPITALIST

## 2018-04-03 PROCEDURE — 65270000029 HC RM PRIVATE

## 2018-04-03 PROCEDURE — 36591 DRAW BLOOD OFF VENOUS DEVICE: CPT

## 2018-04-03 PROCEDURE — 36415 COLL VENOUS BLD VENIPUNCTURE: CPT | Performed by: HOSPITALIST

## 2018-04-03 PROCEDURE — 94762 N-INVAS EAR/PLS OXIMTRY CONT: CPT

## 2018-04-03 PROCEDURE — 74011250636 HC RX REV CODE- 250/636: Performed by: HOSPITALIST

## 2018-04-03 PROCEDURE — 84550 ASSAY OF BLOOD/URIC ACID: CPT | Performed by: HOSPITALIST

## 2018-04-03 PROCEDURE — 74011250637 HC RX REV CODE- 250/637: Performed by: HOSPITALIST

## 2018-04-03 PROCEDURE — 83735 ASSAY OF MAGNESIUM: CPT | Performed by: HOSPITALIST

## 2018-04-03 PROCEDURE — 86900 BLOOD TYPING SEROLOGIC ABO: CPT | Performed by: HOSPITALIST

## 2018-04-03 PROCEDURE — 80053 COMPREHEN METABOLIC PANEL: CPT | Performed by: HOSPITALIST

## 2018-04-03 PROCEDURE — 74011000258 HC RX REV CODE- 258: Performed by: NURSE PRACTITIONER

## 2018-04-03 PROCEDURE — 83615 LACTATE (LD) (LDH) ENZYME: CPT | Performed by: HOSPITALIST

## 2018-04-03 PROCEDURE — 99222 1ST HOSP IP/OBS MODERATE 55: CPT | Performed by: INTERNAL MEDICINE

## 2018-04-03 RX ORDER — SODIUM CHLORIDE 450 MG/100ML
150 INJECTION, SOLUTION INTRAVENOUS CONTINUOUS
Status: DISCONTINUED | OUTPATIENT
Start: 2018-04-03 | End: 2018-04-05 | Stop reason: HOSPADM

## 2018-04-03 RX ADMIN — SODIUM CHLORIDE 150 ML/HR: 450 INJECTION, SOLUTION INTRAVENOUS at 14:16

## 2018-04-03 RX ADMIN — OXYCODONE HYDROCHLORIDE 80 MG: 80 TABLET, FILM COATED, EXTENDED RELEASE ORAL at 04:01

## 2018-04-03 RX ADMIN — SODIUM CHLORIDE 150 ML/HR: 900 INJECTION, SOLUTION INTRAVENOUS at 04:19

## 2018-04-03 RX ADMIN — SODIUM CHLORIDE 150 ML/HR: 450 INJECTION, SOLUTION INTRAVENOUS at 20:27

## 2018-04-03 RX ADMIN — FOLIC ACID 1 MG: 1 TABLET ORAL at 09:06

## 2018-04-03 RX ADMIN — Medication 1 AMPULE: at 09:41

## 2018-04-03 RX ADMIN — HYDROMORPHONE HYDROCHLORIDE 1 MG: 2 INJECTION, SOLUTION INTRAMUSCULAR; INTRAVENOUS; SUBCUTANEOUS at 11:58

## 2018-04-03 RX ADMIN — HEPARIN SODIUM 5000 UNITS: 5000 INJECTION, SOLUTION INTRAVENOUS; SUBCUTANEOUS at 05:24

## 2018-04-03 RX ADMIN — METOPROLOL TARTRATE 25 MG: 25 TABLET ORAL at 09:05

## 2018-04-03 RX ADMIN — OXYCODONE HYDROCHLORIDE 80 MG: 80 TABLET, FILM COATED, EXTENDED RELEASE ORAL at 20:31

## 2018-04-03 RX ADMIN — HYDROXYUREA 500 MG: 500 CAPSULE ORAL at 09:06

## 2018-04-03 RX ADMIN — OXYCODONE HYDROCHLORIDE 30 MG: 15 TABLET ORAL at 22:40

## 2018-04-03 RX ADMIN — LISINOPRIL 5 MG: 5 TABLET ORAL at 09:05

## 2018-04-03 RX ADMIN — HEPARIN SODIUM 5000 UNITS: 5000 INJECTION, SOLUTION INTRAVENOUS; SUBCUTANEOUS at 13:38

## 2018-04-03 RX ADMIN — HYDROXYUREA 500 MG: 500 CAPSULE ORAL at 17:00

## 2018-04-03 RX ADMIN — Medication 400 MG: at 09:06

## 2018-04-03 RX ADMIN — OXYCODONE HYDROCHLORIDE 80 MG: 80 TABLET, FILM COATED, EXTENDED RELEASE ORAL at 09:05

## 2018-04-03 RX ADMIN — METOPROLOL TARTRATE 25 MG: 25 TABLET ORAL at 17:01

## 2018-04-03 RX ADMIN — HYDROMORPHONE HYDROCHLORIDE 1 MG: 2 INJECTION, SOLUTION INTRAMUSCULAR; INTRAVENOUS; SUBCUTANEOUS at 16:53

## 2018-04-03 RX ADMIN — OXYCODONE HYDROCHLORIDE 30 MG: 15 TABLET ORAL at 07:58

## 2018-04-03 RX ADMIN — HEPARIN SODIUM 5000 UNITS: 5000 INJECTION, SOLUTION INTRAVENOUS; SUBCUTANEOUS at 21:34

## 2018-04-03 RX ADMIN — Medication 1 AMPULE: at 20:32

## 2018-04-03 RX ADMIN — HYDROMORPHONE HYDROCHLORIDE 1 MG: 2 INJECTION, SOLUTION INTRAMUSCULAR; INTRAVENOUS; SUBCUTANEOUS at 05:21

## 2018-04-03 RX ADMIN — HYDROMORPHONE HYDROCHLORIDE 1 MG: 2 INJECTION, SOLUTION INTRAMUSCULAR; INTRAVENOUS; SUBCUTANEOUS at 21:35

## 2018-04-03 NOTE — PROGRESS NOTES
Pt admitted to floor oriented to room and call light system. Data base completed. Dual skin assessment completed by this nurse and Cherylene Necessary RN.  Pt skin is c,d,i.

## 2018-04-03 NOTE — CONSULTS
[facility]        Inpatient Hematology / Oncology Consult Note    Reason for Consult:  Sickle cell crisis [facility])  Referring Physician:  Wesley Jacobs MD    History of Present Illness:  Mr. Inocencia Zamora is a 37 y.o. male admitted on 4/2/2018 . He is a patient of Dr. Ale Hyde at [facility] with sickle cell disease, hgb S/beta plus thalassemia. Currently maintained on hydrea 500mg BID and jadenu 5 tabs daily, oxycontin and oxycodone, last seen in the office on 3/12/18. He was admitted on 4/2/18 again with sickle cell pain crisis, with pain to legs, knees and back. He presented to the ER on 4/1 with complaints of pain and per oncologist at [facility], was transfused 1 unit PRBCs for hgb 6.4 then sent home. Unfortunately, he again presented with uncontrolled on pain on 42/ and was admitted. Hgb on admit was 8.1. He denies any focal infectious symptoms. We have been consulted for further recommendations regarding his sickle cell crisis. Review of Systems:  Constitutional Denies fever, chills, weight loss, appetite changes, fatigue, night sweats. HEENT Denies trauma, blurry vision, hearing loss, ear pain, nosebleeds, sore throat, neck pain    Skin Denies lesions or rashes. Lungs Denies dyspnea, cough, sputum production or hemoptysis. Cardiovascular Denies chest pain, palpitations, or lower extremity edema. Gastrointestinal Denies nausea, vomiting, + abdominal pain.  Denies dysuria, frequency or hesitancy of urination. Neuro Denies headaches, visual changes or ataxia. Denies dizziness, tingling, tremors, sensory change, speech change, focal weakness      MSK + back, leg pain      Psychiatric/Behavioral The patient is not nervous/anxious.          Allergies   Allergen Reactions    Compazine [Prochlorperazine Edisylate] Other (comments)     Also makes him feel funny    Morphine Other (comments)     Makes him feel funny    Reglan [Metoclopramide] Other (comments)     \"feel funny\"  Zofran [Ondansetron Hcl (Pf)] Other (comments)     Make him feel funny     Past Medical History:   Diagnosis Date    Chronic pain     HTN (hypertension)     Ill-defined condition     sickle cell    Iron overload due to repeated red blood cell transfusions 1/18/2017    Paroxysmal SVT [facility]supraventricular tachycardia) ([facility] 7/9/2016    Sickle cell disease ([facility] [facility]      Past Surgical History:   Procedure Laterality Date    HC PENILE IMPL DURA II POSITINBLE      HC PORT LIFE SNGLE LUMEN 5013      to L CW    HX CHOLECYSTECTOMY      HX OTHER SURGICAL      penile inplant    HX VASCULAR ACCESS       Family History   Problem Relation Age of Onset    Hypertension Other     Diabetes Father     Stroke Father     Sickle Cell Anemia Sister      Social History     Social History    Marital status:      Spouse name: N/A    Number of children: N/A    Years of education: N/A     Occupational History    Not on file.      Social History Main Topics    Smoking status: Never Smoker    Smokeless tobacco: Never Used    Alcohol use No    Drug use: No    Sexual activity: Yes     Other Topics Concern    Not on file     Social History Narrative     Current Facility-Administered Medications   Medication Dose Route Frequency Provider Last Rate Last Dose    alcohol 62% (NOZIN) nasal  1 Ampule  1 Ampule Topical Q12H Emigdio Wang MD   1 Ampule at 16/69/04 7984    folic acid (FOLVITE) tablet 1 mg  1 mg Oral DAILY Emigdio Wang MD   1 mg at 04/03/18 0906    lisinopril (PRINIVIL, ZESTRIL) tablet 5 mg  5 mg Oral DAILY Emigdio Wang MD   5 mg at 04/03/18 0905    metoprolol tartrate (LOPRESSOR) tablet 25 mg  25 mg Oral BID Emigdio Wang MD   25 mg at 04/03/18 0905    promethazine (PHENERGAN) tablet 25 mg  25 mg Oral Q6H PRN Emigdio Wang MD   25 mg at 04/02/18 4037    oxyCODONE IR (OXY-IR) immediate release tablet 30 mg  30 mg Oral Q4H PRN Emigdio Wang MD   30 mg at 04/03/18 0668    oxyCODONE ER (OxyCONTIN) tablet 80 mg  80 mg Oral Q12H Andriy See MD   80 mg at 18 7004    hydroxyurea (HYDREA) chemo cap 500 mg  500 mg Oral BID Andriy See MD   500 mg at 18 3116    deferasirox (JADENU) tab 1,800 mg (6 x 360 mg tabs)  (Patient Supplied)  1,800 mg Oral DAILY Andriy See MD        magnesium oxide (MAG-OX) tablet 400 mg  400 mg Oral DAILY Andriy See MD   400 mg at 18 3947    0.9% sodium chloride infusion  150 mL/hr IntraVENous CONTINUOUS Andriy See  mL/hr at 18 0419 150 mL/hr at 18 0419    heparin (porcine) injection 5,000 Units  5,000 Units SubCUTAneous Q8H Andriy See MD   5,000 Units at 18 1338    HYDROmorphone (PF) (DILAUDID) injection 1 mg  1 mg IntraVENous Q3H PRN Andriy See MD   1 mg at 18 1158       OBJECTIVE:  Patient Tammy Romero for the past 8 hrs:   BP Temp Pulse Resp SpO2   18 1158 127/74 98.2 °F (36.8 °C) 66 18 97 %   18 0736 - - - - 94 %   18 0722 136/72 98.7 °F (37.1 °C) 79 16 96 %     Temp (24hrs), Av.3 °F (37.4 °C), Min:98.2 °F (36.8 °C), Max:100.7 °F (38.2 °C)         Physical Exam:  Constitutional: Well developed, well nourished male in no acute distress, sitting comfortably in the hospital bed. HEENT: Normocephalic and atraumatic. Oropharynx is clear, mucous membranes are moist.    Neck supple    Skin Warm and dry. No bruising and no rash noted. No erythema. No pallor. Respiratory Lungs are clear to auscultation bilaterally without wheezes, rales or rhonchi, normal air exchange without accessory muscle use. CVS Normal rate, regular rhythm and normal S1 and S2. No murmurs, gallops, or rubs. Abdomen Soft, nontender and nondistended, normoactive bowel sounds. No palpable mass. No hepatosplenomegaly. Neuro Grossly nonfocal with no obvious sensory or motor deficits. MSK Normal range of motion in general.  No edema and no tenderness.    Psych Appropriate mood and affect. Labs:    Recent Results (from the past 24 hour(s))   CBC WITH AUTOMATED DIFF    Collection Time: 04/02/18  5:41 PM   Result Value Ref Range    WBC 7.9 4.3 - 11.1 K/uL    RBC 2.50 (L) 4.23 - 5.67 M/uL    HGB 8.1 (L) 13.6 - 17.2 g/dL    HCT 23.7 (L) 41.1 - 50.3 %    MCV 94.8 79.6 - 97.8 FL    MCH 32.4 26.1 - 32.9 PG    MCHC 34.2 31.4 - 35.0 g/dL    RDW Cannot be calculated 11.9 - 14.6 %    PLATELET 722 734 - 043 K/uL    MPV 11.1 10.8 - 14.1 FL    NEUTROPHILS 64 47 - 75 %    LYMPHOCYTES 25 16 - 44 %    MONOCYTES 11 (H) 3 - 9 %    NRBC 12.0  WBC    ABS. NEUTROPHILS 5.0 1.7 - 8.2 K/UL    ABS. LYMPHOCYTES 2.0 0.5 - 4.6 K/UL    ABS. MONOCYTES 0.9 0.1 - 1.3 K/UL    RBC COMMENTS MARKED  ANISOCYTOSIS + POIKILOCYTOSIS        RBC COMMENTS MODERATE  MACROCYTOSIS        RBC COMMENTS MODERATE  MICROCYTOSIS        RBC COMMENTS MODERATE  POLYCHROMASIA        WBC COMMENTS SLIGHT      PLATELET COMMENTS ADEQUATE      DF MANUAL     METABOLIC PANEL, COMPREHENSIVE    Collection Time: 04/02/18  5:41 PM   Result Value Ref Range    Sodium 142 136 - 145 mmol/L    Potassium 3.7 3.5 - 5.1 mmol/L    Chloride 111 (H) 98 - 107 mmol/L    CO2 23 21 - 32 mmol/L    Anion gap 8 7 - 16 mmol/L    Glucose 126 (H) 65 - 100 mg/dL    BUN 7 6 - 23 MG/DL    Creatinine 0.79 (L) 0.8 - 1.5 MG/DL    GFR est AA >60 >60 ml/min/1.73m2    GFR est non-AA >60 >60 ml/min/1.73m2    Calcium 8.7 8.3 - 10.4 MG/DL    Bilirubin, total 1.4 (H) 0.2 - 1.1 MG/DL    ALT (SGPT) 42 12 - 65 U/L    AST (SGOT) 36 15 - 37 U/L    Alk.  phosphatase 71 50 - 136 U/L    Protein, total 7.8 6.3 - 8.2 g/dL    Albumin 3.6 3.5 - 5.0 g/dL    Globulin 4.2 (H) 2.3 - 3.5 g/dL    A-G Ratio 0.9 (L) 1.2 - 3.5     RETICULOCYTE COUNT    Collection Time: 04/02/18  5:41 PM   Result Value Ref Range    Reticulocyte count 4.9 (H) 0.3 - 2.0 %    Absolute Retic Cnt. 0.1225 (H) 0.026 - 0.095 M/ul    Immature Retic Fraction 29.6 (H) 2.3 - 13.4 %    Retic Hgb Conc. 41 (H) 29 - 35 pg   URINALYSIS W/ RFLX MICROSCOPIC    Collection Time: 04/02/18 11:50 PM   Result Value Ref Range    Color YELLOW      Appearance CLEAR      Specific gravity 1.009 1.001 - 1.023      pH (UA) 7.0 5.0 - 9.0      Protein TRACE (A) NEG mg/dL    Glucose NEGATIVE  mg/dL    Ketone NEGATIVE  NEG mg/dL    Bilirubin NEGATIVE  NEG      Blood TRACE (A) NEG      Urobilinogen 0.2 0.2 - 1.0 EU/dL    Nitrites NEGATIVE  NEG      Leukocyte Esterase NEGATIVE  NEG      WBC 0-3 0 /hpf    RBC 0-3 0 /hpf    Epithelial cells 0-3 0 /hpf    Bacteria 2+ (H) 0 /hpf    Casts 0-3 0 /lpf   TYPE & SCREEN    Collection Time: 04/03/18  4:58 AM   Result Value Ref Range    Crossmatch Expiration 04/06/2018     ABO/Rh(D) A POSITIVE     Antibody screen NEG    METABOLIC PANEL, COMPREHENSIVE    Collection Time: 04/03/18  5:03 AM   Result Value Ref Range    Sodium 142 136 - 145 mmol/L    Potassium 3.6 3.5 - 5.1 mmol/L    Chloride 111 (H) 98 - 107 mmol/L    CO2 25 21 - 32 mmol/L    Anion gap 6 (L) 7 - 16 mmol/L    Glucose 112 (H) 65 - 100 mg/dL    BUN 7 6 - 23 MG/DL    Creatinine 0.81 0.8 - 1.5 MG/DL    GFR est AA >60 >60 ml/min/1.73m2    GFR est non-AA >60 >60 ml/min/1.73m2    Calcium 8.1 (L) 8.3 - 10.4 MG/DL    Bilirubin, total 1.7 (H) 0.2 - 1.1 MG/DL    ALT (SGPT) 36 12 - 65 U/L    AST (SGOT) 32 15 - 37 U/L    Alk. phosphatase 63 50 - 136 U/L    Protein, total 7.1 6.3 - 8.2 g/dL    Albumin 3.2 (L) 3.5 - 5.0 g/dL    Globulin 3.9 (H) 2.3 - 3.5 g/dL    A-G Ratio 0.8 (L) 1.2 - 3.5     CBC WITH AUTOMATED DIFF    Collection Time: 04/03/18  5:03 AM   Result Value Ref Range    WBC 8.0 4.3 - 11.1 K/uL    RBC 2.27 (L) 4.23 - 5.67 M/uL    HGB 7.4 (L) 13.6 - 17.2 g/dL    HCT 21.8 (L) 41.1 - 50.3 %    MCV 96.0 79.6 - 97.8 FL    MCH 32.6 26.1 - 32.9 PG    MCHC 33.9 31.4 - 35.0 g/dL    PLATELET 874 556 - 802 K/uL    MPV 11.5 10.8 - 14.1 FL    NEUTROPHILS 43 (L) 47 - 75 %    LYMPHOCYTES 55 (H) 16 - 44 %    MONOCYTES 2 (L) 3 - 9 %    NRBC 33.0  WBC    ABS. NEUTROPHILS 3.4 1.7 - 8.2 K/UL    ABS. LYMPHOCYTES 4.4 0.5 - 4.6 K/UL    ABS.  MONOCYTES 0.2 0.1 - 1.3 K/UL    RBC COMMENTS MARKED  ANISOCYTOSIS + POIKILOCYTOSIS        RBC COMMENTS MODERATE  POLYCHROMASIA        WBC COMMENTS Result Confirmed By Smear      PLATELET COMMENTS ADEQUATE      DF MANUAL     MAGNESIUM    Collection Time: 04/03/18  5:03 AM   Result Value Ref Range    Magnesium 1.9 1.8 - 2.4 mg/dL   PHOSPHORUS    Collection Time: 04/03/18  5:03 AM   Result Value Ref Range    Phosphorus 3.2 2.5 - 4.5 MG/DL   URIC ACID    Collection Time: 04/03/18  5:03 AM   Result Value Ref Range    Uric acid 3.3 2.6 - 6.0 MG/DL       Imaging:  NA    ASSESSMENT:  Problem List  Date Reviewed: 3/5/2012          Codes Class Noted    Anemia ICD-10-CM: D64.9  ICD-9-CM: 285.9  11/12/2017        Sickle cell disease ([place] [address] ICD-10-CM: D57.1  ICD-9-CM: 282.60  7/13/2017        Iron overload due to repeated red blood cell transfusions ICD-10-CM: E83.111  ICD-9-CM: 275.02  1/18/2017        Sickle cell anemia with crisis ([facility] ICD-10-CM: D57.00  ICD-9-CM: 282.62  1/16/2017        Paroxysmal SVT (supraventricular tachycardia) ([place] [address] ICD-10-CM: I47.1  ICD-9-CM: 427.0  7/9/2016        Hypomagnesemia ICD-10-CM: E83.42  ICD-9-CM: 275.2  7/9/2016        Sickle cell anemia ([facility] ICD-10-CM: D57.1  ICD-9-CM: 282.60  4/6/2016        * (Principal)Sickle cell crisis ([facility] ICD-10-CM: D57.00  ICD-9-CM: 282.62  4/5/2016        leukocytosis - most likely reactive ICD-10-CM: D72.829  ICD-9-CM: 288.60  1/30/2016        Diarrhea ICD-10-CM: R19.7  ICD-9-CM: 787.91  9/27/2014        Sickle cell pain crisis ([facility] [address] ICD-10-CM: D57.00  ICD-9-CM: 282.62  10/25/2012        Hyperbilirubinemia ICD-10-CM: E80.6  ICD-9-CM: 782.4  9/20/2012    Overview Signed 9/20/2012  1:37 PM by [employer]     Related to sickle cell crisis             Essential hypertension, benign ICD-10-CM: I10  ICD-9-CM: 401.1  6/23/2012        Hemolytic crisis ([facility] [address] ICD-10-CM: D65  ICD-9-CM: 286.6  3/24/2012    Overview Signed 3/24/2012  2:53 PM by Hari Hyde     Sickle cell with anemia             HTN (hypertension) (Chronic) ICD-10-CM: I10  ICD-9-CM: 401.9  3/2/2012        Leukocytosis - chronic reactive ICD-10-CM: D72.829  ICD-9-CM: 288.60  9/16/2011                RECOMMENDATIONS:  Sickle cell anemia, hgb s/beta plus thalassemia  - Continue hydrea, jadenu   - Hgb down to 7.4 - hold further transfusions at this time    Pain crisis  - Continues oxycontin with oxycodone IR with the addition of dilaudid 1mg every 3 hours PRN  - Change fluids to 1/2NS at 150ml/hr  - Phenergan PO only    Lab studies were personally reviewed. Thank you for allowing us to participate in the care of Mr. Cabrera. We will sign off. Please call if we can be of further assistance.  He will need to follow up with his primary hematologist at [facility], Dr. Bianca Hull, within 1 week of discharge         [facility]  [id number] [place] [address]  Office : [phone]  Fax : [phone]

## 2018-04-03 NOTE — PROGRESS NOTES
Pt given phenergan po for complaints of N/V. Also given dilaudid 1 mg IV for complaints of pain to legs 10/10 on pain scale. Will continue to monitor.

## 2018-04-03 NOTE — H&P
Hospitalist H&P/Consult Note     Admit Date:  2018  4:49 PM   Name:  Goran Lozada   Age:  37 y.o.  :  1975   MRN:  [id number]   PCP:  Bam Chase MD  Treatment Team: Attending Provider: Leah Berg MD    HPI:   38 y/o AA gentleman with hx sickle cell disease presents back to the ED with symptoms of pain crisis. He was seen yesterday and received 1 unit PRBC and discharged home. Returns with severe pain in his legs from the knees down. Denies any fever, chills, cough or urinary symptoms. No chest pain or shortness of breath. Does not know what could have triggered this crisis. No N/V, diarrhea or abdominal pain. He received IV dilaudid in ED but still in pain. Hgb yesterday prior to transfusion was 6.4. Today it is 8.1. Follows with Hematology Dr Carlos Nichols with [facility]. Will admit for further supportive treatment of acute pain crisis. 10 systems reviewed and negative except as noted in HPI. Past Medical History:   Diagnosis Date    Chronic pain     HTN (hypertension)     Ill-defined condition     sickle cell    Iron overload due to repeated red blood cell transfusions 2017    Paroxysmal SVT (supraventricular tachycardia) (HCC) 2016    Sickle cell disease ([facility] [address]       Past Surgical History:   Procedure Laterality Date    HC PENILE IMPL DURA II POSITINBLE      HC PORT LIFE SNGLE LUMEN 5013      to L CW    HX CHOLECYSTECTOMY      HX OTHER SURGICAL      penile inplant    HX VASCULAR ACCESS        Prior to Admission Medications   Prescriptions Last Dose Informant Patient Reported? Taking? deferasirox (JADENU) 360 mg tab   No No   Sig: Take 5 Tabs by mouth daily. folic acid (FOLVITE) 1 mg tablet  Self Yes No   Sig: Take 1 mg by mouth daily. hydroxyurea (HYDREA) 500 mg capsule   Yes No   Sig: Take 500 mg by mouth two (2) times a day. Takes in am at 0900   lisinopril (PRINIVIL, ZESTRIL) 5 mg tablet   Yes No   Sig: Take 5 mg by mouth daily.  Indications: HYPERTENSION magnesium oxide (MAG-OX) 400 mg tablet   No No   Sig: Take 1 Tab by mouth daily. metoprolol (LOPRESSOR) 25 mg tablet   Yes No   Sig: Take 25 mg by mouth two (2) times a day. Indications: HYPERTENSION   oxyCODONE ER (OXYCONTIN) 80 mg ER tablet   No No   Sig: Take 1 Tab by mouth every twelve (12) hours. Max Daily Amount: 160 mg.   oxyCODONE IR (OXY-IR) 30 mg immediate release tablet   No No   Sig: Take 1 Tab by mouth every four (4) hours as needed for Pain. Max Daily Amount: 180 mg.   promethazine (PHENERGAN) 25 mg tablet   No No   Sig: Take 1 Tab by mouth every six (6) hours as needed.  May substitute suppository if vomiting      Facility-Administered Medications: None     Allergies   Allergen Reactions    Compazine [Prochlorperazine Edisylate] Other (comments)     Also makes him feel funny    Morphine Other (comments)     Makes him feel funny    Reglan [Metoclopramide] Other (comments)     \"feel funny\"    Zofran [Ondansetron Hcl (Pf)] Other (comments)     Make him feel funny      Social History   Substance Use Topics    Smoking status: Never Smoker    Smokeless tobacco: Never Used    Alcohol use No      Family History   Problem Relation Age of Onset    Hypertension Other     Diabetes Father     Stroke Father     Sickle Cell Anemia Sister       Immunization History   Administered Date(s) Administered    Influenza Vaccine 09/10/2015    Influenza Vaccine Split 09/27/2012    ZZZ-RETIRED (DO NOT USE) Pneumococcal Vaccine (Unspecified Type) 09/21/2010       Objective:   Patient Vitals for the past 24 hrs:   Temp Pulse Resp BP SpO2   04/02/18 2003 99.6 °F (37.6 °C) 68 18 126/59 98 %   04/02/18 1942 - 64 - 131/70 98 %   04/02/18 1815 - - 16 135/72 99 %   04/02/18 1800 - - 16 136/71 97 %   04/02/18 1745 - - 16 162/81 99 %   04/02/18 1634 99.5 °F (37.5 °C) 70 18 151/83 96 %     Oxygen Therapy  O2 Sat (%): 98 % (04/02/18 2003)  Pulse via Oximetry: 64 beats per minute (04/02/18 1942)  O2 Device: Room air (04/02/18 2003)  No intake or output data in the 24 hours ending 04/02/18 5052    Physical Exam:  General:    Well nourished. Alert. uncomfortable   Eyes:   Normal sclera. Extraocular movements intact. ENT:  Normocephalic, atraumatic. Moist mucous membranes  CV:   RRR. No murmur, rub, or gallop. Lungs:  CTAB. No wheezing, rhonchi, or rales. Abdomen: Soft, nontender, nondistended. Bowel sounds normal.   Extremities: Warm and dry. No cyanosis or edema. Shins tender. Calves NT  Neurologic: CN II-XII grossly intact. Sensation intact. Skin:     No rashes or jaundice. No wounds. Psych:  Normal mood and affect. I reviewed the labs, imaging, EKGs, telemetry, and other studies done this admission. Data Review:   Recent Results (from the past 24 hour(s))   CBC WITH AUTOMATED DIFF    Collection Time: 04/02/18  5:41 PM   Result Value Ref Range    WBC 7.9 4.3 - 11.1 K/uL    RBC 2.50 (L) 4.23 - 5.67 M/uL    HGB 8.1 (L) 13.6 - 17.2 g/dL    HCT 23.7 (L) 41.1 - 50.3 %    MCV 94.8 79.6 - 97.8 FL    MCH 32.4 26.1 - 32.9 PG    MCHC 34.2 31.4 - 35.0 g/dL    RDW Cannot be calculated 11.9 - 14.6 %    PLATELET 501 656 - 455 K/uL    MPV 11.1 10.8 - 14.1 FL    NEUTROPHILS 64 47 - 75 %    LYMPHOCYTES 25 16 - 44 %    MONOCYTES 11 (H) 3 - 9 %    NRBC 12.0  WBC    ABS. NEUTROPHILS 5.0 1.7 - 8.2 K/UL    ABS. LYMPHOCYTES 2.0 0.5 - 4.6 K/UL    ABS.  MONOCYTES 0.9 0.1 - 1.3 K/UL    RBC COMMENTS MARKED  ANISOCYTOSIS + POIKILOCYTOSIS        RBC COMMENTS MODERATE  MACROCYTOSIS        RBC COMMENTS MODERATE  MICROCYTOSIS        RBC COMMENTS MODERATE  POLYCHROMASIA        WBC COMMENTS SLIGHT      PLATELET COMMENTS ADEQUATE      DF MANUAL     METABOLIC PANEL, COMPREHENSIVE    Collection Time: 04/02/18  5:41 PM   Result Value Ref Range    Sodium 142 136 - 145 mmol/L    Potassium 3.7 3.5 - 5.1 mmol/L    Chloride 111 (H) 98 - 107 mmol/L    CO2 23 21 - 32 mmol/L    Anion gap 8 7 - 16 mmol/L    Glucose 126 (H) 65 - 100 mg/dL    BUN 7 6 - 23 MG/DL    Creatinine 0.79 (L) 0.8 - 1.5 MG/DL    GFR est AA >60 >60 ml/min/1.73m2    GFR est non-AA >60 >60 ml/min/1.73m2    Calcium 8.7 8.3 - 10.4 MG/DL    Bilirubin, total 1.4 (H) 0.2 - 1.1 MG/DL    ALT (SGPT) 42 12 - 65 U/L    AST (SGOT) 36 15 - 37 U/L    Alk. phosphatase 71 50 - 136 U/L    Protein, total 7.8 6.3 - 8.2 g/dL    Albumin 3.6 3.5 - 5.0 g/dL    Globulin 4.2 (H) 2.3 - 3.5 g/dL    A-G Ratio 0.9 (L) 1.2 - 3.5     RETICULOCYTE COUNT    Collection Time: 04/02/18  5:41 PM   Result Value Ref Range    Reticulocyte count 4.9 (H) 0.3 - 2.0 %    Absolute Retic Cnt. 0.1225 (H) 0.026 - 0.095 M/ul    Immature Retic Fraction 29.6 (H) 2.3 - 13.4 %    Retic Hgb Conc. 41 (H) 29 - 35 pg       Imaging Studies:  CXR Results  (Last 48 hours)    None        CT Results  (Last 48 hours)    None          Assessment and Plan:     Hospital Problems as of 4/2/2018  Date Reviewed: 3/5/2012          Codes Class Noted - Resolved POA    * (Principal)Sickle cell crisis ([facility] ICD-10-CM: D57.00  ICD-9-CM: 282.62  4/5/2016 - Present Yes        Hemolytic crisis ([facility] ICD-10-CM: D65  ICD-9-CM: 286.6  3/24/2012 - Present Yes    Overview Signed 3/24/2012  2:53 PM by Ana Macario     Sickle cell with anemia             Sickle cell anemia (HCC) ICD-10-CM: D57.1  ICD-9-CM: 282.60  4/6/2016 - Present Yes        Iron overload due to repeated red blood cell transfusions ICD-10-CM: E83.111  ICD-9-CM: 275.02  1/18/2017 - Present Yes        HTN (hypertension) (Chronic) ICD-10-CM: I10  ICD-9-CM: 401.9  3/2/2012 - Present Yes              PLAN:  · Admit inpatient  · Supportive care with aggressive pain management, IV fluids  · Supplemental O2  · Continue folic acid, hydrea  · Check LDH, uric acid, Mg. Type and screen  · Daily CBC. Incentive spirometry  · No evidence for any active infection.  So no antibiotics  · SQ heparin for DVT prophylaxis    Estimated LOS:  2 or more midnights    Signed:  Verner Berry, MD

## 2018-04-03 NOTE — PROGRESS NOTES
Hospitalist Progress Note     Admit Date:  2018  4:49 PM   Name:  Mora Dominique   Age:  37 y.o.  :  1975   MRN:  [id number]   PCP:  Wally Stone MD  Treatment Team: Attending Provider: Raheem Gandhi MD; Consulting Provider: Ellen Moulton MD; Utilization Review: Joe Dallas    Subjective:     36 y/o AA gentleman with hx sickle cell disease presents back to the ED with symptoms of pain crisis. He was seen yesterday and received 1 unit PRBC and discharged home. Returns with severe pain in his legs from the knees down. Denies any fever, chills, cough or urinary symptoms. No chest pain or shortness of breath. Does not know what could have triggered this crisis. No N/V, diarrhea or abdominal pain. He received IV dilaudid in ED but still in pain. Hgb yesterday prior to transfusion was 6.4. Today it is 8.1. Follows with Hematology Dr Tal Luque with [facility]. Will admit for further supportive treatment of acute pain crisis. 4/3/18  Says leg pain better    Objective:   Patient Vitals for the past 24 hrs:   Temp Pulse Resp BP SpO2   18 0736 - - - - 94 %   18 0722 98.7 °F (37.1 °C) 79 16 136/72 96 %   18 0404 98.8 °F (37.1 °C) 84 18 125/78 94 %   18 2326 - - - - 96 %   18 2138 (!) 100.7 °F (38.2 °C) 99 18 132/81 90 %   18 99.6 °F (37.6 °C) 68 18 126/59 98 %   18 1942 - 64 - 131/70 98 %   18 1815 - - 16 135/72 99 %   18 1800 - - 16 136/71 97 %   18 1745 - - 16 162/81 99 %   18 1634 99.5 °F (37.5 °C) 70 18 151/83 96 %     Oxygen Therapy  O2 Sat (%): 94 % (18 0736)  Pulse via Oximetry: 74 beats per minute (18)  O2 Device: Room air (18)    Intake/Output Summary (Last 24 hours) at 18 1145  Last data filed at 18 0405   Gross per 24 hour   Intake              946 ml   Output                0 ml   Net              946 ml         General:    Well nourished. Alert.     HEENT- pale pink conjunctiva  CV:   RRR. No murmur, rub, or gallop. Lungs:   Clear to auscultation bilaterally. No wheezing, rhonchi, or rales. Abdomen:   Soft, nontender, nondistended. Cns- no focal neurological deficits  Extremities: Warm and dry. No cyanosis or edema. Tender on palpation anterior aspect of both legs,no swelling noted. Skin:     No rashes or jaundice. Data Review:  I have reviewed all labs, meds, telemetry events, and studies from the last 24 hours. Recent Results (from the past 24 hour(s))   CBC WITH AUTOMATED DIFF    Collection Time: 04/02/18  5:41 PM   Result Value Ref Range    WBC 7.9 4.3 - 11.1 K/uL    RBC 2.50 (L) 4.23 - 5.67 M/uL    HGB 8.1 (L) 13.6 - 17.2 g/dL    HCT 23.7 (L) 41.1 - 50.3 %    MCV 94.8 79.6 - 97.8 FL    MCH 32.4 26.1 - 32.9 PG    MCHC 34.2 31.4 - 35.0 g/dL    RDW Cannot be calculated 11.9 - 14.6 %    PLATELET 494 819 - 646 K/uL    MPV 11.1 10.8 - 14.1 FL    NEUTROPHILS 64 47 - 75 %    LYMPHOCYTES 25 16 - 44 %    MONOCYTES 11 (H) 3 - 9 %    NRBC 12.0  WBC    ABS. NEUTROPHILS 5.0 1.7 - 8.2 K/UL    ABS. LYMPHOCYTES 2.0 0.5 - 4.6 K/UL    ABS.  MONOCYTES 0.9 0.1 - 1.3 K/UL    RBC COMMENTS MARKED  ANISOCYTOSIS + POIKILOCYTOSIS        RBC COMMENTS MODERATE  MACROCYTOSIS        RBC COMMENTS MODERATE  MICROCYTOSIS        RBC COMMENTS MODERATE  POLYCHROMASIA        WBC COMMENTS SLIGHT      PLATELET COMMENTS ADEQUATE      DF MANUAL     METABOLIC PANEL, COMPREHENSIVE    Collection Time: 04/02/18  5:41 PM   Result Value Ref Range    Sodium 142 136 - 145 mmol/L    Potassium 3.7 3.5 - 5.1 mmol/L    Chloride 111 (H) 98 - 107 mmol/L    CO2 23 21 - 32 mmol/L    Anion gap 8 7 - 16 mmol/L    Glucose 126 (H) 65 - 100 mg/dL    BUN 7 6 - 23 MG/DL    Creatinine 0.79 (L) 0.8 - 1.5 MG/DL    GFR est AA >60 >60 ml/min/1.73m2    GFR est non-AA >60 >60 ml/min/1.73m2    Calcium 8.7 8.3 - 10.4 MG/DL    Bilirubin, total 1.4 (H) 0.2 - 1.1 MG/DL    ALT (SGPT) 42 12 - 65 U/L    AST (SGOT) 36 15 - 37 U/L Alk. phosphatase 71 50 - 136 U/L    Protein, total 7.8 6.3 - 8.2 g/dL    Albumin 3.6 3.5 - 5.0 g/dL    Globulin 4.2 (H) 2.3 - 3.5 g/dL    A-G Ratio 0.9 (L) 1.2 - 3.5     RETICULOCYTE COUNT    Collection Time: 04/02/18  5:41 PM   Result Value Ref Range    Reticulocyte count 4.9 (H) 0.3 - 2.0 %    Absolute Retic Cnt. 0.1225 (H) 0.026 - 0.095 M/ul    Immature Retic Fraction 29.6 (H) 2.3 - 13.4 %    Retic Hgb Conc. 41 (H) 29 - 35 pg   URINALYSIS W/ RFLX MICROSCOPIC    Collection Time: 04/02/18 11:50 PM   Result Value Ref Range    Color YELLOW      Appearance CLEAR      Specific gravity 1.009 1.001 - 1.023      pH (UA) 7.0 5.0 - 9.0      Protein TRACE (A) NEG mg/dL    Glucose NEGATIVE  mg/dL    Ketone NEGATIVE  NEG mg/dL    Bilirubin NEGATIVE  NEG      Blood TRACE (A) NEG      Urobilinogen 0.2 0.2 - 1.0 EU/dL    Nitrites NEGATIVE  NEG      Leukocyte Esterase NEGATIVE  NEG      WBC 0-3 0 /hpf    RBC 0-3 0 /hpf    Epithelial cells 0-3 0 /hpf    Bacteria 2+ (H) 0 /hpf    Casts 0-3 0 /lpf   TYPE & SCREEN    Collection Time: 04/03/18  4:58 AM   Result Value Ref Range    Crossmatch Expiration 04/06/2018     ABO/Rh(D) A POSITIVE     Antibody screen NEG    METABOLIC PANEL, COMPREHENSIVE    Collection Time: 04/03/18  5:03 AM   Result Value Ref Range    Sodium 142 136 - 145 mmol/L    Potassium 3.6 3.5 - 5.1 mmol/L    Chloride 111 (H) 98 - 107 mmol/L    CO2 25 21 - 32 mmol/L    Anion gap 6 (L) 7 - 16 mmol/L    Glucose 112 (H) 65 - 100 mg/dL    BUN 7 6 - 23 MG/DL    Creatinine 0.81 0.8 - 1.5 MG/DL    GFR est AA >60 >60 ml/min/1.73m2    GFR est non-AA >60 >60 ml/min/1.73m2    Calcium 8.1 (L) 8.3 - 10.4 MG/DL    Bilirubin, total 1.7 (H) 0.2 - 1.1 MG/DL    ALT (SGPT) 36 12 - 65 U/L    AST (SGOT) 32 15 - 37 U/L    Alk.  phosphatase 63 50 - 136 U/L    Protein, total 7.1 6.3 - 8.2 g/dL    Albumin 3.2 (L) 3.5 - 5.0 g/dL    Globulin 3.9 (H) 2.3 - 3.5 g/dL    A-G Ratio 0.8 (L) 1.2 - 3.5     CBC WITH AUTOMATED DIFF    Collection Time: 04/03/18  5:03 AM   Result Value Ref Range    WBC 8.0 4.3 - 11.1 K/uL    RBC 2.27 (L) 4.23 - 5.67 M/uL    HGB 7.4 (L) 13.6 - 17.2 g/dL    HCT 21.8 (L) 41.1 - 50.3 %    MCV 96.0 79.6 - 97.8 FL    MCH 32.6 26.1 - 32.9 PG    MCHC 33.9 31.4 - 35.0 g/dL    PLATELET 127 427 - 223 K/uL    MPV 11.5 10.8 - 14.1 FL    NEUTROPHILS 43 (L) 47 - 75 %    LYMPHOCYTES 55 (H) 16 - 44 %    MONOCYTES 2 (L) 3 - 9 %    NRBC 33.0  WBC    ABS. NEUTROPHILS 3.4 1.7 - 8.2 K/UL    ABS. LYMPHOCYTES 4.4 0.5 - 4.6 K/UL    ABS.  MONOCYTES 0.2 0.1 - 1.3 K/UL    RBC COMMENTS MARKED  ANISOCYTOSIS + POIKILOCYTOSIS        RBC COMMENTS MODERATE  POLYCHROMASIA        WBC COMMENTS Result Confirmed By Smear      PLATELET COMMENTS ADEQUATE      DF MANUAL     MAGNESIUM    Collection Time: 04/03/18  5:03 AM   Result Value Ref Range    Magnesium 1.9 1.8 - 2.4 mg/dL   PHOSPHORUS    Collection Time: 04/03/18  5:03 AM   Result Value Ref Range    Phosphorus 3.2 2.5 - 4.5 MG/DL   URIC ACID    Collection Time: 04/03/18  5:03 AM   Result Value Ref Range    Uric acid 3.3 2.6 - 6.0 MG/DL        All Micro Results     None          Current Meds:  Current Facility-Administered Medications   Medication Dose Route Frequency    alcohol 62% (NOZIN) nasal  1 Ampule  1 Ampule Topical N36X    folic acid (FOLVITE) tablet 1 mg  1 mg Oral DAILY    lisinopril (PRINIVIL, ZESTRIL) tablet 5 mg  5 mg Oral DAILY    metoprolol tartrate (LOPRESSOR) tablet 25 mg  25 mg Oral BID    promethazine (PHENERGAN) tablet 25 mg  25 mg Oral Q6H PRN    oxyCODONE IR (OXY-IR) immediate release tablet 30 mg  30 mg Oral Q4H PRN    oxyCODONE ER (OxyCONTIN) tablet 80 mg  80 mg Oral Q12H    hydroxyurea (HYDREA) chemo cap 500 mg  500 mg Oral BID    deferasirox (JADENU) tab 1,800 mg (6 x 360 mg tabs)  (Patient Supplied)  1,800 mg Oral DAILY    magnesium oxide (MAG-OX) tablet 400 mg  400 mg Oral DAILY    0.9% sodium chloride infusion  150 mL/hr IntraVENous CONTINUOUS    heparin (porcine) injection 5,000 Units  5,000 Units SubCUTAneous Q8H    HYDROmorphone (PF) (DILAUDID) injection 1 mg  1 mg IntraVENous Q3H PRN       Other Studies (last 24 hours):  No results found.     Assessment and Plan:     Hospital Problems as of 4/3/2018  Date Reviewed: 3/5/2012          Codes Class Noted - Resolved POA    Iron overload due to repeated red blood cell transfusions ICD-10-CM: E83.111  ICD-9-CM: 275.02  1/18/2017 - Present Yes        Sickle cell anemia (HCC) ICD-10-CM: D57.1  ICD-9-CM: 282.60  4/6/2016 - Present Yes        * (Principal)Sickle cell crisis ([facility] [address] ICD-10-CM: D57.00  ICD-9-CM: 282.62  4/5/2016 - Present Yes        Hemolytic crisis ([facility] [address] ICD-10-CM: D65  ICD-9-CM: 286.6  3/24/2012 - Present Yes    Overview Signed 3/24/2012  2:53 PM by Martine Gaming     Sickle cell with anemia             HTN (hypertension) (Chronic) ICD-10-CM: I10  ICD-9-CM: 401.9  3/2/2012 - Present Yes              PLAN:    Sickle cell crisis- cont ivf,pain meds,hydrea, oncology consult  htn - cont home meds  anema- sec to sickle crisis- recent blood transfusion prior to coming to hospital    DC planning/Dispo:    DVT ppx:  heparin    Signed:  Deejay Edwards MD

## 2018-04-04 LAB
ANION GAP SERPL CALC-SCNC: 6 MMOL/L (ref 7–16)
BUN SERPL-MCNC: 8 MG/DL (ref 6–23)
CALCIUM SERPL-MCNC: 8.2 MG/DL (ref 8.3–10.4)
CHLORIDE SERPL-SCNC: 108 MMOL/L (ref 98–107)
CO2 SERPL-SCNC: 24 MMOL/L (ref 21–32)
CREAT SERPL-MCNC: 0.87 MG/DL (ref 0.8–1.5)
DIFFERENTIAL METHOD BLD: ABNORMAL
EOSINOPHIL # BLD: 0.2 K/UL (ref 0–0.8)
EOSINOPHIL NFR BLD MANUAL: 3 % (ref 1–8)
GLUCOSE SERPL-MCNC: 118 MG/DL (ref 65–100)
HCT VFR BLD AUTO: 21.1 % (ref 41.1–50.3)
HGB BLD-MCNC: 7 G/DL (ref 13.6–17.2)
LYMPHOCYTES # BLD: 4.7 K/UL (ref 0.5–4.6)
LYMPHOCYTES NFR BLD MANUAL: 58 % (ref 16–44)
MCH RBC QN AUTO: 32.6 PG (ref 26.1–32.9)
MCHC RBC AUTO-ENTMCNC: 33.2 G/DL (ref 31.4–35)
MCV RBC AUTO: 98.1 FL (ref 79.6–97.8)
MONOCYTES # BLD: 0.6 K/UL (ref 0.1–1.3)
MONOCYTES NFR BLD MANUAL: 7 % (ref 3–9)
NEUTS SEG # BLD: 2.6 K/UL (ref 1.7–8.2)
NEUTS SEG NFR BLD MANUAL: 32 % (ref 47–75)
NRBC BLD-RTO: 23 PER 100 WBC
PLATELET # BLD AUTO: 187 K/UL (ref 150–450)
PLATELET COMMENTS,PCOM: ADEQUATE
PMV BLD AUTO: 11 FL (ref 10.8–14.1)
POTASSIUM SERPL-SCNC: 3.9 MMOL/L (ref 3.5–5.1)
RBC # BLD AUTO: 2.15 M/UL (ref 4.23–5.67)
RBC MORPH BLD: ABNORMAL
RBC MORPH BLD: ABNORMAL
SODIUM SERPL-SCNC: 138 MMOL/L (ref 136–145)
WBC # BLD AUTO: 8.1 K/UL (ref 4.3–11.1)
WBC MORPH BLD: ABNORMAL

## 2018-04-04 PROCEDURE — 74011250637 HC RX REV CODE- 250/637: Performed by: HOSPITALIST

## 2018-04-04 PROCEDURE — 94762 N-INVAS EAR/PLS OXIMTRY CONT: CPT

## 2018-04-04 PROCEDURE — 74011250636 HC RX REV CODE- 250/636: Performed by: HOSPITALIST

## 2018-04-04 PROCEDURE — 36591 DRAW BLOOD OFF VENOUS DEVICE: CPT

## 2018-04-04 PROCEDURE — 65270000029 HC RM PRIVATE

## 2018-04-04 PROCEDURE — 85025 COMPLETE CBC W/AUTO DIFF WBC: CPT | Performed by: FAMILY MEDICINE

## 2018-04-04 PROCEDURE — 80048 BASIC METABOLIC PNL TOTAL CA: CPT | Performed by: FAMILY MEDICINE

## 2018-04-04 PROCEDURE — 74011000258 HC RX REV CODE- 258: Performed by: NURSE PRACTITIONER

## 2018-04-04 RX ADMIN — LISINOPRIL 5 MG: 5 TABLET ORAL at 09:02

## 2018-04-04 RX ADMIN — OXYCODONE HYDROCHLORIDE 80 MG: 80 TABLET, FILM COATED, EXTENDED RELEASE ORAL at 09:02

## 2018-04-04 RX ADMIN — Medication 1 AMPULE: at 21:46

## 2018-04-04 RX ADMIN — HYDROMORPHONE HYDROCHLORIDE 1 MG: 2 INJECTION, SOLUTION INTRAMUSCULAR; INTRAVENOUS; SUBCUTANEOUS at 11:15

## 2018-04-04 RX ADMIN — HYDROXYUREA 500 MG: 500 CAPSULE ORAL at 17:42

## 2018-04-04 RX ADMIN — SODIUM CHLORIDE 150 ML/HR: 450 INJECTION, SOLUTION INTRAVENOUS at 09:08

## 2018-04-04 RX ADMIN — HYDROMORPHONE HYDROCHLORIDE 1 MG: 2 INJECTION, SOLUTION INTRAMUSCULAR; INTRAVENOUS; SUBCUTANEOUS at 16:10

## 2018-04-04 RX ADMIN — METOPROLOL TARTRATE 25 MG: 25 TABLET ORAL at 09:02

## 2018-04-04 RX ADMIN — HYDROMORPHONE HYDROCHLORIDE 1 MG: 2 INJECTION, SOLUTION INTRAMUSCULAR; INTRAVENOUS; SUBCUTANEOUS at 02:47

## 2018-04-04 RX ADMIN — SODIUM CHLORIDE 150 ML/HR: 450 INJECTION, SOLUTION INTRAVENOUS at 22:48

## 2018-04-04 RX ADMIN — HYDROMORPHONE HYDROCHLORIDE 1 MG: 2 INJECTION, SOLUTION INTRAMUSCULAR; INTRAVENOUS; SUBCUTANEOUS at 20:16

## 2018-04-04 RX ADMIN — HEPARIN SODIUM 5000 UNITS: 5000 INJECTION, SOLUTION INTRAVENOUS; SUBCUTANEOUS at 21:46

## 2018-04-04 RX ADMIN — HYDROMORPHONE HYDROCHLORIDE 1 MG: 2 INJECTION, SOLUTION INTRAMUSCULAR; INTRAVENOUS; SUBCUTANEOUS at 07:38

## 2018-04-04 RX ADMIN — HYDROXYUREA 500 MG: 500 CAPSULE ORAL at 09:09

## 2018-04-04 RX ADMIN — OXYCODONE HYDROCHLORIDE 80 MG: 80 TABLET, FILM COATED, EXTENDED RELEASE ORAL at 21:46

## 2018-04-04 RX ADMIN — HEPARIN SODIUM 5000 UNITS: 5000 INJECTION, SOLUTION INTRAVENOUS; SUBCUTANEOUS at 13:45

## 2018-04-04 RX ADMIN — HEPARIN SODIUM 5000 UNITS: 5000 INJECTION, SOLUTION INTRAVENOUS; SUBCUTANEOUS at 05:46

## 2018-04-04 RX ADMIN — FOLIC ACID 1 MG: 1 TABLET ORAL at 09:02

## 2018-04-04 RX ADMIN — SODIUM CHLORIDE 150 ML/HR: 450 INJECTION, SOLUTION INTRAVENOUS at 16:12

## 2018-04-04 RX ADMIN — Medication 1 AMPULE: at 09:05

## 2018-04-04 RX ADMIN — OXYCODONE HYDROCHLORIDE 30 MG: 15 TABLET ORAL at 17:53

## 2018-04-04 RX ADMIN — OXYCODONE HYDROCHLORIDE 30 MG: 15 TABLET ORAL at 22:48

## 2018-04-04 RX ADMIN — SODIUM CHLORIDE 150 ML/HR: 450 INJECTION, SOLUTION INTRAVENOUS at 02:47

## 2018-04-04 RX ADMIN — METOPROLOL TARTRATE 25 MG: 25 TABLET ORAL at 17:42

## 2018-04-04 RX ADMIN — Medication 400 MG: at 09:02

## 2018-04-04 NOTE — PROGRESS NOTES
Hospitalist Progress Note     Admit Date:  2018  4:49 PM   Name:  Alvan Fothergill   Age:  37 y.o.  :  1975   MRN:  [id number]   PCP:  Sindy Sahu MD  Treatment Team: Attending Provider: Seferino Barrios MD; Utilization Review: Galindo Martin    Subjective:     36 y/o AA gentleman with hx sickle cell disease presents back to the ED with symptoms of pain crisis. He was seen yesterday and received 1 unit PRBC and discharged home. Returns with severe pain in his legs from the knees down. Denies any fever, chills, cough or urinary symptoms. No chest pain or shortness of breath. Does not know what could have triggered this crisis. No N/V, diarrhea or abdominal pain. He received IV dilaudid in ED but still in pain. Hgb yesterday prior to transfusion was 6.4. Today it is 8.1. Follows with Hematology Dr Najma Shetty with [facility]. Will admit for further supportive treatment of acute pain crisis. 4/3/18  Says leg pain better    18  Still c/o bilateral leg pain  Mild left chest wall pain- musculoskeletal    Objective:     Patient Vitals for the past 24 hrs:   Temp Pulse Resp BP SpO2   18 1116 98.5 °F (36.9 °C) 78 18 134/74 99 %   18 0903 - 78 - - -   18 0724 98.2 °F (36.8 °C) (!) 57 18 125/56 100 %   18 0305 98.1 °F (36.7 °C) 72 18 122/77 95 %   18 2334 98.7 °F (37.1 °C) 60 18 137/64 98 %   18 2132 98.8 °F (37.1 °C) 61 18 141/60 100 %   18 1804 - 74 - 121/68 -   18 1656 98.1 °F (36.7 °C) 85 18 (!) 157/97 100 %   18 1158 98.2 °F (36.8 °C) 66 18 127/74 97 %     Oxygen Therapy  O2 Sat (%): 99 % (18 1116)  Pulse via Oximetry: 74 beats per minute (18 0736)  O2 Device: Room air (18 1116)    Intake/Output Summary (Last 24 hours) at 18 1142  Last data filed at 18 0305   Gross per 24 hour   Intake             3110 ml   Output                0 ml   Net             3110 ml         General:    Well nourished. Alert.     HEENT- pale pink conjunctiva  CV:   RRR. No murmur, rub, or gallop. Lungs:   Clear to auscultation bilaterally. No wheezing, rhonchi, or rales. chest wall ,mild tenderness lateral chest wall  Abdomen:   Soft, nontender, nondistended. Cns- no focal neurological deficits  Extremities: Warm and dry. No cyanosis or edema. Tender on palpation anterior aspect of both legs,no swelling noted. Skin:     No rashes or jaundice. Data Review:  I have reviewed all labs, meds, telemetry events, and studies from the last 24 hours. Recent Results (from the past 24 hour(s))   CBC WITH AUTOMATED DIFF    Collection Time: 04/04/18  6:07 AM   Result Value Ref Range    WBC 8.1 4.3 - 11.1 K/uL    RBC 2.15 (L) 4.23 - 5.67 M/uL    HGB 7.0 (L) 13.6 - 17.2 g/dL    HCT 21.1 (L) 41.1 - 50.3 %    MCV 98.1 (H) 79.6 - 97.8 FL    MCH 32.6 26.1 - 32.9 PG    MCHC 33.2 31.4 - 35.0 g/dL    PLATELET 358 980 - 610 K/uL    MPV 11.0 10.8 - 14.1 FL    NEUTROPHILS 32 (L) 47 - 75 %    LYMPHOCYTES 58 (H) 16 - 44 %    MONOCYTES 7 3 - 9 %    EOSINOPHILS 3 1 - 8 %    NRBC 23.0  WBC    ABS. NEUTROPHILS 2.6 1.7 - 8.2 K/UL    ABS. LYMPHOCYTES 4.7 (H) 0.5 - 4.6 K/UL    ABS. MONOCYTES 0.6 0.1 - 1.3 K/UL    ABS.  EOSINOPHILS 0.2 0.0 - 0.8 K/UL    RBC COMMENTS MARKED  ANISOCYTOSIS + POIKILOCYTOSIS        RBC COMMENTS MODERATE  POLYCHROMASIA        WBC COMMENTS Result Confirmed By Smear      PLATELET COMMENTS ADEQUATE      DF MANUAL     METABOLIC PANEL, BASIC    Collection Time: 04/04/18  6:07 AM   Result Value Ref Range    Sodium 138 136 - 145 mmol/L    Potassium 3.9 3.5 - 5.1 mmol/L    Chloride 108 (H) 98 - 107 mmol/L    CO2 24 21 - 32 mmol/L    Anion gap 6 (L) 7 - 16 mmol/L    Glucose 118 (H) 65 - 100 mg/dL    BUN 8 6 - 23 MG/DL    Creatinine 0.87 0.8 - 1.5 MG/DL    GFR est AA >60 >60 ml/min/1.73m2    GFR est non-AA >60 >60 ml/min/1.73m2    Calcium 8.2 (L) 8.3 - 10.4 MG/DL        All Micro Results     None          Current Meds:  Current Facility-Administered Medications   Medication Dose Route Frequency    alcohol 62% (NOZIN) nasal  1 Ampule  1 Ampule Topical Q12H    0.45% sodium chloride infusion  150 mL/hr IntraVENous CONTINUOUS    folic acid (FOLVITE) tablet 1 mg  1 mg Oral DAILY    lisinopril (PRINIVIL, ZESTRIL) tablet 5 mg  5 mg Oral DAILY    metoprolol tartrate (LOPRESSOR) tablet 25 mg  25 mg Oral BID    promethazine (PHENERGAN) tablet 25 mg  25 mg Oral Q6H PRN    oxyCODONE IR (OXY-IR) immediate release tablet 30 mg  30 mg Oral Q4H PRN    oxyCODONE ER (OxyCONTIN) tablet 80 mg  80 mg Oral Q12H    hydroxyurea (HYDREA) chemo cap 500 mg  500 mg Oral BID    deferasirox (JADENU) tab 1,800 mg (6 x 360 mg tabs)  (Patient Supplied)  1,800 mg Oral DAILY    magnesium oxide (MAG-OX) tablet 400 mg  400 mg Oral DAILY    heparin (porcine) injection 5,000 Units  5,000 Units SubCUTAneous Q8H    HYDROmorphone (PF) (DILAUDID) injection 1 mg  1 mg IntraVENous Q3H PRN       Other Studies (last 24 hours):  No results found.     Assessment and Plan:     Hospital Problems as of 4/4/2018  Date Reviewed: 3/5/2012          Codes Class Noted - Resolved POA    Iron overload due to repeated red blood cell transfusions ICD-10-CM: E83.111  ICD-9-CM: 275.02  1/18/2017 - Present Yes        Sickle cell anemia (HCC) ICD-10-CM: D57.1  ICD-9-CM: 282.60  4/6/2016 - Present Yes        * (Principal)Sickle cell crisis ([facility] ICD-10-CM: D57.00  ICD-9-CM: 282.62  4/5/2016 - Present Yes        Hemolytic crisis ([facility] ICD-10-CM: D65  ICD-9-CM: 286.6  3/24/2012 - Present Yes    Overview Signed 3/24/2012  2:53 PM by George Coop[place]     Sickle cell with anemia             HTN (hypertension) (Chronic) ICD-10-CM: I10  ICD-9-CM: 401.9  3/2/2012 - Present Yes              PLAN:    Sickle cell crisis- cont ivf,pain meds,hydrea, oncology signed off  htn - cont home meds  anema- sec to sickle crisis- recent blood transfusion prior to coming to hospital- hb 7    DC planning/Dispo:  24- 48 hrs  DVT ppx: heparin    Signed:  Flavia Chance MD

## 2018-04-04 NOTE — PROGRESS NOTES
Care Management Interventions  PCP Verified by CM: Yes  Transition of Care Consult (CM Consult): Discharge Planning  Physical Therapy Consult: No  Occupational Therapy Consult: No  Current Support Network: Lives with Spouse  Confirm Follow Up Transport: Family  Plan discussed with Pt/Family/Caregiver: Yes  Freedom of Choice Offered: Yes  Discharge Location  Discharge Placement: Home     Patient is alert and oriented times four. Independent at baseline. He will dc to home tomorrow with spouse. He is disabled and not working. Spouse is there to assist as needed. Sickel cell crisis. Danielle Maher

## 2018-04-04 NOTE — PROGRESS NOTES
Interdisciplinary Rounds completed 04/04/2018. Nursing, Case Management, Physician and PT present. Plan of care reviewed and updated. Possible discharge in am if pain controlled.   No discharge needs identified

## 2018-04-05 ENCOUNTER — PATIENT OUTREACH (OUTPATIENT)
Dept: CASE MANAGEMENT | Age: 43
End: 2018-04-05

## 2018-04-05 VITALS
OXYGEN SATURATION: 97 % | HEIGHT: 70 IN | WEIGHT: 165 LBS | TEMPERATURE: 98.3 F | DIASTOLIC BLOOD PRESSURE: 70 MMHG | SYSTOLIC BLOOD PRESSURE: 122 MMHG | BODY MASS INDEX: 23.62 KG/M2 | RESPIRATION RATE: 18 BRPM | HEART RATE: 75 BPM

## 2018-04-05 LAB
ANION GAP SERPL CALC-SCNC: 5 MMOL/L (ref 7–16)
BUN SERPL-MCNC: 7 MG/DL (ref 6–23)
CALCIUM SERPL-MCNC: 8.1 MG/DL (ref 8.3–10.4)
CHLORIDE SERPL-SCNC: 108 MMOL/L (ref 98–107)
CO2 SERPL-SCNC: 27 MMOL/L (ref 21–32)
CREAT SERPL-MCNC: 0.8 MG/DL (ref 0.8–1.5)
DIFFERENTIAL METHOD BLD: ABNORMAL
GLUCOSE SERPL-MCNC: 103 MG/DL (ref 65–100)
HCT VFR BLD AUTO: 21.6 % (ref 41.1–50.3)
HGB BLD-MCNC: 7.2 G/DL (ref 13.6–17.2)
LDH SERPL-CCNC: 510 IU/L (ref 121–224)
LDH1 CFR SERPL ELPH: 38 % (ref 17–32)
LDH2 CFR SERPL ELPH: 33 % (ref 25–40)
LDH3 CFR SERPL ELPH: 16 % (ref 17–27)
LDH4 CFR SERPL ELPH: 6 % (ref 5–13)
LDH5 CFR SERPL ELPH: 7 % (ref 4–20)
LYMPHOCYTES # BLD: 3.3 K/UL (ref 0.5–4.6)
LYMPHOCYTES NFR BLD MANUAL: 46 % (ref 16–44)
MCH RBC QN AUTO: 32.9 PG (ref 26.1–32.9)
MCHC RBC AUTO-ENTMCNC: 33.3 G/DL (ref 31.4–35)
MCV RBC AUTO: 98.6 FL (ref 79.6–97.8)
MONOCYTES # BLD: 0.2 K/UL (ref 0.1–1.3)
MONOCYTES NFR BLD MANUAL: 3 % (ref 3–9)
NEUTS SEG # BLD: 3.6 K/UL (ref 1.7–8.2)
NEUTS SEG NFR BLD MANUAL: 51 % (ref 47–75)
NRBC BLD-RTO: 8 PER 100 WBC
PLATELET # BLD AUTO: 196 K/UL (ref 150–450)
PMV BLD AUTO: 11.2 FL (ref 10.8–14.1)
POTASSIUM SERPL-SCNC: 4 MMOL/L (ref 3.5–5.1)
RBC # BLD AUTO: 2.19 M/UL (ref 4.23–5.67)
RBC MORPH BLD: ABNORMAL
SODIUM SERPL-SCNC: 140 MMOL/L (ref 136–145)
WBC # BLD AUTO: 7.1 K/UL (ref 4.3–11.1)
WBC MORPH BLD: ABNORMAL

## 2018-04-05 PROCEDURE — 80048 BASIC METABOLIC PNL TOTAL CA: CPT | Performed by: FAMILY MEDICINE

## 2018-04-05 PROCEDURE — 74011250636 HC RX REV CODE- 250/636: Performed by: HOSPITALIST

## 2018-04-05 PROCEDURE — 85025 COMPLETE CBC W/AUTO DIFF WBC: CPT | Performed by: HOSPITALIST

## 2018-04-05 PROCEDURE — 74011250637 HC RX REV CODE- 250/637: Performed by: HOSPITALIST

## 2018-04-05 PROCEDURE — 74011000258 HC RX REV CODE- 258: Performed by: NURSE PRACTITIONER

## 2018-04-05 RX ORDER — HEPARIN 100 UNIT/ML
300 SYRINGE INTRAVENOUS AS NEEDED
Status: DISCONTINUED | OUTPATIENT
Start: 2018-04-05 | End: 2018-04-05 | Stop reason: HOSPADM

## 2018-04-05 RX ADMIN — OXYCODONE HYDROCHLORIDE 30 MG: 15 TABLET ORAL at 04:34

## 2018-04-05 RX ADMIN — OXYCODONE HYDROCHLORIDE 30 MG: 15 TABLET ORAL at 10:06

## 2018-04-05 RX ADMIN — HYDROMORPHONE HYDROCHLORIDE 1 MG: 2 INJECTION, SOLUTION INTRAMUSCULAR; INTRAVENOUS; SUBCUTANEOUS at 07:13

## 2018-04-05 RX ADMIN — SODIUM CHLORIDE 150 ML/HR: 450 INJECTION, SOLUTION INTRAVENOUS at 05:34

## 2018-04-05 RX ADMIN — FOLIC ACID 1 MG: 1 TABLET ORAL at 08:42

## 2018-04-05 RX ADMIN — LISINOPRIL 5 MG: 5 TABLET ORAL at 08:41

## 2018-04-05 RX ADMIN — Medication 400 MG: at 08:42

## 2018-04-05 RX ADMIN — METOPROLOL TARTRATE 25 MG: 25 TABLET ORAL at 08:42

## 2018-04-05 RX ADMIN — Medication 1 AMPULE: at 08:45

## 2018-04-05 RX ADMIN — HYDROMORPHONE HYDROCHLORIDE 1 MG: 2 INJECTION, SOLUTION INTRAMUSCULAR; INTRAVENOUS; SUBCUTANEOUS at 01:00

## 2018-04-05 RX ADMIN — HEPARIN SODIUM 5000 UNITS: 5000 INJECTION, SOLUTION INTRAVENOUS; SUBCUTANEOUS at 05:13

## 2018-04-05 RX ADMIN — HYDROXYUREA 500 MG: 500 CAPSULE ORAL at 08:42

## 2018-04-05 RX ADMIN — OXYCODONE HYDROCHLORIDE 80 MG: 80 TABLET, FILM COATED, EXTENDED RELEASE ORAL at 08:41

## 2018-04-05 NOTE — PROGRESS NOTES
Care Management Interventions  PCP Verified by CM: Yes  Transition of Care Consult (CM Consult): Discharge Planning  Physical Therapy Consult: No  Occupational Therapy Consult: No  Current Support Network: Lives with Spouse  Confirm Follow Up Transport: Family  Plan discussed with Pt/Family/Caregiver: Yes  Freedom of Choice Offered: Yes  Discharge Location  Discharge Placement: Home     Dc to home today. Patient will follow up with hematology on an outpatient basis. No needs. Jerman Chadwick

## 2018-04-05 NOTE — PROGRESS NOTES
Discharge instructions and prescriptions provided and explained to patient, patient voiced understanding. Medication side effect sheet reviewed with pt. No home meds or valuables to return. Opportunity for questions provided. Instructed to call once ready to leave the floor. Primary RN to de access port.

## 2018-04-05 NOTE — DISCHARGE INSTRUCTIONS
Sickle Cell Crisis: Care Instructions  Your Care Instructions    Sickle cell crisis is a painful episode that may begin suddenly in a person with sickle cell disease. Sickle cell disease turns normal, round red blood cells into cells that look like kendall or crescent moons. The sickle-shaped cells can get stuck in blood vessels, blocking blood flow and causing severe pain. The pain can occur in the bones of the spine, the arms and legs, the chest, and the abdomen. An episode may be called a \"painful event\" or \"painful crisis. \" Some people who have sickle cell disease have many painful events, while others have few or none. Treatment depends on the level of pain and how long it lasts. Sometimes taking nonprescription pain relievers can help. Or you may need stronger pain relief medicine that is prescribed or given by a doctor. You may need to be treated in the hospital.  It isn't always possible to know what sets off a painful event. But triggers include being dehydrated, cold temperatures, infection, stress, and not getting enough oxygen. Follow-up care is a key part of your treatment and safety. Be sure to make and go to all appointments, and call your doctor if you are having problems. It's also a good idea to know your test results and keep a list of the medicines you take. How can you care for yourself at home? · Create a pain management plan with your doctor. This plan should include the types of medicines you can take and other actions you can take at home to relieve pain. · Drink plenty of fluids, enough so that your urine is light yellow or clear like water. If you have kidney, heart, or liver disease and have to limit fluids, talk with your doctor before you increase the amount of fluids you drink. · Take your medicines exactly as prescribed. Call your doctor if you think you are having a problem with your medicine. · Take pain medicines exactly as directed.   ¨ If the doctor gave you a prescription medicine for pain, take it as prescribed. ¨ If you are not taking a prescription pain medicine, ask your doctor if you can take an over-the-counter medicine. · Avoid alcohol. It can make you dehydrated. · Dress warmly in cold weather. The cold and windy weather can lead to severe pain. · Do not smoke. Smoking can reduce the amount of oxygen in your blood. · Get plenty of sleep. When should you call for help? Call 911 anytime you think you may need emergency care. For example, call if:  ? · You have symptoms of a severe problem from sickle cell. ? · You have symptoms of a stroke. These may include:  ¨ Sudden numbness, tingling, weakness, or loss of movement in your face, arm, or leg, especially on only one side of your body. ¨ Sudden vision changes. ¨ Sudden trouble speaking. ¨ Sudden confusion or trouble understanding simple statements. ¨ Sudden problems with walking or balance. ¨ A sudden, severe headache that is different from past headaches. ? · You are in severe pain. ? · You have symptoms of a heart attack. These may include:  ¨ Chest pain or pressure, or a strange feeling in the chest.  ¨ Sweating. ¨ Shortness of breath. ¨ Nausea or vomiting. ¨ Pain, pressure, or a strange feeling in the back, neck, jaw, or upper belly or in one or both shoulders or arms. ¨ Lightheadedness or sudden weakness. ¨ A fast or irregular heartbeat. After you call 911, the  may tell you to chew 1 adult-strength or 2 to 4 low-dose aspirin. Wait for an ambulance. Do not try to drive yourself. ?Call your doctor now or seek immediate medical care if:  ? · You have a fever. ? Watch closely for changes in your health, and be sure to contact your doctor if you have any problems. Where can you learn more? Go to http://socorro-rosita.info/. Enter F104 in the search box to learn more about \"Sickle Cell Crisis: Care Instructions. \"  Current as of: October 13, 2016  Content Version: 11.4  © 9077-0772 [employer]. Care instructions adapted under license by [employer] (which disclaims liability or warranty for this information). If you have questions about a medical condition or this instruction, always ask your healthcare professional. [address] any warranty or liability for your use of this information. DISCHARGE SUMMARY from Nurse    PATIENT INSTRUCTIONS:    After general anesthesia or intravenous sedation, for 24 hours or while taking prescription Narcotics:  · Limit your activities  · Do not drive and operate hazardous machinery  · Do not make important personal or business decisions  · Do  not drink alcoholic beverages  · If you have not urinated within 8 hours after discharge, please contact your surgeon on call. Report the following to your surgeon:  · Excessive pain, swelling, redness or odor of or around the surgical area  · Temperature over 100.5  · Nausea and vomiting lasting longer than 4 hours or if unable to take medications  · Any signs of decreased circulation or nerve impairment to extremity: change in color, persistent  numbness, tingling, coldness or increase pain  · Any questions    What to do at Home:  Recommended activity: Activity as tolerated,    If you experience any of the following symptoms fever, chills, new or unrelieved pain, nausea or vomiting, or any other worrisome symptoms, please follow up with PCP. *  Please give a list of your current medications to your Primary Care Provider. *  Please update this list whenever your medications are discontinued, doses are      changed, or new medications (including over-the-counter products) are added. *  Please carry medication information at all times in case of emergency situations.     These are general instructions for a healthy lifestyle:    No smoking/ No tobacco products/ Avoid exposure to second hand smoke  Surgeon General's Warning:  Quitting smoking now greatly reduces serious risk to your health. Obesity, smoking, and sedentary lifestyle greatly increases your risk for illness    A healthy diet, regular physical exercise & weight monitoring are important for maintaining a healthy lifestyle    You may be retaining fluid if you have a history of heart failure or if you experience any of the following symptoms:  Weight gain of 3 pounds or more overnight or 5 pounds in a week, increased swelling in our hands or feet or shortness of breath while lying flat in bed. Please call your doctor as soon as you notice any of these symptoms; do not wait until your next office visit. Recognize signs and symptoms of STROKE:    F-face looks uneven    A-arms unable to move or move unevenly    S-speech slurred or non-existent    T-time-call 911 as soon as signs and symptoms begin-DO NOT go       Back to bed or wait to see if you get better-TIME IS BRAIN. Warning Signs of HEART ATTACK     Call 911 if you have these symptoms:   Chest discomfort. Most heart attacks involve discomfort in the center of the chest that lasts more than a few minutes, or that goes away and comes back. It can feel like uncomfortable pressure, squeezing, fullness, or pain.  Discomfort in other areas of the upper body. Symptoms can include pain or discomfort in one or both arms, the back, neck, jaw, or stomach.  Shortness of breath with or without chest discomfort.  Other signs may include breaking out in a cold sweat, nausea, or lightheadedness. Don't wait more than five minutes to call 911 - MINUTES MATTER! Fast action can save your life. Calling 911 is almost always the fastest way to get lifesaving treatment. Emergency Medical Services staff can begin treatment when they arrive -- up to an hour sooner than if someone gets to the hospital by car. The discharge information has been reviewed with the patient. The patient verbalized understanding.   Discharge medications reviewed with the patient and appropriate educational materials and side effects teaching were provided.   ___________________________________________________________________________________________________________________________________

## 2018-04-05 NOTE — DISCHARGE SUMMARY
Hospitalist Discharge Summary     Admit Date:  2018  4:49 PM   Name:  Elvia Ashford   Age:  37 y.o.  :  1975   MRN:  [id number]   PCP:  Karen Michel MD  Treatment Team: Attending Provider: Timi Tobias MD; Utilization Review: Sosa Calloway; Care Manager: AMOR Neves    Problem List for this Hospitalization:  Hospital Problems as of 2018  Date Reviewed: 3/5/2012          Codes Class Noted - Resolved POA    Iron overload due to repeated red blood cell transfusions ICD-10-CM: E83.111  ICD-9-CM: 275.02  2017 - Present Yes        Sickle cell anemia (HCC) ICD-10-CM: D57.1  ICD-9-CM: 282.60  2016 - Present Yes        * (Principal)Sickle cell crisis ([facility] [address] ICD-10-CM: D57.00  ICD-9-CM: 282.62  2016 - Present Yes        Hemolytic crisis ([facility] [address] ICD-10-CM: D65  ICD-9-CM: 286.6  3/24/2012 - Present Yes    Overview Signed 3/24/2012  2:53 PM by [facility]     Sickle cell with anemia             HTN (hypertension) (Chronic) ICD-10-CM: I10  ICD-9-CM: 401.9  3/2/2012 - Present Yes                Admission HPI from 2018:    36 y/o AA gentleman with hx sickle cell disease presents back to the ED with symptoms of pain crisis. He was seen yesterday and received 1 unit PRBC and discharged home. Returns with severe pain in his legs from the knees down. Denies any fever, chills, cough or urinary symptoms. No chest pain or shortness of breath. Does not know what could have triggered this crisis. No N/V, diarrhea or abdominal pain. He received IV dilaudid in ED but still in pain. Hgb yesterday prior to transfusion was 6.4. Today it is 8.1. Follows with Hematology Dr Rosa Marino with [facility]. Will admit for further supportive treatment of acute pain crisis. Hospital Course:  Pt was continued on fluids,pain medications. Painmedications adjusted. Hematology consulted. Agreed with management and signed off. pt bilateral lower ext pain- over the anterior aspect resolved today. Hb stable. pt is stable for discharge. Follow up instructions below. Plan was discussed with patient. All questions answered. Patient was stable at time of discharge and was instructed to call or return if there are any concerns or recurrence of symptoms. Diagnostic Imaging/Tests:   No results found. Echocardiogram results:  No results found for this visit on 04/02/18.       All Micro Results     None          Labs: Results:       BMP, Mg, Phos Recent Labs      04/05/18   0440  04/04/18   0607  04/03/18   0503   NA  140  138  142   K  4.0  3.9  3.6   CL  108*  108*  111*   CO2  27  24  25   AGAP  5*  6*  6*   BUN  7  8  7   CREA  0.80  0.87  0.81   CA  8.1*  8.2*  8.1*   GLU  103*  118*  112*   MG   --    --   1.9   PHOS   --    --   3.2      CBC Recent Labs      04/05/18   0645  04/04/18   0607  04/03/18   0503  04/02/18   1741   WBC  7.1  8.1  8.0  7.9   RBC  2.19*  2.15*  2.27*  2.50*   HGB  7.2*  7.0*  7.4*  8.1*   HCT  21.6*  21.1*  21.8*  23.7*   PLT  196  187  174  188   GRANS   --   32*  43*  64   LYMPH   --   58*  55*  25   EOS   --   3   --    --    MONOS   --   7  2*  11*   ANEU   --   2.6  3.4  5.0   ABL   --   4.7*  4.4  2.0   TENZIN   --   0.2   --    --    ABM   --   0.6  0.2  0.9      LFT Recent Labs      04/03/18   0503  04/02/18   1741   SGOT  32  36   ALT  36  42   AP  63  71   TP  7.1  7.8   ALB  3.2*  3.6   GLOB  3.9*  4.2*   AGRAT  0.8*  0.9*      Cardiac Testing Lab Results   Component Value Date/Time     (H) 04/26/2012 03:17 PM    CK 39 01/17/2017 03:35 AM    CK 42 01/17/2017 01:30 AM    CK 41 01/16/2017 09:05 PM    CK - MB 0.6 01/17/2017 03:35 AM    CK - MB <0.5 (L) 01/17/2017 01:30 AM    CK - MB <0.5 (L) 01/16/2017 09:05 PM    CK-MB Index 1.5 01/17/2017 03:35 AM    CK-MB Index CANNOT BE CALCULATED 01/17/2017 01:30 AM    CK-MB Index CANNOT BE CALCULATED 01/16/2017 09:05 PM    Troponin-I <0.05 04/26/2012 03:17 PM    Troponin-I, Qt. <0.02 (L) 08/10/2017 04:36 PM    Troponin-I, Qt. <0.02 (L) 01/17/2017 03:35 AM    Troponin-I, Qt. <0.02 (L) 01/17/2017 01:30 AM      Coagulation Tests Lab Results   Component Value Date/Time    Prothrombin time 11.6 02/08/2015 12:05 PM    Prothrombin time 11.9 (H) 10/24/2012 02:45 PM    INR 1.1 02/08/2015 12:05 PM    INR 1.1 10/24/2012 02:45 PM    aPTT 26.0 10/24/2012 02:45 PM      A1c No results found for: HBA1C, HGBE8, NXV8XAUE   Lipid Panel No results found for: CHOL, CHOLPOCT, CHOLX, CHLST, CHOLV, 817867, HDL, LDL, LDLC, DLDLP, 063692, VLDLC, VLDL, TGLX, TRIGL, TRIGP, TGLPOCT, CHHD, CHHDX   Thyroid Panel No results found for: T4, T3U, TSH, TSHEXT     Most Recent UA Lab Results   Component Value Date/Time    Color YELLOW 04/02/2018 11:50 PM    Appearance CLEAR 04/02/2018 11:50 PM    Specific gravity 1.009 04/02/2018 11:50 PM    pH (UA) 7.0 04/02/2018 11:50 PM    Protein TRACE (A) 04/02/2018 11:50 PM    Glucose NEGATIVE  04/02/2018 11:50 PM    Ketone NEGATIVE  04/02/2018 11:50 PM    Bilirubin NEGATIVE  04/02/2018 11:50 PM    Blood TRACE (A) 04/02/2018 11:50 PM    Urobilinogen 0.2 04/02/2018 11:50 PM    Nitrites NEGATIVE  04/02/2018 11:50 PM    Leukocyte Esterase NEGATIVE  04/02/2018 11:50 PM        Allergies   Allergen Reactions    Compazine [Prochlorperazine Edisylate] Other (comments)     Also makes him feel funny    Morphine Other (comments)     Makes him feel funny    Reglan [Metoclopramide] Other (comments)     \"feel funny\"    Zofran [Ondansetron Hcl (Pf)] Other (comments)     Make him feel funny     Immunization History   Administered Date(s) Administered    Influenza Vaccine 09/10/2015    Influenza Vaccine Split 09/27/2012    ZZZ-RETIRED (DO NOT USE) Pneumococcal Vaccine (Unspecified Type) 09/21/2010       All Labs from Last 24 Hrs:  Recent Results (from the past 24 hour(s))   METABOLIC PANEL, BASIC    Collection Time: 04/05/18  4:40 AM   Result Value Ref Range    Sodium 140 136 - 145 mmol/L    Potassium 4.0 3.5 - 5.1 mmol/L    Chloride 108 (H) 98 - 107 mmol/L    CO2 27 21 - 32 mmol/L    Anion gap 5 (L) 7 - 16 mmol/L    Glucose 103 (H) 65 - 100 mg/dL    BUN 7 6 - 23 MG/DL    Creatinine 0.80 0.8 - 1.5 MG/DL    GFR est AA >60 >60 ml/min/1.73m2    GFR est non-AA >60 >60 ml/min/1.73m2    Calcium 8.1 (L) 8.3 - 10.4 MG/DL   CBC WITH AUTOMATED DIFF    Collection Time: 04/05/18  6:45 AM   Result Value Ref Range    WBC 7.1 4.3 - 11.1 K/uL    RBC 2.19 (L) 4.23 - 5.67 M/uL    HGB 7.2 (L) 13.6 - 17.2 g/dL    HCT 21.6 (L) 41.1 - 50.3 %    MCV 98.6 (H) 79.6 - 97.8 FL    MCH 32.9 26.1 - 32.9 PG    MCHC 33.3 31.4 - 35.0 g/dL    PLATELET 959 140 - 203 K/uL    MPV 11.2 10.8 - 14.1 FL    DF PENDING        Discharge Exam:  Patient Vitals for the past 24 hrs:   Temp Pulse Resp BP SpO2   04/05/18 0736 98.3 °F (36.8 °C) 75 18 122/70 97 %   04/05/18 0457 98.4 °F (36.9 °C) 73 18 133/65 100 %   04/04/18 2323 98.6 °F (37 °C) 69 18 125/74 98 %   04/04/18 2002 98.8 °F (37.1 °C) 73 18 121/86 99 %   04/04/18 1742 - 79 - 124/74 -   04/04/18 1422 98 °F (36.7 °C) 73 18 111/73 95 %   04/04/18 1116 98.5 °F (36.9 °C) 78 18 134/74 99 %     Oxygen Therapy  O2 Sat (%): 97 % (04/05/18 0736)  Pulse via Oximetry: 74 beats per minute (04/03/18 0736)  O2 Device: Room air (04/04/18 1422)    Intake/Output Summary (Last 24 hours) at 04/05/18 0940  Last data filed at 04/04/18 1422   Gross per 24 hour   Intake             1381 ml   Output                0 ml   Net             1381 ml       General:    Well nourished. Alert. No distress. Eyes:   Normal sclera. Extraocular movements intact. Pale pink conjuctiva  ENT:  Normocephalic, atraumatic. Moist mucous membranes  CV:   Regular rate and rhythm. No murmur, rub, or gallop. Lungs:  Clear to auscultation bilaterally. No wheezing, rhonchi, or rales. Abdomen: Soft, nontender, nondistended. Bowel sounds normal.   Extremities: Warm and dry. No cyanosis or edema. Neurologic: CN II-XII grossly intact. Sensation intact. Skin:     No rashes or jaundice. Psych:  Normal mood and affect. Discharge Info:   Current Discharge Medication List      CONTINUE these medications which have NOT CHANGED    Details   magnesium oxide (MAG-OX) 400 mg tablet Take 1 Tab by mouth daily. Qty: 10 Tab, Refills: 0      deferasirox (JADENU) 360 mg tab Take 5 Tabs by mouth daily. Qty: 150 Tab, Refills: 0      hydroxyurea (HYDREA) 500 mg capsule Take 500 mg by mouth two (2) times a day. Takes in am at 0900      promethazine (PHENERGAN) 25 mg tablet Take 1 Tab by mouth every six (6) hours as needed. May substitute suppository if vomiting  Qty: 20 Tab, Refills: 0      oxyCODONE IR (OXY-IR) 30 mg immediate release tablet Take 1 Tab by mouth every four (4) hours as needed for Pain. Max Daily Amount: 180 mg.  Qty: 12 Tab, Refills: 0      oxyCODONE ER (OXYCONTIN) 80 mg ER tablet Take 1 Tab by mouth every twelve (12) hours. Max Daily Amount: 160 mg.  Qty: 6 Tab, Refills: 0      metoprolol (LOPRESSOR) 25 mg tablet Take 25 mg by mouth two (2) times a day. Indications: HYPERTENSION      lisinopril (PRINIVIL, ZESTRIL) 5 mg tablet Take 5 mg by mouth daily. Indications: HYPERTENSION      folic acid (FOLVITE) 1 mg tablet Take 1 mg by mouth daily. Disposition: home  Activity: ADLIB  Diet: DIET REGULAR    Follow-up Appointments   Procedures    FOLLOW UP VISIT Appointment in: One Week F/u with hematologist in a week with cbc and bmp. F/u with hematologist in a week with cbc and bmp. Standing Status:   Standing     Number of Occurrences:   1     Order Specific Question:   Appointment in     Answer: One Week         Follow-up Information     Follow up With Details Comments [id number] [facility], [place]   [address] [facility]  [phone]            Time spent in patient discharge planning and coordination 25 minutes.     Signed:  Toño Tellez MD

## 2018-04-05 NOTE — PROGRESS NOTES
· Referral received from [facility] due to multiple ER visits   · Patient is currently a patient of [facility] hematology   · Will outreach for JOSÉ TREVIZO and CCM assessment needs tomorrow   · Will discuss coordinating care for patient for better continuity of care since patient is a current [facility] hematology patient  This note will not be viewable in [address]

## 2018-04-05 NOTE — PROGRESS NOTES
Hourly rounds performed on patient. Pt rested throughout the night. Pain managed with current pain medicine. No signs of respiratory distress noted. VSS. Pt needs met at this time.

## 2018-04-06 ENCOUNTER — PATIENT OUTREACH (OUTPATIENT)
Dept: CASE MANAGEMENT | Age: 43
End: 2018-04-06

## 2018-04-06 NOTE — PROGRESS NOTES
Transition of Care Discharge Follow-up Questionnaire   Date/Time of Call:   4/6/18 10:30am    What was the patient hospitalized for? Sickle cell crisis      Does the patient understand his/her diagnosis and/or treatment and what happened during the hospitalization? Yes  Patient has had this disease his whole life but didnt start having complications until his teen years      Did the patient receive discharge instructions? Yes    CM Assessed Risk for Readmission:       Patient stated Risk for Readmission:      Sickle cell crisis   Pain management from the disease process  Transfusions   The same as listed above  he understands the disease process  majority of the time when he phones his [facility] hematologist for issues they refer him to the ER    Review any discharge instructions (see discharge instructions/AVS in [facility]). Ask patient if they understand these. Do they have any questions? Reviewed and patient verbalized understanding    Were home services ordered (nursing, PT, OT, ST, etc.)? No    If so, has the first visit occurred? If not, why? (Assist with coordination of services if necessary.)   n/a    Was any DME ordered? No      If so, has it been received? If not, why?  (Assist patient in obtaining DME orders &/or equipment if necessary.) n/a      Complete a review of all medications (new, continued and discontinued meds per the D/C instructions and medication tab in [facility]). Reviewed and patient verbalized understanding of dosage and medication administration      Were all new prescriptions filled? If not, why?  (Assist patient in obtaining medications if necessary  escalate for CCM &/or SW if ongoing issues are verbalized by pt or anticipated)     Yes        Does the patient understand the purpose and dosing instructions for all medications?   (If patient has questions, provide explanation and education.)   Yes   Denies any issues or side effects at this time    Does the patient have any problems in performing ADLs? (If patient is unable to perform ADLs  what is the limiting factor(s)? Do they have a support system that can assist? If no support system is present, discuss possible assistance that they may be able to obtain. Escalate for CCM/SW if ongoing issues are verbalized by pt or anticipated)     Independent with all ADLs   Good support system            Does the patient have all follow-up appointments scheduled? 7 day f/up with PCP?   (f/up with PCP may be w/in 14 days if patient has a f/up with their specialist w/in 7 days)    7-14 day f/up with specialist?   (or per discharge instructions)    If f/up has not been made  what actions has the care coordinator made to accomplish this? Has transportation been arranged? Yes       Yes Dr. Neo Justin in [facility] 4/10/18        Hematologist at [facility] next week              Yes, family transports      Any other questions or concerns expressed by the patient? Denies any issues or questions at this time   I asked patient if he was happy with current hematologist or would he want to transfer to a Jannette Garcia hematologist to assist with better continuity of care [place] [facility] . Stated that he was happy with his current hematologist and utilizes [facility] due to demographics and [place]. Schedule next appointment with JOSÉ TREVIZO Coordinator or refer to RN Case Manager/ per the workflow guidelines. When is care coordinators next follow-up call scheduled? If referred for CCM  what RN care manager was the referral assigned? Will follow up with patient next week             In one week      LALY Call Completed By:   Olga Smith RN, BSN, CCM /   c: [phone] / [email or web address]           This note will not be viewable in [address]

## 2018-04-13 ENCOUNTER — PATIENT OUTREACH (OUTPATIENT)
Dept: CASE MANAGEMENT | Age: 43
End: 2018-04-13

## 2018-04-13 NOTE — PROGRESS NOTES
Community Care Management  Follow up Outreach Note   Outreach type: Phone call:  4/13/18 @ 10:00 am     Home visit:         Reason for follow-up:   Sickle cell crisis  Multiple ER visits     Disease specific complaints/issues:   Sickle cell crisis      Patient progress towards goals set from last contact:   patient followed up with hematologist on 4/9/18  patient states that he is feeling better s/p transfusion   CM Assessed Risk for Readmission:       Patient stated Risk for Readmission:   Sickle cell crisis   Pain management from the disease process  Transfusions       The same as listed above  he understands the disease process  majority of the time when he phones his [facility] hematologist for issues they refer him to the ER   Has patient attended any PCP or specialist follow-up appointments since last contact? What was outcome of appointment? When is next follow-up scheduled? Yes 4/9/18 with hematologist          Review medications. Any medication changes since last outreach? Does patient have any questions or issues related to their medications? No changes since last med review         Verbalizes understanding of his medication regimen    Home health active? If yes  any issue? Progress? no     Referrals needed?  (SW, Diabetes education, HH, etc. ) Not at this time    Other issues/Miscellaneous? (Transportation, access to meals, ability to perform ADLs, adequate caregiver support, etc.) Hematologist is a [facility] physician    Next Outreach Scheduled: In one week      Next Steps/Goals:   f/u with CCM in one week      [address]:   Maricruz Felix RN, BSN, CCM /   c: [phone] / [email or web address]     This note will not be viewable in [address]

## 2018-04-20 ENCOUNTER — PATIENT OUTREACH (OUTPATIENT)
Dept: CASE MANAGEMENT | Age: 43
End: 2018-04-20

## 2018-04-20 NOTE — PROGRESS NOTES
Community Care Management  Follow up Outreach Note   Outreach type: Phone call:  4/20/18 @ 11:15 am      Home visit:            Reason for follow-up: Sickle cell crisis  Multiple ER visits     Disease specific complaints/issues:    Sickle cell crisis       Patient progress towards goals set from last contact: patient followed up with hematologist on 4/9/18  patient states that he is feeling better s/p transfusion   CM Assessed Risk for Readmission:         Patient stated Risk for Readmission: Sickle cell crisis   Pain management from the disease process  Transfusions         The same as listed above  he understands the disease process  majority of the time when he phones his [facility] hematologist for issues they refer him to the ER   Has patient attended any PCP or specialist follow-up appointments since last contact?     What was outcome of appointment?     When is next follow-up scheduled? Yes 4/9/18 with hematologist          Review medications.     Any medication changes since last outreach?     Does patient have any questions or issues related to their medications? No changes since last med review            Verbalizes understanding of his medication regimen    Home health active? If yes  any issue? Progress? no     Referrals needed?  (SW, Diabetes education, HH, etc. ) Not at this time    Other issues/Miscellaneous? (Transportation, access to meals, ability to perform ADLs, adequate caregiver support, etc.) Hematologist is a [facility] physician    Next Outreach Scheduled: In 2 weeks       Next Steps/Goals: f/u with CCM in 2 weeks  [address] Manager:   Tamara Muir RN, BSN, CCM /   c: [phone] / [email or web address]     This note will not be viewable in [address]

## 2018-04-29 ENCOUNTER — HOSPITAL ENCOUNTER (EMERGENCY)
Age: 43
Discharge: HOME | DRG: 812 | End: 2018-04-29
Attending: EMERGENCY MEDICINE
Payer: MEDICARE

## 2018-04-29 ENCOUNTER — APPOINTMENT (OUTPATIENT)
Dept: ULTRASOUND IMAGING | Age: 43
DRG: 812 | End: 2018-04-29
Attending: EMERGENCY MEDICINE
Payer: MEDICARE

## 2018-04-29 VITALS
DIASTOLIC BLOOD PRESSURE: 82 MMHG | SYSTOLIC BLOOD PRESSURE: 144 MMHG | HEART RATE: 66 BPM | HEIGHT: 70 IN | BODY MASS INDEX: 22.05 KG/M2 | RESPIRATION RATE: 16 BRPM | WEIGHT: 154 LBS | OXYGEN SATURATION: 99 % | TEMPERATURE: 99.3 F

## 2018-04-29 DIAGNOSIS — D57.00 SICKLE CELL CRISIS (HCC): Primary | ICD-10-CM

## 2018-04-29 LAB
ALBUMIN SERPL-MCNC: 3.5 G/DL (ref 3.5–5)
ALBUMIN/GLOB SERPL: 0.8 {RATIO} (ref 1.2–3.5)
ALP SERPL-CCNC: 89 U/L (ref 50–136)
ALT SERPL-CCNC: 72 U/L (ref 12–65)
ANION GAP SERPL CALC-SCNC: 8 MMOL/L (ref 7–16)
AST SERPL-CCNC: 45 U/L (ref 15–37)
BASOPHILS # BLD: 0 K/UL (ref 0–0.2)
BASOPHILS NFR BLD: 1 % (ref 0–2)
BILIRUB SERPL-MCNC: 0.6 MG/DL (ref 0.2–1.1)
BUN SERPL-MCNC: 10 MG/DL (ref 6–23)
CALCIUM SERPL-MCNC: 8.4 MG/DL (ref 8.3–10.4)
CHLORIDE SERPL-SCNC: 107 MMOL/L (ref 98–107)
CO2 SERPL-SCNC: 23 MMOL/L (ref 21–32)
CREAT SERPL-MCNC: 0.82 MG/DL (ref 0.8–1.5)
DIFFERENTIAL METHOD BLD: ABNORMAL
EOSINOPHIL # BLD: 0 K/UL (ref 0–0.8)
EOSINOPHIL NFR BLD: 0 % (ref 0.5–7.8)
ERYTHROCYTE [DISTWIDTH] IN BLOOD BY AUTOMATED COUNT: 24.3 % (ref 11.9–14.6)
GLOBULIN SER CALC-MCNC: 4.5 G/DL (ref 2.3–3.5)
GLUCOSE SERPL-MCNC: 98 MG/DL (ref 65–100)
HCT VFR BLD AUTO: 25.2 % (ref 41.1–50.3)
HGB BLD-MCNC: 8.5 G/DL (ref 13.6–17.2)
HGB RETIC QN AUTO: 29 PG (ref 29–35)
IMM GRANULOCYTES # BLD: 0 K/UL (ref 0–0.5)
IMM GRANULOCYTES NFR BLD AUTO: 2 % (ref 0–5)
IMM RETICS NFR: 0 % (ref 2.3–13.4)
LYMPHOCYTES # BLD: 1.2 K/UL (ref 0.5–4.6)
LYMPHOCYTES NFR BLD: 56 % (ref 13–44)
MCH RBC QN AUTO: 30.5 PG (ref 26.1–32.9)
MCHC RBC AUTO-ENTMCNC: 33.7 G/DL (ref 31.4–35)
MCV RBC AUTO: 90.3 FL (ref 79.6–97.8)
MONOCYTES # BLD: 0 K/UL (ref 0.1–1.3)
MONOCYTES NFR BLD: 2 % (ref 4–12)
NEUTS SEG # BLD: 0.9 K/UL (ref 1.7–8.2)
NEUTS SEG NFR BLD: 41 % (ref 43–78)
PLATELET # BLD AUTO: 39 K/UL (ref 150–450)
PLATELET COMMENTS,PCOM: ABNORMAL
PMV BLD AUTO: ABNORMAL FL (ref 10.8–14.1)
POTASSIUM SERPL-SCNC: 4.6 MMOL/L (ref 3.5–5.1)
PROT SERPL-MCNC: 8 G/DL (ref 6.3–8.2)
RBC # BLD AUTO: 2.79 M/UL (ref 4.23–5.67)
RBC MORPH BLD: ABNORMAL
RETICS # AUTO: 0.03 M/UL (ref 0.03–0.1)
RETICS/RBC NFR AUTO: 0.9 % (ref 0.3–2)
SODIUM SERPL-SCNC: 138 MMOL/L (ref 136–145)
URATE SERPL-MCNC: 3.2 MG/DL (ref 2.6–6)
WBC # BLD AUTO: 2.2 K/UL (ref 4.3–11.1)
WBC MORPH BLD: ABNORMAL

## 2018-04-29 PROCEDURE — 96376 TX/PRO/DX INJ SAME DRUG ADON: CPT | Performed by: EMERGENCY MEDICINE

## 2018-04-29 PROCEDURE — 96374 THER/PROPH/DIAG INJ IV PUSH: CPT | Performed by: EMERGENCY MEDICINE

## 2018-04-29 PROCEDURE — 84550 ASSAY OF BLOOD/URIC ACID: CPT | Performed by: EMERGENCY MEDICINE

## 2018-04-29 PROCEDURE — 93971 EXTREMITY STUDY: CPT

## 2018-04-29 PROCEDURE — 96375 TX/PRO/DX INJ NEW DRUG ADDON: CPT | Performed by: EMERGENCY MEDICINE

## 2018-04-29 PROCEDURE — 99284 EMERGENCY DEPT VISIT MOD MDM: CPT | Performed by: EMERGENCY MEDICINE

## 2018-04-29 PROCEDURE — 80053 COMPREHEN METABOLIC PANEL: CPT | Performed by: EMERGENCY MEDICINE

## 2018-04-29 PROCEDURE — 85046 RETICYTE/HGB CONCENTRATE: CPT | Performed by: EMERGENCY MEDICINE

## 2018-04-29 PROCEDURE — 74011000250 HC RX REV CODE- 250: Performed by: EMERGENCY MEDICINE

## 2018-04-29 PROCEDURE — 77030003560 HC NDL HUBR BARD -A

## 2018-04-29 PROCEDURE — 74011250636 HC RX REV CODE- 250/636: Performed by: EMERGENCY MEDICINE

## 2018-04-29 PROCEDURE — 85025 COMPLETE CBC W/AUTO DIFF WBC: CPT | Performed by: EMERGENCY MEDICINE

## 2018-04-29 PROCEDURE — 96361 HYDRATE IV INFUSION ADD-ON: CPT | Performed by: EMERGENCY MEDICINE

## 2018-04-29 RX ORDER — HYDROMORPHONE HYDROCHLORIDE 2 MG/ML
1.5 INJECTION, SOLUTION INTRAMUSCULAR; INTRAVENOUS; SUBCUTANEOUS
Status: COMPLETED | OUTPATIENT
Start: 2018-04-29 | End: 2018-04-29

## 2018-04-29 RX ORDER — HYDROMORPHONE HYDROCHLORIDE 2 MG/ML
1 INJECTION, SOLUTION INTRAMUSCULAR; INTRAVENOUS; SUBCUTANEOUS
Status: COMPLETED | OUTPATIENT
Start: 2018-04-29 | End: 2018-04-29

## 2018-04-29 RX ORDER — DIPHENHYDRAMINE HYDROCHLORIDE 50 MG/ML
25 INJECTION, SOLUTION INTRAMUSCULAR; INTRAVENOUS
Status: COMPLETED | OUTPATIENT
Start: 2018-04-29 | End: 2018-04-29

## 2018-04-29 RX ADMIN — DIPHENHYDRAMINE HYDROCHLORIDE 25 MG: 50 INJECTION, SOLUTION INTRAMUSCULAR; INTRAVENOUS at 17:42

## 2018-04-29 RX ADMIN — SODIUM CHLORIDE 1000 ML: 900 INJECTION, SOLUTION INTRAVENOUS at 17:38

## 2018-04-29 RX ADMIN — HYDROMORPHONE HYDROCHLORIDE 1.5 MG: 2 INJECTION, SOLUTION INTRAMUSCULAR; INTRAVENOUS; SUBCUTANEOUS at 18:51

## 2018-04-29 RX ADMIN — SODIUM CHLORIDE 1000 ML: 900 INJECTION, SOLUTION INTRAVENOUS at 19:11

## 2018-04-29 RX ADMIN — HYDROMORPHONE HYDROCHLORIDE 1.5 MG: 2 INJECTION, SOLUTION INTRAMUSCULAR; INTRAVENOUS; SUBCUTANEOUS at 20:31

## 2018-04-29 RX ADMIN — SODIUM CHLORIDE 12.5 MG: 9 INJECTION INTRAMUSCULAR; INTRAVENOUS; SUBCUTANEOUS at 17:37

## 2018-04-29 RX ADMIN — HYDROMORPHONE HYDROCHLORIDE 1 MG: 2 INJECTION, SOLUTION INTRAMUSCULAR; INTRAVENOUS; SUBCUTANEOUS at 17:05

## 2018-04-29 RX ADMIN — HEPARIN SODIUM 300 UNITS: 10000 INJECTION, SOLUTION INTRAVENOUS at 21:25

## 2018-04-29 NOTE — ED PROVIDER NOTES
HPI Comments: Here with sicle cell pain crisis to mainly right leggot 3 units of blood in last 7-10 days. Uses oxycod for baseline pain control. No gi or  changes. No cough or sputum. also with swelling (slight ) to left ankle. No recall of injury or similar. No hx gout. Sees Dr Karyle Broody.  '      Patient is a 37 y.o. male presenting with sickle cell disease. The history is provided by the patient and a relative. Sickle Cell Crisis    This is a recurrent problem. The current episode started yesterday. The problem has been gradually worsening. The quality of the pain is described as aching. Radiates to: right leg. The pain is moderate. Pertinent negatives include no chest pain, no fever, no abdominal pain and no weakness. Past Medical History:   Diagnosis Date    Chronic pain     HTN (hypertension)     Ill-defined condition     sickle cell    Iron overload due to repeated red blood cell transfusions 1/18/2017    Paroxysmal SVT [facility]ra[facility]) ([facility]) 7/9/2016    Sickle cell disease ([facility] [address]        Past Surgical History:   Procedure Laterality Date    HC PENILE IMPL DURA II POSITINBLE      HC PORT LIFE SNGLE LUMEN 5013      to L CW    HX CHOLECYSTECTOMY      HX OTHER SURGICAL      penile inplant    HX VASCULAR ACCESS           Family History:   Problem Relation Age of Onset    Hypertension Other     Diabetes Father     Stroke Father     Sickle Cell Anemia Sister        Social History     Social History    Marital status:      Spouse name: N/A    Number of children: N/A    Years of education: N/A     Occupational History    Not on file. Social History Main Topics    Smoking status: Never Smoker    Smokeless tobacco: Never Used    Alcohol use No    Drug use: No    Sexual activity: Yes     Other Topics Concern    Not on file     Social History Narrative         ALLERGIES: Compazine [prochlorperazine edisylate];  Morphine; Reglan [metoclopramide]; and Zofran [ondansetron hcl (pf)]    Review of Systems   Constitutional: Negative for chills, diaphoresis and fever. HENT: Negative. Respiratory: Negative for cough and shortness of breath. Cardiovascular: Negative for chest pain. Gastrointestinal: Negative for abdominal distention, abdominal pain, nausea and vomiting. Musculoskeletal: Positive for arthralgias. Skin: Negative for color change, pallor and wound. Neurological: Negative for weakness. Psychiatric/Behavioral: Negative for confusion and decreased concentration. All other systems reviewed and are negative. Vitals:    04/29/18 1424   BP: 157/89   Pulse: 72   Resp: 18   Temp: 99.3 °F (37.4 °C)   SpO2: 97%   Weight: 69.9 kg (154 lb)   Height: 5' 10\" (1.778 m)            Physical Exam   Constitutional: He appears well-developed and well-nourished. No distress. HENT:   Head: Atraumatic. Mouth/Throat: Oropharynx is clear and moist.   Eyes: EOM are normal. Pupils are equal, round, and reactive to light. Neck: Neck supple. Cardiovascular: Normal rate, regular rhythm and intact distal pulses. Pulmonary/Chest: Effort normal. No respiratory distress. He has no wheezes. He has no rales. Abdominal: Soft. There is no tenderness. There is no rebound. Musculoskeletal: He exhibits tenderness. right lower leg where sickle cell type pain is normal to exam. Very slight puffiness to ankle   Neurological: He is alert. Skin: Skin is warm and dry. He is not diaphoretic. Psychiatric: His behavior is normal. Thought content normal.   Flat affect   Nursing note and vitals reviewed. MDM  Number of Diagnoses or Management Options  Sickle cell crisis [facility]):   Diagnosis management comments: Will hydrate and check labs.  In ER labs stable for patient and after fluids/meds feels can give trial at home       Amount and/or Complexity of Data Reviewed  Clinical lab tests: ordered and reviewed  Decide to obtain previous medical records or to obtain history from someone other than the patient: yes  Obtain history from someone other than the patient: yes    Risk of Complications, Morbidity, and/or Mortality  Presenting problems: moderate  Diagnostic procedures: low  Management options: moderate    Patient Progress  Patient progress: improved        ED Course       Procedures

## 2018-04-29 NOTE — ED NOTES
Patient has returned from CT and appears in no acute distress. Family at bedside. Will continue to monitor.

## 2018-04-30 NOTE — ED NOTES
I have reviewed discharge instructions with the patient. The patient verbalized understanding. Patient left ED via Discharge Method: ambulatory to Home with family). Opportunity for questions and clarification provided. Patient given 0 scripts. To continue your aftercare when you leave the hospital, you may receive an automated call from our care team to check in on how you are doing. This is a free service and part of our promise to provide the best care and service to meet your aftercare needs.  If you have questions, or wish to unsubscribe from this service please call [phone]. Thank you for Choosing our [facility] Emergency Department.

## 2018-05-01 ENCOUNTER — HOSPITAL ENCOUNTER (INPATIENT)
Age: 43
LOS: 2 days | Discharge: HOME | DRG: 812 | End: 2018-05-03
Admitting: HOSPITALIST
Payer: MEDICARE

## 2018-05-01 ENCOUNTER — PATIENT OUTREACH (OUTPATIENT)
Dept: CASE MANAGEMENT | Age: 43
End: 2018-05-01

## 2018-05-01 ENCOUNTER — APPOINTMENT (OUTPATIENT)
Dept: GENERAL RADIOLOGY | Age: 43
DRG: 812 | End: 2018-05-01
Attending: HOSPITALIST
Payer: MEDICARE

## 2018-05-01 DIAGNOSIS — R52 INTRACTABLE PAIN: ICD-10-CM

## 2018-05-01 DIAGNOSIS — D70.2 OTHER DRUG-INDUCED NEUTROPENIA (HCC): ICD-10-CM

## 2018-05-01 DIAGNOSIS — D69.6 THROMBOCYTOPENIA (HCC): ICD-10-CM

## 2018-05-01 DIAGNOSIS — D57.00 HB-SS DISEASE WITH CRISIS (HCC): Primary | ICD-10-CM

## 2018-05-01 PROBLEM — R53.1 GENERALIZED WEAKNESS: Status: ACTIVE | Noted: 2018-05-01

## 2018-05-01 PROBLEM — E86.0 DEHYDRATION: Status: ACTIVE | Noted: 2018-05-01

## 2018-05-01 LAB
ALBUMIN SERPL-MCNC: 3.7 G/DL (ref 3.5–5)
ALBUMIN/GLOB SERPL: 0.8 {RATIO} (ref 1.2–3.5)
ALP SERPL-CCNC: 97 U/L (ref 50–136)
ALT SERPL-CCNC: 76 U/L (ref 12–65)
ANION GAP SERPL CALC-SCNC: 7 MMOL/L (ref 7–16)
AST SERPL-CCNC: 40 U/L (ref 15–37)
BASOPHILS # BLD: 0 K/UL (ref 0–0.2)
BASOPHILS NFR BLD: 1 % (ref 0–2)
BILIRUB SERPL-MCNC: 0.5 MG/DL (ref 0.2–1.1)
BUN SERPL-MCNC: 10 MG/DL (ref 6–23)
CALCIUM SERPL-MCNC: 8.8 MG/DL (ref 8.3–10.4)
CHLORIDE SERPL-SCNC: 107 MMOL/L (ref 98–107)
CO2 SERPL-SCNC: 25 MMOL/L (ref 21–32)
CREAT SERPL-MCNC: 0.91 MG/DL (ref 0.8–1.5)
DIFFERENTIAL METHOD BLD: ABNORMAL
EOSINOPHIL # BLD: 0 K/UL (ref 0–0.8)
EOSINOPHIL NFR BLD: 0 % (ref 0.5–7.8)
ERYTHROCYTE [DISTWIDTH] IN BLOOD BY AUTOMATED COUNT: 24.1 % (ref 11.9–14.6)
GLOBULIN SER CALC-MCNC: 4.9 G/DL (ref 2.3–3.5)
GLUCOSE SERPL-MCNC: 158 MG/DL (ref 65–100)
HAPTOGLOB SERPL-MCNC: <8 MG/DL (ref 30–200)
HCT VFR BLD AUTO: 25.9 % (ref 41.1–50.3)
HGB BLD-MCNC: 8.7 G/DL (ref 13.6–17.2)
HGB RETIC QN AUTO: 33 PG (ref 29–35)
IMM GRANULOCYTES # BLD: 0 K/UL (ref 0–0.5)
IMM GRANULOCYTES NFR BLD AUTO: 0 % (ref 0–5)
IMM RETICS NFR: 1.7 % (ref 2.3–13.4)
LDH SERPL L TO P-CCNC: 304 U/L (ref 100–190)
LYMPHOCYTES # BLD: 0.9 K/UL (ref 0.5–4.6)
LYMPHOCYTES NFR BLD: 68 % (ref 13–44)
MCH RBC QN AUTO: 30.3 PG (ref 26.1–32.9)
MCHC RBC AUTO-ENTMCNC: 33.6 G/DL (ref 31.4–35)
MCV RBC AUTO: 90.2 FL (ref 79.6–97.8)
MONOCYTES # BLD: 0.1 K/UL (ref 0.1–1.3)
MONOCYTES NFR BLD: 6 % (ref 4–12)
NEUTS SEG # BLD: 0.3 K/UL (ref 1.7–8.2)
NEUTS SEG NFR BLD: 25 % (ref 43–78)
PLATELET # BLD AUTO: 42 K/UL (ref 150–450)
PMV BLD AUTO: ABNORMAL FL (ref 10.8–14.1)
POTASSIUM SERPL-SCNC: 3.4 MMOL/L (ref 3.5–5.1)
PROT SERPL-MCNC: 8.6 G/DL (ref 6.3–8.2)
RBC # BLD AUTO: 2.87 M/UL (ref 4.23–5.67)
RETICS # AUTO: 0.01 M/UL (ref 0.03–0.1)
RETICS/RBC NFR AUTO: 0.5 % (ref 0.3–2)
SODIUM SERPL-SCNC: 139 MMOL/L (ref 136–145)
WBC # BLD AUTO: 1.4 K/UL (ref 4.3–11.1)

## 2018-05-01 PROCEDURE — 71046 X-RAY EXAM CHEST 2 VIEWS: CPT

## 2018-05-01 PROCEDURE — 80053 COMPREHEN METABOLIC PANEL: CPT

## 2018-05-01 PROCEDURE — 83615 LACTATE (LD) (LDH) ENZYME: CPT | Performed by: HOSPITALIST

## 2018-05-01 PROCEDURE — 85046 RETICYTE/HGB CONCENTRATE: CPT

## 2018-05-01 PROCEDURE — 96374 THER/PROPH/DIAG INJ IV PUSH: CPT

## 2018-05-01 PROCEDURE — 85025 COMPLETE CBC W/AUTO DIFF WBC: CPT

## 2018-05-01 PROCEDURE — 74011000250 HC RX REV CODE- 250

## 2018-05-01 PROCEDURE — 83010 ASSAY OF HAPTOGLOBIN QUANT: CPT | Performed by: HOSPITALIST

## 2018-05-01 PROCEDURE — 99284 EMERGENCY DEPT VISIT MOD MDM: CPT

## 2018-05-01 PROCEDURE — 96375 TX/PRO/DX INJ NEW DRUG ADDON: CPT

## 2018-05-01 PROCEDURE — 74011000250 HC RX REV CODE- 250: Performed by: HOSPITALIST

## 2018-05-01 PROCEDURE — 65270000029 HC RM PRIVATE

## 2018-05-01 PROCEDURE — 74011250636 HC RX REV CODE- 250/636: Performed by: INTERNAL MEDICINE

## 2018-05-01 PROCEDURE — 74011250637 HC RX REV CODE- 250/637: Performed by: HOSPITALIST

## 2018-05-01 PROCEDURE — 74011250636 HC RX REV CODE- 250/636: Performed by: HOSPITALIST

## 2018-05-01 PROCEDURE — 96361 HYDRATE IV INFUSION ADD-ON: CPT

## 2018-05-01 PROCEDURE — 74011250636 HC RX REV CODE- 250/636

## 2018-05-01 PROCEDURE — 96376 TX/PRO/DX INJ SAME DRUG ADON: CPT

## 2018-05-01 RX ORDER — HYDROMORPHONE HYDROCHLORIDE 2 MG/ML
1 INJECTION, SOLUTION INTRAMUSCULAR; INTRAVENOUS; SUBCUTANEOUS
Status: COMPLETED | OUTPATIENT
Start: 2018-05-01 | End: 2018-05-01

## 2018-05-01 RX ORDER — ENOXAPARIN SODIUM 100 MG/ML
30 INJECTION SUBCUTANEOUS EVERY 24 HOURS
Status: DISCONTINUED | OUTPATIENT
Start: 2018-05-02 | End: 2018-05-03 | Stop reason: HOSPADM

## 2018-05-01 RX ORDER — HYDRALAZINE HYDROCHLORIDE 20 MG/ML
20 INJECTION INTRAMUSCULAR; INTRAVENOUS
Status: DISCONTINUED | OUTPATIENT
Start: 2018-05-01 | End: 2018-05-03 | Stop reason: HOSPADM

## 2018-05-01 RX ORDER — ACETAMINOPHEN 325 MG/1
650 TABLET ORAL
Status: DISCONTINUED | OUTPATIENT
Start: 2018-05-01 | End: 2018-05-03 | Stop reason: HOSPADM

## 2018-05-01 RX ORDER — POTASSIUM CHLORIDE 14.9 MG/ML
20 INJECTION INTRAVENOUS
Status: DISCONTINUED | OUTPATIENT
Start: 2018-05-01 | End: 2018-05-01 | Stop reason: SDUPTHER

## 2018-05-01 RX ORDER — HYDROMORPHONE HYDROCHLORIDE 2 MG/ML
2 INJECTION, SOLUTION INTRAMUSCULAR; INTRAVENOUS; SUBCUTANEOUS
Status: DISCONTINUED | OUTPATIENT
Start: 2018-05-01 | End: 2018-05-03

## 2018-05-01 RX ORDER — METOPROLOL TARTRATE 25 MG/1
25 TABLET, FILM COATED ORAL 2 TIMES DAILY
Status: DISCONTINUED | OUTPATIENT
Start: 2018-05-01 | End: 2018-05-03 | Stop reason: HOSPADM

## 2018-05-01 RX ORDER — DIPHENHYDRAMINE HYDROCHLORIDE 50 MG/ML
12.5 INJECTION, SOLUTION INTRAMUSCULAR; INTRAVENOUS
Status: DISCONTINUED | OUTPATIENT
Start: 2018-05-01 | End: 2018-05-03 | Stop reason: HOSPADM

## 2018-05-01 RX ORDER — OXYCODONE HYDROCHLORIDE 80 MG/1
80 TABLET, FILM COATED, EXTENDED RELEASE ORAL EVERY 12 HOURS
Status: DISCONTINUED | OUTPATIENT
Start: 2018-05-01 | End: 2018-05-03 | Stop reason: HOSPADM

## 2018-05-01 RX ORDER — SODIUM CHLORIDE 0.9 % (FLUSH) 0.9 %
5-10 SYRINGE (ML) INJECTION AS NEEDED
Status: DISCONTINUED | OUTPATIENT
Start: 2018-05-01 | End: 2018-05-03 | Stop reason: HOSPADM

## 2018-05-01 RX ORDER — SODIUM CHLORIDE 0.9 % (FLUSH) 0.9 %
5-10 SYRINGE (ML) INJECTION EVERY 8 HOURS
Status: DISCONTINUED | OUTPATIENT
Start: 2018-05-01 | End: 2018-05-03 | Stop reason: HOSPADM

## 2018-05-01 RX ORDER — KETOROLAC TROMETHAMINE 30 MG/ML
30 INJECTION, SOLUTION INTRAMUSCULAR; INTRAVENOUS
Status: DISCONTINUED | OUTPATIENT
Start: 2018-05-01 | End: 2018-05-01 | Stop reason: SDUPTHER

## 2018-05-01 RX ORDER — KETOROLAC TROMETHAMINE 30 MG/ML
30 INJECTION, SOLUTION INTRAMUSCULAR; INTRAVENOUS
Status: COMPLETED | OUTPATIENT
Start: 2018-05-01 | End: 2018-05-01

## 2018-05-01 RX ORDER — POTASSIUM CHLORIDE 20 MEQ/1
40 TABLET, EXTENDED RELEASE ORAL 2 TIMES DAILY
Status: COMPLETED | OUTPATIENT
Start: 2018-05-01 | End: 2018-05-02

## 2018-05-01 RX ORDER — OXYCODONE HYDROCHLORIDE 15 MG/1
30 TABLET ORAL
Status: DISCONTINUED | OUTPATIENT
Start: 2018-05-01 | End: 2018-05-03 | Stop reason: HOSPADM

## 2018-05-01 RX ORDER — POTASSIUM CHLORIDE 14.9 MG/ML
20 INJECTION INTRAVENOUS ONCE
Status: COMPLETED | OUTPATIENT
Start: 2018-05-01 | End: 2018-05-01

## 2018-05-01 RX ORDER — ENOXAPARIN SODIUM 100 MG/ML
30 INJECTION SUBCUTANEOUS EVERY 24 HOURS
Status: DISCONTINUED | OUTPATIENT
Start: 2018-05-01 | End: 2018-05-01

## 2018-05-01 RX ORDER — FOLIC ACID 1 MG/1
1 TABLET ORAL DAILY
Status: DISCONTINUED | OUTPATIENT
Start: 2018-05-01 | End: 2018-05-03 | Stop reason: HOSPADM

## 2018-05-01 RX ORDER — POTASSIUM CHLORIDE 14.9 MG/ML
20 INJECTION INTRAVENOUS
Status: DISPENSED | OUTPATIENT
Start: 2018-05-01 | End: 2018-05-01

## 2018-05-01 RX ORDER — SODIUM CHLORIDE 9 MG/ML
125 INJECTION, SOLUTION INTRAVENOUS CONTINUOUS
Status: DISCONTINUED | OUTPATIENT
Start: 2018-05-01 | End: 2018-05-02

## 2018-05-01 RX ORDER — NALOXONE HYDROCHLORIDE 0.4 MG/ML
0.4 INJECTION, SOLUTION INTRAMUSCULAR; INTRAVENOUS; SUBCUTANEOUS AS NEEDED
Status: DISCONTINUED | OUTPATIENT
Start: 2018-05-01 | End: 2018-05-03 | Stop reason: HOSPADM

## 2018-05-01 RX ORDER — LANOLIN ALCOHOL/MO/W.PET/CERES
400 CREAM (GRAM) TOPICAL DAILY
Status: DISCONTINUED | OUTPATIENT
Start: 2018-05-01 | End: 2018-05-03 | Stop reason: HOSPADM

## 2018-05-01 RX ORDER — HYDROXYUREA 500 MG/1
500 CAPSULE ORAL 2 TIMES DAILY
Status: DISCONTINUED | OUTPATIENT
Start: 2018-05-01 | End: 2018-05-01 | Stop reason: SINTOL

## 2018-05-01 RX ORDER — ADHESIVE BANDAGE
30 BANDAGE TOPICAL DAILY PRN
Status: DISCONTINUED | OUTPATIENT
Start: 2018-05-01 | End: 2018-05-03 | Stop reason: HOSPADM

## 2018-05-01 RX ORDER — DEFERASIROX 360 MG/1
1800 TABLET, FILM COATED ORAL DAILY
Status: DISCONTINUED | OUTPATIENT
Start: 2018-05-01 | End: 2018-05-03 | Stop reason: HOSPADM

## 2018-05-01 RX ADMIN — HYDROMORPHONE HYDROCHLORIDE 1 MG: 2 INJECTION, SOLUTION INTRAMUSCULAR; INTRAVENOUS; SUBCUTANEOUS at 08:14

## 2018-05-01 RX ADMIN — ENOXAPARIN SODIUM 30 MG: 30 INJECTION SUBCUTANEOUS at 12:16

## 2018-05-01 RX ADMIN — SODIUM CHLORIDE 1000 ML: 900 INJECTION, SOLUTION INTRAVENOUS at 06:16

## 2018-05-01 RX ADMIN — Medication 400 MG: at 12:17

## 2018-05-01 RX ADMIN — HYDROMORPHONE HYDROCHLORIDE 2 MG: 2 INJECTION, SOLUTION INTRAMUSCULAR; INTRAVENOUS; SUBCUTANEOUS at 17:04

## 2018-05-01 RX ADMIN — HYDROMORPHONE HYDROCHLORIDE 2 MG: 2 INJECTION, SOLUTION INTRAMUSCULAR; INTRAVENOUS; SUBCUTANEOUS at 20:37

## 2018-05-01 RX ADMIN — POTASSIUM CHLORIDE 20 MEQ: 14.9 INJECTION, SOLUTION INTRAVENOUS at 10:50

## 2018-05-01 RX ADMIN — OXYCODONE HYDROCHLORIDE 80 MG: 80 TABLET, FILM COATED, EXTENDED RELEASE ORAL at 22:34

## 2018-05-01 RX ADMIN — PROMETHAZINE HYDROCHLORIDE 12.5 MG: 25 INJECTION INTRAMUSCULAR; INTRAVENOUS at 08:25

## 2018-05-01 RX ADMIN — PROMETHAZINE HYDROCHLORIDE 12.5 MG: 25 INJECTION INTRAMUSCULAR; INTRAVENOUS at 20:36

## 2018-05-01 RX ADMIN — KETOROLAC TROMETHAMINE 30 MG: 30 INJECTION, SOLUTION INTRAMUSCULAR at 06:18

## 2018-05-01 RX ADMIN — POTASSIUM CHLORIDE 20 MEQ: 14.9 INJECTION, SOLUTION INTRAVENOUS at 16:15

## 2018-05-01 RX ADMIN — HYDROMORPHONE HYDROCHLORIDE 1 MG: 2 INJECTION, SOLUTION INTRAMUSCULAR; INTRAVENOUS; SUBCUTANEOUS at 06:22

## 2018-05-01 RX ADMIN — POTASSIUM CHLORIDE 40 MEQ: 20 TABLET, EXTENDED RELEASE ORAL at 12:17

## 2018-05-01 RX ADMIN — PROMETHAZINE HYDROCHLORIDE 12.5 MG: 25 INJECTION INTRAMUSCULAR; INTRAVENOUS at 06:14

## 2018-05-01 RX ADMIN — HYDROMORPHONE HYDROCHLORIDE 2 MG: 2 INJECTION, SOLUTION INTRAMUSCULAR; INTRAVENOUS; SUBCUTANEOUS at 12:19

## 2018-05-01 RX ADMIN — METOPROLOL TARTRATE 25 MG: 25 TABLET, FILM COATED ORAL at 12:17

## 2018-05-01 RX ADMIN — SODIUM CHLORIDE 125 ML/HR: 900 INJECTION, SOLUTION INTRAVENOUS at 10:51

## 2018-05-01 RX ADMIN — POTASSIUM CHLORIDE 40 MEQ: 20 TABLET, EXTENDED RELEASE ORAL at 17:05

## 2018-05-01 RX ADMIN — HYDROMORPHONE HYDROCHLORIDE 2 MG: 2 INJECTION, SOLUTION INTRAMUSCULAR; INTRAVENOUS; SUBCUTANEOUS at 23:52

## 2018-05-01 RX ADMIN — FOLIC ACID 1 MG: 1 TABLET ORAL at 13:24

## 2018-05-01 RX ADMIN — Medication 10 ML: at 17:09

## 2018-05-01 RX ADMIN — PROMETHAZINE HYDROCHLORIDE 12.5 MG: 25 INJECTION INTRAMUSCULAR; INTRAVENOUS at 12:18

## 2018-05-01 RX ADMIN — METOPROLOL TARTRATE 25 MG: 25 TABLET, FILM COATED ORAL at 17:05

## 2018-05-01 NOTE — ED NOTES
Pt resting quietly. States he does not want lunch tray. Does not have much of an appetite. VS stable. Respirations even and unlabored. No acute distress noted.

## 2018-05-01 NOTE — ED PROVIDER NOTES
HPI Comments: 70-year-old male complaining of general body pain joint pain or back pain and leg pain. Patient has a history of sickle cell anemia and feels he is in sickle cell crisis. Patient was seen on Sunday for the same complaints and had a workup Pain relief has a negative Doppler of his lower left extremity was discharged home. The patient continued to have pain return early this morning due to an adequate pain relief. Denies fever or chills. Patient is a 37 y.o. male presenting with sickle cell disease. The history is provided by the patient. Sickle Cell Crisis    This is a recurrent problem. The current episode started more than 2 days ago. The problem has been gradually worsening. The problem occurs constantly. The pain is associated with no known injury. Associated symptoms include leg pain. He has tried analgesics for the symptoms. The treatment provided no relief. Past Medical History:   Diagnosis Date    Chronic pain     HTN (hypertension)     Ill-defined condition     sickle cell    Iron overload due to repeated red blood cell transfusions 1/18/2017    [facility]oxys[facility] 7/9/2016    Sickle cell disease ([facility] [address]        Past Surgical History:   Procedure Laterality Date    HC PENILE IMPL DURA II POSITINBLE      HC PORT LIFE SNGLE LUMEN 5013      to L CW    HX CHOLECYSTECTOMY      HX OTHER SURGICAL      penile inplant    HX VASCULAR ACCESS           Family History:   Problem Relation Age of Onset    Hypertension Other     Diabetes Father     Stroke Father     Sickle Cell Anemia Sister        Social History     Social History    Marital status:      Spouse name: N/A    Number of children: N/A    Years of education: N/A     Occupational History    Not on file.      Social History Main Topics    Smoking status: Never Smoker    Smokeless tobacco: Never Used    Alcohol use No    Drug use: No    Sexual activity: Yes     Other Topics Concern    Not on file     Social History Narrative         ALLERGIES: Compazine [prochlorperazine edisylate]; Morphine; Reglan [metoclopramide]; and Zofran [ondansetron hcl (pf)]    Review of Systems   Constitutional: Negative. Negative for activity change. HENT: Negative. Eyes: Negative. Respiratory: Negative. Cardiovascular: Negative. Gastrointestinal: Negative. Genitourinary: Negative. Musculoskeletal: Negative. Skin: Negative. Neurological: Negative. Psychiatric/Behavioral: Negative. All other systems reviewed and are negative. Vitals:    05/01/18 0255 05/01/18 0701 05/01/18 0801 05/01/18 0822   BP: 151/73 146/71 (!) 181/91 163/72   Pulse: 77 (!) 55 65 60   Resp: 20      Temp: 99.8 °F (37.7 °C)      SpO2: 94% 98% 100% 98%   Weight: 66.2 kg (146 lb)      Height: 5' 10\" (1.778 m)               Physical Exam   Constitutional: He is oriented to person, place, and time. He appears well-developed and well-nourished. No distress. HENT:   Head: Normocephalic and atraumatic. Right Ear: External ear normal.   Left Ear: External ear normal.   Nose: Nose normal.   Mouth/Throat: Oropharynx is clear and moist. No oropharyngeal exudate. Eyes: Conjunctivae and EOM are normal. Pupils are equal, round, and reactive to light. Right eye exhibits no discharge. Left eye exhibits no discharge. No scleral icterus. Neck: Normal range of motion. Neck supple. No JVD present. No tracheal deviation present. Cardiovascular: Normal rate, regular rhythm and intact distal pulses. Pulmonary/Chest: Effort normal and breath sounds normal. No stridor. No respiratory distress. He has no wheezes. He exhibits no tenderness. Abdominal: Soft. Bowel sounds are normal. He exhibits no distension and no mass. There is no tenderness. Musculoskeletal: Normal range of motion. He exhibits no edema or tenderness. Neurological: He is alert and oriented to person, place, and time. No cranial nerve deficit. Skin: Skin is warm and dry. No rash noted. He is not diaphoretic. No erythema. No pallor. Psychiatric: He has a normal mood and affect. His behavior is normal. Thought content normal.   Nursing note and vitals reviewed. MDM  Number of Diagnoses or Management Options  Hb-SS disease with crisis [facility]): established and worsening  Intractable pain: established and worsening  Other drug-induced neutropenia ([facility] [address]): established and worsening  Thrombocytopenia ([facility] [address]): established and worsening  Diagnosis management comments: The patient was given several doses of Dilaudid with minimal relief. Also given Toradol and fluids with minimal relief. Patient stated \"I think I need to be admitted this time\". Patient does have leukopenia must likely secondary to proximal ureter also has thrombocytopenia. His hemoglobin has not changed much from Sunday however his pain is not controlled.   He requested hospitalist admit for pain control and further treatment        ED Course       Procedures

## 2018-05-01 NOTE — PROGRESS NOTES
· Patient in the ER today for sickle cell crisis and admitted to room 207  · HRRP with RRAT score of 22 patient has multiple ER visits and hospital admits  · Will outreach to patient for JOSÉ TREVIZO assess when discharged from hospital to home  This note will not be viewable in [address]

## 2018-05-01 NOTE — IP AVS SNAPSHOT
[address] 
[phone] Patient: Elton Cruz MRN: [id number] About your hospitalization You were admitted on:  May 1, 2018 You last received care in the:  [facility] 2 SURGICAL You were discharged on:  May 3, 2018 Why you were hospitalized Your primary diagnosis was:  Not on File Your diagnoses also included:  Sickle Cell Crisis (Hcc), Dehydration, Generalized Weakness, Body Aches Follow-up Information Follow up With Details Comments Contact Info Sharifa Murray MD   [address] [facility] [facility] 
[phone] Discharge Orders None A check cristina indicates which time of day the medication should be taken. My Medications CHANGE how you take these medications Instructions Each Dose to Equal  
 Morning Noon Evening Bedtime  
 hydroxyurea 500 mg capsule Commonly known as:  HYDREA What changed:  additional instructions Notes to Patient:  START WHEN YOU FOLLOW UP WITH MD AT [facility] Take 1 Cap by mouth two (2) times a day. Takes in am at 0900. Star after follow up with your PCP. 500 mg CONTINUE taking these medications Instructions Each Dose to Equal  
 Morning Noon Evening Bedtime  
 deferasirox 360 mg tablet Commonly known as:  Kristy Quick Take 5 Tabs by mouth daily. 5 Tab  
    
  
   
   
   
  
 folic acid 1 mg tablet Commonly known as:  Google Take 1 mg by mouth daily. 1 mg  
    
  
   
   
   
  
 lisinopril 5 mg tablet Commonly known as:  Allan Natalie Take 5 mg by mouth daily. Indications: HYPERTENSION  
 5 mg  
    
  
   
   
   
  
 magnesium oxide 400 mg tablet Commonly known as:  MAG-OX Take 1 Tab by mouth daily. 400 mg  
    
  
   
   
   
  
 metoprolol tartrate 25 mg tablet Commonly known as:  LOPRESSOR  
   
 Take 25 mg by mouth two (2) times a day. Indications: HYPERTENSION  
 25 mg  
    
  
   
   
  
   
  
 * oxyCODONE IR 30 mg immediate release tablet Commonly known as:  Herminio Molina Notes to Patient:  Take on as needed schedule Take 1 Tab by mouth every four (4) hours as needed for Pain. Max Daily Amount: 180 mg.  
 30 mg  
    
   
   
   
  
 * oxyCODONE ER 80 mg ER tablet Commonly known as:  OxyCONTIN Take 1 Tab by mouth every twelve (12) hours. Max Daily Amount: 160 mg.  
 80 mg  
    
  
   
   
   
  
  
 promethazine 25 mg tablet Commonly known as:  PHENERGAN Notes to Patient:  Take on as needed schedule Take 1 Tab by mouth every six (6) hours as needed. May substitute suppository if vomiting 25 mg  
    
   
   
   
  
 * Notice: This list has 2 medication(s) that are the same as other medications prescribed for you. Read the directions carefully, and ask your doctor or other care provider to review them with you. Where to Get Your Medications Information on where to get these meds will be given to you by the nurse or doctor. ! Ask your nurse or doctor about these medications  
  hydroxyurea 500 mg capsule Opioid Education Prescription Opioids: What You Need to Know: 
 
  
  With your hematologist at [facility]. Repeat cbc with them as well to decide on when to re-start your hydroxyurea DISCHARGE SUMMARY from Nurse PATIENT INSTRUCTIONS: 
 
After general anesthesia or intravenous sedation, for 24 hours or while taking prescription Narcotics: · Limit your activities · Do not drive and operate hazardous machinery · Do not make important personal or business decisions · Do  not drink alcoholic beverages · If you have not urinated within 8 hours after discharge, please contact your surgeon on call. Report the following to your surgeon: 
· Excessive pain, swelling, redness or odor of or around the surgical area · Temperature over 100.5 · Nausea and vomiting lasting longer than 4 hours or if unable to take medications · Any signs of decreased circulation or nerve impairment to extremity: change in color, persistent  numbness, tingling, coldness or increase pain · Any questions What to do at Home: 
Recommended activity: Activity as tolerated, per MD instructions If you experience any of the following symptoms fever > 100.5, nausea, vomiting, pain, chest pain and/or shortness of breath please follow up with MD. 
 
*  Please give a list of your current medications to your Primary Care Provider. *  Please update this list whenever your medications are discontinued, doses are 
    changed, or new medications (including over-the-counter products) are added. *  Please carry medication information at all times in case of emergency situations. These are general instructions for a healthy lifestyle: No smoking/ No tobacco products/ Avoid exposure to second hand smoke Surgeon General's Warning:  Quitting smoking now greatly reduces serious risk to your health. Obesity, smoking, and sedentary lifestyle greatly increases your risk for illness A healthy diet, regular physical exercise & weight monitoring are important for maintaining a healthy lifestyle You may be retaining fluid if you have a history of heart failure or if you experience any of the following symptoms:  Weight gain of 3 pounds or more overnight or 5 pounds in a week, increased swelling in our hands or feet or shortness of breath while lying flat in bed. Please call your doctor as soon as you notice any of these symptoms; do not wait until your next office visit. Recognize signs and symptoms of STROKE: 
 
F-face looks uneven A-arms unable to move or move unevenly S-speech slurred or non-existent T-time-call 911 as soon as signs and symptoms begin-DO NOT go Back to bed or wait to see if you get better-TIME IS BRAIN. Warning Signs of HEART ATTACK Call 911 if you have these symptoms: 
? Chest discomfort. Most heart attacks involve discomfort in the center of the chest that lasts more than a few minutes, or that goes away and comes back. It can feel like uncomfortable pressure, squeezing, fullness, or pain. ? Discomfort in other areas of the upper body. Symptoms can include pain or discomfort in one or both arms, the back, neck, jaw, or stomach. ? Shortness of breath with or without chest discomfort. ? Other signs may include breaking out in a cold sweat, nausea, or lightheadedness. Don't wait more than five minutes to call [address]! Fast action can save your life. Calling 911 is almost always the fastest way to get lifesaving treatment. Emergency Medical Services staff can begin treatment when they arrive  up to an hour sooner than if someone gets to the hospital by car. The discharge information has been reviewed with the patient. The patient verbalized understanding. Discharge medications reviewed with the patient and appropriate educational materials and side effects teaching were provided. ___________________________________________________________________________________________________________________________________ Sickle Cell Crisis: Care Instructions Your Care Instructions Sickle cell crisis is a painful episode that may begin suddenly in a person with sickle cell disease. Sickle cell disease turns normal, round red blood cells into cells that look like kendall or crescent moons. The sickle-shaped cells can get stuck in blood vessels, blocking blood flow and causing severe pain. The pain can occur in the bones of the spine, the arms and legs, the chest, and the abdomen. An episode may be called a \"painful event\" or \"painful crisis. \" Some people who have sickle cell disease have many painful events, while others have few or none. Treatment depends on the level of pain and how long it lasts. Sometimes taking nonprescription pain relievers can help. Or you may need stronger pain relief medicine that is prescribed or given by a doctor. You may need to be treated in the hospital. 
It isn't always possible to know what sets off a painful event. But triggers include being dehydrated, cold temperatures, infection, stress, and not getting enough oxygen. Follow-up care is a key part of your treatment and safety. Be sure to make and go to all appointments, and call your doctor if you are having problems. It's also a good idea to know your test results and keep a list of the medicines you take. How can you care for yourself at home? · Create a pain management plan with your doctor. This plan should include the types of medicines you can take and other actions you can take at home to relieve pain. · Drink plenty of fluids, enough so that your urine is light yellow or clear like water. If you have kidney, heart, or liver disease and have to limit fluids, talk with your doctor before you increase the amount of fluids you drink. · Take your medicines exactly as prescribed. Call your doctor if you think you are having a problem with your medicine. · Take pain medicines exactly as directed. ¨ If the doctor gave you a prescription medicine for pain, take it as prescribed. ¨ If you are not taking a prescription pain medicine, ask your doctor if you can take an over-the-counter medicine. · Avoid alcohol. It can make you dehydrated. · Dress warmly in cold weather. The cold and windy weather can lead to severe pain. · Do not smoke. Smoking can reduce the amount of oxygen in your blood. · Get plenty of sleep. When should you call for help? Call 911 anytime you think you may need emergency care. For example, call if: 
? · You have symptoms of a severe problem from sickle cell. ? · You have symptoms of a stroke. These may include: 
¨ Sudden numbness, tingling, weakness, or loss of movement in your face, arm, or leg, especially on only one side of your body. ¨ Sudden vision changes. ¨ Sudden trouble speaking. ¨ Sudden confusion or trouble understanding simple statements. ¨ Sudden problems with walking or balance. ¨ A sudden, severe headache that is different from past headaches. ? · You are in severe pain. ? · You have symptoms of a heart attack. These may include: ¨ Chest pain or pressure, or a strange feeling in the chest. 
¨ Sweating. ¨ Shortness of breath. ¨ Nausea or vomiting. ¨ Pain, pressure, or a strange feeling in the back, neck, jaw, or upper belly or in one or both shoulders or arms. ¨ Lightheadedness or sudden weakness. ¨ A fast or irregular heartbeat. After you call 911, the  may tell you to chew 1 adult-strength or 2 to 4 low-dose aspirin. Wait for an ambulance. Do not try to drive yourself. ?Call your doctor now or seek immediate medical care if: 
? · You have a fever. ? Watch closely for changes in your health, and be sure to contact your doctor if you have any problems. Where can you learn more? Go to http://socorro-rosita.info/. Enter F104 in the search box to learn more about \"Sickle Cell Crisis: Care Instructions. \" Current as of: October 13, 2016 Content Version: 11.4 © 3827-5063 [employer]. Care instructions adapted under license by [employer] (which disclaims liability or warranty for this information). If you have questions about a medical condition or this instruction, always ask your healthcare professional. [place] [address] any warranty or liability for your use of this information. [employer] Announcement We are excited to announce that we are making your provider's discharge notes available to you in Mozido. You will see these notes when they are completed and signed by the physician that discharged you from your recent hospital stay. If you have any questions or concerns about any information you see in Mozido, please call the Health Information Department where you were seen or reach out to your Primary Care Provider for more information about your plan of care. Introducing [facility]! [employer] introduces Mozido patient portal. Now you can access parts of your medical record, email your doctor's office, and request medication refills online. 1. In your internet browser, go to [email or web address] 2. Click on the First Time User? Click Here link in the Sign In box. You will see the New Member Sign Up page. 3. Enter your Mozido Access Code exactly as it appears below. You will not need to use this code after youve completed the sign-up process. If you do not sign up before the expiration date, you must request a new code. · Mozido Access Code: [facility] Expires: 6/7/2018 11:11 AM 
 
4. Enter the last four digits of your Social Security Number (xxxx) and Date of Birth (mm/dd/yyyy) as indicated and click Submit. You will be taken to the next sign-up page. 5. Create a Mozido ID. This will be your Mozido login ID and cannot be changed, so think of one that is secure and easy to remember. 6. Create a Mozido password. You can change your password at any time. 7. Enter your Password Reset Question and Answer. This can be used at a later time if you forget your password. 8. Enter your e-mail address. You will receive e-mail notification when new information is available in [address] 9. Click Sign Up. You can now view and download portions of your medical record.  
10. Click the Download Summary menu link to download a portable copy of your medical information. If you have questions, please visit the Frequently Asked Questions section of the DriftToItt website. Remember, [employer] is NOT to be used for urgent needs. For medical emergencies, dial 911. Now available from your iPhone and Android! Introducing [facility]ravo As a [facility] patient, I wanted to make you aware of our electronic visit tool called [facility]. [facility] [employer] 24/7 allows you to connect within minutes with a medical provider 24 hours a day, seven days a week via a mobile device or tablet or logging into a secure website from your computer. You can access Israel Bravo from anywhere in the [place]. A virtual visit might be right for you when you have a simple condition and feel like you just dont want to get out of bed, or cant get away from work for an appointment, when your regular [facility] provider is not available (evenings, weekends or holidays), or when youre out of town and need minor care. Electronic visits cost only $49 and if the [facility] 24/7 provider determines a prescription is needed to treat your condition, one can be electronically transmitted to a nearby pharmacy*. Please take a moment to enroll today if you have not already done so. The enrollment process is free and takes just a few minutes. To enroll, please download the [employer] 24/"[employer] jhony to your tablet or phone, or visit [email or web address] to enroll on your computer. And, as an [address] [place] [address] patient with a [employer] account, the results of your visits will be scanned into your electronic medical record and your primary care provider will be able to view the scanned results. We urge you to continue to see your regular [facility] provider for your ongoing medical care.   And while your primary care provider may not be the one available when you seek a Israel Bravo virtual visit, the peace of mind you get from getting a real diagnosis real time can be priceless. For more information on [employer] Bravo, view our Frequently Asked Questions (FAQs) at [email or web address]. Sincerely, 
 
Waqas Fields MD 
Chief Medical Officer [employer] *:  certain medications cannot be prescribed via Israel Bravo Providers Seen During Your Hospitalization Provider Specialty Primary office phone Jose Sears MD Emergency Medicine [phone] Charmayne Prude, [address] [place] [address] Internal Medicine [phone] Lizeth Pizano MD Internal Medicine [phone] Immunizations Administered for This Admission Name Date  
 TB Skin Test (PPD) Intradermal  Deferred () Your Primary Care Physician (PCP) Primary Care Physician Office Phone Office Fax Fabby Smiley [phone] You are allergic to the following Allergen Reactions Compazine (Prochlorperazine Edisylate) Other (comments) Also makes him feel funny Morphine Other (comments) Makes him feel funny Reglan (Metoclopramide) Other (comments) \"feel funny\" Zofran (Ondansetron Hcl (Pf)) Other (comments) Make him feel funny Recent Documentation Height Weight BMI Smoking Status 1.778 m 66.2 kg 20.95 kg/m2 Never Smoker Emergency Contacts Name Discharge Info Relation Home Work Mobile Yoselin Alcantara  Spouse [phone] Patient Belongings The following personal items are in your possession at time of discharge: 
  Dental Appliances: None  Visual Aid: Glasses      Home Medications: None   Jewelry: None  Clothing: With patient, Undergarments, Shirt, Pants, Footwear    Other Valuables: At bedside Please provide this summary of care documentation to your next provider.  
  
  
 
  
Signatures-by signing, you are acknowledging that this After Visit Summary has been reviewed with you and you have received a copy. Patient Signature:  ____________________________________________________________ Date:  ____________________________________________________________  
  
Dewane Salen Provider Signature:  ____________________________________________________________ Date:  ____________________________________________________________

## 2018-05-01 NOTE — ED TRIAGE NOTES
Patient states that he was seen yesterday for sickle cell crisis. States still hurting and has been vomiting.

## 2018-05-01 NOTE — ED NOTES
TRANSFER - OUT REPORT:    Verbal report given to receiving nurse (name) on Ivelisse Dial  being transferred to 2nd floor (unit) for routine progression of care       Report consisted of patients Situation, Background, Assessment and   Recommendations(SBAR). Information from the following report(s) ED Summary was reviewed with the receiving nurse. Lines:   Venous Access Device Other (comment) (Active)   Central Line Being Utilized Yes 5/1/2018  7:04 AM   Site Assessment Clean, dry, & intact 5/1/2018  7:04 AM   Date of Last Dressing Change 05/01/18 5/1/2018  7:04 AM   Dressing Status Clean, dry, & intact 5/1/2018  7:04 AM   Date Accessed (Medial Site) 05/01/18 5/1/2018  7:04 AM   Access Needle Size (Site #1) 20 G 5/1/2018  7:04 AM   Access Needle Length (Medial Site) 0.75 inches 5/1/2018  7:04 AM   Positive Blood Return (Medial Site) Yes 5/1/2018  7:04 AM   Action Taken (Medial Site) Alcohol;Benzoin;Blood drawn;Flushed; Infusing 5/1/2018  7:04 AM        Opportunity for questions and clarification was provided. Patient transported with: fluids, patient chart, patient belongings.

## 2018-05-01 NOTE — H&P
HOSPITALIST H&P/CONSULT  NAME:  Harrison Norton   Age:  37 y.o.  :   1975   MRN:   [id number]  PCP: Liane Novak MD  Consulting MD:  Treatment Team: Attending Provider: Dima Min DO; Primary Nurse: Onita Dakins, RN; Primary Nurse: Vamsi Dahl RN  HPI:   Patient is 37years old  [place] male with pmhx of Sickle cell disease, iron overload, HTN, pSVT, chronic pain presented to ER with chief complaints of worsening of generalized body pain which is similar to his usual sickle cell crisis. Pt also complains of nausea/vomiting. He denies any SOB, palpitations, cough, sore throat, rhinorrhea, diarrhea, urinary complaints. Pt reports that home pain meds are not controlling the pain. In ER, CXR showed no acute cardiopulmonary abnormalities concerning for acute chest syndrome. Pt is being admitted for acute sickle cell crisis. Complete ROS done and is as stated in HPI or otherwise negative  Past Medical History:   Diagnosis Date    Chronic pain     HTN (hypertension)     Ill-defined condition     sickle cell    Iron overload due to repeated red blood cell transfusions 2017    Paroxysmal SV[facility]raventricular tachycardia) ([facility] 2016    Sickle cell disease ([facility]       Past Surgical History:   Procedure Laterality Date    HC PENILE IMPL DURA II POSITINBLE      HC PORT LIFE SNGLE LUMEN 5013      to L CW    HX CHOLECYSTECTOMY      HX OTHER SURGICAL      penile inplant    HX VASCULAR ACCESS        Prior to Admission Medications   Prescriptions Last Dose Informant Patient Reported? Taking? deferasirox (JADENU) 360 mg tab   No No   Sig: Take 5 Tabs by mouth daily. folic acid (FOLVITE) 1 mg tablet  Self Yes No   Sig: Take 1 mg by mouth daily. hydroxyurea (HYDREA) 500 mg capsule   Yes No   Sig: Take 500 mg by mouth two (2) times a day. Takes in am at 0900   lisinopril (PRINIVIL, ZESTRIL) 5 mg tablet   Yes No   Sig: Take 5 mg by mouth daily.  Indications: HYPERTENSION   magnesium oxide (MAG-OX) 400 mg tablet   No No   Sig: Take 1 Tab by mouth daily. metoprolol (LOPRESSOR) 25 mg tablet   Yes No   Sig: Take 25 mg by mouth two (2) times a day. Indications: HYPERTENSION   oxyCODONE ER (OXYCONTIN) 80 mg ER tablet   No No   Sig: Take 1 Tab by mouth every twelve (12) hours. Max Daily Amount: 160 mg.   oxyCODONE IR (OXY-IR) 30 mg immediate release tablet   No No   Sig: Take 1 Tab by mouth every four (4) hours as needed for Pain. Max Daily Amount: 180 mg.   promethazine (PHENERGAN) 25 mg tablet   No No   Sig: Take 1 Tab by mouth every six (6) hours as needed. May substitute suppository if vomiting      Facility-Administered Medications: None     Allergies   Allergen Reactions    Compazine [Prochlorperazine Edisylate] Other (comments)     Also makes him feel funny    Morphine Other (comments)     Makes him feel funny    Reglan [Metoclopramide] Other (comments)     \"feel funny\"    Zofran [Ondansetron Hcl (Pf)] Other (comments)     Make him feel funny      Social History   Substance Use Topics    Smoking status: Never Smoker    Smokeless tobacco: Never Used    Alcohol use No      Family History   Problem Relation Age of Onset    Hypertension Other     Diabetes Father     Stroke Father     Sickle Cell Anemia Sister       Objective:     Visit Vitals    /78    Pulse 72    Temp 99.8 °F (37.7 °C)    Resp 20    Ht 5' 10\" (1.778 m)    Wt 66.2 kg (146 lb)    SpO2 100%    BMI 20.95 kg/m2      Temp (24hrs), Av.8 °F (37.7 °C), Min:99.8 °F (37.7 °C), Max:99.8 °F (37.7 °C)    Oxygen Therapy  O2 Sat (%): 100 % (18 0842)  Pulse via Oximetry: 72 beats per minute (18 0842)  O2 Device: Room air (18 0255)  Physical Exam:  General:    Alert, awake, NAD     HEENT:          Head NCAT, PERRLA+, no pallor or ict, MM dry  Lungs:   CTAB/L  Heart:   Regular rate and rhythm,  no murmur, rub or gallop. Abdomen:   Soft, non-tender. Not distended.   Bowel sounds normal.   Extremities: No cyanosis. No edema. No clubbing  Skin:     Texture, turgor normal. No rashes or lesions. Not Jaundiced  Neurologic: GCS 15, no motor or sensory deficits, CN 2-12 intact  Psych:             AOx 3, mood and affect appropriate  Lymphatics:    No LAD  Data Review:   Recent Results (from the past 24 hour(s))   CBC WITH AUTOMATED DIFF    Collection Time: 05/01/18  6:03 AM   Result Value Ref Range    WBC 1.4 (LL) 4.3 - 11.1 K/uL    RBC 2.87 (L) 4.23 - 5.67 M/uL    HGB 8.7 (L) 13.6 - 17.2 g/dL    HCT 25.9 (L) 41.1 - 50.3 %    MCV 90.2 79.6 - 97.8 FL    MCH 30.3 26.1 - 32.9 PG    MCHC 33.6 31.4 - 35.0 g/dL    RDW 24.1 (H) 11.9 - 14.6 %    PLATELET 42 (L) 271 - 450 K/uL    MPV Cannot be calculated 10.8 - 14.1 FL    DF AUTOMATED      NEUTROPHILS 25 (L) 43 - 78 %    LYMPHOCYTES 68 (H) 13 - 44 %    MONOCYTES 6 4.0 - 12.0 %    EOSINOPHILS 0 (L) 0.5 - 7.8 %    BASOPHILS 1 0.0 - 2.0 %    IMMATURE GRANULOCYTES 0 0.0 - 5.0 %    ABS. NEUTROPHILS 0.3 (L) 1.7 - 8.2 K/UL    ABS. LYMPHOCYTES 0.9 0.5 - 4.6 K/UL    ABS. MONOCYTES 0.1 0.1 - 1.3 K/UL    ABS. EOSINOPHILS 0.0 0.0 - 0.8 K/UL    ABS. BASOPHILS 0.0 0.0 - 0.2 K/UL    ABS. IMM. GRANS. 0.0 0.0 - 0.5 K/UL   METABOLIC PANEL, COMPREHENSIVE    Collection Time: 05/01/18  6:03 AM   Result Value Ref Range    Sodium 139 136 - 145 mmol/L    Potassium 3.4 (L) 3.5 - 5.1 mmol/L    Chloride 107 98 - 107 mmol/L    CO2 25 21 - 32 mmol/L    Anion gap 7 7 - 16 mmol/L    Glucose 158 (H) 65 - 100 mg/dL    BUN 10 6 - 23 MG/DL    Creatinine 0.91 0.8 - 1.5 MG/DL    GFR est AA >60 >60 ml/min/1.73m2    GFR est non-AA >60 >60 ml/min/1.73m2    Calcium 8.8 8.3 - 10.4 MG/DL    Bilirubin, total 0.5 0.2 - 1.1 MG/DL    ALT (SGPT) 76 (H) 12 - 65 U/L    AST (SGOT) 40 (H) 15 - 37 U/L    Alk.  phosphatase 97 50 - 136 U/L    Protein, total 8.6 (H) 6.3 - 8.2 g/dL    Albumin 3.7 3.5 - 5.0 g/dL    Globulin 4.9 (H) 2.3 - 3.5 g/dL    A-G Ratio 0.8 (L) 1.2 - 3.5     RETICULOCYTE COUNT Collection Time: 05/01/18  6:03 AM   Result Value Ref Range    Reticulocyte count 0.5 0.3 - 2.0 %    Absolute Retic Cnt. 0.0139 (L) 0.026 - 0.095 M/ul    Immature Retic Fraction 1.7 (L) 2.3 - 13.4 %    Retic Hgb Conc. 33 29 - 35 pg     Imaging /Procedures /Studies   I have personally reviewed the chest xray. No acute parenchymal process suggestive of acute chest syndrome appreciated. /  Assessment and Plan: Active Hospital Problems    Diagnosis Date Noted    Dehydration 05/01/2018    Generalized weakness 05/01/2018    Body aches 05/01/2018    Sickle cell crisis ([facility] [address] 04/05/2016       PLAN  · Admit to hematology floor for acute sickle cell crisis with dehydration and generalized weakness.   · Start on IV NS@ 125 cc/hr, switch to half NS after 24 hours  · Start on IV dilaudid 2 mg q3h with phenergan for nausea/vomiting  · Resume home roxicodone IR and ER as ordered  · Hold hydroxyurea for ANC of 300 and platelets of 06D  · Hematology team consulted for evaluation  · Restart lopressor for HTN, prn hydralazine for SBP > 441  · Continue folic acid and Janedu  · DVT prophylaxis with lovenox  · Replace potassium, recheck in AM    Code Status: full  High risk with opioids on board    Anticipated discharge: >2 MN    Signed By: Sam Morris MD     May 1, 2018

## 2018-05-02 LAB
ALBUMIN SERPL-MCNC: 3.2 G/DL (ref 3.5–5)
ALBUMIN/GLOB SERPL: 0.7 {RATIO} (ref 1.2–3.5)
ALP SERPL-CCNC: 85 U/L (ref 50–136)
ALT SERPL-CCNC: 77 U/L (ref 12–65)
ANION GAP SERPL CALC-SCNC: 9 MMOL/L (ref 7–16)
AST SERPL-CCNC: 43 U/L (ref 15–37)
BASOPHILS # BLD: 0 K/UL (ref 0–0.2)
BASOPHILS NFR BLD: 0 % (ref 0–2)
BILIRUB SERPL-MCNC: 0.4 MG/DL (ref 0.2–1.1)
BUN SERPL-MCNC: 7 MG/DL (ref 6–23)
CALCIUM SERPL-MCNC: 8.3 MG/DL (ref 8.3–10.4)
CHLORIDE SERPL-SCNC: 108 MMOL/L (ref 98–107)
CO2 SERPL-SCNC: 23 MMOL/L (ref 21–32)
CREAT SERPL-MCNC: 0.76 MG/DL (ref 0.8–1.5)
DIFFERENTIAL METHOD BLD: ABNORMAL
EOSINOPHIL # BLD: 0.1 K/UL (ref 0–0.8)
EOSINOPHIL NFR BLD: 2 % (ref 0.5–7.8)
ERYTHROCYTE [DISTWIDTH] IN BLOOD BY AUTOMATED COUNT: 24.3 % (ref 11.9–14.6)
GLOBULIN SER CALC-MCNC: 4.4 G/DL (ref 2.3–3.5)
GLUCOSE SERPL-MCNC: 107 MG/DL (ref 65–100)
HCT VFR BLD AUTO: 23 % (ref 41.1–50.3)
HGB BLD-MCNC: 7.5 G/DL (ref 13.6–17.2)
HGB RETIC QN AUTO: 34 PG (ref 29–35)
IMM GRANULOCYTES # BLD: 0 K/UL (ref 0–0.5)
IMM GRANULOCYTES NFR BLD AUTO: 0 % (ref 0–5)
IMM RETICS NFR: 2.2 % (ref 2.3–13.4)
LYMPHOCYTES # BLD: 4.1 K/UL (ref 0.5–4.6)
LYMPHOCYTES NFR BLD: 89 % (ref 13–44)
MCH RBC QN AUTO: 30.1 PG (ref 26.1–32.9)
MCHC RBC AUTO-ENTMCNC: 32.6 G/DL (ref 31.4–35)
MCV RBC AUTO: 92.4 FL (ref 79.6–97.8)
MONOCYTES # BLD: 0.1 K/UL (ref 0.1–1.3)
MONOCYTES NFR BLD: 2 % (ref 4–12)
NEUTS SEG # BLD: 0.3 K/UL (ref 1.7–8.2)
NEUTS SEG NFR BLD: 7 % (ref 43–78)
PLATELET # BLD AUTO: 62 K/UL (ref 150–450)
PMV BLD AUTO: ABNORMAL FL (ref 10.8–14.1)
POTASSIUM SERPL-SCNC: 4.2 MMOL/L (ref 3.5–5.1)
POTASSIUM SERPL-SCNC: 4.5 MMOL/L (ref 3.5–5.1)
PROT SERPL-MCNC: 7.6 G/DL (ref 6.3–8.2)
RBC # BLD AUTO: 2.49 M/UL (ref 4.23–5.67)
RETICS # AUTO: 0.01 M/UL (ref 0.03–0.1)
RETICS/RBC NFR AUTO: 0.4 % (ref 0.3–2)
SODIUM SERPL-SCNC: 140 MMOL/L (ref 136–145)
WBC # BLD AUTO: 4.5 K/UL (ref 4.3–11.1)

## 2018-05-02 PROCEDURE — 97161 PT EVAL LOW COMPLEX 20 MIN: CPT

## 2018-05-02 PROCEDURE — 85046 RETICYTE/HGB CONCENTRATE: CPT | Performed by: HOSPITALIST

## 2018-05-02 PROCEDURE — 85027 COMPLETE CBC AUTOMATED: CPT | Performed by: HOSPITALIST

## 2018-05-02 PROCEDURE — 97165 OT EVAL LOW COMPLEX 30 MIN: CPT

## 2018-05-02 PROCEDURE — 65270000029 HC RM PRIVATE

## 2018-05-02 PROCEDURE — 74011000258 HC RX REV CODE- 258: Performed by: INTERNAL MEDICINE

## 2018-05-02 PROCEDURE — 74011000258 HC RX REV CODE- 258: Performed by: NURSE PRACTITIONER

## 2018-05-02 PROCEDURE — 74011000250 HC RX REV CODE- 250: Performed by: HOSPITALIST

## 2018-05-02 PROCEDURE — 74011250637 HC RX REV CODE- 250/637: Performed by: HOSPITALIST

## 2018-05-02 PROCEDURE — 74011250636 HC RX REV CODE- 250/636: Performed by: HOSPITALIST

## 2018-05-02 PROCEDURE — 80053 COMPREHEN METABOLIC PANEL: CPT | Performed by: HOSPITALIST

## 2018-05-02 PROCEDURE — 84132 ASSAY OF SERUM POTASSIUM: CPT | Performed by: INTERNAL MEDICINE

## 2018-05-02 PROCEDURE — 99222 1ST HOSP IP/OBS MODERATE 55: CPT | Performed by: INTERNAL MEDICINE

## 2018-05-02 RX ORDER — SODIUM CHLORIDE 450 MG/100ML
75 INJECTION, SOLUTION INTRAVENOUS CONTINUOUS
Status: DISCONTINUED | OUTPATIENT
Start: 2018-05-02 | End: 2018-05-03 | Stop reason: HOSPADM

## 2018-05-02 RX ADMIN — OXYCODONE HYDROCHLORIDE 80 MG: 80 TABLET, FILM COATED, EXTENDED RELEASE ORAL at 12:07

## 2018-05-02 RX ADMIN — OXYCODONE HYDROCHLORIDE 30 MG: 15 TABLET ORAL at 18:12

## 2018-05-02 RX ADMIN — HYDROMORPHONE HYDROCHLORIDE 2 MG: 2 INJECTION, SOLUTION INTRAMUSCULAR; INTRAVENOUS; SUBCUTANEOUS at 03:46

## 2018-05-02 RX ADMIN — SODIUM CHLORIDE 125 ML/HR: 450 INJECTION, SOLUTION INTRAVENOUS at 07:55

## 2018-05-02 RX ADMIN — HYDROMORPHONE HYDROCHLORIDE 2 MG: 2 INJECTION, SOLUTION INTRAMUSCULAR; INTRAVENOUS; SUBCUTANEOUS at 13:52

## 2018-05-02 RX ADMIN — OXYCODONE HYDROCHLORIDE 80 MG: 80 TABLET, FILM COATED, EXTENDED RELEASE ORAL at 20:36

## 2018-05-02 RX ADMIN — Medication 10 ML: at 15:51

## 2018-05-02 RX ADMIN — FOLIC ACID 1 MG: 1 TABLET ORAL at 08:36

## 2018-05-02 RX ADMIN — Medication 400 MG: at 08:36

## 2018-05-02 RX ADMIN — Medication 5 ML: at 20:37

## 2018-05-02 RX ADMIN — HYDROMORPHONE HYDROCHLORIDE 2 MG: 2 INJECTION, SOLUTION INTRAMUSCULAR; INTRAVENOUS; SUBCUTANEOUS at 07:50

## 2018-05-02 RX ADMIN — ENOXAPARIN SODIUM 30 MG: 30 INJECTION SUBCUTANEOUS at 15:49

## 2018-05-02 RX ADMIN — PROMETHAZINE HYDROCHLORIDE 12.5 MG: 25 INJECTION INTRAMUSCULAR; INTRAVENOUS at 07:50

## 2018-05-02 RX ADMIN — PROMETHAZINE HYDROCHLORIDE 12.5 MG: 25 INJECTION INTRAMUSCULAR; INTRAVENOUS at 20:36

## 2018-05-02 RX ADMIN — SODIUM CHLORIDE 125 ML/HR: 900 INJECTION, SOLUTION INTRAVENOUS at 03:42

## 2018-05-02 RX ADMIN — METOPROLOL TARTRATE 25 MG: 25 TABLET, FILM COATED ORAL at 08:36

## 2018-05-02 RX ADMIN — POTASSIUM CHLORIDE 40 MEQ: 20 TABLET, EXTENDED RELEASE ORAL at 08:36

## 2018-05-02 RX ADMIN — SODIUM CHLORIDE 150 ML/HR: 450 INJECTION, SOLUTION INTRAVENOUS at 15:44

## 2018-05-02 RX ADMIN — METOPROLOL TARTRATE 25 MG: 25 TABLET, FILM COATED ORAL at 18:00

## 2018-05-02 RX ADMIN — SODIUM CHLORIDE 150 ML/HR: 450 INJECTION, SOLUTION INTRAVENOUS at 22:30

## 2018-05-02 NOTE — PROGRESS NOTES
Problem: Self Care Deficits Care Plan (Adult)  Goal: *Acute Goals and Plan of Care (Insert Text)  1. Patient will complete tub/shower transfer, independently. 2. Patient will complete HEP for UE strengthening and overall activity tolerance with supervision. 3. Patient will tolerate 30 minutes of OT treatment with 0 rest breaks to increase activity tolerance for ADLs. 4. Patient will complete functional transfers with superivison and adaptive equipment as needed. Timeframe: 4 visits       OCCUPATIONAL THERAPY: Initial Assessment 5/2/2018  INPATIENT: Hospital Day: 2  [place] / Product Type: Medicare /      NAME/AGE/GENDER: Remy Buchanan is a 37 y.o. male   PRIMARY DIAGNOSIS:  Sickle cell crisis (HCC)  Generalized weakness  Body aches  Dehydration <principal problem not specified> <principal problem not specified>        ICD-10: Treatment Diagnosis:    · Generalized Muscle Weakness (M62.81)  · Difficulty in walking, Not elsewhere classified (R26.2)   Precautions/Allergies:     Compazine [prochlorperazine edisylate]; Morphine; Reglan [metoclopramide]; and Zofran [ondansetron hcl (pf)]      ASSESSMENT:     Mr. Germania Licea presents with diagnosis listed above. New left ankle pain is largest barrier during ADL transfers. He has good ROM to self manage dressing and bathing. He has minor strength decline and would like to work on UE strengthening and overall activity tolerance while here. Will likely not need skilled OT at discharge but could benefit for acute deficits. Initiate OT POC. This section established at most recent assessment   PROBLEM LIST (Impairments causing functional limitations):  1. Decreased Strength  2. Decreased Transfer Abilities  3. Decreased Activity Tolerance   INTERVENTIONS PLANNED: (Benefits and precautions of occupational therapy have been discussed with the patient.)  1. Activities of daily living training  2.  Neuromuscular re-eduation  3. Therapeutic activity  4. Therapeutic exercise     TREATMENT PLAN: Frequency/Duration: Follow patient 3-4 times to address above goals. Rehabilitation Potential For Stated Goals: Good     RECOMMENDED REHABILITATION/EQUIPMENT: (at time of discharge pending progress): Due to the probability of continued deficits (see above) this patient will not likely need continued skilled occupational therapy after discharge. Equipment:    None at this time              OCCUPATIONAL PROFILE AND HISTORY:   History of Present Injury/Illness (Reason for Referral):  Mr. Merly Casanova is a 37 y.o. male admitted on 5/1/2018 with a primary diagnosis of The primary encounter diagnosis was Hb-SS disease with crisis ([address] Diagnoses of Other drug-induced neutropenia ([address] Thrombocytopenia ([address] and Intractable pain were also pertinent to this visit. .       Mr. Cabrera is a 37 y. o. male admitted on 5/1/2018. He is a patient of Dr. Becky Sellers at [facility] with sickle cell disease, hgb S/beta plus thalassemia. Currently maintained on hydrea 500mg BID and jadenu 1800 daily, oxycontin and oxycodone. He presented to ED on Sunday for workup of pain in left lower extremity which was negative for DVT and sent home once pain relieved. He returned yesterday for sickle cell crisis pain and was admitted for symptom management. Past Medical History/Comorbidities:   Mr. Merly Casanova  has a past medical history of Chronic pain; HTN (hypertension); Ill-defined condition; Iron overload due to repeated red blood cell transfusions (1/18/2017); Paroxysmal SVT (supraventricular tachycardia) (HCC) (7/9/2016); and Sickle cell disease ([address] He also has no past medical history of Aneurysm ([address] Arthritis; Asthma; Autoimmune disease ([address] CAD (coronary artery disease); Cancer ([address] Chronic kidney disease; Chronic obstructive pulmonary disease ([address] COPD; DEMENTIA; Diabetes ([address] Endocrine disease; GERD (gastroesophageal reflux disease);  Heart failure ([facility] Infectious disease; Liver disease; Morbid obesity ([facility] Neurological disorder; Psychiatric disorder; PUD (peptic ulcer disease); Seizures ([facility] Sleep disorder; Stroke [facility]); Thromboembolus ([facility] Thyroid disease; or Unspecified sleep apnea. Mr. Germania Licea  has a past surgical history that includes hx cholecystectomy; hc port life sngle lumen 5013; hc penile impl dura ii positinble; hx other surgical; and hx vascular access.   Social History/Living Environment:   Home Environment: Private residence  One/Two Story Residence: Two story  Living Alone: No  Support Systems: Spouse/Significant Other/Partner  Patient Expects to be Discharged to[de-identified] Private residence  Current DME Used/Available at Home: None  Prior Level of Function/Work/Activity:     Number of Personal Factors/Comorbidities that affect the Plan of Care: Brief history (0):  LOW COMPLEXITY   ASSESSMENT OF OCCUPATIONAL PERFORMANCE[de-identified]   Activities of Daily Living:           Basic ADLs (From Assessment) Complex ADLs (From Assessment)   Feeding: Independent  Oral Facial Hygiene/Grooming: Independent  Bathing: Independent  Upper Body Dressing: Independent  Lower Body Dressing: Supervision  Toileting: Supervision Instrumental ADL  Meal Preparation: Minimum assistance  Homemaking: Minimum assistance   Grooming/Bathing/Dressing Activities of Daily Living     Cognitive Retraining  Safety/Judgement: Awareness of environment                 Functional Transfers  Toilet Transfer : Supervision  Shower Transfer: Supervision     Bed/Mat Mobility  Rolling: Modified independent  Supine to Sit: Independent  Sit to Supine: Independent  Sit to Stand: Supervision  Bed to Chair: Supervision  Scooting: Independent       Most Recent Physical Functioning:   Gross Assessment:                  Posture:  Posture (WDL): Within defined limits  Balance:  Sitting: Intact  Standing: Impaired  Standing - Static: Good  Standing - Dynamic : Good Bed Mobility:  Rolling: Modified independent  Supine to Sit: Independent  Sit to Supine: Independent  Scooting: Independent  Wheelchair Mobility:     Transfers:  Sit to Stand: Supervision  Stand to Sit: Supervision  Bed to Chair: Supervision       Patient Vitals for the past 6 hrs:   BP SpO2 Pulse   18 0806 133/74 99 % 68       Mental Status  Neurologic State: Alert  Orientation Level: Oriented X4  Cognition: Follows commands  Perception: Appears intact  Perseveration: No perseveration noted  Safety/Judgement: Awareness of environment                          Physical Skills Involved:  1. Strength  2. Activity Tolerance  3. Pain (Chronic) Cognitive Skills Affected (resulting in the inability to perform in a timely and safe manner):  1. None Psychosocial Skills Affected:  1. No deficits   Number of elements that affect the Plan of Care: 1-3:  LOW COMPLEXITY   CLINICAL DECISION MAKIN[address] [place] [address] AM-PAC 6 Clicks   Daily Activity Inpatient Short Form  How much help from another person does the patient currently need. .. Total A Lot A Little None   1. Putting on and taking off regular lower body clothing? [] 1   [] 2   [x] 3   [] 4   2. Bathing (including washing, rinsing, drying)? [] 1   [] 2   [] 3   [x] 4   3. Toileting, which includes using toilet, bedpan or urinal?   [] 1   [] 2   [x] 3   [] 4   4. Putting on and taking off regular upper body clothing? [] 1   [] 2   [] 3   [x] 4   5. Taking care of personal grooming such as brushing teeth? [] 1   [] 2   [] 3   [x] 4   6. Eating meals? [] 1   [] 2   [] 3   [x] 4   © , Trustees of [address] [place] [id number], under license to [employer]. All rights reserved      Score:  Initial: CJ Most Recent: X (Date: -- )    Interpretation of Tool:  Represents activities that are increasingly more difficult (i.e. Bed mobility, Transfers, Gait). Score 24 23 22-20 19-15 14-10 9-7 6     Modifier CH CI CJ CK CL CM CN      ?  Self Care:     - CURRENT STATUS: CJ - 20%-39% impaired, limited or restricted    - GOAL STATUS: CI - 1%-19% impaired, limited or restricted    - D/C STATUS:  ---------------To be determined---------------  [place] / Product Type: Medicare /      Medical Necessity:     · Skilled intervention continues to be required due to Deficits listed above. Reason for Services/Other Comments:  · Patient continues to require skilled intervention due to medical complications and patient unable to attend/participate in therapy as expected. Use of outcome tool(s) and clinical judgement create a POC that gives a: LOW COMPLEXITY         TREATMENT:   (In addition to Assessment/Re-Assessment sessions the following treatments were rendered)     Pre-treatment Symptoms/Complaints:    Pain: Initial:   Pain Intensity 1: 7  Pain Location 1: Ankle  Pain Orientation 1: Left  Post Session:  7     Assessment/Reassessment only, no treatment provided today    Braces/Orthotics/Lines/Etc:   · O2 Device: Room air  Treatment/Session Assessment:    · Response to Treatment:  Good  · Interdisciplinary Collaboration:   o Physical Therapist  o Occupational Therapist  o Registered Nurse  · After treatment position/precautions:   o Up in chair  o Bed/Chair-wheels locked  o Call light within reach   · Compliance with Program/Exercises: Will assess as treatment progresses. · Recommendations/Intent for next treatment session: \"Next visit will focus on advancements to more challenging activities and reduction in assistance provided\".   Total Treatment Duration:  OT Patient Time In/Time Out  Time In: 1030  Time Out: SOBIA [place]

## 2018-05-02 NOTE — PROGRESS NOTES
Problem: Mobility Impaired (Adult and Pediatric)  Goal: *Acute Goals and Plan of Care (Insert Text)  STG:  (1.)Mr. Rayshawn Ivory will transfer from bed to chair and chair to bed with MODIFIED INDEPENDENCE using the least restrictive device within 4 treatment day(s). (2.)Mr. Rayshawn Ivory will ambulate with MODIFIED INDEPENDENCE for >150 feet with the least restrictive device within 4 treatment day(s). (3.)Mr. Rayshawn Ivory will have LLE ankle PROM and AROM WFL and be able to increase his WB for transfers and gait within 4 treatment day(s)        PHYSICAL THERAPY: Initial Assessment, Treatment Day: Day of Assessment, AM 5/2/2018  INPATIENT: Hospital Day: 2  [place] / Product Type: Medicare /      NAME/AGE/GENDER: Anthony Prado is a 37 y.o. male   PRIMARY DIAGNOSIS: Sickle cell crisis (HCC)  Generalized weakness  Body aches  Dehydration <principal problem not specified> <principal problem not specified>        ICD-10: Treatment Diagnosis:    · Generalized Muscle Weakness (M62.81)  · Difficulty in walking, Not elsewhere classified (R26.2)   Precaution/Allergies:  Compazine [prochlorperazine edisylate]; Morphine; Reglan [metoclopramide]; and Zofran [ondansetron hcl (pf)]      ASSESSMENT:     Mr. Rayshawn vIory is a 37year old AAM with an admitting diagnosis of Sickle cell crisis. He presents today sitting up in the bed agreeable to have therapy. He states he lives with his wife in a 2 story house with 1-2 steps to enter. He reports that prior to this admission he has been functioning independently without an assistive device. He reports his LLE ankle has pain and swelling that has been present for a couple of weeks and now is limiting how much weight he can tolerate on his foot. He is independent with his bed mobility with good sitting balance. His BLE AROM is WFL with exception of the LLE ankle which is too painful to move. Sit to stand is supervision using the r/walker for BUE support.   He ambulated 48' using the r/walker with supervision and a step too gait pattern with no real weight bearing on his LLE ankle. His gait is steady and safe using the r/walker and once his pain and swelling resolve in his LLE ankle, he should be able to ambulate on his own without an assistive device. Mr. Ross Sutherland is pleasant and cooperative but is concerned about his ankle and his ability to no put much weight on it secondary to pain. Mr. Ross Sutherland would benefit from continued skilled PT to maximize his functional mobility. This section established at most recent assessment   PROBLEM LIST (Impairments causing functional limitations):  1. Decreased Strength  2. Decreased Ambulation Ability/Technique  3. Increased Pain  4. Decreased Activity Tolerance  5. Decreased Flexibility/Joint Mobility  6. Edema/Girth   INTERVENTIONS PLANNED: (Benefits and precautions of physical therapy have been discussed with the patient.)  1. Gait Training  2. Therapeutic Activites  3. Therapeutic Exercise/Strengthening  4. Transfer Training     TREATMENT PLAN: Frequency/Duration: 3 times a week for duration of hospital stay  Rehabilitation Potential For Stated Goals: Good     RECOMMENDED REHABILITATION/EQUIPMENT: (at time of discharge pending progress): Due to the probability of continued deficits (see above) this patient will not likely need continued skilled physical therapy after discharge. Equipment:    Walkers, Type: Rolling Walker  Only needed if his LLE ankle remains painful and too swollen for ambulation without an assistive device. HISTORY:   History of Present Injury/Illness (Reason for Referral):  Patient is 37years old  [place] male with pmhx of Sickle cell disease, iron overload, HTN, pSVT, chronic pain presented to ER with chief complaints of worsening of generalized body pain which is similar to his usual sickle cell crisis. Pt also complains of nausea/vomiting.  He denies any SOB, palpitations, cough, sore throat, rhinorrhea, diarrhea, urinary complaints. Pt reports that home pain meds are not controlling the pain. In ER, CXR showed no acute cardiopulmonary abnormalities concerning for acute chest syndrome. Pt is being admitted for acute sickle cell crisis.     Past Medical History/Comorbidities:   Mr. Nicolasa Davis  has a past medical history of Chronic pain; HTN (hypertension); Ill-defined condition; Iron overload due to repeated red blood cell transfusions (1/18/2017); Paroxysmal SVT (supraventricular tachycardia) (HCC) (7/9/2016); and Sickle cell disease ([address] He also has no past medical history of Aneurysm ([facility] [address] Arthritis; Asthma; Autoimmune disease ([facility] [address] CAD (coronary artery disease); Cancer ([facility] [address] Chronic kidney disease; Chronic obstructive pulmonary disease ([address] COPD; DEMENTIA; Diabetes ([facility] [address] Endocrine disease; GERD (gastroesophageal reflux disease); Heart failure ([facility] [address] Infectious disease; Liver disease; Morbid obesity ([address] Neurological disorder; Psychiatric disorder; PUD (peptic ulcer disease); Seizures ([facility] [address] Sleep disorder; Stroke [facility]); Thromboembolus ([facility] [address] Thyroid disease; or Unspecified sleep apnea. Mr. Nicolasa Davis  has a past surgical history that includes hx cholecystectomy; hc port life sngle lumen 5013; hc penile impl dura ii positinble; hx other surgical; and hx vascular access. Social History/Living Environment:   Home Environment: Private residence  One/Two Story Residence: Two story  Living Alone: No  Support Systems: Spouse/Significant Other/Partner  Patient Expects to be Discharged to[de-identified] Private residence  Current DME Used/Available at Home: None  Prior Level of Function/Work/Activity:  He reports that he has been functioning independently without an assistive device prior to this admission.      Number of Personal Factors/Comorbidities that affect the Plan of Care: 1-2: MODERATE COMPLEXITY   EXAMINATION:   Most Recent Physical Functioning:   Gross Assessment:  AROM: Within functional limits (with exception of his LLE ankle)  PROM: Within functional limits (with exception of the LLE ankle)  Strength: Within functional limits  Coordination: Within functional limits  Tone: Normal  Sensation: Intact               Posture:  Posture (WDL): Within defined limits  Balance:  Sitting: Intact  Standing: Impaired  Standing - Static: Good;Constant support  Standing - Dynamic : Good Bed Mobility:  Rolling: Modified independent  Supine to Sit: Independent  Sit to Supine: Independent  Scooting: Independent  Wheelchair Mobility:     Transfers:  Sit to Stand: Supervision  Stand to Sit: Supervision  Bed to Chair: Supervision  Gait:     Base of Support: Narrowed  Speed/Lillian: Pace decreased (<100 feet/min)  Step Length: Left shortened;Right shortened  Gait Abnormalities: Decreased step clearance; Step to gait; Antalgic  Distance (ft): 50 Feet (ft)  Assistive Device: Walker, rolling (only TDWB on the LLE ankle secondary to pain)  Ambulation - Level of Assistance: Supervision  Interventions: Safety awareness training      Body Structures Involved:  1. Metabolic  2. Endocrine  3. Joints Body Functions Affected:  1. Neuromusculoskeletal  2. Metobolic/Endocrine Activities and Participation Affected:  1. General Tasks and Demands  2. Mobility   Number of elements that affect the Plan of Care: 1-2: LOW COMPLEXITY   CLINICAL PRESENTATION:   Presentation: Stable and uncomplicated: LOW COMPLEXITY   CLINICAL DECISION MAKIN [facility] [address] [id number] AM-PAC 6 Clicks   Basic Mobility Inpatient Short Form  How much difficulty does the patient currently have. .. Unable A Lot A Little None   1. Turning over in bed (including adjusting bedclothes, sheets and blankets)? [] 1   [] 2   [] 3   [x] 4   2. Sitting down on and standing up from a chair with arms ( e.g., wheelchair, bedside commode, etc.)   [] 1   [] 2   [] 3   [x] 4   3. Moving from lying on back to sitting on the side of the bed?    [] 1   [] 2   [] 3   [x] 4   How much help from another person does the patient currently need. .. Total A Lot A Little None   4. Moving to and from a bed to a chair (including a wheelchair)? [] 1   [] 2   [x] 3   [] 4   5. Need to walk in hospital room? [] 1   [] 2   [x] 3   [] 4   6. Climbing 3-5 steps with a railing? [] 1   [] 2   [x] 3   [] 4   © 2007, Trustees of [address] [place] [address] [address] [id number], under license to [employer]. All rights reserved      Score:  Initial: 21 Most Recent: X (Date: -- )    Interpretation of Tool:  Represents activities that are increasingly more difficult (i.e. Bed mobility, Transfers, Gait). Score 24 23 22-20 19-15 14-10 9-7 6     Modifier CH CI CJ CK CL CM CN      ? Mobility - Walking and Moving Around:     - CURRENT STATUS: CJ - 20%-39% impaired, limited or restricted    - GOAL STATUS: CI - 1%-19% impaired, limited or restricted    - D/C STATUS:  ---------------To be determined---------------  [place] / [place] / Product Type: Medicare /      Medical Necessity:     · Patient is expected to demonstrate progress in strength, balance and functional technique to increase independence with transfers and gait, with and without an assistive device. Reason for Services/Other Comments:  · Patient continues to require skilled intervention due to medical complications. Use of outcome tool(s) and clinical judgement create a POC that gives a: Clear prediction of patient's progress: LOW COMPLEXITY            TREATMENT:   (In addition to Assessment/Re-Assessment sessions the following treatments were rendered)   Pre-treatment Symptoms/Complaints:  Patient complained of LLE ankle pain and swelling that has been going on for a couple of weeks but has gotten to the point that he cannot put weight on it very well. Pain: Initial:   Pain Intensity 1: 7  Pain Location 1: Generalized  Post Session:  7/10  Pain meds provided.      Assessment/Reassessment only, no treatment provided today    Braces/Orthotics/Lines/Etc:   · IV  · O2 Device: Room air  Treatment/Session Assessment:    · Response to Treatment:  Patient participated and tolerated therapy well but his LLE ankle pain and swelling limit his mobility at this time. · Interdisciplinary Collaboration:   o Physical Therapist  o Registered Nurse  · After treatment position/precautions:   o Up in chair  o Bed/Chair-wheels locked  o Call light within reach  o RN notified   · Compliance with Program/Exercises: Will assess as treatment progresses. · Recommendations/Intent for next treatment session: \"Next visit will focus on advancements to more challenging activities and reduction in assistance provided\".   Total Treatment Duration:  PT Patient Time In/Time Out  Time In: 0927  Time Out: [address] [facility]

## 2018-05-02 NOTE — PROGRESS NOTES
HOSPITALIST Progress notes  NAME:  Mikhail Qureshi   Age:  37 y.o.  :   1975   MRN:   [id number]  PCP: Javed Hartmann MD  Consulting MD:  Treatment Team: Attending Provider: Cresencio Garduno DO; Attending Provider: Jean Green MD; Consulting Provider: Pati Jacobs MD; Utilization Review: Romulo Rosenberg; Care Manager: Clifton Garza RN  HPI:   As previously documented\"  \"Patient is 37years old  [place] male with pmhx of Sickle cell disease, iron overload, HTN, pSVT, chronic pain presented to ER with chief complaints of worsening of generalized body pain which is similar to his usual sickle cell crisis. Pt also complains of nausea/vomiting. He denies any SOB, palpitations, cough, sore throat, rhinorrhea, diarrhea, urinary complaints. Pt reports that home pain meds are not controlling the pain. In ER, CXR showed no acute cardiopulmonary abnormalities concerning for acute chest syndrome. Pt is being admitted for acute sickle cell crisis. \"    2018- seen - still complaining of pain - has outpatient appointment with his hematologist at [facility] which he called and told them he will have to re-schedule    Complete ROS done and is as stated in HPI or otherwise negative  Past Medical History:   Diagnosis Date    Chronic pain     HTN (hypertension)     Ill-defined condition     sickle cell    Iron overload due to repeated red blood cell transfusions 2017    Paroxysmal SVT (supraventricular tachycardia) ([address] 2016    Sickle cell disease ([address]       Past Surgical History:   Procedure Laterality Date    HC PENILE IMPL DURA II POSITINBLE      HC PORT LIFE SNGLE LUMEN 5013      to L CW    HX CHOLECYSTECTOMY      HX OTHER SURGICAL      penile inplant    HX VASCULAR ACCESS        Prior to Admission Medications   Prescriptions Last Dose Informant Patient Reported? Taking? deferasirox (JADENU) 360 mg tab   No No   Sig: Take 5 Tabs by mouth daily.    folic acid (FOLVITE) 1 mg tablet  Self Yes No   Sig: Take 1 mg by mouth daily. hydroxyurea (HYDREA) 500 mg capsule   Yes No   Sig: Take 500 mg by mouth two (2) times a day. Takes in am at 0900   lisinopril (PRINIVIL, ZESTRIL) 5 mg tablet   Yes No   Sig: Take 5 mg by mouth daily. Indications: HYPERTENSION   magnesium oxide (MAG-OX) 400 mg tablet   No No   Sig: Take 1 Tab by mouth daily. metoprolol (LOPRESSOR) 25 mg tablet   Yes No   Sig: Take 25 mg by mouth two (2) times a day. Indications: HYPERTENSION   oxyCODONE ER (OXYCONTIN) 80 mg ER tablet   No No   Sig: Take 1 Tab by mouth every twelve (12) hours. Max Daily Amount: 160 mg.   oxyCODONE IR (OXY-IR) 30 mg immediate release tablet   No No   Sig: Take 1 Tab by mouth every four (4) hours as needed for Pain. Max Daily Amount: 180 mg.   promethazine (PHENERGAN) 25 mg tablet   No No   Sig: Take 1 Tab by mouth every six (6) hours as needed.  May substitute suppository if vomiting      Facility-Administered Medications: None     Allergies   Allergen Reactions    Compazine [Prochlorperazine Edisylate] Other (comments)     Also makes him feel funny    Morphine Other (comments)     Makes him feel funny    Reglan [Metoclopramide] Other (comments)     \"feel funny\"    Zofran [Ondansetron Hcl (Pf)] Other (comments)     Make him feel funny      Social History   Substance Use Topics    Smoking status: Never Smoker    Smokeless tobacco: Never Used    Alcohol use No      Family History   Problem Relation Age of Onset    Hypertension Other     Diabetes Father     Stroke Father     Sickle Cell Anemia Sister       Objective:     Visit Vitals    /82    Pulse 62    Temp 98.7 °F (37.1 °C)    Resp 18    Ht 5' 10\" (1.778 m)    Wt 66.2 kg (146 lb)    SpO2 97%    BMI 20.95 kg/m2      Temp (24hrs), Av.5 °F (36.9 °C), Min:98.1 °F (36.7 °C), Max:98.7 °F (37.1 °C)    Oxygen Therapy  O2 Sat (%): 97 % (18 1130)  Pulse via Oximetry: 62 beats per minute (18 1401)  O2 Device: Room air (05/01/18 0255)  Physical Exam:  General:    Alert, awake, NAD     HEENT:          Head NCAT, PERRLA+, no pallor or ict, MM dry  Lungs:   CTAB/L  Heart:   Regular rate and rhythm,  no murmur, rub or gallop. Abdomen:   Soft, non-tender. Not distended. Bowel sounds normal.   Extremities: No cyanosis. No edema. No clubbing  Skin:     Texture, turgor normal. No rashes or lesions. Not Jaundiced  Neurologic: GCS 15, no motor or sensory deficits, CN 2-12 intact  Psych:             AOx 3, mood and affect appropriate  Lymphatics:    No LAD  Data Review:   Recent Results (from the past 24 hour(s))   METABOLIC PANEL, COMPREHENSIVE    Collection Time: 05/02/18  6:39 AM   Result Value Ref Range    Sodium 140 136 - 145 mmol/L    Potassium 4.5 3.5 - 5.1 mmol/L    Chloride 108 (H) 98 - 107 mmol/L    CO2 23 21 - 32 mmol/L    Anion gap 9 7 - 16 mmol/L    Glucose 107 (H) 65 - 100 mg/dL    BUN 7 6 - 23 MG/DL    Creatinine 0.76 (L) 0.8 - 1.5 MG/DL    GFR est AA >60 >60 ml/min/1.73m2    GFR est non-AA >60 >60 ml/min/1.73m2    Calcium 8.3 8.3 - 10.4 MG/DL    Bilirubin, total 0.4 0.2 - 1.1 MG/DL    ALT (SGPT) 77 (H) 12 - 65 U/L    AST (SGOT) 43 (H) 15 - 37 U/L    Alk.  phosphatase 85 50 - 136 U/L    Protein, total 7.6 6.3 - 8.2 g/dL    Albumin 3.2 (L) 3.5 - 5.0 g/dL    Globulin 4.4 (H) 2.3 - 3.5 g/dL    A-G Ratio 0.7 (L) 1.2 - 3.5     CBC W/O DIFF    Collection Time: 05/02/18  6:39 AM   Result Value Ref Range    WBC 4.5 4.3 - 11.1 K/uL    RBC 2.49 (L) 4.23 - 5.67 M/uL    HGB 7.5 (L) 13.6 - 17.2 g/dL    HCT 23.0 (L) 41.1 - 50.3 %    MCV 92.4 79.6 - 97.8 FL    MCH 30.1 26.1 - 32.9 PG    MCHC 32.6 31.4 - 35.0 g/dL    RDW 24.3 (H) 11.9 - 14.6 %    PLATELET 62 (L) 657 - 450 K/uL    MPV Cannot be calculated 10.8 - 14.1 FL   DIFFERENTIAL, AUTO    Collection Time: 05/02/18  6:39 AM   Result Value Ref Range    NEUTROPHILS 7 (L) 43 - 78 %    LYMPHOCYTES 89 (H) 13 - 44 %    MONOCYTES 2 (L) 4.0 - 12.0 %    EOSINOPHILS 2 0.5 - 7.8 % BASOPHILS 0 0.0 - 2.0 %    ABS. NEUTROPHILS 0.3 (L) 1.7 - 8.2 K/UL    ABS. LYMPHOCYTES 4.1 0.5 - 4.6 K/UL    ABS. MONOCYTES 0.1 0.1 - 1.3 K/UL    ABS. EOSINOPHILS 0.1 0.0 - 0.8 K/UL    ABS. BASOPHILS 0.0 0.0 - 0.2 K/UL    DF AUTOMATED      IMMATURE GRANULOCYTES 0 0.0 - 5.0 %    ABS. IMM. GRANS. 0.0 0.0 - 0.5 K/UL   RETICULOCYTE COUNT    Collection Time: 05/02/18  6:39 AM   Result Value Ref Range    Reticulocyte count 0.4 0.3 - 2.0 %    Absolute Retic Cnt. 0.0104 (L) 0.026 - 0.095 M/ul    Immature Retic Fraction 2.2 (L) 2.3 - 13.4 %    Retic Hgb Conc. 34 29 - 35 pg   POTASSIUM    Collection Time: 05/02/18 10:19 AM   Result Value Ref Range    Potassium 4.2 3.5 - 5.1 mmol/L     Imaging /Procedures /Studies   I have personally reviewed the chest xray. No acute parenchymal process suggestive of acute chest syndrome appreciated. /  Assessment and Plan: Active Hospital Problems    Diagnosis Date Noted    Dehydration 05/01/2018    Generalized weakness 05/01/2018    Body aches 05/01/2018    Sickle cell crisis ([facility] [address] 04/05/2016       PLAN  · Continue mgt for acute sickle cell crisis with dehydration and generalized weakness. · Start on IV NS@ 125 cc/hr, switch to half NS after 24 hours  · Continue on IV dilaudid 2 mg q4h with phenergan for nausea/vomiting- pt told we will be alternating between IV and po pain meds- he is tolerating a diet.    · Continue home roxicodone IR and ER as ordered  · Continue to hold for hydroxyurea for ANC of 300 and platelets of 28Y- per hematology recommendations  · Hematology team consulted for evaluation  · Restart lopressor for HTN, prn hydralazine for SBP > 812  · Continue folic acid and Janedu  · DVT prophylaxis with lovenox  ·     Code Status: full  High risk with opioids on board    Anticipated discharge: >2 MN    Signed By: Adilia Santiago MD     May 2, 2018

## 2018-05-02 NOTE — CONSULTS
[facility] Hematology & Oncology        Inpatient Hematology / Oncology Consult Note    Reason for Consult:  Sickle cell crisis [facility])  Generalized weakness  Body aches  Dehydration  Referring Physician:  Shawn Tang*    History of Present Illness:  Mr. Gabrielle Huber is a 37 y.o. male admitted on 5/1/2018 with a primary diagnosis of The primary encounter diagnosis was Hb-SS disease with crisis ([facility] [address] Diagnoses of Other drug-induced neutropenia ([facility] [address] Thrombocytopenia ([facility] [address] and Intractable pain were also pertinent to this visit. .      Mr. Gabrielle Huber is a 37 y.o. male admitted on 5/1/2018. He is a patient of Dr. Kaylynn Bush at [facility] with sickle cell disease, hgb S/beta plus thalassemia. Currently maintained on hydrea 500mg BID and jadenu 1800 daily, oxycontin and oxycodone. He presented to ED on Sunday for workup of pain in left lower extremity which was negative for DVT and sent home once pain relieved. He returned yesterday for sickle cell crisis pain and was admitted for symptom management. We are consulted for recommendations. Review of Systems:  Constitutional Denies fever, chills, weight loss, appetite changes, fatigue   HEENT Denies trauma, blurry vision, hearing loss, ear pain, nosebleeds, sore throat, neck pain    Skin Denies lesions or rashes. Lungs Denies dyspnea, cough, sputum production or hemoptysis. Cardiovascular Denies chest pain, palpitations, or lower extremity edema. Neuro Denies headaches, visual changes or ataxia. Denies dizziness, tingling, tremors, sensory change, speech change, focal weakness or headaches. MSK Denies back pain. Complains of bilateral lower extremity pain. Pain in left ankle. Psychiatric/Behavioral The patient is not nervous/anxious.          Allergies   Allergen Reactions    Compazine [Prochlorperazine Edisylate] Other (comments)     Also makes him feel funny    Morphine Other (comments)     Makes him feel funny    Reglan [Metoclopramide] Other (comments)     \"feel funny\"    Zofran [Ondansetron Hcl (Pf)] Other (comments)     Make him feel funny     Past Medical History:   Diagnosis Date    Chronic pain     HTN (hypertension)     Ill-defined condition     sickle cell    Iron overload due to repeated red blood cell transfusions 1/18/2017    Paroxysmal SVT (supraventricular tachycardia) ([facility] [address] 7/9/2016    Sickle cell disease ([facility] [address]      Past Surgical History:   Procedure Laterality Date    HC PENILE IMPL DURA II POSITINBLE      HC PORT LIFE SNGLE LUMEN 5013      to L CW    HX CHOLECYSTECTOMY      HX OTHER SURGICAL      penile inplant    HX VASCULAR ACCESS       Family History   Problem Relation Age of Onset    Hypertension Other     Diabetes Father     Stroke Father     Sickle Cell Anemia Sister      Social History     Social History    Marital status:      Spouse name: N/A    Number of children: N/A    Years of education: N/A     Occupational History    Not on file.      Social History Main Topics    Smoking status: Never Smoker    Smokeless tobacco: Never Used    Alcohol use No    Drug use: No    Sexual activity: Yes     Other Topics Concern    Not on file     Social History Narrative     Current Facility-Administered Medications   Medication Dose Route Frequency Provider Last Rate Last Dose    0.45% sodium chloride infusion  125 mL/hr IntraVENous CONTINUOUS Randeen Organ,  mL/hr at 05/02/18 0755 125 mL/hr at 05/02/18 0755    deferasirox (JADENU) tablet 1,800 mg (Patient Supplied)  1,800 mg Oral DAILY Mary Hassan MD   Stopped at 46/21/79 7760    folic acid (FOLVITE) tablet 1 mg  1 mg Oral DAILY Mary Hassan MD   1 mg at 05/02/18 0836    magnesium oxide (MAG-OX) tablet 400 mg  400 mg Oral DAILY Mary Hassan MD   400 mg at 05/02/18 0836    metoprolol tartrate (LOPRESSOR) tablet 25 mg  25 mg Oral BID Mary Hassan MD   25 mg at 05/02/18 0836    oxyCODONE ER (OxyCONTIN) tablet 80 mg  80 mg Oral Q12H Petey Dominguez MD   80 mg at 18 2234    oxyCODONE IR (OXY-IR) immediate release tablet 30 mg  30 mg Oral Q4H PRN Petey Dominguez MD        sodium chloride (NS) flush 5-10 mL  5-10 mL IntraVENous Q8H Petey Dominguez MD   Stopped at 18 2200    sodium chloride (NS) flush 5-10 mL  5-10 mL IntraVENous PRN Petey Dominguez MD        tuberculin injection 5 Units  5 Units IntraDERMal ONCE Petey Dominguez MD   Stopped at 18 1135    acetaminophen (TYLENOL) tablet 650 mg  650 mg Oral Q6H PRN Petey Dominguez MD        HYDROmorphone (PF) (DILAUDID) injection 2 mg  2 mg IntraVENous Q3H PRN Petey Dominguez MD   2 mg at 18 0750    naloxone [facility]) injection 0.4 mg  0.4 mg IntraVENous PRN Petey Dominguez MD        diphenhydrAMINE (BENADRYL) injection 12.5 mg  12.5 mg IntraVENous Q4H PRN Petey Dominguez MD        magnesium hydroxide (MILK OF MAGNESIA) 400 mg/5 mL oral suspension 30 mL  30 mL Oral DAILY PRN Petey Dominguez MD        [facility]) with saline injection 12.5 mg  12.5 mg IntraVENous Q4H PRN Petey Dominguez MD   12.5 mg at 18 0750    hydrALAZINE (APRESOLINE) 20 mg/mL injection 20 mg  20 mg IntraVENous Q6H PRN Petey Dominguez MD        enoxaparin (LOVENOX) injection 30 mg  30 mg SubCUTAneous Q24H Petey Dominguez MD           OBJECTIVE:  Patient Vitals for the past 8 hrs:   BP Temp Pulse Resp SpO2   18 0806 133/74 98.4 °F (36.9 °C) 68 18 99 %   18 0325 155/75 98.6 °F (37 °C) 65 18 95 %     Temp (24hrs), Av.4 °F (36.9 °C), Min:98.1 °F (36.7 °C), Max:98.6 °F (37 °C)     07  In: 1021 [I.V.:1021]  Out: -     Physical Exam:  Constitutional: Well developed, well nourished male in no acute distress, sitting comfortably bedside chair    HEENT: Normocephalic and atraumatic. Oropharynx is clear, mucous membranes are moist. Extraocular muscles are intact. Sclerae anicteric. Skin Warm and dry. No bruising and no rash noted.   No erythema. No pallor. Respiratory Lungs are clear to auscultation bilaterally without wheezes, rales or rhonchi, normal air exchange without accessory muscle use. CVS Normal rate, regular rhythm and normal S1 and S2. No murmurs, gallops, or rubs. Abdomen Soft, nontender and nondistended, normoactive bowel sounds. Neuro Grossly nonfocal with no obvious sensory or motor deficits. MSK Normal range of motion in general.  Left ankle pain. Psych Appropriate mood and affect. Labs:    Recent Results (from the past 24 hour(s))   CBC W/O DIFF    Collection Time: 05/02/18  6:39 AM   Result Value Ref Range    WBC 4.5 4.3 - 11.1 K/uL    RBC 2.49 (L) 4.23 - 5.67 M/uL    HGB 7.5 (L) 13.6 - 17.2 g/dL    HCT 23.0 (L) 41.1 - 50.3 %    MCV 92.4 79.6 - 97.8 FL    MCH 30.1 26.1 - 32.9 PG    MCHC 32.6 31.4 - 35.0 g/dL    RDW 24.3 (H) 11.9 - 14.6 %    PLATELET 62 (L) 735 - 450 K/uL    MPV Cannot be calculated 10.8 - 14.1 FL   DIFFERENTIAL, AUTO    Collection Time: 05/02/18  6:39 AM   Result Value Ref Range    NEUTROPHILS 7 (L) 43 - 78 %    LYMPHOCYTES 89 (H) 13 - 44 %    MONOCYTES 2 (L) 4.0 - 12.0 %    EOSINOPHILS 2 0.5 - 7.8 %    BASOPHILS 0 0.0 - 2.0 %    ABS. NEUTROPHILS 0.3 (L) 1.7 - 8.2 K/UL    ABS. LYMPHOCYTES 4.1 0.5 - 4.6 K/UL    ABS. MONOCYTES 0.1 0.1 - 1.3 K/UL    ABS. EOSINOPHILS 0.1 0.0 - 0.8 K/UL    ABS. BASOPHILS 0.0 0.0 - 0.2 K/UL    DF AUTOMATED      IMMATURE GRANULOCYTES 0 0.0 - 5.0 %    ABS. IMM.  GRANS. 0.0 0.0 - 0.5 K/UL   RETICULOCYTE COUNT    Collection Time: 05/02/18  6:39 AM   Result Value Ref Range    Reticulocyte count 0.4 0.3 - 2.0 %    Absolute Retic Cnt. 0.0104 (L) 0.026 - 0.095 M/ul    Immature Retic Fraction 2.2 (L) 2.3 - 13.4 %    Retic Hgb Conc. 34 29 - 35 pg       Imaging:  @[email or web address]@    ASSESSMENT:  Problem List  Date Reviewed: 4/29/2018          Codes Class Noted    Dehydration ICD-10-CM: E86.0  ICD-9-CM: 276.51  5/1/2018        Generalized weakness ICD-10-CM: R53.1  ICD-9-CM: 780.79  5/1/2018        Body aches ICD-10-CM: R52  ICD-9-CM: 780.96  5/1/2018        Anemia ICD-10-CM: D64.9  ICD-9-CM: 285.9  11/12/2017        Sickle cell disease ([facility] ICD-10-CM: D57.1  ICD-9-CM: 282.60  7/13/2017        Iron overload due to repeated red blood cell transfusions ICD-10-CM: E83.111  ICD-9-CM: 275.02  1/18/2017        Sickle cell anemia with crisis [facility]) ICD-10-CM: D57.00  ICD-9-CM: 282.62  1/16/2017        Paroxysmal SVT (supraventricular tachycardia) ([facility]) ICD-10-CM: I47.1  ICD-9-CM: 427.0  7/9/2016        Hypomagnesemia ICD-10-CM: E83.42  ICD-9-CM: 275.2  7/9/2016        Sickle cell anemia ([facility] ICD-10-CM: D57.1  ICD-9-CM: 282.60  4/6/2016        Sickle cell crisis ([facility] ICD-10-CM: D57.00  ICD-9-CM: 282.62  4/5/2016        leukocytosis - most likely reactive ICD-10-CM: D72.829  ICD-9-CM: 288.60  1/30/2016        Diarrhea ICD-10-CM: R19.7  ICD-9-CM: 787.91  9/27/2014        Sickle cell pain crisis ([facility] ICD-10-CM: D57.00  ICD-9-CM: 282.62  10/25/2012        Hyperbilirubinemia ICD-10-CM: E80.6  ICD-9-CM: 782.4  9/20/2012    Overview Signed 9/20/2012  1:37 PM by Reji Damon     Related to sickle cell crisis             Essential hypertension, benign ICD-10-CM: I10  ICD-9-CM: 401.1  6/23/2012        Hemolytic crisis ([facility] ICD-10-CM: D65  ICD-9-CM: 286.6  3/24/2012    Overview Signed 3/24/2012  2:53 PM by Quita Stockton     Sickle cell with anemia             HTN (hypertension) (Chronic) ICD-10-CM: I10  ICD-9-CM: 401.9  3/2/2012        Leukocytosis - chronic reactive ICD-10-CM: I30.234  ICD-9-CM: 288.60  9/16/2011                RECOMMENDATIONS:  Sickle cell anemia, hgb s/beta plus thalassemia  5/2 Continue Jadenu. Continue to hold hydrea due to low platelets. Daily CBC with diff.      SS Pain crisis  5/2 Continues oxycontin with oxycodone IR with the addition of dilaudid 2mg every 3 hours PRN. Change fluids to 1/2NS at 150ml/hr    Leukopenia  5/2 ANC 0.3.  If patients becomes febrile may consider granix. Lab studies were personally reviewed. Thank you for allowing us to participate in the care of Mr. Cabrera. Hold hydrea until patient has follow up with primary hematologist at [facility], Dr. Audie Mcburney, within 1 week of discharge. We will sign off. Please call if we can be of further assistance. Thank you for allowing us to participate in the care of Mr. Cabrera. [facility]  [address] [place] [address]  Office : [phone]  Fax : [phone]         Attending Addendum:  I personally evaluated the patient with Tessa Diop N.P.,  and agree with the assessment, findings and plan as documented. Appears stable, heart regular without murmur, lungs clear, abdomen benign. Middle aged gentleman h/o Hgb S/Beta thal, followed by [facility] hematology, now admitted w pain crisis. No evidence of infection. Supportive care as outlined above including generous hydration as well as pain control. His low ANC and thrombocytopenia is probably related to hydrea and should be monitored for improvement. If pt becomes febrile or clinically declines than GCSF support w/ granix can be considered. Thank you for allowing us to participate in care of this pleasant patient. Please call w any questions.                 Lakisha Leiva MD  [employer] [address]  Office : [phone]  Fax : [phone]

## 2018-05-02 NOTE — PROGRESS NOTES
Problem: Nutrition Deficit  Goal: *Optimize nutritional status  Nutrition  Reason for assessment: Received nutrition consult for general nutrition management and supplements. Assessment:   Diet order(s): Cardiac  Food/Nutrition Patient History:  Pt reprots decreased appetite and intake r/t sickle cell crisis pain and associated vomiting. He says that his decreased appetite usually will last a few more days. Declined offer of Ensure Enlive or Ensure clear but receptive to mighty shake. Anthropometrics:Height: 5' 10\" (177.8 cm),  Weight: 66.2 kg (146 lb), Weight Source: Patient stated, Body mass index is 20.95 kg/(m^2). BMI class of normal weight. Macronutrient needs:  EER:  8453-2506 kcal /day (25-30 kcal/kg stated BW)  EPR:  60-75 grams protein/day (0.8-1 grams/kg IBW)  Intake/Comparative Standards: Per RD meal rounds: <25% of lunch. No other intake data. 1 meal does not represent a trend but pt has the potential to meet  ~25-50% of kcal and ~25-50% of protein needs    Nutrition Diagnosis: Inadequate oral intake r/t decreased ability to consume sufficient oral intake as evidenced by decreased appetite and intake as above    Intervention:  Meals and snacks: Continue current diet. Nutrition Supplement Therapy: Mighty shake bid. Provided pt with one to drink after lunch today.       Cari Moeller, [address], [address] [place] [address], [address] [place] [address]

## 2018-05-03 VITALS
RESPIRATION RATE: 19 BRPM | WEIGHT: 146 LBS | HEART RATE: 73 BPM | TEMPERATURE: 98.9 F | HEIGHT: 70 IN | BODY MASS INDEX: 20.9 KG/M2 | SYSTOLIC BLOOD PRESSURE: 131 MMHG | DIASTOLIC BLOOD PRESSURE: 76 MMHG | OXYGEN SATURATION: 98 %

## 2018-05-03 LAB
ALBUMIN SERPL-MCNC: 3.3 G/DL (ref 3.5–5)
ALBUMIN/GLOB SERPL: 0.8 {RATIO} (ref 1.2–3.5)
ALP SERPL-CCNC: 88 U/L (ref 50–136)
ALT SERPL-CCNC: 73 U/L (ref 12–65)
ANION GAP SERPL CALC-SCNC: 7 MMOL/L (ref 7–16)
AST SERPL-CCNC: 33 U/L (ref 15–37)
BASOPHILS # BLD: 0 K/UL (ref 0–0.2)
BASOPHILS NFR BLD: 1 % (ref 0–2)
BILIRUB SERPL-MCNC: 0.2 MG/DL (ref 0.2–1.1)
BUN SERPL-MCNC: 8 MG/DL (ref 6–23)
CALCIUM SERPL-MCNC: 8.5 MG/DL (ref 8.3–10.4)
CHLORIDE SERPL-SCNC: 107 MMOL/L (ref 98–107)
CO2 SERPL-SCNC: 26 MMOL/L (ref 21–32)
CREAT SERPL-MCNC: 0.82 MG/DL (ref 0.8–1.5)
DIFFERENTIAL METHOD BLD: ABNORMAL
EOSINOPHIL # BLD: 0.1 K/UL (ref 0–0.8)
EOSINOPHIL NFR BLD: 3 % (ref 0.5–7.8)
ERYTHROCYTE [DISTWIDTH] IN BLOOD BY AUTOMATED COUNT: 24 % (ref 11.9–14.6)
GLOBULIN SER CALC-MCNC: 4.1 G/DL (ref 2.3–3.5)
GLUCOSE SERPL-MCNC: 107 MG/DL (ref 65–100)
HCT VFR BLD AUTO: 22.6 % (ref 41.1–50.3)
HGB BLD-MCNC: 7.3 G/DL (ref 13.6–17.2)
HGB RETIC QN AUTO: 29 PG (ref 29–35)
IMM GRANULOCYTES # BLD: 0 K/UL (ref 0–0.5)
IMM GRANULOCYTES NFR BLD AUTO: 0 % (ref 0–5)
IMM RETICS NFR: 2.7 % (ref 2.3–13.4)
LYMPHOCYTES # BLD: 2.7 K/UL (ref 0.5–4.6)
LYMPHOCYTES NFR BLD: 84 % (ref 13–44)
MCH RBC QN AUTO: 29.7 PG (ref 26.1–32.9)
MCHC RBC AUTO-ENTMCNC: 32.3 G/DL (ref 31.4–35)
MCV RBC AUTO: 91.9 FL (ref 79.6–97.8)
MONOCYTES # BLD: 0.1 K/UL (ref 0.1–1.3)
MONOCYTES NFR BLD: 3 % (ref 4–12)
NEUTS SEG # BLD: 0.3 K/UL (ref 1.7–8.2)
NEUTS SEG NFR BLD: 9 % (ref 43–78)
PLATELET # BLD AUTO: 90 K/UL (ref 150–450)
PMV BLD AUTO: 12.4 FL (ref 10.8–14.1)
POTASSIUM SERPL-SCNC: 4.5 MMOL/L (ref 3.5–5.1)
PROT SERPL-MCNC: 7.4 G/DL (ref 6.3–8.2)
RBC # BLD AUTO: 2.46 M/UL (ref 4.23–5.67)
RETICS # AUTO: 0.01 M/UL (ref 0.03–0.1)
RETICS/RBC NFR AUTO: 0.6 % (ref 0.3–2)
SODIUM SERPL-SCNC: 140 MMOL/L (ref 136–145)
WBC # BLD AUTO: 3.2 K/UL (ref 4.3–11.1)

## 2018-05-03 PROCEDURE — 74011250637 HC RX REV CODE- 250/637: Performed by: HOSPITALIST

## 2018-05-03 PROCEDURE — 80053 COMPREHEN METABOLIC PANEL: CPT | Performed by: HOSPITALIST

## 2018-05-03 PROCEDURE — 85025 COMPLETE CBC W/AUTO DIFF WBC: CPT | Performed by: INTERNAL MEDICINE

## 2018-05-03 PROCEDURE — 74011000250 HC RX REV CODE- 250: Performed by: HOSPITALIST

## 2018-05-03 PROCEDURE — 74011250636 HC RX REV CODE- 250/636: Performed by: HOSPITALIST

## 2018-05-03 PROCEDURE — 85046 RETICYTE/HGB CONCENTRATE: CPT | Performed by: HOSPITALIST

## 2018-05-03 PROCEDURE — 74011000258 HC RX REV CODE- 258: Performed by: INTERNAL MEDICINE

## 2018-05-03 RX ORDER — HYDROXYUREA 500 MG/1
500 CAPSULE ORAL 2 TIMES DAILY
Qty: 60 CAP | Refills: 0 | Status: ON HOLD
Start: 2018-05-03 | End: 2019-03-16 | Stop reason: CLARIF

## 2018-05-03 RX ORDER — HYDROMORPHONE HYDROCHLORIDE 2 MG/ML
1 INJECTION, SOLUTION INTRAMUSCULAR; INTRAVENOUS; SUBCUTANEOUS
Status: DISCONTINUED | OUTPATIENT
Start: 2018-05-03 | End: 2018-05-03 | Stop reason: HOSPADM

## 2018-05-03 RX ORDER — HEPARIN 100 UNIT/ML
300 SYRINGE INTRAVENOUS AS NEEDED
Status: DISCONTINUED | OUTPATIENT
Start: 2018-05-03 | End: 2018-05-03

## 2018-05-03 RX ADMIN — METOPROLOL TARTRATE 25 MG: 25 TABLET, FILM COATED ORAL at 17:28

## 2018-05-03 RX ADMIN — Medication 400 MG: at 08:59

## 2018-05-03 RX ADMIN — ENOXAPARIN SODIUM 30 MG: 30 INJECTION SUBCUTANEOUS at 14:47

## 2018-05-03 RX ADMIN — HYDROMORPHONE HYDROCHLORIDE 2 MG: 2 INJECTION, SOLUTION INTRAMUSCULAR; INTRAVENOUS; SUBCUTANEOUS at 10:27

## 2018-05-03 RX ADMIN — HEPARIN SODIUM 300 UNITS: 10000 INJECTION, SOLUTION INTRAVENOUS at 18:33

## 2018-05-03 RX ADMIN — SODIUM CHLORIDE 150 ML/HR: 450 INJECTION, SOLUTION INTRAVENOUS at 06:32

## 2018-05-03 RX ADMIN — HYDROMORPHONE HYDROCHLORIDE 2 MG: 2 INJECTION, SOLUTION INTRAMUSCULAR; INTRAVENOUS; SUBCUTANEOUS at 14:47

## 2018-05-03 RX ADMIN — HYDROMORPHONE HYDROCHLORIDE 2 MG: 2 INJECTION, SOLUTION INTRAMUSCULAR; INTRAVENOUS; SUBCUTANEOUS at 03:20

## 2018-05-03 RX ADMIN — OXYCODONE HYDROCHLORIDE 30 MG: 15 TABLET ORAL at 17:48

## 2018-05-03 RX ADMIN — PROMETHAZINE HYDROCHLORIDE 12.5 MG: 25 INJECTION INTRAMUSCULAR; INTRAVENOUS at 10:24

## 2018-05-03 RX ADMIN — SODIUM CHLORIDE 150 ML/HR: 450 INJECTION, SOLUTION INTRAVENOUS at 11:44

## 2018-05-03 RX ADMIN — PROMETHAZINE HYDROCHLORIDE 12.5 MG: 25 INJECTION INTRAMUSCULAR; INTRAVENOUS at 03:19

## 2018-05-03 RX ADMIN — METOPROLOL TARTRATE 25 MG: 25 TABLET, FILM COATED ORAL at 09:00

## 2018-05-03 RX ADMIN — OXYCODONE HYDROCHLORIDE 80 MG: 80 TABLET, FILM COATED, EXTENDED RELEASE ORAL at 08:59

## 2018-05-03 RX ADMIN — FOLIC ACID 1 MG: 1 TABLET ORAL at 09:00

## 2018-05-03 NOTE — PROGRESS NOTES
END OF SHIFT NOTE:    INTAKE/OUTPUT  05/01 0701 - 05/02 0700  In: 1233 [I.V.:1709]  Out: 6286 [Urine:1025]  Voiding: YES  Catheter: NO  Drain:              Flatus: Patient does have flatus present. Stool:  1 occurrences. Characteristics:       Emesis: 0 occurrences. Characteristics:        VITAL SIGNS  Patient Vitals for the past 12 hrs:   Temp Pulse Resp BP SpO2   05/02/18 1724 98.9 °F (37.2 °C) 72 18 (!) 137/92 96 %   05/02/18 1130 98.7 °F (37.1 °C) 62 18 141/82 97 %       Pain Assessment  Pain Intensity 1: 7 (05/02/18 1355)  Pain Location 1: Leg  Pain Intervention(s) 1: Medication (see MAR)  Patient Stated Pain Goal: 0    Ambulating  Yes    Shift report given to oncoming nurse at the bedside. out of bed more,ambulating with PTGeneva Dunlap RN

## 2018-05-03 NOTE — DISCHARGE INSTRUCTIONS
With your hematologist at [facility]. Repeat cbc with them as well to decide on when to re-start your hydroxyurea               DISCHARGE SUMMARY from Nurse    PATIENT INSTRUCTIONS:    After general anesthesia or intravenous sedation, for 24 hours or while taking prescription Narcotics:  · Limit your activities  · Do not drive and operate hazardous machinery  · Do not make important personal or business decisions  · Do  not drink alcoholic beverages  · If you have not urinated within 8 hours after discharge, please contact your surgeon on call. Report the following to your surgeon:  · Excessive pain, swelling, redness or odor of or around the surgical area  · Temperature over 100.5  · Nausea and vomiting lasting longer than 4 hours or if unable to take medications  · Any signs of decreased circulation or nerve impairment to extremity: change in color, persistent  numbness, tingling, coldness or increase pain  · Any questions    What to do at Home:  Recommended activity: Activity as tolerated, per MD instructions    If you experience any of the following symptoms fever > 100.5, nausea, vomiting, pain, chest pain and/or shortness of breath please follow up with MD.    *  Please give a list of your current medications to your Primary Care Provider. *  Please update this list whenever your medications are discontinued, doses are      changed, or new medications (including over-the-counter products) are added. *  Please carry medication information at all times in case of emergency situations. These are general instructions for a healthy lifestyle:    No smoking/ No tobacco products/ Avoid exposure to second hand smoke  Surgeon General's Warning:  Quitting smoking now greatly reduces serious risk to your health.     Obesity, smoking, and sedentary lifestyle greatly increases your risk for illness    A healthy diet, regular physical exercise & weight monitoring are important for maintaining a healthy lifestyle    You may be retaining fluid if you have a history of heart failure or if you experience any of the following symptoms:  Weight gain of 3 pounds or more overnight or 5 pounds in a week, increased swelling in our hands or feet or shortness of breath while lying flat in bed. Please call your doctor as soon as you notice any of these symptoms; do not wait until your next office visit. Recognize signs and symptoms of STROKE:    F-face looks uneven    A-arms unable to move or move unevenly    S-speech slurred or non-existent    T-time-call 911 as soon as signs and symptoms begin-DO NOT go       Back to bed or wait to see if you get better-TIME IS BRAIN. Warning Signs of HEART ATTACK     Call 911 if you have these symptoms:   Chest discomfort. Most heart attacks involve discomfort in the center of the chest that lasts more than a few minutes, or that goes away and comes back. It can feel like uncomfortable pressure, squeezing, fullness, or pain.  Discomfort in other areas of the upper body. Symptoms can include pain or discomfort in one or both arms, the back, neck, jaw, or stomach.  Shortness of breath with or without chest discomfort.  Other signs may include breaking out in a cold sweat, nausea, or lightheadedness. Don't wait more than five minutes to call 911 - MINUTES MATTER! Fast action can save your life. Calling 911 is almost always the fastest way to get lifesaving treatment. Emergency Medical Services staff can begin treatment when they arrive -- up to an hour sooner than if someone gets to the hospital by car. The discharge information has been reviewed with the patient. The patient verbalized understanding. Discharge medications reviewed with the patient and appropriate educational materials and side effects teaching were provided.   ___________________________________________________________________________________________________________________________________ Sickle Cell Crisis: Care Instructions  Your Care Instructions    Sickle cell crisis is a painful episode that may begin suddenly in a person with sickle cell disease. Sickle cell disease turns normal, round red blood cells into cells that look like kendall or crescent moons. The sickle-shaped cells can get stuck in blood vessels, blocking blood flow and causing severe pain. The pain can occur in the bones of the spine, the arms and legs, the chest, and the abdomen. An episode may be called a \"painful event\" or \"painful crisis. \" Some people who have sickle cell disease have many painful events, while others have few or none. Treatment depends on the level of pain and how long it lasts. Sometimes taking nonprescription pain relievers can help. Or you may need stronger pain relief medicine that is prescribed or given by a doctor. You may need to be treated in the hospital.  It isn't always possible to know what sets off a painful event. But triggers include being dehydrated, cold temperatures, infection, stress, and not getting enough oxygen. Follow-up care is a key part of your treatment and safety. Be sure to make and go to all appointments, and call your doctor if you are having problems. It's also a good idea to know your test results and keep a list of the medicines you take. How can you care for yourself at home? · Create a pain management plan with your doctor. This plan should include the types of medicines you can take and other actions you can take at home to relieve pain. · Drink plenty of fluids, enough so that your urine is light yellow or clear like water. If you have kidney, heart, or liver disease and have to limit fluids, talk with your doctor before you increase the amount of fluids you drink. · Take your medicines exactly as prescribed. Call your doctor if you think you are having a problem with your medicine. · Take pain medicines exactly as directed.   ¨ If the doctor gave you a prescription medicine for pain, take it as prescribed. ¨ If you are not taking a prescription pain medicine, ask your doctor if you can take an over-the-counter medicine. · Avoid alcohol. It can make you dehydrated. · Dress warmly in cold weather. The cold and windy weather can lead to severe pain. · Do not smoke. Smoking can reduce the amount of oxygen in your blood. · Get plenty of sleep. When should you call for help? Call 911 anytime you think you may need emergency care. For example, call if:  ? · You have symptoms of a severe problem from sickle cell. ? · You have symptoms of a stroke. These may include:  ¨ Sudden numbness, tingling, weakness, or loss of movement in your face, arm, or leg, especially on only one side of your body. ¨ Sudden vision changes. ¨ Sudden trouble speaking. ¨ Sudden confusion or trouble understanding simple statements. ¨ Sudden problems with walking or balance. ¨ A sudden, severe headache that is different from past headaches. ? · You are in severe pain. ? · You have symptoms of a heart attack. These may include:  ¨ Chest pain or pressure, or a strange feeling in the chest.  ¨ Sweating. ¨ Shortness of breath. ¨ Nausea or vomiting. ¨ Pain, pressure, or a strange feeling in the back, neck, jaw, or upper belly or in one or both shoulders or arms. ¨ Lightheadedness or sudden weakness. ¨ A fast or irregular heartbeat. After you call 911, the  may tell you to chew 1 adult-strength or 2 to 4 low-dose aspirin. Wait for an ambulance. Do not try to drive yourself. ?Call your doctor now or seek immediate medical care if:  ? · You have a fever. ? Watch closely for changes in your health, and be sure to contact your doctor if you have any problems. Where can you learn more? Go to http://socorro-rosita.info/. Enter F104 in the search box to learn more about \"Sickle Cell Crisis: Care Instructions. \"  Current as of: October 13, 2016  Content Version: 11.4  © 6262-3159 [employer]. Care instructions adapted under license by [employer] (which disclaims liability or warranty for this information). If you have questions about a medical condition or this instruction, always ask your healthcare professional. [address] any warranty or liability for your use of this information.

## 2018-05-03 NOTE — DISCHARGE SUMMARY
Hospitalist Discharge Summary     Admit Date:  2018  5:07 AM   Name:  Alvan Fothergill   Age:  37 y.o.  :  1975   MRN:  [id number]   PCP:  Sindy Sahu MD  Treatment Team: Attending Provider: Jacob Zavala DO; Attending Provider: Sudeep Schulz MD; Utilization Review: Galindo Martin; Care Manager: Jess Aguilar RN    Problem List for this Hospitalization:  Hospital Problems as of 5/3/2018  Date Reviewed: 2018          Codes Class Noted - Resolved POA    Dehydration ICD-10-CM: E86.0  ICD-9-CM: 276.51  2018 - Present Unknown        Generalized weakness ICD-10-CM: R53.1  ICD-9-CM: 780.79  2018 - Present Unknown        Body aches ICD-10-CM: R52  ICD-9-CM: 780.96  2018 - Present Unknown        Sickle cell crisis ([facility] [address] ICD-10-CM: D57.00  ICD-9-CM: 282.62  2016 - Present Unknown                Admission HPI from 2018:    \"Patient is 37years old  [place] male with pmhx of Sickle cell disease, iron overload, HTN, pSVT, chronic pain presented to ER with chief complaints of worsening of generalized body pain which is similar to his usual sickle cell crisis. Pt also complains of nausea/vomiting. He denies any SOB, palpitations, cough, sore throat, rhinorrhea, diarrhea, urinary complaints. Pt reports that home pain meds are not controlling the pain. In ER, CXR showed no acute cardiopulmonary abnormalities concerning for acute chest syndrome. Pt is being admitted for acute sickle cell crisis. \"    Hospital Course:  Pt was admitted and started on ivfls, and pain mgt. Isidra Elias He was seen by our hematologist who recommended holding his hydrea secondary to thrombocytopenia and follow up with his hematologist at [facility]. The patients symptoms have improved. He wants to go home with his wife and continue pain management at home. He will need follow up with Hematologist in the next 3-5  days. A repeat cbc needs to be done and they can decide on when to re-start his hydroxyurea. Follow up instructions below. Plan was discussed with patient and his wife. All questions answered. Patient was stable at time of discharge and was instructed to call or return if there are any concerns or recurrence of symptoms. Diagnostic Imaging/Tests:   Xr Chest Pa Lat    Result Date: 5/1/2018  TWO-VIEW CHEST: CLINICAL HISTORY: Sickle cell crisis with cough and shortness of breath for 2 days. COMPARISON:  March 6, 2018. FINDINGS: PA and lateral chest images demonstrate no acute pneumonic infiltrate or significant pleural fluid collection. The heart size is within normal limits without evidence of congestive heart failure or pneumothorax. The bony thorax appears intact on these views. Vascular clips are again noted in the right upper quadrant of the abdomen. IMPRESSION:  NO ACUTE CARDIOPULMONARY DISEASE IDENTIFIED. Echocardiogram results:  No results found for this visit on 05/01/18.       All Micro Results     None          Labs: Results:       BMP, Mg, Phos Recent Labs      05/03/18 0553 05/02/18   1019  05/02/18   0639  05/01/18   0603   NA  140   --   140  139   K  4.5  4.2  4.5  3.4*   CL  107   --   108*  107   CO2  26   --   23  25   AGAP  7   --   9  7   BUN  8   --   7  10   CREA  0.82   --   0.76*  0.91   CA  8.5   --   8.3  8.8   GLU  107*   --   107*  158*      CBC Recent Labs      05/03/18 0553 05/02/18   0639  05/01/18   0603   WBC  3.2*  4.5  1.4*   RBC  2.46*  2.49*  2.87*   HGB  7.3*  7.5*  8.7*   HCT  22.6*  23.0*  25.9*   PLT  90*  62*  42*   GRANS  9*  7*  25*   LYMPH  84*  89*  68*   EOS  3  2  0*   MONOS  3*  2*  6   BASOS  1  0  1   IG  0  0  0   ANEU  0.3*  0.3*  0.3*   ABL  2.7  4.1  0.9   TENZIN  0.1  0.1  0.0   ABM  0.1  0.1  0.1   ABB  0.0  0.0  0.0   AIG  0.0  0.0  0.0      LFT Recent Labs      05/03/18 0553  05/02/18   0639  05/01/18   0603   SGOT  33  43*  40*   ALT  73*  77*  76*   AP  88  85  97   TP  7.4  7.6  8.6*   ALB  3.3*  3.2*  3.7   GLOB  4.1* 4. 4*  4.9*   AGRAT  0.8*  0.7*  0.8*      Cardiac Testing Lab Results   Component Value Date/Time     (H) 04/26/2012 03:17 PM    CK 39 01/17/2017 03:35 AM    CK 42 01/17/2017 01:30 AM    CK 41 01/16/2017 09:05 PM    CK - MB 0.6 01/17/2017 03:35 AM    CK - MB <0.5 (L) 01/17/2017 01:30 AM    CK - MB <0.5 (L) 01/16/2017 09:05 PM    CK-MB Index 1.5 01/17/2017 03:35 AM    CK-MB Index CANNOT BE CALCULATED 01/17/2017 01:30 AM    CK-MB Index CANNOT BE CALCULATED 01/16/2017 09:05 PM    Troponin-I <0.05 04/26/2012 03:17 PM    Troponin-I, Qt. <0.02 (L) 08/10/2017 04:36 PM    Troponin-I, Qt. <0.02 (L) 01/17/2017 03:35 AM    Troponin-I, Qt. <0.02 (L) 01/17/2017 01:30 AM      Coagulation Tests Lab Results   Component Value Date/Time    Prothrombin time 11.6 02/08/2015 12:05 PM    Prothrombin time 11.9 (H) 10/24/2012 02:45 PM    INR 1.1 02/08/2015 12:05 PM    INR 1.1 10/24/2012 02:45 PM    aPTT 26.0 10/24/2012 02:45 PM      A1c No results found for: HBA1C, HGBE8, CKU9JRDM   Lipid Panel No results found for: CHOL, CHOLPOCT, CHOLX, CHLST, CHOLV, 632085, HDL, LDL, LDLC, DLDLP, 655593, VLDLC, VLDL, TGLX, TRIGL, TRIGP, TGLPOCT, CHHD, CHHDX   Thyroid Panel No results found for: T4, T3U, TSH, TSHEXT     Most Recent UA Lab Results   Component Value Date/Time    Color YELLOW 04/02/2018 11:50 PM    Appearance CLEAR 04/02/2018 11:50 PM    Specific gravity 1.009 04/02/2018 11:50 PM    pH (UA) 7.0 04/02/2018 11:50 PM    Protein TRACE (A) 04/02/2018 11:50 PM    Glucose NEGATIVE  04/02/2018 11:50 PM    Ketone NEGATIVE  04/02/2018 11:50 PM    Bilirubin NEGATIVE  04/02/2018 11:50 PM    Blood TRACE (A) 04/02/2018 11:50 PM    Urobilinogen 0.2 04/02/2018 11:50 PM    Nitrites NEGATIVE  04/02/2018 11:50 PM    Leukocyte Esterase NEGATIVE  04/02/2018 11:50 PM        Allergies   Allergen Reactions    Compazine [Prochlorperazine Edisylate] Other (comments)     Also makes him feel funny    Morphine Other (comments)     Makes him feel funny    Reglan [Metoclopramide] Other (comments)     \"feel funny\"    Zofran [Ondansetron Hcl (Pf)] Other (comments)     Make him feel funny     Immunization History   Administered Date(s) Administered    Influenza Vaccine 09/10/2015    Influenza Vaccine Split 09/27/2012    ZZZ-RETIRED (DO NOT USE) Pneumococcal Vaccine (Unspecified Type) 09/21/2010       All Labs from Last 24 Hrs:  Recent Results (from the past 24 hour(s))   METABOLIC PANEL, COMPREHENSIVE    Collection Time: 05/03/18  5:53 AM   Result Value Ref Range    Sodium 140 136 - 145 mmol/L    Potassium 4.5 3.5 - 5.1 mmol/L    Chloride 107 98 - 107 mmol/L    CO2 26 21 - 32 mmol/L    Anion gap 7 7 - 16 mmol/L    Glucose 107 (H) 65 - 100 mg/dL    BUN 8 6 - 23 MG/DL    Creatinine 0.82 0.8 - 1.5 MG/DL    GFR est AA >60 >60 ml/min/1.73m2    GFR est non-AA >60 >60 ml/min/1.73m2    Calcium 8.5 8.3 - 10.4 MG/DL    Bilirubin, total 0.2 0.2 - 1.1 MG/DL    ALT (SGPT) 73 (H) 12 - 65 U/L    AST (SGOT) 33 15 - 37 U/L    Alk.  phosphatase 88 50 - 136 U/L    Protein, total 7.4 6.3 - 8.2 g/dL    Albumin 3.3 (L) 3.5 - 5.0 g/dL    Globulin 4.1 (H) 2.3 - 3.5 g/dL    A-G Ratio 0.8 (L) 1.2 - 3.5     RETICULOCYTE COUNT    Collection Time: 05/03/18  5:53 AM   Result Value Ref Range    Reticulocyte count 0.6 0.3 - 2.0 %    Absolute Retic Cnt. 0.0134 (L) 0.026 - 0.095 M/ul    Immature Retic Fraction 2.7 2.3 - 13.4 %    Retic Hgb Conc. 29 29 - 35 pg   CBC WITH AUTOMATED DIFF    Collection Time: 05/03/18  5:53 AM   Result Value Ref Range    WBC 3.2 (L) 4.3 - 11.1 K/uL    RBC 2.46 (L) 4.23 - 5.67 M/uL    HGB 7.3 (L) 13.6 - 17.2 g/dL    HCT 22.6 (L) 41.1 - 50.3 %    MCV 91.9 79.6 - 97.8 FL    MCH 29.7 26.1 - 32.9 PG    MCHC 32.3 31.4 - 35.0 g/dL    RDW 24.0 (H) 11.9 - 14.6 %    PLATELET 90 (L) 308 - 450 K/uL    MPV 12.4 10.8 - 14.1 FL    DF AUTOMATED      NEUTROPHILS 9 (L) 43 - 78 %    LYMPHOCYTES 84 (H) 13 - 44 %    MONOCYTES 3 (L) 4.0 - 12.0 %    EOSINOPHILS 3 0.5 - 7.8 %    BASOPHILS 1 0.0 - 2.0 %    IMMATURE GRANULOCYTES 0 0.0 - 5.0 %    ABS. NEUTROPHILS 0.3 (L) 1.7 - 8.2 K/UL    ABS. LYMPHOCYTES 2.7 0.5 - 4.6 K/UL    ABS. MONOCYTES 0.1 0.1 - 1.3 K/UL    ABS. EOSINOPHILS 0.1 0.0 - 0.8 K/UL    ABS. BASOPHILS 0.0 0.0 - 0.2 K/UL    ABS. IMM. GRANS. 0.0 0.0 - 0.5 K/UL       Discharge Exam:  Patient Vitals for the past 24 hrs:   Temp Pulse Resp BP SpO2   05/03/18 1500 98.9 °F (37.2 °C) 73 19 131/76 98 %   05/03/18 1100 97.7 °F (36.5 °C) 81 19 123/74 97 %   05/03/18 0700 98.7 °F (37.1 °C) 81 19 119/68 97 %   05/03/18 0335 98.8 °F (37.1 °C) 80 18 145/85 98 %   05/02/18 2342 98.8 °F (37.1 °C) 78 18 136/86 97 %   05/02/18 1920 98.7 °F (37.1 °C) 70 18 140/90 97 %     Oxygen Therapy  O2 Sat (%): 98 % (05/03/18 1500)  Pulse via Oximetry: 62 beats per minute (05/01/18 1401)  O2 Device: Room air (05/03/18 0829)    Intake/Output Summary (Last 24 hours) at 05/03/18 1728  Last data filed at 05/03/18 1500   Gross per 24 hour   Intake             3547 ml   Output             1450 ml   Net             2097 ml       General:    Well nourished. Alert. No distress. Eyes:   Normal sclera. Extraocular movements intact. ENT:  Normocephalic, atraumatic. Moist mucous membranes  CV:   Regular rate and rhythm. No murmur, rub, or gallop. Lungs:  Clear to auscultation bilaterally. No wheezing, rhonchi, or rales. Abdomen: Soft, nontender, nondistended. Bowel sounds normal.   Extremities: Warm and dry. No cyanosis or edema. Neurologic: CN II-XII grossly intact. Sensation intact. Skin:     No rashes or jaundice. Psych:  Normal mood and affect. Discharge Info:   Current Discharge Medication List      CONTINUE these medications which have CHANGED    Details   hydroxyurea (HYDREA) 500 mg capsule Take 1 Cap by mouth two (2) times a day. Takes in am at 0900. Star after follow up with your PCP.   Qty: 60 Cap, Refills: 0         CONTINUE these medications which have NOT CHANGED    Details   magnesium oxide (MAG-OX) 400 mg tablet Take 1 Tab by mouth daily. Qty: 10 Tab, Refills: 0      deferasirox (JADENU) 360 mg tab Take 5 Tabs by mouth daily. Qty: 150 Tab, Refills: 0      oxyCODONE IR (OXY-IR) 30 mg immediate release tablet Take 1 Tab by mouth every four (4) hours as needed for Pain. Max Daily Amount: 180 mg.  Qty: 12 Tab, Refills: 0      oxyCODONE ER (OXYCONTIN) 80 mg ER tablet Take 1 Tab by mouth every twelve (12) hours. Max Daily Amount: 160 mg.  Qty: 6 Tab, Refills: 0      metoprolol (LOPRESSOR) 25 mg tablet Take 25 mg by mouth two (2) times a day. Indications: HYPERTENSION      lisinopril (PRINIVIL, ZESTRIL) 5 mg tablet Take 5 mg by mouth daily. Indications: HYPERTENSION      folic acid (FOLVITE) 1 mg tablet Take 1 mg by mouth daily. promethazine (PHENERGAN) 25 mg tablet Take 1 Tab by mouth every six (6) hours as needed. May substitute suppository if vomiting  Qty: 20 Tab, Refills: 0               Disposition: home  Activity: as tolerated  Diet: DIET CARDIAC Regular  DIET NUTRITIONAL SUPPLEMENTS Lunch, Dinner; Other    No orders of the defined types were placed in this encounter. Repeat cbc in 3-5 days with hematologist at [facility] and re-start hydroxyurea per their recommendations      Follow-up Information     None          Time spent in patient discharge planning and coordination 35 minutes.     Signed:  Lolita Gross MD

## 2018-05-03 NOTE — PROGRESS NOTES
Uneventful shift. Hourly rounds completed throughout shift. Received pain and nausea medication x2 this shift. Labs drawn via port. Patient rested all night. Continue Iv fluids. Patient denies needs at this time. Will continue to monitor and give bedside report to oncoming day shift nurse.

## 2018-05-04 ENCOUNTER — PATIENT OUTREACH (OUTPATIENT)
Dept: CASE MANAGEMENT | Age: 43
End: 2018-05-04

## 2018-05-04 NOTE — PROGRESS NOTES
Transition of Care Discharge Follow-up Questionnaire   Date/Time of Call: 5/3/18 10:00am    What was the patient hospitalized for? Sickle cell crisis    Does the patient understand his/her diagnosis and/or treatment and what happened during the hospitalization?    Yes  Patient has had this disease his whole life but didnt start having complications until his teen years    Did the patient receive discharge instructions? Yes    CM Assessed Risk for Readmission:           Patient stated Risk for Readmission:     Sickle cell crisis   Pain management from the disease process  Transfusions     The same as listed above  he understands the disease process  majority of the time when he phones his [facility] hematologist for issues they refer him to the ER    Review any discharge instructions (see discharge instructions/AVS in [address]). Ask patient if they understand these. Do they have any questions?    Reviewed and patient verbalized understanding    Were home services ordered (nursing, PT, OT, ST, etc.)?    No    If so, has the first visit occurred? If not, why? (Assist with coordination of services if necessary.) n/a    Was any DME ordered? No    If so, has it been received? If not, why?  (Assist patient in obtaining DME orders &/or equipment if necessary.) n/a    Complete a review of all medications (new, continued and discontinued meds per the D/C instructions and medication tab in [facility]). Reviewed and patient verbalized understanding of dosage and medication administration    Were all new prescriptions filled? If not, why?  (Assist patient in obtaining medications if necessary  escalate for CCM &/or SW if ongoing issues are verbalized by pt or anticipated)    Yes       Does the patient understand the purpose and dosing instructions for all medications?   (If patient has questions, provide explanation and education.) Yes   Denies any issues or side effects at this time    Does the patient have any problems in performing ADLs? (If patient is unable to perform ADLs  what is the limiting factor(s)? Do they have a support system that can assist? If no support system is present, discuss possible assistance that they may be able to obtain. Escalate for CCM/SW if ongoing issues are verbalized by pt or anticipated)    Independent with all ADLs   Good support system             Does the patient have all follow-up appointments scheduled?       7 day f/up with PCP?   (f/up with PCP may be w/in 14 days if patient has a f/up with their specialist w/in 7 days)     7-14 day f/up with specialist?   (or per discharge instructions)     If f/up has not been made  what actions has the care coordinator made to accomplish this?     Has transportation been arranged?    Yes         Yes Dr. Juana Mi in [facility] 5/4/18  Oncology 5/4/18 with infusion scheduled                          Yes, family transports    Any other questions or concerns expressed by the patient?    Denies any issues or questions at this time   I asked patient if he was happy with current hematologist or would he want to transfer to a [facility] hematologist to assist with better continuity of care since he lives closer to [facility] ER. Stated that he was happy with his current hematologist and utilizes [facility] ER due to demographics and [facility] is closer to his home. Schedule next appointment with JOSÉ TREVIZO Coordinator or refer to RN Case Manager/ per the workflow guidelines.        When is care coordinators next follow-up call scheduled?        If referred for CCM  what RN care manager was the referral assigned? Will follow up with patient next week                  In one week    LALY Call Completed By:   Sheri Gardner RN, BSN, CCM /   c: [phone] / [email or web address]        This note will not be viewable in [employer].

## 2018-05-11 ENCOUNTER — PATIENT OUTREACH (OUTPATIENT)
Dept: CASE MANAGEMENT | Age: 43
End: 2018-05-11

## 2018-05-11 NOTE — PROGRESS NOTES
Community Care Management  Follow up Outreach Note   Outreach type: Phone call:  5/11/18 @ 9:00 am       Home visit:   Kaitlyn Ramírez  Reason for follow-up: Hospitalized for sickle cell crisis from 5/1/18 - 5/3/18  Sickle cell crisis  Multiple ER visits     Disease specific complaints/issues:     Sickle cell crisis        Patient progress towards goals set from last contact: patient followed up with hematologist on 5/4/18     CM Assessed Risk for Readmission:           Patient stated Risk for Readmission: Sickle cell crisis   Pain management from the disease process  Transfusions   Chronic disease process that will probably warrant unavoidable hospitalizations and medical interventions for the remainder of his lifetime          The same as listed above  he understands the disease process  majority of the time when he phones his [facility] hematologist for issues they refer him to the ER   Has patient attended any PCP or specialist follow-up appointments since last contact?      What was outcome of appointment?      When is next follow-up scheduled? Yes 5/4/18 with oncologist/hematologist for hospital f/u           Review medications.      Any medication changes since last outreach?      Does patient have any questions or issues related to their medications? No changes since last med review               Verbalizes understanding of his medication regimen    Home health active? If yes Jacqui Sandhoff issue? Progress? no     Referrals needed?  (SW, Diabetes education, HH, etc. ) Not at this time    Other issues/Miscellaneous? (Transportation, access to meals, ability to perform ADLs, adequate caregiver support, etc.) Hematologist is a [facility] physician   Oncologist is at [employer] Scheduled: In 1 week        Next Steps/Goals: f/u with CCM in 1 week  [address] Manager:   Monty Rao RN, BSN, CCM /   c: [phone] / [email or web address]     This note will not be viewable in [address]

## 2018-05-18 ENCOUNTER — PATIENT OUTREACH (OUTPATIENT)
Dept: CASE MANAGEMENT | Age: 43
End: 2018-05-18

## 2018-05-18 NOTE — PROGRESS NOTES
Community Care Management  Follow up Outreach Note   Outreach type: Phone call:  5/18/18 @ 9:30 am       Home visit:   Oskar Waldrop  Reason for follow-up: Hospitalized for sickle cell crisis from 5/1/18 - 5/3/18  Sickle cell crisis  Multiple ER visits     Disease specific complaints/issues:     Sickle cell crisis        Patient progress towards goals set from last contact: patient followed up with hematologist on 5/4/18   CM Assessed Risk for Readmission:           Patient stated Risk for Readmission: Sickle cell crisis   Pain management from the disease process  Transfusions   Chronic disease process that will probably warrant unavoidable hospitalizations and medical interventions for the remainder of his lifetime          The same as listed above  he understands the disease process  majority of the time when he phones his [facility] hematologist for issues they refer him to the ER   Has patient attended any PCP or specialist follow-up appointments since last contact?      What was outcome of appointment?      When is next follow-up scheduled? Yes 5/4/18 with oncologist/hematologist for hospital f/u           Review medications.      Any medication changes since last outreach?      Does patient have any questions or issues related to their medications? No changes since last med review               Verbalizes understanding of his medication regimen    Home health active? If yes Synetta Lambing issue? Progress? no     Referrals needed?  (SW, Diabetes education, HH, etc. ) Not at this time    Other issues/Miscellaneous? (Transportation, access to meals, ability to perform ADLs, adequate caregiver support, etc.) Hematologist is a [facility] physician   Oncologist is at [facility] [employer] Scheduled: In [place]  Next Steps/Goals: f/u with CCM in 1 week  Michele Ruslan Cathie [address]:   Courtney Iverson RN, BSN, CCM /   c: [phone] / [email or web address]     This note will not be viewable in [address]

## 2018-05-24 ENCOUNTER — PATIENT OUTREACH (OUTPATIENT)
Dept: CASE MANAGEMENT | Age: 43
End: 2018-05-24

## 2018-05-24 NOTE — PROGRESS NOTES
Community Care Management  Follow up Outreach Note   Outreach type: Phone call:  5/24/18 @ 11:30 am       Home visit:   Minoocedrick Dennis  Reason for follow-up: Hospitalized for sickle cell crisis from 5/1/18 - 5/3/18  Sickle cell crisis  Multiple ER visits     Disease specific complaints/issues:     Sickle cell crisis        Patient progress towards goals set from last contact: patient followed up with hematologist on 5/22/18   CM Assessed Risk for Readmission:           Patient stated Risk for Readmission: Sickle cell crisis   Pain management from the disease process  Transfusions   Chronic disease process that will probably warrant unavoidable hospitalizations and medical interventions for the remainder of his lifetime          The same as listed above  he understands the disease process  majority of the time when he phones his [facility] hematologist for issues they refer him to the ER   Has patient attended any PCP or specialist follow-up appointments since last contact?      What was outcome of appointment?      When is next follow-up scheduled? Yes 5/22/18 with oncologist/hematologist for hospital f/u           Review medications.      Any medication changes since last outreach?      Does patient have any questions or issues related to their medications? No changes since last med review               Verbalizes understanding of his medication regimen    Home health active? If yes Keila Doing issue? Progress? no     Referrals needed?  (SW, Diabetes education, HH, etc. ) Not at this time    Other issues/Miscellaneous? (Transportation, access to meals, ability to perform ADLs, adequate caregiver support, etc.) Hematologist is a [facility] physician   Oncologist is at [employer] Scheduled: In [place]  Next Steps/Goals: f/u with CCM in 2 weeks  Michele Caicedo 197:   Sue Watson RN, BSN, CCM /   c: [phone] / [email or web address]     This note will not be viewable in [address]

## 2018-05-28 ENCOUNTER — APPOINTMENT (OUTPATIENT)
Dept: GENERAL RADIOLOGY | Age: 43
End: 2018-05-28
Attending: EMERGENCY MEDICINE
Payer: MEDICARE

## 2018-05-28 ENCOUNTER — HOSPITAL ENCOUNTER (EMERGENCY)
Age: 43
Discharge: HOME | End: 2018-05-28
Attending: EMERGENCY MEDICINE
Payer: MEDICARE

## 2018-05-28 VITALS
RESPIRATION RATE: 18 BRPM | OXYGEN SATURATION: 97 % | SYSTOLIC BLOOD PRESSURE: 126 MMHG | HEART RATE: 86 BPM | BODY MASS INDEX: 22.48 KG/M2 | HEIGHT: 70 IN | DIASTOLIC BLOOD PRESSURE: 74 MMHG | WEIGHT: 157 LBS | TEMPERATURE: 99.2 F

## 2018-05-28 DIAGNOSIS — D57.00 SICKLE CELL PAIN CRISIS (HCC): Primary | ICD-10-CM

## 2018-05-28 LAB
ALBUMIN SERPL-MCNC: 3.6 G/DL (ref 3.5–5)
ALBUMIN/GLOB SERPL: 0.7 {RATIO} (ref 1.2–3.5)
ALP SERPL-CCNC: 90 U/L (ref 50–136)
ALT SERPL-CCNC: 99 U/L (ref 12–65)
ANION GAP SERPL CALC-SCNC: 9 MMOL/L (ref 7–16)
AST SERPL-CCNC: 82 U/L (ref 15–37)
BASOPHILS # BLD: 0.1 K/UL (ref 0–0.2)
BASOPHILS NFR BLD: 1 % (ref 0–2)
BILIRUB SERPL-MCNC: 1.3 MG/DL (ref 0.2–1.1)
BUN SERPL-MCNC: 12 MG/DL (ref 6–23)
CALCIUM SERPL-MCNC: 9 MG/DL (ref 8.3–10.4)
CHLORIDE SERPL-SCNC: 103 MMOL/L (ref 98–107)
CO2 SERPL-SCNC: 26 MMOL/L (ref 21–32)
CREAT SERPL-MCNC: 1.01 MG/DL (ref 0.8–1.5)
DIFFERENTIAL METHOD BLD: ABNORMAL
EOSINOPHIL # BLD: 0.1 K/UL (ref 0–0.8)
EOSINOPHIL NFR BLD: 1 % (ref 0.5–7.8)
ERYTHROCYTE [DISTWIDTH] IN BLOOD BY AUTOMATED COUNT: 26.5 % (ref 11.9–14.6)
GLOBULIN SER CALC-MCNC: 5 G/DL (ref 2.3–3.5)
GLUCOSE SERPL-MCNC: 104 MG/DL (ref 65–100)
HCT VFR BLD AUTO: 25.1 % (ref 41.1–50.3)
HGB BLD-MCNC: 8.5 G/DL (ref 13.6–17.2)
IMM GRANULOCYTES # BLD: 0.1 K/UL (ref 0–0.5)
IMM GRANULOCYTES NFR BLD AUTO: 0 % (ref 0–5)
LYMPHOCYTES # BLD: 3.6 K/UL (ref 0.5–4.6)
LYMPHOCYTES NFR BLD: 24 % (ref 13–44)
MCH RBC QN AUTO: 30.2 PG (ref 26.1–32.9)
MCHC RBC AUTO-ENTMCNC: 33.9 G/DL (ref 31.4–35)
MCV RBC AUTO: 89.3 FL (ref 79.6–97.8)
MONOCYTES # BLD: 1.3 K/UL (ref 0.1–1.3)
MONOCYTES NFR BLD: 9 % (ref 4–12)
NEUTS SEG # BLD: 9.7 K/UL (ref 1.7–8.2)
NEUTS SEG NFR BLD: 65 % (ref 43–78)
PLATELET # BLD AUTO: 238 K/UL (ref 150–450)
PMV BLD AUTO: 11.6 FL (ref 10.8–14.1)
POTASSIUM SERPL-SCNC: 4.1 MMOL/L (ref 3.5–5.1)
PROT SERPL-MCNC: 8.6 G/DL (ref 6.3–8.2)
RBC # BLD AUTO: 2.81 M/UL (ref 4.23–5.67)
SODIUM SERPL-SCNC: 138 MMOL/L (ref 136–145)
TROPONIN I BLD-MCNC: 0 NG/ML (ref 0.02–0.05)
TROPONIN I BLD-MCNC: 0 NG/ML (ref 0.02–0.05)
TROPONIN I SERPL-MCNC: <0.02 NG/ML (ref 0.02–0.05)
WBC # BLD AUTO: 14.7 K/UL (ref 4.3–11.1)

## 2018-05-28 PROCEDURE — 96376 TX/PRO/DX INJ SAME DRUG ADON: CPT | Performed by: EMERGENCY MEDICINE

## 2018-05-28 PROCEDURE — 96375 TX/PRO/DX INJ NEW DRUG ADDON: CPT | Performed by: EMERGENCY MEDICINE

## 2018-05-28 PROCEDURE — 80053 COMPREHEN METABOLIC PANEL: CPT | Performed by: EMERGENCY MEDICINE

## 2018-05-28 PROCEDURE — 96372 THER/PROPH/DIAG INJ SC/IM: CPT | Performed by: EMERGENCY MEDICINE

## 2018-05-28 PROCEDURE — 99285 EMERGENCY DEPT VISIT HI MDM: CPT | Performed by: EMERGENCY MEDICINE

## 2018-05-28 PROCEDURE — 84484 ASSAY OF TROPONIN QUANT: CPT

## 2018-05-28 PROCEDURE — 85025 COMPLETE CBC W/AUTO DIFF WBC: CPT | Performed by: EMERGENCY MEDICINE

## 2018-05-28 PROCEDURE — 71045 X-RAY EXAM CHEST 1 VIEW: CPT

## 2018-05-28 PROCEDURE — 84484 ASSAY OF TROPONIN QUANT: CPT | Performed by: EMERGENCY MEDICINE

## 2018-05-28 PROCEDURE — 96374 THER/PROPH/DIAG INJ IV PUSH: CPT | Performed by: EMERGENCY MEDICINE

## 2018-05-28 PROCEDURE — 93005 ELECTROCARDIOGRAM TRACING: CPT | Performed by: EMERGENCY MEDICINE

## 2018-05-28 PROCEDURE — 74011250636 HC RX REV CODE- 250/636: Performed by: EMERGENCY MEDICINE

## 2018-05-28 RX ORDER — HYDROMORPHONE HYDROCHLORIDE 1 MG/ML
1 INJECTION, SOLUTION INTRAMUSCULAR; INTRAVENOUS; SUBCUTANEOUS
Status: COMPLETED | OUTPATIENT
Start: 2018-05-28 | End: 2018-05-28

## 2018-05-28 RX ORDER — PROMETHAZINE HYDROCHLORIDE 25 MG/ML
25 INJECTION, SOLUTION INTRAMUSCULAR; INTRAVENOUS
Status: COMPLETED | OUTPATIENT
Start: 2018-05-28 | End: 2018-05-28

## 2018-05-28 RX ORDER — KETOROLAC TROMETHAMINE 30 MG/ML
15 INJECTION, SOLUTION INTRAMUSCULAR; INTRAVENOUS
Status: COMPLETED | OUTPATIENT
Start: 2018-05-28 | End: 2018-05-28

## 2018-05-28 RX ORDER — SODIUM CHLORIDE 9 MG/ML
2000 INJECTION, SOLUTION INTRAVENOUS ONCE
Status: COMPLETED | OUTPATIENT
Start: 2018-05-28 | End: 2018-05-28

## 2018-05-28 RX ORDER — HYDROMORPHONE HYDROCHLORIDE 1 MG/ML
2 INJECTION, SOLUTION INTRAMUSCULAR; INTRAVENOUS; SUBCUTANEOUS
Status: COMPLETED | OUTPATIENT
Start: 2018-05-28 | End: 2018-05-28

## 2018-05-28 RX ORDER — HEPARIN 100 UNIT/ML
300 SYRINGE INTRAVENOUS AS NEEDED
Status: DISCONTINUED | OUTPATIENT
Start: 2018-05-28 | End: 2018-05-29 | Stop reason: HOSPADM

## 2018-05-28 RX ADMIN — SODIUM CHLORIDE 2000 ML: 900 INJECTION, SOLUTION INTRAVENOUS at 20:24

## 2018-05-28 RX ADMIN — HYDROMORPHONE HYDROCHLORIDE 1 MG: 1 INJECTION, SOLUTION INTRAMUSCULAR; INTRAVENOUS; SUBCUTANEOUS at 22:30

## 2018-05-28 RX ADMIN — HYDROMORPHONE HYDROCHLORIDE 2 MG: 1 INJECTION, SOLUTION INTRAMUSCULAR; INTRAVENOUS; SUBCUTANEOUS at 20:25

## 2018-05-28 RX ADMIN — PROMETHAZINE HYDROCHLORIDE 25 MG: 25 INJECTION INTRAMUSCULAR; INTRAVENOUS at 20:24

## 2018-05-28 RX ADMIN — KETOROLAC TROMETHAMINE 15 MG: 30 INJECTION, SOLUTION INTRAMUSCULAR at 22:29

## 2018-05-28 NOTE — ED PROVIDER NOTES
Patient is a [address] y.o. male presenting with chest pain. The history is provided by the patient. Chest Pain (Angina)    This is a new problem. The current episode started yesterday. The problem has not changed since onset. Duration of episode(s) is 1 day. The problem occurs constantly. The pain is associated with normal activity. The pain is present in the substernal region. The pain is moderate. The quality of the pain is described as sharp. The pain does not radiate. The symptoms are aggravated by palpation. Associated symptoms include leg pain. Pertinent negatives include no abdominal pain, no back pain, no cough, no diaphoresis, no fever, no headaches, no lower extremity edema, no nausea, no numbness, no shortness of breath, no vomiting and no weakness. He has tried nothing for the symptoms. Risk factors: sickle cell diseease. His past medical history does not include DVT or PE. Past medical history comments: ssickle cell SS disease. Past Medical History:   Diagnosis Date    Chronic pain     HTN (hypertension)     Ill-defined condition     sickle cell    Iron overload due to repeated red blood cell transfusions 1/18/2017    Paroxysmal SVT [facility]supraventricular tachycardia) ([facility]) 7/9/2016    Sick[facility] cell disease ([facility] [address]        Past Surgical History:   Procedure Laterality Date    HC PENILE IMPL DURA II POSITINBLE      HC PORT LIFE SNGLE LUMEN 5013      to L CW    HX CHOLECYSTECTOMY      HX OTHER SURGICAL      penile inplant    HX VASCULAR ACCESS           Family History:   Problem Relation Age of Onset    Hypertension Other     Diabetes Father     Stroke Father     Sickle Cell Anemia Sister        Social History     Social History    Marital status:      Spouse name: N/A    Number of children: N/A    Years of education: N/A     Occupational History    Not on file.      Social History Main Topics    Smoking status: Never Smoker    Smokeless tobacco: Never Used    Alcohol use No    Drug use: No    Sexual activity: Yes     Other Topics Concern    Not on file     Social History Narrative         ALLERGIES: Compazine [prochlorperazine edisylate]; Morphine; Reglan [metoclopramide]; and Zofran [ondansetron hcl (pf)]    Review of Systems   Constitutional: Negative for chills, diaphoresis and fever. HENT: Negative for congestion, rhinorrhea and sore throat. Respiratory: Negative for cough and shortness of breath. Cardiovascular: Positive for chest pain. Negative for leg swelling. Gastrointestinal: Negative for abdominal pain, diarrhea, nausea and vomiting. Endocrine: Negative for polydipsia and polyuria. Genitourinary: Negative for dysuria and hematuria. Musculoskeletal: Negative for back pain and myalgias. Skin: Negative for color change. Neurological: Negative for weakness, numbness and headaches. Vitals:    05/28/18 1916   BP: (!) 152/114   Pulse: 89   Resp: 18   Temp: 99.2 °F (37.3 °C)   SpO2: 98%   Weight: 71.2 kg (157 lb)   Height: 5' 10\" (1.778 m)            Physical Exam   Constitutional: He is oriented to person, place, and time. He appears well-developed and well-nourished. HENT:   Mouth/Throat: Oropharynx is clear and moist. No oropharyngeal exudate. Eyes: Conjunctivae are normal. Pupils are equal, round, and reactive to light. Neck: Neck supple. Cardiovascular: Normal rate, regular rhythm and normal heart sounds. Pulmonary/Chest: Effort normal and breath sounds normal. He exhibits tenderness (bilateral costochondral junction tenderness, reproducible of his pain). Abdominal: Soft. Bowel sounds are normal. He exhibits no distension. There is tenderness (mild benign left-sided abdominal tendernessn-no pain without palpation). There is no rebound and no guarding. Musculoskeletal: Normal range of motion. He exhibits no edema or tenderness. Lymphadenopathy:     He has no cervical adenopathy.    Neurological: He is alert and oriented to person, place, and time.   Skin: Skin is warm and dry. Nursing note and vitals reviewed. MDM  Number of Diagnoses or Management Options  Diagnosis management comments: Patient does not appear acutely ill or to be in acute pain. IV hydration and medication ordered check CBC and rule out MI. Patient does have a negative CT for PE couple of months ago and has reproducible pain. No hypoxia or tachycardia. Perc criteria negative. 11:13 PM  ppatient feels improved. Chest wall is reproducible pain. Last to CT for PE protocols have been negative. Patient reports he frequently has chest discomfort with his  Sickle cell crises. Doubt chest syndrome given lack of fever.        Amount and/or Complexity of Data Reviewed  Clinical lab tests: ordered and reviewed (Results for orders placed or performed during the hospital encounter of 05/28/18  -TROPONIN I       Result                                            Value                         Ref Range                       Troponin-I, Qt.                                   <0.02 (L)                     0.02 - 0.05 NG/ML          -CBC WITH AUTOMATED DIFF       Result                                            Value                         Ref Range                       WBC                                               14.7 (H)                      4.3 - 11.1 K/uL                 RBC                                               2.81 (L)                      4.23 - 5.67 M/uL                HGB                                               8.5 (L)                       13.6 - 17.2 g/dL                HCT                                               25.1 (L)                      41.1 - 50.3 %                   MCV                                               89.3                          79.6 - 97.8 FL                  MCH                                               30.2                          26.1 - 32.9 PG                  MCHC                                              33.9 31.4 - 35.0 g/dL                RDW                                               26.5 (H)                      11.9 - 14.6 %                   PLATELET                                          238                           150 - 450 K/uL                  MPV                                               11.6                          10.8 - 14.1 FL                  DF                                                AUTOMATED                                                     NEUTROPHILS                                       65                            43 - 78 %                       LYMPHOCYTES                                       24                            13 - 44 %                       MONOCYTES                                         9                             4.0 - 12.0 %                    EOSINOPHILS                                       1                             0.5 - 7.8 %                     BASOPHILS                                         1                             0.0 - 2.0 %                     IMMATURE GRANULOCYTES                             0                             0.0 - 5.0 %                     ABS. NEUTROPHILS                                  9.7 (H)                       1.7 - 8.2 K/UL                  ABS. LYMPHOCYTES                                  3.6                           0.5 - 4.6 K/UL                  ABS. MONOCYTES                                    1.3                           0.1 - 1.3 K/UL                  ABS. EOSINOPHILS                                  0.1                           0.0 - 0.8 K/UL                  ABS. BASOPHILS                                    0.1                           0.0 - 0.2 K/UL                  ABS. IMM.  GRANS.                                  0.1                           0.0 - 0.5 K/UL             -METABOLIC PANEL, COMPREHENSIVE       Result                                            Value Ref Range                       Sodium                                            138                           136 - 145 mmol/L                Potassium                                         4.1                           3.5 - 5.1 mmol/L                Chloride                                          103                           98 - 107 mmol/L                 CO2                                               26                            21 - 32 mmol/L                  Anion gap                                         9                             7 - 16 mmol/L                   Glucose                                           104 (H)                       65 - 100 mg/dL                  BUN                                               12                            6 - 23 MG/DL                    Creatinine                                        1.01                          0.8 - 1.5 MG/DL                 GFR est AA                                        >60                           >60 ml/min/1.73m2               GFR est non-AA                                    >60                           >60 ml/min/1.73m2               Calcium                                           9.0                           8.3 - 10.4 MG/DL                Bilirubin, total                                  1.3 (H)                       0.2 - 1.1 MG/DL                 ALT (SGPT)                                        99 (H)                        12 - 65 U/L                     AST (SGOT)                                        82 (H)                        15 - 37 U/L                     Alk. phosphatase                                  90                            50 - 136 U/L                    Protein, total                                    8.6 (H)                       6.3 - 8.2 g/dL                  Albumin                                           3.6                           3.5 - 5.0 g/dL Globulin                                          5.0 (H)                       2.3 - 3.5 g/dL                  A-G Ratio                                         0.7 (L)                       1.2 - 3.5                  -POC TROPONIN-I       Result                                            Value                         Ref Range                       Troponin-I (POC)                                  0 (L)                         0.02 - 0.05 ng/ml          -POC TROPONIN-I       Result                                            Value                         Ref Range                       Troponin-I (POC)                                  0 (L)                         0.02 - 0.05 ng/ml          -EKG, 12 LEAD, INITIAL       Result                                            Value                         Ref Range                       Ventricular Rate                                  70                            BPM                             Atrial Rate                                       70                            BPM                             P-R Interval                                      156                           ms                              QRS Duration                                      72                            ms                              Q-T Interval                                      378                           ms                              QTC Calculation (Bezet)                           408                           ms                              Calculated P Axis                                 41                            degrees                         Calculated R Axis                                 38                            degrees                         Calculated T Axis                                 27                            degrees                         Diagnosis !! AGE AND GENDER SPECIFIC ECG ANALYSIS !! Normal sinus rhythm   Normal ECG   When compared with ECG of 11-NOV-2017 13:36,   Previous ECG has undetermined rhythm, needs review   ST no longer depressed in Inferior leads   ST no longer depressed in Anterior leads   Nonspecific T wave abnormality now evident in Inferior leads   Nonspecific T wave abnormality now evident in Anterior leads   T wave amplitude has increased in Lateral leads     )  Tests in the radiology section of CPT®: ordered and reviewed (Xr Chest Port    Result Date: 5/28/2018  Portable chest: History: Chest pain x1 day. Comparison: 05/01/2018 Findings: A single view of the chest was obtained at 1940 hours. The cardiac and mediastinal silhouette are normal in size and configuration. There is a shallow inspiratory result with mild hypoventilatory changes at the lung bases. There are no pleural effusions. The pulmonary vascularity is within normal limits. Impression: Shallow inspiratory result with hypoventilatory changes.      )          ED Course       Procedures

## 2018-05-28 NOTE — ED TRIAGE NOTES
Pt has a history and no complaining of chest pain since yesterday. C/o sob. Pt took roxicodone at home and no relief.

## 2018-05-29 LAB
ATRIAL RATE: 70 BPM
CALCULATED P AXIS, ECG09: 41 DEGREES
CALCULATED R AXIS, ECG10: 38 DEGREES
CALCULATED T AXIS, ECG11: 27 DEGREES
DIAGNOSIS, 93000: NORMAL
P-R INTERVAL, ECG05: 156 MS
Q-T INTERVAL, ECG07: 378 MS
QRS DURATION, ECG06: 72 MS
QTC CALCULATION (BEZET), ECG08: 408 MS
VENTRICULAR RATE, ECG03: 70 BPM

## 2018-05-29 NOTE — DISCHARGE INSTRUCTIONS
Sickle Cell Crisis: Care Instructions  Your Care Instructions    Sickle cell crisis is a painful episode that may begin suddenly in a person with sickle cell disease. Sickle cell disease turns normal, round red blood cells into cells that look like kendall or crescent moons. The sickle-shaped cells can get stuck in blood vessels, blocking blood flow and causing severe pain. The pain can occur in the bones of the spine, the arms and legs, the chest, and the abdomen. An episode may be called a \"painful event\" or \"painful crisis. \" Some people who have sickle cell disease have many painful events, while others have few or none. Treatment depends on the level of pain and how long it lasts. Sometimes taking nonprescription pain relievers can help. Or you may need stronger pain relief medicine that is prescribed or given by a doctor. You may need to be treated in the hospital.  It isn't always possible to know what sets off a painful event. But triggers include being dehydrated, cold temperatures, infection, stress, and not getting enough oxygen. Follow-up care is a key part of your treatment and safety. Be sure to make and go to all appointments, and call your doctor if you are having problems. It's also a good idea to know your test results and keep a list of the medicines you take. How can you care for yourself at home? · Create a pain management plan with your doctor. This plan should include the types of medicines you can take and other actions you can take at home to relieve pain. · Drink plenty of fluids, enough so that your urine is light yellow or clear like water. If you have kidney, heart, or liver disease and have to limit fluids, talk with your doctor before you increase the amount of fluids you drink. · Take your medicines exactly as prescribed. Call your doctor if you think you are having a problem with your medicine. · Take pain medicines exactly as directed.   ¨ If the doctor gave you a prescription medicine for pain, take it as prescribed. ¨ If you are not taking a prescription pain medicine, ask your doctor if you can take an over-the-counter medicine. · Avoid alcohol. It can make you dehydrated. · Dress warmly in cold weather. The cold and windy weather can lead to severe pain. · Do not smoke. Smoking can reduce the amount of oxygen in your blood. · Get plenty of sleep. When should you call for help? Call 911 anytime you think you may need emergency care. For example, call if:  ? · You have symptoms of a severe problem from sickle cell. ? · You have symptoms of a stroke. These may include:  ¨ Sudden numbness, tingling, weakness, or loss of movement in your face, arm, or leg, especially on only one side of your body. ¨ Sudden vision changes. ¨ Sudden trouble speaking. ¨ Sudden confusion or trouble understanding simple statements. ¨ Sudden problems with walking or balance. ¨ A sudden, severe headache that is different from past headaches. ? · You are in severe pain. ? · You have symptoms of a heart attack. These may include:  ¨ Chest pain or pressure, or a strange feeling in the chest.  ¨ Sweating. ¨ Shortness of breath. ¨ Nausea or vomiting. ¨ Pain, pressure, or a strange feeling in the back, neck, jaw, or upper belly or in one or both shoulders or arms. ¨ Lightheadedness or sudden weakness. ¨ A fast or irregular heartbeat. After you call 911, the  may tell you to chew 1 adult-strength or 2 to 4 low-dose aspirin. Wait for an ambulance. Do not try to drive yourself. ?Call your doctor now or seek immediate medical care if:  ? · You have a fever. ? Watch closely for changes in your health, and be sure to contact your doctor if you have any problems. Where can you learn more? Go to http://socorro-rosita.info/. Enter F104 in the search box to learn more about \"Sickle Cell Crisis: Care Instructions. \"  Current as of: October 13, 2016  Content Version: 11.4  © 9017-3158 [employer]. Care instructions adapted under license by [employer] (which disclaims liability or warranty for this information). If you have questions about a medical condition or this instruction, always ask your healthcare professional. [address] any warranty or liability for your use of this information.

## 2018-05-29 NOTE — ED NOTES
I have reviewed discharge instructions with the patient. The patient verbalized understanding. Patient left ED via Discharge Method: ambulatory to Home with (wife). Opportunity for questions and clarification provided. Patient given 0 scripts. To continue your aftercare when you leave the hospital, you may receive an automated call from our care team to check in on how you are doing. This is a free service and part of our promise to provide the best care and service to meet your aftercare needs.  If you have questions, or wish to unsubscribe from this service please call [phone]. Thank you for Choosing our [facility] Emergency Department.

## 2018-06-07 ENCOUNTER — PATIENT OUTREACH (OUTPATIENT)
Dept: CASE MANAGEMENT | Age: 43
End: 2018-06-07

## 2018-06-07 NOTE — PROGRESS NOTES
Community Care Management  Follow up Outreach Note   Outreach type: Phone call:  6/7/18 @ 11:00 am       Home visit:   Americo Campbell  Reason for follow-up: Hospitalized for sickle cell crisis from 5/1/18 - 5/3/18  Sickle cell crisis  Was in ER again on 5/28/18 for sickle cell crisis   Multiple ER visits     Disease specific complaints/issues:     Sickle cell crisis        Patient progress towards goals set from last contact: patient followed up with hematologist on 5/31/18   CM Assessed Risk for Readmission:           Patient stated Risk for Readmission: Sickle cell crisis   Pain management from the disease process  Transfusions   Chronic disease process that will probably warrant unavoidable hospitalizations and medical interventions for the remainder of his lifetime          The same as listed above  he understands the disease process  majority of the time when he phones his [facility] hematologist for issues they refer him to the ER   Has patient attended any PCP or specialist follow-up appointments since last contact?      What was outcome of appointment?      When is next follow-up scheduled? Yes 5/31/18 with oncologist/hematologist for hospital f/u           Review medications.      Any medication changes since last outreach?      Does patient have any questions or issues related to their medications? No changes since last med review               Verbalizes understanding of his medication regimen    Home health active? If yes Clent Revering issue? Progress? no     Referrals needed?  (SW, Diabetes education, HH, etc. ) Not at this time    Other issues/Miscellaneous? (Transportation, access to meals, ability to perform ADLs, adequate caregiver support, etc.) Hematologist is a [facility] physician   Oncologist is at [place] Scheduled:  In [facility]  Next Steps/Goals: f/u with CCM in 2 weeks  [address] Manager:   Capo Boles RN, BSN, CCM /   c: [phone] / [email or web address]    This note will not be viewable in [address]

## 2018-06-21 ENCOUNTER — PATIENT OUTREACH (OUTPATIENT)
Dept: CASE MANAGEMENT | Age: 43
End: 2018-06-21

## 2018-06-21 NOTE — PROGRESS NOTES
Community Care Management  Follow up Outreach Note   Outreach type: Phone call:  6/21/18 @ 10:00 am       Home visit:   Jennie Goldmann  Reason for follow-up: Hospitalized for sickle cell crisis from 5/1/18 - 5/3/18  Sickle cell crisis  Was in ER again on 5/28/18 for sickle cell crisis   Multiple ER visits     Disease specific complaints/issues:     Sickle cell crisis        Patient progress towards goals set from last contact: patient followed up with hematologist on 6/11/18   CM Assessed Risk for Readmission:           Patient stated Risk for Readmission: Sickle cell crisis   Pain management from the disease process  Transfusions   Chronic disease process that will probably warrant unavoidable hospitalizations and medical interventions for the remainder of his lifetime          The same as listed above  he understands the disease process  majority of the time when he phones his [facility] hematologist for issues they refer him to the ER   Has patient attended any PCP or specialist follow-up appointments since last contact?      What was outcome of appointment?      When is next follow-up scheduled? Yes 6/11/18 with oncologist/hematologist for f/u           Review medications.      Any medication changes since last outreach?      Does patient have any questions or issues related to their medications? No changes since last med review               Verbalizes understanding of his medication regimen    Home health active? If yes [facility] issue? Progress? no     Referrals needed?  (SW, Diabetes education, HH, etc. ) Not at this time    Other issues/Miscellaneous?  (Transportation, access to meals, ability to perform ADLs, adequate caregiver support, etc.) Hematologist is a [facility] physician   Oncologist is at [facility] Scheduled: Case closed and patient graduated at this time  Patient stable at this time       Next Steps/Goals: Continue f/u with [facility] oncology and hematology        Community Care Manager:   Nina Zurita RN, HODA, [facility] /   c: [phone] / [email or web address]     This note will not be viewable in [address]

## 2018-06-24 ENCOUNTER — HOSPITAL ENCOUNTER (INPATIENT)
Age: 43
LOS: 5 days | Discharge: HOME | DRG: 812 | End: 2018-06-29
Attending: EMERGENCY MEDICINE | Admitting: FAMILY MEDICINE
Payer: MEDICARE

## 2018-06-24 DIAGNOSIS — D57.00 SICKLE CELL CRISIS (HCC): Primary | ICD-10-CM

## 2018-06-24 PROBLEM — M79.606 LEG PAIN: Status: ACTIVE | Noted: 2018-06-24

## 2018-06-24 PROBLEM — D64.9 ANEMIA: Status: RESOLVED | Noted: 2017-11-12 | Resolved: 2018-06-24

## 2018-06-24 PROBLEM — R53.1 GENERALIZED WEAKNESS: Status: RESOLVED | Noted: 2018-05-01 | Resolved: 2018-06-24

## 2018-06-24 PROBLEM — E86.0 DEHYDRATION: Status: RESOLVED | Noted: 2018-05-01 | Resolved: 2018-06-24

## 2018-06-24 PROBLEM — R52 BODY ACHES: Status: RESOLVED | Noted: 2018-05-01 | Resolved: 2018-06-24

## 2018-06-24 LAB
ALBUMIN SERPL-MCNC: 3.6 G/DL (ref 3.5–5)
ALBUMIN/GLOB SERPL: 0.8 {RATIO} (ref 1.2–3.5)
ALP SERPL-CCNC: 74 U/L (ref 50–136)
ALT SERPL-CCNC: 58 U/L (ref 12–65)
ANION GAP SERPL CALC-SCNC: 10 MMOL/L (ref 7–16)
AST SERPL-CCNC: 39 U/L (ref 15–37)
BASOPHILS # BLD: 0 K/UL (ref 0–0.2)
BASOPHILS NFR BLD: 0 % (ref 0–2)
BILIRUB SERPL-MCNC: 1.7 MG/DL (ref 0.2–1.1)
BUN SERPL-MCNC: 12 MG/DL (ref 6–23)
CALCIUM SERPL-MCNC: 8.7 MG/DL (ref 8.3–10.4)
CHLORIDE SERPL-SCNC: 103 MMOL/L (ref 98–107)
CO2 SERPL-SCNC: 25 MMOL/L (ref 21–32)
CREAT SERPL-MCNC: 0.88 MG/DL (ref 0.8–1.5)
DIFFERENTIAL METHOD BLD: ABNORMAL
EOSINOPHIL # BLD: 0.1 K/UL (ref 0–0.8)
EOSINOPHIL NFR BLD: 1 % (ref 0.5–7.8)
ERYTHROCYTE [DISTWIDTH] IN BLOOD BY AUTOMATED COUNT: 32.4 % (ref 11.9–14.6)
GLOBULIN SER CALC-MCNC: 4.8 G/DL (ref 2.3–3.5)
GLUCOSE SERPL-MCNC: 109 MG/DL (ref 65–100)
HCT VFR BLD AUTO: 20.9 % (ref 41.1–50.3)
HGB BLD-MCNC: 7.1 G/DL (ref 13.6–17.2)
IMM GRANULOCYTES # BLD: 0 K/UL (ref 0–0.5)
IMM GRANULOCYTES NFR BLD AUTO: 0 % (ref 0–5)
LACTATE BLD-SCNC: 1 MMOL/L (ref 0.5–1.9)
LYMPHOCYTES # BLD: 2.1 K/UL (ref 0.5–4.6)
LYMPHOCYTES NFR BLD: 24 % (ref 13–44)
MCH RBC QN AUTO: 31.3 PG (ref 26.1–32.9)
MCHC RBC AUTO-ENTMCNC: 34 G/DL (ref 31.4–35)
MCV RBC AUTO: 92.1 FL (ref 79.6–97.8)
MONOCYTES # BLD: 0.4 K/UL (ref 0.1–1.3)
MONOCYTES NFR BLD: 5 % (ref 4–12)
NEUTS SEG # BLD: 6.1 K/UL (ref 1.7–8.2)
NEUTS SEG NFR BLD: 70 % (ref 43–78)
PLATELET # BLD AUTO: 274 K/UL (ref 150–450)
PMV BLD AUTO: 10.2 FL (ref 10.8–14.1)
POTASSIUM SERPL-SCNC: 4.1 MMOL/L (ref 3.5–5.1)
PROT SERPL-MCNC: 8.4 G/DL (ref 6.3–8.2)
RBC # BLD AUTO: 2.27 M/UL (ref 4.23–5.67)
SODIUM SERPL-SCNC: 138 MMOL/L (ref 136–145)
WBC # BLD AUTO: 8.7 K/UL (ref 4.3–11.1)

## 2018-06-24 PROCEDURE — 99284 EMERGENCY DEPT VISIT MOD MDM: CPT | Performed by: EMERGENCY MEDICINE

## 2018-06-24 PROCEDURE — 85025 COMPLETE CBC W/AUTO DIFF WBC: CPT | Performed by: EMERGENCY MEDICINE

## 2018-06-24 PROCEDURE — 96375 TX/PRO/DX INJ NEW DRUG ADDON: CPT | Performed by: EMERGENCY MEDICINE

## 2018-06-24 PROCEDURE — 80053 COMPREHEN METABOLIC PANEL: CPT | Performed by: EMERGENCY MEDICINE

## 2018-06-24 PROCEDURE — 96376 TX/PRO/DX INJ SAME DRUG ADON: CPT | Performed by: EMERGENCY MEDICINE

## 2018-06-24 PROCEDURE — 65270000029 HC RM PRIVATE

## 2018-06-24 PROCEDURE — 74011000250 HC RX REV CODE- 250: Performed by: EMERGENCY MEDICINE

## 2018-06-24 PROCEDURE — 74011250636 HC RX REV CODE- 250/636: Performed by: FAMILY MEDICINE

## 2018-06-24 PROCEDURE — 96374 THER/PROPH/DIAG INJ IV PUSH: CPT | Performed by: EMERGENCY MEDICINE

## 2018-06-24 PROCEDURE — 96361 HYDRATE IV INFUSION ADD-ON: CPT | Performed by: EMERGENCY MEDICINE

## 2018-06-24 PROCEDURE — 85046 RETICYTE/HGB CONCENTRATE: CPT | Performed by: FAMILY MEDICINE

## 2018-06-24 PROCEDURE — 77030019938 HC TBNG IV PCA ICUM -A

## 2018-06-24 PROCEDURE — 80048 BASIC METABOLIC PNL TOTAL CA: CPT | Performed by: FAMILY MEDICINE

## 2018-06-24 PROCEDURE — 74011250636 HC RX REV CODE- 250/636: Performed by: EMERGENCY MEDICINE

## 2018-06-24 PROCEDURE — 77030020263 HC SOL INJ SOD CL0.9% LFCR 1000ML

## 2018-06-24 PROCEDURE — 83605 ASSAY OF LACTIC ACID: CPT

## 2018-06-24 RX ORDER — DIPHENHYDRAMINE HCL 25 MG
25 CAPSULE ORAL
Status: DISCONTINUED | OUTPATIENT
Start: 2018-06-24 | End: 2018-06-29 | Stop reason: HOSPADM

## 2018-06-24 RX ORDER — ENOXAPARIN SODIUM 100 MG/ML
40 INJECTION SUBCUTANEOUS EVERY 24 HOURS
Status: DISCONTINUED | OUTPATIENT
Start: 2018-06-25 | End: 2018-06-29 | Stop reason: HOSPADM

## 2018-06-24 RX ORDER — OXYCODONE HYDROCHLORIDE 80 MG/1
80 TABLET, FILM COATED, EXTENDED RELEASE ORAL EVERY 12 HOURS
Status: DISCONTINUED | OUTPATIENT
Start: 2018-06-25 | End: 2018-06-29 | Stop reason: HOSPADM

## 2018-06-24 RX ORDER — HYDROMORPHONE HYDROCHLORIDE 2 MG/ML
2 INJECTION, SOLUTION INTRAMUSCULAR; INTRAVENOUS; SUBCUTANEOUS
Status: COMPLETED | OUTPATIENT
Start: 2018-06-24 | End: 2018-06-24

## 2018-06-24 RX ORDER — SODIUM CHLORIDE 9 MG/ML
125 INJECTION, SOLUTION INTRAVENOUS CONTINUOUS
Status: DISCONTINUED | OUTPATIENT
Start: 2018-06-25 | End: 2018-06-25

## 2018-06-24 RX ORDER — PROMETHAZINE HYDROCHLORIDE 25 MG/1
25 TABLET ORAL
Status: DISCONTINUED | OUTPATIENT
Start: 2018-06-24 | End: 2018-06-29 | Stop reason: HOSPADM

## 2018-06-24 RX ORDER — LANOLIN ALCOHOL/MO/W.PET/CERES
400 CREAM (GRAM) TOPICAL DAILY
Status: DISCONTINUED | OUTPATIENT
Start: 2018-06-25 | End: 2018-06-29 | Stop reason: HOSPADM

## 2018-06-24 RX ORDER — DIPHENHYDRAMINE HYDROCHLORIDE 50 MG/ML
25 INJECTION, SOLUTION INTRAMUSCULAR; INTRAVENOUS
Status: COMPLETED | OUTPATIENT
Start: 2018-06-24 | End: 2018-06-24

## 2018-06-24 RX ORDER — HYDROMORPHONE HYDROCHLORIDE 2 MG/ML
1 INJECTION, SOLUTION INTRAMUSCULAR; INTRAVENOUS; SUBCUTANEOUS
Status: DISCONTINUED | OUTPATIENT
Start: 2018-06-24 | End: 2018-06-24

## 2018-06-24 RX ORDER — ACETAMINOPHEN 325 MG/1
650 TABLET ORAL
Status: DISCONTINUED | OUTPATIENT
Start: 2018-06-24 | End: 2018-06-29 | Stop reason: HOSPADM

## 2018-06-24 RX ORDER — HYDROXYUREA 500 MG/1
500 CAPSULE ORAL EVERY 12 HOURS
Status: DISCONTINUED | OUTPATIENT
Start: 2018-06-25 | End: 2018-06-29 | Stop reason: HOSPADM

## 2018-06-24 RX ORDER — SODIUM CHLORIDE 0.9 % (FLUSH) 0.9 %
5-10 SYRINGE (ML) INJECTION AS NEEDED
Status: DISCONTINUED | OUTPATIENT
Start: 2018-06-24 | End: 2018-06-29 | Stop reason: HOSPADM

## 2018-06-24 RX ORDER — DEFERASIROX 360 MG/1
1800 TABLET, FILM COATED ORAL DAILY
Status: DISCONTINUED | OUTPATIENT
Start: 2018-06-25 | End: 2018-06-29 | Stop reason: HOSPADM

## 2018-06-24 RX ORDER — KETOROLAC TROMETHAMINE 30 MG/ML
30 INJECTION, SOLUTION INTRAMUSCULAR; INTRAVENOUS
Status: COMPLETED | OUTPATIENT
Start: 2018-06-24 | End: 2018-06-24

## 2018-06-24 RX ORDER — FOLIC ACID 1 MG/1
1 TABLET ORAL DAILY
Status: DISCONTINUED | OUTPATIENT
Start: 2018-06-25 | End: 2018-06-29 | Stop reason: HOSPADM

## 2018-06-24 RX ORDER — ONDANSETRON 2 MG/ML
4 INJECTION INTRAMUSCULAR; INTRAVENOUS
Status: DISCONTINUED | OUTPATIENT
Start: 2018-06-24 | End: 2018-06-29 | Stop reason: HOSPADM

## 2018-06-24 RX ORDER — SODIUM CHLORIDE 0.9 % (FLUSH) 0.9 %
5-10 SYRINGE (ML) INJECTION EVERY 8 HOURS
Status: DISCONTINUED | OUTPATIENT
Start: 2018-06-25 | End: 2018-06-29 | Stop reason: HOSPADM

## 2018-06-24 RX ORDER — METOPROLOL TARTRATE 25 MG/1
25 TABLET, FILM COATED ORAL EVERY 12 HOURS
Status: DISCONTINUED | OUTPATIENT
Start: 2018-06-25 | End: 2018-06-29 | Stop reason: HOSPADM

## 2018-06-24 RX ORDER — LISINOPRIL 5 MG/1
5 TABLET ORAL DAILY
Status: DISCONTINUED | OUTPATIENT
Start: 2018-06-25 | End: 2018-06-29 | Stop reason: HOSPADM

## 2018-06-24 RX ORDER — ADHESIVE BANDAGE
30 BANDAGE TOPICAL DAILY PRN
Status: DISCONTINUED | OUTPATIENT
Start: 2018-06-24 | End: 2018-06-29 | Stop reason: HOSPADM

## 2018-06-24 RX ORDER — ONDANSETRON 2 MG/ML
4 INJECTION INTRAMUSCULAR; INTRAVENOUS
Status: DISPENSED | OUTPATIENT
Start: 2018-06-24 | End: 2018-06-25

## 2018-06-24 RX ORDER — MORPHINE SULFATE 5 MG/ML
INJECTION, SOLUTION INTRAVENOUS CONTINUOUS
Status: DISCONTINUED | OUTPATIENT
Start: 2018-06-24 | End: 2018-06-28

## 2018-06-24 RX ORDER — OXYCODONE HYDROCHLORIDE 15 MG/1
30 TABLET ORAL
Status: DISCONTINUED | OUTPATIENT
Start: 2018-06-24 | End: 2018-06-29 | Stop reason: HOSPADM

## 2018-06-24 RX ADMIN — DIPHENHYDRAMINE HYDROCHLORIDE 25 MG: 50 INJECTION, SOLUTION INTRAMUSCULAR; INTRAVENOUS at 21:42

## 2018-06-24 RX ADMIN — SODIUM CHLORIDE 1000 ML: 900 INJECTION, SOLUTION INTRAVENOUS at 19:59

## 2018-06-24 RX ADMIN — KETOROLAC TROMETHAMINE 30 MG: 30 INJECTION, SOLUTION INTRAMUSCULAR; INTRAVENOUS at 19:59

## 2018-06-24 RX ADMIN — HYDROMORPHONE HYDROCHLORIDE 2 MG: 2 INJECTION, SOLUTION INTRAMUSCULAR; INTRAVENOUS; SUBCUTANEOUS at 19:59

## 2018-06-24 RX ADMIN — SODIUM CHLORIDE 100 ML/HR: 900 INJECTION, SOLUTION INTRAVENOUS at 23:38

## 2018-06-24 RX ADMIN — HYDROMORPHONE HYDROCHLORIDE 2 MG: 2 INJECTION, SOLUTION INTRAMUSCULAR; INTRAVENOUS; SUBCUTANEOUS at 21:42

## 2018-06-24 RX ADMIN — PROMETHAZINE HYDROCHLORIDE 25 MG: 25 INJECTION INTRAMUSCULAR; INTRAVENOUS at 21:10

## 2018-06-24 RX ADMIN — DIPHENHYDRAMINE HYDROCHLORIDE 25 MG: 50 INJECTION, SOLUTION INTRAMUSCULAR; INTRAVENOUS at 19:59

## 2018-06-24 RX ADMIN — Medication 10 ML: at 23:40

## 2018-06-24 RX ADMIN — MORPHINE SULFATE: 5 INJECTION, SOLUTION INTRAVENOUS at 23:49

## 2018-06-24 NOTE — IP AVS SNAPSHOT
303 [facility] 
 
 
 2329 [facility] 322 [facility] 
[phone] Patient: Ozzy Werner MRN: [id number] PDW:1/55/1232 About your hospitalization You were admitted on:  June 24, 2018 You last received care in the:  [address] You were discharged on:  June 29, 2018 Why you were hospitalized Your primary diagnosis was:  Vasoocclusive Sickle Cell Crisis (Hcc) Your diagnoses also included:  Htn (Hypertension), Essential Hypertension, Benign, Sickle Cell Anemia (Hcc), Paroxysmal Svt (Supraventricular Tachycardia) (Hcc), Sickle Cell Disease (Hcc), Leg Pain, Volume Overload Follow-up Information Follow up With Details Comments Contact Info Belem Nguyen MD On 7/5/2018 Appointment at 9:00 800 [address] 
[phone] Dr. Dakota Hilario  On 7/9/2018 Appointment at 2:00 40 [address] 
[phone] Discharge Orders None A check cristina indicates which time of day the medication should be taken. My Medications CONTINUE taking these medications Instructions Each Dose to Equal  
 Morning Noon Evening Bedtime  
 deferasirox 360 mg tablet Commonly known as:  [place] Your last dose was: Your next dose is: Take 5 Tabs by mouth daily. 5 Tab  
    
   
   
   
  
 folic acid 1 mg tablet Commonly known as:  Google Your last dose was: Your next dose is: Take 1 mg by mouth daily. 1 mg  
    
   
   
   
  
 hydroxyurea 500 mg capsule Commonly known as:  HYDREA Your last dose was: Your next dose is: Take 1 Cap by mouth two (2) times a day. Takes in am at 0900. Star after follow up with your PCP. 500 mg  
    
   
   
   
  
 lisinopril 5 mg tablet Commonly known as:  Fountain Lisandra Your last dose was: Your next dose is: Take 5 mg by mouth daily. Indications: HYPERTENSION  
 5 mg  
    
   
   
   
  
 magnesium oxide 400 mg tablet Commonly known as:  MAG-OX Your last dose was: Your next dose is: Take 1 Tab by mouth daily. 400 mg  
    
   
   
   
  
 metoprolol tartrate 25 mg tablet Commonly known as:  LOPRESSOR Your last dose was: Your next dose is: Take 25 mg by mouth two (2) times a day. Indications: HYPERTENSION  
 25 mg  
    
   
   
   
  
 * oxyCODONE IR 30 mg immediate release tablet Commonly known as:  Claressa Dills Your last dose was: Your next dose is: Take 1 Tab by mouth every four (4) hours as needed for Pain. Max Daily Amount: 180 mg.  
 30 mg  
    
   
   
   
  
 * oxyCODONE ER 80 mg ER tablet Commonly known as:  OxyCONTIN Your last dose was: Your next dose is: Take 1 Tab by mouth every twelve (12) hours. Max Daily Amount: 160 mg.  
 80 mg  
    
   
   
   
  
 promethazine 25 mg tablet Commonly known as:  PHENERGAN Your last dose was: Your next dose is: Take 1 Tab by mouth every six (6) hours as needed. May substitute suppository if vomiting 25 mg  
    
   
   
   
  
 * Notice: This list has 2 medication(s) that are the same as other medications prescribed for you. Read the directions carefully, and ask your doctor or other care provider to review them with you. Opioid Education Prescription Opioids: What You Need to Know: 
 
Prescription opioids can be used to help relieve moderate-to-severe pain and are often prescribed following a surgery or injury, or for certain health conditions. These medications can be an important part of treatment but also come with serious risks. Opioids are strong pain medicines. Examples include hydrocodone, oxycodone, fentanyl, and morphine. Heroin is an example of an illegal opioid.   It is important to work with your health care provider to make sure you are getting the safest, most effective care. WHAT ARE THE RISKS AND SIDE EFFECTS OF OPIOID USE? Prescription opioids carry serious risks of addiction and overdose, especially with prolonged use. An opioid overdose, often marked by slow breathing, can cause sudden death. The use of prescription opioids can have a number of side effects as well, even when taken as directed. · Tolerance-meaning you might need to take more of a medication for the same pain relief · Physical dependence-meaning you have symptoms of withdrawal when the medication is stopped. Withdrawal symptoms can include nausea, sweating, chills, diarrhea, stomach cramps, and muscle aches. Withdrawal can last up to several weeks, depending on which drug you took and how long you took it. · Increased sensitivity to pain · Constipation · Nausea, vomiting, and dry mouth · Sleepiness and dizziness · Confusion · Depression · Low levels of testosterone that can result in lower sex drive, energy, and strength · Itching and sweating RISKS ARE GREATER WITH:      
· History of drug misuse, substance use disorder, or overdose · Mental health conditions (such as depression or anxiety) · Sleep apnea · Older age (72 years or older) · Pregnancy Avoid alcohol while taking prescription opioids. Also, unless specifically advised by your health care provider, medications to avoid include: · Benzodiazepines (such as Xanax or Valium) · Muscle relaxants (such as Soma or Flexeril) · Hypnotics (such as Ambien or Lunesta) · Other prescription opioids KNOW YOUR OPTIONS Talk to your health care provider about ways to manage your pain that don't involve prescription opioids. Some of these options may actually work better and have fewer risks and side effects. Options may include: 
· Pain relievers such as acetaminophen, ibuprofen, and naproxen · Some medications that are also used for depression or seizures · Physical therapy and exercise · Counseling to help patients learn how to cope better with triggers of pain and stress. · Application of heat or cold compress · Massage therapy · Relaxation techniques Be Informed Make sure you know the name of your medication, how much and how often to take it, and its potential risks & side effects. IF YOU ARE PRESCRIBED OPIOIDS FOR PAIN: 
· Never take opioids in greater amounts or more often than prescribed. Remember the goal is not to be pain-free but to manage your pain at a tolerable level. · Follow up with your primary care provider to: · Work together to create a plan on how to manage your pain. · Talk about ways to help manage your pain that don't involve prescription opioids. · Talk about any and all concerns and side effects. · Help prevent misuse and abuse. · Never sell or share prescription opioids · Help prevent misuse and abuse. · Store prescription opioids in a secure place and out of reach of others (this may include visitors, children, friends, and family). · Safely dispose of unused/unwanted prescription opioids: Find your community drug take-back program or your pharmacy mail-back program, or flush them down the toilet, following guidance from the Food and Drug Administration (www.fda.gov/Drugs/ResourcesForYou). · Visit www.cdc.gov/drugoverdose to learn about the risks of opioid abuse and overdose. · If you believe you may be struggling with addiction, tell your health care provider and ask for guidance or call [address] [place] [address] at 2-895-282-RQMI. Discharge Instructions None [facility] Announcement We are excited to announce that we are making your provider's discharge notes available to you in Avnera.   You will see these notes when they are completed and signed by the physician that discharged you from your recent hospital stay. If you have any questions or concerns about any information you see in Qlibri, please call the Health Information Department where you were seen or reach out to your Primary Care Provider for more information about your plan of care. Introducing [facility] SERVICES! Dimple Campbell introduces Qlibri patient portal. Now you can access parts of your medical record, email your doctor's office, and request medication refills online. 1. In your internet browser, go to [email or web address] 2. Click on the First Time User? Click Here link in the Sign In box. You will see the New Member Sign Up page. 3. Enter your Qlibri Access Code exactly as it appears below. You will not need to use this code after youve completed the sign-up process. If you do not sign up before the expiration date, you must request a new code. · Qlibri Access Code: [email or web address] Expires: 9/22/2018  5:27 PM 
 
4. Enter the last four digits of your Social Security Number (xxxx) and Date of Birth (mm/dd/yyyy) as indicated and click Submit. You will be taken to the next sign-up page. 5. Create a Qlibri ID. This will be your Qlibri login ID and cannot be changed, so think of one that is secure and easy to remember. 6. Create a Qlibri password. You can change your password at any time. 7. Enter your Password Reset Question and Answer. This can be used at a later time if you forget your password. 8. Enter your e-mail address. You will receive e-mail notification when new information is available in [address] 9. Click Sign Up. You can now view and download portions of your medical record. 10. Click the Download Summary menu link to download a portable copy of your medical information.  
 
If you have questions, please visit the Frequently Asked Questions section of the Sarenza. Remember, MyChart is NOT to be used for urgent needs. For medical emergencies, dial 911. Now available from your iPhone and Android! Introducing Israel Bravo As a Lai Valdez [facility] patient, I wanted to make you aware of our electronic visit tool called Israel Bravo. [employer]Mail[employer]Inside allows you to connect within minutes with a medical provider 24 hours a day, seven days a week via a mobile device or tablet or logging into a secure website from your computer. You can access Israel Bravo from anywhere in the [place]. A virtual visit might be right for you when you have a simple condition and feel like you just dont want to get out of bed, or cant get away from work for an appointment, when your regular [facility] provider is not available (evenings, weekends or holidays), or when youre out of town and need minor care. Electronic visits cost only $49 and if the [employer]Mail[employer]Inside provider determines a prescription is needed to treat your condition, one can be electronically transmitted to a nearby pharmacy*. Please take a moment to enroll today if you have not already done so. The enrollment process is free and takes just a few minutes. To enroll, please download the Carritus jhony to your tablet or phone, or visit [email or web address] to enroll on your computer. And, as an [address] [place] [address] patient with a [employer] account, the results of your visits will be scanned into your electronic medical record and your primary care provider will be able to view the scanned results. We urge you to continue to see your regular [facility] provider for your ongoing medical care. And while your primary care provider may not be the one available when you seek a Israel Bravo virtual visit, the peace of mind you get from getting a real diagnosis real time can be priceless. For more information on Israel Bravo, view our Frequently Asked Questions (FAQs) at [email or web address]. Sincerely, 
 
Summer Dupont MD 
Chief Medical Officer Harlan Perez *:  certain medications cannot be prescribed via Israel Bravo Providers Seen During Your Hospitalization Provider Specialty Primary office phone Nancy New MD Emergency Medicine [phone] Justa Ramos MD [facility] [phone] Your Primary Care Physician (PCP) Primary Care Physician Office Phone Office Fax Valentine Morillo [phone] You are allergic to the following Allergen Reactions Compazine (Prochlorperazine Edisylate) Other (comments) Also makes him feel funny Morphine Other (comments) Makes him feel funny Reglan (Metoclopramide) Other (comments) \"feel funny\" Zofran (Ondansetron Hcl (Pf)) Other (comments) Make him feel funny Recent Documentation Height Weight BMI Smoking Status 1.778 m 75.8 kg 23.98 kg/m2 Never Smoker Emergency Contacts Name Discharge Info Relation Home Work Mobile Jerris Sever  Spouse [phone] Patient Belongings The following personal items are in your possession at time of discharge: 
  Dental Appliances: None  Visual Aid: Glasses, With patient             Clothing: Shirt, Shorts, Footwear, Undergarments Please provide this summary of care documentation to your next provider. Signatures-by signing, you are acknowledging that this After Visit Summary has been reviewed with you and you have received a copy. Patient Signature:  ____________________________________________________________ Date:  ____________________________________________________________  
  
Corinne Knott Provider Signature:  ____________________________________________________________ Date:  ____________________________________________________________

## 2018-06-24 NOTE — IP AVS SNAPSHOT
[address] 
[phone] Patient: Omid Nair MRN: [id number] LID:7/94/[id number] A check cristina indicates which time of day the medication should be taken. My Medications CONTINUE taking these medications Instructions Each Dose to Equal  
 Morning Noon Evening Bedtime  
 deferasirox 360 mg tablet Commonly known as:  Lulu Cobia Your last dose was: Your next dose is: Take 5 Tabs by mouth daily. 5 Tab  
    
   
   
   
  
 folic acid 1 mg tablet Commonly known as:  Google Your last dose was: Your next dose is: Take 1 mg by mouth daily. 1 mg  
    
   
   
   
  
 hydroxyurea 500 mg capsule Commonly known as:  HYDREA Your last dose was: Your next dose is: Take 1 Cap by mouth two (2) times a day. Takes in am at 0900. Star after follow up with your PCP. 500 mg  
    
   
   
   
  
 lisinopril 5 mg tablet Commonly known as:  Mitra Diaz Your last dose was: Your next dose is: Take 5 mg by mouth daily. Indications: HYPERTENSION  
 5 mg  
    
   
   
   
  
 magnesium oxide 400 mg tablet Commonly known as:  MAG-OX Your last dose was: Your next dose is: Take 1 Tab by mouth daily. 400 mg  
    
   
   
   
  
 metoprolol tartrate 25 mg tablet Commonly known as:  LOPRESSOR Your last dose was: Your next dose is: Take 25 mg by mouth two (2) times a day. Indications: HYPERTENSION  
 25 mg  
    
   
   
   
  
 * oxyCODONE IR 30 mg immediate release tablet Commonly known as:  Cresencio Salazar Your last dose was: Your next dose is: Take 1 Tab by mouth every four (4) hours as needed for Pain. Max Daily Amount: 180 mg.  
 30 mg  
    
   
   
   
  
 * oxyCODONE ER 80 mg ER tablet Commonly known as:  OxyCONTIN  
   
 Your last dose was: Your next dose is: Take 1 Tab by mouth every twelve (12) hours. Max Daily Amount: 160 mg.  
 80 mg  
    
   
   
   
  
 promethazine 25 mg tablet Commonly known as:  PHENERGAN Your last dose was: Your next dose is: Take 1 Tab by mouth every six (6) hours as needed. May substitute suppository if vomiting 25 mg  
    
   
   
   
  
 * Notice: This list has 2 medication(s) that are the same as other medications prescribed for you. Read the directions carefully, and ask your doctor or other care provider to review them with you.

## 2018-06-24 NOTE — IP AVS SNAPSHOT
Summary of Care Report The Summary of Care report has been created to help improve care coordination. Users with access to [employer] or [address] (Web-based application) may access additional patient information including the Discharge Summary. If you are not currently a [address] user and need more information, please call the number listed below in the [place] [address] section and ask to be connected with Medical Records. Facility Information Name Address Phone [address] [facility] [address] [phone] Patient Information Patient Name Sex  Kayla Causey ([id number]) Male 1975 Discharge Information Admitting Provider Service Area Unit Maggi Figueroa MD / [facility] [address] / [phone] Discharge Provider Discharge Date/Time Discharge Disposition Destination (none) 2018  8:39 AM (Pending) AHR (none) Patient Language Language ENGLISH [13] Hospital Problems as of 2018  Reviewed: 2018  4:41 PM by April Luong MD  
  
  
  
 Class Noted - Resolved Last Modified POA Active Problems Essential hypertension, benign  2012 - Present 2018 by Maggi Figueroa MD Yes Entered by Carlos Olea Sickle cell anemia ([facility]  2016 - Present 2018 by Maggi Figueroa MD Yes Entered by Irene Flynn DO Paroxysmal SVT (supraventricular tachycardia) ([facility]  2016 - Present 2018 by Maggi Figueroa MD Yes Entered by Damaris Griffith MD  
  Sickle cell disease ([facility]  2017 - Present 2018 by Maggi Figueroa MD Yes Entered by Jesse Ortiz MD  
  * (Principal)Vasoocclusive sickle cell crisis [facility])  2018 - Present 2018 by Maggi Figueroa MD Yes   Entered by Maggi Figueroa MD  
 Leg pain  6/24/2018 - Present 6/24/2018 by Love Lerner MD Yes Entered by Love Lerner MD  
  Volume overload  6/28/2018 - Present 6/28/2018 by Cayla Lee MD No  
  Entered by Cayla Lee MD  
  
Non-Hospital Problems as of 6/29/2018  Reviewed: 4/29/2018  4:41 PM by Tara Plummer MD  
  
  
  
 Class Noted - Resolved Last Modified Active Problems Iron overload due to repeated red blood cell transfusions  1/18/2017 - Present 4/2/2018 by Ryan Valadez MD  
  Entered by Alicja Angeles MD  
  
You are allergic to the following Allergen Reactions Compazine (Prochlorperazine Edisylate) Other (comments) Also makes him feel funny Morphine Other (comments) Makes him feel funny Reglan (Metoclopramide) Other (comments) \"feel funny\" Zofran (Ondansetron Hcl (Pf)) Other (comments) Make him feel funny Current Discharge Medication List  
  
CONTINUE these medications which have NOT CHANGED Dose & Instructions Dispensing Information Comments  
 deferasirox 360 mg tablet Commonly known as:  Lavona Leitz Dose:  5 Tab Take 5 Tabs by mouth daily. Quantity:  150 Tab Refills:  0  
   
 folic acid 1 mg tablet Commonly known as:  Google Dose:  1 mg Take 1 mg by mouth daily. Refills:  0  
   
 hydroxyurea 500 mg capsule Commonly known as:  HYDREA Dose:  500 mg Take 1 Cap by mouth two (2) times a day. Takes in am at 0900. Star after follow up with your PCP. Quantity:  60 Cap Refills:  0  
   
 lisinopril 5 mg tablet Commonly known as:  Warren Peek Dose:  5 mg Take 5 mg by mouth daily. Indications: HYPERTENSION Refills:  0  
   
 magnesium oxide 400 mg tablet Commonly known as:  MAG-OX Dose:  400 mg Take 1 Tab by mouth daily. Quantity:  10 Tab Refills:  0  
   
 metoprolol tartrate 25 mg tablet Commonly known as:  LOPRESSOR  Dose:  25 mg  
 Take 25 mg by mouth two (2) times a day. Indications: HYPERTENSION Refills:  0  
   
 * oxyCODONE IR 30 mg immediate release tablet Commonly known as:  Loy Melena Dose:  30 mg Take 1 Tab by mouth every four (4) hours as needed for Pain. Max Daily Amount: 180 mg.  
 Quantity:  12 Tab Refills:  0  
   
 * oxyCODONE ER 80 mg ER tablet Commonly known as:  OxyCONTIN Dose:  80 mg Take 1 Tab by mouth every twelve (12) hours. Max Daily Amount: 160 mg.  
 Quantity:  6 Tab Refills:  0  
   
 promethazine 25 mg tablet Commonly known as:  PHENERGAN Dose:  25 mg Take 1 Tab by mouth every six (6) hours as needed. May substitute suppository if vomiting Quantity:  20 Tab Refills:  0  
   
 * Notice: This list has 2 medication(s) that are the same as other medications prescribed for you. Read the directions carefully, and ask your doctor or other care provider to review them with you. Current Immunizations Name Date Influenza Vaccine 9/10/2015 Influenza Vaccine Split 9/27/2012 ZZZ-RETIRED (DO NOT USE) Pneumococcal Vaccine (Unspecified Type) 9/21/2010 Follow-up Information Follow up With Details Comments Contact Info Quita Boles MD On 7/5/2018 Appointment at 9:00 800 [address] 
[phone] Dr. Benitez Pena  On 7/9/2018 Appointment at 2:00 40 [address] 
[phone] Discharge Instructions None Chart Review Routing History Recipient Method Report Sent By Jeremy Lanza MD  
Fax: [phone] Phone: [phone] Fax IP Auto Routed [employer] [[id number]] 3/28/2012 11:56 AM 03/28/2012 Kriss Lanza MD  
Fax: [phone] Phone: [phone] Fax IP Auto Routed [id number] Norris Baez MD [[id number]] 11/28/2012  3:52 PM 11/28/2012 Rula Coleman MD  
Fax: [phone] Phone: [phone] Fax IP Auto Routed [place], [place] [[id number]] 1/17/2017 11:35 AM 01/17/2017

## 2018-06-24 NOTE — ED TRIAGE NOTES
Pt presents with generalized body aches. Pain onset 12pm yesterday. Home meds not helping. Hx of sickle cell. He reports pain being same to sickle cell in past. Has port for same.

## 2018-06-25 ENCOUNTER — APPOINTMENT (OUTPATIENT)
Dept: GENERAL RADIOLOGY | Age: 43
DRG: 812 | End: 2018-06-25
Attending: INTERNAL MEDICINE
Payer: MEDICARE

## 2018-06-25 ENCOUNTER — PATIENT OUTREACH (OUTPATIENT)
Dept: CASE MANAGEMENT | Age: 43
End: 2018-06-25

## 2018-06-25 LAB
ANION GAP SERPL CALC-SCNC: 8 MMOL/L (ref 7–16)
BASOPHILS # BLD: 0 K/UL (ref 0–0.2)
BASOPHILS # BLD: 0 K/UL (ref 0–0.2)
BASOPHILS NFR BLD: 0 % (ref 0–2)
BASOPHILS NFR BLD: 1 % (ref 0–2)
BUN SERPL-MCNC: 14 MG/DL (ref 6–23)
CALCIUM SERPL-MCNC: 7.9 MG/DL (ref 8.3–10.4)
CHLORIDE SERPL-SCNC: 106 MMOL/L (ref 98–107)
CO2 SERPL-SCNC: 25 MMOL/L (ref 21–32)
CREAT SERPL-MCNC: 1.04 MG/DL (ref 0.8–1.5)
DIFFERENTIAL METHOD BLD: ABNORMAL
DIFFERENTIAL METHOD BLD: ABNORMAL
EOSINOPHIL # BLD: 0.1 K/UL (ref 0–0.8)
EOSINOPHIL NFR BLD: 1 % (ref 0.5–7.8)
ERYTHROCYTE [DISTWIDTH] IN BLOOD BY AUTOMATED COUNT: 32.4 % (ref 11.9–14.6)
ERYTHROCYTE [DISTWIDTH] IN BLOOD BY AUTOMATED COUNT: 32.6 % (ref 11.9–14.6)
GLUCOSE SERPL-MCNC: 98 MG/DL (ref 65–100)
HCT VFR BLD AUTO: 16.5 % (ref 41.1–50.3)
HCT VFR BLD AUTO: 18.4 % (ref 41.1–50.3)
HGB BLD-MCNC: 5.6 G/DL (ref 13.6–17.2)
HGB BLD-MCNC: 6.1 G/DL (ref 13.6–17.2)
HGB RETIC QN AUTO: 40 PG (ref 29–35)
IMM GRANULOCYTES # BLD: 0 K/UL (ref 0–0.5)
IMM GRANULOCYTES NFR BLD AUTO: 0 % (ref 0–5)
IMM RETICS NFR: 20.5 % (ref 2.3–13.4)
LYMPHOCYTES # BLD: 3 K/UL (ref 0.5–4.6)
LYMPHOCYTES NFR BLD: 35 % (ref 13–44)
MCH RBC QN AUTO: 30.7 PG (ref 26.1–32.9)
MCH RBC QN AUTO: 31.8 PG (ref 26.1–32.9)
MCHC RBC AUTO-ENTMCNC: 33.2 G/DL (ref 31.4–35)
MCHC RBC AUTO-ENTMCNC: 33.9 G/DL (ref 31.4–35)
MCV RBC AUTO: 92.5 FL (ref 79.6–97.8)
MCV RBC AUTO: 93.8 FL (ref 79.6–97.8)
MONOCYTES # BLD: 0.3 K/UL (ref 0.1–1.3)
MONOCYTES NFR BLD: 4 % (ref 4–12)
NEUTS SEG # BLD: 5 K/UL (ref 1.7–8.2)
NEUTS SEG NFR BLD: 60 % (ref 43–78)
PLATELET # BLD AUTO: 224 K/UL (ref 150–450)
PLATELET # BLD AUTO: 236 K/UL (ref 150–450)
PMV BLD AUTO: 10 FL (ref 10.8–14.1)
PMV BLD AUTO: 10.1 FL (ref 10.8–14.1)
POTASSIUM SERPL-SCNC: 4.4 MMOL/L (ref 3.5–5.1)
RBC # BLD AUTO: 1.76 M/UL (ref 4.23–5.67)
RBC # BLD AUTO: 1.99 M/UL (ref 4.23–5.67)
RETICS # AUTO: 0.05 M/UL (ref 0.03–0.1)
RETICS/RBC NFR AUTO: 2.4 % (ref 0.3–2)
SODIUM SERPL-SCNC: 139 MMOL/L (ref 136–145)
WBC # BLD AUTO: 6.5 K/UL (ref 4.3–11.1)
WBC # BLD AUTO: 8.4 K/UL (ref 4.3–11.1)

## 2018-06-25 PROCEDURE — 77030020263 HC SOL INJ SOD CL0.9% LFCR 1000ML

## 2018-06-25 PROCEDURE — 74011250636 HC RX REV CODE- 250/636: Performed by: FAMILY MEDICINE

## 2018-06-25 PROCEDURE — 85025 COMPLETE CBC W/AUTO DIFF WBC: CPT | Performed by: INTERNAL MEDICINE

## 2018-06-25 PROCEDURE — 65270000029 HC RM PRIVATE

## 2018-06-25 PROCEDURE — 86902 BLOOD TYPE ANTIGEN DONOR EA: CPT | Performed by: FAMILY MEDICINE

## 2018-06-25 PROCEDURE — 30233N1 TRANSFUSION OF NONAUTOLOGOUS RED BLOOD CELLS INTO PERIPHERAL VEIN, PERCUTANEOUS APPROACH: ICD-10-PCS | Performed by: FAMILY MEDICINE

## 2018-06-25 PROCEDURE — 74011250636 HC RX REV CODE- 250/636: Performed by: INTERNAL MEDICINE

## 2018-06-25 PROCEDURE — 77030020253 HC SOL INJ D545NS .05 DEX .45 SAL

## 2018-06-25 PROCEDURE — 74011250637 HC RX REV CODE- 250/637: Performed by: FAMILY MEDICINE

## 2018-06-25 PROCEDURE — 74011000250 HC RX REV CODE- 250: Performed by: FAMILY MEDICINE

## 2018-06-25 PROCEDURE — 99222 1ST HOSP IP/OBS MODERATE 55: CPT | Performed by: INTERNAL MEDICINE

## 2018-06-25 PROCEDURE — 74011000258 HC RX REV CODE- 258: Performed by: NURSE PRACTITIONER

## 2018-06-25 PROCEDURE — 76937 US GUIDE VASCULAR ACCESS: CPT

## 2018-06-25 PROCEDURE — 86920 COMPATIBILITY TEST SPIN: CPT | Performed by: FAMILY MEDICINE

## 2018-06-25 PROCEDURE — 71045 X-RAY EXAM CHEST 1 VIEW: CPT

## 2018-06-25 PROCEDURE — 36430 TRANSFUSION BLD/BLD COMPNT: CPT | Performed by: NURSE PRACTITIONER

## 2018-06-25 PROCEDURE — 77030039270 HC TU BLD FLTR CARD -A

## 2018-06-25 PROCEDURE — C1751 CATH, INF, PER/CENT/MIDLINE: HCPCS

## 2018-06-25 PROCEDURE — 36591 DRAW BLOOD OFF VENOUS DEVICE: CPT | Performed by: NURSE PRACTITIONER

## 2018-06-25 PROCEDURE — P9040 RBC LEUKOREDUCED IRRADIATED: HCPCS | Performed by: FAMILY MEDICINE

## 2018-06-25 PROCEDURE — 86900 BLOOD TYPING SEROLOGIC ABO: CPT | Performed by: FAMILY MEDICINE

## 2018-06-25 RX ORDER — SODIUM CHLORIDE 0.9 % (FLUSH) 0.9 %
10 SYRINGE (ML) INJECTION AS NEEDED
Status: DISCONTINUED | OUTPATIENT
Start: 2018-06-25 | End: 2018-06-29 | Stop reason: HOSPADM

## 2018-06-25 RX ORDER — DEXTROSE MONOHYDRATE AND SODIUM CHLORIDE 5; .45 G/100ML; G/100ML
25 INJECTION, SOLUTION INTRAVENOUS CONTINUOUS
Status: DISCONTINUED | OUTPATIENT
Start: 2018-06-25 | End: 2018-06-28

## 2018-06-25 RX ORDER — MORPHINE SULFATE 2 MG/ML
4 INJECTION, SOLUTION INTRAMUSCULAR; INTRAVENOUS
Status: COMPLETED | OUTPATIENT
Start: 2018-06-25 | End: 2018-06-25

## 2018-06-25 RX ORDER — SODIUM CHLORIDE 9 MG/ML
250 INJECTION, SOLUTION INTRAVENOUS AS NEEDED
Status: DISCONTINUED | OUTPATIENT
Start: 2018-06-25 | End: 2018-06-29 | Stop reason: HOSPADM

## 2018-06-25 RX ORDER — SODIUM CHLORIDE 0.9 % (FLUSH) 0.9 %
10 SYRINGE (ML) INJECTION EVERY 8 HOURS
Status: DISCONTINUED | OUTPATIENT
Start: 2018-06-25 | End: 2018-06-29 | Stop reason: HOSPADM

## 2018-06-25 RX ADMIN — Medication 10 ML: at 13:07

## 2018-06-25 RX ADMIN — METOPROLOL TARTRATE 25 MG: 25 TABLET ORAL at 00:56

## 2018-06-25 RX ADMIN — OXYCODONE HYDROCHLORIDE 80 MG: 80 TABLET, FILM COATED, EXTENDED RELEASE ORAL at 21:14

## 2018-06-25 RX ADMIN — DEXTROSE MONOHYDRATE AND SODIUM CHLORIDE 125 ML/HR: 5; .45 INJECTION, SOLUTION INTRAVENOUS at 13:02

## 2018-06-25 RX ADMIN — Medication 10 ML: at 21:15

## 2018-06-25 RX ADMIN — ACETAMINOPHEN 650 MG: 325 TABLET ORAL at 13:04

## 2018-06-25 RX ADMIN — SODIUM CHLORIDE 12.5 MG: 9 INJECTION INTRAMUSCULAR; INTRAVENOUS; SUBCUTANEOUS at 18:38

## 2018-06-25 RX ADMIN — Medication 1 AMPULE: at 08:18

## 2018-06-25 RX ADMIN — Medication 400 MG: at 08:16

## 2018-06-25 RX ADMIN — METOPROLOL TARTRATE 25 MG: 25 TABLET ORAL at 08:16

## 2018-06-25 RX ADMIN — Medication 1 AMPULE: at 21:14

## 2018-06-25 RX ADMIN — HYDROXYUREA 500 MG: 500 CAPSULE ORAL at 08:15

## 2018-06-25 RX ADMIN — FOLIC ACID 1 MG: 1 TABLET ORAL at 08:16

## 2018-06-25 RX ADMIN — MORPHINE SULFATE 4 MG: 2 INJECTION, SOLUTION INTRAMUSCULAR; INTRAVENOUS at 11:52

## 2018-06-25 RX ADMIN — DIPHENHYDRAMINE HYDROCHLORIDE 25 MG: 25 CAPSULE ORAL at 13:04

## 2018-06-25 RX ADMIN — Medication 1 AMPULE: at 00:56

## 2018-06-25 RX ADMIN — ENOXAPARIN SODIUM 40 MG: 40 INJECTION SUBCUTANEOUS at 08:15

## 2018-06-25 RX ADMIN — HYDROXYUREA 500 MG: 500 CAPSULE ORAL at 21:10

## 2018-06-25 RX ADMIN — LISINOPRIL 5 MG: 5 TABLET ORAL at 08:15

## 2018-06-25 RX ADMIN — OXYCODONE HYDROCHLORIDE 80 MG: 80 TABLET, FILM COATED, EXTENDED RELEASE ORAL at 08:15

## 2018-06-25 NOTE — H&P
HOSPITALIST INITIAL HISTORY AND PHYSICAL    NAME:  Sussy Hernandez   Age:  37 y.o.  :   1975   MRN:   [id number]  PCP: Beulah Nelson MD  Consulting MD:  Treatment Team: Attending Provider: Janessa Martines MD; Primary Nurse: Lane Bradley    CHIEF COMPLAINT: leg pain    HISTORY OF PRESENT ILLNESS:   Sussy Hernandez is a 37 y.o. male with a past medical history of Sickle Cell disease and HTN who presents to the ER with two days of progressively worsening pain in his shins bilaterally. He reports that this is consistent with previous sickle cell crisis pain that he has experienced in the past. The pain is somewhat relieved by IV dilaudid in the ER. He also reports some mild nausea that has improved with IV phenergan in ER. He denies any fevers, chills, vomiting, diarrhea, constipation, cough, congestion, SOB. REVIEW OF SYSTEMS: Comprehensive ROS performed and negative except as stated in HPI. Past Medical History:   Diagnosis Date    Chronic pain     HTN (hypertension)     Ill-defined condition     sickle cell    Iron overload due to repeated red blood cell transfusions 2017    Paroxysmal SVT (supraventricular tachycardia) (HCC) 2016    Sickle cell disease ([facility] [address]         Past Surgical History:   Procedure Laterality Date    HC PENILE IMPL DURA II POSITINBLE      HC PORT LIFE SNGLE LUMEN 5013      to L CW    HX CHOLECYSTECTOMY      HX OTHER SURGICAL      penile inplant    HX VASCULAR ACCESS         Prior to Admission Medications   Prescriptions Last Dose Informant Patient Reported? Taking? deferasirox (JADENU) 360 mg tab   No No   Sig: Take 5 Tabs by mouth daily. folic acid (FOLVITE) 1 mg tablet  Self Yes No   Sig: Take 1 mg by mouth daily. hydroxyurea (HYDREA) 500 mg capsule   No No   Sig: Take 1 Cap by mouth two (2) times a day. Takes in am at 0900. Star after follow up with your PCP.    lisinopril (PRINIVIL, ZESTRIL) 5 mg tablet   Yes No   Sig: Take 5 mg by mouth daily. Indications: HYPERTENSION   magnesium oxide (MAG-OX) 400 mg tablet   No No   Sig: Take 1 Tab by mouth daily. metoprolol (LOPRESSOR) 25 mg tablet   Yes No   Sig: Take 25 mg by mouth two (2) times a day. Indications: HYPERTENSION   oxyCODONE ER (OXYCONTIN) 80 mg ER tablet   No No   Sig: Take 1 Tab by mouth every twelve (12) hours. Max Daily Amount: 160 mg.   oxyCODONE IR (OXY-IR) 30 mg immediate release tablet   No No   Sig: Take 1 Tab by mouth every four (4) hours as needed for Pain. Max Daily Amount: 180 mg.   promethazine (PHENERGAN) 25 mg tablet   No No   Sig: Take 1 Tab by mouth every six (6) hours as needed. May substitute suppository if vomiting      Facility-Administered Medications: None       Allergies   Allergen Reactions    Compazine [Prochlorperazine Edisylate] Other (comments)     Also makes him feel funny    Morphine Other (comments)     Makes him feel funny    Reglan [Metoclopramide] Other (comments)     \"feel funny\"    Zofran [Ondansetron Hcl (Pf)] Other (comments)     Make him feel funny       FAMILY HISTORY: Reviewed. Negative except   Family History   Problem Relation Age of Onset    Hypertension Other     Diabetes Father     Stroke Father     Sickle Cell Anemia Sister        Social History   Substance Use Topics    Smoking status: Never Smoker    Smokeless tobacco: Never Used    Alcohol use No         Objective:     Visit Vitals    /75 (BP 1 Location: Right arm, BP Patient Position: At rest)    Pulse 95    Temp 99 °F (37.2 °C)    Resp 16    Ht 5' 10\" (1.778 m)    Wt 76.2 kg (168 lb)    SpO2 99%    BMI 24.11 kg/m2      Temp (24hrs), Av.3 °F (37.4 °C), Min:99 °F (37.2 °C), Max:99.5 °F (37.5 °C)    Oxygen Therapy  O2 Sat (%): 99 % (18)  Pulse via Oximetry: 72 beats per minute (18)  O2 Device: Room air (18)  Physical Exam:  General:    The patient is a pleasant middle aged male in no acute distress.     Head: Normocephalic/atraumatic. Eyes:  Mild palpebral pallor, no scleral icterus. ENT:  External auricular and nasal exam within normal limits. Mucous membranes are moist.  Neck:  Supple, non-tender, no JVD. Lungs:   Clear to auscultation bilaterally without wheezes or crackles. No respiratory distress or accessory muscle use. Heart:   Regular rate and rhythm, without murmurs, rubs, or gallops. Abdomen:   Soft, non-tender, non-distended with normoactive bowel sounds. Genitourinary: No tenderness over the bladder or bilateral CVAs. Extremities: Without clubbing, cyanosis, or edema. Skin:     Normal color, texture, and turgor. No rashes, lesions, or jaundice. Pulses: Radial and dorsalis pedis pulses present 2+ bilaterally. Capillary refill <2s. Neurologic: CN II-XII grossly intact and symmetrical.     Moving all four extremities well with no focal deficits. Psychiatric: Pleasant demeanor, appropriate affect. Alert and oriented x 3    Data Review:   Recent Results (from the past 24 hour(s))   CBC WITH AUTOMATED DIFF    Collection Time: 06/24/18  8:36 PM   Result Value Ref Range    WBC 8.7 4.3 - 11.1 K/uL    RBC 2.27 (L) 4.23 - 5.67 M/uL    HGB 7.1 (L) 13.6 - 17.2 g/dL    HCT 20.9 (LL) 41.1 - 50.3 %    MCV 92.1 79.6 - 97.8 FL    MCH 31.3 26.1 - 32.9 PG    MCHC 34.0 31.4 - 35.0 g/dL    RDW 32.4 (H) 11.9 - 14.6 %    PLATELET 990 092 - 295 K/uL    MPV 10.2 (L) 10.8 - 14.1 FL    DF AUTOMATED      NEUTROPHILS 70 43 - 78 %    LYMPHOCYTES 24 13 - 44 %    MONOCYTES 5 4.0 - 12.0 %    EOSINOPHILS 1 0.5 - 7.8 %    BASOPHILS 0 0.0 - 2.0 %    IMMATURE GRANULOCYTES 0 0.0 - 5.0 %    ABS. NEUTROPHILS 6.1 1.7 - 8.2 K/UL    ABS. LYMPHOCYTES 2.1 0.5 - 4.6 K/UL    ABS. MONOCYTES 0.4 0.1 - 1.3 K/UL    ABS. EOSINOPHILS 0.1 0.0 - 0.8 K/UL    ABS. BASOPHILS 0.0 0.0 - 0.2 K/UL    ABS. IMM.  GRANS. 0.0 0.0 - 0.5 K/UL   METABOLIC PANEL, COMPREHENSIVE    Collection Time: 06/24/18  8:36 PM   Result Value Ref Range    Sodium 138 136 - 145 mmol/L    Potassium 4.1 3.5 - 5.1 mmol/L    Chloride 103 98 - 107 mmol/L    CO2 25 21 - 32 mmol/L    Anion gap 10 7 - 16 mmol/L    Glucose 109 (H) 65 - 100 mg/dL    BUN 12 6 - 23 MG/DL    Creatinine 0.88 0.8 - 1.5 MG/DL    GFR est AA >60 >60 ml/min/1.73m2    GFR est non-AA >60 >60 ml/min/1.73m2    Calcium 8.7 8.3 - 10.4 MG/DL    Bilirubin, total 1.7 (H) 0.2 - 1.1 MG/DL    ALT (SGPT) 58 12 - 65 U/L    AST (SGOT) 39 (H) 15 - 37 U/L    Alk. phosphatase 74 50 - 136 U/L    Protein, total 8.4 (H) 6.3 - 8.2 g/dL    Albumin 3.6 3.5 - 5.0 g/dL    Globulin 4.8 (H) 2.3 - 3.5 g/dL    A-G Ratio 0.8 (L) 1.2 - 3.5     POC LACTIC ACID    Collection Time: 06/24/18  8:44 PM   Result Value Ref Range    Lactic Acid (POC) 1.0 0.5 - 1.9 mmol/L       Imaging /Procedures /Studies:  No results found. Assessment and Plan:     Principal Problem:    Vasoocclusive sickle cell crisis ([facility] (6/24/2018)    Will treat symptomatically. Morphine PCA pump, continue home PO pain medications. IV phenergan for nausea. Active Problems:    Leg pain (6/24/2018)    Per above. Sickle cell anemia (HCC) (4/6/2016)    Hgb stable at 7.1 Follow CBC, transfuse if below 6.5      Sickle cell disease ([facility] (7/13/2017)    Stable. Continue hydroxyurea, iron chelator, folic acid. Paroxysmal SVT (supraventricular tachycardia) ([facility]) (7/9/2016)    Stable      Essential hypertension, benign (6/23/2012)    Stable, continue home meds        DVT Prophylaxis: Lovenox      Code Status: FULL CODE      Disposition: Admit to med/surg for evaluation and treatment as per above.       Anticipated discharge: 3-4 days     Signed By: Steven Chadwick MD     June 24, 2018

## 2018-06-25 NOTE — PROGRESS NOTES
· Patient back in the ER on 6/24/18 for sickle cell crisis   · Patient presented to ER on 6/24/18 with generalized body aches and home meds were not helping. · Patient admitted to hospital to Room 522  · RRAT score of 24  · Patient is a HRRP due to Patient has a chronic disease process that will probably warrant unavoidable hospitalizations and medical interventions for the remainder of his lifetime  · Patient understands the disease process due to he has had disease since [facility]  majority of the time when he phones his [facility] hematologist for issues they refer him to the ER  · Will outreach to patient for JOSÉ TREVIZO when discharged from hospital to home  This note will not be viewable in [address]

## 2018-06-25 NOTE — PROGRESS NOTES
06/24/18 2337   Dual Skin Pressure Injury Assessment   Dual Skin Pressure Injury Assessment WDL   Second Care Provider (Based on [address] [place] [address]) Burke Leon RN

## 2018-06-25 NOTE — PROGRESS NOTES
END OF SHIFT NOTE:    Intake/Output      Voiding: YES  Catheter: NO  Drain:              Stool:  0 occurrences. Emesis:  0 occurrences. VITAL SIGNS  Patient Vitals for the past 12 hrs:   Temp Pulse Resp BP SpO2   06/25/18 0253 98.2 °F (36.8 °C) 63 16 108/71 99 %   06/24/18 2347 98 °F (36.7 °C) 68 16 110/73 99 %   06/24/18 2230 - - 16 125/75 99 %   06/24/18 2127 99 °F (37.2 °C) - 18 132/84 99 %   06/24/18 2005 - - - 139/87 99 %   06/24/18 1936 - - - 141/89 97 %       Pain Assessment  Pain 1  Pain Scale 1: Visual (06/25/18 0055)  Pain Intensity 1: 0 (06/25/18 0055)  Patient Stated Pain Goal: 0 (06/24/18 2337)  Pain Reassessment 1: Patient sleeping (06/25/18 0055)  Pain Onset 1: PTA (06/24/18 2355)  Pain Location 1: Generalized (06/24/18 2355)  Pain Orientation 1: Left;Right (06/24/18 2337)  Pain Description 1: Aching (06/24/18 2337)  Pain Intervention(s) 1: Encouraged PCA (06/24/18 2355)    Ambulating  Yes    Additional Information: Pt rested well through the night. Per MD, order was placed for blood but it has not yet arrived. Dr. Francis Herrera was notified of the inability to start a peripheral IV after several attempts from ED and ICU nurses. Per Dr. Francis Herrera, this would be readdressed when the unit of blood is ready. Shift report given to oncoming nurse at the bedside.     Geronimo Singh

## 2018-06-25 NOTE — PROGRESS NOTES
0650-Bedside report received from Michel Gunderson RN. Resting in bed. No needs voiced. No s/s of acute distress. 1805-END OF SHIFT NOTE:  Pt's VSS and is in no acute distress. Pt received one unit of PRCBs today. Pt on morphine PCA with pain controlled at level of 4 out of 10. Intake/Output  06/25 0701 - 06/25 1900  In: 3573 [P.O.:480; I.V.:929]  Out: 725 [Urine:725]   Voiding: YES  Catheter: NO  Drain:      Stool:  0 occurrences. Emesis:  0 occurrences. VITAL SIGNS  Patient Vitals for the past 12 hrs:   Temp Pulse Resp BP SpO2   06/25/18 1553 98.6 °F (37 °C) 63 15 121/68 98 %   06/25/18 1521 98.4 °F (36.9 °C) (!) 57 14 115/66 96 %   06/25/18 1421 98.5 °F (36.9 °C) 64 16 113/70 96 %   06/25/18 1350 98.4 °F (36.9 °C) 74 14 106/70 94 %   06/25/18 1113 98.1 °F (36.7 °C) 65 18 126/79 98 %   06/25/18 0741 98.4 °F (36.9 °C) 68 17 122/75 95 %       Pain Assessment  Pain 1  Pain Scale 1: Numeric (0 - 10) (06/25/18 1230)  Pain Intensity 1: 4 (06/25/18 1230)  Patient Stated Pain Goal: 0 (06/24/18 2337)  Pain Reassessment 1: Yes (06/25/18 1230)  Pain Onset 1: PTA (06/24/18 2355)  Pain Location 1: Leg (06/25/18 1152)  Pain Orientation 1: Left;Right (06/25/18 1152)  Pain Description 1: Aching (06/25/18 1152)  Pain Intervention(s) 1: Medication (see MAR) (06/25/18 1152)    Ambulating  Yes    Melania Geronimo    1850-Bedside shift change report given to Melecio Cerda RN (oncoming nurse) by Kalli Carlos RN (offgoing nurse). Report included the following information SBAR, Kardex, Intake/Output, MAR and Recent Results.

## 2018-06-25 NOTE — ED PROVIDER NOTES
Patient is a 37 y.o. male presenting with sickle cell disease. The history is provided by the patient. Sickle Cell Crisis    This is a new problem. The current episode started yesterday. The problem has not changed since onset. The problem occurs constantly. Patient reports not work related injury. The pain is associated with no known injury. Pain location: both legs. The quality of the pain is described as aching. The pain does not radiate. The pain is severe. The pain is the same all the time. Pertinent negatives include no chest pain, no fever, no headaches, no abdominal pain, no paresthesias, no paresis, no tingling and no weakness. Treatments tried: Opioids at home. The treatment provided no relief. The patient's surgical history non-contributory        Past Medical History:   Diagnosis Date    Chronic pain     HTN (hypertension)     Ill-defined condition     sickle cell    Iron overload due to repeated red blood cell transfusions 1/18/2017    [facility]oxysmal [facility] 7/9/2016    Sickle cell disease ([facility] [address]        Past Surgical History:   Procedure Laterality Date    HC PENILE IMPL DURA II POSITINBLE      HC PORT LIFE SNGLE LUMEN 5013      to L CW    HX CHOLECYSTECTOMY      HX OTHER SURGICAL      penile inplant    HX VASCULAR ACCESS           Family History:   Problem Relation Age of Onset    Hypertension Other     Diabetes Father     Stroke Father     Sickle Cell Anemia Sister        Social History     Social History    Marital status:      Spouse name: N/A    Number of children: N/A    Years of education: N/A     Occupational History    Not on file. Social History Main Topics    Smoking status: Never Smoker    Smokeless tobacco: Never Used    Alcohol use No    Drug use: No    Sexual activity: Yes     Other Topics Concern    Not on file     Social History Narrative         ALLERGIES: Compazine [prochlorperazine edisylate];  Morphine; Reglan [metoclopramide]; and Zofran [ondansetron hcl (pf)]    Review of Systems   Constitutional: Negative for chills and fever. HENT: Negative for rhinorrhea and sore throat. Eyes: Negative for discharge and redness. Respiratory: Negative for cough and shortness of breath. Cardiovascular: Negative for chest pain and palpitations. Gastrointestinal: Negative for abdominal pain, diarrhea, nausea and vomiting. Musculoskeletal: Positive for arthralgias and myalgias. Negative for back pain. Skin: Negative for rash. Neurological: Negative for dizziness, tingling, weakness, headaches and paresthesias. All other systems reviewed and are negative. Vitals:    06/24/18 1750 06/24/18 1936 06/24/18 2005 06/24/18 2127   BP: 135/78 141/89 139/87 132/84   Pulse: 95      Resp: 16   18   Temp: 99.5 °F (37.5 °C)   99 °F (37.2 °C)   SpO2: 99% 97% 99% 99%   Weight: 76.2 kg (168 lb)      Height: 5' 10\" (1.778 m)               Physical Exam   Constitutional: He is oriented to person, place, and time. He appears well-developed and well-nourished. No distress (Seems to be wearing his pain fairly stoically). HENT:   Head: Normocephalic and atraumatic. Eyes: Conjunctivae are normal. Pupils are equal, round, and reactive to light. Right eye exhibits no discharge. Left eye exhibits no discharge. No scleral icterus. Neck: Normal range of motion. Neck supple. Cardiovascular: Normal rate, regular rhythm and normal heart sounds. Exam reveals no gallop. No murmur heard. Pulmonary/Chest: Effort normal and breath sounds normal. No respiratory distress. He has no wheezes. He has no rales. Abdominal: Soft. There is no tenderness. There is no guarding. Musculoskeletal: Normal range of motion. He exhibits tenderness. He exhibits no edema. Neurological: He is alert and oriented to person, place, and time. He exhibits normal muscle tone. cni 2-12 grossly   Skin: Skin is warm and dry. He is not diaphoretic. Psychiatric: He has a normal mood and affect. His behavior is normal.   Nursing note and vitals reviewed. MDM  Number of Diagnoses or Management Options  Sickle cell crisis [facility]):   Diagnosis management comments: Medical decision making note:  Cipro vaso-occlusive pain crisis to both legs onset last night. Unresponsive to oral opioids home. No fevers, mild chills. Port accessed, brief relief after the initial 2 mg dose of Dilaudid with Benadryl to augment its effect. We'll re-dose now, 9:47 PM  And rechecked for admission versus discharge decision  This concludes the \"medical decision making note\" part of this emergency department visit note.           ED Course       Procedures

## 2018-06-25 NOTE — PROGRESS NOTES
Dr. Tushar Archer notified of inability to obtain a peripheral IV for blood transfusion. Will continue to monitor and will notify Dr. Tushar Archer if blood becomes ready to be transfused before 0700 and a decision will then be made to either hold the PCA pump in order to transfuse blood or wait until a central line can be placed.

## 2018-06-25 NOTE — PROGRESS NOTES
MIDLINE Placement Note    PRE-PROCEDURE VERIFICATION  PROCEDURE DETAIL  Time out completed all person present in agreement with time out. A single lumen Midline was started for vascular access. The following documentation is in addition to the Midline properties in the lines/airways flowsheet :  Lot #: [id number]  Xylocaine used: yes  Mid-Arm Circumference: 32 (cm)  Internal Catheter Length: 8 (cm)  Internal Catheter Total Length: 8 (cm)  Vein Selection for Midline:right basilic    Line is okay to use: yes.     Juana Hampton RN, VAT

## 2018-06-25 NOTE — CONSULTS
[facility]        Inpatient Hematology / Oncology Consult Note    Reason for Consult:  Vasoocclusive sickle cell crisis [facility])  Referring Physician:  Pérez Ortega MD    History of Present Illness:  Mr. Kirk Valerio is a 37 y.o. male admitted on 6/24/2018 with a primary diagnosis of The encounter diagnosis was Sickle cell crisis ([address] He is following by Dr. Briana Burgos at [facility]. He presented to ED with c/o worsening b/l shin pain. CXR pending. No other s/sx of acute chest.  Retic 2.4. Hgb 5.6 down from 6.1 yesterday (b/l around 7-8). 1 unit PRBCs ordered. Retic 2.4. He takes Jadenu, folic acid, and Hydrea as OP. We were consulted for recommendations. Review of Systems:  Constitutional Denies fever, chills, weight loss, appetite changes, fatigue, night sweats. HEENT Denies trauma, blurry vision, hearing loss, ear pain, nosebleeds, sore throat, neck pain and ear discharge. Skin Denies lesions or rashes. Lungs Denies dyspnea, cough, sputum production or hemoptysis. Cardiovascular Denies chest pain, palpitations, or lower extremity edema. Gastrointestinal Denies nausea, vomiting, changes in bowel habits, bloody or black stools, abdominal pain.  Denies dysuria, frequency or hesitancy of urination. Neuro Denies headaches, visual changes or ataxia. Denies dizziness, tingling, tremors, sensory change, speech change, focal weakness or headaches. Hematology Denies easy bruising or bleeding, denies gingival bleeding or epistaxis. Endo Denies heat/cold intolerance, denies diabetes or thyroid abnormalities. MSK +b/l shin pain. Denies back pain, arthralgias, myalgias or frequent falls. Psychiatric/Behavioral Denies depression and substance abuse. The patient is not nervous/anxious.          Allergies   Allergen Reactions    Compazine [Prochlorperazine Edisylate] Other (comments)     Also makes him feel funny    Morphine Other (comments)     Makes him feel funny    Reglan [Metoclopramide] Other (comments)     \"feel funny\"    Zofran [Ondansetron Hcl (Pf)] Other (comments)     Make him feel funny     Past Medical History:   Diagnosis Date    Chronic pain     HTN (hypertension)     Ill-defined condition     sickle cell    Iron overload due to repeated red blood cell transfusions 1/18/2017    Paroxysmal SVT (supraventricular tachycardia) ([facility] [address] 7/9/2016    Sickle cell disease ([facility]      Past Surgical History:   Procedure Laterality Date    HC PENILE IMPL DURA II POSITINBLE      HC PORT LIFE SNGLE LUMEN 5013      to L CW    HX CHOLECYSTECTOMY      HX OTHER SURGICAL      penile inplant    HX VASCULAR ACCESS       Family History   Problem Relation Age of Onset    Hypertension Other     Diabetes Father     Stroke Father     Sickle Cell Anemia Sister      Social History     Social History    Marital status:      Spouse name: N/A    Number of children: N/A    Years of education: N/A     Occupational History    Not on file.      Social History Main Topics    Smoking status: Never Smoker    Smokeless tobacco: Never Used    Alcohol use No    Drug use: No    Sexual activity: Yes     Other Topics Concern    Not on file     Social History Narrative     Current Facility-Administered Medications   Medication Dose Route Frequency Provider Last Rate Last Dose    0.9% sodium chloride infusion 250 mL  250 mL IntraVENous PRN Justa Ramos MD        sodium chloride (NS) flush 10 mL  10 mL InterCATHeter Jimbo Canas MD        sodium chloride (NS) flush 10 mL  10 mL InterCATHeter PRN Phoenix Paredes MD        deferasirox (JADENU) tablet 1,800 mg (Patient Supplied)  1,800 mg Oral DAILY Justa Ramos MD        folic acid (FOLVITE) tablet 1 mg  1 mg Oral DAILY Justa Ramos MD   1 mg at 06/25/18 0816    hydroxyurea (HYDREA) chemo cap 500 mg  500 mg Oral Q12H Justa Ramos MD   500 mg at 06/25/18 0815    lisinopril (PRINIVIL, ZESTRIL) tablet 5 mg  5 mg Oral DAILY Clinton Rashid MD   5 mg at 18 0815    magnesium oxide (MAG-OX) tablet 400 mg  400 mg Oral DAILY Clinton Rashid MD   400 mg at 18 0816    metoprolol tartrate (LOPRESSOR) tablet 25 mg  25 mg Oral Q12H Clinton Rashid MD   25 mg at 18 0816    oxyCODONE ER (OxyCONTIN) tablet 80 mg  80 mg Oral Q12H Clinton Rashid MD   80 mg at 18 0815    oxyCODONE IR (OXY-IR) immediate release tablet 30 mg  30 mg Oral Q4H PRN Clinton Rashid MD        promethazine (PHENERGAN) tablet 25 mg  25 mg Oral Q6H PRN Clinton Rashid MD        sodium chloride (NS) flush 5-10 mL  5-10 mL IntraVENous Q8H Clinton Rashid MD   Stopped at 18 0600    sodium chloride (NS) flush 5-10 mL  5-10 mL IntraVENous PRN Clinton Rashid MD        acetaminophen (TYLENOL) tablet 650 mg  650 mg Oral Q4H PRN Clinton Rashid MD        ondansetron [facility]) injection 4 mg  4 mg IntraVENous Q4H PRN Clinton Rashid MD        magnesium hydroxide (MILK OF MAGNESIA) 400 mg/5 mL oral suspension 30 mL  30 mL Oral DAILY PRN Clinton Rashid MD        0.9% sodium chloride infusion  125 mL/hr IntraVENous CONTINUOUS Leticia Ardon  mL/hr at 18 0841 125 mL/hr at 18 0841    enoxaparin (LOVENOX) injection 40 mg  40 mg SubCUTAneous Q24H Clinton Rashid MD   40 mg at 18 0815    morphine (PF)  mg/30 ml   IntraVENous CONTINUOUS Leticia Ardon MD        diphenhydrAMINE (BENADRYL) capsule 25 mg  25 mg Oral Q4H PRN Clinton Rashid MD        promethazine [facility]) with saline injection 12.5 mg  12.5 mg IntraVENous Q4H PRN Clinton Rashid MD        alcohol 62% (NOZIN) nasal  1 Ampule  1 Ampule Topical Q12H Clinton Rashid MD   1 Ampule at 18 0818       OBJECTIVE:  Patient Vitals for the past 8 hrs:   BP Temp Pulse Resp SpO2   18 1113 126/79 98.1 °F (36.7 °C) 65 18 98 %   18 0741 122/75 98.4 °F (36.9 °C) 68 17 95 %     Temp (24hrs), Av.5 °F (36.9 °C), Min:98 °F (36.7 °C), Max:99.5 °F (37.5 °C)    06/25 0701 - 06/25 1900  In: 240 [P.O.:240]  Out: -     Physical Exam:  Constitutional: Well developed, well nourished male in no acute distress, sitting comfortably in the hospital bed. HEENT: Normocephalic and atraumatic. Oropharynx is clear, mucous membranes are moist.  Extraocular muscles are intact. Sclerae anicteric. Neck supple without JVD. No thyromegaly present. Lymph node   Deferred   Skin Warm and dry. No bruising and no rash noted. No erythema. No pallor. Respiratory Lungs are clear to auscultation bilaterally without wheezes, rales or rhonchi, normal air exchange without accessory muscle use. CVS Normal rate, regular rhythm and normal S1 and S2. No murmurs, gallops, or rubs. Abdomen Soft, nontender and nondistended, normoactive bowel sounds. No palpable mass. No hepatosplenomegaly. Neuro Grossly nonfocal with no obvious sensory or motor deficits. MSK Normal range of motion in general.  No edema and no tenderness. Psych Appropriate mood and affect. Labs:    Recent Results (from the past 24 hour(s))   CBC WITH AUTOMATED DIFF    Collection Time: 06/24/18  8:36 PM   Result Value Ref Range    WBC 8.7 4.3 - 11.1 K/uL    RBC 2.27 (L) 4.23 - 5.67 M/uL    HGB 7.1 (L) 13.6 - 17.2 g/dL    HCT 20.9 (LL) 41.1 - 50.3 %    MCV 92.1 79.6 - 97.8 FL    MCH 31.3 26.1 - 32.9 PG    MCHC 34.0 31.4 - 35.0 g/dL    RDW 32.4 (H) 11.9 - 14.6 %    PLATELET 360 817 - 626 K/uL    MPV 10.2 (L) 10.8 - 14.1 FL    DF AUTOMATED      NEUTROPHILS 70 43 - 78 %    LYMPHOCYTES 24 13 - 44 %    MONOCYTES 5 4.0 - 12.0 %    EOSINOPHILS 1 0.5 - 7.8 %    BASOPHILS 0 0.0 - 2.0 %    IMMATURE GRANULOCYTES 0 0.0 - 5.0 %    ABS. NEUTROPHILS 6.1 1.7 - 8.2 K/UL    ABS. LYMPHOCYTES 2.1 0.5 - 4.6 K/UL    ABS. MONOCYTES 0.4 0.1 - 1.3 K/UL    ABS. EOSINOPHILS 0.1 0.0 - 0.8 K/UL    ABS. BASOPHILS 0.0 0.0 - 0.2 K/UL    ABS. IMM.  GRANS. 0.0 0.0 - 0.5 K/UL   METABOLIC PANEL, COMPREHENSIVE Collection Time: 06/24/18  8:36 PM   Result Value Ref Range    Sodium 138 136 - 145 mmol/L    Potassium 4.1 3.5 - 5.1 mmol/L    Chloride 103 98 - 107 mmol/L    CO2 25 21 - 32 mmol/L    Anion gap 10 7 - 16 mmol/L    Glucose 109 (H) 65 - 100 mg/dL    BUN 12 6 - 23 MG/DL    Creatinine 0.88 0.8 - 1.5 MG/DL    GFR est AA >60 >60 ml/min/1.73m2    GFR est non-AA >60 >60 ml/min/1.73m2    Calcium 8.7 8.3 - 10.4 MG/DL    Bilirubin, total 1.7 (H) 0.2 - 1.1 MG/DL    ALT (SGPT) 58 12 - 65 U/L    AST (SGOT) 39 (H) 15 - 37 U/L    Alk.  phosphatase 74 50 - 136 U/L    Protein, total 8.4 (H) 6.3 - 8.2 g/dL    Albumin 3.6 3.5 - 5.0 g/dL    Globulin 4.8 (H) 2.3 - 3.5 g/dL    A-G Ratio 0.8 (L) 1.2 - 3.5     POC LACTIC ACID    Collection Time: 06/24/18  8:44 PM   Result Value Ref Range    Lactic Acid (POC) 1.0 0.5 - 1.9 mmol/L   RETICULOCYTE COUNT    Collection Time: 06/24/18 11:49 PM   Result Value Ref Range    Reticulocyte count 2.4 (H) 0.3 - 2.0 %    Absolute Retic Cnt. 0.0476 0.026 - 0.095 M/ul    Immature Retic Fraction 20.5 (H) 2.3 - 13.4 %    Retic Hgb Conc. 40 (H) 29 - 35 pg   METABOLIC PANEL, BASIC    Collection Time: 06/24/18 11:49 PM   Result Value Ref Range    Sodium 139 136 - 145 mmol/L    Potassium 4.4 3.5 - 5.1 mmol/L    Chloride 106 98 - 107 mmol/L    CO2 25 21 - 32 mmol/L    Anion gap 8 7 - 16 mmol/L    Glucose 98 65 - 100 mg/dL    BUN 14 6 - 23 MG/DL    Creatinine 1.04 0.8 - 1.5 MG/DL    GFR est AA >60 >60 ml/min/1.73m2    GFR est non-AA >60 >60 ml/min/1.73m2    Calcium 7.9 (L) 8.3 - 10.4 MG/DL   CBC WITH AUTOMATED DIFF    Collection Time: 06/24/18 11:49 PM   Result Value Ref Range    WBC 8.4 4.3 - 11.1 K/uL    RBC 1.99 (L) 4.23 - 5.67 M/uL    HGB 6.1 (LL) 13.6 - 17.2 g/dL    HCT 18.4 (LL) 41.1 - 50.3 %    MCV 92.5 79.6 - 97.8 FL    MCH 30.7 26.1 - 32.9 PG    MCHC 33.2 31.4 - 35.0 g/dL    RDW 32.4 (H) 11.9 - 14.6 %    PLATELET 423 760 - 769 K/uL    MPV 10.1 (L) 10.8 - 14.1 FL    DF AUTOMATED      NEUTROPHILS 60 43 - 78 % LYMPHOCYTES 35 13 - 44 %    MONOCYTES 4 4.0 - 12.0 %    EOSINOPHILS 1 0.5 - 7.8 %    BASOPHILS 0 0.0 - 2.0 %    IMMATURE GRANULOCYTES 0 0.0 - 5.0 %    ABS. NEUTROPHILS 5.0 1.7 - 8.2 K/UL    ABS. LYMPHOCYTES 3.0 0.5 - 4.6 K/UL    ABS. MONOCYTES 0.3 0.1 - 1.3 K/UL    ABS. EOSINOPHILS 0.1 0.0 - 0.8 K/UL    ABS. BASOPHILS 0.0 0.0 - 0.2 K/UL    ABS. IMM. GRANS. 0.0 0.0 - 0.5 K/UL   TYPE + CROSSMATCH    Collection Time: 06/25/18  1:06 AM   Result Value Ref Range    Crossmatch Expiration 06/28/2018     ABO/Rh(D) A POSITIVE     Antibody screen NEG     Unit number [id number]     Blood component type RC LRIR     Unit division 00     Status of unit ALLOCATED     Crossmatch result PENDING     ANTIGEN/ANTIBODY INFO       C NEGATIVE,  E NEGATIVE,  ALETA NEGATIVE,  FY(A) NEGATIVE,  FY(B) NEGATIVE,  SICKLEDEX NEGATIVE     CBC WITH AUTOMATED DIFF    Collection Time: 06/25/18  9:27 AM   Result Value Ref Range    WBC 6.5 4.3 - 11.1 K/uL    RBC 1.76 (L) 4.23 - 5.67 M/uL    HGB 5.6 (LL) 13.6 - 17.2 g/dL    HCT 16.5 (LL) 41.1 - 50.3 %    MCV 93.8 79.6 - 97.8 FL    MCH 31.8 26.1 - 32.9 PG    MCHC 33.9 31.4 - 35.0 g/dL    RDW 32.6 (H) 11.9 - 14.6 %    PLATELET 365 062 - 924 K/uL    MPV 10.0 (L) 10.8 - 14.1 FL    DF AUTOMATED      BASOPHILS 1 0.0 - 2.0 %    ABS.  BASOPHILS 0.0 0.0 - 0.2 K/UL       Imaging:  n/a    ASSESSMENT:  Problem List  Date Reviewed: 4/29/2018          Codes Class Noted    * (Principal)Vasoocclusive sickle cell crisis ([facility] ICD-10-CM: D57.00  ICD-9-CM: 282.62  6/24/2018        Leg pain ICD-10-CM: M79.606  ICD-9-CM: 729.5  6/24/2018        Sickle cell disease ([facility] ICD-10-CM: D57.1  ICD-9-CM: 282.60  7/13/2017        Iron overload due to repeated red blood cell transfusions ICD-10-CM: E83.111  ICD-9-CM: 275.02  1/18/2017        Paroxysmal SVT (supraventricular tachycardia) (HCC) ICD-10-CM: I47.1  ICD-9-CM: 427.0  7/9/2016        Sickle cell anemia ([facility] ICD-10-CM: D57.1  ICD-9-CM: 282.60  4/6/2016        Essential hypertension, benign ICD-10-CM: I10  ICD-9-CM: 401.1  6/23/2012                RECOMMENDATIONS:  Sickle Cell Crisis / Anemia (followed by Dr. Keith Stephens at [facility])  - change IVF to D5 1/2NS  - Continue home meds - Jadenu, folic acid, Hydrea  - CXR pending. No s/sx of acute chest  - On Morphine PCA  - Hgb down to 5.6 from 6.1 (b/l around 7-8). 1 unit PRBCs ordered. Lab studies and imaging studies were personally reviewed. Thank you for allowing us to participate in the care of Mr. Cabrera.          Felicity Sequeira NP   [facility]  [address] [place] [address]  Office : [phone]  Fax : [phone]

## 2018-06-26 LAB
ABO + RH BLD: NORMAL
ALBUMIN SERPL-MCNC: 3 G/DL (ref 3.5–5)
ALBUMIN/GLOB SERPL: 0.7 {RATIO} (ref 1.2–3.5)
ALP SERPL-CCNC: 67 U/L (ref 50–136)
ALT SERPL-CCNC: 51 U/L (ref 12–65)
ANION GAP SERPL CALC-SCNC: 7 MMOL/L (ref 7–16)
ANTIGENS PRESENT RBC DONR: NORMAL
AST SERPL-CCNC: 36 U/L (ref 15–37)
BASOPHILS # BLD: 0.1 K/UL (ref 0–0.2)
BASOPHILS NFR BLD: 1 % (ref 0–2)
BILIRUB SERPL-MCNC: 0.9 MG/DL (ref 0.2–1.1)
BLD PROD TYP BPU: NORMAL
BLOOD GROUP ANTIBODIES SERPL: NORMAL
BPU ID: NORMAL
BUN SERPL-MCNC: 11 MG/DL (ref 6–23)
CALCIUM SERPL-MCNC: 7.9 MG/DL (ref 8.3–10.4)
CHLORIDE SERPL-SCNC: 106 MMOL/L (ref 98–107)
CO2 SERPL-SCNC: 26 MMOL/L (ref 21–32)
CREAT SERPL-MCNC: 0.84 MG/DL (ref 0.8–1.5)
CROSSMATCH RESULT,%XM: NORMAL
DIFFERENTIAL METHOD BLD: ABNORMAL
EOSINOPHIL # BLD: 0.4 K/UL (ref 0–0.8)
EOSINOPHIL NFR BLD: 6 % (ref 0.5–7.8)
ERYTHROCYTE [DISTWIDTH] IN BLOOD BY AUTOMATED COUNT: 28.8 % (ref 11.9–14.6)
GLOBULIN SER CALC-MCNC: 4.3 G/DL (ref 2.3–3.5)
GLUCOSE SERPL-MCNC: 157 MG/DL (ref 65–100)
HCT VFR BLD AUTO: 21.4 % (ref 41.1–50.3)
HGB BLD-MCNC: 7.3 G/DL (ref 13.6–17.2)
HGB RETIC QN AUTO: 40 PG (ref 29–35)
IMM GRANULOCYTES # BLD: 0 K/UL (ref 0–0.5)
IMM GRANULOCYTES NFR BLD AUTO: 0 % (ref 0–5)
IMM RETICS NFR: 20.4 % (ref 2.3–13.4)
LYMPHOCYTES # BLD: 3.5 K/UL (ref 0.5–4.6)
LYMPHOCYTES NFR BLD: 50 % (ref 13–44)
MCH RBC QN AUTO: 31.9 PG (ref 26.1–32.9)
MCHC RBC AUTO-ENTMCNC: 34.1 G/DL (ref 31.4–35)
MCV RBC AUTO: 93.4 FL (ref 79.6–97.8)
MONOCYTES # BLD: 0.5 K/UL (ref 0.1–1.3)
MONOCYTES NFR BLD: 8 % (ref 4–12)
NEUTS SEG # BLD: 2.4 K/UL (ref 1.7–8.2)
NEUTS SEG NFR BLD: 35 % (ref 43–78)
PLATELET # BLD AUTO: 240 K/UL (ref 150–450)
PMV BLD AUTO: 10.6 FL (ref 10.8–14.1)
POTASSIUM SERPL-SCNC: 4.2 MMOL/L (ref 3.5–5.1)
PROT SERPL-MCNC: 7.3 G/DL (ref 6.3–8.2)
RBC # BLD AUTO: 2.29 M/UL (ref 4.23–5.67)
RETICS # AUTO: 0.05 M/UL (ref 0.03–0.1)
RETICS/RBC NFR AUTO: 2.3 % (ref 0.3–2)
SODIUM SERPL-SCNC: 139 MMOL/L (ref 136–145)
SPECIMEN EXP DATE BLD: NORMAL
STATUS OF UNIT,%ST: NORMAL
UNIT DIVISION, %UDIV: 0
WBC # BLD AUTO: 7 K/UL (ref 4.3–11.1)

## 2018-06-26 PROCEDURE — 74011250636 HC RX REV CODE- 250/636: Performed by: FAMILY MEDICINE

## 2018-06-26 PROCEDURE — 85025 COMPLETE CBC W/AUTO DIFF WBC: CPT | Performed by: FAMILY MEDICINE

## 2018-06-26 PROCEDURE — 74011250636 HC RX REV CODE- 250/636: Performed by: INTERNAL MEDICINE

## 2018-06-26 PROCEDURE — 74011000250 HC RX REV CODE- 250: Performed by: FAMILY MEDICINE

## 2018-06-26 PROCEDURE — 74011000258 HC RX REV CODE- 258: Performed by: NURSE PRACTITIONER

## 2018-06-26 PROCEDURE — 65270000029 HC RM PRIVATE

## 2018-06-26 PROCEDURE — 80053 COMPREHEN METABOLIC PANEL: CPT | Performed by: NURSE PRACTITIONER

## 2018-06-26 PROCEDURE — 77030020253 HC SOL INJ D545NS .05 DEX .45 SAL

## 2018-06-26 PROCEDURE — 74011250637 HC RX REV CODE- 250/637: Performed by: FAMILY MEDICINE

## 2018-06-26 PROCEDURE — 85046 RETICYTE/HGB CONCENTRATE: CPT | Performed by: FAMILY MEDICINE

## 2018-06-26 RX ADMIN — Medication 10 ML: at 05:30

## 2018-06-26 RX ADMIN — HYDROXYUREA 500 MG: 500 CAPSULE ORAL at 08:54

## 2018-06-26 RX ADMIN — Medication 1 AMPULE: at 21:38

## 2018-06-26 RX ADMIN — MORPHINE SULFATE: 5 INJECTION, SOLUTION INTRAVENOUS at 11:40

## 2018-06-26 RX ADMIN — OXYCODONE HYDROCHLORIDE 30 MG: 15 TABLET ORAL at 18:07

## 2018-06-26 RX ADMIN — OXYCODONE HYDROCHLORIDE 80 MG: 80 TABLET, FILM COATED, EXTENDED RELEASE ORAL at 21:38

## 2018-06-26 RX ADMIN — METOPROLOL TARTRATE 25 MG: 25 TABLET ORAL at 08:41

## 2018-06-26 RX ADMIN — OXYCODONE HYDROCHLORIDE 80 MG: 80 TABLET, FILM COATED, EXTENDED RELEASE ORAL at 08:41

## 2018-06-26 RX ADMIN — HYDROXYUREA 500 MG: 500 CAPSULE ORAL at 21:38

## 2018-06-26 RX ADMIN — LISINOPRIL 5 MG: 5 TABLET ORAL at 08:41

## 2018-06-26 RX ADMIN — Medication 400 MG: at 08:41

## 2018-06-26 RX ADMIN — ENOXAPARIN SODIUM 40 MG: 40 INJECTION SUBCUTANEOUS at 08:43

## 2018-06-26 RX ADMIN — METOPROLOL TARTRATE 25 MG: 25 TABLET ORAL at 21:38

## 2018-06-26 RX ADMIN — Medication 1 AMPULE: at 08:52

## 2018-06-26 RX ADMIN — Medication 10 ML: at 23:06

## 2018-06-26 RX ADMIN — OXYCODONE HYDROCHLORIDE 30 MG: 15 TABLET ORAL at 04:19

## 2018-06-26 RX ADMIN — FOLIC ACID 1 MG: 1 TABLET ORAL at 08:41

## 2018-06-26 RX ADMIN — OXYCODONE HYDROCHLORIDE 30 MG: 15 TABLET ORAL at 11:45

## 2018-06-26 RX ADMIN — DEXTROSE MONOHYDRATE AND SODIUM CHLORIDE 125 ML/HR: 5; .45 INJECTION, SOLUTION INTRAVENOUS at 13:29

## 2018-06-26 RX ADMIN — SODIUM CHLORIDE 12.5 MG: 9 INJECTION INTRAMUSCULAR; INTRAVENOUS; SUBCUTANEOUS at 18:14

## 2018-06-26 NOTE — PROGRESS NOTES
END OF SHIFT NOTE:    Intake/Output      Voiding: YES  Catheter: NO  Drain:              Stool:  0 occurrences. Emesis:  0 occurrences. VITAL SIGNS  Patient Vitals for the past 12 hrs:   Temp Pulse Resp BP SpO2   06/26/18 0408 97.8 °F (36.6 °C) 68 18 111/73 94 %   06/26/18 0004 98.2 °F (36.8 °C) 71 18 118/74 96 %   06/25/18 2111 - 71 - 104/65 -   06/25/18 2105 - 71 - 104/65 -   06/25/18 1918 98.8 °F (37.1 °C) 69 16 129/82 95 %       Pain Assessment  Pain 1  Pain Scale 1: Numeric (0 - 10) (06/26/18 0420)  Pain Intensity 1: 6 (06/26/18 0420)  Patient Stated Pain Goal: 4 (06/26/18 0420)  Pain Reassessment 1: Yes (06/25/18 1230)  Pain Onset 1: PTA (06/24/18 2355)  Pain Location 1: Leg (06/26/18 0420)  Pain Orientation 1: Right;Left (06/26/18 0420)  Pain Description 1: Aching (06/26/18 0420)  Pain Intervention(s) 1: Medication (see MAR) (06/26/18 0420)    Ambulating  Yes    Additional Information: Pt resting quietly. PO oxy IR given once during the shift. Pt w/trace edema of left foot. Pt advised to increase ambulation maybe walk the halls more. No needs voiced at this time. No visible s/sx of distress. Shift report given to oncoming nurse at the bedside.     Evgeny Nichols, RN

## 2018-06-26 NOTE — PROGRESS NOTES
Hospitalist Progress Note     Admit Date:  2018  7:24 PM   Name:  Fannie Weber   Age:  37 y.o.  :  1975   MRN:  [id number]   PCP:  Sowmya Castaneda MD  Treatment Team: Attending Provider: Ayush Headley MD; Consulting Provider: Ermelinda Castellanos MD; Utilization Review: Issac Bell    Subjective:     Mr. Margaret Hu is a 38 yo male with PMH of Sickle cell disease followed at [facility] hematology Dr. Chata Arboleda admitted with sickle cell crisis. He has been managed with morphine PCA and hydration. CXR negative. Hematology following. He did receive 1 unit PRBC for HGB drop to 5.6. Plans are for home once improved       18 no family present, has improved pain control, used 53 mg morphine over shift , diet tolerant without constipation, some pedal edema LLE, denies dyspnea or cough, no chest pain         Objective:   Patient Vitals for the past 24 hrs:   Temp Pulse Resp BP SpO2   18 1530 98.6 °F (37 °C) 72 18 119/75 94 %   18 1159 98.7 °F (37.1 °C) 66 18 101/54 98 %   18 0741 98.6 °F (37 °C) 79 18 119/81 94 %   18 0408 97.8 °F (36.6 °C) 68 18 111/73 94 %   18 0004 98.2 °F (36.8 °C) 71 18 118/74 96 %   18 - 71 - 104/65 -   18 -  - 104/65 -   18 1918 98.8 °F (37.1 °C) 69 16 129/82 95 %     Oxygen Therapy  O2 Sat (%): 94 % (18 1530)  Pulse via Oximetry: 72 beats per minute (18)  O2 Device: Room air (18)    Intake/Output Summary (Last 24 hours) at 18 1728  Last data filed at 18 1437   Gross per 24 hour   Intake             1502 ml   Output             1000 ml   Net              502 ml         General:    Well nourished. Alert. No distress    CV:   RRR. No murmur, rub, or gallop. Trace left pedal edema  Lungs:   CTAB. No wheezing, rhonchi, or rales. Abdomen:   Soft, nontender, nondistended. Present BS  Extremities: Warm and dry. No cyanosis   Skin:     No rashes or jaundice.    Neuro:  No gross focal deficits    Data Review:  I have reviewed all labs, meds, telemetry events, and studies from the last 24 hours:    Recent Results (from the past 24 hour(s))   CBC WITH AUTOMATED DIFF    Collection Time: 06/26/18  4:21 AM   Result Value Ref Range    WBC 7.0 4.3 - 11.1 K/uL    RBC 2.29 (L) 4.23 - 5.67 M/uL    HGB 7.3 (L) 13.6 - 17.2 g/dL    HCT 21.4 (L) 41.1 - 50.3 %    MCV 93.4 79.6 - 97.8 FL    MCH 31.9 26.1 - 32.9 PG    MCHC 34.1 31.4 - 35.0 g/dL    RDW 28.8 (H) 11.9 - 14.6 %    PLATELET 038 557 - 907 K/uL    MPV 10.6 (L) 10.8 - 14.1 FL    DF AUTOMATED      NEUTROPHILS 35 (L) 43 - 78 %    LYMPHOCYTES 50 (H) 13 - 44 %    MONOCYTES 8 4.0 - 12.0 %    EOSINOPHILS 6 0.5 - 7.8 %    BASOPHILS 1 0.0 - 2.0 %    IMMATURE GRANULOCYTES 0 0.0 - 5.0 %    ABS. NEUTROPHILS 2.4 1.7 - 8.2 K/UL    ABS. LYMPHOCYTES 3.5 0.5 - 4.6 K/UL    ABS. MONOCYTES 0.5 0.1 - 1.3 K/UL    ABS. EOSINOPHILS 0.4 0.0 - 0.8 K/UL    ABS. BASOPHILS 0.1 0.0 - 0.2 K/UL    ABS. IMM. GRANS. 0.0 0.0 - 0.5 K/UL   RETICULOCYTE COUNT    Collection Time: 06/26/18  4:21 AM   Result Value Ref Range    Reticulocyte count 2.3 (H) 0.3 - 2.0 %    Absolute Retic Cnt. 0.0531 0.026 - 0.095 M/ul    Immature Retic Fraction 20.4 (H) 2.3 - 13.4 %    Retic Hgb Conc. 40 (H) 29 - 35 pg   METABOLIC PANEL, COMPREHENSIVE    Collection Time: 06/26/18  4:21 AM   Result Value Ref Range    Sodium 139 136 - 145 mmol/L    Potassium 4.2 3.5 - 5.1 mmol/L    Chloride 106 98 - 107 mmol/L    CO2 26 21 - 32 mmol/L    Anion gap 7 7 - 16 mmol/L    Glucose 157 (H) 65 - 100 mg/dL    BUN 11 6 - 23 MG/DL    Creatinine 0.84 0.8 - 1.5 MG/DL    GFR est AA >60 >60 ml/min/1.73m2    GFR est non-AA >60 >60 ml/min/1.73m2    Calcium 7.9 (L) 8.3 - 10.4 MG/DL    Bilirubin, total 0.9 0.2 - 1.1 MG/DL    ALT (SGPT) 51 12 - 65 U/L    AST (SGOT) 36 15 - 37 U/L    Alk.  phosphatase 67 50 - 136 U/L    Protein, total 7.3 6.3 - 8.2 g/dL    Albumin 3.0 (L) 3.5 - 5.0 g/dL    Globulin 4.3 (H) 2.3 - 3.5 g/dL    A-G Ratio 0.7 (L) 1.2 - 3.5          All Micro Results     None          Current Meds:  Current Facility-Administered Medications   Medication Dose Route Frequency    0.9% sodium chloride infusion 250 mL  250 mL IntraVENous PRN    sodium chloride (NS) flush 10 mL  10 mL InterCATHeter Q8H    sodium chloride (NS) flush 10 mL  10 mL InterCATHeter PRN    dextrose 5 % - 0.45% NaCl infusion  125 mL/hr IntraVENous CONTINUOUS    deferasirox (JADENU) tablet 1,800 mg (Patient Supplied)  1,800 mg Oral DAILY    folic acid (FOLVITE) tablet 1 mg  1 mg Oral DAILY    hydroxyurea (HYDREA) chemo cap 500 mg  500 mg Oral Q12H    lisinopril (PRINIVIL, ZESTRIL) tablet 5 mg  5 mg Oral DAILY    magnesium oxide (MAG-OX) tablet 400 mg  400 mg Oral DAILY    metoprolol tartrate (LOPRESSOR) tablet 25 mg  25 mg Oral Q12H    oxyCODONE ER (OxyCONTIN) tablet 80 mg  80 mg Oral Q12H    oxyCODONE IR (OXY-IR) immediate release tablet 30 mg  30 mg Oral Q4H PRN    promethazine (PHENERGAN) tablet 25 mg  25 mg Oral Q6H PRN    sodium chloride (NS) flush 5-10 mL  5-10 mL IntraVENous Q8H    sodium chloride (NS) flush 5-10 mL  5-10 mL IntraVENous PRN    acetaminophen (TYLENOL) tablet 650 mg  650 mg Oral Q4H PRN    ondansetron (ZOFRAN) injection 4 mg  4 mg IntraVENous Q4H PRN    magnesium hydroxide (MILK OF MAGNESIA) 400 mg/5 mL oral suspension 30 mL  30 mL Oral DAILY PRN    enoxaparin (LOVENOX) injection 40 mg  40 mg SubCUTAneous Q24H    morphine (PF)  mg/30 ml   IntraVENous CONTINUOUS    diphenhydrAMINE (BENADRYL) capsule 25 mg  25 mg Oral Q4H PRN    promethazine (PHENERGAN) with saline injection 12.5 mg  12.5 mg IntraVENous Q4H PRN    alcohol 62% (NOZIN) nasal  1 Ampule  1 Ampule Topical Q12H       Other Studies (last 24 hours):  No results found.     Assessment and Plan:     Hospital Problems as of 6/26/2018  Date Reviewed: 4/29/2018          Codes Class Noted - Resolved POA    * (Principal)Vasoocclusive sickle cell crisis ([facility] ICD-10-CM: D57.00  ICD-9-CM: 282.62  6/24/2018 - Present Yes        Leg pain ICD-10-CM: M79.606  ICD-9-CM: 729.5  6/24/2018 - Present Yes        Sickle cell disease ([address] ICD-10-CM: D57.1  ICD-9-CM: 282.60  7/13/2017 - Present Yes        Paroxysmal SVT (supraventricular tachycardia) (HCC) ICD-10-CM: I47.1  ICD-9-CM: 427.0  7/9/2016 - Present Yes        Sickle cell anemia (HCC) ICD-10-CM: D57.1  ICD-9-CM: 282.60  4/6/2016 - Present Yes        Essential hypertension, benign ICD-10-CM: I10  ICD-9-CM: 401.1  6/23/2012 - Present Yes        RESOLVED: HTN (hypertension) (Chronic) ICD-10-CM: I10  ICD-9-CM: 401.9  3/2/2012 - 6/24/2018 Yes              PLAN:    · Sickle cell crisis: improving, continue morphine PCA, IVF, appreciate heme input, followup daily labs   · HTN: continue lisinopril/ metoprolol    DC planning/Dispo:  Plans for home once improved   Diet:  DIET REGULAR  DVT ppx:  lovenox     Signed:  Sandra Sidhu MD

## 2018-06-26 NOTE — PROGRESS NOTES
Problem: Falls - Risk of  Goal: *Absence of Falls  Document Toy Fall Risk and appropriate interventions in the flowsheet.    Outcome: Progressing Towards Goal  Fall Risk Interventions:            Medication Interventions: Evaluate medications/consider consulting pharmacy, Teach patient to arise slowly

## 2018-06-26 NOTE — PROGRESS NOTES
END OF SHIFT NOTE:    Intake/Output  06/26 0701 - 06/26 1900  In: 7867 [P.O.:240; I.V.:1262]  Out: 1000 [Urine:1000]   Voiding: YES  Catheter: NO  Drain:              Stool:  0 occurrences. Emesis:  0 occurrences. VITAL SIGNS  Patient Vitals for the past 12 hrs:   Temp Pulse Resp BP SpO2   06/26/18 1530 98.6 °F (37 °C) 72 18 119/75 94 %   06/26/18 1159 98.7 °F (37.1 °C) 66 18 101/54 98 %   06/26/18 0741 98.6 °F (37 °C) 79 18 119/81 94 %       Pain Assessment  Pain 1  Pain Scale 1: Numeric (0 - 10) (06/26/18 1807)  Pain Intensity 1: 7 (06/26/18 1807)  Patient Stated Pain Goal: 0 (06/26/18 1807)  Pain Reassessment 1: Yes (06/26/18 1220)  Pain Onset 1: pta (06/26/18 1807)  Pain Location 1: Leg (06/26/18 1807)  Pain Orientation 1: Left;Right (06/26/18 1807)  Pain Description 1: Aching (06/26/18 1807)  Pain Intervention(s) 1: Medication (see MAR) (06/26/18 1807)    Ambulating  Yes    Additional Information: Pt walked in the mendez today. Oxy-IR given twice on this shift. Pt had no other complaints today. Shift report given to [facility], RN oncoming nurse at the bedside.     Renato Wei RN

## 2018-06-26 NOTE — PROGRESS NOTES
Progress Note    Patient: Aminah Harrington MRN: [id number]  SSN: [id number]    YOB: 1975  Age: 37 y.o. Sex: male      Admit Date: 6/24/2018    LOS: 1 day     Subjective: This is a 38 YO male patient with a PMH of sickle cell disease who came to the hospital yesterday complaining of severe bilateral pain. Anemic. Admitted to the 5th floor on IV fluids and started on a morphine PCA pump. Seen by hematology today. Due to worsening anemia, 1 U of pRBC was ordered. PCA pump/ basal infusion increased because patient complained of pain NOT being controlled. Objective:     Vitals:    06/25/18 1553 06/25/18 1918 06/25/18 2105 06/25/18 2111   BP: 121/68 129/82 104/65 104/65   Pulse: 63 69 71 71   Resp: 15 16     Temp: 98.6 °F (37 °C) 98.8 °F (37.1 °C)     SpO2: 98% 95%     Weight:       Height:            Intake and Output:  Current Shift:    Last three shifts: 06/24 0701 - 06/25 1900  In: 2588 [P.O.:716; I.V.:1564]  Out: 1225 [Urine:1225]    Physical Exam:   GENERAL: alert, appears stated age  EYE: conjunctivae/corneas clear. LYMPHATIC: Cervical, supraclavicular, and axillary nodes normal.   THROAT & NECK: normal and no erythema or exudates noted. LUNG: clear to auscultation bilaterally  HEART: regular rate and rhythm, S1, S2 normal, no murmur, click, rub or gallop  ABDOMEN: soft, non-tender. Bowel sounds normal. No masses,  no organomegaly  EXTREMITIES:  extremities normal, atraumatic, no cyanosis or edema  SKIN: Normal.  NEUROLOGIC: no focal deficits. PSYCHIATRIC: non focal    Lab/Data Review:  Lab results reviewed. For significant abnormal values and values requiring intervention, see assessment and plan.          Assessment:     Principal Problem:    Vasoocclusive sickle cell crisis ([facility] (6/24/2018)    Active Problems:    Essential hypertension, benign (6/23/2012)      Sickle cell anemia (HCC) (4/6/2016)      Paroxysmal SVT (supraventricular tachycardia) ([address] (7/9/2016)      Sickle cell disease ([facility] (7/13/2017)      Leg pain (6/24/2018)        Plan:     -continue IV fluids   -PCA morphine pump ( basal dose increased)   -1 U of pRBCs ordered today   -monitor VS , CBC, Retic count   -appreciate hematology recommendations     Signed By: Danica Leblanc MD     June 25, 2018

## 2018-06-27 ENCOUNTER — APPOINTMENT (OUTPATIENT)
Dept: ULTRASOUND IMAGING | Age: 43
DRG: 812 | End: 2018-06-27
Attending: INTERNAL MEDICINE
Payer: MEDICARE

## 2018-06-27 LAB
ERYTHROCYTE [DISTWIDTH] IN BLOOD BY AUTOMATED COUNT: 28.8 % (ref 11.9–14.6)
HCT VFR BLD AUTO: 21.4 % (ref 41.1–50.3)
HGB BLD-MCNC: 7.2 G/DL (ref 13.6–17.2)
HGB RETIC QN AUTO: 35 PG (ref 29–35)
IMM RETICS NFR: 15.5 % (ref 2.3–13.4)
MCH RBC QN AUTO: 31.7 PG (ref 26.1–32.9)
MCHC RBC AUTO-ENTMCNC: 33.6 G/DL (ref 31.4–35)
MCV RBC AUTO: 94.3 FL (ref 79.6–97.8)
PLATELET # BLD AUTO: 233 K/UL (ref 150–450)
PMV BLD AUTO: 9.9 FL (ref 10.8–14.1)
RBC # BLD AUTO: 2.27 M/UL (ref 4.23–5.67)
RETICS # AUTO: 0.05 M/UL (ref 0.03–0.1)
RETICS/RBC NFR AUTO: 2.3 % (ref 0.3–2)
WBC # BLD AUTO: 7.6 K/UL (ref 4.3–11.1)

## 2018-06-27 PROCEDURE — 74011250636 HC RX REV CODE- 250/636: Performed by: FAMILY MEDICINE

## 2018-06-27 PROCEDURE — 85027 COMPLETE CBC AUTOMATED: CPT | Performed by: NURSE PRACTITIONER

## 2018-06-27 PROCEDURE — 36591 DRAW BLOOD OFF VENOUS DEVICE: CPT

## 2018-06-27 PROCEDURE — 65270000029 HC RM PRIVATE

## 2018-06-27 PROCEDURE — 93971 EXTREMITY STUDY: CPT

## 2018-06-27 PROCEDURE — 74011250637 HC RX REV CODE- 250/637: Performed by: FAMILY MEDICINE

## 2018-06-27 PROCEDURE — 77030020253 HC SOL INJ D545NS .05 DEX .45 SAL

## 2018-06-27 PROCEDURE — 74011000250 HC RX REV CODE- 250: Performed by: FAMILY MEDICINE

## 2018-06-27 PROCEDURE — 99232 SBSQ HOSP IP/OBS MODERATE 35: CPT | Performed by: INTERNAL MEDICINE

## 2018-06-27 PROCEDURE — 85046 RETICYTE/HGB CONCENTRATE: CPT | Performed by: NURSE PRACTITIONER

## 2018-06-27 RX ORDER — FAMOTIDINE 20 MG/1
20 TABLET, FILM COATED ORAL 2 TIMES DAILY
Status: DISCONTINUED | OUTPATIENT
Start: 2018-06-28 | End: 2018-06-29 | Stop reason: HOSPADM

## 2018-06-27 RX ORDER — FUROSEMIDE 10 MG/ML
40 INJECTION INTRAMUSCULAR; INTRAVENOUS ONCE
Status: COMPLETED | OUTPATIENT
Start: 2018-06-27 | End: 2018-06-27

## 2018-06-27 RX ADMIN — Medication 400 MG: at 08:21

## 2018-06-27 RX ADMIN — OXYCODONE HYDROCHLORIDE 30 MG: 15 TABLET ORAL at 01:16

## 2018-06-27 RX ADMIN — Medication 10 ML: at 13:55

## 2018-06-27 RX ADMIN — SODIUM CHLORIDE 12.5 MG: 9 INJECTION INTRAMUSCULAR; INTRAVENOUS; SUBCUTANEOUS at 17:47

## 2018-06-27 RX ADMIN — Medication 10 ML: at 20:49

## 2018-06-27 RX ADMIN — SODIUM CHLORIDE 12.5 MG: 9 INJECTION INTRAMUSCULAR; INTRAVENOUS; SUBCUTANEOUS at 01:16

## 2018-06-27 RX ADMIN — Medication 10 ML: at 23:54

## 2018-06-27 RX ADMIN — OXYCODONE HYDROCHLORIDE 80 MG: 80 TABLET, FILM COATED, EXTENDED RELEASE ORAL at 20:44

## 2018-06-27 RX ADMIN — FOLIC ACID 1 MG: 1 TABLET ORAL at 08:21

## 2018-06-27 RX ADMIN — LISINOPRIL 5 MG: 5 TABLET ORAL at 08:22

## 2018-06-27 RX ADMIN — METOPROLOL TARTRATE 25 MG: 25 TABLET ORAL at 08:21

## 2018-06-27 RX ADMIN — FUROSEMIDE 40 MG: 10 INJECTION, SOLUTION INTRAMUSCULAR; INTRAVENOUS at 20:43

## 2018-06-27 RX ADMIN — ENOXAPARIN SODIUM 40 MG: 40 INJECTION SUBCUTANEOUS at 08:21

## 2018-06-27 RX ADMIN — OXYCODONE HYDROCHLORIDE 30 MG: 15 TABLET ORAL at 11:38

## 2018-06-27 RX ADMIN — Medication 1 AMPULE: at 20:43

## 2018-06-27 RX ADMIN — Medication 1 AMPULE: at 08:25

## 2018-06-27 RX ADMIN — Medication 10 ML: at 07:51

## 2018-06-27 RX ADMIN — HYDROXYUREA 500 MG: 500 CAPSULE ORAL at 20:44

## 2018-06-27 RX ADMIN — OXYCODONE HYDROCHLORIDE 80 MG: 80 TABLET, FILM COATED, EXTENDED RELEASE ORAL at 08:22

## 2018-06-27 RX ADMIN — METOPROLOL TARTRATE 25 MG: 25 TABLET ORAL at 20:44

## 2018-06-27 RX ADMIN — OXYCODONE HYDROCHLORIDE 30 MG: 15 TABLET ORAL at 17:44

## 2018-06-27 RX ADMIN — Medication 10 ML: at 07:50

## 2018-06-27 RX ADMIN — HYDROXYUREA 500 MG: 500 CAPSULE ORAL at 08:21

## 2018-06-27 NOTE — PROGRESS NOTES
END OF SHIFT NOTE:    Intake/Output      Voiding: YES  Catheter: NO  Drain:              Stool:  0 occurrences. Emesis:  0 occurrences. VITAL SIGNS  Patient Vitals for the past 12 hrs:   Temp Pulse Resp BP SpO2   06/27/18 0736 98.5 °F (36.9 °C) 83 18 121/77 94 %   06/27/18 0325 98.2 °F (36.8 °C) 84 18 130/74 97 %   06/26/18 2249 98 °F (36.7 °C) 80 18 120/81 95 %       Pain Assessment  Pain 1  Pain Scale 1: Visual (06/27/18 0200)  Pain Intensity 1: 0 (06/27/18 0200)  Patient Stated Pain Goal: 0 (06/27/18 0200)  Pain Reassessment 1: Patient sleeping (06/27/18 0200)  Pain Onset 1: pta (06/26/18 1807)  Pain Location 1: Leg (06/26/18 1807)  Pain Orientation 1: Left;Right (06/26/18 1807)  Pain Description 1: Aching (06/26/18 1807)  Pain Intervention(s) 1: Medication (see MAR) (06/27/18 0114)    Ambulating  Yes    Additional Information: Pt rested most of the night, requiring prn pain medication 1x. No needs voiced at this time. Shift report given to oncoming nurse, Lucy Steven RN at the bedside.     Mj Markham

## 2018-06-27 NOTE — PROGRESS NOTES
[facility] Hematology & Oncology        Inpatient Hematology / Oncology Progress Note      Admission Date: 2018  7:24 PM  Reason for Admission/Hospital Course: Vasoocclusive sickle cell crisis (HCC)      24 Hour Events:  Afebrile, VSS  Hgb stable  States pain improved today  Ambulating hallways    ROS:  Constitutional: Negative for fever, chills, weakness, malaise, fatigue. CV: Negative for chest pain, palpitations, edema. Respiratory: Negative for dyspnea, cough, wheezing. GI: Negative for nausea, abdominal pain, diarrhea. 10 point review of systems is otherwise negative with the exception of the elements mentioned above in the HPI. Allergies   Allergen Reactions    Compazine [Prochlorperazine Edisylate] Other (comments)     Also makes him feel funny    Morphine Other (comments)     Makes him feel funny    Reglan [Metoclopramide] Other (comments)     \"feel funny\"    Zofran [Ondansetron Hcl (Pf)] Other (comments)     Make him feel funny       OBJECTIVE:  Patient Vitals for the past 8 hrs:   BP Temp Pulse Resp SpO2   18 1140 116/86 98.6 °F (37 °C) 75 18 96 %   18 0736 121/77 98.5 °F (36.9 °C) 83 18 94 %     Temp (24hrs), Av.5 °F (36.9 °C), Min:98 °F (36.7 °C), Max:98.8 °F (37.1 °C)     0701 -  1900  In: 766 [P.O.:354; I.V.:386]  Out: 600 [Urine:600]    Physical Exam:  Constitutional: Well developed, well nourished male in no acute distress, sitting comfortably in the hospital bed. HEENT: Normocephalic and atraumatic. Oropharynx is clear, mucous membranes are moist.  Extraocular muscles are intact. Sclerae anicteric. Neck supple without JVD. No thyromegaly present. Lymph node Deferred   Skin Warm and dry. No bruising and no rash noted. No erythema. No pallor. Respiratory Lungs are clear to auscultation bilaterally without wheezes, rales or rhonchi, normal air exchange without accessory muscle use.     CVS Normal rate, regular rhythm and normal S1 and S2. No murmurs, gallops, or rubs. Abdomen Soft, nontender and nondistended, normoactive bowel sounds. No palpable mass. No hepatosplenomegaly. Neuro Grossly nonfocal with no obvious sensory or motor deficits. MSK Normal range of motion in general.  No edema and no tenderness. Psych Appropriate mood and affect.              Labs:    Recent Labs      06/27/18 0420 06/26/18 0421 06/25/18 0927 06/24/18 2349 06/24/18 2036   WBC  7.6  7.0  6.5  8.4  8.7   RBC  2.27*  2.29*  1.76*  1.99*  2.27*   HGB  7.2*  7.3*  5.6*  6.1*  7.1*   HCT  21.4*  21.4*  16.5*  18.4*  20.9*   MCV  94.3  93.4  93.8  92.5  92.1   MCH  31.7  31.9  31.8  30.7  31.3   MCHC  33.6  34.1  33.9  33.2  34.0   RDW  28.8*  28.8*  32.6*  32.4*  32.4*   PLT  233  240  224  236  274   GRANS   --   35*   --   60  70   LYMPH   --   50*   --   35  24   MONOS   --   8   --   4  5   EOS   --   6   --   1  1   BASOS   --   1  1  0  0   IG   --   0   --   0  0   DF   --   AUTOMATED  AUTOMATED  AUTOMATED  AUTOMATED   ANEU   --   2.4   --   5.0  6.1   ABL   --   3.5   --   3.0  2.1   ABM   --   0.5   --   0.3  0.4   TENZIN   --   0.4   --   0.1  0.1   ABB   --   0.1  0.0  0.0  0.0   AIG   --   0.0   --   0.0  0.0      Recent Labs      06/26/18 0421 06/24/18 2349 06/24/18 2036   NA  139  139  138   K  4.2  4.4  4.1   CL  106  106  103   CO2  26  25  25   AGAP  7  8  10   GLU  157*  98  109*   BUN  11  14  12   CREA  0.84  1.04  0.88   GFRAA  >60  >60  >60   GFRNA  >60  >60  >60   CA  7.9*  7.9*  8.7   SGOT  36   --   39*   AP  67   --   74   TP  7.3   --   8.4*   ALB  3.0*   --   3.6   GLOB  4.3*   --   4.8*   AGRAT  0.7*   --   0.8*         Imaging:  XR CHEST SNGL V [[id number]] Collected: 06/25/18 1335      Order Status: Completed Updated: 06/25/18 1338     Narrative:       CHEST X-RAY, one view. HISTORY:  Sickle cell crisis. TECHNIQUE:  AP upright view    COMPARISON: 28 May 2018.     FINDINGS:   Mild interstitial prominence, nonspecific. No pleural effusion. No  lobar consolidation. Heart and pulmonary vasculature unremarkable.        Impression:       IMPRESSION:  Mild interstitial prominence, nonspecific. ASSESSMENT:    Problem List  Date Reviewed: 4/29/2018          Codes Class Noted    * (Principal)Vasoocclusive sickle cell crisis ([facility] ICD-10-CM: D57.00  ICD-9-CM: 282.62  6/24/2018        Leg pain ICD-10-CM: M79.606  ICD-9-CM: 729.5  6/24/2018        Sickle cell disease ([facility] ICD-10-CM: D57.1  ICD-9-CM: 282.60  7/13/2017        Iron overload due to repeated red blood cell transfusions ICD-10-CM: E83.111  ICD-9-CM: 275.02  1/18/2017        Paroxysmal SVT (supraventricular tachycardia) (HCC) ICD-10-CM: I47.1  ICD-9-CM: 427.0  7/9/2016        Sickle cell anemia (HCC) ICD-10-CM: D57.1  ICD-9-CM: 282.60  4/6/2016        Essential hypertension, benign ICD-10-CM: I10  ICD-9-CM: 401.1  6/23/2012            Mr. Cesario Mcwilliams is a 37 y.o. male admitted on 6/24/2018 with a primary diagnosis of The encounter diagnosis was Sickle cell crisis ([facility] He is following by Dr. Selena Jacobsen at [facility]. He presented to ED with c/o worsening b/l shin pain. CXR pending. No other s/sx of acute chest.  Retic 2.4. Hgb 5.6 down from 6.1 yesterday (b/l around 7-8). 1 unit PRBCs ordered. Retic 2.4. He takes Jadenu, folic acid, and Hydrea as OP. We were consulted for recommendations. PLAN:  Sickle Cell Crisis / Anemia (followed by Dr. Selena Jacobsen at [facility])  - change IVF to D5 1/2NS  - Continue home meds - Jadenu, folic acid, Hydrea  - CXR pending. No s/sx of acute chest  - On Morphine PCA  - Hgb down to 5.6 from 6.1 (b/l around 7-8). 1 unit PRBCs ordered. 6/27 Hgb stable and at baseline at 7.3. Retic 2.3. Pt states pain improving. Has been ambulating hallways.        Thank you for allowing us to participate in the care of Mr. Cabrrea. He will need to f/u with Dr. Selena Jacobsen after discharge.               [facility]  [address]  Office : [phone]  Fax : [phone]

## 2018-06-27 NOTE — PROGRESS NOTES
Hospitalist Progress Note     Admit Date:  2018  7:24 PM   Name:  Rebel Clements   Age:  37 y.o.  :  1975   MRN:  [id number]   PCP:  José Luis Vasquez MD  Treatment Team: Attending Provider: Wicho Rodriguez MD; Consulting Provider: Fermin Mayes MD; Utilization Review: Didi     Subjective:       Mr. Elidia Lauren is a 36 yo male with PMH of Sickle cell disease followed at [facility] hematology Dr. Elda Cooper admitted with sickle cell crisis. He has been managed with morphine PCA and hydration. CXR negative. Hematology following. He did receive 1 unit PRBC for HGB drop to 5.6. Plans are for home once improved       18 no family present, has improved pain control, used 54mg morphine over shift , diet tolerant without constipation, some pedal edema LLE feels to be worse today, denies dyspnea or cough, ambulated in halls         Objective:     Patient Vitals for the past 24 hrs:   Temp Pulse Resp BP SpO2   18 1140 98.6 °F (37 °C) 75 18 116/86 96 %   18 0736 98.5 °F (36.9 °C) 83 18 121/77 94 %   18 0325 98.2 °F (36.8 °C) 84 18 130/74 97 %   18 2249 98 °F (36.7 °C) 80 18 120/81 95 %   18 98.8 °F (37.1 °C) 92 18 137/89 95 %   18 1530 98.6 °F (37 °C) 72 18 119/75 94 %     Oxygen Therapy  O2 Sat (%): 96 % (18 1140)  Pulse via Oximetry: 72 beats per minute (18 2127)  O2 Device: Room air (18 0400)    Intake/Output Summary (Last 24 hours) at 18 1416  Last data filed at 18 1336   Gross per 24 hour   Intake             2555 ml   Output             4300 ml   Net            -1745 ml         General:    Well nourished. Alert. No distress    CV:   RRR. No murmur, rub, or gallop. Trace to 1 + left pedal edema  Lungs:   CTAB. No wheezing, rhonchi, or rales. Abdomen:   Soft, nontender, nondistended. Present BS  Extremities: Warm and dry. No cyanosis   Skin:     No rashes or jaundice.    Neuro:  No gross focal deficits    Data Review:  I have reviewed all labs, meds, telemetry events, and studies from the last 24 hours:    Recent Results (from the past 24 hour(s))   CBC W/O DIFF    Collection Time: 06/27/18  4:20 AM   Result Value Ref Range    WBC 7.6 4.3 - 11.1 K/uL    RBC 2.27 (L) 4.23 - 5.67 M/uL    HGB 7.2 (L) 13.6 - 17.2 g/dL    HCT 21.4 (L) 41.1 - 50.3 %    MCV 94.3 79.6 - 97.8 FL    MCH 31.7 26.1 - 32.9 PG    MCHC 33.6 31.4 - 35.0 g/dL    RDW 28.8 (H) 11.9 - 14.6 %    PLATELET 832 044 - 939 K/uL    MPV 9.9 (L) 10.8 - 14.1 FL   RETICULOCYTE COUNT    Collection Time: 06/27/18  4:20 AM   Result Value Ref Range    Reticulocyte count 2.3 (H) 0.3 - 2.0 %    Absolute Retic Cnt. 0.0520 0.026 - 0.095 M/ul    Immature Retic Fraction 15.5 (H) 2.3 - 13.4 %    Retic Hgb Conc. 35 29 - 35 pg        All Micro Results     None          Current Meds:  Current Facility-Administered Medications   Medication Dose Route Frequency    0.9% sodium chloride infusion 250 mL  250 mL IntraVENous PRN    sodium chloride (NS) flush 10 mL  10 mL InterCATHeter Q8H    sodium chloride (NS) flush 10 mL  10 mL InterCATHeter PRN    dextrose 5 % - 0.45% NaCl infusion  125 mL/hr IntraVENous CONTINUOUS    deferasirox (JADENU) tablet 1,800 mg (Patient Supplied)  1,800 mg Oral DAILY    folic acid (FOLVITE) tablet 1 mg  1 mg Oral DAILY    hydroxyurea (HYDREA) chemo cap 500 mg  500 mg Oral Q12H    lisinopril (PRINIVIL, ZESTRIL) tablet 5 mg  5 mg Oral DAILY    magnesium oxide (MAG-OX) tablet 400 mg  400 mg Oral DAILY    metoprolol tartrate (LOPRESSOR) tablet 25 mg  25 mg Oral Q12H    oxyCODONE ER (OxyCONTIN) tablet 80 mg  80 mg Oral Q12H    oxyCODONE IR (OXY-IR) immediate release tablet 30 mg  30 mg Oral Q4H PRN    promethazine (PHENERGAN) tablet 25 mg  25 mg Oral Q6H PRN    sodium chloride (NS) flush 5-10 mL  5-10 mL IntraVENous Q8H    sodium chloride (NS) flush 5-10 mL  5-10 mL IntraVENous PRN    acetaminophen (TYLENOL) tablet 650 mg  650 mg Oral Q4H PRN    ondansetron [facility]) injection 4 mg  4 mg IntraVENous Q4H PRN    magnesium hydroxide (MILK OF MAGNESIA) 400 mg/5 mL oral suspension 30 mL  30 mL Oral DAILY PRN    enoxaparin (LOVENOX) injection 40 mg  40 mg SubCUTAneous Q24H    morphine (PF)  mg/30 ml   IntraVENous CONTINUOUS    diphenhydrAMINE (BENADRYL) capsule 25 mg  25 mg Oral Q4H PRN    promethazine (PHENERGAN) with saline injection 12.5 mg  12.5 mg IntraVENous Q4H PRN    alcohol 62% (NOZIN) nasal  1 Ampule  1 Ampule Topical Q12H       Other Studies (last 24 hours):  No results found.     Assessment and Plan:     Hospital Problems as of 6/27/2018  Date Reviewed: 4/29/2018          Codes Class Noted - Resolved POA    * (Principal)Vasoocclusive sickle cell crisis ([facility] ICD-10-CM: D57.00  ICD-9-CM: 282.62  6/24/2018 - Present Yes        Leg pain ICD-10-CM: M79.606  ICD-9-CM: 729.5  6/24/2018 - Present Yes        Sickle cell disease ([facility] ICD-10-CM: D57.1  ICD-9-CM: 282.60  7/13/2017 - Present Yes        Paroxysmal SVT (supraventricular tachycardia) (HCC) ICD-10-CM: I47.1  ICD-9-CM: 427.0  7/9/2016 - Present Yes        Sickle cell anemia (HCC) ICD-10-CM: D57.1  ICD-9-CM: 282.60  4/6/2016 - Present Yes        Essential hypertension, benign ICD-10-CM: I10  ICD-9-CM: 401.1  6/23/2012 - Present Yes        RESOLVED: HTN (hypertension) (Chronic) ICD-10-CM: I10  ICD-9-CM: 401.9  3/2/2012 - 6/24/2018 Yes              PLAN:    · Sickle cell crisis: improving, continue morphine PCA/ oxycodone , IVF, appreciate heme input, followup daily labs that are stable  · HTN: continue lisinopril/ metoprolol  · Edema: duplex LLE     DC planning/Dispo:  Plans for home once improved   Diet:  DIET REGULAR  DVT ppx:  lovenox     Signed:  Dimitri Padgett MD

## 2018-06-27 NOTE — PROGRESS NOTES
END OF SHIFT NOTE:    Intake/Output  06/27 0701 - 06/27 1900  In: 1095 [P.O.:709; I.V.:386]  Out: 600 [Urine:600]   Voiding: YES  Catheter: NO  Drain:              Stool:  0 occurrences. Emesis:  0 occurrences. VITAL SIGNS  Patient Vitals for the past 12 hrs:   Temp Pulse Resp BP SpO2   06/27/18 1600 98.3 °F (36.8 °C) 67 18 125/80 94 %   06/27/18 1140 98.6 °F (37 °C) 75 18 116/86 96 %   06/27/18 0736 98.5 °F (36.9 °C) 83 18 121/77 94 %       Pain Assessment  Pain 1  Pain Scale 1: Numeric (0 - 10) (06/27/18 1745)  Pain Intensity 1: 7 (06/27/18 1745)  Patient Stated Pain Goal: 0 (06/27/18 1350)  Pain Reassessment 1: Yes (06/27/18 1350)  Pain Onset 1: pta (06/27/18 1139)  Pain Location 1: Leg (06/27/18 1745)  Pain Orientation 1: Left;Right (06/27/18 1745)  Pain Description 1: Aching (06/27/18 1745)  Pain Intervention(s) 1: Medication (see MAR) (06/27/18 1745)    Ambulating  Yes    Additional Information: Pt complained of swelling in feet. Duplex neg for DVT. Pt received two doses of pain medication this shift. No complaints noted at this time. Shift report given to Mahin Howard RN oncoming nurse at the bedside.     Di Mathis RN

## 2018-06-28 PROBLEM — E87.70 VOLUME OVERLOAD: Status: ACTIVE | Noted: 2018-06-28

## 2018-06-28 LAB
ALBUMIN SERPL-MCNC: 3.3 G/DL (ref 3.5–5)
ALBUMIN/GLOB SERPL: 0.8 {RATIO} (ref 1.2–3.5)
ALP SERPL-CCNC: 69 U/L (ref 50–136)
ALT SERPL-CCNC: 51 U/L (ref 12–65)
ANION GAP SERPL CALC-SCNC: 8 MMOL/L (ref 7–16)
AST SERPL-CCNC: 39 U/L (ref 15–37)
BILIRUB SERPL-MCNC: 0.8 MG/DL (ref 0.2–1.1)
BUN SERPL-MCNC: 10 MG/DL (ref 6–23)
CALCIUM SERPL-MCNC: 8.5 MG/DL (ref 8.3–10.4)
CHLORIDE SERPL-SCNC: 101 MMOL/L (ref 98–107)
CO2 SERPL-SCNC: 29 MMOL/L (ref 21–32)
CREAT SERPL-MCNC: 1.08 MG/DL (ref 0.8–1.5)
ERYTHROCYTE [DISTWIDTH] IN BLOOD BY AUTOMATED COUNT: 29.5 % (ref 11.9–14.6)
GLOBULIN SER CALC-MCNC: 4.4 G/DL (ref 2.3–3.5)
GLUCOSE SERPL-MCNC: 138 MG/DL (ref 65–100)
HCT VFR BLD AUTO: 21.4 % (ref 41.1–50.3)
HGB BLD-MCNC: 7.2 G/DL (ref 13.6–17.2)
HGB RETIC QN AUTO: 33 PG (ref 29–35)
IMM RETICS NFR: 25.2 % (ref 2.3–13.4)
MCH RBC QN AUTO: 32.1 PG (ref 26.1–32.9)
MCHC RBC AUTO-ENTMCNC: 33.6 G/DL (ref 31.4–35)
MCV RBC AUTO: 95.5 FL (ref 79.6–97.8)
PLATELET # BLD AUTO: 237 K/UL (ref 150–450)
PMV BLD AUTO: 10 FL (ref 10.8–14.1)
POTASSIUM SERPL-SCNC: 4.3 MMOL/L (ref 3.5–5.1)
PROT SERPL-MCNC: 7.7 G/DL (ref 6.3–8.2)
RBC # BLD AUTO: 2.24 M/UL (ref 4.23–5.67)
RETICS # AUTO: 0.05 M/UL (ref 0.03–0.1)
RETICS/RBC NFR AUTO: 2.2 % (ref 0.3–2)
SODIUM SERPL-SCNC: 138 MMOL/L (ref 136–145)
WBC # BLD AUTO: 7.9 K/UL (ref 4.3–11.1)

## 2018-06-28 PROCEDURE — 85046 RETICYTE/HGB CONCENTRATE: CPT | Performed by: NURSE PRACTITIONER

## 2018-06-28 PROCEDURE — 99232 SBSQ HOSP IP/OBS MODERATE 35: CPT | Performed by: INTERNAL MEDICINE

## 2018-06-28 PROCEDURE — 85027 COMPLETE CBC AUTOMATED: CPT | Performed by: NURSE PRACTITIONER

## 2018-06-28 PROCEDURE — 80053 COMPREHEN METABOLIC PANEL: CPT | Performed by: NURSE PRACTITIONER

## 2018-06-28 PROCEDURE — 74011250637 HC RX REV CODE- 250/637: Performed by: FAMILY MEDICINE

## 2018-06-28 PROCEDURE — 74011000250 HC RX REV CODE- 250: Performed by: FAMILY MEDICINE

## 2018-06-28 PROCEDURE — 36591 DRAW BLOOD OFF VENOUS DEVICE: CPT

## 2018-06-28 PROCEDURE — 74011250636 HC RX REV CODE- 250/636: Performed by: INTERNAL MEDICINE

## 2018-06-28 PROCEDURE — 74011250637 HC RX REV CODE- 250/637: Performed by: INTERNAL MEDICINE

## 2018-06-28 PROCEDURE — 65270000029 HC RM PRIVATE

## 2018-06-28 PROCEDURE — 74011250636 HC RX REV CODE- 250/636: Performed by: FAMILY MEDICINE

## 2018-06-28 RX ORDER — CALCIUM CARBONATE 200(500)MG
200 TABLET,CHEWABLE ORAL ONCE
Status: COMPLETED | OUTPATIENT
Start: 2018-06-28 | End: 2018-06-28

## 2018-06-28 RX ORDER — HYDROMORPHONE HYDROCHLORIDE 2 MG/ML
1 INJECTION, SOLUTION INTRAMUSCULAR; INTRAVENOUS; SUBCUTANEOUS
Status: DISCONTINUED | OUTPATIENT
Start: 2018-06-28 | End: 2018-06-29 | Stop reason: HOSPADM

## 2018-06-28 RX ORDER — FUROSEMIDE 10 MG/ML
40 INJECTION INTRAMUSCULAR; INTRAVENOUS ONCE
Status: COMPLETED | OUTPATIENT
Start: 2018-06-28 | End: 2018-06-28

## 2018-06-28 RX ORDER — OXYCODONE HYDROCHLORIDE 15 MG/1
15 TABLET ORAL
Status: DISCONTINUED | OUTPATIENT
Start: 2018-06-28 | End: 2018-06-29 | Stop reason: HOSPADM

## 2018-06-28 RX ADMIN — OXYCODONE HYDROCHLORIDE 80 MG: 80 TABLET, FILM COATED, EXTENDED RELEASE ORAL at 07:41

## 2018-06-28 RX ADMIN — Medication 400 MG: at 07:40

## 2018-06-28 RX ADMIN — ENOXAPARIN SODIUM 40 MG: 40 INJECTION SUBCUTANEOUS at 07:40

## 2018-06-28 RX ADMIN — LISINOPRIL 5 MG: 5 TABLET ORAL at 07:40

## 2018-06-28 RX ADMIN — Medication 10 ML: at 14:07

## 2018-06-28 RX ADMIN — HYDROXYUREA 500 MG: 500 CAPSULE ORAL at 21:25

## 2018-06-28 RX ADMIN — SODIUM CHLORIDE 12.5 MG: 9 INJECTION INTRAMUSCULAR; INTRAVENOUS; SUBCUTANEOUS at 13:37

## 2018-06-28 RX ADMIN — Medication 1 AMPULE: at 21:19

## 2018-06-28 RX ADMIN — CALCIUM CARBONATE (ANTACID) CHEW TAB 500 MG 200 MG: 500 CHEW TAB at 00:19

## 2018-06-28 RX ADMIN — FAMOTIDINE 20 MG: 20 TABLET, FILM COATED ORAL at 17:46

## 2018-06-28 RX ADMIN — HYDROMORPHONE HYDROCHLORIDE 1 MG: 2 INJECTION, SOLUTION INTRAMUSCULAR; INTRAVENOUS; SUBCUTANEOUS at 19:18

## 2018-06-28 RX ADMIN — Medication 10 ML: at 05:59

## 2018-06-28 RX ADMIN — METOPROLOL TARTRATE 25 MG: 25 TABLET ORAL at 21:19

## 2018-06-28 RX ADMIN — Medication 1 AMPULE: at 07:40

## 2018-06-28 RX ADMIN — HYDROMORPHONE HYDROCHLORIDE 1 MG: 2 INJECTION, SOLUTION INTRAMUSCULAR; INTRAVENOUS; SUBCUTANEOUS at 09:25

## 2018-06-28 RX ADMIN — Medication 10 ML: at 15:28

## 2018-06-28 RX ADMIN — FUROSEMIDE 40 MG: 10 INJECTION, SOLUTION INTRAMUSCULAR; INTRAVENOUS at 09:25

## 2018-06-28 RX ADMIN — MORPHINE SULFATE: 5 INJECTION, SOLUTION INTRAVENOUS at 00:06

## 2018-06-28 RX ADMIN — OXYCODONE HYDROCHLORIDE 80 MG: 80 TABLET, FILM COATED, EXTENDED RELEASE ORAL at 21:19

## 2018-06-28 RX ADMIN — SODIUM CHLORIDE 12.5 MG: 9 INJECTION INTRAMUSCULAR; INTRAVENOUS; SUBCUTANEOUS at 02:24

## 2018-06-28 RX ADMIN — OXYCODONE HYDROCHLORIDE 30 MG: 15 TABLET ORAL at 16:16

## 2018-06-28 RX ADMIN — METOPROLOL TARTRATE 25 MG: 25 TABLET ORAL at 07:40

## 2018-06-28 RX ADMIN — SODIUM CHLORIDE 12.5 MG: 9 INJECTION INTRAMUSCULAR; INTRAVENOUS; SUBCUTANEOUS at 19:50

## 2018-06-28 RX ADMIN — Medication 10 ML: at 21:50

## 2018-06-28 RX ADMIN — OXYCODONE HYDROCHLORIDE 30 MG: 15 TABLET ORAL at 02:24

## 2018-06-28 RX ADMIN — HYDROXYUREA 500 MG: 500 CAPSULE ORAL at 08:27

## 2018-06-28 RX ADMIN — HYDROMORPHONE HYDROCHLORIDE 1 MG: 2 INJECTION, SOLUTION INTRAMUSCULAR; INTRAVENOUS; SUBCUTANEOUS at 13:37

## 2018-06-28 RX ADMIN — FAMOTIDINE 20 MG: 20 TABLET, FILM COATED ORAL at 07:40

## 2018-06-28 RX ADMIN — FOLIC ACID 1 MG: 1 TABLET ORAL at 07:40

## 2018-06-28 NOTE — PROGRESS NOTES
Hospitalist Progress Note     Admit Date:  2018  7:24 PM   Name:  Fannie Weber   Age:  37 y.o.  :  1975   MRN:  [id number]   PCP:  Sowmya Castaneda MD  Treatment Team: Attending Provider: Ayush Headley MD; Consulting Provider: Ermelinda Castellanos MD; Utilization Review: Issac Bell    Subjective:       Mr. Margaret Hu is a 36 yo male with PMH of Sickle cell disease followed at [address] [place] [address] hematology Dr. Chata Arboleda admitted with sickle cell crisis. He has been managed with morphine PCA and hydration. CXR negative. Hematology following. He did receive 1 unit PRBC for HGB drop to 5.6. He has had some volume overload that has improved with lasix. LLE edematous and LE duplex negative. Plans are for home once improved       18 no family present, has improved pain control and feels ready to stop morphine PCA, still with some LE edema and dyspnea/ wheezing overnight,diet tolerant without constipation      Objective:     Patient Vitals for the past 24 hrs:   Temp Pulse Resp BP SpO2   18 0830 - 87 - - -   18 0745 99 °F (37.2 °C) (!) 140 20 114/84 95 %   18 0740 - (!) 135 - - -   18 0420 98.4 °F (36.9 °C) 69 18 122/73 91 %   18 2359 98.8 °F (37.1 °C) 73 18 97/57 90 %   18 1600 98.3 °F (36.8 °C) 67 18 125/80 94 %   18 1140 98.6 °F (37 °C) 75 18 116/86 96 %     Oxygen Therapy  O2 Sat (%): 95 % (18 0745)  Pulse via Oximetry: 72 beats per minute (187)  O2 Device: Room air (18 0745)    Intake/Output Summary (Last 24 hours) at 18 0914  Last data filed at 18 0907   Gross per 24 hour   Intake              833 ml   Output                0 ml   Net              833 ml         General:    Well nourished. Alert. No distress    CV:   Regular and slightly tachycardic,  No murmur, rub, or gallop. Trace to 1 + left pedal edema  Lungs:   CTAB. No wheezing, rhonchi, or rales. Abdomen:   Soft, nontender, nondistended. Present BS  Extremities: Warm and dry. No cyanosis   Skin:     No rashes or jaundice. Neuro:  No gross focal deficits    Data Review:  I have reviewed all labs, meds, telemetry events, and studies from the last 24 hours:    Recent Results (from the past 24 hour(s))   CBC W/O DIFF    Collection Time: 06/28/18  4:54 AM   Result Value Ref Range    WBC 7.9 4.3 - 11.1 K/uL    RBC 2.24 (L) 4.23 - 5.67 M/uL    HGB 7.2 (L) 13.6 - 17.2 g/dL    HCT 21.4 (L) 41.1 - 50.3 %    MCV 95.5 79.6 - 97.8 FL    MCH 32.1 26.1 - 32.9 PG    MCHC 33.6 31.4 - 35.0 g/dL    RDW 29.5 (H) 11.9 - 14.6 %    PLATELET 738 427 - 914 K/uL    MPV 10.0 (L) 10.8 - 14.1 FL   RETICULOCYTE COUNT    Collection Time: 06/28/18  4:54 AM   Result Value Ref Range    Reticulocyte count 2.2 (H) 0.3 - 2.0 %    Absolute Retic Cnt. 0.0500 0.026 - 0.095 M/ul    Immature Retic Fraction 25.2 (H) 2.3 - 13.4 %    Retic Hgb Conc. 33 29 - 35 pg   METABOLIC PANEL, COMPREHENSIVE    Collection Time: 06/28/18  4:54 AM   Result Value Ref Range    Sodium 138 136 - 145 mmol/L    Potassium 4.3 3.5 - 5.1 mmol/L    Chloride 101 98 - 107 mmol/L    CO2 29 21 - 32 mmol/L    Anion gap 8 7 - 16 mmol/L    Glucose 138 (H) 65 - 100 mg/dL    BUN 10 6 - 23 MG/DL    Creatinine 1.08 0.8 - 1.5 MG/DL    GFR est AA >60 >60 ml/min/1.73m2    GFR est non-AA >60 >60 ml/min/1.73m2    Calcium 8.5 8.3 - 10.4 MG/DL    Bilirubin, total 0.8 0.2 - 1.1 MG/DL    ALT (SGPT) 51 12 - 65 U/L    AST (SGOT) 39 (H) 15 - 37 U/L    Alk.  phosphatase 69 50 - 136 U/L    Protein, total 7.7 6.3 - 8.2 g/dL    Albumin 3.3 (L) 3.5 - 5.0 g/dL    Globulin 4.4 (H) 2.3 - 3.5 g/dL    A-G Ratio 0.8 (L) 1.2 - 3.5          All Micro Results     None          Current Meds:  Current Facility-Administered Medications   Medication Dose Route Frequency    furosemide (LASIX) injection 40 mg  40 mg IntraVENous ONCE    HYDROmorphone (PF) (DILAUDID) injection 1 mg  1 mg IntraVENous Q4H PRN    oxyCODONE IR (OXY-IR) immediate release tablet 15 mg  15 mg Oral Q4H PRN    famotidine (PEPCID) tablet 20 mg  20 mg Oral BID    0.9% sodium chloride infusion 250 mL  250 mL IntraVENous PRN    sodium chloride (NS) flush 10 mL  10 mL InterCATHeter Q8H    sodium chloride (NS) flush 10 mL  10 mL InterCATHeter PRN    deferasirox (JADENU) tablet 1,800 mg (Patient Supplied)  1,800 mg Oral DAILY    folic acid (FOLVITE) tablet 1 mg  1 mg Oral DAILY    hydroxyurea (HYDREA) chemo cap 500 mg  500 mg Oral Q12H    lisinopril (PRINIVIL, ZESTRIL) tablet 5 mg  5 mg Oral DAILY    magnesium oxide (MAG-OX) tablet 400 mg  400 mg Oral DAILY    metoprolol tartrate (LOPRESSOR) tablet 25 mg  25 mg Oral Q12H    oxyCODONE ER (OxyCONTIN) tablet 80 mg  80 mg Oral Q12H    oxyCODONE IR (OXY-IR) immediate release tablet 30 mg  30 mg Oral Q4H PRN    promethazine (PHENERGAN) tablet 25 mg  25 mg Oral Q6H PRN    sodium chloride (NS) flush 5-10 mL  5-10 mL IntraVENous Q8H    sodium chloride (NS) flush 5-10 mL  5-10 mL IntraVENous PRN    acetaminophen (TYLENOL) tablet 650 mg  650 mg Oral Q4H PRN    ondansetron (ZOFRAN) injection 4 mg  4 mg IntraVENous Q4H PRN    magnesium hydroxide (MILK OF MAGNESIA) 400 mg/5 mL oral suspension 30 mL  30 mL Oral DAILY PRN    enoxaparin (LOVENOX) injection 40 mg  40 mg SubCUTAneous Q24H    diphenhydrAMINE (BENADRYL) capsule 25 mg  25 mg Oral Q4H PRN    promethazine (PHENERGAN) with saline injection 12.5 mg  12.5 mg IntraVENous Q4H PRN    alcohol 62% (NOZIN) nasal  1 Ampule  1 Ampule Topical Q12H       Other Studies (last 24 hours):  Duplex Lower Ext Venous Left    Result Date: 6/27/2018  Left lower extremity Doppler evaluation: 06/27/2018 HISTORY: Mild pain & x3 days. Grayscale, color-flow and Doppler evaluation of the major veins of the left lower extremity was performed. Comparison: 04/29/2018 FINDINGS: There is normal compression and augmentation involving the left common femoral, superficial femoral and popliteal veins.  No grayscale or color-flow findings within these vessels or within the distal greater saphenous or profunda femoral veins were noted to suggest deep venous thrombosis. Interrogated portions of the peroneal and posterior tibial veins were also unremarkable. No popliteal cyst is identified. Cursory imaging of the right common femoral vein reveals it to be patent with normal color-flow and augmentation. IMPRESSION: Negative evaluation for deep venous thrombosis within the left lower extremity.        Assessment and Plan:     Hospital Problems as of 6/28/2018  Date Reviewed: 4/29/2018          Codes Class Noted - Resolved POA    Volume overload ICD-10-CM: E87.70  ICD-9-CM: 276.69  6/28/2018 - Present No        * (Principal)Vasoocclusive sickle cell crisis ([facility] ICD-10-CM: D57.00  ICD-9-CM: 282.62  6/24/2018 - Present Yes        Leg pain ICD-10-CM: M79.606  ICD-9-CM: 729.5  6/24/2018 - Present Yes        Sickle cell disease ([facility] ICD-10-CM: D57.1  ICD-9-CM: 282.60  7/13/2017 - Present Yes        Paroxysmal SVT (supraventricular tachycardia) (HCC) ICD-10-CM: I47.1  ICD-9-CM: 427.0  7/9/2016 - Present Yes        Sickle cell anemia (HCC) ICD-10-CM: D57.1  ICD-9-CM: 282.60  4/6/2016 - Present Yes        Essential hypertension, benign ICD-10-CM: I10  ICD-9-CM: 401.1  6/23/2012 - Present Yes        RESOLVED: HTN (hypertension) (Chronic) ICD-10-CM: I10  ICD-9-CM: 401.9  3/2/2012 - 6/24/2018 Yes              PLAN:    · Sickle cell crisis: improving, stop morphine PCA/ continue oxycodone scheduled and as needed with prn dilaudid , stop IVF, appreciate heme input, followup daily labs that are stable  · HTN: continue lisinopril/ metoprolol  · Edema/ volume overload: duplex LLE negative, give one more dose of IV lasix now    DC planning/Dispo:  Plans for home once improved, likely 6-29  Diet:  DIET REGULAR  DVT ppx:  lovenox     Signed:  Jorge Nathan MD

## 2018-06-28 NOTE — PROGRESS NOTES
END OF SHIFT NOTE:    Intake/Output      Voiding: YES  Catheter: NO  Drain:              Stool:  0 occurrences. Emesis:  0 occurrences. VITAL SIGNS  Patient Vitals for the past 12 hrs:   Temp Pulse Resp BP SpO2   06/28/18 0420 98.4 °F (36.9 °C) 69 18 122/73 91 %   06/27/18 2359 98.8 °F (37.1 °C) 73 18 97/57 90 %       Pain Assessment  Pain 1  Pain Scale 1: Numeric (0 - 10) (06/28/18 0300)  Pain Intensity 1: 0 (06/28/18 0300)  Patient Stated Pain Goal: 0 (06/28/18 0300)  Pain Reassessment 1: Yes (06/28/18 0300)  Pain Onset 1: pta (06/27/18 1139)  Pain Location 1: Leg (06/27/18 1745)  Pain Orientation 1: Left;Right (06/27/18 1745)  Pain Description 1: Aching (06/27/18 1745)  Pain Intervention(s) 1: Medication (see MAR) (06/27/18 1745)    Ambulating  Yes    Additional Information: Pt rested in bed through the night, requiring prn pain mediation and phenergan 1x. No needs voiced at this time. Shift report given to oncoming nurse, Francisco Chowdary RN, at the bedside.     Juan Luis New

## 2018-06-28 NOTE — PROGRESS NOTES
[facility]        Inpatient Hematology / Oncology Progress Note      Admission Date: 2018  7:24 PM  Reason for Admission/Hospital Course: Vasoocclusive sickle cell crisis (HCC)      24 Hour Events:  Afebrile, VSS  Hgb stable  PCA d/c'd today    ROS:  Constitutional: Negative for fever, chills, weakness, malaise, fatigue. CV: Negative for chest pain, palpitations, edema. Respiratory: Negative for dyspnea, cough, wheezing. GI: Negative for nausea, abdominal pain, diarrhea. 10 point review of systems is otherwise negative with the exception of the elements mentioned above in the HPI. Allergies   Allergen Reactions    Compazine [Prochlorperazine Edisylate] Other (comments)     Also makes him feel funny    Morphine Other (comments)     Makes him feel funny    Reglan [Metoclopramide] Other (comments)     \"feel funny\"    Zofran [Ondansetron Hcl (Pf)] Other (comments)     Make him feel funny       OBJECTIVE:  Patient Vitals for the past 8 hrs:   BP Temp Pulse Resp SpO2   18 0830 - - 87 - -   18 0745 114/84 99 °F (37.2 °C) (!) 140 20 95 %   18 0740 - - (!) 135 - -   18 0420 122/73 98.4 °F (36.9 °C) 69 18 91 %     Temp (24hrs), Av.6 °F (37 °C), Min:98.3 °F (36.8 °C), Max:99 °F (37.2 °C)     0701 -  1900  In: 118 [P.O.:118]  Out: -     Physical Exam:  Constitutional: Well developed, well nourished male in no acute distress, sitting comfortably in the hospital bed. HEENT: Normocephalic and atraumatic. Oropharynx is clear, mucous membranes are moist.  Extraocular muscles are intact. Sclerae anicteric. Neck supple without JVD. No thyromegaly present. Lymph node Deferred   Skin Warm and dry. No bruising and no rash noted. No erythema. No pallor. Respiratory Lungs are clear to auscultation bilaterally without wheezes, rales or rhonchi, normal air exchange without accessory muscle use.     CVS Normal rate, regular rhythm and normal S1 and S2.  No murmurs, gallops, or rubs. Abdomen Soft, nontender and nondistended, normoactive bowel sounds. No palpable mass. No hepatosplenomegaly. Neuro Grossly nonfocal with no obvious sensory or motor deficits. MSK Normal range of motion in general.  No edema and no tenderness. Psych Appropriate mood and affect.              Labs:      Recent Labs      06/28/18 0454  06/27/18   0420  06/26/18   0421   WBC  7.9  7.6  7.0   RBC  2.24*  2.27*  2.29*   HGB  7.2*  7.2*  7.3*   HCT  21.4*  21.4*  21.4*   MCV  95.5  94.3  93.4   MCH  32.1  31.7  31.9   MCHC  33.6  33.6  34.1   RDW  29.5*  28.8*  28.8*   PLT  237  233  240   GRANS   --    --   35*   LYMPH   --    --   50*   MONOS   --    --   8   EOS   --    --   6   BASOS   --    --   1   IG   --    --   0   DF   --    --   AUTOMATED   ANEU   --    --   2.4   ABL   --    --   3.5   ABM   --    --   0.5   TENZIN   --    --   0.4   ABB   --    --   0.1   AIG   --    --   0.0        Recent Labs      06/28/18 0454 06/26/18 0421   NA  138  139   K  4.3  4.2   CL  101  106   CO2  29  26   AGAP  8  7   GLU  138*  157*   BUN  10  11   CREA  1.08  0.84   GFRAA  >60  >60   GFRNA  >60  >60   CA  8.5  7.9*   SGOT  39*  36   AP  69  67   TP  7.7  7.3   ALB  3.3*  3.0*   GLOB  4.4*  4.3*   AGRAT  0.8*  0.7*         Imaging:  XR CHEST SNGL V [[id number]] Collected: 06/25/18 1335      Order Status: Completed Updated: 06/25/18 1338     Narrative:       CHEST X-RAY, one view. HISTORY:  Sickle cell crisis. TECHNIQUE:  AP upright view    COMPARISON: 28 May 2018. FINDINGS:   Mild interstitial prominence, nonspecific. No pleural effusion. No  lobar consolidation. Heart and pulmonary vasculature unremarkable.        Impression:       IMPRESSION:  Mild interstitial prominence, nonspecific.           ASSESSMENT:    Problem List  Date Reviewed: 4/29/2018          Codes Class Noted    Volume overload ICD-10-CM: E87.70  ICD-9-CM: 276.69  6/28/2018        * (Principal)Vasoocclusive sickle cell crisis [facility]) ICD-10-CM: D57.00  ICD-9-CM: 282.62  6/24/2018        Leg pain ICD-10-CM: M79.606  ICD-9-CM: 729.5  6/24/2018        Sickle cell disease ([facility] ICD-10-CM: D57.1  ICD-9-CM: 282.60  7/13/2017        Iron overload due to repeated red blood cell transfusions ICD-10-CM: E83.111  ICD-9-CM: 275.02  1/18/2017        Paroxysmal SVT (supraventricular tachycardia) ([facility]) ICD-10-CM: I47.1  ICD-9-CM: 427.0  7/9/2016        Sickle cell anemia (HCC) ICD-10-CM: D57.1  ICD-9-CM: 282.60  4/6/2016        Essential hypertension, benign ICD-10-CM: I10  ICD-9-CM: 401.1  6/23/2012            Mr. Rowena Monroe is a 37 y.o. male admitted on 6/24/2018 with a primary diagnosis of The encounter diagnosis was Sickle cell crisis ([facility].). He is following by Dr. Lilian Ayala at [facility]. He presented to ED with c/o worsening b/l shin pain. CXR pending. No other s/sx of acute chest.  Retic 2.4. Hgb 5.6 down from 6.1 yesterday (b/l around 7-8). 1 unit PRBCs ordered. Retic 2.4. He takes Jadenu, folic acid, and Hydrea as OP. We were consulted for recommendations. PLAN:  Sickle Cell Crisis / Anemia (followed by Dr. Lilian Ayala at [facility])  - change IVF to D5 1/2NS  - Continue home meds - Jadenu, folic acid, Hydrea  - CXR pending. No s/sx of acute chest  - On Morphine PCA  - Hgb down to 5.6 from 6.1 (b/l around 7-8). 1 unit PRBCs ordered. 6/27 Hgb stable and at baseline at 7.3. Retic 2.3. Pt states pain improving. Has been ambulating hallways. 6/28 Hgb stable. Retic 2.2. Primary team d/c'd PCA today. Possible discharge within the next few days.        Thank you for allowing us to participate in the care of Mr. Cabrera. We will sign off; please do not hesitate to call with any questions. He will need to f/u with Dr. Lilian Ayala after discharge.               [facility]  [id number] [place] [address]  Office : [phone]  Fax : [phone]

## 2018-06-28 NOTE — PROGRESS NOTES
Massage Therapy Note    20 min gentle massage of lower legs and feet using hypoallergenic massage lotion while patient supine. Pain in legs before massage was 4/10 and after massage was 2/10. Slightly ticklish so I massage feet with socks on. Patient was appreciative.     Franko James, NELLYBT

## 2018-06-29 ENCOUNTER — PATIENT OUTREACH (OUTPATIENT)
Dept: CASE MANAGEMENT | Age: 43
End: 2018-06-29

## 2018-06-29 VITALS
HEIGHT: 70 IN | SYSTOLIC BLOOD PRESSURE: 100 MMHG | OXYGEN SATURATION: 92 % | TEMPERATURE: 98.1 F | HEART RATE: 63 BPM | BODY MASS INDEX: 23.92 KG/M2 | WEIGHT: 167.1 LBS | RESPIRATION RATE: 17 BRPM | DIASTOLIC BLOOD PRESSURE: 61 MMHG

## 2018-06-29 LAB
ERYTHROCYTE [DISTWIDTH] IN BLOOD BY AUTOMATED COUNT: 30.1 % (ref 11.9–14.6)
HCT VFR BLD AUTO: 21.6 % (ref 41.1–50.3)
HGB BLD-MCNC: 7.1 G/DL (ref 13.6–17.2)
HGB RETIC QN AUTO: 34 PG (ref 29–35)
IMM RETICS NFR: 27.3 % (ref 2.3–13.4)
MCH RBC QN AUTO: 31.7 PG (ref 26.1–32.9)
MCHC RBC AUTO-ENTMCNC: 32.9 G/DL (ref 31.4–35)
MCV RBC AUTO: 96.4 FL (ref 79.6–97.8)
PLATELET # BLD AUTO: 230 K/UL (ref 150–450)
PMV BLD AUTO: 10.6 FL (ref 10.8–14.1)
RBC # BLD AUTO: 2.24 M/UL (ref 4.23–5.67)
RETICS # AUTO: 0.06 M/UL (ref 0.03–0.1)
RETICS/RBC NFR AUTO: 2.7 % (ref 0.3–2)
WBC # BLD AUTO: 7.3 K/UL (ref 4.3–11.1)

## 2018-06-29 PROCEDURE — 74011250636 HC RX REV CODE- 250/636: Performed by: INTERNAL MEDICINE

## 2018-06-29 PROCEDURE — 74011250637 HC RX REV CODE- 250/637: Performed by: FAMILY MEDICINE

## 2018-06-29 PROCEDURE — 36591 DRAW BLOOD OFF VENOUS DEVICE: CPT

## 2018-06-29 PROCEDURE — 85027 COMPLETE CBC AUTOMATED: CPT | Performed by: NURSE PRACTITIONER

## 2018-06-29 PROCEDURE — 74011250636 HC RX REV CODE- 250/636: Performed by: FAMILY MEDICINE

## 2018-06-29 PROCEDURE — 85046 RETICYTE/HGB CONCENTRATE: CPT | Performed by: NURSE PRACTITIONER

## 2018-06-29 RX ORDER — HEPARIN 100 UNIT/ML
300 SYRINGE INTRAVENOUS AS NEEDED
Status: DISCONTINUED | OUTPATIENT
Start: 2018-06-29 | End: 2018-06-29 | Stop reason: HOSPADM

## 2018-06-29 RX ADMIN — OXYCODONE HYDROCHLORIDE 30 MG: 15 TABLET ORAL at 10:08

## 2018-06-29 RX ADMIN — OXYCODONE HYDROCHLORIDE 30 MG: 15 TABLET ORAL at 05:26

## 2018-06-29 RX ADMIN — PROMETHAZINE HYDROCHLORIDE 25 MG: 25 TABLET ORAL at 01:14

## 2018-06-29 RX ADMIN — HYDROXYUREA 500 MG: 500 CAPSULE ORAL at 09:19

## 2018-06-29 RX ADMIN — Medication 400 MG: at 07:49

## 2018-06-29 RX ADMIN — OXYCODONE HYDROCHLORIDE 80 MG: 80 TABLET, FILM COATED, EXTENDED RELEASE ORAL at 07:49

## 2018-06-29 RX ADMIN — Medication 10 ML: at 05:29

## 2018-06-29 RX ADMIN — LISINOPRIL 5 MG: 5 TABLET ORAL at 07:48

## 2018-06-29 RX ADMIN — Medication 1 AMPULE: at 07:49

## 2018-06-29 RX ADMIN — HYDROMORPHONE HYDROCHLORIDE 1 MG: 2 INJECTION, SOLUTION INTRAMUSCULAR; INTRAVENOUS; SUBCUTANEOUS at 08:37

## 2018-06-29 RX ADMIN — ENOXAPARIN SODIUM 40 MG: 40 INJECTION SUBCUTANEOUS at 07:50

## 2018-06-29 RX ADMIN — FOLIC ACID 1 MG: 1 TABLET ORAL at 07:49

## 2018-06-29 RX ADMIN — FAMOTIDINE 20 MG: 20 TABLET, FILM COATED ORAL at 07:48

## 2018-06-29 RX ADMIN — METOPROLOL TARTRATE 25 MG: 25 TABLET ORAL at 07:49

## 2018-06-29 RX ADMIN — OXYCODONE HYDROCHLORIDE 30 MG: 15 TABLET ORAL at 01:14

## 2018-06-29 NOTE — PROGRESS NOTES
END OF SHIFT NOTE:    Intake/Output      Voiding: YES  Catheter: NO  Drain:              Stool:  0 occurrences. Emesis:  0 occurrences. VITAL SIGNS  Patient Vitals for the past 12 hrs:   Temp Pulse Resp BP SpO2   06/29/18 0310 98.9 °F (37.2 °C) 68 18 104/45 92 %   06/28/18 2220 99.5 °F (37.5 °C) 78 17 120/60 93 %       Pain Assessment  Pain 1  Pain Scale 1: Visual (06/29/18 0600)  Pain Intensity 1: 0 (06/29/18 0600)  Patient Stated Pain Goal: 0 (06/29/18 0600)  Pain Reassessment 1: Patient sleeping (06/29/18 0600)  Pain Onset 1: pta (06/28/18 1616)  Pain Location 1: Leg (06/29/18 0117)  Pain Orientation 1: Right;Left (06/28/18 1616)  Pain Description 1: Aching (06/28/18 1954)  Pain Intervention(s) 1: Medication (see MAR) (06/29/18 0117)    Ambulating  Yes    Additional Information: Pt rested and watched tv/slept during shift. PO prn pain meds given 2x, IV given 1x. Pt pain was controlled with PO meds. Shift report given to oncoming nurse, Ana Luisa Berumen RN,  at the bedside.     Capo Markham

## 2018-06-29 NOTE — PROGRESS NOTES
Discharge instructions reviewed and pt verbalized understanding of follow up appointments. Heparin infused in port before de accessing. Pt ambulatory at discharge. Wife transporting home.

## 2018-06-29 NOTE — DISCHARGE SUMMARY
Hospitalist Discharge Summary     Admit Date:  2018  7:24 PM   Name:  Sussy Hernandez   Age:  37 y.o.  :  1975   MRN:  [id number]   PCP:  Beulah Nelson MD  Treatment Team: Attending Provider: Janessa Martines MD; Utilization Review: Lila Gujody    Problem List for this Hospitalization:  Hospital Problems as of 2018  Date Reviewed: 2018          Codes Class Noted - Resolved POA    Volume overload ICD-10-CM: E87.70  ICD-9-CM: 276.69  2018 - Present No        * (Principal)Vasoocclusive sickle cell crisis ([facility] ICD-10-CM: D57.00  ICD-9-CM: 282.62  2018 - Present Yes        Leg pain ICD-10-CM: M79.606  ICD-9-CM: 729.5  2018 - Present Yes        Sickle cell disease ([facility] ICD-10-CM: D57.1  ICD-9-CM: 282.60  2017 - Present Yes        Paroxysmal SVT (supraventricular tachycardia) ([facility] ICD-10-CM: I47.1  ICD-9-CM: 427.0  2016 - Present Yes        Sickle cell anemia ([facility] ICD-10-CM: D57.1  ICD-9-CM: 282.60  2016 - Present Yes        Essential hypertension, benign ICD-10-CM: I10  ICD-9-CM: 401.1  2012 - Present Yes        RESOLVED: HTN (hypertension) (Chronic) ICD-10-CM: I10  ICD-9-CM: 401.9  3/2/2012 - 2018 Yes                    Hospital Course:    Mr. Inocencia Zamora is a 38 yo male with PMH of Sickle cell disease followed at [facility] hematology Dr. Ale Hyde admitted with sickle cell crisis. He has been managed with morphine PCA and hydration. CXR negative. Hematology following. He did receive 1 unit PRBC for HGB drop to 5.6. He has had some volume overload that has improved with lasix. LLE edematous and LE duplex negative. Plans are for home today on home pain medications and S hematology followup. Follow up instructions and discharge meds at bottom of this note. Plan was discussed with patient. All questions answered. Patient was stable at time of discharge. Diagnostic Imaging/Tests:   Xr Chest Sngl V    Result Date: 2018  CHEST X-RAY, one view.  HISTORY: Sickle cell crisis. TECHNIQUE:  AP upright view COMPARISON: 28 May 2018. FINDINGS:   Mild interstitial prominence, nonspecific. No pleural effusion. No lobar consolidation. Heart and pulmonary vasculature unremarkable. IMPRESSION:  Mild interstitial prominence, nonspecific. Echocardiogram results:  No results found for this visit on 06/24/18.       All Micro Results     None          Labs: Results:       BMP, Mg, Phos Recent Labs      06/28/18   0454   NA  138   K  4.3   CL  101   CO2  29   AGAP  8   BUN  10   CREA  1.08   CA  8.5   GLU  138*      CBC Recent Labs      06/29/18   0434  06/28/18   0454  06/27/18   0420   WBC  7.3  7.9  7.6   RBC  2.24*  2.24*  2.27*   HGB  7.1*  7.2*  7.2*   HCT  21.6*  21.4*  21.4*   PLT  230  237  233      LFT Recent Labs      06/28/18   0454   SGOT  39*   ALT  51   AP  69   TP  7.7   ALB  3.3*   GLOB  4.4*   AGRAT  0.8*      Cardiac Testing Lab Results   Component Value Date/Time     (H) 04/26/2012 03:17 PM    CK 39 01/17/2017 03:35 AM    CK 42 01/17/2017 01:30 AM    CK 41 01/16/2017 09:05 PM    CK - MB 0.6 01/17/2017 03:35 AM    CK - MB <0.5 (L) 01/17/2017 01:30 AM    CK - MB <0.5 (L) 01/16/2017 09:05 PM    CK-MB Index 1.5 01/17/2017 03:35 AM    CK-MB Index CANNOT BE CALCULATED 01/17/2017 01:30 AM    CK-MB Index CANNOT BE CALCULATED 01/16/2017 09:05 PM    Troponin-I <0.05 04/26/2012 03:17 PM    Troponin-I, Qt. <0.02 (L) 05/28/2018 07:52 PM    Troponin-I, Qt. <0.02 (L) 08/10/2017 04:36 PM    Troponin-I, Qt. <0.02 (L) 01/17/2017 03:35 AM      Coagulation Tests Lab Results   Component Value Date/Time    Prothrombin time 11.6 02/08/2015 12:05 PM    Prothrombin time 11.9 (H) 10/24/2012 02:45 PM    INR 1.1 02/08/2015 12:05 PM    INR 1.1 10/24/2012 02:45 PM    aPTT 26.0 10/24/2012 02:45 PM      A1c No results found for: HBA1C, HGBE8, DXO3EHFP   Lipid Panel No results found for: CHOL, CHOLPOCT, CHOLX, CHLST, CHOLV, 702508, HDL, LDL, LDLC, DLDLP, 013147, VLDLC, VLDL, TGLX, TRIGL, TRIGP, TGLPOCT, CHHD, CHHDX   Thyroid Panel No results found for: TSH, T4, FT4, TT3, T3U, TSHEXT     Most Recent UA Lab Results   Component Value Date/Time    Color YELLOW 04/02/2018 11:50 PM    Appearance CLEAR 04/02/2018 11:50 PM    Specific gravity 1.009 04/02/2018 11:50 PM    pH (UA) 7.0 04/02/2018 11:50 PM    Protein TRACE (A) 04/02/2018 11:50 PM    Glucose NEGATIVE  04/02/2018 11:50 PM    Ketone NEGATIVE  04/02/2018 11:50 PM    Bilirubin NEGATIVE  04/02/2018 11:50 PM    Blood TRACE (A) 04/02/2018 11:50 PM    Urobilinogen 0.2 04/02/2018 11:50 PM    Nitrites NEGATIVE  04/02/2018 11:50 PM    Leukocyte Esterase NEGATIVE  04/02/2018 11:50 PM        Allergies   Allergen Reactions    Compazine [Prochlorperazine Edisylate] Other (comments)     Also makes him feel funny    Morphine Other (comments)     Makes him feel funny    Reglan [Metoclopramide] Other (comments)     \"feel funny\"    Zofran [Ondansetron Hcl (Pf)] Other (comments)     Make him feel funny     Immunization History   Administered Date(s) Administered    Influenza Vaccine 09/10/2015    Influenza Vaccine Split 09/27/2012    ZZZ-RETIRED (DO NOT USE) Pneumococcal Vaccine (Unspecified Type) 09/21/2010       All Labs from Last 24 Hrs:  Recent Results (from the past 24 hour(s))   CBC W/O DIFF    Collection Time: 06/29/18  4:34 AM   Result Value Ref Range    WBC 7.3 4.3 - 11.1 K/uL    RBC 2.24 (L) 4.23 - 5.67 M/uL    HGB 7.1 (L) 13.6 - 17.2 g/dL    HCT 21.6 (L) 41.1 - 50.3 %    MCV 96.4 79.6 - 97.8 FL    MCH 31.7 26.1 - 32.9 PG    MCHC 32.9 31.4 - 35.0 g/dL    RDW 30.1 (H) 11.9 - 14.6 %    PLATELET 019 283 - 778 K/uL    MPV 10.6 (L) 10.8 - 14.1 FL   RETICULOCYTE COUNT    Collection Time: 06/29/18  4:34 AM   Result Value Ref Range    Reticulocyte count 2.7 (H) 0.3 - 2.0 %    Absolute Retic Cnt. 0.0612 0.026 - 0.095 M/ul    Immature Retic Fraction 27.3 (H) 2.3 - 13.4 %    Retic Hgb Conc. 34 29 - 35 pg       Discharge Exam:  Patient Vitals for the past 24 hrs:   Temp Pulse Resp BP SpO2   06/29/18 0753 98.1 °F (36.7 °C) 63 17 100/61 92 %   06/29/18 0310 98.9 °F (37.2 °C) 68 18 104/45 92 %   06/28/18 2220 99.5 °F (37.5 °C) 78 17 120/60 93 %   06/28/18 1900 98.3 °F (36.8 °C) 75 18 111/60 -   06/28/18 1548 98.5 °F (36.9 °C) 81 18 103/68 95 %   06/28/18 1130 98.8 °F (37.1 °C) 71 18 92/59 96 %     Oxygen Therapy  O2 Sat (%): 92 % (06/29/18 0753)  Pulse via Oximetry: 72 beats per minute (06/24/18 2127)  O2 Device: Room air (06/29/18 0749)    Intake/Output Summary (Last 24 hours) at 06/29/18 0839  Last data filed at 06/29/18 0412   Gross per 24 hour   Intake              876 ml   Output             1500 ml   Net             -624 ml       General:                    Well nourished. Alert. No distress    CV:                                      RRR,  No murmur, rub, or gallop. Trace to 1 + left pedal edema  Lungs:                       CTAB. No wheezing, rhonchi, or rales. Abdomen:                  Soft, nontender, nondistended. Present BS  Extremities:               Warm and dry. No cyanosis   Skin:                                    No rashes or jaundice. Neuro:                                 No gross focal deficits              Discharge Info:   Current Discharge Medication List      CONTINUE these medications which have NOT CHANGED    Details   hydroxyurea (HYDREA) 500 mg capsule Take 1 Cap by mouth two (2) times a day. Takes in am at 0900. Star after follow up with your PCP. Qty: 60 Cap, Refills: 0      deferasirox (JADENU) 360 mg tab Take 5 Tabs by mouth daily. Qty: 150 Tab, Refills: 0      promethazine (PHENERGAN) 25 mg tablet Take 1 Tab by mouth every six (6) hours as needed. May substitute suppository if vomiting  Qty: 20 Tab, Refills: 0      oxyCODONE IR (OXY-IR) 30 mg immediate release tablet Take 1 Tab by mouth every four (4) hours as needed for Pain.  Max Daily Amount: 180 mg.  Qty: 12 Tab, Refills: 0      oxyCODONE ER (OXYCONTIN) 80 mg ER tablet Take 1 Tab by mouth every twelve (12) hours. Max Daily Amount: 160 mg.  Qty: 6 Tab, Refills: 0      metoprolol (LOPRESSOR) 25 mg tablet Take 25 mg by mouth two (2) times a day. Indications: HYPERTENSION      lisinopril (PRINIVIL, ZESTRIL) 5 mg tablet Take 5 mg by mouth daily. Indications: HYPERTENSION      folic acid (FOLVITE) 1 mg tablet Take 1 mg by mouth daily. magnesium oxide (MAG-OX) 400 mg tablet Take 1 Tab by mouth daily. Qty: 10 Tab, Refills: 0               Disposition: home    Activity: Activity as tolerated  Diet: DIET REGULAR    Follow-up Appointments   Procedures    FOLLOW UP VISIT Appointment in: One Week 1 week PCP and dr walls of [facility] hematology thanks     1 week PCP and dr walls of [facility] hematology thanks     Standing Status:   Standing     Number of Occurrences:   1     Order Specific Question:   Appointment in     Answer: One Week         Follow-up Information     Follow up With Details Comments [address] [place] [address] III, MD   [address] [place] [address]  [phone]            Time spent in patient discharge planning and coordination 30 minutes.     Signed:  Dawson Morales MD

## 2018-06-29 NOTE — PROGRESS NOTES
· Patient back in the ER on 6/24/18 for sickle cell crisis and discharged to home on 6/29/18   · Patient presented to ER on 6/24/18 with generalized body aches and home meds were not helping. · Patient admitted to hospital to Room 522  · RRAT score of 24  · Patient is a HRRP due to Patient has a chronic disease process that will probably warrant unavoidable hospitalizations and medical interventions for the remainder of his lifetime  · meds reviewed and no changes   CM Assessed Risk for Readmission:   Sickle cell crisis   Pain management from the disease process  Transfusions   Chronic disease process that will probably warrant unavoidable hospitalizations and medical interventions for the remainder of his lifetime      Patient stated Risk for Readmission:  The same as listed above  he understands the disease process  majority of the time when he phones his S hematologist for issues they refer him to the ER        · Patient understands the disease process due to he has had disease since childhool  majority of the time when he phones his S hematologist for issues they refer him to the ER  · Plan: Will outreach to patient for in 21 days per  [facility] guidelines     This note will not be viewable in [address]

## 2018-07-02 ENCOUNTER — PATIENT OUTREACH (OUTPATIENT)
Dept: CASE MANAGEMENT | Age: 43
End: 2018-07-02

## 2018-07-02 NOTE — PROGRESS NOTES
Please note this patient was IP d/c and is engaged with Mariangel Constantino RN CCM who I will notify of d/c and need of initial LALY per work flow. Stacey Chin LPN/ Care Coordinator  [address]  [email or web address] note will not be viewable in [address]

## 2018-07-20 ENCOUNTER — PATIENT OUTREACH (OUTPATIENT)
Dept: CASE MANAGEMENT | Age: 43
End: 2018-07-20

## 2018-07-20 NOTE — PROGRESS NOTES
· Final follow up phone call from [employer] outreach on 6/29/18  · Patient follows [facility] oncology for sickle cell anemia   · Patient back in the ER on 6/24/18 for sickle cell crisis and discharged to home on 6/29/18   · Patient presented to ER on 6/24/18 with generalized body aches and home meds were not helping. · Patient admitted to hospital to Room 522  · RRAT score of 24  · Patient is a HRRP due to Patient has a chronic disease process that will probably warrant unavoidable hospitalizations and medical interventions for the remainder of his lifetime  · meds reviewed and no changes   CM Assessed Risk for Readmission:   Sickle cell crisis   Pain management from the disease process  Transfusions   Chronic disease process that will probably warrant unavoidable hospitalizations and medical interventions for the remainder of his lifetime      Patient stated Risk for Readmission:  The same as listed above  he understands the disease process  majority of the time when he phones his [facility] hematologist for issues they refer him to the ER      Patient understands the disease process due to he has had disease since childhood  majority of the time when he phones his [facility] hematologist for issues they refer him to the ER    Plan: case closed at this time - [employer] outreach call completed.   Chronic disease process that will probably warrant unavoidable hospitalizations and medical interventions for the remainder of his lifetime

## 2018-07-22 ENCOUNTER — HOSPITAL ENCOUNTER (INPATIENT)
Age: 43
LOS: 1 days | Discharge: HOME | DRG: 812 | End: 2018-07-25
Attending: EMERGENCY MEDICINE | Admitting: INTERNAL MEDICINE
Payer: MEDICARE

## 2018-07-22 ENCOUNTER — APPOINTMENT (OUTPATIENT)
Dept: GENERAL RADIOLOGY | Age: 43
DRG: 812 | End: 2018-07-22
Attending: EMERGENCY MEDICINE
Payer: MEDICARE

## 2018-07-22 DIAGNOSIS — D57.00 VASOOCCLUSIVE SICKLE CELL CRISIS (HCC): Primary | ICD-10-CM

## 2018-07-22 DIAGNOSIS — M79.604 PAIN IN BOTH LOWER EXTREMITIES: ICD-10-CM

## 2018-07-22 DIAGNOSIS — M79.605 PAIN IN BOTH LOWER EXTREMITIES: ICD-10-CM

## 2018-07-22 LAB
ALBUMIN SERPL-MCNC: 3.6 G/DL (ref 3.5–5)
ALBUMIN/GLOB SERPL: 0.8 {RATIO} (ref 1.2–3.5)
ALP SERPL-CCNC: 84 U/L (ref 50–136)
ALT SERPL-CCNC: 60 U/L (ref 12–65)
ANION GAP SERPL CALC-SCNC: 8 MMOL/L (ref 7–16)
AST SERPL-CCNC: 38 U/L (ref 15–37)
ATRIAL RATE: 78 BPM
BASOPHILS # BLD: 0 K/UL (ref 0–0.2)
BASOPHILS NFR BLD: 0 % (ref 0–2)
BILIRUB SERPL-MCNC: 0.9 MG/DL (ref 0.2–1.1)
BNP SERPL-MCNC: 77 PG/ML
BUN SERPL-MCNC: 10 MG/DL (ref 6–23)
CALCIUM SERPL-MCNC: 9 MG/DL (ref 8.3–10.4)
CALCULATED P AXIS, ECG09: 20 DEGREES
CALCULATED R AXIS, ECG10: 54 DEGREES
CALCULATED T AXIS, ECG11: 15 DEGREES
CHLORIDE SERPL-SCNC: 104 MMOL/L (ref 98–107)
CO2 SERPL-SCNC: 27 MMOL/L (ref 21–32)
CREAT SERPL-MCNC: 0.84 MG/DL (ref 0.8–1.5)
DIAGNOSIS, 93000: NORMAL
DIFFERENTIAL METHOD BLD: ABNORMAL
EOSINOPHIL # BLD: 0 K/UL (ref 0–0.8)
EOSINOPHIL NFR BLD: 0 % (ref 0.5–7.8)
ERYTHROCYTE [DISTWIDTH] IN BLOOD BY AUTOMATED COUNT: 25.2 % (ref 11.9–14.6)
GLOBULIN SER CALC-MCNC: 4.8 G/DL (ref 2.3–3.5)
GLUCOSE SERPL-MCNC: 119 MG/DL (ref 65–100)
HCT VFR BLD AUTO: 27.1 % (ref 41.1–50.3)
HGB BLD-MCNC: 9.3 G/DL (ref 13.6–17.2)
HGB RETIC QN AUTO: 35 PG (ref 29–35)
IMM GRANULOCYTES # BLD: 0 K/UL (ref 0–0.5)
IMM GRANULOCYTES NFR BLD AUTO: 0 % (ref 0–5)
IMM RETICS NFR: 3.1 % (ref 2.3–13.4)
LYMPHOCYTES # BLD: 1.6 K/UL (ref 0.5–4.6)
LYMPHOCYTES NFR BLD: 30 % (ref 13–44)
MAGNESIUM SERPL-MCNC: 2 MG/DL (ref 1.8–2.4)
MCH RBC QN AUTO: 32.5 PG (ref 26.1–32.9)
MCHC RBC AUTO-ENTMCNC: 34.3 G/DL (ref 31.4–35)
MCV RBC AUTO: 94.8 FL (ref 79.6–97.8)
MONOCYTES # BLD: 0.4 K/UL (ref 0.1–1.3)
MONOCYTES NFR BLD: 7 % (ref 4–12)
NEUTS SEG # BLD: 3.2 K/UL (ref 1.7–8.2)
NEUTS SEG NFR BLD: 63 % (ref 43–78)
P-R INTERVAL, ECG05: 154 MS
PLATELET # BLD AUTO: 240 K/UL (ref 150–450)
PMV BLD AUTO: 11 FL (ref 10.8–14.1)
POTASSIUM SERPL-SCNC: 4.1 MMOL/L (ref 3.5–5.1)
PROT SERPL-MCNC: 8.4 G/DL (ref 6.3–8.2)
Q-T INTERVAL, ECG07: 396 MS
QRS DURATION, ECG06: 84 MS
QTC CALCULATION (BEZET), ECG08: 451 MS
RBC # BLD AUTO: 2.86 M/UL (ref 4.23–5.67)
RETICS # AUTO: 0.02 M/UL (ref 0.03–0.1)
RETICS/RBC NFR AUTO: 0.7 % (ref 0.3–2)
SODIUM SERPL-SCNC: 139 MMOL/L (ref 136–145)
TROPONIN I BLD-MCNC: 0.01 NG/ML (ref 0.02–0.05)
TROPONIN I SERPL-MCNC: 0.02 NG/ML (ref 0.02–0.05)
VENTRICULAR RATE, ECG03: 78 BPM
WBC # BLD AUTO: 5.2 K/UL (ref 4.3–11.1)

## 2018-07-22 PROCEDURE — 93005 ELECTROCARDIOGRAM TRACING: CPT | Performed by: EMERGENCY MEDICINE

## 2018-07-22 PROCEDURE — 71045 X-RAY EXAM CHEST 1 VIEW: CPT

## 2018-07-22 PROCEDURE — 96372 THER/PROPH/DIAG INJ SC/IM: CPT | Performed by: EMERGENCY MEDICINE

## 2018-07-22 PROCEDURE — 77030020257 HC SOL INJ SOD CL 0.45% 1000ML BG

## 2018-07-22 PROCEDURE — 84484 ASSAY OF TROPONIN QUANT: CPT

## 2018-07-22 PROCEDURE — 85046 RETICYTE/HGB CONCENTRATE: CPT | Performed by: EMERGENCY MEDICINE

## 2018-07-22 PROCEDURE — 83880 ASSAY OF NATRIURETIC PEPTIDE: CPT | Performed by: EMERGENCY MEDICINE

## 2018-07-22 PROCEDURE — 74011250636 HC RX REV CODE- 250/636: Performed by: INTERNAL MEDICINE

## 2018-07-22 PROCEDURE — 99218 HC RM OBSERVATION: CPT

## 2018-07-22 PROCEDURE — 74011250637 HC RX REV CODE- 250/637: Performed by: INTERNAL MEDICINE

## 2018-07-22 PROCEDURE — 74011000258 HC RX REV CODE- 258: Performed by: INTERNAL MEDICINE

## 2018-07-22 PROCEDURE — 36591 DRAW BLOOD OFF VENOUS DEVICE: CPT

## 2018-07-22 PROCEDURE — 96376 TX/PRO/DX INJ SAME DRUG ADON: CPT | Performed by: EMERGENCY MEDICINE

## 2018-07-22 PROCEDURE — 99285 EMERGENCY DEPT VISIT HI MDM: CPT | Performed by: EMERGENCY MEDICINE

## 2018-07-22 PROCEDURE — 74011250636 HC RX REV CODE- 250/636: Performed by: EMERGENCY MEDICINE

## 2018-07-22 PROCEDURE — 80053 COMPREHEN METABOLIC PANEL: CPT | Performed by: EMERGENCY MEDICINE

## 2018-07-22 PROCEDURE — 83735 ASSAY OF MAGNESIUM: CPT | Performed by: EMERGENCY MEDICINE

## 2018-07-22 PROCEDURE — 85025 COMPLETE CBC W/AUTO DIFF WBC: CPT | Performed by: EMERGENCY MEDICINE

## 2018-07-22 PROCEDURE — 96374 THER/PROPH/DIAG INJ IV PUSH: CPT | Performed by: EMERGENCY MEDICINE

## 2018-07-22 PROCEDURE — 94760 N-INVAS EAR/PLS OXIMETRY 1: CPT | Performed by: EMERGENCY MEDICINE

## 2018-07-22 RX ORDER — ZOLPIDEM TARTRATE 5 MG/1
5 TABLET ORAL
Status: DISCONTINUED | OUTPATIENT
Start: 2018-07-22 | End: 2018-07-25 | Stop reason: HOSPADM

## 2018-07-22 RX ORDER — DEFERASIROX 360 MG/1
1800 TABLET, FILM COATED ORAL DAILY
Status: DISCONTINUED | OUTPATIENT
Start: 2018-07-23 | End: 2018-07-25 | Stop reason: HOSPADM

## 2018-07-22 RX ORDER — HYDROMORPHONE HYDROCHLORIDE 2 MG/ML
1 INJECTION, SOLUTION INTRAMUSCULAR; INTRAVENOUS; SUBCUTANEOUS
Status: DISCONTINUED | OUTPATIENT
Start: 2018-07-22 | End: 2018-07-23

## 2018-07-22 RX ORDER — DIPHENHYDRAMINE HYDROCHLORIDE 50 MG/ML
25 INJECTION, SOLUTION INTRAMUSCULAR; INTRAVENOUS
Status: DISCONTINUED | OUTPATIENT
Start: 2018-07-22 | End: 2018-07-25 | Stop reason: HOSPADM

## 2018-07-22 RX ORDER — LISINOPRIL 5 MG/1
5 TABLET ORAL DAILY
Status: DISCONTINUED | OUTPATIENT
Start: 2018-07-23 | End: 2018-07-25 | Stop reason: HOSPADM

## 2018-07-22 RX ORDER — ENOXAPARIN SODIUM 100 MG/ML
40 INJECTION SUBCUTANEOUS EVERY 24 HOURS
Status: DISCONTINUED | OUTPATIENT
Start: 2018-07-22 | End: 2018-07-25 | Stop reason: HOSPADM

## 2018-07-22 RX ORDER — PROMETHAZINE HYDROCHLORIDE 25 MG/ML
25 INJECTION, SOLUTION INTRAMUSCULAR; INTRAVENOUS
Status: COMPLETED | OUTPATIENT
Start: 2018-07-22 | End: 2018-07-22

## 2018-07-22 RX ORDER — NALOXONE HYDROCHLORIDE 0.4 MG/ML
0.4 INJECTION, SOLUTION INTRAMUSCULAR; INTRAVENOUS; SUBCUTANEOUS AS NEEDED
Status: DISCONTINUED | OUTPATIENT
Start: 2018-07-22 | End: 2018-07-25 | Stop reason: HOSPADM

## 2018-07-22 RX ORDER — HYDROXYUREA 500 MG/1
1000 CAPSULE ORAL DAILY
Status: DISCONTINUED | OUTPATIENT
Start: 2018-07-23 | End: 2018-07-25 | Stop reason: HOSPADM

## 2018-07-22 RX ORDER — SODIUM CHLORIDE 0.9 % (FLUSH) 0.9 %
5-10 SYRINGE (ML) INJECTION AS NEEDED
Status: DISCONTINUED | OUTPATIENT
Start: 2018-07-22 | End: 2018-07-25 | Stop reason: HOSPADM

## 2018-07-22 RX ORDER — FOLIC ACID 1 MG/1
1 TABLET ORAL DAILY
Status: DISCONTINUED | OUTPATIENT
Start: 2018-07-23 | End: 2018-07-25 | Stop reason: HOSPADM

## 2018-07-22 RX ORDER — ACETAMINOPHEN 325 MG/1
650 TABLET ORAL
Status: DISCONTINUED | OUTPATIENT
Start: 2018-07-22 | End: 2018-07-25 | Stop reason: HOSPADM

## 2018-07-22 RX ORDER — LORAZEPAM 1 MG/1
1 TABLET ORAL
Status: DISCONTINUED | OUTPATIENT
Start: 2018-07-22 | End: 2018-07-22

## 2018-07-22 RX ORDER — SODIUM CHLORIDE 450 MG/100ML
100 INJECTION, SOLUTION INTRAVENOUS CONTINUOUS
Status: DISCONTINUED | OUTPATIENT
Start: 2018-07-22 | End: 2018-07-22

## 2018-07-22 RX ORDER — OXYCODONE HYDROCHLORIDE 15 MG/1
30 TABLET ORAL
Status: DISCONTINUED | OUTPATIENT
Start: 2018-07-22 | End: 2018-07-25 | Stop reason: HOSPADM

## 2018-07-22 RX ORDER — HYDROMORPHONE HYDROCHLORIDE 2 MG/ML
2 INJECTION, SOLUTION INTRAMUSCULAR; INTRAVENOUS; SUBCUTANEOUS
Status: COMPLETED | OUTPATIENT
Start: 2018-07-22 | End: 2018-07-22

## 2018-07-22 RX ORDER — SODIUM CHLORIDE 450 MG/100ML
50 INJECTION, SOLUTION INTRAVENOUS CONTINUOUS
Status: DISCONTINUED | OUTPATIENT
Start: 2018-07-22 | End: 2018-07-25 | Stop reason: HOSPADM

## 2018-07-22 RX ORDER — METOPROLOL TARTRATE 25 MG/1
25 TABLET, FILM COATED ORAL EVERY 12 HOURS
Status: DISCONTINUED | OUTPATIENT
Start: 2018-07-22 | End: 2018-07-25 | Stop reason: HOSPADM

## 2018-07-22 RX ORDER — LANOLIN ALCOHOL/MO/W.PET/CERES
400 CREAM (GRAM) TOPICAL DAILY
Status: DISCONTINUED | OUTPATIENT
Start: 2018-07-23 | End: 2018-07-25 | Stop reason: HOSPADM

## 2018-07-22 RX ORDER — AMOXICILLIN 250 MG
2 CAPSULE ORAL
Status: DISCONTINUED | OUTPATIENT
Start: 2018-07-22 | End: 2018-07-25 | Stop reason: HOSPADM

## 2018-07-22 RX ORDER — OXYCODONE HYDROCHLORIDE 80 MG/1
80 TABLET, FILM COATED, EXTENDED RELEASE ORAL EVERY 12 HOURS
Status: DISCONTINUED | OUTPATIENT
Start: 2018-07-22 | End: 2018-07-25 | Stop reason: HOSPADM

## 2018-07-22 RX ORDER — SODIUM CHLORIDE 0.9 % (FLUSH) 0.9 %
5-10 SYRINGE (ML) INJECTION EVERY 8 HOURS
Status: DISCONTINUED | OUTPATIENT
Start: 2018-07-22 | End: 2018-07-25 | Stop reason: HOSPADM

## 2018-07-22 RX ORDER — ONDANSETRON 2 MG/ML
4 INJECTION INTRAMUSCULAR; INTRAVENOUS
Status: DISCONTINUED | OUTPATIENT
Start: 2018-07-22 | End: 2018-07-25 | Stop reason: HOSPADM

## 2018-07-22 RX ADMIN — ENOXAPARIN SODIUM 40 MG: 40 INJECTION SUBCUTANEOUS at 17:37

## 2018-07-22 RX ADMIN — HYDROMORPHONE HYDROCHLORIDE 1 MG: 2 INJECTION, SOLUTION INTRAMUSCULAR; INTRAVENOUS; SUBCUTANEOUS at 17:42

## 2018-07-22 RX ADMIN — HYDROMORPHONE HYDROCHLORIDE 2 MG: 2 INJECTION, SOLUTION INTRAMUSCULAR; INTRAVENOUS; SUBCUTANEOUS at 11:26

## 2018-07-22 RX ADMIN — SODIUM CHLORIDE 125 ML/HR: 450 INJECTION, SOLUTION INTRAVENOUS at 15:10

## 2018-07-22 RX ADMIN — HYDROMORPHONE HYDROCHLORIDE 2 MG: 2 INJECTION, SOLUTION INTRAMUSCULAR; INTRAVENOUS; SUBCUTANEOUS at 12:54

## 2018-07-22 RX ADMIN — OXYCODONE HYDROCHLORIDE 80 MG: 80 TABLET, FILM COATED, EXTENDED RELEASE ORAL at 20:42

## 2018-07-22 RX ADMIN — METOPROLOL TARTRATE 25 MG: 25 TABLET, FILM COATED ORAL at 20:42

## 2018-07-22 RX ADMIN — HYDROMORPHONE HYDROCHLORIDE 2 MG: 2 INJECTION, SOLUTION INTRAMUSCULAR; INTRAVENOUS; SUBCUTANEOUS at 10:30

## 2018-07-22 RX ADMIN — Medication 10 ML: at 21:06

## 2018-07-22 RX ADMIN — PROMETHAZINE HYDROCHLORIDE 25 MG: 25 INJECTION INTRAMUSCULAR; INTRAVENOUS at 10:30

## 2018-07-22 RX ADMIN — OXYCODONE HYDROCHLORIDE 30 MG: 15 TABLET ORAL at 15:26

## 2018-07-22 RX ADMIN — PROMETHAZINE HYDROCHLORIDE 25 MG: 25 INJECTION INTRAMUSCULAR; INTRAVENOUS at 11:26

## 2018-07-22 RX ADMIN — SODIUM CHLORIDE 125 ML/HR: 450 INJECTION, SOLUTION INTRAVENOUS at 22:54

## 2018-07-22 RX ADMIN — HYDROMORPHONE HYDROCHLORIDE 1 MG: 2 INJECTION, SOLUTION INTRAMUSCULAR; INTRAVENOUS; SUBCUTANEOUS at 22:58

## 2018-07-22 NOTE — PROGRESS NOTES
Bedside shift change report given to  (oncoming nurse) by Mansi Cortez RN (offgoing nurse). Report included the following information SBAR, Kardex and MAR.

## 2018-07-22 NOTE — IP AVS SNAPSHOT
[address] [facility] 
 
 
 [address] [facility] 
[phone] Patient: Jyoti Lubin MRN: [id number] BVO:[id number] About your hospitalization You were admitted on:  July 22, 2018 You last received care in the:  [address] You were discharged on:  July 25, 2018 Why you were hospitalized Your primary diagnosis was:  Sickle Cell Pain Crisis (Hcc) Your diagnoses also included:  Essential Hypertension, Benign, Iron Overload Due To Repeated Red Blood Cell Transfusions, Leg Pain, Sickle Cell Anemia (Hcc), Sickle Cell Disease (Hcc), Precordial Pain Follow-up Information Follow up With Details Comments Contact Info Roselia Gale MD  As needed [address] 
[phone] Discharge Orders None A check cristina indicates which time of day the medication should be taken. My Medications CHANGE how you take these medications Instructions Each Dose to Equal  
 Morning Noon Evening Bedtime  
 hydroxyurea 500 mg capsule Commonly known as:  HYDREA What changed:   
- how much to take - when to take this 
- additional instructions Take 1 Cap by mouth two (2) times a day. Takes in am at 0900. Star after follow up with your PCP. 500 mg  
    
  
   
   
  
   
  
 * oxyCODONE ER 80 mg ER tablet Commonly known as:  OxyCONTIN What changed:  Another medication with the same name was changed. Make sure you understand how and when to take each. Notes to Patient:  Take on as needed schedule Take 1 Tab by mouth every twelve (12) hours. Max Daily Amount: 160 mg.  
 80 mg  
    
   
   
   
  
 * oxyCODONE IR 30 mg immediate release tablet Commonly known as:  Sandra Glez What changed:  reasons to take this Notes to Patient:  Take on as needed schedule Take 1 Tab by mouth every four (4) hours as needed.  Max Daily Amount: 180 mg.  
 30 mg  
    
   
   
 * Notice: This list has 2 medication(s) that are the same as other medications prescribed for you. Read the directions carefully, and ask your doctor or other care provider to review them with you. CONTINUE taking these medications Instructions Each Dose to Equal  
 Morning Noon Evening Bedtime  
 deferasirox 360 mg tablet Commonly known as:  Boogie Chock Take 5 Tabs by mouth daily. 5 Tab  
    
  
   
   
   
  
 folic acid 1 mg tablet Commonly known as:  Google Take 1 mg by mouth daily. 1 mg  
    
  
   
   
   
  
 lisinopril 5 mg tablet Commonly known as:  Malou Loss Take 5 mg by mouth daily. Indications: HYPERTENSION  
 5 mg  
    
  
   
   
   
  
 magnesium oxide 400 mg tablet Commonly known as:  MAG-OX Take 1 Tab by mouth daily. 400 mg  
    
  
   
   
   
  
 metoprolol tartrate 25 mg tablet Commonly known as:  LOPRESSOR Take 12.5 mg by mouth two (2) times a day. Indications: hypertension 12.5 mg  
    
  
   
   
  
   
  
 promethazine 25 mg tablet Commonly known as:  PHENERGAN Notes to Patient:  Take on as needed schedule Take 1 Tab by mouth every six (6) hours as needed. May substitute suppository if vomiting 25 mg Where to Get Your Medications Information on where to get these meds will be given to you by the nurse or doctor. ! Ask your nurse or doctor about these medications  
  oxyCODONE ER 80 mg ER tablet  
 oxyCODONE IR 30 mg immediate release tablet Opioid Education Prescription Opioids: What You Need to Know: 
 
 
 After general anesthesia or intravenous sedation, for 24 hours or while taking prescription Narcotics: · Limit your activities · Do not drive and operate hazardous machinery · Do not make important personal or business decisions · Do  not drink alcoholic beverages · If you have not urinated within 8 hours after discharge, please contact your surgeon on call. Report the following to your surgeon: 
· Excessive pain, swelling, redness or odor of or around the surgical area · Temperature over 100.5 · Nausea and vomiting lasting longer than 4 hours or if unable to take medications · Any signs of decreased circulation or nerve impairment to extremity: change in color, persistent  numbness, tingling, coldness or increase pain · Any questions What to do at Home: 
Recommended activity: Activity as tolerated, per MD instructions If you experience any of the following symptoms fever > 100.5, nausea, vomiting, pain, chest pain and/or shortness of breath to ER please follow up with MD. 
 
*  Please give a list of your current medications to your Primary Care Provider. *  Please update this list whenever your medications are discontinued, doses are 
    changed, or new medications (including over-the-counter products) are added. *  Please carry medication information at all times in case of emergency situations. These are general instructions for a healthy lifestyle: No smoking/ No tobacco products/ Avoid exposure to second hand smoke Surgeon General's Warning:  Quitting smoking now greatly reduces serious risk to your health. Obesity, smoking, and sedentary lifestyle greatly increases your risk for illness A healthy diet, regular physical exercise & weight monitoring are important for maintaining a healthy lifestyle You may be retaining fluid if you have a history of heart failure or if you experience any of the following symptoms:  Weight gain of 3 pounds or more overnight or 5 pounds in a week, increased swelling in our hands or feet or shortness of breath while lying flat in bed. Please call your doctor as soon as you notice any of these symptoms; do not wait until your next office visit. Recognize signs and symptoms of STROKE: 
 
F-face looks uneven A-arms unable to move or move unevenly S-speech slurred or non-existent T-time-call 911 as soon as signs and symptoms begin-DO NOT go Back to bed or wait to see if you get better-TIME IS BRAIN. Warning Signs of HEART ATTACK Call 911 if you have these symptoms: 
? Chest discomfort. Most heart attacks involve discomfort in the center of the chest that lasts more than a few minutes, or that goes away and comes back. It can feel like uncomfortable pressure, squeezing, fullness, or pain. ? Discomfort in other areas of the upper body. Symptoms can include pain or discomfort in one or both arms, the back, neck, jaw, or stomach. ? Shortness of breath with or without chest discomfort. ? Other signs may include breaking out in a cold sweat, nausea, or lightheadedness. Don't wait more than five minutes to call [address]! Fast action can save your life. Calling 911 is almost always the fastest way to get lifesaving treatment. Emergency Medical Services staff can begin treatment when they arrive  up to an hour sooner than if someone gets to the hospital by car. The discharge information has been reviewed with the patient. The patient verbalized understanding. Discharge medications reviewed with the patient and appropriate educational materials and side effects teaching were provided. ___________________________________________________________________________________________________________________________________ Sickle Cell Crisis: Care Instructions Your Care Instructions Sickle cell crisis is a painful episode that may begin suddenly in a person with sickle cell disease. Sickle cell disease turns normal, round red blood cells into cells that look like kendall or crescent moons. The sickle-shaped cells can get stuck in blood vessels, blocking blood flow and causing severe pain. The pain can occur in the bones of the spine, the arms and legs, the chest, and the abdomen. An episode may be called a \"painful event\" or \"painful crisis. \" Some people who have sickle cell disease have many painful events, while others have few or none. Treatment depends on the level of pain and how long it lasts. Sometimes taking nonprescription pain relievers can help. Or you may need stronger pain relief medicine that is prescribed or given by a doctor. You may need to be treated in the hospital. 
It isn't always possible to know what sets off a painful event. But triggers include being dehydrated, cold temperatures, infection, stress, and not getting enough oxygen. Follow-up care is a key part of your treatment and safety. Be sure to make and go to all appointments, and call your doctor if you are having problems. It's also a good idea to know your test results and keep a list of the medicines you take. How can you care for yourself at home? · Create a pain management plan with your doctor. This plan should include the types of medicines you can take and other actions you can take at home to relieve pain. · Drink plenty of fluids, enough so that your urine is light yellow or clear like water. If you have kidney, heart, or liver disease and have to limit fluids, talk with your doctor before you increase the amount of fluids you drink. · Take your medicines exactly as prescribed. Call your doctor if you think you are having a problem with your medicine. · Take pain medicines exactly as directed. ¨ If the doctor gave you a prescription medicine for pain, take it as prescribed.  
¨ If you are not taking a prescription pain medicine, ask your doctor if you can take an over-the-counter medicine. · Avoid alcohol. It can make you dehydrated. · Dress warmly in cold weather. The cold and windy weather can lead to severe pain. · Do not smoke. Smoking can reduce the amount of oxygen in your blood. · Get plenty of sleep. When should you call for help? Call 911 anytime you think you may need emergency care. For example, call if: 
  · You have symptoms of a severe problem from sickle cell.  
  · You have symptoms of a stroke. These may include: 
¨ Sudden numbness, tingling, weakness, or loss of movement in your face, arm, or leg, especially on only one side of your body. ¨ Sudden vision changes. ¨ Sudden trouble speaking. ¨ Sudden confusion or trouble understanding simple statements. ¨ Sudden problems with walking or balance. ¨ A sudden, severe headache that is different from past headaches.  
  · You are in severe pain.  
  · You have symptoms of a heart attack. These may include: ¨ Chest pain or pressure, or a strange feeling in the chest. 
¨ Sweating. ¨ Shortness of breath. ¨ Nausea or vomiting. ¨ Pain, pressure, or a strange feeling in the back, neck, jaw, or upper belly or in one or both shoulders or arms. ¨ Lightheadedness or sudden weakness. ¨ A fast or irregular heartbeat. After you call 911, the  may tell you to chew 1 adult-strength or 2 to 4 low-dose aspirin. Wait for an ambulance. Do not try to drive yourself.  
 Call your doctor now or seek immediate medical care if: 
  · You have a fever.  
 Watch closely for changes in your health, and be sure to contact your doctor if you have any problems. Where can you learn more? Go to http://socorro-rosita.info/. Enter F104 in the search box to learn more about \"Sickle Cell Crisis: Care Instructions. \" Current as of: September 10, 2017 Content Version: 11.7 © 9304-3434 [employer].  Care instructions adapted under license by [address] (which disclaims liability or warranty for this information). If you have questions about a medical condition or this instruction, always ask your healthcare professional. [address] any warranty or liability for your use of this information. MySQUAR Announcement We are excited to announce that we are making your provider's discharge notes available to you in MySQUAR. You will see these notes when they are completed and signed by the physician that discharged you from your recent hospital stay. If you have any questions or concerns about any information you see in MySQUAR, please call the Health Information Department where you were seen or reach out to your Primary Care Provider for more information about your plan of care. Introducing [facility]! [employer] introduces MySQUAR patient portal. Now you can access parts of your medical record, email your doctor's office, and request medication refills online. 1. In your internet browser, go to [email or web address] 2. Click on the First Time User? Click Here link in the Sign In box. You will see the New Member Sign Up page. 3. Enter your MySQUAR Access Code exactly as it appears below. You will not need to use this code after youve completed the sign-up process. If you do not sign up before the expiration date, you must request a new code. · MySQUAR Access Code: [email or web address] Expires: 9/22/2018  5:27 PM 
 
4. Enter the last four digits of your Social Security Number (xxxx) and Date of Birth (mm/dd/yyyy) as indicated and click Submit. You will be taken to the next sign-up page. 5. Create a MySQUAR ID. This will be your MySQUAR login ID and cannot be changed, so think of one that is secure and easy to remember. 6. Create a MySQUAR password. You can change your password at any time. 7. Enter your Password Reset Question and Answer. This can be used at a later time if you forget your password. 8. Enter your e-mail address. You will receive e-mail notification when new information is available in [address] 9. Click Sign Up. You can now view and download portions of your medical record. 10. Click the Download Summary menu link to download a portable copy of your medical information. If you have questions, please visit the Frequently Asked Questions section of the [employer] website. Remember, [employer] is NOT to be used for urgent needs. For medical emergencies, dial 911. Now available from your iPhone and Android! Introducing Israel Bravo As a [employer] patient, I wanted to make you aware of our electronic visit tool called [employer] Bravo. [employer] 24/7 allows you to connect within minutes with a medical provider 24 hours a day, seven days a week via a mobile device or tablet or logging into a secure website from your computer. You can access Israel Bravo from anywhere in the [place]. A virtual visit might be right for you when you have a simple condition and feel like you just dont want to get out of bed, or cant get away from work for an appointment, when your regular [employer] provider is not available (evenings, weekends or holidays), or when youre out of town and need minor care. Electronic visits cost only $49 and if the [employer] 24/7 provider determines a prescription is needed to treat your condition, one can be electronically transmitted to a nearby pharmacy*. Please take a moment to enroll today if you have not already done so. The enrollment process is free and takes just a few minutes. To enroll, please download the [employer] 24/7 jhony to your tablet or phone, or visit [email or web address] to enroll on your computer.    
And, as an [address] [place] [address] patient with a [employer] account, the results of your visits will be scanned into your electronic medical record and your primary care provider will be able to view the scanned results. We urge you to continue to see your regular [employer] provider for your ongoing medical care. And while your primary care provider may not be the one available when you seek a Israel Bravo virtual visit, the peace of mind you get from getting a real diagnosis real time can be priceless. For more information on Israel [employer], view our Frequently Asked Questions (FAQs) at [email or web address]. Sincerely, 
 
Pretty Teran MD 
Chief Medical Officer [employer] *:  certain medications cannot be prescribed via [employer] Unresulted Labs-Please follow up with your PCP about these lab tests Order Current Status CBC WITH AUTOMATED DIFF In process Providers Seen During Your Hospitalization Provider Specialty Primary office phone Koki Choi MD Emergency Medicine [phone] Katey Ley MD Internal Medicine [phone] Javi Handy MD Internal Medicine [phone] Your Primary Care Physician (PCP) Primary Care Physician Office Phone Office Fax Reese Noguerajerad [phone] You are allergic to the following Allergen Reactions Compazine (Prochlorperazine Edisylate) Other (comments) Also makes him feel funny Morphine Other (comments) Makes him feel funny Reglan (Metoclopramide) Other (comments) \"feel funny\" Zofran (Ondansetron Hcl (Pf)) Other (comments) Make him feel funny Recent Documentation Height Weight BMI Smoking Status 1.778 m 77.2 kg 24.44 kg/m2 Never Smoker Emergency Contacts Name Discharge Info Relation Home Work Mobile Miranda Cooley  Spouse [3] [phone] Patient Belongings The following personal items are in your possession at time of discharge: 
  Dental Appliances: None  Visual Aid: None      Home Medications: None   Jewelry: Ring  Clothing: None    Other Valuables: Cell Phone, [employer] Please provide this summary of care documentation to your next provider. Signatures-by signing, you are acknowledging that this After Visit Summary has been reviewed with you and you have received a copy. Patient Signature:  ____________________________________________________________ Date:  ____________________________________________________________  
  
[facility] Provider Signature:  ____________________________________________________________ Date:  ____________________________________________________________

## 2018-07-22 NOTE — PROGRESS NOTES
Problem: Falls - Risk of  Goal: *Absence of Falls  Document Toy Fall Risk and appropriate interventions in the flowsheet.    Outcome: Progressing Towards Goal  Fall Risk Interventions:            Medication Interventions: Teach patient to arise slowly, Patient to call before getting OOB, Evaluate medications/consider consulting pharmacy

## 2018-07-22 NOTE — PROGRESS NOTES
07/22/18 1502   Dual Skin Pressure Injury Assessment   Dual Skin Pressure Injury Assessment WDL   Second Care Provider (Based on [address] [place] [address]) Kelly Murphy RN

## 2018-07-22 NOTE — ED NOTES
TRANSFER - OUT REPORT:    Verbal report given to ANYI Guzman(name) on Rob King  being transferred to 504(unit) for routine progression of care       Report consisted of patients Situation, Background, Assessment and   Recommendations(SBAR). Information from the following report(s) SBAR, ED Summary and Recent Results was reviewed with the receiving nurse. Lines:   Venous Access Device port Other (comment) (Active)        Opportunity for questions and clarification was provided.       Patient transported with:   O2 @ 1 liters

## 2018-07-22 NOTE — H&P
Hospitalist H&P Note Admit Date:  2018  9:20 AM  
Name:  Concepción Do Age:  37 y.o. 
:  1975 MRN:  [id number] PCP:  Radha Carroll MD 
Treatment Team: Attending Provider: Mariangel King MD; Primary Nurse: Lanny Bahena RN 
 
HPI:  
Pt is a 38 yo male with PMH of HTN, Sickle cell disease, Iron overload, paroxysmal SVT, volume overload. PT presents to the ER with bilateral leg and chest pain. This pain is consistent with pain he has experienced previously during sickle cell pain crisis. Patient takes oxycodone at home but it is not alleviating this pain. He reports that the pain has increased x 1 day, denies any fever, SOB, N/V/D, dizziness, falls. He endorses chills. Pt's last blood transfusion was 3 weeks ago. Pt states that the chest pain has mostly resolved after medication with dilaudid in the ER, still has pain in both legs from the knees down but it is improved. Pt on 2L oxygen with sats 95-99%. CXR consistent with cardiomegaly. 10 systems reviewed and negative except as noted in HPI. Past Medical History:  
Diagnosis Date  Chronic pain  HTN (hypertension)  Ill-defined condition   
 sickle cell  Iron overload due to repeated red blood cell transfusions 2017  Paroxysmal SVT (supraventricular tachycardia) ([facility] [address] 2016  Sickle cell disease ([facility] [address] Past Surgical History:  
Procedure Laterality Date  HC PENILE IMPL DURA II POSITINBLE    
 HC PORT LIFE SNGLE LUMEN 5013    
 to L CW  
 HX CHOLECYSTECTOMY  HX OTHER SURGICAL    
 penile inplant  HX VASCULAR ACCESS Allergies Allergen Reactions  Compazine [Prochlorperazine Edisylate] Other (comments) Also makes him feel funny  Morphine Other (comments) Makes him feel funny  Reglan [Metoclopramide] Other (comments) \"feel funny\"  Zofran [Ondansetron Hcl (Pf)] Other (comments) Make him feel funny Social History Substance Use Topics  Smoking status: Never Smoker  Smokeless tobacco: Never Used  Alcohol use No  
  
Family History Problem Relation Age of Onset  Hypertension Other  Diabetes Father  Stroke Father  Sickle Cell Anemia Sister Immunization History Administered Date(s) Administered  Influenza Vaccine 09/10/2015  Influenza Vaccine Split 09/27/2012  ZZZ-RETIRED (DO NOT USE) Pneumococcal Vaccine (Unspecified Type) 09/21/2010 PTA Medications: 
Prior to Admission Medications Prescriptions Last Dose Informant Patient Reported? Taking? deferasirox (JADENU) 360 mg tab   No No  
Sig: Take 5 Tabs by mouth daily. folic acid (FOLVITE) 1 mg tablet  Self Yes No  
Sig: Take 1 mg by mouth daily. hydroxyurea (HYDREA) 500 mg capsule   No No  
Sig: Take 1 Cap by mouth two (2) times a day. Takes in am at 0900. Star after follow up with your PCP. lisinopril (PRINIVIL, ZESTRIL) 5 mg tablet   Yes No  
Sig: Take 5 mg by mouth daily. Indications: HYPERTENSION  
magnesium oxide (MAG-OX) 400 mg tablet   No No  
Sig: Take 1 Tab by mouth daily. metoprolol (LOPRESSOR) 25 mg tablet   Yes No  
Sig: Take 25 mg by mouth two (2) times a day. Indications: HYPERTENSION  
oxyCODONE ER (OXYCONTIN) 80 mg ER tablet   No No  
Sig: Take 1 Tab by mouth every twelve (12) hours. Max Daily Amount: 160 mg.  
oxyCODONE IR (OXY-IR) 30 mg immediate release tablet   No No  
Sig: Take 1 Tab by mouth every four (4) hours as needed for Pain. Max Daily Amount: 180 mg.  
promethazine (PHENERGAN) 25 mg tablet   No No  
Sig: Take 1 Tab by mouth every six (6) hours as needed. May substitute suppository if vomiting Facility-Administered Medications: None Objective:  
Patient Vitals for the past 24 hrs: 
 Temp Pulse Resp BP SpO2  
07/22/18 1330 - 83 8 109/67 98 %  
07/22/18 1259 - 86 (!) 7 108/64 95 %  
07/22/18 1229 - 69 9 101/64 96 %  
07/22/18 1159 - 90 - 97/58 96 %  
07/22/18 1129 - 93 - 128/83 98 %  
07/22/18 1100 - 78 13 110/60 97 %  
07/22/18 1029 - 60 17 160/76 98 %  
07/22/18 0923 99 °F (37.2 °C) 67 16 (!) 161/92 99 % Oxygen Therapy O2 Sat (%): 98 % (07/22/18 1330) Pulse via Oximetry: 73 beats per minute (07/22/18 1330) No intake or output data in the 24 hours ending 07/22/18 1418 Physical Exam: 
General:    Well nourished. Alert. Eyes:   Normal sclera. Extraocular movements intact. ENT:  Normocephalic, atraumatic. Moist mucous membranes Neck:  Supple, no lymphadenopathy or JVD 
CV:   Irregularly irregular. No m/r/g. Peripheral pulses 2+. Candance Huger Lungs:  CTAB. No wheezing, rhonchi, or rales. Abdomen: Soft, nontender, nondistended. Extremities: Warm and dry. No cyanosis or edema. Neurologic: CN II-XII grossly intact. Skin:     No rashes or jaundice. Normal coloration Psych:  Normal mood and affect. I reviewed the labs, imaging, EKGs, telemetry, and other studies done this admission. Data Review:  
Recent Results (from the past 24 hour(s)) CBC WITH AUTOMATED DIFF Collection Time: 07/22/18  9:48 AM  
Result Value Ref Range WBC 5.2 4.3 - 11.1 K/uL  
 RBC 2.86 (L) 4.23 - 5.67 M/uL HGB 9.3 (L) 13.6 - 17.2 g/dL HCT 27.1 (L) 41.1 - 50.3 % MCV 94.8 79.6 - 97.8 FL  
 MCH 32.5 26.1 - 32.9 PG  
 MCHC 34.3 31.4 - 35.0 g/dL RDW 25.2 (H) 11.9 - 14.6 % PLATELET 146 161 - 213 K/uL MPV 11.0 10.8 - 14.1 FL  
 DF AUTOMATED NEUTROPHILS 63 43 - 78 % LYMPHOCYTES 30 13 - 44 % MONOCYTES 7 4.0 - 12.0 % EOSINOPHILS 0 (L) 0.5 - 7.8 % BASOPHILS 0 0.0 - 2.0 % IMMATURE GRANULOCYTES 0 0.0 - 5.0 %  
 ABS. NEUTROPHILS 3.2 1.7 - 8.2 K/UL  
 ABS. LYMPHOCYTES 1.6 0.5 - 4.6 K/UL  
 ABS. MONOCYTES 0.4 0.1 - 1.3 K/UL  
 ABS. EOSINOPHILS 0.0 0.0 - 0.8 K/UL  
 ABS. BASOPHILS 0.0 0.0 - 0.2 K/UL  
 ABS. IMM. GRANS. 0.0 0.0 - 0.5 K/UL METABOLIC PANEL, COMPREHENSIVE Collection Time: 07/22/18  9:48 AM  
Result Value Ref Range Sodium 139 136 - 145 mmol/L  Potassium 4.1 3.5 - 5.1 mmol/L Chloride 104 98 - 107 mmol/L  
 CO2 27 21 - 32 mmol/L Anion gap 8 7 - 16 mmol/L Glucose 119 (H) 65 - 100 mg/dL BUN 10 6 - 23 MG/DL Creatinine 0.84 0.8 - 1.5 MG/DL  
 GFR est AA >60 >60 ml/min/1.73m2 GFR est non-AA >60 >60 ml/min/1.73m2 Calcium 9.0 8.3 - 10.4 MG/DL Bilirubin, total 0.9 0.2 - 1.1 MG/DL  
 ALT (SGPT) 60 12 - 65 U/L  
 AST (SGOT) 38 (H) 15 - 37 U/L Alk. phosphatase 84 50 - 136 U/L Protein, total 8.4 (H) 6.3 - 8.2 g/dL Albumin 3.6 3.5 - 5.0 g/dL Globulin 4.8 (H) 2.3 - 3.5 g/dL A-G Ratio 0.8 (L) 1.2 - 3.5 RETICULOCYTE COUNT Collection Time: 07/22/18  9:48 AM  
Result Value Ref Range Reticulocyte count 0.7 0.3 - 2.0 % Absolute Retic Cnt. 0.0194 (L) 0.026 - 0.095 M/ul Immature Retic Fraction 3.1 2.3 - 13.4 % Retic Hgb Conc. 35 29 - 35 pg MAGNESIUM Collection Time: 07/22/18  9:48 AM  
Result Value Ref Range Magnesium 2.0 1.8 - 2.4 mg/dL BNP Collection Time: 07/22/18  9:48 AM  
Result Value Ref Range BNP 77 pg/mL POC TROPONIN-I Collection Time: 07/22/18 10:45 AM  
Result Value Ref Range Troponin-I (POC) 0.01 (L) 0.02 - 0.05 ng/ml All Micro Results None Other Studies: Xr Chest [facility] Result Date: 7/22/2018 CHEST X-RAY, single portable view  7/22/2018 History: Chest pain. Technique: Single frontal view of the chest. Comparison: Chest x-ray 6/25/2018 Findings: The cardiac silhouette is mildly enlarged although stable. The lungs are expanded without evidence for pneumothorax. No consolidative airspace process or pleural effusion is seen. Interstitial prominence is seen at the lung bases for which an interstitial infiltrate is difficult to exclude. A similar appearance was seen on the prior study. IMPRESSION: 1.  Stable cardiomegaly and suspected basilar interstitial infiltrate. Infiltrate may represent pulmonary edema given the bilateral distribution and associated cardiomegaly.   
 
 
Assessment and Plan:  
 
Hospital Problems as of 7/22/2018  Date Reviewed: 7/22/2018 Codes Class Noted - Resolved POA Leg pain ICD-10-CM: M79.606 ICD-9-CM: 729.5  6/24/2018 - Present Yes Sickle cell disease ([facility] ICD-10-CM: D57.1 ICD-9-CM: 282.60  7/13/2017 - Present Yes Iron overload due to repeated red blood cell transfusions ICD-10-CM: E83.111 ICD-9-CM: 275.02  1/18/2017 - Present Yes Sickle cell anemia (HCC) ICD-10-CM: D57.1 ICD-9-CM: 282.60  4/6/2016 - Present Yes * (Principal)Sickle cell pain crisis ([facility] ICD-10-CM: D57.00 ICD-9-CM: 282.62  10/25/2012 - Present Yes Essential hypertension, benign ICD-10-CM: I10 
ICD-9-CM: 401.1  6/23/2012 - Present Yes PLAN: 1. Sickle Cell Pain Crisis 
-Pain management with PO oxycodone and IV dilaudid -IVF 0.45NS  
-Continue hydroxyurea 2. Sickle Cell Anemia 
-Monitor CBC daily 
-Transfuse if hgb <7 2. HTN 
-Monitor BP 
-Contine lisinopril  
-Monitor BMP daily 3. Iron overload 
-Continue deferasirox 4. pSVT 
-Remote tele 
-Continue metoprolol Discharge planning:  Home DVT ppx: lovenox Code status:  Full Estimated LOS:  Greater < 2 midnights Risk:  High Case reviewed with supervising MD Keisha Baker MD 
 
Signed: 
Moon Yu NP

## 2018-07-22 NOTE — ED PROVIDER NOTES
HPI Comments: Patient is a 56-year-old male who is coming in with sickle cell crisis. He states he has bilateral leg pain as well as some chest pain. He states he had both of these types of pain with previous sickle cell crisis in the past.  He also reports a history of an irregular heartbeat. He does have oxycodone at home  And has been taking it without relief. His symptoms were gone on for one day. There's been no fevers or chills. He also has history of anemia and does get blood transfusion scheduled through hematology last one was about 3 weeks ago. He denies any shortness of breath. Patient is a 37 y.o. male presenting with sickle cell disease. The history is provided by the patient. Sickle Cell Crisis    Pertinent negatives include no chest pain, no fever and no abdominal pain. Past Medical History:   Diagnosis Date    Chronic pain     HTN (hypertension)     Ill-defined condition     sickle cell    Iron overload due to repeated red blood cell transfusions 1/18/2017    Paroxysmal SV[facility] 7/9/2016    Sickle cell disease ([facility] [address]        Past Surgical History:   Procedure Laterality Date    HC PENILE IMPL DURA II POSITINBLE      HC PORT LIFE SNGLE LUMEN 5013      to L CW    HX CHOLECYSTECTOMY      HX OTHER SURGICAL      penile inplant    HX VASCULAR ACCESS           Family History:   Problem Relation Age of Onset    Hypertension Other     Diabetes Father     Stroke Father     Sickle Cell Anemia Sister        Social History     Social History    Marital status:      Spouse name: N/A    Number of children: N/A    Years of education: N/A     Occupational History    Not on file.      Social History Main Topics    Smoking status: Never Smoker    Smokeless tobacco: Never Used    Alcohol use No    Drug use: No    Sexual activity: Yes     Other Topics Concern    Not on file     Social History Narrative         ALLERGIES: Compazine [prochlorperazine edisylate]; Morphine; Reglan [metoclopramide]; and Zofran [ondansetron hcl (pf)]    Review of Systems   Constitutional: Negative for chills and fever. Respiratory: Negative for chest tightness, shortness of breath, wheezing and stridor. Cardiovascular: Negative for chest pain and palpitations. Gastrointestinal: Negative for abdominal pain, diarrhea, nausea and vomiting. Skin: Negative. All other systems reviewed and are negative. Vitals:    07/22/18 0923   BP: (!) 161/92   Pulse: 67   Resp: 16   Temp: 99 °F (37.2 °C)   SpO2: 99%   Weight: 75.8 kg (167 lb)   Height: 5' 10\" (1.778 m)            Physical Exam   Constitutional: He is oriented to person, place, and time. He appears well-developed and well-nourished. No distress. HENT:   Head: Normocephalic and atraumatic. Eyes: Conjunctivae are normal. No scleral icterus. Neck: Normal range of motion. Neck supple. Cardiovascular: Normal rate and normal heart sounds. Intermittent irregular beats. Pulmonary/Chest: Effort normal and breath sounds normal. No stridor. No respiratory distress. He has no wheezes. He has no rales. Abdominal: Soft. There is no tenderness. There is no rebound and no guarding. Neurological: He is alert and oriented to person, place, and time. No focal weakness   Skin: Skin is warm and dry. No rash noted. He is not diaphoretic. Psychiatric: He has a normal mood and affect. His behavior is normal.   Nursing note and vitals reviewed. MDM  Number of Diagnoses or Management Options  Vasoocclusive sickle cell crisis [facility]):   Diagnosis management comments: Patient is a 25-year-old male with history of sickle cell. His bnp is normal and xray to me appears unchanged compared to old ones. I have given hhim 3 doses of Dilaudid and yet he states he still in significant pain. He is resting comfortably on exam I have spoken with hospitalist for further pain management control.       Yared Pavon, MD; 7/22/2018 @1:28 PM Voice dictation software was used during the making of this note. This software is not perfect and grammatical and other typographical errors may be present.   This note has not been proofread for errors.  ===================================================================        Amount and/or Complexity of Data Reviewed  Clinical lab tests: reviewed and ordered (Results for orders placed or performed during the hospital encounter of 07/22/18  -CBC WITH AUTOMATED DIFF       Result                                            Value                         Ref Range                       WBC                                               5.2                           4.3 - 11.1 K/uL                 RBC                                               2.86 (L)                      4.23 - 5.67 M/uL                HGB                                               9.3 (L)                       13.6 - 17.2 g/dL                HCT                                               27.1 (L)                      41.1 - 50.3 %                   MCV                                               94.8                          79.6 - 97.8 FL                  MCH                                               32.5                          26.1 - 32.9 PG                  MCHC                                              34.3                          31.4 - 35.0 g/dL                RDW                                               25.2 (H)                      11.9 - 14.6 %                   PLATELET                                          240                           150 - 450 K/uL                  MPV                                               11.0                          10.8 - 14.1 FL                  DF                                                AUTOMATED                                                     NEUTROPHILS                                       63                            43 - 78 % LYMPHOCYTES                                       30                            13 - 44 %                       MONOCYTES                                         7                             4.0 - 12.0 %                    EOSINOPHILS                                       0 (L)                         0.5 - 7.8 %                     BASOPHILS                                         0                             0.0 - 2.0 %                     IMMATURE GRANULOCYTES                             0                             0.0 - 5.0 %                     ABS. NEUTROPHILS                                  3.2                           1.7 - 8.2 K/UL                  ABS. LYMPHOCYTES                                  1.6                           0.5 - 4.6 K/UL                  ABS. MONOCYTES                                    0.4                           0.1 - 1.3 K/UL                  ABS. EOSINOPHILS                                  0.0                           0.0 - 0.8 K/UL                  ABS. BASOPHILS                                    0.0                           0.0 - 0.2 K/UL                  ABS. IMM.  GRANS.                                  0.0                           0.0 - 0.5 K/UL             -METABOLIC PANEL, COMPREHENSIVE       Result                                            Value                         Ref Range                       Sodium                                            139                           136 - 145 mmol/L                Potassium                                         4.1                           3.5 - 5.1 mmol/L                Chloride                                          104                           98 - 107 mmol/L                 CO2                                               27                            21 - 32 mmol/L                  Anion gap                                         8                             7 - 16 mmol/L Glucose                                           119 (H)                       65 - 100 mg/dL                  BUN                                               10                            6 - 23 MG/DL                    Creatinine                                        0.84                          0.8 - 1.5 MG/DL                 GFR est AA                                        >60                           >60 ml/min/1.73m2               GFR est non-AA                                    >60                           >60 ml/min/1.73m2               Calcium                                           9.0                           8.3 - 10.4 MG/DL                Bilirubin, total                                  0.9                           0.2 - 1.1 MG/DL                 ALT (SGPT)                                        60                            12 - 65 U/L                     AST (SGOT)                                        38 (H)                        15 - 37 U/L                     Alk. phosphatase                                  84                            50 - 136 U/L                    Protein, total                                    8.4 (H)                       6.3 - 8.2 g/dL                  Albumin                                           3.6                           3.5 - 5.0 g/dL                  Globulin                                          4.8 (H)                       2.3 - 3.5 g/dL                  A-G Ratio                                         0.8 (L)                       1.2 - 3.5                  -RETICULOCYTE COUNT       Result                                            Value                         Ref Range                       Reticulocyte count                                0.7                           0.3 - 2.0 %                     Absolute Retic Cnt.                               0.0194 (L)                    0.026 - 0.095 M/ul              Immature Retic Fraction 3.1                           2.3 - 13.4 %                    Retic Hgb Conc.                                   35                            29 - 35 pg                 -MAGNESIUM       Result                                            Value                         Ref Range                       Magnesium                                         2.0                           1.8 - 2.4 mg/dL            -BNP       Result                                            Value                         Ref Range                       BNP                                               77                            pg/mL                      -POC TROPONIN-I       Result                                            Value                         Ref Range                       Troponin-I (POC)                                  0.01 (L)                      0.02 - 0.05 ng/ml         )  Tests in the radiology section of CPT®: ordered and reviewed (Xr Chest Port    Result Date: 7/22/2018  CHEST X-RAY, single portable view  7/22/2018 History: Chest pain. Technique: Single frontal view of the chest. Comparison: Chest x-ray 6/25/2018 Findings: The cardiac silhouette is mildly enlarged although stable. The lungs are expanded without evidence for pneumothorax. No consolidative airspace process or pleural effusion is seen. Interstitial prominence is seen at the lung bases for which an interstitial infiltrate is difficult to exclude. A similar appearance was seen on the prior study. IMPRESSION: 1.  Stable cardiomegaly and suspected basilar interstitial infiltrate.  Infiltrate may represent pulmonary edema given the bilateral distribution and associated cardiomegaly.    )  Independent visualization of images, tracings, or specimens: yes          ED Course       Procedures

## 2018-07-22 NOTE — PROGRESS NOTES
Pt arrived to room 504 via stretcher from ED. Report received from [facility] noted; pt instructed to use call bell for needs.

## 2018-07-23 LAB
ANION GAP SERPL CALC-SCNC: 5 MMOL/L (ref 7–16)
BASOPHILS # BLD: 0 K/UL (ref 0–0.2)
BASOPHILS NFR BLD: 0 % (ref 0–2)
BUN SERPL-MCNC: 16 MG/DL (ref 6–23)
CALCIUM SERPL-MCNC: 8 MG/DL (ref 8.3–10.4)
CHLORIDE SERPL-SCNC: 101 MMOL/L (ref 98–107)
CO2 SERPL-SCNC: 28 MMOL/L (ref 21–32)
CREAT SERPL-MCNC: 1.19 MG/DL (ref 0.8–1.5)
DIFFERENTIAL METHOD BLD: ABNORMAL
EOSINOPHIL # BLD: 0.1 K/UL (ref 0–0.8)
EOSINOPHIL NFR BLD: 2 % (ref 0.5–7.8)
ERYTHROCYTE [DISTWIDTH] IN BLOOD BY AUTOMATED COUNT: 25 % (ref 11.9–14.6)
GLUCOSE SERPL-MCNC: 130 MG/DL (ref 65–100)
HCT VFR BLD AUTO: 24.4 % (ref 41.1–50.3)
HGB BLD-MCNC: 8.1 G/DL (ref 13.6–17.2)
HGB RETIC QN AUTO: 33 PG (ref 29–35)
IMM GRANULOCYTES # BLD: 0 K/UL (ref 0–0.5)
IMM GRANULOCYTES NFR BLD AUTO: 0 % (ref 0–5)
IMM RETICS NFR: 4.6 % (ref 2.3–13.4)
LYMPHOCYTES # BLD: 5 K/UL (ref 0.5–4.6)
LYMPHOCYTES NFR BLD: 65 % (ref 13–44)
MCH RBC QN AUTO: 32 PG (ref 26.1–32.9)
MCHC RBC AUTO-ENTMCNC: 33.2 G/DL (ref 31.4–35)
MCV RBC AUTO: 96.4 FL (ref 79.6–97.8)
MONOCYTES # BLD: 0.5 K/UL (ref 0.1–1.3)
MONOCYTES NFR BLD: 6 % (ref 4–12)
NEUTS SEG # BLD: 2.1 K/UL (ref 1.7–8.2)
NEUTS SEG NFR BLD: 27 % (ref 43–78)
PLATELET # BLD AUTO: 222 K/UL (ref 150–450)
PMV BLD AUTO: 10.5 FL (ref 10.8–14.1)
POTASSIUM SERPL-SCNC: 3.9 MMOL/L (ref 3.5–5.1)
RBC # BLD AUTO: 2.53 M/UL (ref 4.23–5.67)
RETICS # AUTO: 0.01 M/UL (ref 0.03–0.1)
RETICS/RBC NFR AUTO: 0.5 % (ref 0.3–2)
SODIUM SERPL-SCNC: 134 MMOL/L (ref 136–145)
WBC # BLD AUTO: 7.8 K/UL (ref 4.3–11.1)

## 2018-07-23 PROCEDURE — 80048 BASIC METABOLIC PNL TOTAL CA: CPT | Performed by: NURSE PRACTITIONER

## 2018-07-23 PROCEDURE — 85046 RETICYTE/HGB CONCENTRATE: CPT | Performed by: NURSE PRACTITIONER

## 2018-07-23 PROCEDURE — 36591 DRAW BLOOD OFF VENOUS DEVICE: CPT

## 2018-07-23 PROCEDURE — 85025 COMPLETE CBC W/AUTO DIFF WBC: CPT | Performed by: NURSE PRACTITIONER

## 2018-07-23 PROCEDURE — 74011000250 HC RX REV CODE- 250: Performed by: INTERNAL MEDICINE

## 2018-07-23 PROCEDURE — 77030020257 HC SOL INJ SOD CL 0.45% 1000ML BG

## 2018-07-23 PROCEDURE — 74011250636 HC RX REV CODE- 250/636: Performed by: INTERNAL MEDICINE

## 2018-07-23 PROCEDURE — 74011000258 HC RX REV CODE- 258: Performed by: INTERNAL MEDICINE

## 2018-07-23 PROCEDURE — 99218 HC RM OBSERVATION: CPT

## 2018-07-23 PROCEDURE — 74011250637 HC RX REV CODE- 250/637: Performed by: INTERNAL MEDICINE

## 2018-07-23 PROCEDURE — 99222 1ST HOSP IP/OBS MODERATE 55: CPT | Performed by: INTERNAL MEDICINE

## 2018-07-23 RX ORDER — HYDROMORPHONE HYDROCHLORIDE 2 MG/ML
2 INJECTION, SOLUTION INTRAMUSCULAR; INTRAVENOUS; SUBCUTANEOUS
Status: DISCONTINUED | OUTPATIENT
Start: 2018-07-23 | End: 2018-07-25 | Stop reason: HOSPADM

## 2018-07-23 RX ADMIN — Medication 10 ML: at 05:16

## 2018-07-23 RX ADMIN — HYDROMORPHONE HYDROCHLORIDE 2 MG: 2 INJECTION, SOLUTION INTRAMUSCULAR; INTRAVENOUS; SUBCUTANEOUS at 12:07

## 2018-07-23 RX ADMIN — ENOXAPARIN SODIUM 40 MG: 40 INJECTION SUBCUTANEOUS at 17:55

## 2018-07-23 RX ADMIN — HYDROMORPHONE HYDROCHLORIDE 2 MG: 2 INJECTION, SOLUTION INTRAMUSCULAR; INTRAVENOUS; SUBCUTANEOUS at 16:02

## 2018-07-23 RX ADMIN — LISINOPRIL 5 MG: 5 TABLET ORAL at 08:10

## 2018-07-23 RX ADMIN — Medication 10 ML: at 14:00

## 2018-07-23 RX ADMIN — PROMETHAZINE HYDROCHLORIDE 25 MG: 25 INJECTION INTRAMUSCULAR; INTRAVENOUS at 16:02

## 2018-07-23 RX ADMIN — FOLIC ACID 1 MG: 1 TABLET ORAL at 08:10

## 2018-07-23 RX ADMIN — METOPROLOL TARTRATE 25 MG: 25 TABLET, FILM COATED ORAL at 20:36

## 2018-07-23 RX ADMIN — Medication 10 ML: at 21:37

## 2018-07-23 RX ADMIN — HYDROMORPHONE HYDROCHLORIDE 1 MG: 2 INJECTION, SOLUTION INTRAMUSCULAR; INTRAVENOUS; SUBCUTANEOUS at 03:20

## 2018-07-23 RX ADMIN — OXYCODONE HYDROCHLORIDE 80 MG: 80 TABLET, FILM COATED, EXTENDED RELEASE ORAL at 20:37

## 2018-07-23 RX ADMIN — Medication 400 MG: at 08:10

## 2018-07-23 RX ADMIN — PROMETHAZINE HYDROCHLORIDE 25 MG: 25 INJECTION INTRAMUSCULAR; INTRAVENOUS at 08:47

## 2018-07-23 RX ADMIN — HYDROMORPHONE HYDROCHLORIDE 1 MG: 2 INJECTION, SOLUTION INTRAMUSCULAR; INTRAVENOUS; SUBCUTANEOUS at 08:09

## 2018-07-23 RX ADMIN — SODIUM CHLORIDE 125 ML/HR: 450 INJECTION, SOLUTION INTRAVENOUS at 15:00

## 2018-07-23 RX ADMIN — HYDROXYUREA 1000 MG: 500 CAPSULE ORAL at 08:45

## 2018-07-23 RX ADMIN — OXYCODONE HYDROCHLORIDE 80 MG: 80 TABLET, FILM COATED, EXTENDED RELEASE ORAL at 08:09

## 2018-07-23 RX ADMIN — HYDROMORPHONE HYDROCHLORIDE 2 MG: 2 INJECTION, SOLUTION INTRAMUSCULAR; INTRAVENOUS; SUBCUTANEOUS at 19:19

## 2018-07-23 NOTE — PROGRESS NOTES
Initial visit to assess pt's spiritual needs. Ministry of presence & prayer to demonstrate caring & concern, convey emotional & spiritual support.     Chaplain Abbey Lowe, Balta,ThM,PhD

## 2018-07-23 NOTE — PROGRESS NOTES
Hospitalist Progress Note Admit Date:  2018  9:20 AM  
Name:  Naresh Banuelos Age:  37 y.o. 
:  1975 MRN:  [id number] PCP:  Soco Pina MD 
Treatment Team: Attending Provider: Maxine Campuzano MD 
 
Subjective:  
CC:bilateral leg pain, chest pain Pt is a 36 yo male with PMH of HTN, Sickle cell disease, Iron overload, paroxysmal SVT, volume overload. PT presents to the ER with bilateral leg and chest pain. This pain is consistent with pain he has experienced previously during sickle cell pain crisis. Patient takes oxycodone at home but it is not alleviating this pain. He reports that the pain has increased x 1 day, denies any fever, SOB, N/V/D, dizziness, falls. He endorses chills. Pt's last blood transfusion was 3 weeks ago. Pt rates the pain at a 10/10. 
  
Pt states that the chest pain has mostly resolved after medication with dilaudid in the ER, still has pain in both legs from the knees down but it is improved. Pt on 2L oxygen with sats 95-99%. CXR consistent with cardiomegaly. Pt does not have any pedal edema, lungs clear bilateral.  Overnight pt reports that his pain was only partially managed, will increase dilaudid. At this time he reports that his pain is at a 7/10. Pt's H&H at 8.1/24.4, sodium at 134. Maintaining NSR, occasional PVC per tele. Tolerating PO, no chest pain, no other concerns. Will consult with hematology. Objective:  
Patient Vitals for the past 24 hrs: 
 Temp Pulse Resp BP SpO2  
18 0710 98.5 °F (36.9 °C) (!) 52 16 96/52 100 % 18 0346 98.6 °F (37 °C) 68 16 105/73 96 %  
18 2308 98.7 °F (37.1 °C) 69 16 103/76 95 %  
18 1942 98.5 °F (36.9 °C) 69 16 111/63 98 %  
18 1502 98.3 °F (36.8 °C) 77 12 114/68 100 % 18 1330 - 83 8 109/67 98 %  
18 1259 - 86 (!) 7 108/64 95 %  
18 1229 - 69 9 101/64 96 %  
18 1159 - 90 - 97/58 96 %  
18 1129 - 93 - 128/83 98 %  
18 1100 - 78 13 110/60 97 %  
07/22/18 1029 - 60 17 160/76 98 % Oxygen Therapy O2 Sat (%): 100 % (07/23/18 0710) Pulse via Oximetry: 73 beats per minute (07/22/18 1330) O2 Device: Room air (07/23/18 0855) Intake/Output Summary (Last 24 hours) at 07/23/18 9634 Last data filed at 07/23/18 3971 Gross per 24 hour Intake             4404 ml Output             2025 ml Net             2379 ml General:    Well nourished. Awake and alert. Head:  Normocephalic, atraumatic Eyes:  Extraocular movements intact, normal sclera Neck:  Supple, no lymphadenopathy or JVD 
CV:   RRR. No  Murmurs, clicks, or gallops Lungs:   Unlabored, no cyanosis Abdomen:   Soft, nondistended, nontender. Extremities: Warm and dry. No cyanosis or edema. Skin:     No rashes or jaundice. Neuro:  No gross focal deficits Psych:  Mood and affect appropriate Data Review: 
I have reviewed all labs, meds, telemetry events, and studies from the last 24 hours. Recent Results (from the past 24 hour(s)) CBC WITH AUTOMATED DIFF Collection Time: 07/22/18  9:48 AM  
Result Value Ref Range WBC 5.2 4.3 - 11.1 K/uL  
 RBC 2.86 (L) 4.23 - 5.67 M/uL HGB 9.3 (L) 13.6 - 17.2 g/dL HCT 27.1 (L) 41.1 - 50.3 % MCV 94.8 79.6 - 97.8 FL  
 MCH 32.5 26.1 - 32.9 PG  
 MCHC 34.3 31.4 - 35.0 g/dL RDW 25.2 (H) 11.9 - 14.6 % PLATELET 975 259 - 558 K/uL MPV 11.0 10.8 - 14.1 FL  
 DF AUTOMATED NEUTROPHILS 63 43 - 78 % LYMPHOCYTES 30 13 - 44 % MONOCYTES 7 4.0 - 12.0 % EOSINOPHILS 0 (L) 0.5 - 7.8 % BASOPHILS 0 0.0 - 2.0 % IMMATURE GRANULOCYTES 0 0.0 - 5.0 %  
 ABS. NEUTROPHILS 3.2 1.7 - 8.2 K/UL  
 ABS. LYMPHOCYTES 1.6 0.5 - 4.6 K/UL  
 ABS. MONOCYTES 0.4 0.1 - 1.3 K/UL  
 ABS. EOSINOPHILS 0.0 0.0 - 0.8 K/UL  
 ABS. BASOPHILS 0.0 0.0 - 0.2 K/UL  
 ABS. IMM. GRANS. 0.0 0.0 - 0.5 K/UL METABOLIC PANEL, COMPREHENSIVE Collection Time: 07/22/18  9:48 AM  
Result Value Ref Range  Sodium 139 136 - 145 mmol/L Potassium 4.1 3.5 - 5.1 mmol/L Chloride 104 98 - 107 mmol/L  
 CO2 27 21 - 32 mmol/L Anion gap 8 7 - 16 mmol/L Glucose 119 (H) 65 - 100 mg/dL BUN 10 6 - 23 MG/DL Creatinine 0.84 0.8 - 1.5 MG/DL  
 GFR est AA >60 >60 ml/min/1.73m2 GFR est non-AA >60 >60 ml/min/1.73m2 Calcium 9.0 8.3 - 10.4 MG/DL Bilirubin, total 0.9 0.2 - 1.1 MG/DL  
 ALT (SGPT) 60 12 - 65 U/L  
 AST (SGOT) 38 (H) 15 - 37 U/L Alk. phosphatase 84 50 - 136 U/L Protein, total 8.4 (H) 6.3 - 8.2 g/dL Albumin 3.6 3.5 - 5.0 g/dL Globulin 4.8 (H) 2.3 - 3.5 g/dL A-G Ratio 0.8 (L) 1.2 - 3.5 RETICULOCYTE COUNT Collection Time: 07/22/18  9:48 AM  
Result Value Ref Range Reticulocyte count 0.7 0.3 - 2.0 % Absolute Retic Cnt. 0.0194 (L) 0.026 - 0.095 M/ul Immature Retic Fraction 3.1 2.3 - 13.4 % Retic Hgb Conc. 35 29 - 35 pg MAGNESIUM Collection Time: 07/22/18  9:48 AM  
Result Value Ref Range Magnesium 2.0 1.8 - 2.4 mg/dL BNP Collection Time: 07/22/18  9:48 AM  
Result Value Ref Range BNP 77 pg/mL POC TROPONIN-I Collection Time: 07/22/18 10:45 AM  
Result Value Ref Range Troponin-I (POC) 0.01 (L) 0.02 - 0.05 ng/ml TROPONIN I Collection Time: 07/22/18  5:58 PM  
Result Value Ref Range Troponin-I, Qt. 0.02 0.02 - 4.42 NG/ML  
METABOLIC PANEL, BASIC Collection Time: 07/23/18  3:21 AM  
Result Value Ref Range Sodium 134 (L) 136 - 145 mmol/L Potassium 3.9 3.5 - 5.1 mmol/L Chloride 101 98 - 107 mmol/L  
 CO2 28 21 - 32 mmol/L Anion gap 5 (L) 7 - 16 mmol/L Glucose 130 (H) 65 - 100 mg/dL BUN 16 6 - 23 MG/DL Creatinine 1.19 0.8 - 1.5 MG/DL  
 GFR est AA >60 >60 ml/min/1.73m2 GFR est non-AA >60 >60 ml/min/1.73m2 Calcium 8.0 (L) 8.3 - 10.4 MG/DL  
CBC WITH AUTOMATED DIFF Collection Time: 07/23/18  3:21 AM  
Result Value Ref Range WBC 7.8 4.3 - 11.1 K/uL  
 RBC 2.53 (L) 4.23 - 5.67 M/uL HGB 8.1 (L) 13.6 - 17.2 g/dL  HCT 24.4 (L) 41.1 - 50.3 % MCV 96.4 79.6 - 97.8 FL  
 MCH 32.0 26.1 - 32.9 PG  
 MCHC 33.2 31.4 - 35.0 g/dL RDW 25.0 (H) 11.9 - 14.6 % PLATELET 644 165 - 420 K/uL MPV 10.5 (L) 10.8 - 14.1 FL  
 DF AUTOMATED NEUTROPHILS 27 (L) 43 - 78 % LYMPHOCYTES 65 (H) 13 - 44 % MONOCYTES 6 4.0 - 12.0 % EOSINOPHILS 2 0.5 - 7.8 % BASOPHILS 0 0.0 - 2.0 % IMMATURE GRANULOCYTES 0 0.0 - 5.0 %  
 ABS. NEUTROPHILS 2.1 1.7 - 8.2 K/UL  
 ABS. LYMPHOCYTES 5.0 (H) 0.5 - 4.6 K/UL  
 ABS. MONOCYTES 0.5 0.1 - 1.3 K/UL  
 ABS. EOSINOPHILS 0.1 0.0 - 0.8 K/UL  
 ABS. BASOPHILS 0.0 0.0 - 0.2 K/UL  
 ABS. IMM. GRANS. 0.0 0.0 - 0.5 K/UL All Micro Results None Current Meds: 
Current Facility-Administered Medications Medication Dose Route Frequency  HYDROmorphone (PF) (DILAUDID) injection 2 mg  2 mg IntraVENous Q3H PRN  
 deferasirox (JADENU) tablet 1,800 mg (Patient Supplied)  1,800 mg Oral DAILY  folic acid (FOLVITE) tablet 1 mg  1 mg Oral DAILY  hydroxyurea (HYDREA) chemo cap 1,000 mg  1,000 mg Oral DAILY  lisinopril (PRINIVIL, ZESTRIL) tablet 5 mg  5 mg Oral DAILY  magnesium oxide (MAG-OX) tablet 400 mg  400 mg Oral DAILY  metoprolol tartrate (LOPRESSOR) tablet 25 mg  25 mg Oral Q12H  
 oxyCODONE IR (OXY-IR) immediate release tablet 30 mg  30 mg Oral Q4H PRN  
 sodium chloride (NS) flush 5-10 mL  5-10 mL IntraVENous Q8H  
 sodium chloride (NS) flush 5-10 mL  5-10 mL IntraVENous PRN  
 acetaminophen (TYLENOL) tablet 650 mg  650 mg Oral Q4H PRN  
 naloxone (NARCAN) injection 0.4 mg  0.4 mg IntraVENous PRN  
 diphenhydrAMINE (BENADRYL) injection 25 mg  25 mg IntraVENous Q4H PRN  
 ondansetron (ZOFRAN) injection 4 mg  4 mg IntraVENous Q4H PRN  
 senna-docusate (PERICOLACE) 8.6-50 mg per tablet 2 Tab  2 Tab Oral DAILY PRN  
 zolpidem (AMBIEN) tablet 5 mg  5 mg Oral QHS PRN  
 enoxaparin (LOVENOX) injection 40 mg  40 mg SubCUTAneous Q24H  
 0.45% sodium chloride infusion  125 mL/hr IntraVENous CONTINUOUS  
 oxyCODONE ER (OxyCONTIN) tablet 80 mg  80 mg Oral Q12H  promethazine (PHENERGAN) with saline injection 25 mg  25 mg IntraVENous Q6H PRN Diet: DIET REGULAR Other Studies (last 24 hours): Xr Chest [facility] Result Date: 7/22/2018 CHEST X-RAY, single portable view  7/22/2018 History: Chest pain. Technique: Single frontal view of the chest. Comparison: Chest x-ray 6/25/2018 Findings: The cardiac silhouette is mildly enlarged although stable. The lungs are expanded without evidence for pneumothorax. No consolidative airspace process or pleural effusion is seen. Interstitial prominence is seen at the lung bases for which an interstitial infiltrate is difficult to exclude. A similar appearance was seen on the prior study. IMPRESSION: 1.  Stable cardiomegaly and suspected basilar interstitial infiltrate. Infiltrate may represent pulmonary edema given the bilateral distribution and associated cardiomegaly. Assessment and Plan:  
 
Hospital Problems as of 7/23/2018  Date Reviewed: 7/22/2018 Codes Class Noted - Resolved POA Leg pain ICD-10-CM: M79.606 ICD-9-CM: 729.5  6/24/2018 - Present Yes Sickle cell disease ([facility] ICD-10-CM: D57.1 ICD-9-CM: 282.60  7/13/2017 - Present Yes Iron overload due to repeated red blood cell transfusions ICD-10-CM: E83.111 ICD-9-CM: 275.02  1/18/2017 - Present Yes Sickle cell anemia (HCC) ICD-10-CM: D57.1 ICD-9-CM: 282.60  4/6/2016 - Present Yes * (Principal)Sickle cell pain crisis ([facility] ICD-10-CM: D57.00 ICD-9-CM: 282.62  10/25/2012 - Present Yes Essential hypertension, benign ICD-10-CM: I10 
ICD-9-CM: 401.1  6/23/2012 - Present Yes Precordial pain ICD-10-CM: R07.2 ICD-9-CM: 786.51  3/24/2012 - Present Yes Overview Signed 3/24/2012  2:53 PM by Angi Bailey Acute chest syndrome unlikely. A/P:   
1. Sickle Cell Pain Crisis 
-Pain management with PO oxycodone and IV dilaudid -IVF 0.45NS  
-Continue hydroxyurea 
  2. Sickle Cell Anemia 
-Monitor CBC daily 
-Transfuse if hgb <7 
-Consult hematology 
  
2. HTN 
-Monitor BP 
-Contine lisinopril  
-Monitor BMP daily 
  3. Iron overload 
-Continue deferasirox 
  
4. pSVT 
-Remote tele 
-Continue metoprolol 
  
Discharge planning:  Home DVT ppx: lovenox Code status:  Full Estimated LOS:  Greater < 2 midnights Risk:  High 
  
Case reviewed with supervising physician - AGGIE Garcia MD 
 
Signed: 
Sanna Herrera NP-C

## 2018-07-23 NOTE — PROGRESS NOTES
END OF SHIFT NOTE:    IV pain and nausea medications administered throughout shift. 2L NC PRN. Port to R leg remains patent and c/d/i. Patient currently resting quietly with wife at bedside. Continue current POC. Patient Vitals for the past 12 hrs:   Temp Pulse Resp BP SpO2   07/23/18 1437 98.6 °F (37 °C) 81 16 109/69 92 %   07/23/18 1050 98.7 °F (37.1 °C) 84 16 116/78 94 %   07/23/18 0710 98.5 °F (36.9 °C) (!) 52 16 96/52 100 %     Shift report given to Jurgen Guido RN at the bedside.     Hetal Hinojosa RN

## 2018-07-23 NOTE — CONSULTS
[facility] Hematology & Oncology        Inpatient Hematology / Oncology Consult Note    Reason for Consult:  Sickle cell disease [facility])  Referring Physician:  Олег Muro, *    History of Present Illness:  Mr. Amando Eckert is a 37 y.o. male admitted on 7/22/2018 with a primary diagnosis of The encounter diagnosis was Vasoocclusive sickle cell crisis ([address] Matt Omer He is followed by Dr. Wojciech Gracia at [facility]. He presented to ED with c/o chest pain and BLE pain, c/w his pain from previous crises. CXR with stable cardiomegaly and basilar infiltrates, felt to be pulm edema. No s/sx of acute chest.  Hgb 8.1 (b/l around 7-8). Trop wnl. He takes Jadenu, folic acid, and Hydrea as OP. We were consulted for recommendations. Review of Systems:  Constitutional Denies fever, chills, weight loss, appetite changes, fatigue, night sweats. HEENT Denies trauma, blurry vision, hearing loss, ear pain, nosebleeds, sore throat, neck pain and ear discharge. Skin Denies lesions or rashes. Lungs Denies dyspnea, cough, sputum production or hemoptysis. Cardiovascular +chest pain. Denies palpitations, or lower extremity edema. Gastrointestinal Denies nausea, vomiting, changes in bowel habits, bloody or black stools, abdominal pain.  Denies dysuria, frequency or hesitancy of urination. Neuro Denies headaches, visual changes or ataxia. Denies dizziness, tingling, tremors, sensory change, speech change, focal weakness or headaches. Hematology Denies easy bruising or bleeding, denies gingival bleeding or epistaxis. Endo Denies heat/cold intolerance, denies diabetes or thyroid abnormalities. MSK +BLE pain. Denies back pain, arthralgias, myalgias or frequent falls. Psychiatric/Behavioral Denies depression and substance abuse. The patient is not nervous/anxious.          Allergies   Allergen Reactions    Compazine [Prochlorperazine Edisylate] Other (comments)     Also makes him feel funny    Morphine Other (comments) Makes him feel funny    Reglan [Metoclopramide] Other (comments)     \"feel funny\"    Zofran [Ondansetron Hcl (Pf)] Other (comments)     Make him feel funny     Past Medical History:   Diagnosis Date    Chronic pain     HTN (hypertension)     Ill-defined condition     sickle cell    Iron overload due to repeated red blood cell transfusions 1/18/2017    Paroxysmal SVT (supraventricular tachycardia) ([facility] 7/9/2016    Sickle cell disease ([facility]      Past Surgical History:   Procedure Laterality Date    HC PENILE IMPL DURA II POSITINBLE      HC PORT LIFE SNGLE LUMEN 5013      to L CW    HX CHOLECYSTECTOMY      HX OTHER SURGICAL      penile inplant    HX VASCULAR ACCESS       Family History   Problem Relation Age of Onset    Hypertension Other     Diabetes Father     Stroke Father     Sickle Cell Anemia Sister      Social History     Social History    Marital status:      Spouse name: N/A    Number of children: N/A    Years of education: N/A     Occupational History    Not on file.      Social History Main Topics    Smoking status: Never Smoker    Smokeless tobacco: Never Used    Alcohol use No    Drug use: No    Sexual activity: Yes     Other Topics Concern    Not on file     Social History Narrative     Current Facility-Administered Medications   Medication Dose Route Frequency Provider Last Rate Last Dose    HYDROmorphone (PF) (DILAUDID) injection 2 mg  2 mg IntraVENous Q3H PRN Cornelia Vazquez MD   2 mg at 07/23/18 1207    deferasirox (JADENU) tablet 1,800 mg (Patient Supplied)  1,800 mg Oral DAILY Olya Weathers MD   Stopped at 80/71/89 6554    folic acid (FOLVITE) tablet 1 mg  1 mg Oral DAILY Olya Weathers MD   1 mg at 07/23/18 0810    hydroxyurea (HYDREA) chemo cap 1,000 mg  1,000 mg Oral DAILY Olya Weathers MD   1,000 mg at 07/23/18 0845    lisinopril (PRINIVIL, ZESTRIL) tablet 5 mg  5 mg Oral DAILY Olya Weathers MD   5 mg at 07/23/18 0810    magnesium oxide (MAG-OX) tablet 400 mg  400 mg Oral DAILY Ashly Vance MD   400 mg at 18 0810    metoprolol tartrate (LOPRESSOR) tablet 25 mg  25 mg Oral Q12H Ashly Vance MD   Stopped at 18 0810    oxyCODONE IR (OXY-IR) immediate release tablet 30 mg  30 mg Oral Q4H PRN Ashly Vance MD   30 mg at 18 1526    sodium chloride (NS) flush 5-10 mL  5-10 mL IntraVENous Q8H Ashly Vance MD   10 mL at 18 0516    sodium chloride (NS) flush 5-10 mL  5-10 mL IntraVENous PRN Ashly Vance MD        acetaminophen (TYLENOL) tablet 650 mg  650 mg Oral Q4H PRN Ashly Vnace MD        [facility]) injection 0.4 mg  0.4 mg IntraVENous PRN Ashly Vance MD        diphenhydrAMINE (BENADRYL) injection 25 mg  25 mg IntraVENous Q4H PRN Ashly Vance MD        ondansetron [facility]) injection 4 mg  4 mg IntraVENous Q4H PRN Ashly Vance MD        senna-docusate (PERICOLACE) 8.6-50 mg per tablet 2 Tab  2 Tab Oral DAILY PRN Ashly Vance MD        zolpidem [facility]) tablet 5 mg  5 mg Oral QHS PRN Ashly Vance MD        enoxaparin (LOVENOX) injection 40 mg  40 mg SubCUTAneous Q24H Ashly Vance MD   40 mg at 18 1737    0.45% sodium chloride infusion  125 mL/hr IntraVENous CONTINUOUS Ashly Vance  mL/hr at 18 2254 125 mL/hr at 18 2254    oxyCODONE ER (OxyCONTIN) tablet 80 mg  80 mg Oral Q12H Ashly Vance MD   80 mg at 18 0809    promethazine (PHENERGAN) with saline injection 25 mg  25 mg IntraVENous Q6H PRN Ashly Vance MD   25 mg at 18 0847       OBJECTIVE:  Patient Vitals for the past 8 hrs:   BP Temp Pulse Resp SpO2   18 1050 116/78 98.7 °F (37.1 °C) 84 16 94 %   18 0710 96/52 98.5 °F (36.9 °C) (!) 52 16 100 %     Temp (24hrs), Av.6 °F (37 °C), Min:98.3 °F (36.8 °C), Max:98.7 °F (37.1 °C)    701 - 1900  In: 2528 [P.O.:840;  I.V.:416]  Out:  [Urine:]    Physical Exam:  Constitutional: Well developed, well nourished male in no acute distress, sitting comfortably in the hospital bed. HEENT: Normocephalic and atraumatic. Oropharynx is clear, mucous membranes are moist. Extraocular muscles are intact. Sclerae anicteric. Neck supple without JVD. No thyromegaly present. Skin Warm and dry. No bruising and no rash noted. No erythema. No pallor. Respiratory Lungs are clear to auscultation bilaterally without wheezes, rales or rhonchi, normal air exchange without accessory muscle use. CVS Normal rate, regular rhythm and normal S1 and S2. No murmurs, gallops, or rubs. Abdomen Soft, nontender and nondistended, normoactive bowel sounds. No palpable mass. No hepatosplenomegaly. Neuro Grossly nonfocal with no obvious sensory or motor deficits. MSK Normal range of motion in general.  No edema and no tenderness. Psych Appropriate mood and affect.         Labs:    Recent Results (from the past 24 hour(s))   TROPONIN I    Collection Time: 07/22/18  5:58 PM   Result Value Ref Range    Troponin-I, Qt. 0.02 0.02 - 3.09 NG/ML   METABOLIC PANEL, BASIC    Collection Time: 07/23/18  3:21 AM   Result Value Ref Range    Sodium 134 (L) 136 - 145 mmol/L    Potassium 3.9 3.5 - 5.1 mmol/L    Chloride 101 98 - 107 mmol/L    CO2 28 21 - 32 mmol/L    Anion gap 5 (L) 7 - 16 mmol/L    Glucose 130 (H) 65 - 100 mg/dL    BUN 16 6 - 23 MG/DL    Creatinine 1.19 0.8 - 1.5 MG/DL    GFR est AA >60 >60 ml/min/1.73m2    GFR est non-AA >60 >60 ml/min/1.73m2    Calcium 8.0 (L) 8.3 - 10.4 MG/DL   CBC WITH AUTOMATED DIFF    Collection Time: 07/23/18  3:21 AM   Result Value Ref Range    WBC 7.8 4.3 - 11.1 K/uL    RBC 2.53 (L) 4.23 - 5.67 M/uL    HGB 8.1 (L) 13.6 - 17.2 g/dL    HCT 24.4 (L) 41.1 - 50.3 %    MCV 96.4 79.6 - 97.8 FL    MCH 32.0 26.1 - 32.9 PG    MCHC 33.2 31.4 - 35.0 g/dL    RDW 25.0 (H) 11.9 - 14.6 %    PLATELET 187 731 - 643 K/uL    MPV 10.5 (L) 10.8 - 14.1 FL    DF AUTOMATED      NEUTROPHILS 27 (L) 43 - 78 %    LYMPHOCYTES 65 (H) 13 - 44 % MONOCYTES 6 4.0 - 12.0 %    EOSINOPHILS 2 0.5 - 7.8 %    BASOPHILS 0 0.0 - 2.0 %    IMMATURE GRANULOCYTES 0 0.0 - 5.0 %    ABS. NEUTROPHILS 2.1 1.7 - 8.2 K/UL    ABS. LYMPHOCYTES 5.0 (H) 0.5 - 4.6 K/UL    ABS. MONOCYTES 0.5 0.1 - 1.3 K/UL    ABS. EOSINOPHILS 0.1 0.0 - 0.8 K/UL    ABS. BASOPHILS 0.0 0.0 - 0.2 K/UL    ABS. IMM. GRANS. 0.0 0.0 - 0.5 K/UL       Imaging:  XR CHEST PORT [[id number]] Collected: 07/22/18 1009      Order Status: Completed Updated: 07/22/18 1012     Narrative:       CHEST X-RAY, single portable view  7/22/2018    History: Chest pain. Technique: Single frontal view of the chest.    Comparison: Chest x-ray 6/25/2018    Findings: The cardiac silhouette is mildly enlarged although stable.  The lungs are  expanded without evidence for pneumothorax.   No consolidative airspace process  or pleural effusion is seen. Interstitial prominence is seen at the lung bases  for which an interstitial infiltrate is difficult to exclude. A similar  appearance was seen on the prior study.       Impression:       IMPRESSION:   1.  Stable cardiomegaly and suspected basilar interstitial infiltrate. Infiltrate may represent pulmonary edema given the bilateral distribution and  associated cardiomegaly.          ASSESSMENT:  Problem List  Date Reviewed: 7/22/2018          Codes Class Noted    Volume overload ICD-10-CM: E87.70  ICD-9-CM: 276.69  6/28/2018        Vasoocclusive sickle cell crisis [facility]) ICD-10-CM: D57.00  ICD-9-CM: 282.62  6/24/2018        Leg pain ICD-10-CM: M79.606  ICD-9-CM: 729.5  6/24/2018        Sickle cell disease ([facility] ICD-10-CM: D57.1  ICD-9-CM: 282.60  7/13/2017        Iron overload due to repeated red blood cell transfusions ICD-10-CM: E83.111  ICD-9-CM: 275.02  1/18/2017        Paroxysmal SVT (supraventricular tachycardia) (HCC) ICD-10-CM: I47.1  ICD-9-CM: 427.0  7/9/2016        Sickle cell anemia ([facility] ICD-10-CM: D57.1  ICD-9-CM: 282.60  4/6/2016        * (Principal)Sickle cell pain crisis [facility]) ICD-10-CM: D57.00  ICD-9-CM: 282.62  10/25/2012        Essential hypertension, benign ICD-10-CM: I10  ICD-9-CM: 401.1  6/23/2012        Precordial pain ICD-10-CM: R07.2  ICD-9-CM: 786.51  3/24/2012    Overview Signed 3/24/2012  2:53 PM by Chico Wood     Acute chest syndrome unlikely. RECOMMENDATIONS:  Sickle Cell Crisis (followed by Dr. Nataly Marrufo at [facility])  - c/o chest pain and BLE pain  - Hgb 8.1 (b/l 7-8)  - Con't IVF  - Continue home meds - Jadenu, folic acid, Hydrea  - Follow daily retic count  - Pain regimen per primary team    Chest Pain  - CXR with stable cardiomegaly and basilar infiltrates, felt to be r/t pulm edema. - EKG - NSR, PVCs  - trop wnl    Lab studies and imaging studies were personally reviewed. Thank you for allowing us to participate in the care of Mr. Cabrera.          Lolly Suazo NP   [facility] Hematology & Oncology  [address] [address]  Office : [phone]  Fax : [phone]

## 2018-07-24 LAB
ANION GAP SERPL CALC-SCNC: 7 MMOL/L (ref 7–16)
BASOPHILS # BLD: 0 K/UL (ref 0–0.2)
BASOPHILS NFR BLD: 1 % (ref 0–2)
BUN SERPL-MCNC: 11 MG/DL (ref 6–23)
CALCIUM SERPL-MCNC: 8.4 MG/DL (ref 8.3–10.4)
CHLORIDE SERPL-SCNC: 105 MMOL/L (ref 98–107)
CO2 SERPL-SCNC: 26 MMOL/L (ref 21–32)
CREAT SERPL-MCNC: 1.02 MG/DL (ref 0.8–1.5)
DIFFERENTIAL METHOD BLD: ABNORMAL
EOSINOPHIL # BLD: 0.1 K/UL (ref 0–0.8)
EOSINOPHIL NFR BLD: 2 % (ref 0.5–7.8)
ERYTHROCYTE [DISTWIDTH] IN BLOOD BY AUTOMATED COUNT: 24.8 % (ref 11.9–14.6)
GLUCOSE SERPL-MCNC: 118 MG/DL (ref 65–100)
HCT VFR BLD AUTO: 23.8 % (ref 41.1–50.3)
HGB BLD-MCNC: 7.8 G/DL (ref 13.6–17.2)
HGB RETIC QN AUTO: 32 PG (ref 29–35)
IMM GRANULOCYTES # BLD: 0 K/UL (ref 0–0.5)
IMM GRANULOCYTES NFR BLD AUTO: 0 % (ref 0–5)
IMM RETICS NFR: 4.1 % (ref 2.3–13.4)
LYMPHOCYTES # BLD: 3.6 K/UL (ref 0.5–4.6)
LYMPHOCYTES NFR BLD: 59 % (ref 13–44)
MCH RBC QN AUTO: 31.5 PG (ref 26.1–32.9)
MCHC RBC AUTO-ENTMCNC: 32.8 G/DL (ref 31.4–35)
MCV RBC AUTO: 96 FL (ref 79.6–97.8)
MONOCYTES # BLD: 0.6 K/UL (ref 0.1–1.3)
MONOCYTES NFR BLD: 9 % (ref 4–12)
NEUTS SEG # BLD: 1.8 K/UL (ref 1.7–8.2)
NEUTS SEG NFR BLD: 29 % (ref 43–78)
PLATELET # BLD AUTO: 214 K/UL (ref 150–450)
PMV BLD AUTO: 10.7 FL (ref 10.8–14.1)
POTASSIUM SERPL-SCNC: 4.5 MMOL/L (ref 3.5–5.1)
RBC # BLD AUTO: 2.48 M/UL (ref 4.23–5.67)
RETICS # AUTO: 0.02 M/UL (ref 0.03–0.1)
RETICS/RBC NFR AUTO: 0.7 % (ref 0.3–2)
SODIUM SERPL-SCNC: 138 MMOL/L (ref 136–145)
WBC # BLD AUTO: 6.1 K/UL (ref 4.3–11.1)

## 2018-07-24 PROCEDURE — 80048 BASIC METABOLIC PNL TOTAL CA: CPT | Performed by: NURSE PRACTITIONER

## 2018-07-24 PROCEDURE — 74011000258 HC RX REV CODE- 258: Performed by: INTERNAL MEDICINE

## 2018-07-24 PROCEDURE — 74011250637 HC RX REV CODE- 250/637: Performed by: INTERNAL MEDICINE

## 2018-07-24 PROCEDURE — 77030020257 HC SOL INJ SOD CL 0.45% 1000ML BG

## 2018-07-24 PROCEDURE — 99218 HC RM OBSERVATION: CPT

## 2018-07-24 PROCEDURE — 36591 DRAW BLOOD OFF VENOUS DEVICE: CPT

## 2018-07-24 PROCEDURE — 85025 COMPLETE CBC W/AUTO DIFF WBC: CPT | Performed by: NURSE PRACTITIONER

## 2018-07-24 PROCEDURE — 85046 RETICYTE/HGB CONCENTRATE: CPT | Performed by: NURSE PRACTITIONER

## 2018-07-24 PROCEDURE — 65270000029 HC RM PRIVATE

## 2018-07-24 PROCEDURE — 77030020263 HC SOL INJ SOD CL0.9% LFCR 1000ML

## 2018-07-24 PROCEDURE — 74011250636 HC RX REV CODE- 250/636: Performed by: INTERNAL MEDICINE

## 2018-07-24 PROCEDURE — 74011000250 HC RX REV CODE- 250: Performed by: INTERNAL MEDICINE

## 2018-07-24 PROCEDURE — 99232 SBSQ HOSP IP/OBS MODERATE 35: CPT | Performed by: INTERNAL MEDICINE

## 2018-07-24 RX ADMIN — OXYCODONE HYDROCHLORIDE 80 MG: 80 TABLET, FILM COATED, EXTENDED RELEASE ORAL at 21:53

## 2018-07-24 RX ADMIN — PROMETHAZINE HYDROCHLORIDE 25 MG: 25 INJECTION INTRAMUSCULAR; INTRAVENOUS at 23:08

## 2018-07-24 RX ADMIN — HYDROMORPHONE HYDROCHLORIDE 2 MG: 2 INJECTION, SOLUTION INTRAMUSCULAR; INTRAVENOUS; SUBCUTANEOUS at 23:08

## 2018-07-24 RX ADMIN — HYDROXYUREA 1000 MG: 500 CAPSULE ORAL at 09:43

## 2018-07-24 RX ADMIN — HYDROMORPHONE HYDROCHLORIDE 2 MG: 2 INJECTION, SOLUTION INTRAMUSCULAR; INTRAVENOUS; SUBCUTANEOUS at 12:13

## 2018-07-24 RX ADMIN — SODIUM CHLORIDE 50 ML/HR: 450 INJECTION, SOLUTION INTRAVENOUS at 16:26

## 2018-07-24 RX ADMIN — Medication 400 MG: at 07:59

## 2018-07-24 RX ADMIN — PROMETHAZINE HYDROCHLORIDE 25 MG: 25 INJECTION INTRAMUSCULAR; INTRAVENOUS at 01:11

## 2018-07-24 RX ADMIN — SODIUM CHLORIDE 125 ML/HR: 450 INJECTION, SOLUTION INTRAVENOUS at 08:05

## 2018-07-24 RX ADMIN — HYDROMORPHONE HYDROCHLORIDE 2 MG: 2 INJECTION, SOLUTION INTRAMUSCULAR; INTRAVENOUS; SUBCUTANEOUS at 07:59

## 2018-07-24 RX ADMIN — SODIUM CHLORIDE 125 ML/HR: 450 INJECTION, SOLUTION INTRAVENOUS at 01:11

## 2018-07-24 RX ADMIN — Medication 10 ML: at 04:26

## 2018-07-24 RX ADMIN — FOLIC ACID 1 MG: 1 TABLET ORAL at 07:59

## 2018-07-24 RX ADMIN — PROMETHAZINE HYDROCHLORIDE 25 MG: 25 INJECTION INTRAMUSCULAR; INTRAVENOUS at 16:26

## 2018-07-24 RX ADMIN — ENOXAPARIN SODIUM 40 MG: 40 INJECTION SUBCUTANEOUS at 16:30

## 2018-07-24 RX ADMIN — HYDROMORPHONE HYDROCHLORIDE 2 MG: 2 INJECTION, SOLUTION INTRAMUSCULAR; INTRAVENOUS; SUBCUTANEOUS at 16:26

## 2018-07-24 RX ADMIN — METOPROLOL TARTRATE 25 MG: 25 TABLET, FILM COATED ORAL at 21:53

## 2018-07-24 RX ADMIN — Medication 10 ML: at 21:58

## 2018-07-24 RX ADMIN — OXYCODONE HYDROCHLORIDE 80 MG: 80 TABLET, FILM COATED, EXTENDED RELEASE ORAL at 07:59

## 2018-07-24 RX ADMIN — HYDROMORPHONE HYDROCHLORIDE 2 MG: 2 INJECTION, SOLUTION INTRAMUSCULAR; INTRAVENOUS; SUBCUTANEOUS at 01:11

## 2018-07-24 RX ADMIN — PROMETHAZINE HYDROCHLORIDE 25 MG: 25 INJECTION INTRAMUSCULAR; INTRAVENOUS at 07:59

## 2018-07-24 RX ADMIN — HYDROMORPHONE HYDROCHLORIDE 2 MG: 2 INJECTION, SOLUTION INTRAMUSCULAR; INTRAVENOUS; SUBCUTANEOUS at 04:25

## 2018-07-24 RX ADMIN — HYDROMORPHONE HYDROCHLORIDE 2 MG: 2 INJECTION, SOLUTION INTRAMUSCULAR; INTRAVENOUS; SUBCUTANEOUS at 19:53

## 2018-07-24 NOTE — PROGRESS NOTES
[facility] Inpatient Hematology / Oncology Progress Note Admission Date: 2018  9:20 AM 
Reason for Admission/Hospital Course: Sickle cell disease ([address] 24 Hour Events: 
Afebrile, VSS Hgb stable Chest pain resolved, continues with BLE pain ROS: 
Constitutional: Negative for fever, chills, weakness, malaise, fatigue. CV: Negative for chest pain, palpitations, edema. Respiratory: Negative for dyspnea, cough, wheezing. GI: Negative for nausea, abdominal pain, diarrhea. MSK: +BLE pain 10 point review of systems is otherwise negative with the exception of the elements mentioned above in the HPI. Allergies Allergen Reactions  Compazine [Prochlorperazine Edisylate] Other (comments) Also makes him feel funny  Morphine Other (comments) Makes him feel funny  Reglan [Metoclopramide] Other (comments) \"feel funny\"  Zofran [Ondansetron Hcl (Pf)] Other (comments) Make him feel funny OBJECTIVE: 
Patient Vitals for the past 8 hrs: 
 BP Temp Pulse Resp SpO2  
18 0700 101/62 98.3 °F (36.8 °C) 71 16 95 %  
18 0355 123/74 98.5 °F (36.9 °C) 78 16 93 % Temp (24hrs), Av.6 °F (37 °C), Min:98.3 °F (36.8 °C), Max:98.7 °F (37.1 °C) 
 
 0701 -  1900 In: 240 [P.O.:240] Out: - Physical Exam: 
Constitutional: Well developed, well nourished male in no acute distress, sitting comfortably in the hospital bed. HEENT: Normocephalic and atraumatic. Oropharynx is clear, mucous membranes are moist.  Extraocular muscles are intact. Sclerae anicteric. Neck supple without JVD. No thyromegaly present. Skin Warm and dry. No bruising and no rash noted. No erythema. No pallor. Respiratory Lungs are clear to auscultation bilaterally without wheezes, rales or rhonchi, normal air exchange without accessory muscle use. CVS Normal rate, regular rhythm and normal S1 and S2. No murmurs, gallops, or rubs.   
Abdomen Soft, nontender and nondistended, normoactive bowel sounds. No palpable mass. No hepatosplenomegaly. Neuro Grossly nonfocal with no obvious sensory or motor deficits. MSK Normal range of motion in general.  No edema and no tenderness. Psych Appropriate mood and affect. Labs: 
  Recent Labs  
   07/24/18 0414 07/23/18 
 0321  07/22/18 
 4736 WBC  6.1  7.8  5.2  
RBC  2.48*  2.53*  2.86* HGB  7.8*  8.1*  9.3* HCT  23.8*  24.4*  27.1*  
MCV  96.0  96.4  94.8 MCH  31.5  32.0  32.5 MCHC  32.8  33.2  34.3  
RDW  24.8*  25.0*  25.2*  
PLT  214  222  240 GRANS  29*  27*  63  
LYMPH  59*  65*  30  
MONOS  9  6  7 EOS  2  2  0* BASOS  1  0  0 IG  0  0  0  
DF  AUTOMATED  AUTOMATED  AUTOMATED ANEU  1.8  2.1  3.2 ABL  3.6  5.0*  1.6 ABM  0.6  0.5  0.4 TENZIN  0.1  0.1  0.0 ABB  0.0  0.0  0.0 AIG  0.0  0.0  0.0 Recent Labs  
   07/24/18 0414 07/23/18 
 0321  07/22/18 
 2739 NA  138  134*  139  
K  4.5  3.9  4.1 CL  105  101  104 CO2  26  28  27 AGAP  7  5*  8 GLU  118*  130*  119* BUN  11  16  10 CREA  1.02  1.19  0.84 GFRAA  >60  >60  >60 GFRNA  >60  >60  >60  
CA  8.4  8.0*  9.0 SGOT   --    --   38* AP   --    --   84  
TP   --    --   8.4* ALB   --    --   3.6 GLOB   --    --   4.8* AGRAT   --    --   0.8* MG   --    --   2.0 Imaging: XR CHEST PORT [[id number]] Collected: 07/22/18 1009   
  Order Status: Completed Updated: 07/22/18 1012  
  Narrative:    
  CHEST X-RAY, single portable view  7/22/2018 History: Chest pain. Technique: Single frontal view of the chest. 
 
Comparison: Chest x-ray 6/25/2018 Findings: The cardiac silhouette is mildly enlarged although stable.  The lungs are 
expanded without evidence for pneumothorax.   No consolidative airspace process 
or pleural effusion is seen. Interstitial prominence is seen at the lung bases 
for which an interstitial infiltrate is difficult to exclude.  A similar 
appearance was seen on the prior study. 
  
  Impression:    
  IMPRESSION:  
1.  Stable cardiomegaly and suspected basilar interstitial infiltrate. Infiltrate may represent pulmonary edema given the bilateral distribution and 
associated cardiomegaly. ASSESSMENT: 
 
Problem List  Date Reviewed: 7/22/2018 Codes Class Noted Volume overload ICD-10-CM: E87.70 ICD-9-CM: 276.69  6/28/2018 Vasoocclusive sickle cell crisis (HCC) ICD-10-CM: D57.00 ICD-9-CM: 282.62  6/24/2018 Leg pain ICD-10-CM: M79.606 ICD-9-CM: 729.5  6/24/2018 Sickle cell disease ([facility] ICD-10-CM: D57.1 ICD-9-CM: 282.60  7/13/2017 Iron overload due to repeated red blood cell transfusions ICD-10-CM: E83.111 ICD-9-CM: 275.02  1/18/2017 Paroxysmal SVT (supraventricular tachycardia) ([facility]) ICD-10-CM: I47.1 ICD-9-CM: 427.0  7/9/2016 Sickle cell anemia (HCC) ICD-10-CM: D57.1 ICD-9-CM: 282.60  4/6/2016 * (Principal)Sickle cell pain crisis ([facility] ICD-10-CM: D57.00 ICD-9-CM: 282.62  10/25/2012 Essential hypertension, benign ICD-10-CM: I10 
ICD-9-CM: 401.1  6/23/2012 Precordial pain ICD-10-CM: R07.2 ICD-9-CM: 786.51  3/24/2012 Overview Signed 3/24/2012  2:53 PM by Jessica Martínez Acute chest syndrome unlikely. Mr. Mj Saez is a 37 y.o. male admitted on 7/22/2018. He is followed by Dr. 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 [facility] presented to ED with c/o chest pain and BLE pain, c/w his pain from previous crises.  CXR with stable cardiomegaly and basilar infiltrates, felt to be pulm edema.  No s/sx of acute chest.  Hgb 8.1 (b/l around 7-8). Trop wnl. He takes Jadenu, folic acid, and Hydrea as OP.  We were consulted for recommendations. PLAN: 
Sickle/beta+ thal (followed by Dr. Rhett Gibbons at [facility]) - c/o chest pain and BLE pain crisis 
- Hgb 8.1 (b/l 7-8) - Con't IVF 
- Continue home meds - Jadenu, folic acid, Hydrea - Follow daily retic count 
- Pain regimen per primary team 
7/24 Chest pain resolved. Continues with BLE pain. Primary team managing. 
  
Chest Pain - CXR with stable cardiomegaly and basilar infiltrates, felt to be r/t pulm edema. - EKG - NSR, PVCs 
- trop wnl 
  
Thank you for allowing us to participate in the care of Mr. Cabrera. Gwen Doran NP [facility] Hematology & Oncology [address] [place] [address] Office : [phone] Fax : [phone]

## 2018-07-24 NOTE — PROGRESS NOTES
END OF SHIFT NOTE:    Intake/Output  07/24 0701 - 07/24 1900  In: 2056 [P.O.:720; I.V.:1336]  Out: 1650 [Urine:1650]   Voiding: YES  Catheter: NO  Drain:              Stool:  0 occurrences. Stool Assessment  Stool Appearance: Formed (per pt) (07/23/18 0815)    Emesis:  0 occurrences. VITAL SIGNS  Patient Vitals for the past 12 hrs:   Temp Pulse Resp BP SpO2   07/24/18 1440 97.9 °F (36.6 °C) (!) 58 16 103/51 97 %   07/24/18 1038 98.5 °F (36.9 °C) 72 16 116/68 95 %   07/24/18 0700 98.3 °F (36.8 °C) 71 16 101/62 95 %       Pain Assessment  Pain 1  Pain Scale 1: Numeric (0 - 10) (07/24/18 1626)  Pain Intensity 1: 7 (07/24/18 1626)  Patient Stated Pain Goal: 0 (07/24/18 0447)  Pain Reassessment 1: Yes (07/24/18 1245)  Pain Onset 1: ongoing (07/24/18 0427)  Pain Location 1: Leg (07/24/18 1626)  Pain Orientation 1: Right;Left (07/24/18 1626)  Pain Description 1: Aching;Constant (07/24/18 1626)  Pain Intervention(s) 1: Medication (see MAR) (07/24/18 1626)    Ambulating  Yes    Additional Information: ambulating in mendez, c/o pain x3, nausea x2, dressing changed on port, monitoring port for swelling      Shift report given to oncoming nurse at the bedside.     Cassandra Carrillo

## 2018-07-24 NOTE — PROGRESS NOTES
END OF SHIFT NOTE:    Patient rested well this shift, PRN IV Dilaudid given x3 this shift with good results. Patient ambulating to restroom, voiding without difficulty, IVF infusing via R thigh port, no evidence of fluid overload, lung sounds clear bilaterally with no crackles noted. Intake/Output      Voiding: YES  Catheter: NO  Drain:              Stool:  0 occurrences. Stool Assessment  Stool Appearance: Formed (per pt) (07/23/18 0815)    Emesis:  0 occurrences. VITAL SIGNS  Patient Vitals for the past 12 hrs:   Temp Pulse Resp BP SpO2   07/24/18 0355 98.5 °F (36.9 °C) 78 16 123/74 93 %   07/23/18 2313 98.6 °F (37 °C) 76 16 116/78 95 %       Pain Assessment  Pain 1  Pain Scale 1: Visual (07/24/18 0447)  Pain Intensity 1: 0 (07/24/18 0447)  Patient Stated Pain Goal: 0 (07/24/18 0447)  Pain Reassessment 1: Patient sleeping (07/24/18 0447)  Pain Onset 1: ongoing (07/24/18 0427)  Pain Location 1: Leg (07/24/18 0427)  Pain Orientation 1: Left;Right (07/24/18 0427)  Pain Description 1: Aching; Sore (07/24/18 0427)  Pain Intervention(s) 1: Medication (see MAR) (07/24/18 0427)    Ambulating  Yes    Additional Information:     Shift report given to oncoming nurse at the bedside.     Marisol Ken

## 2018-07-24 NOTE — PROGRESS NOTES
Problem: Falls - Risk of  Goal: *Absence of Falls  Document Toy Fall Risk and appropriate interventions in the flowsheet.    Outcome: Progressing Towards Goal  Fall Risk Interventions:            Medication Interventions: Teach patient to arise slowly, Evaluate medications/consider consulting pharmacy

## 2018-07-24 NOTE — PHYSICIAN ADVISORY
Letter of Determination: Upgrade from Observation to Inpatient Status    This patient was originally hospitalized as Outpatient Status with Observation Services on 7/22/2018 for sickle cell crisis. This patient now meets for Inpatient Admission in accordance with CMS regulation Section 43 .3. Specifically, patient's stay is now over Two Midnights and was medically necessary. The patient's stay was medically necessary based on use of 6 doses of intravenous dilaudid 2mg. Consistent with CMS guidelines, patient meets for inpatient status. It is our recommendation that this patient's hospitalization status should be upgraded from OBSERVATION to INPATIENT status.      The final decision regarding the patient's hospitalization status depends on the attending physician's judgement.     Adela Ba MD, LEANDER,   Physician [place].

## 2018-07-24 NOTE — PROGRESS NOTES
Hospitalist Progress Note Admit Date:  2018  9:20 AM  
Name:  Ken Andrea Age:  37 y.o. 
:  1975 MRN:  [id number] PCP:  Jn Peraza MD 
Treatment Team: Attending Provider: Alicia Fung MD; Consulting Provider: Sim Nascimento MD 
 
Subjective:  
CC:bilateral leg pain, chest pain Pt is a 38 yo male with PMH of HTN, Sickle cell disease, Iron overload, paroxysmal SVT, volume overload. PT presents to the ER with bilateral leg and chest pain. This pain is consistent with pain he has experienced previously during sickle cell pain crisis. Patient takes oxycodone at home but it is not alleviating this pain. He reports that the pain has increased x 1 day, denies any fever, SOB, N/V/D, dizziness, falls. He endorses chills. Pt's last blood transfusion was 3 weeks ago. Pt rates the pain at a 10/10. 
  
Pt states that the chest pain has mostly resolved after medication with dilaudid in the ER, still has pain in both legs from the knees down but it is improved. Pt on 2L oxygen with sats 95-99%. CXR consistent with cardiomegaly. Pt does not have any pedal edema, lungs clear bilateral.  Pt states that his pain is better managed on the Dilaudid 2mg. At this time he reports that his pain is at a 7/10, he is due for additional medication. Pt's H&H at 7.8/26.1, sodium at 138. Continues in NSR, occasional PVC per tele. Tolerating PO, no chest pain, no other concerns. Hematology saw pt, no changes to plan. Expect that pt will be ready for d/c / 
 
Objective:  
 
Patient Vitals for the past 24 hrs: 
 Temp Pulse Resp BP SpO2  
18 1038 98.5 °F (36.9 °C) 72 16 116/68 95 %  
18 0700 98.3 °F (36.8 °C) 71 16 101/62 95 %  
18 0355 98.5 °F (36.9 °C) 78 16 123/74 93 % 18 2313 98.6 °F (37 °C) 76 16 116/78 95 %  
18 1940 98.6 °F (37 °C) 87 16 119/74 94 % Oxygen Therapy O2 Sat (%): 95 % (18 1038) Pulse via Oximetry: 73 beats per minute (07/22/18 1330) O2 Device: Room air (07/24/18 0800) Intake/Output Summary (Last 24 hours) at 07/24/18 1500 Last data filed at 07/24/18 1258 Gross per 24 hour Intake             3446 ml Output             1900 ml Net             1546 ml General:    Well nourished. Awake and alert. Head:  Normocephalic, atraumatic Eyes:  Extraocular movements intact, normal sclera Neck:  Supple, no lymphadenopathy or JVD 
CV:   RRR. No  Murmurs, clicks, or gallops Lungs:   Unlabored, no cyanosis Abdomen:   Soft, nondistended, nontender. Extremities: Warm and dry. No cyanosis or edema. Skin:     No rashes or jaundice. Neuro:  No gross focal deficits Psych:  Mood and affect appropriate Data Review: 
I have reviewed all labs, meds, telemetry events, and studies from the last 24 hours. Recent Results (from the past 24 hour(s)) METABOLIC PANEL, BASIC Collection Time: 07/24/18  4:14 AM  
Result Value Ref Range Sodium 138 136 - 145 mmol/L Potassium 4.5 3.5 - 5.1 mmol/L Chloride 105 98 - 107 mmol/L  
 CO2 26 21 - 32 mmol/L Anion gap 7 7 - 16 mmol/L Glucose 118 (H) 65 - 100 mg/dL BUN 11 6 - 23 MG/DL Creatinine 1.02 0.8 - 1.5 MG/DL  
 GFR est AA >60 >60 ml/min/1.73m2 GFR est non-AA >60 >60 ml/min/1.73m2 Calcium 8.4 8.3 - 10.4 MG/DL  
CBC WITH AUTOMATED DIFF Collection Time: 07/24/18  4:14 AM  
Result Value Ref Range WBC 6.1 4.3 - 11.1 K/uL  
 RBC 2.48 (L) 4.23 - 5.67 M/uL HGB 7.8 (L) 13.6 - 17.2 g/dL HCT 23.8 (L) 41.1 - 50.3 % MCV 96.0 79.6 - 97.8 FL  
 MCH 31.5 26.1 - 32.9 PG  
 MCHC 32.8 31.4 - 35.0 g/dL RDW 24.8 (H) 11.9 - 14.6 % PLATELET 170 161 - 783 K/uL MPV 10.7 (L) 10.8 - 14.1 FL  
 DF AUTOMATED NEUTROPHILS 29 (L) 43 - 78 % LYMPHOCYTES 59 (H) 13 - 44 % MONOCYTES 9 4.0 - 12.0 % EOSINOPHILS 2 0.5 - 7.8 % BASOPHILS 1 0.0 - 2.0 % IMMATURE GRANULOCYTES 0 0.0 - 5.0 %  
 ABS. NEUTROPHILS 1.8 1.7 - 8.2 K/UL  
 ABS.  LYMPHOCYTES 3.6 0.5 - 4.6 K/UL  
 ABS. MONOCYTES 0.6 0.1 - 1.3 K/UL  
 ABS. EOSINOPHILS 0.1 0.0 - 0.8 K/UL  
 ABS. BASOPHILS 0.0 0.0 - 0.2 K/UL  
 ABS. IMM. GRANS. 0.0 0.0 - 0.5 K/UL  
RETICULOCYTE COUNT Collection Time: 07/24/18  4:14 AM  
Result Value Ref Range Reticulocyte count 0.7 0.3 - 2.0 % Absolute Retic Cnt. 0.0171 (L) 0.026 - 0.095 M/ul Immature Retic Fraction 4.1 2.3 - 13.4 % Retic Hgb Conc. 32 29 - 35 pg All Micro Results None Current Meds: 
Current Facility-Administered Medications Medication Dose Route Frequency  HYDROmorphone (PF) (DILAUDID) injection 2 mg  2 mg IntraVENous Q3H PRN  
 deferasirox (JADENU) tablet 1,800 mg (Patient Supplied)  1,800 mg Oral DAILY  folic acid (FOLVITE) tablet 1 mg  1 mg Oral DAILY  hydroxyurea (HYDREA) chemo cap 1,000 mg  1,000 mg Oral DAILY  lisinopril (PRINIVIL, ZESTRIL) tablet 5 mg  5 mg Oral DAILY  magnesium oxide (MAG-OX) tablet 400 mg  400 mg Oral DAILY  metoprolol tartrate (LOPRESSOR) tablet 25 mg  25 mg Oral Q12H  
 oxyCODONE IR (OXY-IR) immediate release tablet 30 mg  30 mg Oral Q4H PRN  
 sodium chloride (NS) flush 5-10 mL  5-10 mL IntraVENous Q8H  
 sodium chloride (NS) flush 5-10 mL  5-10 mL IntraVENous PRN  
 acetaminophen (TYLENOL) tablet 650 mg  650 mg Oral Q4H PRN  
 naloxone (NARCAN) injection 0.4 mg  0.4 mg IntraVENous PRN  
 diphenhydrAMINE (BENADRYL) injection 25 mg  25 mg IntraVENous Q4H PRN  
 ondansetron (ZOFRAN) injection 4 mg  4 mg IntraVENous Q4H PRN  
 senna-docusate (PERICOLACE) 8.6-50 mg per tablet 2 Tab  2 Tab Oral DAILY PRN  
 zolpidem (AMBIEN) tablet 5 mg  5 mg Oral QHS PRN  
 enoxaparin (LOVENOX) injection 40 mg  40 mg SubCUTAneous Q24H  
 0.45% sodium chloride infusion  125 mL/hr IntraVENous CONTINUOUS  
 oxyCODONE ER (OxyCONTIN) tablet 80 mg  80 mg Oral Q12H  promethazine (PHENERGAN) with saline injection 25 mg  25 mg IntraVENous Q6H PRN Diet: DIET REGULAR Other Studies (last 24 hours): No results found. Assessment and Plan:  
 
Hospital Problems as of 7/24/2018  Date Reviewed: 7/22/2018 Codes Class Noted - Resolved POA Leg pain ICD-10-CM: M79.606 ICD-9-CM: 729.5  6/24/2018 - Present Yes Sickle cell disease ([facility] ICD-10-CM: D57.1 ICD-9-CM: 282.60  7/13/2017 - Present Yes Iron overload due to repeated red blood cell transfusions ICD-10-CM: E83.111 ICD-9-CM: 275.02  1/18/2017 - Present Yes Sickle cell anemia (HCC) ICD-10-CM: D57.1 ICD-9-CM: 282.60  4/6/2016 - Present Yes * (Principal)Sickle cell pain crisis ([facility] ICD-10-CM: D57.00 ICD-9-CM: 282.62  10/25/2012 - Present Yes Essential hypertension, benign ICD-10-CM: I10 
ICD-9-CM: 401.1  6/23/2012 - Present Yes Precordial pain ICD-10-CM: R07.2 ICD-9-CM: 786.51  3/24/2012 - Present Yes Overview Signed 3/24/2012  2:53 PM by Gisella Boles Acute chest syndrome unlikely. A/P:   
1. Sickle Cell Pain Crisis 
-Pain management with PO oxycodone and IV dilaudid -IVF 0.45NS  
-Continue hydroxyurea 
  2. Sickle Cell Anemia 
-Monitor CBC daily 
-Transfuse if hgb <7 
-Hematology following 
  
2. HTN 
-Monitor BP 
-Contine lisinopril  
-Monitor BMP daily 
  3. Iron overload 
-Continue deferasirox 
  
4. pSVT 
-Remote tele 
-Continue metoprolol 
  
Discharge planning:  Home DVT ppx: lovenox Code status:  Full Estimated LOS:  D/C home 7/25 Risk:  High 
  
Case reviewed with supervising physician - CHRIS Harry MD 
 
Signed: 
OFE Narvaez

## 2018-07-25 VITALS
DIASTOLIC BLOOD PRESSURE: 79 MMHG | RESPIRATION RATE: 18 BRPM | TEMPERATURE: 98.5 F | HEART RATE: 77 BPM | SYSTOLIC BLOOD PRESSURE: 128 MMHG | OXYGEN SATURATION: 97 % | WEIGHT: 170.3 LBS | HEIGHT: 70 IN | BODY MASS INDEX: 24.38 KG/M2

## 2018-07-25 LAB
BASOPHILS # BLD: 0 K/UL (ref 0–0.2)
BASOPHILS NFR BLD: 1 % (ref 0–2)
DIFFERENTIAL METHOD BLD: ABNORMAL
EOSINOPHIL # BLD: 0.2 K/UL (ref 0–0.8)
EOSINOPHIL NFR BLD: 2 % (ref 0.5–7.8)
ERYTHROCYTE [DISTWIDTH] IN BLOOD BY AUTOMATED COUNT: 24.9 % (ref 11.9–14.6)
HCT VFR BLD AUTO: 23.9 % (ref 41.1–50.3)
HGB BLD-MCNC: 7.8 G/DL (ref 13.6–17.2)
HGB RETIC QN AUTO: 32 PG (ref 29–35)
IMM GRANULOCYTES # BLD: 0 K/UL (ref 0–0.5)
IMM GRANULOCYTES NFR BLD AUTO: 0 % (ref 0–5)
IMM RETICS NFR: 6.3 % (ref 2.3–13.4)
LYMPHOCYTES # BLD: 3.2 K/UL (ref 0.5–4.6)
LYMPHOCYTES NFR BLD: 48 % (ref 13–44)
MCH RBC QN AUTO: 31.7 PG (ref 26.1–32.9)
MCHC RBC AUTO-ENTMCNC: 32.6 G/DL (ref 31.4–35)
MCV RBC AUTO: 97.2 FL (ref 79.6–97.8)
MONOCYTES # BLD: 0.6 K/UL (ref 0.1–1.3)
MONOCYTES NFR BLD: 9 % (ref 4–12)
NEUTS SEG # BLD: 2.6 K/UL (ref 1.7–8.2)
NEUTS SEG NFR BLD: 40 % (ref 43–78)
PLATELET # BLD AUTO: 196 K/UL (ref 150–450)
PMV BLD AUTO: 10.8 FL (ref 10.8–14.1)
RBC # BLD AUTO: 2.46 M/UL (ref 4.23–5.67)
RETICS # AUTO: 0.02 M/UL (ref 0.03–0.1)
RETICS/RBC NFR AUTO: 0.7 % (ref 0.3–2)
WBC # BLD AUTO: 6.6 K/UL (ref 4.3–11.1)

## 2018-07-25 PROCEDURE — 85025 COMPLETE CBC W/AUTO DIFF WBC: CPT | Performed by: NURSE PRACTITIONER

## 2018-07-25 PROCEDURE — 85046 RETICYTE/HGB CONCENTRATE: CPT | Performed by: NURSE PRACTITIONER

## 2018-07-25 PROCEDURE — 74011250636 HC RX REV CODE- 250/636: Performed by: INTERNAL MEDICINE

## 2018-07-25 PROCEDURE — 74011000250 HC RX REV CODE- 250: Performed by: INTERNAL MEDICINE

## 2018-07-25 PROCEDURE — 74011250637 HC RX REV CODE- 250/637: Performed by: INTERNAL MEDICINE

## 2018-07-25 RX ORDER — OXYCODONE HYDROCHLORIDE 80 MG/1
80 TABLET, FILM COATED, EXTENDED RELEASE ORAL EVERY 12 HOURS
Qty: 4 TAB | Refills: 0 | Status: SHIPPED | OUTPATIENT
Start: 2018-07-25 | End: 2018-12-10

## 2018-07-25 RX ORDER — OXYCODONE HYDROCHLORIDE 30 MG/1
30 TABLET ORAL
Qty: 8 TAB | Refills: 0 | Status: ON HOLD | OUTPATIENT
Start: 2018-07-25 | End: 2018-12-10 | Stop reason: SDUPTHER

## 2018-07-25 RX ORDER — HEPARIN 100 UNIT/ML
500 SYRINGE INTRAVENOUS ONCE
Status: DISCONTINUED | OUTPATIENT
Start: 2018-07-25 | End: 2018-07-25 | Stop reason: HOSPADM

## 2018-07-25 RX ADMIN — OXYCODONE HYDROCHLORIDE 30 MG: 15 TABLET ORAL at 07:59

## 2018-07-25 RX ADMIN — HYDROMORPHONE HYDROCHLORIDE 2 MG: 2 INJECTION, SOLUTION INTRAMUSCULAR; INTRAVENOUS; SUBCUTANEOUS at 04:59

## 2018-07-25 RX ADMIN — Medication 400 MG: at 08:01

## 2018-07-25 RX ADMIN — Medication 10 ML: at 05:00

## 2018-07-25 RX ADMIN — METOPROLOL TARTRATE 25 MG: 25 TABLET, FILM COATED ORAL at 08:00

## 2018-07-25 RX ADMIN — PROMETHAZINE HYDROCHLORIDE 25 MG: 25 INJECTION INTRAMUSCULAR; INTRAVENOUS at 04:59

## 2018-07-25 RX ADMIN — OXYCODONE HYDROCHLORIDE 80 MG: 80 TABLET, FILM COATED, EXTENDED RELEASE ORAL at 08:00

## 2018-07-25 RX ADMIN — LISINOPRIL 5 MG: 5 TABLET ORAL at 08:00

## 2018-07-25 RX ADMIN — HYDROMORPHONE HYDROCHLORIDE 2 MG: 2 INJECTION, SOLUTION INTRAMUSCULAR; INTRAVENOUS; SUBCUTANEOUS at 08:46

## 2018-07-25 RX ADMIN — HYDROMORPHONE HYDROCHLORIDE 2 MG: 2 INJECTION, SOLUTION INTRAMUSCULAR; INTRAVENOUS; SUBCUTANEOUS at 02:19

## 2018-07-25 RX ADMIN — FOLIC ACID 1 MG: 1 TABLET ORAL at 08:01

## 2018-07-25 NOTE — DISCHARGE INSTRUCTIONS
DISCHARGE SUMMARY from Nurse    PATIENT INSTRUCTIONS:    After general anesthesia or intravenous sedation, for 24 hours or while taking prescription Narcotics:  · Limit your activities  · Do not drive and operate hazardous machinery  · Do not make important personal or business decisions  · Do  not drink alcoholic beverages  · If you have not urinated within 8 hours after discharge, please contact your surgeon on call. Report the following to your surgeon:  · Excessive pain, swelling, redness or odor of or around the surgical area  · Temperature over 100.5  · Nausea and vomiting lasting longer than 4 hours or if unable to take medications  · Any signs of decreased circulation or nerve impairment to extremity: change in color, persistent  numbness, tingling, coldness or increase pain  · Any questions    What to do at Home:  Recommended activity: Activity as tolerated, per MD instructions    If you experience any of the following symptoms fever > 100.5, nausea, vomiting, pain, chest pain and/or shortness of breath to ER please follow up with MD.    *  Please give a list of your current medications to your Primary Care Provider. *  Please update this list whenever your medications are discontinued, doses are      changed, or new medications (including over-the-counter products) are added. *  Please carry medication information at all times in case of emergency situations. These are general instructions for a healthy lifestyle:    No smoking/ No tobacco products/ Avoid exposure to second hand smoke  Surgeon General's Warning:  Quitting smoking now greatly reduces serious risk to your health.     Obesity, smoking, and sedentary lifestyle greatly increases your risk for illness    A healthy diet, regular physical exercise & weight monitoring are important for maintaining a healthy lifestyle    You may be retaining fluid if you have a history of heart failure or if you experience any of the following symptoms: Weight gain of 3 pounds or more overnight or 5 pounds in a week, increased swelling in our hands or feet or shortness of breath while lying flat in bed. Please call your doctor as soon as you notice any of these symptoms; do not wait until your next office visit. Recognize signs and symptoms of STROKE:    F-face looks uneven    A-arms unable to move or move unevenly    S-speech slurred or non-existent    T-time-call 911 as soon as signs and symptoms begin-DO NOT go       Back to bed or wait to see if you get better-TIME IS BRAIN. Warning Signs of HEART ATTACK     Call 911 if you have these symptoms:   Chest discomfort. Most heart attacks involve discomfort in the center of the chest that lasts more than a few minutes, or that goes away and comes back. It can feel like uncomfortable pressure, squeezing, fullness, or pain.  Discomfort in other areas of the upper body. Symptoms can include pain or discomfort in one or both arms, the back, neck, jaw, or stomach.  Shortness of breath with or without chest discomfort.  Other signs may include breaking out in a cold sweat, nausea, or lightheadedness. Don't wait more than five minutes to call 911 - MINUTES MATTER! Fast action can save your life. Calling 911 is almost always the fastest way to get lifesaving treatment. Emergency Medical Services staff can begin treatment when they arrive -- up to an hour sooner than if someone gets to the hospital by car. The discharge information has been reviewed with the patient. The patient verbalized understanding. Discharge medications reviewed with the patient and appropriate educational materials and side effects teaching were provided.   ___________________________________________________________________________________________________________________________________           Sickle Cell Crisis: Care Instructions  Your Care Instructions    Sickle cell crisis is a painful episode that may begin suddenly in a person with sickle cell disease. Sickle cell disease turns normal, round red blood cells into cells that look like kendall or crescent moons. The sickle-shaped cells can get stuck in blood vessels, blocking blood flow and causing severe pain. The pain can occur in the bones of the spine, the arms and legs, the chest, and the abdomen. An episode may be called a \"painful event\" or \"painful crisis. \" Some people who have sickle cell disease have many painful events, while others have few or none. Treatment depends on the level of pain and how long it lasts. Sometimes taking nonprescription pain relievers can help. Or you may need stronger pain relief medicine that is prescribed or given by a doctor. You may need to be treated in the hospital.  It isn't always possible to know what sets off a painful event. But triggers include being dehydrated, cold temperatures, infection, stress, and not getting enough oxygen. Follow-up care is a key part of your treatment and safety. Be sure to make and go to all appointments, and call your doctor if you are having problems. It's also a good idea to know your test results and keep a list of the medicines you take. How can you care for yourself at home? · Create a pain management plan with your doctor. This plan should include the types of medicines you can take and other actions you can take at home to relieve pain. · Drink plenty of fluids, enough so that your urine is light yellow or clear like water. If you have kidney, heart, or liver disease and have to limit fluids, talk with your doctor before you increase the amount of fluids you drink. · Take your medicines exactly as prescribed. Call your doctor if you think you are having a problem with your medicine. · Take pain medicines exactly as directed. ¨ If the doctor gave you a prescription medicine for pain, take it as prescribed.   ¨ If you are not taking a prescription pain medicine, ask your doctor if you can take an over-the-counter medicine. · Avoid alcohol. It can make you dehydrated. · Dress warmly in cold weather. The cold and windy weather can lead to severe pain. · Do not smoke. Smoking can reduce the amount of oxygen in your blood. · Get plenty of sleep. When should you call for help? Call 911 anytime you think you may need emergency care. For example, call if:    · You have symptoms of a severe problem from sickle cell.     · You have symptoms of a stroke. These may include:  ¨ Sudden numbness, tingling, weakness, or loss of movement in your face, arm, or leg, especially on only one side of your body. ¨ Sudden vision changes. ¨ Sudden trouble speaking. ¨ Sudden confusion or trouble understanding simple statements. ¨ Sudden problems with walking or balance. ¨ A sudden, severe headache that is different from past headaches.     · You are in severe pain.     · You have symptoms of a heart attack. These may include:  ¨ Chest pain or pressure, or a strange feeling in the chest.  ¨ Sweating. ¨ Shortness of breath. ¨ Nausea or vomiting. ¨ Pain, pressure, or a strange feeling in the back, neck, jaw, or upper belly or in one or both shoulders or arms. ¨ Lightheadedness or sudden weakness. ¨ A fast or irregular heartbeat. After you call 911, the  may tell you to chew 1 adult-strength or 2 to 4 low-dose aspirin. Wait for an ambulance. Do not try to drive yourself.    Call your doctor now or seek immediate medical care if:    · You have a fever.    Watch closely for changes in your health, and be sure to contact your doctor if you have any problems. Where can you learn more? Go to http://socorro-rosita.info/. Enter F104 in the search box to learn more about \"Sickle Cell Crisis: Care Instructions. \"  Current as of: September 10, 2017  Content Version: 11.7  © 8984-1506 [employer].  Care instructions adapted under license by [employer] (which disclaims liability or warranty for this information). If you have questions about a medical condition or this instruction, always ask your healthcare professional. [place] [address] any warranty or liability for your use of this information.

## 2018-07-25 NOTE — DISCHARGE SUMMARY
Hospitalist Discharge Summary     Admit Date:  2018  9:20 AM   Name:  Lily Fernandez   Age:  37 y.o.  :  1975   MRN:  [id number]   PCP:  Austin Lane MD  Treatment Team: Attending Provider: Javi Handy MD; Consulting Provider: Vinay Myers MD    Problem List for this Hospitalization:  Hospital Problems as of 2018  Date Reviewed: 2018          Codes Class Noted - Resolved POA    Leg pain ICD-10-CM: N10.554  ICD-9-CM: 729.5  2018 - Present Yes        Sickle cell disease ([facility] ICD-10-CM: D57.1  ICD-9-CM: 282.60  2017 - Present Yes        Iron overload due to repeated red blood cell transfusions ICD-10-CM: E83.111  ICD-9-CM: 275.02  2017 - Present Yes        Sickle cell anemia ([facility] ICD-10-CM: D57.1  ICD-9-CM: 282.60  2016 - Present Yes        * (Principal)Sickle cell pain crisis ([facility] ICD-10-CM: D57.00  ICD-9-CM: 282.62  10/25/2012 - Present Yes        Essential hypertension, benign ICD-10-CM: I10  ICD-9-CM: 401.1  2012 - Present Yes        Precordial pain ICD-10-CM: R07.2  ICD-9-CM: 786.51  3/24/2012 - Present Yes    Overview Signed 3/24/2012  2:53 PM by Hope Kinds     Acute chest syndrome unlikely. Admission HPI from 2018:    \"Pt is a 36 yo male with PMH of HTN, Sickle cell disease, Iron overload, paroxysmal SVT, volume overload. PT presents to the ER with bilateral leg and chest pain. This pain is consistent with pain he has experienced previously during sickle cell pain crisis. Patient takes oxycodone at home but it is not alleviating this pain. He reports that the pain has increased x 1 day, denies any fever, SOB, N/V/D, dizziness, falls. He endorses chills. Pt's last blood transfusion was 3 weeks ago.       Pt states that the chest pain has mostly resolved after medication with dilaudid in the ER, still has pain in both legs from the knees down but it is improved. Pt on 2L oxygen with sats 95-99%.   CXR consistent with cardiomegaly. \"    Hospital Course:  Pt is a 36 yo male with PMH of HTN, Sickle cell disease, Iron overload, paroxysmal SVT, volume overload.  PT presents to the ER with bilateral leg and chest pain.  This pain is consistent with pain he has experienced previously during sickle cell pain crisis.  Patient takes oxycodone at home but it is not alleviating this pain.  He reports that the pain has increased x 1 day, denies any fever, SOB, N/V/D, dizziness, falls.  He endorses chills.  Pt's last blood transfusion was 3 weeks ago.  Pt rates the pain at a 10/10.      Pt states that the chest pain has mostly resolved after medication with dilaudid in the ER, still has pain in both legs from the knees down but it is improved.  Pt on 2L oxygen with sats 95-99%.  CXR consistent with cardiomegaly.  Pt does not have any pedal edema, lungs clear bilateral.  Pt states that his pain is better managed on the Dilaudid 2mg. At this time he reports that his pain is at a 7/10, he is due for additional medication. Pt's H&H at 7.8/26.1, sodium at 138. Continues in NSR, occasional PVC per tele. Tolerating PO, no chest pain, no other concerns. Hematology saw pt, no changes to plan. Pt verbalizes that his pain is better and he feels he can manage it at home, ready for d/c. Has follow up appts with his regular providers tomorrow and next Monday. Follow up instructions and discharge meds at bottom of this note. Plan was discussed with patient,spouse, care team.  All questions answered. Patient was stable at time of discharge. Diagnostic Imaging/Tests:   Xr Chest Port    Result Date: 7/22/2018  CHEST X-RAY, single portable view  7/22/2018 History: Chest pain. Technique: Single frontal view of the chest. Comparison: Chest x-ray 6/25/2018 Findings: The cardiac silhouette is mildly enlarged although stable. The lungs are expanded without evidence for pneumothorax.    No consolidative airspace process or pleural effusion is seen. Interstitial prominence is seen at the lung bases for which an interstitial infiltrate is difficult to exclude. A similar appearance was seen on the prior study. IMPRESSION: 1.  Stable cardiomegaly and suspected basilar interstitial infiltrate. Infiltrate may represent pulmonary edema given the bilateral distribution and associated cardiomegaly. Echocardiogram results:  No results found for this visit on 07/22/18.       All Micro Results     None          Labs: Results:       BMP, Mg, Phos Recent Labs      07/24/18 0414  07/23/18   0321  07/22/18   0948   NA  138  134*  139   K  4.5  3.9  4.1   CL  105  101  104   CO2  26  28  27   AGAP  7  5*  8   BUN  11  16  10   CREA  1.02  1.19  0.84   CA  8.4  8.0*  9.0   GLU  118*  130*  119*   MG   --    --   2.0      CBC Recent Labs      07/24/18 0414 07/23/18   0321  07/22/18   0948   WBC  6.1  7.8  5.2   RBC  2.48*  2.53*  2.86*   HGB  7.8*  8.1*  9.3*   HCT  23.8*  24.4*  27.1*   PLT  214  222  240   GRANS  29*  27*  63   LYMPH  59*  65*  30   EOS  2  2  0*   MONOS  9  6  7   BASOS  1  0  0   IG  0  0  0   ANEU  1.8  2.1  3.2   ABL  3.6  5.0*  1.6   TENZIN  0.1  0.1  0.0   ABM  0.6  0.5  0.4   ABB  0.0  0.0  0.0   AIG  0.0  0.0  0.0      LFT Recent Labs      07/22/18   0948   SGOT  38*   ALT  60   AP  84   TP  8.4*   ALB  3.6   GLOB  4.8*   AGRAT  0.8*      Cardiac Testing Lab Results   Component Value Date/Time    BNP 77 07/22/2018 09:48 AM     (H) 04/26/2012 03:17 PM    CK 39 01/17/2017 03:35 AM    CK 42 01/17/2017 01:30 AM    CK 41 01/16/2017 09:05 PM    CK - MB 0.6 01/17/2017 03:35 AM    CK - MB <0.5 (L) 01/17/2017 01:30 AM    CK - MB <0.5 (L) 01/16/2017 09:05 PM    CK-MB Index 1.5 01/17/2017 03:35 AM    CK-MB Index CANNOT BE CALCULATED 01/17/2017 01:30 AM    CK-MB Index CANNOT BE CALCULATED 01/16/2017 09:05 PM    Troponin-I <0.05 04/26/2012 03:17 PM    Troponin-I, Qt. 0.02 07/22/2018 05:58 PM    Troponin-I, Qt. <0.02 (L) 05/28/2018 07:52 PM    Troponin-I, Qt. <0.02 (L) 08/10/2017 04:36 PM      Coagulation Tests Lab Results   Component Value Date/Time    Prothrombin time 11.6 02/08/2015 12:05 PM    Prothrombin time 11.9 (H) 10/24/2012 02:45 PM    INR 1.1 02/08/2015 12:05 PM    INR 1.1 10/24/2012 02:45 PM    aPTT 26.0 10/24/2012 02:45 PM      A1c No results found for: HBA1C, HGBE8, IUX5AUZF   Lipid Panel No results found for: CHOL, CHOLPOCT, CHOLX, CHLST, CHOLV, 240239, HDL, LDL, LDLC, DLDLP, 870360, VLDLC, VLDL, TGLX, TRIGL, TRIGP, TGLPOCT, CHHD, CHHDX   Thyroid Panel No results found for: TSH, T4, FT4, TT3, T3U, TSHEXT     Most Recent UA Lab Results   Component Value Date/Time    Color YELLOW 04/02/2018 11:50 PM    Appearance CLEAR 04/02/2018 11:50 PM    Specific gravity 1.009 04/02/2018 11:50 PM    pH (UA) 7.0 04/02/2018 11:50 PM    Protein TRACE (A) 04/02/2018 11:50 PM    Glucose NEGATIVE  04/02/2018 11:50 PM    Ketone NEGATIVE  04/02/2018 11:50 PM    Bilirubin NEGATIVE  04/02/2018 11:50 PM    Blood TRACE (A) 04/02/2018 11:50 PM    Urobilinogen 0.2 04/02/2018 11:50 PM    Nitrites NEGATIVE  04/02/2018 11:50 PM    Leukocyte Esterase NEGATIVE  04/02/2018 11:50 PM        Allergies   Allergen Reactions    Compazine [Prochlorperazine Edisylate] Other (comments)     Also makes him feel funny    Morphine Other (comments)     Makes him feel funny    Reglan [Metoclopramide] Other (comments)     \"feel funny\"    Zofran [Ondansetron Hcl (Pf)] Other (comments)     Make him feel funny     Immunization History   Administered Date(s) Administered    Influenza Vaccine 09/10/2015    Influenza Vaccine Split 09/27/2012    ZZZ-RETIRED (DO NOT USE) Pneumococcal Vaccine (Unspecified Type) 09/21/2010       All Labs from Last 24 Hrs:  Recent Results (from the past 24 hour(s))   RETICULOCYTE COUNT    Collection Time: 07/25/18  5:04 AM   Result Value Ref Range    Reticulocyte count 0.7 0.3 - 2.0 %    Absolute Retic Cnt. 0.0168 (L) 0.026 - 0.095 M/ul Immature Retic Fraction 6.3 2.3 - 13.4 %    Retic Hgb Conc. 32 29 - 35 pg       Discharge Exam:  Patient Vitals for the past 24 hrs:   Temp Pulse Resp BP SpO2   07/25/18 0700 98.5 °F (36.9 °C) 77 18 128/79 97 %   07/25/18 0309 98.8 °F (37.1 °C) 75 18 125/76 96 %   07/24/18 2309 98.7 °F (37.1 °C) 85 18 115/87 95 %   07/24/18 2155 - (!) 104 - - -   07/24/18 1909 98.4 °F (36.9 °C) 99 18 126/74 96 %   07/24/18 1440 97.9 °F (36.6 °C) (!) 58 16 103/51 97 %   07/24/18 1038 98.5 °F (36.9 °C) 72 16 116/68 95 %     Oxygen Therapy  O2 Sat (%): 97 % (07/25/18 0700)  Pulse via Oximetry: 73 beats per minute (07/22/18 1330)  O2 Device: Room air (07/25/18 0219)    Intake/Output Summary (Last 24 hours) at 07/25/18 0821  Last data filed at 07/25/18 0716   Gross per 24 hour   Intake             3392 ml   Output             3100 ml   Net              292 ml       General:    Well nourished. Alert. No distress. Eyes:   Normal sclera. Extraocular movements intact. ENT:  Normocephalic, atraumatic. Moist mucous membranes  CV:   Regular rate and rhythm. No murmur, rub, or gallop. Lungs:  Clear to auscultation bilaterally. No wheezing, rhonchi, or rales. Abdomen: Soft, nontender, nondistended. Bowel sounds normal.   Extremities: Warm and dry. No cyanosis or edema. Neurologic: CN II-XII grossly intact. Sensation intact. Skin:     No rashes or jaundice. Psych:  Normal mood and affect. Discharge Info:   Current Discharge Medication List      CONTINUE these medications which have CHANGED    Details   oxyCODONE ER (OXYCONTIN) 80 mg ER tablet Take 1 Tab by mouth every twelve (12) hours. Max Daily Amount: 160 mg.  Qty: 4 Tab, Refills: 0    Associated Diagnoses: Vasoocclusive sickle cell crisis (HCC)      oxyCODONE IR (ROXICODONE) 30 mg immediate release tablet Take 1 Tab by mouth every four (4) hours as needed.  Max Daily Amount: 180 mg.  Qty: 8 Tab, Refills: 0    Associated Diagnoses: Vasoocclusive sickle cell crisis ([facility] CONTINUE these medications which have NOT CHANGED    Details   hydroxyurea (HYDREA) 500 mg capsule Take 1 Cap by mouth two (2) times a day. Takes in am at 0900. Star after follow up with your PCP. Qty: 60 Cap, Refills: 0      deferasirox (JADENU) 360 mg tab Take 5 Tabs by mouth daily. Qty: 150 Tab, Refills: 0      promethazine (PHENERGAN) 25 mg tablet Take 1 Tab by mouth every six (6) hours as needed. May substitute suppository if vomiting  Qty: 20 Tab, Refills: 0      metoprolol (LOPRESSOR) 25 mg tablet Take 12.5 mg by mouth two (2) times a day. Indications: hypertension      lisinopril (PRINIVIL, ZESTRIL) 5 mg tablet Take 5 mg by mouth daily. Indications: HYPERTENSION      folic acid (FOLVITE) 1 mg tablet Take 1 mg by mouth daily. magnesium oxide (MAG-OX) 400 mg tablet Take 1 Tab by mouth daily. Qty: 10 Tab, Refills: 0               Disposition: home    Activity: Activity as tolerated  Diet: DIET REGULAR    Follow-up Appointments   Procedures    FOLLOW UP VISIT Appointment in: Other (Wally Horan) Follow up with hematology as instructed Follow up with PCP as needed     Follow up with hematology as instructed  Follow up with PCP as needed     Standing Status:   Standing     Number of Occurrences:   1     Order Specific Question:   Appointment in     Answer: Other (Specify)         Follow-up Information     Follow up With Details Comments [id number] [facility], MD    [facility]  [phone]            Case reviewed with supervising physician - MAYA Velazquez MD    Time spent in patient discharge planning and coordination 35 minutes.     Signed:  OFE Sparrow

## 2018-07-26 ENCOUNTER — PATIENT OUTREACH (OUTPATIENT)
Dept: CASE MANAGEMENT | Age: 43
End: 2018-07-26

## 2018-07-26 NOTE — PROGRESS NOTES
This note will not be viewable in [address] Transition of Care Discharge Follow-up Questionnaire   Date/Time of Call:   7/26/18 10:40am   What was the patient hospitalized for? Sickle Cell Crisis   Does the patient understand his/her diagnosis and/or treatment and what happened during the hospitalization? Spoke with patient, who was agreeable to JOSÉ TREVIZO call. Patient states understanding of diagnosis and treatment. Reports he is doing well, reports minimal pain that is controlled with medication, denies any new concerns or symptoms. Did the patient receive discharge instructions? Yes, patient received discharge instructions. CM Assessed Risk for Readmission:       Patient stated Risk for Readmission:      High risk for readmission due to Chronic Disease Process     Per patient, another sickle cell crisis   Review any discharge instructions (see discharge instructions/AVS in [facility]). Ask patient if they understand these. Do they have any questions? Discharge instructions reviewed with patient, who verbalized understanding of instructions and denied any questions. Patient is very knowledgeable of chronic disease process and Tx plan. States when the pain gets too severe I call my  physician ([address] [facility]) and they refer me to ER. Were home services ordered (nursing, PT, OT, ST, etc.)? No   If so, has the first visit occurred? If not, why? (Assist with coordination of services if necessary.)   N/A   Was any DME ordered? No   If so, has it been received? If not, why?  (Assist patient in obtaining DME orders &/or equipment if necessary.) N/A   Complete a review of all medications (new, continued and discontinued meds per the D/C instructions and medication tab in [facility]). Medication review completed with patient, who stated understanding. Were all new prescriptions filled?   If not, why?  (Assist patient in obtaining medications if necessary  escalate for CCM &/or SW if ongoing issues are verbalized by pt or anticipated)   No new prescriptions were ordered at discharge. Patient denies any problems obtaining medications. Does the patient understand the purpose and dosing instructions for all medications? (If patient has questions, provide explanation and education.)   Patient states he knows purpose and dosing for medications. Does the patient have any problems in performing ADLs? (If patient is unable to perform ADLs  what is the limiting factor(s)? Do they have a support system that can assist? If no support system is present, discuss possible assistance that they may be able to obtain. Escalate for CCM/SW if ongoing issues are verbalized by pt or anticipated)   Patient is normally independent with ADLs, but has family to assist as needed. Does the patient have all follow-up appointments scheduled? 7 day f/up with PCP?   (f/up with PCP may be w/in 14 days if patient has a f/up with their specialist w/in 7 days)    7-14 day f/up with specialist?   (or per discharge instructions)    If f/up has not been made  what actions has the care coordinator made to accomplish this? Has transportation been arranged? Patient has an appointment with [facility] oncology and PCP next week per patient and also documented in [address] [place] [address] discharge note. Patient could not give specific dates. Patient denies any problems with transportation. Any other questions or concerns expressed by the patient? Patient had no other questions or concerns. No immediate needs identified. Due to Chronic disease process that will likely warrant unavoidable hospitalizations and medical interventions for remainder of patients life, and patients knowledge of disease and management of it no further LALY outreach is needed. Schedule next appointment with JOSÉ TREVIZO Coordinator or refer to RN Case Manager/ per the workflow guidelines.       When is care coordinators next follow-up call scheduled? If referred for CCM  what RN care manager was the referral assigned? Patient has CC contact information and advised to call for new concerns.    LALY Call Completed By: Miriam Weber, [address] [place] [address] Coordinator

## 2018-10-10 ENCOUNTER — HOSPITAL ENCOUNTER (INPATIENT)
Age: 43
LOS: 3 days | Discharge: HOME | DRG: 812 | End: 2018-10-14
Attending: EMERGENCY MEDICINE | Admitting: FAMILY MEDICINE
Payer: MEDICARE

## 2018-10-10 DIAGNOSIS — D57.00 SICKLE CELL PAIN CRISIS (HCC): Primary | ICD-10-CM

## 2018-10-10 DIAGNOSIS — M79.605 PAIN IN BOTH LOWER EXTREMITIES: ICD-10-CM

## 2018-10-10 DIAGNOSIS — M79.604 PAIN IN BOTH LOWER EXTREMITIES: ICD-10-CM

## 2018-10-10 LAB
ALBUMIN SERPL-MCNC: 3.4 G/DL (ref 3.5–5)
ALBUMIN/GLOB SERPL: 0.7 {RATIO} (ref 1.2–3.5)
ALP SERPL-CCNC: 74 U/L (ref 50–136)
ALT SERPL-CCNC: 47 U/L (ref 12–65)
ANION GAP SERPL CALC-SCNC: 6 MMOL/L (ref 7–16)
AST SERPL-CCNC: 35 U/L (ref 15–37)
BASOPHILS # BLD: 0 K/UL (ref 0–0.2)
BASOPHILS NFR BLD: 0 % (ref 0–2)
BILIRUB SERPL-MCNC: 0.9 MG/DL (ref 0.2–1.1)
BUN SERPL-MCNC: 9 MG/DL (ref 6–23)
CALCIUM SERPL-MCNC: 8.4 MG/DL (ref 8.3–10.4)
CHLORIDE SERPL-SCNC: 106 MMOL/L (ref 98–107)
CO2 SERPL-SCNC: 27 MMOL/L (ref 21–32)
CREAT SERPL-MCNC: 0.83 MG/DL (ref 0.8–1.5)
DIFFERENTIAL METHOD BLD: ABNORMAL
EOSINOPHIL # BLD: 0.1 K/UL (ref 0–0.8)
EOSINOPHIL NFR BLD: 1 % (ref 0.5–7.8)
ERYTHROCYTE [DISTWIDTH] IN BLOOD BY AUTOMATED COUNT: ABNORMAL %
GLOBULIN SER CALC-MCNC: 4.7 G/DL (ref 2.3–3.5)
GLUCOSE SERPL-MCNC: 92 MG/DL (ref 65–100)
HCT VFR BLD AUTO: 25.3 % (ref 41.1–50.3)
HGB BLD-MCNC: 8.6 G/DL (ref 13.6–17.2)
HGB RETIC QN AUTO: 43 PG (ref 29–35)
IMM GRANULOCYTES # BLD: 0 K/UL (ref 0–0.5)
IMM GRANULOCYTES NFR BLD AUTO: 0 % (ref 0–5)
IMM RETICS NFR: 33.4 % (ref 2.3–13.4)
LDH SERPL L TO P-CCNC: 361 U/L (ref 100–190)
LYMPHOCYTES # BLD: 4.3 K/UL (ref 0.5–4.6)
LYMPHOCYTES NFR BLD: 34 % (ref 13–44)
MCH RBC QN AUTO: 33.3 PG (ref 26.1–32.9)
MCHC RBC AUTO-ENTMCNC: 34 G/DL (ref 31.4–35)
MCV RBC AUTO: 98.1 FL (ref 79.6–97.8)
MONOCYTES # BLD: 1.7 K/UL (ref 0.1–1.3)
MONOCYTES NFR BLD: 13 % (ref 4–12)
NEUTS SEG # BLD: 6.6 K/UL (ref 1.7–8.2)
NEUTS SEG NFR BLD: 52 % (ref 43–78)
NRBC # BLD: 0.6 K/UL (ref 0–0.2)
PLATELET # BLD AUTO: 238 K/UL (ref 150–450)
PLATELET COMMENTS,PCOM: SLIGHT
PMV BLD AUTO: 10.5 FL (ref 9.4–12.3)
POTASSIUM SERPL-SCNC: 3.5 MMOL/L (ref 3.5–5.1)
PROT SERPL-MCNC: 8.1 G/DL (ref 6.3–8.2)
RBC # BLD AUTO: 2.58 M/UL (ref 4.23–5.6)
RBC MORPH BLD: ABNORMAL
RETICS # AUTO: 0.13 M/UL (ref 0.03–0.1)
RETICS/RBC NFR AUTO: 5.2 % (ref 0.3–2)
SODIUM SERPL-SCNC: 139 MMOL/L (ref 136–145)
WBC # BLD AUTO: 12.7 K/UL (ref 4.3–11.1)
WBC MORPH BLD: ABNORMAL

## 2018-10-10 PROCEDURE — 96374 THER/PROPH/DIAG INJ IV PUSH: CPT | Performed by: EMERGENCY MEDICINE

## 2018-10-10 PROCEDURE — 74011000250 HC RX REV CODE- 250: Performed by: EMERGENCY MEDICINE

## 2018-10-10 PROCEDURE — 96375 TX/PRO/DX INJ NEW DRUG ADDON: CPT | Performed by: EMERGENCY MEDICINE

## 2018-10-10 PROCEDURE — 83615 LACTATE (LD) (LDH) ENZYME: CPT

## 2018-10-10 PROCEDURE — 85046 RETICYTE/HGB CONCENTRATE: CPT

## 2018-10-10 PROCEDURE — 80053 COMPREHEN METABOLIC PANEL: CPT

## 2018-10-10 PROCEDURE — 99285 EMERGENCY DEPT VISIT HI MDM: CPT | Performed by: EMERGENCY MEDICINE

## 2018-10-10 PROCEDURE — 96361 HYDRATE IV INFUSION ADD-ON: CPT | Performed by: EMERGENCY MEDICINE

## 2018-10-10 PROCEDURE — 74011250636 HC RX REV CODE- 250/636: Performed by: EMERGENCY MEDICINE

## 2018-10-10 PROCEDURE — 85025 COMPLETE CBC W/AUTO DIFF WBC: CPT

## 2018-10-10 RX ORDER — HYDROMORPHONE HYDROCHLORIDE 1 MG/ML
2 INJECTION, SOLUTION INTRAMUSCULAR; INTRAVENOUS; SUBCUTANEOUS ONCE
Status: COMPLETED | OUTPATIENT
Start: 2018-10-10 | End: 2018-10-11

## 2018-10-10 RX ORDER — HYDROMORPHONE HYDROCHLORIDE 1 MG/ML
2 INJECTION, SOLUTION INTRAMUSCULAR; INTRAVENOUS; SUBCUTANEOUS
Status: COMPLETED | OUTPATIENT
Start: 2018-10-10 | End: 2018-10-10

## 2018-10-10 RX ORDER — SODIUM CHLORIDE, SODIUM LACTATE, POTASSIUM CHLORIDE, CALCIUM CHLORIDE 600; 310; 30; 20 MG/100ML; MG/100ML; MG/100ML; MG/100ML
1000 INJECTION, SOLUTION INTRAVENOUS ONCE
Status: COMPLETED | OUTPATIENT
Start: 2018-10-10 | End: 2018-10-11

## 2018-10-10 RX ADMIN — SODIUM CHLORIDE 25 MG: 9 INJECTION INTRAMUSCULAR; INTRAVENOUS; SUBCUTANEOUS at 22:57

## 2018-10-10 RX ADMIN — SODIUM CHLORIDE, SODIUM LACTATE, POTASSIUM CHLORIDE, AND CALCIUM CHLORIDE 1000 ML: 600; 310; 30; 20 INJECTION, SOLUTION INTRAVENOUS at 22:57

## 2018-10-10 RX ADMIN — HYDROMORPHONE HYDROCHLORIDE 2 MG: 1 INJECTION, SOLUTION INTRAMUSCULAR; INTRAVENOUS; SUBCUTANEOUS at 22:57

## 2018-10-10 NOTE — IP AVS SNAPSHOT
[address] 
[phone] Patient: Mireya Reddy MRN: [id number] TON:4/16/2736 About your hospitalization You were admitted on:  October 11, 2018 You last received care in the:  [address] You were discharged on:  October 14, 2018 Why you were hospitalized Your primary diagnosis was:  Sickle Cell Pain Crisis (Hcc) Your diagnoses also included:  Leukocytosis, Vasoocclusive Sickle Cell Crisis (Hcc), Leg Pain, Sickle Cell Anemia (Hcc), Sickle Cell Disease (Hcc), Paroxysmal Svt (Supraventricular Tachycardia) (Hcc), Essential Hypertension, Benign Follow-up Information Follow up With Details Comments Contact Info Mindy Jain MD   [address] [place] [address] 
[phone] Discharge Orders None A check cristina indicates which time of day the medication should be taken. My Medications CHANGE how you take these medications Instructions Each Dose to Equal  
 Morning Noon Evening Bedtime  
 hydroxyurea 500 mg capsule Commonly known as:  HYDREA What changed:   
- how much to take - when to take this 
- additional instructions Your last dose was:  10/14 AM  
   
 Take 1 Cap by mouth two (2) times a day. Takes in am at 0900. Star after follow up with your PCP. 500 mg CONTINUE taking these medications Instructions Each Dose to Equal  
 Morning Noon Evening Bedtime  
 deferasirox 360 mg tablet Commonly known as:  Gloria Chavarriame Take 5 Tabs by mouth daily. 5 Tab  
    
   
   
   
  
 folic acid 1 mg tablet Commonly known as:  Sajan Your last dose was:  10/14 AM  
   
 Take 1 mg by mouth daily. 1 mg  
    
   
   
   
  
 lisinopril 5 mg tablet Commonly known as:  Denice Wright Your last dose was:  10/14 AM  
   
 Take 5 mg by mouth daily. Indications: HYPERTENSION  
 5 mg magnesium oxide 400 mg tablet Commonly known as:  MAG-OX Take 1 Tab by mouth daily. 400 mg  
    
   
   
   
  
 metoprolol tartrate 25 mg tablet Commonly known as:  LOPRESSOR Your last dose was:  10/14 AM  
   
 Take 12.5 mg by mouth two (2) times a day. Indications: hypertension 12.5 mg  
    
   
   
   
  
 * oxyCODONE ER 80 mg ER tablet Commonly known as:  OxyCONTIN Your last dose was:  10/14 AM  
   
 Take 1 Tab by mouth every twelve (12) hours. Max Daily Amount: 160 mg.  
 80 mg  
    
   
   
   
  
 * oxyCODONE IR 30 mg immediate release tablet Commonly known as:  Analisa Connor Your last dose was:  10/13 Take 1 Tab by mouth every four (4) hours as needed. Max Daily Amount: 180 mg.  
 30 mg  
    
   
   
   
  
 promethazine 25 mg tablet Commonly known as:  PHENERGAN Your last dose was:  10/11 Take 1 Tab by mouth every six (6) hours as needed. May substitute suppository if vomiting 25 mg  
    
   
   
   
  
 * Notice: This list has 2 medication(s) that are the same as other medications prescribed for you. Read the directions carefully, and ask your doctor or other care provider to review them with you. Opioid Education Prescription Opioids: What You Need to Know: 
 
 
After intravenous sedation, for 24 hours or while taking prescription Narcotics: · Limit your activities · Do not drive and operate hazardous machinery · Do not make important personal or business decisions · Do  not drink alcoholic beverages · If you have not urinated within 8 hours after discharge, please contact your physician on call. Report the following to your doctor: 
· Excessive pain, swelling, redness or odor of or around the port area · Temperature over 100.5 · Nausea and vomiting lasting longer than 4 hours or if unable to take medications · Any signs of decreased circulation or nerve impairment to extremity: change in color, persistent  numbness, tingling, coldness or increase pain · Any questions What to do at Home: 
Recommended activity: Activity as tolerated Please call physician after your discharge if the following symptoms present: 1. Temperature greater than 100.5 (check temp every day) 2. Heart rate greater than 90 beats per minute 3. Increased respirations 4. Chills 5. Cough with any sputum (color: yellow, white, green, or bloody?) 6. Shortness of breath or change in breathing pattern 7. Bleeding:  Any prolonged bleeding presented from skin tears, cuts, bleeding from brushing teeth, nose bleed, bleeding noted in stool 8. Tenderness, swelling, or drainage from IV site or central line catheter Diet: Regular *  Please give a list of your current medications to your Primary Care Provider. *  Please update this list whenever your medications are discontinued, doses are 
    changed, or new medications (including over-the-counter products) are added. *  Please carry medication information at all times in case of emergency situations. These are general instructions for a healthy lifestyle: No smoking/ No tobacco products/ Avoid exposure to second hand smoke Surgeon General's Warning:  Quitting smoking now greatly reduces serious risk to your health. Obesity, smoking, and sedentary lifestyle greatly increases your risk for illness A healthy diet, regular physical exercise & weight monitoring are important for maintaining a healthy lifestyle You may be retaining fluid if you have a history of heart failure or if you experience any of the following symptoms:  Weight gain of 3 pounds or more overnight or 5 pounds in a week, increased swelling in our hands or feet or shortness of breath while lying flat in bed. Please call your doctor as soon as you notice any of these symptoms; do not wait until your next office visit. Recognize signs and symptoms of STROKE: 
 
F-face looks uneven A-arms unable to move or move unevenly S-speech slurred or non-existent T-time-call 911 as soon as signs and symptoms begin-DO NOT go Back to bed or wait to see if you get better-TIME IS BRAIN. Warning Signs of HEART ATTACK Call 911 if you have these symptoms: 
? Chest discomfort. Most heart attacks involve discomfort in the center of the chest that lasts more than a few minutes, or that goes away and comes back. It can feel like uncomfortable pressure, squeezing, fullness, or pain. ? Discomfort in other areas of the upper body. Symptoms can include pain or discomfort in one or both arms, the back, neck, jaw, or stomach. ? Shortness of breath with or without chest discomfort. ? Other signs may include breaking out in a cold sweat, nausea, or lightheadedness. Don't wait more than five minutes to call [address]! Fast action can save your life. Calling 911 is almost always the fastest way to get lifesaving treatment. Emergency Medical Services staff can begin treatment when they arrive  up to an hour sooner than if someone gets to the hospital by car. The discharge information has been reviewed with the patient. The patient verbalized understanding. Discharge medications reviewed with the patient and appropriate educational materials and side effects teaching were provided. ___________________________________________________________________________________________________________________________________ [email or web address] Announcement We are excited to announce that we are making your provider's discharge notes available to you in [email or web address]. You will see these notes when they are completed and signed by the physician that discharged you from your recent hospital stay. If you have any questions or concerns about any information you see in Attend.com, please call the Health Information Department where you were seen or reach out to your Primary Care Provider for more information about your plan of care. Introducing [facility]! Skylar Garcia introduces Attend.com patient portal. Now you can access parts of your medical record, email your doctor's office, and request medication refills online. 1. In your internet browser, go to [email or web address] 2. Click on the First Time User? Click Here link in the Sign In box. You will see the New Member Sign Up page. 3. Enter your Attend.com Access Code exactly as it appears below. You will not need to use this code after youve completed the sign-up process. If you do not sign up before the expiration date, you must request a new code. · [email or web address] Access Code: [email or web address] Expires: 12/25/2018  9:02 PM 
 
4. Enter the last four digits of your Social Security Number (xxxx) and Date of Birth (mm/dd/yyyy) as indicated and click Submit. You will be taken to the next sign-up page. 5. Create a Attend.com ID. This will be your Attend.com login ID and cannot be changed, so think of one that is secure and easy to remember. 6. Create a Attend.com password. You can change your password at any time. 7. Enter your Password Reset Question and Answer. This can be used at a later time if you forget your password. 8. Enter your e-mail address. You will receive e-mail notification when new information is available in [address] 9. Click Sign Up. You can now view and download portions of your medical record. 10. Click the Download Summary menu link to download a portable copy of your medical information. If you have questions, please visit the Frequently Asked Questions section of the [employer] website. Remember, Mpayy is NOT to be used for urgent needs. For medical emergencies, dial 911. Now available from your iPhone and Android! Introducing Israel Bravo As a [address] [place] [address] patient, I wanted to make you aware of our electronic visit tool called Israel Bravo. [facility] [facility] allows you to connect within minutes with a medical provider 24 hours a day, seven days a week via a mobile device or tablet or logging into a secure website from your computer. You can access Israel Bravo from anywhere in the [place]. A virtual visit might be right for you when you have a simple condition and feel like you just dont want to get out of bed, or cant get away from work for an appointment, when your regular [address] [place] [address] provider is not available (evenings, weekends or holidays), or when youre out of town and need minor care. Electronic visits cost only $49 and if the [facility] [address] 24/7 provider determines a prescription is needed to treat your condition, one can be electronically transmitted to a nearby pharmacy*. Please take a moment to enroll today if you have not already done so. The enrollment process is free and takes just a few minutes. To enroll, please download the [facility] jhony to your tablet or phone, or visit [email or web address] to enroll on your computer. And, as an [address] [place] [address] patient with a [facility] account, the results of your visits will be scanned into your electronic medical record and your primary care provider will be able to view the scanned results. We urge you to continue to see your regular [address] [place] [address] provider for your ongoing medical care.   And while your primary care provider may not be the one available when you seek a Israel Limaduglasfin virtual visit, the peace of mind you get from getting a real diagnosis real time can be priceless. For more information on Israel Cunninghamfin, view our Frequently Asked Questions (FAQs) at [email or web address]. Sincerely, 
 
Lonny Wilson MD 
Chief Medical Officer [employer] *:  certain medications cannot be prescribed via Israel Clarenceduglasfin Providers Seen During Your Hospitalization Provider Specialty Primary office phone Katherine Salinas MD Emergency Medicine [phone] Alberto Slaughter MD [facility] [phone] Your Primary Care Physician (PCP) Primary Care Physician Office Phone Office Fax Flavio Sanders [phone] You are allergic to the following Allergen Reactions Compazine (Prochlorperazine Edisylate) Other (comments) Also makes him feel funny Morphine Other (comments) Makes him feel funny Reglan (Metoclopramide) Other (comments) \"feel funny\" Zofran (Ondansetron Hcl (Pf)) Other (comments) Make him feel funny Recent Documentation Height Weight BMI Smoking Status 1.778 m 78.4 kg 24.81 kg/m2 Never Smoker Emergency Contacts Name Discharge Info Relation Home Work Mobile Osmar Limon  Spouse [phone] Patient Belongings The following personal items are in your possession at time of discharge: 
  Dental Appliances: None  Visual Aid: Glasses, With patient      Home Medications: None   Jewelry: Ring  Clothing: Pants, Jacket/Coat, Shirt    Other Valuables: Cell Phone  Personal Items Sent to Safe: none Please provide this summary of care documentation to your next provider. Signatures-by signing, you are acknowledging that this After Visit Summary has been reviewed with you and you have received a copy.   
  
 
  
    
    
 Patient Signature: ____________________________________________________________ Date:  ____________________________________________________________  
  
Lio Feast Provider Signature:  ____________________________________________________________ Date:  ____________________________________________________________

## 2018-10-10 NOTE — IP AVS SNAPSHOT
[address] 
[phone] Patient: Yani Gonzalez MRN: [id number] LVK:7/74/8786 A check cristina indicates which time of day the medication should be taken. My Medications CHANGE how you take these medications Instructions Each Dose to Equal  
 Morning Noon Evening Bedtime  
 hydroxyurea 500 mg capsule Commonly known as:  HYDREA What changed:   
- how much to take - when to take this 
- additional instructions Your last dose was:  10/14 AM  
   
 Take 1 Cap by mouth two (2) times a day. Takes in am at 0900. Star after follow up with your PCP. 500 mg CONTINUE taking these medications Instructions Each Dose to Equal  
 Morning Noon Evening Bedtime  
 deferasirox 360 mg tablet Commonly known as:  Essie Amis Take 5 Tabs by mouth daily. 5 Tab  
    
   
   
   
  
 folic acid 1 mg tablet Commonly known as:  Google Your last dose was:  10/14 AM  
   
 Take 1 mg by mouth daily. 1 mg  
    
   
   
   
  
 lisinopril 5 mg tablet Commonly known as:  Gasper Peppers Your last dose was:  10/14 AM  
   
 Take 5 mg by mouth daily. Indications: HYPERTENSION  
 5 mg  
    
   
   
   
  
 magnesium oxide 400 mg tablet Commonly known as:  MAG-OX Take 1 Tab by mouth daily. 400 mg  
    
   
   
   
  
 metoprolol tartrate 25 mg tablet Commonly known as:  LOPRESSOR Your last dose was:  10/14 AM  
   
 Take 12.5 mg by mouth two (2) times a day. Indications: hypertension 12.5 mg  
    
   
   
   
  
 * oxyCODONE ER 80 mg ER tablet Commonly known as:  OxyCONTIN Your last dose was:  10/14 AM  
   
 Take 1 Tab by mouth every twelve (12) hours. Max Daily Amount: 160 mg.  
 80 mg  
    
   
   
   
  
 * oxyCODONE IR 30 mg immediate release tablet Commonly known as:  Rose Tee Your last dose was:  10/13 Take 1 Tab by mouth every four (4) hours as needed. Max Daily Amount: 180 mg.  
 30 mg  
    
   
   
   
  
 promethazine 25 mg tablet Commonly known as:  PHENERGAN Your last dose was:  10/11 Take 1 Tab by mouth every six (6) hours as needed. May substitute suppository if vomiting 25 mg  
    
   
   
   
  
 * Notice: This list has 2 medication(s) that are the same as other medications prescribed for you. Read the directions carefully, and ask your doctor or other care provider to review them with you.

## 2018-10-11 PROBLEM — D72.829 LEUKOCYTOSIS: Status: ACTIVE | Noted: 2018-10-11

## 2018-10-11 PROBLEM — D57.00 VASOOCCLUSIVE SICKLE CELL CRISIS (HCC): Status: RESOLVED | Noted: 2018-06-24 | Resolved: 2018-10-11

## 2018-10-11 LAB
BASOPHILS # BLD: 0 K/UL (ref 0–0.2)
BASOPHILS NFR BLD: 0 % (ref 0–2)
DIFFERENTIAL METHOD BLD: ABNORMAL
EOSINOPHIL # BLD: 0.2 K/UL (ref 0–0.8)
EOSINOPHIL NFR BLD: 1 % (ref 0.5–7.8)
ERYTHROCYTE [DISTWIDTH] IN BLOOD BY AUTOMATED COUNT: ABNORMAL %
FLUAV AG NPH QL IA: NEGATIVE
FLUBV AG NPH QL IA: NEGATIVE
HCT VFR BLD AUTO: 21.9 % (ref 41.1–50.3)
HGB BLD-MCNC: 7.3 G/DL (ref 13.6–17.2)
IMM GRANULOCYTES # BLD: 0 K/UL (ref 0–0.5)
IMM GRANULOCYTES NFR BLD AUTO: 0 % (ref 0–5)
LYMPHOCYTES # BLD: 3.8 K/UL (ref 0.5–4.6)
LYMPHOCYTES NFR BLD: 32 % (ref 13–44)
MCH RBC QN AUTO: 33.5 PG (ref 26.1–32.9)
MCHC RBC AUTO-ENTMCNC: 33.3 G/DL (ref 31.4–35)
MCV RBC AUTO: 100.5 FL (ref 79.6–97.8)
MONOCYTES # BLD: 1.4 K/UL (ref 0.1–1.3)
MONOCYTES NFR BLD: 12 % (ref 4–12)
NEUTS SEG # BLD: 6.4 K/UL (ref 1.7–8.2)
NEUTS SEG NFR BLD: 54 % (ref 43–78)
NRBC # BLD: 0.54 K/UL (ref 0–0.2)
PLATELET # BLD AUTO: 231 K/UL (ref 150–450)
PMV BLD AUTO: 10.7 FL (ref 9.4–12.3)
RBC # BLD AUTO: 2.18 M/UL (ref 4.23–5.6)
SPECIMEN SOURCE: NORMAL
WBC # BLD AUTO: 11.9 K/UL (ref 4.3–11.1)

## 2018-10-11 PROCEDURE — 65270000029 HC RM PRIVATE

## 2018-10-11 PROCEDURE — 99222 1ST HOSP IP/OBS MODERATE 55: CPT | Performed by: INTERNAL MEDICINE

## 2018-10-11 PROCEDURE — 36591 DRAW BLOOD OFF VENOUS DEVICE: CPT

## 2018-10-11 PROCEDURE — 77030020253 HC SOL INJ D545NS .05 DEX .45 SAL

## 2018-10-11 PROCEDURE — 85025 COMPLETE CBC W/AUTO DIFF WBC: CPT

## 2018-10-11 PROCEDURE — 96375 TX/PRO/DX INJ NEW DRUG ADDON: CPT | Performed by: EMERGENCY MEDICINE

## 2018-10-11 PROCEDURE — 74011250636 HC RX REV CODE- 250/636

## 2018-10-11 PROCEDURE — 74011250636 HC RX REV CODE- 250/636: Performed by: FAMILY MEDICINE

## 2018-10-11 PROCEDURE — 74011250637 HC RX REV CODE- 250/637: Performed by: NURSE PRACTITIONER

## 2018-10-11 PROCEDURE — 74011250636 HC RX REV CODE- 250/636: Performed by: NURSE PRACTITIONER

## 2018-10-11 PROCEDURE — 77030020263 HC SOL INJ SOD CL0.9% LFCR 1000ML

## 2018-10-11 PROCEDURE — 96376 TX/PRO/DX INJ SAME DRUG ADON: CPT | Performed by: EMERGENCY MEDICINE

## 2018-10-11 PROCEDURE — 74011000258 HC RX REV CODE- 258: Performed by: NURSE PRACTITIONER

## 2018-10-11 PROCEDURE — 87804 INFLUENZA ASSAY W/OPTIC: CPT

## 2018-10-11 RX ORDER — ACETAMINOPHEN 325 MG/1
650 TABLET ORAL
Status: DISCONTINUED | OUTPATIENT
Start: 2018-10-11 | End: 2018-10-14 | Stop reason: HOSPADM

## 2018-10-11 RX ORDER — ONDANSETRON 2 MG/ML
4 INJECTION INTRAMUSCULAR; INTRAVENOUS
Status: DISCONTINUED | OUTPATIENT
Start: 2018-10-11 | End: 2018-10-14 | Stop reason: HOSPADM

## 2018-10-11 RX ORDER — DEXTROSE MONOHYDRATE AND SODIUM CHLORIDE 5; .45 G/100ML; G/100ML
125 INJECTION, SOLUTION INTRAVENOUS CONTINUOUS
Status: DISCONTINUED | OUTPATIENT
Start: 2018-10-11 | End: 2018-10-14 | Stop reason: HOSPADM

## 2018-10-11 RX ORDER — OXYCODONE HYDROCHLORIDE 80 MG/1
80 TABLET, FILM COATED, EXTENDED RELEASE ORAL EVERY 12 HOURS
Status: CANCELLED | OUTPATIENT
Start: 2018-10-11

## 2018-10-11 RX ORDER — SODIUM CHLORIDE 0.9 % (FLUSH) 0.9 %
5-10 SYRINGE (ML) INJECTION EVERY 8 HOURS
Status: DISCONTINUED | OUTPATIENT
Start: 2018-10-11 | End: 2018-10-14 | Stop reason: HOSPADM

## 2018-10-11 RX ORDER — HYDROMORPHONE HYDROCHLORIDE 1 MG/ML
INJECTION, SOLUTION INTRAMUSCULAR; INTRAVENOUS; SUBCUTANEOUS
Status: COMPLETED
Start: 2018-10-11 | End: 2018-10-11

## 2018-10-11 RX ORDER — LISINOPRIL 5 MG/1
5 TABLET ORAL DAILY
Status: CANCELLED | OUTPATIENT
Start: 2018-10-11

## 2018-10-11 RX ORDER — ADHESIVE BANDAGE
30 BANDAGE TOPICAL DAILY PRN
Status: DISCONTINUED | OUTPATIENT
Start: 2018-10-11 | End: 2018-10-14 | Stop reason: HOSPADM

## 2018-10-11 RX ORDER — LISINOPRIL 5 MG/1
5 TABLET ORAL DAILY
Status: DISCONTINUED | OUTPATIENT
Start: 2018-10-11 | End: 2018-10-14 | Stop reason: HOSPADM

## 2018-10-11 RX ORDER — SODIUM CHLORIDE 9 MG/ML
125 INJECTION, SOLUTION INTRAVENOUS CONTINUOUS
Status: DISCONTINUED | OUTPATIENT
Start: 2018-10-11 | End: 2018-10-11

## 2018-10-11 RX ORDER — METOPROLOL TARTRATE 25 MG/1
25 TABLET, FILM COATED ORAL 2 TIMES DAILY
Status: DISCONTINUED | OUTPATIENT
Start: 2018-10-11 | End: 2018-10-14 | Stop reason: HOSPADM

## 2018-10-11 RX ORDER — DEFERASIROX 360 MG/1
1800 TABLET, FILM COATED ORAL DAILY
Status: DISCONTINUED | OUTPATIENT
Start: 2018-10-11 | End: 2018-10-14 | Stop reason: HOSPADM

## 2018-10-11 RX ORDER — HYDROMORPHONE HYDROCHLORIDE 1 MG/ML
2 INJECTION, SOLUTION INTRAMUSCULAR; INTRAVENOUS; SUBCUTANEOUS ONCE
Status: COMPLETED | OUTPATIENT
Start: 2018-10-11 | End: 2018-10-11

## 2018-10-11 RX ORDER — FOLIC ACID 1 MG/1
1 TABLET ORAL DAILY
Status: DISCONTINUED | OUTPATIENT
Start: 2018-10-11 | End: 2018-10-14 | Stop reason: HOSPADM

## 2018-10-11 RX ORDER — HYDROMORPHONE HYDROCHLORIDE 1 MG/ML
1 INJECTION, SOLUTION INTRAMUSCULAR; INTRAVENOUS; SUBCUTANEOUS
Status: DISCONTINUED | OUTPATIENT
Start: 2018-10-11 | End: 2018-10-14 | Stop reason: HOSPADM

## 2018-10-11 RX ORDER — SODIUM CHLORIDE 0.9 % (FLUSH) 0.9 %
5-10 SYRINGE (ML) INJECTION AS NEEDED
Status: DISCONTINUED | OUTPATIENT
Start: 2018-10-11 | End: 2018-10-14 | Stop reason: HOSPADM

## 2018-10-11 RX ORDER — FOLIC ACID 1 MG/1
1 TABLET ORAL DAILY
Status: CANCELLED | OUTPATIENT
Start: 2018-10-11

## 2018-10-11 RX ORDER — METOPROLOL TARTRATE 25 MG/1
12.5 TABLET, FILM COATED ORAL 2 TIMES DAILY
Status: CANCELLED | OUTPATIENT
Start: 2018-10-11

## 2018-10-11 RX ORDER — PROMETHAZINE HYDROCHLORIDE 25 MG/1
25 TABLET ORAL
Status: CANCELLED | OUTPATIENT
Start: 2018-10-11

## 2018-10-11 RX ORDER — LANOLIN ALCOHOL/MO/W.PET/CERES
400 CREAM (GRAM) TOPICAL DAILY
Status: CANCELLED | OUTPATIENT
Start: 2018-10-11

## 2018-10-11 RX ORDER — HYDROXYUREA 500 MG/1
500 CAPSULE ORAL 2 TIMES DAILY
Status: DISCONTINUED | OUTPATIENT
Start: 2018-10-11 | End: 2018-10-14 | Stop reason: HOSPADM

## 2018-10-11 RX ORDER — OXYCODONE HYDROCHLORIDE 15 MG/1
30 TABLET ORAL
Status: DISCONTINUED | OUTPATIENT
Start: 2018-10-11 | End: 2018-10-14 | Stop reason: HOSPADM

## 2018-10-11 RX ORDER — PROMETHAZINE HYDROCHLORIDE 25 MG/1
25 TABLET ORAL
Status: DISCONTINUED | OUTPATIENT
Start: 2018-10-11 | End: 2018-10-14 | Stop reason: HOSPADM

## 2018-10-11 RX ORDER — OXYCODONE HYDROCHLORIDE 80 MG/1
80 TABLET, FILM COATED, EXTENDED RELEASE ORAL EVERY 12 HOURS
Status: DISCONTINUED | OUTPATIENT
Start: 2018-10-11 | End: 2018-10-14 | Stop reason: HOSPADM

## 2018-10-11 RX ORDER — HYDROXYUREA 500 MG/1
500 CAPSULE ORAL 2 TIMES DAILY
Status: CANCELLED | OUTPATIENT
Start: 2018-10-11

## 2018-10-11 RX ORDER — ENOXAPARIN SODIUM 100 MG/ML
40 INJECTION SUBCUTANEOUS EVERY 24 HOURS
Status: DISCONTINUED | OUTPATIENT
Start: 2018-10-11 | End: 2018-10-14 | Stop reason: HOSPADM

## 2018-10-11 RX ADMIN — HYDROXYUREA 500 MG: 500 CAPSULE ORAL at 13:47

## 2018-10-11 RX ADMIN — HYDROMORPHONE HYDROCHLORIDE 2 MG: 1 INJECTION, SOLUTION INTRAMUSCULAR; INTRAVENOUS; SUBCUTANEOUS at 01:03

## 2018-10-11 RX ADMIN — PROMETHAZINE HYDROCHLORIDE 25 MG: 25 TABLET ORAL at 18:51

## 2018-10-11 RX ADMIN — METOPROLOL TARTRATE 25 MG: 25 TABLET ORAL at 17:08

## 2018-10-11 RX ADMIN — HYDROMORPHONE HYDROCHLORIDE 1 MG: 1 INJECTION, SOLUTION INTRAMUSCULAR; INTRAVENOUS; SUBCUTANEOUS at 03:53

## 2018-10-11 RX ADMIN — HYDROMORPHONE HYDROCHLORIDE 2 MG: 1 INJECTION, SOLUTION INTRAMUSCULAR; INTRAVENOUS; SUBCUTANEOUS at 00:01

## 2018-10-11 RX ADMIN — PROMETHAZINE HYDROCHLORIDE 25 MG: 25 TABLET ORAL at 10:44

## 2018-10-11 RX ADMIN — DEXTROSE MONOHYDRATE AND SODIUM CHLORIDE 125 ML/HR: 5; .45 INJECTION, SOLUTION INTRAVENOUS at 17:07

## 2018-10-11 RX ADMIN — METOPROLOL TARTRATE 25 MG: 25 TABLET ORAL at 10:50

## 2018-10-11 RX ADMIN — HYDROMORPHONE HYDROCHLORIDE 1 MG: 1 INJECTION, SOLUTION INTRAMUSCULAR; INTRAVENOUS; SUBCUTANEOUS at 17:10

## 2018-10-11 RX ADMIN — Medication 10 ML: at 07:52

## 2018-10-11 RX ADMIN — DEXTROSE MONOHYDRATE AND SODIUM CHLORIDE 125 ML/HR: 5; .45 INJECTION, SOLUTION INTRAVENOUS at 10:47

## 2018-10-11 RX ADMIN — FOLIC ACID 1 MG: 1 TABLET ORAL at 10:44

## 2018-10-11 RX ADMIN — HYDROXYUREA 500 MG: 500 CAPSULE ORAL at 21:38

## 2018-10-11 RX ADMIN — HYDROMORPHONE HYDROCHLORIDE 1 MG: 1 INJECTION, SOLUTION INTRAMUSCULAR; INTRAVENOUS; SUBCUTANEOUS at 07:52

## 2018-10-11 RX ADMIN — HYDROMORPHONE HYDROCHLORIDE 1 MG: 1 INJECTION, SOLUTION INTRAMUSCULAR; INTRAVENOUS; SUBCUTANEOUS at 20:17

## 2018-10-11 RX ADMIN — Medication 10 ML: at 13:50

## 2018-10-11 RX ADMIN — OXYCODONE HYDROCHLORIDE 80 MG: 80 TABLET, FILM COATED, EXTENDED RELEASE ORAL at 10:44

## 2018-10-11 RX ADMIN — ENOXAPARIN SODIUM 40 MG: 40 INJECTION, SOLUTION INTRAVENOUS; SUBCUTANEOUS at 07:54

## 2018-10-11 RX ADMIN — OXYCODONE HYDROCHLORIDE 30 MG: 15 TABLET ORAL at 18:51

## 2018-10-11 RX ADMIN — LISINOPRIL 5 MG: 5 TABLET ORAL at 12:19

## 2018-10-11 RX ADMIN — SODIUM CHLORIDE 125 ML/HR: 900 INJECTION, SOLUTION INTRAVENOUS at 07:40

## 2018-10-11 RX ADMIN — OXYCODONE HYDROCHLORIDE 30 MG: 15 TABLET ORAL at 12:21

## 2018-10-11 RX ADMIN — OXYCODONE HYDROCHLORIDE 80 MG: 80 TABLET, FILM COATED, EXTENDED RELEASE ORAL at 21:38

## 2018-10-11 RX ADMIN — HYDROMORPHONE HYDROCHLORIDE 1 MG: 1 INJECTION, SOLUTION INTRAMUSCULAR; INTRAVENOUS; SUBCUTANEOUS at 06:15

## 2018-10-11 RX ADMIN — HYDROMORPHONE HYDROCHLORIDE 1 MG: 1 INJECTION, SOLUTION INTRAMUSCULAR; INTRAVENOUS; SUBCUTANEOUS at 13:47

## 2018-10-11 RX ADMIN — HYDROMORPHONE HYDROCHLORIDE 1 MG: 1 INJECTION, SOLUTION INTRAMUSCULAR; INTRAVENOUS; SUBCUTANEOUS at 10:49

## 2018-10-11 NOTE — PROGRESS NOTES
Pt is a 38 yo with [facility] sickle cell disease, follows with Dr. Lonny Segura at [facility]. He presented to ER earlier today with arm and leg pain, uncontrolled with home pain regimen. He was admitted for sickle cell crisis and started on IV fluids and PRN Dilaudid. He reports last crisis prior to this was just one month ago. His home regimen Jadenu, Hydrea, Folic acid have been continued. Hematology consulted. This afternoon he reports his pain is some improved. Very pleasant man, we discussed home in 1-2 days when pain improved, not requiring IV pain meds. Pt admitted after MN. No charge for this visit.

## 2018-10-11 NOTE — H&P
HOSPITALIST INITIAL HISTORY AND PHYSICAL 
 
NAME:  Roly Gaspar Age:  37 y.o. 
:   1975 MRN:   [id number] PCP: Rossy Monroy MD 
Consulting MD: Treatment Team: Attending Provider: Jannette Lombardi MD; Primary Nurse: Rosalba Shea; Primary Nurse: Sherri Campo RN 
 
CHIEF COMPLAINT: leg pain HISTORY OF PRESENT ILLNESS:  
Roly Gaspar is a 37 y.o. male with a past medical history of SS disease with thalessemia who presents to the ER with complaint of leg pain consistent with typical sickle cell pain crisis. He reports that the pain started about 3 days ago and has not been improved by his home medications. He admits to some nausea, denies fevers, chills, SOB, chest pain, vomiting, diarrhea. Admits to pain in legs and arms. REVIEW OF SYSTEMS: Comprehensive ROS performed and negative except as stated in HPI. Past Medical History:  
Diagnosis Date  Chronic pain  HTN (hypertension)  Ill-defined condition   
 sickle cell  Iron overload due to repeated red blood cell transfusions 2017  Paroxysmal SVT (supraventricular tachycardia) ([facility] [address] 2016  Sickle cell disease ([facility] [address] Past Surgical History:  
Procedure Laterality Date  HC PENILE IMPL DURA II POSITINBLE    
 HC PORT LIFE SNGLE LUMEN 5013    
 to L CW  
 HX CHOLECYSTECTOMY  HX OTHER SURGICAL    
 penile inplant  HX VASCULAR ACCESS Prior to Admission Medications Prescriptions Last Dose Informant Patient Reported? Taking? deferasirox (JADENU) 360 mg tab   No No  
Sig: Take 5 Tabs by mouth daily. folic acid (FOLVITE) 1 mg tablet  Self Yes No  
Sig: Take 1 mg by mouth daily. hydroxyurea (HYDREA) 500 mg capsule   No No  
Sig: Take 1 Cap by mouth two (2) times a day. Takes in am at 0900. Star after follow up with your PCP. Patient taking differently: Take 1,000 mg by mouth daily. Takes in am at 0900. Star after follow up with your PCP. lisinopril (PRINIVIL, ZESTRIL) 5 mg tablet   Yes No  
Sig: Take 5 mg by mouth daily. Indications: HYPERTENSION  
magnesium oxide (MAG-OX) 400 mg tablet   No No  
Sig: Take 1 Tab by mouth daily. metoprolol (LOPRESSOR) 25 mg tablet   Yes No  
Sig: Take 12.5 mg by mouth two (2) times a day. Indications: hypertension  
oxyCODONE ER (OXYCONTIN) 80 mg ER tablet   No No  
Sig: Take 1 Tab by mouth every twelve (12) hours. Max Daily Amount: 160 mg.  
oxyCODONE IR (ROXICODONE) 30 mg immediate release tablet   No No  
Sig: Take 1 Tab by mouth every four (4) hours as needed. Max Daily Amount: 180 mg.  
promethazine (PHENERGAN) 25 mg tablet   No No  
Sig: Take 1 Tab by mouth every six (6) hours as needed. May substitute suppository if vomiting Facility-Administered Medications: None Allergies Allergen Reactions  Compazine [Prochlorperazine Edisylate] Other (comments) Also makes him feel funny  Morphine Other (comments) Makes him feel funny  Reglan [Metoclopramide] Other (comments) \"feel funny\"  Zofran [Ondansetron Hcl (Pf)] Other (comments) Make him feel funny FAMILY HISTORY: Reviewed. Negative except Family History Problem Relation Age of Onset  Hypertension Other  Diabetes Father  Stroke Father  Sickle Cell Anemia Sister Social History Substance Use Topics  Smoking status: Never Smoker  Smokeless tobacco: Never Used  Alcohol use No  
 
 
 
Objective:  
 
Visit Vitals  /82  Pulse 88  Temp 98.6 °F (37 °C)  Resp 18  Ht 5' 10\" (1.778 m)  Wt 76.2 kg (168 lb)  SpO2 98%  BMI 24.11 kg/m2 Temp (24hrs), Av.6 °F (37 °C), Min:98.6 °F (37 °C), Max:98.6 °F (37 °C) Oxygen Therapy O2 Sat (%): 98 % (10/11/18 0229) Pulse via Oximetry: 88 beats per minute (10/11/18 0229) O2 Device: Nasal cannula (10/10/18 2305) O2 Flow Rate (L/min): 2 l/min (10/10/18 2305) Physical Exam: General:    The patient is a very pleasant middle aged male in no acute distress. Head:   Normocephalic/atraumatic. Eyes:  No palpebral pallor or scleral icterus. ENT:  External auricular and nasal exam within normal limits. Mucous membranes are moist. 
Neck:  Supple, non-tender, no JVD. Lungs:   Clear to auscultation bilaterally without wheezes or crackles. No respiratory distress or accessory muscle use. Heart:   Regular rate and rhythm, without murmurs, rubs, or gallops. Abdomen:   Soft, non-tender, non-distended with normoactive bowel sounds. Genitourinary: No tenderness over the bladder or bilateral CVAs. Extremities: Without clubbing, cyanosis, or edema. Skin:     Normal color, texture, and turgor. No rashes, lesions, or jaundice. Pulses: Radial and dorsalis pedis pulses present 2+ bilaterally. Capillary refill <2s. Neurologic: CN II-XII grossly intact and symmetrical.  
  Moving all four extremities well with no focal deficits. Psychiatric: Pleasant demeanor, appropriate affect. Alert and oriented x 3 Data Review:  
Recent Results (from the past 24 hour(s)) CBC WITH AUTOMATED DIFF Collection Time: 10/10/18  9:39 PM  
Result Value Ref Range WBC 12.7 (H) 4.3 - 11.1 K/uL  
 RBC 2.58 (L) 4.23 - 5.6 M/uL HGB 8.6 (L) 13.6 - 17.2 g/dL HCT 25.3 (L) 41.1 - 50.3 % MCV 98.1 (H) 79.6 - 97.8 FL  
 MCH 33.3 (H) 26.1 - 32.9 PG  
 MCHC 34.0 31.4 - 35.0 g/dL RDW Cannot be calculated % PLATELET 720 759 - 007 K/uL MPV 10.5 9.4 - 12.3 FL ABSOLUTE NRBC 0.60 (H) 0.0 - 0.2 K/uL NEUTROPHILS 52 43 - 78 % LYMPHOCYTES 34 13 - 44 % MONOCYTES 13 (H) 4.0 - 12.0 % EOSINOPHILS 1 0.5 - 7.8 % BASOPHILS 0 0.0 - 2.0 % IMMATURE GRANULOCYTES 0 0.0 - 5.0 %  
 ABS. NEUTROPHILS 6.6 1.7 - 8.2 K/UL  
 ABS. LYMPHOCYTES 4.3 0.5 - 4.6 K/UL  
 ABS. MONOCYTES 1.7 (H) 0.1 - 1.3 K/UL  
 ABS. EOSINOPHILS 0.1 0.0 - 0.8 K/UL  
 ABS. BASOPHILS 0.0 0.0 - 0.2 K/UL ABS. IMM. GRANS. 0.0 0.0 - 0.5 K/UL  
 RBC COMMENTS MARKED 
ANISOCYTOSIS + POIKILOCYTOSIS 
    
 RBC COMMENTS OCCASIONAL 
SCHISTOCYTES 
    
 RBC COMMENTS MARKED 
POLYCHROMASIA 
    
 RBC COMMENTS MODERATE HYPOCHROMIA 
    
 RBC COMMENTS MARKED SICKLE CELLS 
    
 RBC COMMENTS OCCASIONAL 
TARGET CELLS 
    
 WBC COMMENTS Result Confirmed By Smear PLATELET COMMENTS SLIGHT    
 DF AUTOMATED METABOLIC PANEL, COMPREHENSIVE Collection Time: 10/10/18  9:39 PM  
Result Value Ref Range Sodium 139 136 - 145 mmol/L Potassium 3.5 3.5 - 5.1 mmol/L Chloride 106 98 - 107 mmol/L  
 CO2 27 21 - 32 mmol/L Anion gap 6 (L) 7 - 16 mmol/L Glucose 92 65 - 100 mg/dL BUN 9 6 - 23 MG/DL Creatinine 0.83 0.8 - 1.5 MG/DL  
 GFR est AA >60 >60 ml/min/1.73m2 GFR est non-AA >60 >60 ml/min/1.73m2 Calcium 8.4 8.3 - 10.4 MG/DL Bilirubin, total 0.9 0.2 - 1.1 MG/DL  
 ALT (SGPT) 47 12 - 65 U/L  
 AST (SGOT) 35 15 - 37 U/L Alk. phosphatase 74 50 - 136 U/L Protein, total 8.1 6.3 - 8.2 g/dL Albumin 3.4 (L) 3.5 - 5.0 g/dL Globulin 4.7 (H) 2.3 - 3.5 g/dL A-G Ratio 0.7 (L) 1.2 - 3.5 RETICULOCYTE COUNT Collection Time: 10/10/18  9:39 PM  
Result Value Ref Range Reticulocyte count 5.2 (H) 0.3 - 2.0 % Absolute Retic Cnt. 0.1334 (H) 0.026 - 0.095 M/ul Immature Retic Fraction 33.4 (H) 2.3 - 13.4 % Retic Hgb Conc. 43 (H) 29 - 35 pg  
LD Collection Time: 10/10/18 10:15 PM  
Result Value Ref Range  (H) 100 - 190 U/L Imaging Dorothe Loud /Studies: No results found. Assessment and Plan:  
 
Principal Problem: 
  Sickle cell pain crisis ([address] (10/25/2012) IV pain control, IVF, nausea control. Active Problems: 
  Leg pain (6/24/2018) Per above. Sickle cell anemia (HCC) (4/6/2016) Stable, continue to follow CBC. Sickle cell disease ([facility] [address] (7/13/2017) Per above. Leukocytosis (10/11/2018) Mild, follow CBC. Paroxysmal SVT (supraventricular tachycardia) ([facility] [address] (7/9/2016) Stable. Essential hypertension, benign (6/23/2012) Stable, continue home meds DVT Prophylaxis: Lovenox Code Status: FULL CODE Disposition: Admit to med/surg for evaluation and treatment as per above. Anticipated discharge: 2-3 days Signed By: Isiah Martin MD   
 October 11, 2018

## 2018-10-11 NOTE — PROGRESS NOTES
Initial visit by  to convey care and concern and encourage patient that  services are available if desired. No needs were voiced during the visit. Provided business card for future reference. Denver Favre, MDiv Board Certified [employer]

## 2018-10-11 NOTE — PROGRESS NOTES
END OF SHIFT NOTE: 
 
Intake/Output Voiding: YES Catheter: NO 
Drain:   
 
 
 
 
 
Stool:  0 occurrences. Emesis:  0 occurrences. VITAL SIGNS Patient Vitals for the past 12 hrs: 
 Temp Pulse Resp BP SpO2  
10/11/18 0612 98.1 °F (36.7 °C) 86 18 (!) 131/98 92 % 10/11/18 0429 98 °F (36.7 °C) 71 18 113/68 98 % 10/11/18 0358 - 74 - 109/70 99 % 10/11/18 0329 - 80 - 106/75 91 % 10/11/18 0258 - 83 - 115/64 98 % 10/11/18 0229 - 88 - 128/82 98 % 10/11/18 0158 - 86 - 136/79 97 % 10/11/18 0129 - 93 - 141/82 97 % 10/11/18 0058 - 84 - 136/80 97 % 10/11/18 0028 - 86 - 133/81 97 % 10/10/18 2358 - 89 - 135/83 99 % 10/10/18 2328 - 89 - 137/82 99 % 10/10/18 2305 - - - - 96 % 10/10/18 2259 - 88 - (!) 161/93 97 % 10/10/18 2245 - - - - 99 % 10/10/18 2228 - 86 - 136/85 96 % 10/10/18 2158 - 91 - 139/85 96 % 10/10/18 2140 - 86 - 132/80 95 % 10/10/18 2020 98.6 °F (37 °C) 94 18 134/73 94 % Pain Assessment Pain 1 Pain Scale 1: Numeric (0 - 10) (10/11/18 0631) Pain Intensity 1: 7 (10/11/18 0631) Patient Stated Pain Goal: 2 (10/11/18 0631) Pain Reassessment 1: Yes (10/11/18 0631) Pain Onset 1: 2 Days ago (10/11/18 0631) Pain Location 1: Arm;Leg (10/11/18 0631) Pain Orientation 1: Lower (10/11/18 0631) Pain Description 1: Aching; Throbbing (10/11/18 0631) Pain Intervention(s) 1: Medication (see MAR) (10/11/18 0631) Ambulating Yes Additional Information:  
Pt received at 0600. IV dilaudid given Skin assessment performed with Lan Barajas RN No needs voiced at this time Shift report given to oncoming nurse at the bedside. Jessie Betancourt

## 2018-10-11 NOTE — PROGRESS NOTES
10/11/18 2114 Dual Skin Pressure Injury Assessment Dual Skin Pressure Injury Assessment WDL Second Care Provider (Based on [address] [place] [address]) Coretta Canseco [facility] Skin is unremarkable. No pressure injury noted.

## 2018-10-11 NOTE — ED TRIAGE NOTES
Pt sts\" I'm having some sickle cell pain. This is the third day its been going on. I've been trying to stay well hydrated\" Pt is calm and comfortable during triage, doesn't appear to be in pain. Pt has a port in his leg that he wants accessed, because he \"don't have any veins in my arms\"

## 2018-10-11 NOTE — CONSULTS
[facility] Hematology & Oncology        Inpatient Hematology / Oncology Consult Note    Reason for Consult:  Sickle cell anemia [facility])  Referring Physician:  Rocio Mart MD    History of Present Illness:  Mr. Flaco Winters is a 37 y.o. male admitted on 10/10/2018. He is a known patient of Dr. Ernestine Woody at [facility] with sickle/beta+ thal, admitted for arm/leg pain crisis. He presented to ED with c/o arm and leg pain x 3 days, unrelieved with pain meds at home. He also c/o nausea. Hgb 7.3 (b/l 7s). Retic 5.2. He takes Jadenu, Hydrea, and folic acid daily. We were consulted for recommendations. Review of Systems:  Constitutional +chills. Denies fever, weight loss, appetite changes, fatigue, night sweats. HEENT Denies trauma, blurry vision, hearing loss, ear pain, nosebleeds, sore throat, neck pain and ear discharge. Skin Denies lesions or rashes. Lungs Denies dyspnea, cough, sputum production or hemoptysis. Cardiovascular Denies chest pain, palpitations, or lower extremity edema. Gastrointestinal +nausea. Denies vomiting, changes in bowel habits, bloody or black stools, abdominal pain.  Denies dysuria, frequency or hesitancy of urination. Neuro Denies headaches, visual changes or ataxia. Denies dizziness, tingling, tremors, sensory change, speech change, focal weakness or headaches. Hematology Denies easy bruising or bleeding, denies gingival bleeding or epistaxis. Endo Denies heat/cold intolerance, denies diabetes or thyroid abnormalities. MSK +arm/leg pain. Denies back pain, arthralgias, myalgias or frequent falls. Psychiatric/Behavioral Denies depression and substance abuse. The patient is not nervous/anxious.          Allergies   Allergen Reactions    Compazine [Prochlorperazine Edisylate] Other (comments)     Also makes him feel funny    Morphine Other (comments)     Makes him feel funny    Reglan [Metoclopramide] Other (comments)     \"feel funny\"    Zofran [Ondansetron Hcl (Pf)] Other (comments)     Make him feel funny     Past Medical History:   Diagnosis Date    Chronic pain     HTN (hypertension)     Ill-defined condition     sickle cell    Iron overload due to repeated red blood cell transfusions 1/18/2017    Paroxysmal SVT (supraventricular tachycardia) (HCC) 7/9/2016    Sickle cell disease ([facility] [address]      Past Surgical History:   Procedure Laterality Date    HC PENILE IMPL DURA II POSITINBLE      HC PORT LIFE SNGLE LUMEN 5013      to L CW    HX CHOLECYSTECTOMY      HX OTHER SURGICAL      penile inplant    HX VASCULAR ACCESS       Family History   Problem Relation Age of Onset    Hypertension Other     Diabetes Father     Stroke Father     Sickle Cell Anemia Sister      Social History     Social History    Marital status:      Spouse name: N/A    Number of children: N/A    Years of education: N/A     Occupational History    Not on file.      Social History Main Topics    Smoking status: Never Smoker    Smokeless tobacco: Never Used    Alcohol use No    Drug use: No    Sexual activity: Yes     Other Topics Concern    Not on file     Social History Narrative     Current Facility-Administered Medications   Medication Dose Route Frequency Provider Last Rate Last Dose    sodium chloride (NS) flush 5-10 mL  5-10 mL IntraVENous Q8H Rhonda Castellano MD   10 mL at 10/11/18 0752    sodium chloride (NS) flush 5-10 mL  5-10 mL IntraVENous PRN Rhonda Castellano MD        acetaminophen (TYLENOL) tablet 650 mg  650 mg Oral Q4H PRN Rhonda Castellano MD        ondansetron [facility]) injection 4 mg  4 mg IntraVENous Q4H PRN Rhonda Castellano MD        magnesium hydroxide (MILK OF MAGNESIA) 400 mg/5 mL oral suspension 30 mL  30 mL Oral DAILY PRN Rhonda Castellano MD        enoxaparin (LOVENOX) injection 40 mg  40 mg SubCUTAneous Q24H Rhonda Castellano MD   40 mg at 10/11/18 0754    HYDROmorphone (PF) (DILAUDID) injection 1 mg  1 mg IntraVENous Q3H PRN Rhonda Castellano MD   1 mg at 10/11/18 0752    deferasirox (JADENU) tablet 1,800 mg  1,800 mg Oral DAILY Kayla Jhaveri NP        folic acid (FOLVITE) tablet 1 mg  1 mg Oral DAILY Kayla Jhaveri NP        hydroxyurea (HYDREA) chemo cap 500 mg  500 mg Oral BID Kayla Jhaveri NP        dextrose 5 % - 0.45% NaCl infusion  125 mL/hr IntraVENous CONTINUOUS Kayla Jhaveri NP        promethazine (PHENERGAN) tablet 25 mg  25 mg Oral Q6H PRN Kayla Jhaveri NP        oxyCODONE ER (OxyCONTIN) tablet 80 mg  80 mg Oral Q12H Kayla Jhaveri NP        oxyCODONE IR (OXY-IR) immediate release tablet 30 mg  30 mg Oral Q4H PRN Kayla Jhaveri NP        metoprolol tartrate (LOPRESSOR) tablet 25 mg  25 mg Oral BID Kayla Jhaveri NP        lisinopril (PRINIVIL, ZESTRIL) tablet 5 mg  5 mg Oral DAILY Kayla Jhaveri NP           OBJECTIVE:  Patient Vitals for the past 8 hrs:   BP Temp Pulse Resp SpO2 Weight   10/11/18 0754 (!) 148/92 97.9 °F (36.6 °C) 73 18 99 % -   10/11/18 0612 (!) 131/98 98.1 °F (36.7 °C) 86 18 92 % 165 lb 4.8 oz (75 kg)   10/11/18 0429 113/68 98 °F (36.7 °C) 71 18 98 % -   10/11/18 0358 109/70 - 74 - 99 % -   10/11/18 0329 106/75 - 80 - 91 % -   10/11/18 0258 115/64 - 83 - 98 % -     Temp (24hrs), Av.2 °F (36.8 °C), Min:97.9 °F (36.6 °C), Max:98.6 °F (37 °C)         Physical Exam:  Constitutional: Well developed, well nourished male in no acute distress, sitting comfortably in the hospital bed. HEENT: Normocephalic and atraumatic. Oropharynx is clear, mucous membranes are moist. Extraocular muscles are intact. Sclerae anicteric. Neck supple without JVD. No thyromegaly present. Skin Warm and dry. No bruising and no rash noted. No erythema. No pallor. Respiratory Lungs are clear to auscultation bilaterally without wheezes, rales or rhonchi, normal air exchange without accessory muscle use. CVS Normal rate, regular rhythm and normal S1 and S2. No murmurs, gallops, or rubs. Abdomen Soft, nontender and nondistended, normoactive bowel sounds. No palpable mass. No hepatosplenomegaly. Neuro Grossly nonfocal with no obvious sensory or motor deficits. MSK Normal range of motion in general.  No edema and no tenderness. Psych Appropriate mood and affect. Labs:    Recent Results (from the past 24 hour(s))   CBC WITH AUTOMATED DIFF    Collection Time: 10/10/18  9:39 PM   Result Value Ref Range    WBC 12.7 (H) 4.3 - 11.1 K/uL    RBC 2.58 (L) 4.23 - 5.6 M/uL    HGB 8.6 (L) 13.6 - 17.2 g/dL    HCT 25.3 (L) 41.1 - 50.3 %    MCV 98.1 (H) 79.6 - 97.8 FL    MCH 33.3 (H) 26.1 - 32.9 PG    MCHC 34.0 31.4 - 35.0 g/dL    RDW Cannot be calculated %    PLATELET 034 341 - 553 K/uL    MPV 10.5 9.4 - 12.3 FL    ABSOLUTE NRBC 0.60 (H) 0.0 - 0.2 K/uL    NEUTROPHILS 52 43 - 78 %    LYMPHOCYTES 34 13 - 44 %    MONOCYTES 13 (H) 4.0 - 12.0 %    EOSINOPHILS 1 0.5 - 7.8 %    BASOPHILS 0 0.0 - 2.0 %    IMMATURE GRANULOCYTES 0 0.0 - 5.0 %    ABS. NEUTROPHILS 6.6 1.7 - 8.2 K/UL    ABS. LYMPHOCYTES 4.3 0.5 - 4.6 K/UL    ABS. MONOCYTES 1.7 (H) 0.1 - 1.3 K/UL    ABS. EOSINOPHILS 0.1 0.0 - 0.8 K/UL    ABS. BASOPHILS 0.0 0.0 - 0.2 K/UL    ABS. IMM.  GRANS. 0.0 0.0 - 0.5 K/UL    RBC COMMENTS MARKED  ANISOCYTOSIS + POIKILOCYTOSIS        RBC COMMENTS OCCASIONAL  SCHISTOCYTES        RBC COMMENTS MARKED  POLYCHROMASIA        RBC COMMENTS MODERATE  HYPOCHROMIA        RBC COMMENTS MARKED  SICKLE CELLS        RBC COMMENTS OCCASIONAL  TARGET CELLS        WBC COMMENTS Result Confirmed By Smear      PLATELET COMMENTS SLIGHT      DF AUTOMATED     METABOLIC PANEL, COMPREHENSIVE    Collection Time: 10/10/18  9:39 PM   Result Value Ref Range    Sodium 139 136 - 145 mmol/L    Potassium 3.5 3.5 - 5.1 mmol/L    Chloride 106 98 - 107 mmol/L    CO2 27 21 - 32 mmol/L    Anion gap 6 (L) 7 - 16 mmol/L    Glucose 92 65 - 100 mg/dL    BUN 9 6 - 23 MG/DL    Creatinine 0.83 0.8 - 1.5 MG/DL    GFR est AA >60 >60 ml/min/1.73m2    GFR est non-AA >60 >60 ml/min/1.73m2    Calcium 8.4 8.3 - 10.4 MG/DL Bilirubin, total 0.9 0.2 - 1.1 MG/DL    ALT (SGPT) 47 12 - 65 U/L    AST (SGOT) 35 15 - 37 U/L    Alk. phosphatase 74 50 - 136 U/L    Protein, total 8.1 6.3 - 8.2 g/dL    Albumin 3.4 (L) 3.5 - 5.0 g/dL    Globulin 4.7 (H) 2.3 - 3.5 g/dL    A-G Ratio 0.7 (L) 1.2 - 3.5     RETICULOCYTE COUNT    Collection Time: 10/10/18  9:39 PM   Result Value Ref Range    Reticulocyte count 5.2 (H) 0.3 - 2.0 %    Absolute Retic Cnt. 0.1334 (H) 0.026 - 0.095 M/ul    Immature Retic Fraction 33.4 (H) 2.3 - 13.4 %    Retic Hgb Conc. 43 (H) 29 - 35 pg   LD    Collection Time: 10/10/18 10:15 PM   Result Value Ref Range     (H) 100 - 190 U/L   INFLUENZA A & B AG (RAPID TEST)    Collection Time: 10/11/18  5:31 AM   Result Value Ref Range    Influenza A Ag NEGATIVE  NEG      Influenza B Ag NEGATIVE  NEG      Source NASOPHARYNGEAL     CBC WITH AUTOMATED DIFF    Collection Time: 10/11/18  7:40 AM   Result Value Ref Range    WBC 11.9 (H) 4.3 - 11.1 K/uL    RBC 2.18 (L) 4.23 - 5.6 M/uL    HGB 7.3 (L) 13.6 - 17.2 g/dL    HCT 21.9 (L) 41.1 - 50.3 %    .5 (H) 79.6 - 97.8 FL    MCH 33.5 (H) 26.1 - 32.9 PG    MCHC 33.3 31.4 - 35.0 g/dL    RDW Cannot be calculated %    PLATELET 742 189 - 141 K/uL    MPV 10.7 9.4 - 12.3 FL    ABSOLUTE NRBC 0.54 (H) 0.0 - 0.2 K/uL    DF AUTOMATED      NEUTROPHILS 54 43 - 78 %    LYMPHOCYTES 32 13 - 44 %    MONOCYTES 12 4.0 - 12.0 %    EOSINOPHILS 1 0.5 - 7.8 %    BASOPHILS 0 0.0 - 2.0 %    IMMATURE GRANULOCYTES 0 0.0 - 5.0 %    ABS. NEUTROPHILS 6.4 1.7 - 8.2 K/UL    ABS. LYMPHOCYTES 3.8 0.5 - 4.6 K/UL    ABS. MONOCYTES 1.4 (H) 0.1 - 1.3 K/UL    ABS. EOSINOPHILS 0.2 0.0 - 0.8 K/UL    ABS. BASOPHILS 0.0 0.0 - 0.2 K/UL    ABS. IMM.  GRANS. 0.0 0.0 - 0.5 K/UL       Imaging:  n/a    ASSESSMENT:  Problem List  Date Reviewed: 7/22/2018          Codes Class Noted    Leukocytosis ICD-10-CM: C47.181  ICD-9-CM: 288.60  10/11/2018        Volume overload ICD-10-CM: E87.70  ICD-9-CM: 276.69  6/28/2018        Leg pain ICD-10-CM: Q44.367  ICD-9-CM: 729.5  6/24/2018        Sickle cell disease ([facility] ICD-10-CM: D57.1  ICD-9-CM: 282.60  7/13/2017        Iron overload due to repeated red blood cell transfusions ICD-10-CM: E83.111  ICD-9-CM: 275.02  1/18/2017        Paroxysmal SVT (supraventricular tachycardia) ([facility]) ICD-10-CM: I47.1  ICD-9-CM: 427.0  7/9/2016        Sickle cell anemia (HCC) ICD-10-CM: D57.1  ICD-9-CM: 282.60  4/6/2016        * (Principal)Sickle cell pain crisis ([facility] ICD-10-CM: D57.00  ICD-9-CM: 282.62  10/25/2012        Essential hypertension, benign ICD-10-CM: I10  ICD-9-CM: 401.1  6/23/2012        Precordial pain ICD-10-CM: R07.2  ICD-9-CM: 786.51  3/24/2012    Overview Signed 3/24/2012  2:53 PM by Terri Bui     Acute chest syndrome unlikely. RECOMMENDATIONS:  Sickle/Beta+ Thal with arm/leg pain crisis  - Hgb 7.3 (b/l 7s)  - Change IVF to D5 1/2 NS  - Continue home pain med regimen with IV dilaudid for breakthrough  - Continue jadenu, hydrea, and folic acid    Nausea  - PO phenergan ordered for nausea (pt request)    Hypertension  - Resume home meds      Lab studies and imaging studies were personally reviewed. Thank you for allowing us to participate in the care of Mr. Cabrera. He will need to f/u with Dr. Ana Daugherty at [facility] after discharge.          Denise Vega NP   [facility] Hematology & Oncology  [address] [address]  Office : [phone]  Fax : [phone]

## 2018-10-11 NOTE — ED PROVIDER NOTES
HPI Comments: 38-year-old male has sickle cell anemia with thalassemia. He states aching in his arms and legs that started 3 days ago. Seen at another hospital 3 days ago and treated with some IV medications. Cells hematologist today I received some medications IV along with fluids as well. Pain temporarily better but seemed to be worse again tonight and has had 3 episodes of vomiting. No chest pain, shortness of breath, abdominal pain. No dysuria or fever. Takes oxycodone at home. This type of pain is similar to his previous pain crises. Patient is a 37 y.o. male presenting with sickle cell disease. The history is provided by the patient. Sickle Cell Crisis This is a new problem. The current episode started more than 2 days ago. The problem has not changed since onset. The problem occurs constantly. The pain is associated with no known injury. The quality of the pain is described as aching and dull. The pain is moderate. Pertinent negatives include no chest pain, no fever, no abdominal pain and no dysuria. Past Medical History:  
Diagnosis Date  Chronic pain  HTN (hypertension)  Ill-defined condition   
 sickle cell  Iron overload due to repeated red blood cell transfusions 1/18/2017  Paroxysmal SVT (supraventricular tachycardia) ([facility] [address] 7/9/2016  Sickle cell disease ([facility] [address] Past Surgical History:  
Procedure Laterality Date  HC PENILE IMPL DURA II POSITINBLE    
 HC PORT LIFE SNGLE LUMEN 5013    
 to L CW  
 HX CHOLECYSTECTOMY  HX OTHER SURGICAL    
 penile inplant  HX VASCULAR ACCESS Family History:  
Problem Relation Age of Onset  Hypertension Other  Diabetes Father  Stroke Father  Sickle Cell Anemia Sister Social History Social History  Marital status:  Spouse name: N/A  
 Number of children: N/A  
 Years of education: N/A Occupational History  Not on file. Social History Main Topics  Smoking status: Never Smoker  Smokeless tobacco: Never Used  Alcohol use No  
 Drug use: No  
 Sexual activity: Yes Other Topics Concern  Not on file Social History Narrative ALLERGIES: Compazine [prochlorperazine edisylate]; Morphine; Reglan [metoclopramide]; and Zofran [ondansetron hcl (pf)] Review of Systems Constitutional: Negative for chills and fever. Respiratory: Negative for cough and shortness of breath. Cardiovascular: Negative for chest pain and palpitations. Gastrointestinal: Negative for abdominal pain, diarrhea and vomiting. Genitourinary: Negative for dysuria and flank pain. Musculoskeletal: Negative for back pain and neck pain. Skin: Negative for color change and rash. All other systems reviewed and are negative. Vitals:  
 10/10/18 2020 BP: 134/73 Pulse: 94 Resp: 18 Temp: 98.6 °F (37 °C) SpO2: 94% Weight: 76.2 kg (168 lb) Height: 5' 10\" (1.778 m) Physical Exam  
Constitutional: He is oriented to person, place, and time. He appears well-developed and well-nourished. No distress. HENT:  
Head: Normocephalic and atraumatic. Right Ear: External ear normal.  
Left Ear: External ear normal.  
Eyes: Conjunctivae and EOM are normal. Pupils are equal, round, and reactive to light. Neck: Normal range of motion. Neck supple. Cardiovascular: Normal rate, regular rhythm and intact distal pulses. No murmur heard. Pulmonary/Chest: Breath sounds normal. No respiratory distress. Abdominal: Soft. Bowel sounds are normal. He exhibits no mass. There is no tenderness. There is no rebound and no guarding. No hernia. Neurological: He is alert and oriented to person, place, and time. Gait normal.  
Nl speech Skin: Skin is warm and dry. Psychiatric: He has a normal mood and affect. His speech is normal.  
Nursing note and vitals reviewed. MDM Number of Diagnoses or Management Options Diagnosis management comments: Check screening lab work. IV fluids nausea control and pain medication. Amount and/or Complexity of Data Reviewed Clinical lab tests: ordered and reviewed Review and summarize past medical records: yes (Patient seen at infusion center today. Has IV fluids along with 2 mg Dilaudid injections ×3. Oxycodone at time,, 80 mg twice a day with 15 mg for breakthrough. Treated at [facility] 3 days ago for pain crisis as well essentially baseline labs.) Risk of Complications, Morbidity, and/or Mortality Presenting problems: moderate Diagnostic procedures: low Management options: moderate Patient Progress Patient progress: stable ED Course Procedures Results Include: 
 
Recent Results (from the past 24 hour(s)) CBC WITH AUTOMATED DIFF Collection Time: 10/10/18  9:39 PM  
Result Value Ref Range WBC 12.7 (H) 4.3 - 11.1 K/uL  
 RBC 2.58 (L) 4.23 - 5.6 M/uL HGB 8.6 (L) 13.6 - 17.2 g/dL HCT 25.3 (L) 41.1 - 50.3 % MCV 98.1 (H) 79.6 - 97.8 FL  
 MCH 33.3 (H) 26.1 - 32.9 PG  
 MCHC 34.0 31.4 - 35.0 g/dL RDW Cannot be calculated % PLATELET 155 790 - 564 K/uL MPV 10.5 9.4 - 12.3 FL ABSOLUTE NRBC 0.60 (H) 0.0 - 0.2 K/uL NEUTROPHILS 52 43 - 78 % LYMPHOCYTES 34 13 - 44 % MONOCYTES 13 (H) 4.0 - 12.0 % EOSINOPHILS 1 0.5 - 7.8 % BASOPHILS 0 0.0 - 2.0 % IMMATURE GRANULOCYTES 0 0.0 - 5.0 %  
 ABS. NEUTROPHILS 6.6 1.7 - 8.2 K/UL  
 ABS. LYMPHOCYTES 4.3 0.5 - 4.6 K/UL  
 ABS. MONOCYTES 1.7 (H) 0.1 - 1.3 K/UL  
 ABS. EOSINOPHILS 0.1 0.0 - 0.8 K/UL  
 ABS. BASOPHILS 0.0 0.0 - 0.2 K/UL  
 ABS. IMM. GRANS. 0.0 0.0 - 0.5 K/UL  
 RBC COMMENTS MARKED 
ANISOCYTOSIS + POIKILOCYTOSIS 
    
 RBC COMMENTS OCCASIONAL 
SCHISTOCYTES 
    
 RBC COMMENTS MARKED 
POLYCHROMASIA 
    
 RBC COMMENTS MODERATE HYPOCHROMIA 
    
 RBC COMMENTS MARKED SICKLE CELLS 
    
 RBC COMMENTS OCCASIONAL 
TARGET CELLS 
    
 WBC COMMENTS Result Confirmed By Smear PLATELET COMMENTS SLIGHT    
 DF AUTOMATED METABOLIC PANEL, COMPREHENSIVE Collection Time: 10/10/18  9:39 PM  
Result Value Ref Range Sodium 139 136 - 145 mmol/L Potassium 3.5 3.5 - 5.1 mmol/L Chloride 106 98 - 107 mmol/L  
 CO2 27 21 - 32 mmol/L Anion gap 6 (L) 7 - 16 mmol/L Glucose 92 65 - 100 mg/dL BUN 9 6 - 23 MG/DL Creatinine 0.83 0.8 - 1.5 MG/DL  
 GFR est AA >60 >60 ml/min/1.73m2 GFR est non-AA >60 >60 ml/min/1.73m2 Calcium 8.4 8.3 - 10.4 MG/DL Bilirubin, total 0.9 0.2 - 1.1 MG/DL  
 ALT (SGPT) 47 12 - 65 U/L  
 AST (SGOT) 35 15 - 37 U/L Alk. phosphatase 74 50 - 136 U/L Protein, total 8.1 6.3 - 8.2 g/dL Albumin 3.4 (L) 3.5 - 5.0 g/dL Globulin 4.7 (H) 2.3 - 3.5 g/dL A-G Ratio 0.7 (L) 1.2 - 3.5 RETICULOCYTE COUNT Collection Time: 10/10/18  9:39 PM  
Result Value Ref Range Reticulocyte count 5.2 (H) 0.3 - 2.0 % Absolute Retic Cnt. 0.1334 (H) 0.026 - 0.095 M/ul Immature Retic Fraction 33.4 (H) 2.3 - 13.4 % Retic Hgb Conc. 43 (H) 29 - 35 pg  
LD Collection Time: 10/10/18 10:15 PM  
Result Value Ref Range  (H) 100 - 190 U/L  
 
2:30 AM 
Patient had 3 doses of pain medications. Had partial relief of pain but still has significant pain according to him. I will discuss with hospitalist for admission

## 2018-10-11 NOTE — PROGRESS NOTES
SW met with pt at bedside. He is AAOx4 and able to make needs known. Pt is  and lives with his spouse in a house with tonny. However, pt does not have a problem with going up the stairs. He reports seeing his PCP a month ag o. He does not have any DME and is independent with ADL's. Pt stated he does drive, but does not work. No other needs at this time. SW will continue to monitor and follow up with discharge planning. Care Management Interventions PCP Verified by CM: Yes Last Visit to PCP: 09/11/18 Mode of Transport at Discharge: Self Transition of Care Consult (CM Consult): Discharge Planning Discharge Durable Medical Equipment: No 
Physical Therapy Consult: No 
Occupational Therapy Consult: No 
Speech Therapy Consult: No 
Current Support Network: Lives with Spouse Confirm Follow Up Transport: Family Plan discussed with Pt/Family/Caregiver: Yes Freedom of Choice Offered: Yes Discharge Location Discharge Placement: Home

## 2018-10-11 NOTE — PROGRESS NOTES
TRANSFER - IN REPORT: 
 
Verbal report received from Kris Villalpando, [facility] (name) on Roly Gaspar  being received from ED(unit) for routine progression of care Report consisted of patients Situation, Background, Assessment and  
Recommendations(SBAR). Information from the following report(s) SBAR was reviewed with the receiving nurse. Opportunity for questions and clarification was provided. Assessment completed upon patients arrival to unit and care assumed.

## 2018-10-12 LAB
ALBUMIN SERPL-MCNC: 3 G/DL (ref 3.5–5)
ALBUMIN/GLOB SERPL: 0.7 {RATIO} (ref 1.2–3.5)
ALP SERPL-CCNC: 68 U/L (ref 50–136)
ALT SERPL-CCNC: 44 U/L (ref 12–65)
ANION GAP SERPL CALC-SCNC: 7 MMOL/L (ref 7–16)
AST SERPL-CCNC: 37 U/L (ref 15–37)
BASOPHILS # BLD: 0 K/UL (ref 0–0.2)
BASOPHILS NFR BLD: 0 % (ref 0–2)
BILIRUB SERPL-MCNC: 0.7 MG/DL (ref 0.2–1.1)
BUN SERPL-MCNC: 11 MG/DL (ref 6–23)
CALCIUM SERPL-MCNC: 8.2 MG/DL (ref 8.3–10.4)
CHLORIDE SERPL-SCNC: 105 MMOL/L (ref 98–107)
CO2 SERPL-SCNC: 26 MMOL/L (ref 21–32)
CREAT SERPL-MCNC: 0.84 MG/DL (ref 0.8–1.5)
DIFFERENTIAL METHOD BLD: ABNORMAL
EOSINOPHIL # BLD: 0.2 K/UL (ref 0–0.8)
EOSINOPHIL NFR BLD: 2 % (ref 0.5–7.8)
ERYTHROCYTE [DISTWIDTH] IN BLOOD BY AUTOMATED COUNT: 0 %
GLOBULIN SER CALC-MCNC: 4.2 G/DL (ref 2.3–3.5)
GLUCOSE SERPL-MCNC: 152 MG/DL (ref 65–100)
HCT VFR BLD AUTO: 21 % (ref 41.1–50.3)
HGB BLD-MCNC: 6.8 G/DL (ref 13.6–17.2)
HGB RETIC QN AUTO: 46 PG (ref 29–35)
IMM GRANULOCYTES # BLD: 0 K/UL (ref 0–0.5)
IMM GRANULOCYTES NFR BLD AUTO: 0 % (ref 0–5)
IMM RETICS NFR: 32.7 % (ref 2.3–13.4)
LYMPHOCYTES # BLD: 4.5 K/UL (ref 0.5–4.6)
LYMPHOCYTES NFR BLD: 42 % (ref 13–44)
MCH RBC QN AUTO: 33.5 PG (ref 26.1–32.9)
MCHC RBC AUTO-ENTMCNC: 33.5 G/DL (ref 31.4–35)
MCV RBC AUTO: 100 FL (ref 79.6–97.8)
MONOCYTES # BLD: 1.3 K/UL (ref 0.1–1.3)
MONOCYTES NFR BLD: 12 % (ref 4–12)
NEUTS SEG # BLD: 4.7 K/UL (ref 1.7–8.2)
NEUTS SEG NFR BLD: 44 % (ref 43–78)
NRBC # BLD: 0.62 K/UL (ref 0–0.2)
PLATELET # BLD AUTO: 242 K/UL (ref 150–450)
PLATELET COMMENTS,PCOM: ADEQUATE
PMV BLD AUTO: 10.8 FL (ref 9.4–12.3)
POTASSIUM SERPL-SCNC: 3.8 MMOL/L (ref 3.5–5.1)
PROT SERPL-MCNC: 7.2 G/DL (ref 6.3–8.2)
RBC # BLD AUTO: 2.03 M/UL (ref 4.23–5.6)
RBC MORPH BLD: ABNORMAL
RETICS # AUTO: 0.11 M/UL (ref 0.03–0.1)
RETICS/RBC NFR AUTO: 5.6 % (ref 0.3–2)
SODIUM SERPL-SCNC: 138 MMOL/L (ref 136–145)
WBC # BLD AUTO: 10.7 K/UL (ref 4.3–11.1)
WBC MORPH BLD: ABNORMAL

## 2018-10-12 PROCEDURE — 74011000258 HC RX REV CODE- 258: Performed by: NURSE PRACTITIONER

## 2018-10-12 PROCEDURE — 85046 RETICYTE/HGB CONCENTRATE: CPT

## 2018-10-12 PROCEDURE — 74011250637 HC RX REV CODE- 250/637: Performed by: NURSE PRACTITIONER

## 2018-10-12 PROCEDURE — 99232 SBSQ HOSP IP/OBS MODERATE 35: CPT | Performed by: INTERNAL MEDICINE

## 2018-10-12 PROCEDURE — 77010033678 HC OXYGEN DAILY

## 2018-10-12 PROCEDURE — 94760 N-INVAS EAR/PLS OXIMETRY 1: CPT

## 2018-10-12 PROCEDURE — 80053 COMPREHEN METABOLIC PANEL: CPT

## 2018-10-12 PROCEDURE — 36591 DRAW BLOOD OFF VENOUS DEVICE: CPT

## 2018-10-12 PROCEDURE — 74011250636 HC RX REV CODE- 250/636: Performed by: NURSE PRACTITIONER

## 2018-10-12 PROCEDURE — 65270000029 HC RM PRIVATE

## 2018-10-12 PROCEDURE — 74011250636 HC RX REV CODE- 250/636: Performed by: FAMILY MEDICINE

## 2018-10-12 PROCEDURE — 85025 COMPLETE CBC W/AUTO DIFF WBC: CPT

## 2018-10-12 PROCEDURE — 77030020253 HC SOL INJ D545NS .05 DEX .45 SAL

## 2018-10-12 RX ADMIN — OXYCODONE HYDROCHLORIDE 80 MG: 80 TABLET, FILM COATED, EXTENDED RELEASE ORAL at 07:56

## 2018-10-12 RX ADMIN — OXYCODONE HYDROCHLORIDE 80 MG: 80 TABLET, FILM COATED, EXTENDED RELEASE ORAL at 21:47

## 2018-10-12 RX ADMIN — DEXTROSE MONOHYDRATE AND SODIUM CHLORIDE 125 ML/HR: 5; .45 INJECTION, SOLUTION INTRAVENOUS at 18:56

## 2018-10-12 RX ADMIN — Medication 10 ML: at 21:49

## 2018-10-12 RX ADMIN — HYDROXYUREA 500 MG: 500 CAPSULE ORAL at 11:03

## 2018-10-12 RX ADMIN — FOLIC ACID 1 MG: 1 TABLET ORAL at 09:27

## 2018-10-12 RX ADMIN — DEXTROSE MONOHYDRATE AND SODIUM CHLORIDE 125 ML/HR: 5; .45 INJECTION, SOLUTION INTRAVENOUS at 11:21

## 2018-10-12 RX ADMIN — LISINOPRIL 5 MG: 5 TABLET ORAL at 08:30

## 2018-10-12 RX ADMIN — OXYCODONE HYDROCHLORIDE 30 MG: 15 TABLET ORAL at 16:55

## 2018-10-12 RX ADMIN — HYDROMORPHONE HYDROCHLORIDE 1 MG: 1 INJECTION, SOLUTION INTRAMUSCULAR; INTRAVENOUS; SUBCUTANEOUS at 14:12

## 2018-10-12 RX ADMIN — HYDROMORPHONE HYDROCHLORIDE 1 MG: 1 INJECTION, SOLUTION INTRAMUSCULAR; INTRAVENOUS; SUBCUTANEOUS at 23:48

## 2018-10-12 RX ADMIN — OXYCODONE HYDROCHLORIDE 30 MG: 15 TABLET ORAL at 02:50

## 2018-10-12 RX ADMIN — METOPROLOL TARTRATE 25 MG: 25 TABLET ORAL at 17:44

## 2018-10-12 RX ADMIN — ENOXAPARIN SODIUM 40 MG: 40 INJECTION, SOLUTION INTRAVENOUS; SUBCUTANEOUS at 08:30

## 2018-10-12 RX ADMIN — HYDROMORPHONE HYDROCHLORIDE 1 MG: 1 INJECTION, SOLUTION INTRAMUSCULAR; INTRAVENOUS; SUBCUTANEOUS at 19:45

## 2018-10-12 RX ADMIN — HYDROMORPHONE HYDROCHLORIDE 1 MG: 1 INJECTION, SOLUTION INTRAMUSCULAR; INTRAVENOUS; SUBCUTANEOUS at 09:27

## 2018-10-12 RX ADMIN — METOPROLOL TARTRATE 25 MG: 25 TABLET ORAL at 08:30

## 2018-10-12 RX ADMIN — Medication 10 ML: at 14:12

## 2018-10-12 RX ADMIN — HYDROXYUREA 500 MG: 500 CAPSULE ORAL at 17:44

## 2018-10-12 RX ADMIN — OXYCODONE HYDROCHLORIDE 30 MG: 15 TABLET ORAL at 12:22

## 2018-10-12 RX ADMIN — HYDROMORPHONE HYDROCHLORIDE 1 MG: 1 INJECTION, SOLUTION INTRAMUSCULAR; INTRAVENOUS; SUBCUTANEOUS at 00:17

## 2018-10-12 NOTE — PROGRESS NOTES
Problem: Falls - Risk of 
Goal: *Absence of Falls Document Viviana Evans Fall Risk and appropriate interventions in the flowsheet. Outcome: Progressing Towards Goal 
Fall Risk Interventions: 
  
 
  
 
Medication Interventions: Evaluate medications/consider consulting pharmacy

## 2018-10-12 NOTE — PROGRESS NOTES
Patient updated on the POC. Patient VSS. Pain being controlled with oxycodone and dilaudid. Assessment, education, and medications completed per documentation.

## 2018-10-12 NOTE — PROGRESS NOTES
Problem: Falls - Risk of 
Goal: *Absence of Falls Document Ese Bonds Fall Risk and appropriate interventions in the flowsheet. Outcome: Progressing Towards Goal 
Fall Risk Interventions: 
  
 
  
 
Medication Interventions: Evaluate medications/consider consulting pharmacy

## 2018-10-12 NOTE — PROGRESS NOTES
Hospitalist Progress Note 10/12/2018 Admit Date: 10/10/2018  8:38 PM  
NAME: Darwin Park :  1975 MRN:  [id number] Attending: Carmela Willis MD 
PCP:  Lisa Celestin MD 
 
SUBJECTIVE:  
 
Pt is a 38 yo with pmh sickle cell disease, follows with Dr. Crys English at [facility]. He presented to ER 10/11 with arm and leg pain, uncontrolled with home pain regimen. He was admitted for sickle cell crisis and started on IV fluids and PRN Dilaudid. He reports last crisis prior to this was just one month ago. His home regimen Jadenu, Hydrea, Folic acid have been continued. Hematology consulted. Today pt reports pain is mildly improving but he is still requesting frequent PRN Dilaudid. His Hgb is down some today but hematology has decided to hold on transfusion for today. Pt is pleasant, in good spirits and we have discussed hopeful home soon, once pain improved. Review of Systems negative with exception of pertinent positives noted above PHYSICAL EXAM  
 
Visit Vitals  /78 (BP 1 Location: Right arm, BP Patient Position: At rest;Sitting)  Pulse 78  Temp 98.2 °F (36.8 °C)  Resp 16  
 Ht 5' 10\" (1.778 m)  Wt 75 kg (165 lb 4.8 oz)  SpO2 97%  BMI 23.72 kg/m2 Temp (24hrs), Av.5 °F (32.5 °C), Min:44.6 °F (7 °C), Max:98.2 °F (36.8 °C) Oxygen Therapy O2 Sat (%): 97 % (10/12/18 0730) Pulse via Oximetry: 71 beats per minute (10/11/18 0429) O2 Device: Nasal cannula (10/11/18 1132) O2 Flow Rate (L/min): 1 l/min (10/11/18 1132) Intake/Output Summary (Last 24 hours) at 10/12/18 1005 Last data filed at 10/12/18 3147 Gross per 24 hour Intake             4835 ml Output             3125 ml Net             1710 ml General: No acute distress   
Lungs:  CTA Bilaterally. Heart:  Regular rate and rhythm,  No murmur, rub, or gallop Abdomen: Soft, Non distended, Non tender, Positive bowel sounds Extremities: No cyanosis, clubbing or edema Neurologic:  No focal deficits ASSESSMENT Active Hospital Problems Diagnosis Date Noted  Leukocytosis 10/11/2018  Leg pain 06/24/2018  Sickle cell disease ([facility] 07/13/2017  Paroxysmal SVT (supraventricular tachycardia) ([facility] 07/09/2016  Sickle cell anemia ([facility] 04/06/2016  Sickle cell pain crisis ([facility] 10/25/2012  Essential hypertension, benign 06/23/2012 A/P: 
 
Sickle cell crisis- Cont D5 1/2 NS, PRN Dilaudid. Cont home regimen Jadenu, Hydrea, Folic Acid. Hgb 6.8, monitor H&H daily to assess need for transfusions. Will need outpatient follow up with Dr. Marquis Barnard as [facility]  
 
HTN- Cont home regimen Lopressor DC planning- Hopeful home in 1-2 days Signed By: Antonio Lundberg NP October 12, 2018

## 2018-10-12 NOTE — PROGRESS NOTES
[facility] Inpatient Hematology / Oncology Progress Note Admission Date: 10/10/2018  8:38 PM 
Reason for Admission/Hospital Course: Sickle cell anemia ([address] 24 Hour Events: 
Afebrile, VSS Hgb 6.8  
 
 
ROS: 
Constitutional: Negative for fever, chills. CV: Negative for chest pain, palpitations, edema. Respiratory: Negative for dyspnea, cough, wheezing. GI: Negative for nausea, abdominal pain, diarrhea. MSK: +arm/leg pain 10 point review of systems is otherwise negative with the exception of the elements mentioned above in the HPI. Allergies Allergen Reactions  Compazine [Prochlorperazine Edisylate] Other (comments) Also makes him feel funny  Morphine Other (comments) Makes him feel funny  Reglan [Metoclopramide] Other (comments) \"feel funny\"  Zofran [Ondansetron Hcl (Pf)] Other (comments) Make him feel funny OBJECTIVE: 
Patient Vitals for the past 8 hrs: 
 BP Temp Pulse Resp SpO2  
10/12/18 0730 130/78 98.2 °F (36.8 °C) 78 16 97 % 10/12/18 0243 114/67 98.2 °F (36.8 °C) 77 16 96 % Temp (24hrs), Av.5 °F (32.5 °C), Min:44.6 °F (7 °C), Max:98.2 °F (36.8 °C) Physical Exam: 
Constitutional: Well developed, well nourished male in no acute distress, sitting comfortably in the bedside chair. HEENT: Normocephalic and atraumatic. Oropharynx is clear, mucous membranes are moist.  Extraocular muscles are intact. Sclerae anicteric. Neck supple without JVD. No thyromegaly present. Skin Warm and dry. No bruising and no rash noted. No erythema. No pallor. Respiratory Lungs are clear to auscultation bilaterally without wheezes, rales or rhonchi, normal air exchange without accessory muscle use. CVS Normal rate, regular rhythm and normal S1 and S2. No murmurs, gallops, or rubs. Abdomen Soft, nontender and nondistended, normoactive bowel sounds. No palpable mass. No hepatosplenomegaly. Neuro Grossly nonfocal with no obvious sensory or motor deficits. MSK Normal range of motion in general.  No edema and no tenderness. Psych Appropriate mood and affect. Labs: 
  Recent Labs 10/12/18 
 0242  10/11/18 
 0740  10/10/18 
 2139 WBC  10.7  11.9*  12.7*  
RBC  2.03*  2.18*  2.58* HGB  6.8*  7.3*  8.6* HCT  21.0*  21.9*  25.3*  
MCV  100.0*  100.5*  98.1*  
MCH  33.5*  33.5*  33.3*  
MCHC  33.5  33.3  34.0  
RDW  0  Cannot be calculated  Cannot be calculated PLT  242  231  238 GRANS  44  54  52 LYMPH  42  32  34 MONOS  12  12  13* EOS  2  1  1  
BASOS  0  0  0 IG  0  0  0  
DF  AUTOMATED  AUTOMATED  AUTOMATED ANEU  4.7  6.4  6.6 ABL  4.5  3.8  4.3 ABM  1.3  1.4*  1.7* TENZIN  0.2  0.2  0.1 ABB  0.0  0.0  0.0 AIG  0.0  0.0  0.0 Recent Labs 10/12/18 
 0242  10/10/18 
 2139 NA  138  139  
K  3.8  3.5 CL  105  106 CO2  26  27 AGAP  7  6*  
GLU  152*  92 BUN  11  9 CREA  0.84  0.83 GFRAA  >60  >60 GFRNA  >60  >60  
CA  8.2*  8.4 SGOT  37  35 AP  68  74  
TP  7.2  8.1 ALB  3.0*  3.4*  
GLOB  4.2*  4.7* AGRAT  0.7*  0.7* Imaging: n/a ASSESSMENT: 
 
Problem List  Date Reviewed: 7/22/2018 Codes Class Noted Leukocytosis ICD-10-CM: T68.677 ICD-9-CM: 288.60  10/11/2018 Volume overload ICD-10-CM: E87.70 ICD-9-CM: 276.69  6/28/2018 Leg pain ICD-10-CM: M79.606 ICD-9-CM: 729.5  6/24/2018 Sickle cell disease ([facility] ICD-10-CM: D57.1 ICD-9-CM: 282.60  7/13/2017 Iron overload due to repeated red blood cell transfusions ICD-10-CM: E83.111 ICD-9-CM: 275.02  1/18/2017 Paroxysmal SVT (supraventricular tachycardia) (HCC) ICD-10-CM: I47.1 ICD-9-CM: 427.0  7/9/2016 Sickle cell anemia (HCC) ICD-10-CM: D57.1 ICD-9-CM: 282.60  4/6/2016 * (Principal)Sickle cell pain crisis ([facility] ICD-10-CM: D57.00 ICD-9-CM: 282.62  10/25/2012  Essential hypertension, benign ICD-10-CM: I10 
 ICD-9-CM: 401.1  6/23/2012 Precordial pain ICD-10-CM: R07.2 ICD-9-CM: 786.51  3/24/2012 Overview Signed 3/24/2012  2:53 PM by John Bryan Acute chest syndrome unlikely. Mr. Brittany Alejandro is a 37 y.o. male admitted on 10/10/2018. He is a known patient of Dr. Jasmeet Roman at [facility] with sickle/beta+ thal, admitted for arm/leg pain crisis. He presented to ED with c/o arm and leg pain x 3 days, unrelieved with pain meds at home. He also c/o nausea. Hgb 7.3 (b/l 7s). Retic 5.2. He takes Jadenu, Hydrea, and folic acid daily. We were consulted for recommendations. PLAN: 
Sickle/Beta+ Thal with arm/leg pain crisis 
- Hgb 7.3 (b/l 7s) - Change IVF to D5 1/2 NS 
- Continue home pain med regimen with IV dilaudid for breakthrough 
- Continue jadenu, hydrea, and folic acid 10/12 Hgb 6.8. Pt asymptomatic. No transfusion needed today. 
  
Nausea - PO phenergan ordered for nausea (pt request) 
  
Hypertension - Resume home meds 10/12 BP improved. 
  
  
Lab studies and imaging studies were personally reviewed. Thank you for allowing us to participate in the care of Mr. Cabrera. He will need to f/u with Dr. Jasmeet Roman at [facility] after discharge. Chantal Martinez, NAVEED [facility] [address] [place] [address] Office : [phone] Fax : [phone]

## 2018-10-12 NOTE — PROGRESS NOTES
END OF SHIFT NOTE: 
 
Intake/Output 10/11 1901 - 10/12 0700 In: 178 [I.V.:178] Out: 6370 [Urine:2425] Voiding: YES Catheter: NO 
Drain:   
 
 
 
 
 
Stool:  0 occurrences. Emesis:  0 occurrences. VITAL SIGNS Patient Vitals for the past 12 hrs: 
 Temp Pulse Resp BP SpO2  
10/12/18 0243 98.2 °F (36.8 °C) 77 16 114/67 96 % 10/11/18 2302 98.2 °F (36.8 °C) 66 16 118/64 93 % 10/11/18 2005 98 °F (36.7 °C) 75 16 113/65 98 % Pain Assessment Pain 1 Pain Scale 1: Visual (10/12/18 0340) Pain Intensity 1: 0 (10/12/18 0340) Patient Stated Pain Goal: 0 (10/12/18 0248) Pain Reassessment 1: Patient sleeping (10/12/18 0340) Pain Onset 1: pta (10/12/18 0248) Pain Location 1: Leg (10/12/18 0248) Pain Orientation 1: Left;Right (10/12/18 0248) Pain Description 1: Aching (10/12/18 0248) Pain Intervention(s) 1: Medication (see MAR) (10/12/18 0248) Ambulating Yes Additional Information: Pt rested well through the night. 1 mg PRN dilaudid given 2x, PRN oxycodone given 1x for pain control. Uneventful night. Shift report given to oncoming nurse at the bedside. Basil Fields

## 2018-10-13 ENCOUNTER — APPOINTMENT (OUTPATIENT)
Dept: ULTRASOUND IMAGING | Age: 43
DRG: 812 | End: 2018-10-13
Attending: PHYSICIAN ASSISTANT
Payer: MEDICARE

## 2018-10-13 LAB
ALBUMIN SERPL-MCNC: 3 G/DL (ref 3.5–5)
ALBUMIN/GLOB SERPL: 0.7 {RATIO} (ref 1.2–3.5)
ALP SERPL-CCNC: 71 U/L (ref 50–136)
ALT SERPL-CCNC: 48 U/L (ref 12–65)
ANION GAP SERPL CALC-SCNC: 10 MMOL/L (ref 7–16)
AST SERPL-CCNC: 39 U/L (ref 15–37)
BASOPHILS # BLD: 0 K/UL (ref 0–0.2)
BASOPHILS NFR BLD: 0 % (ref 0–2)
BILIRUB SERPL-MCNC: 0.8 MG/DL (ref 0.2–1.1)
BUN SERPL-MCNC: 6 MG/DL (ref 6–23)
CALCIUM SERPL-MCNC: 8.2 MG/DL (ref 8.3–10.4)
CHLORIDE SERPL-SCNC: 104 MMOL/L (ref 98–107)
CO2 SERPL-SCNC: 24 MMOL/L (ref 21–32)
CREAT SERPL-MCNC: 0.83 MG/DL (ref 0.8–1.5)
DIFFERENTIAL METHOD BLD: ABNORMAL
EOSINOPHIL # BLD: 0.3 K/UL (ref 0–0.8)
EOSINOPHIL NFR BLD: 3 % (ref 0.5–7.8)
GLOBULIN SER CALC-MCNC: 4.3 G/DL (ref 2.3–3.5)
GLUCOSE SERPL-MCNC: 180 MG/DL (ref 65–100)
HCT VFR BLD AUTO: 21.1 % (ref 41.1–50.3)
HGB BLD-MCNC: 7.2 G/DL (ref 13.6–17.2)
HGB RETIC QN AUTO: 44 PG (ref 29–35)
IMM GRANULOCYTES # BLD: 0 K/UL (ref 0–0.5)
IMM GRANULOCYTES NFR BLD AUTO: 0 % (ref 0–5)
IMM RETICS NFR: 32.5 % (ref 2.3–13.4)
LYMPHOCYTES # BLD: 4.7 K/UL (ref 0.5–4.6)
LYMPHOCYTES NFR BLD: 42 % (ref 13–44)
MCH RBC QN AUTO: 35.6 PG (ref 26.1–32.9)
MCHC RBC AUTO-ENTMCNC: 35.5 G/DL (ref 31.4–35)
MCV RBC AUTO: 100.5 FL (ref 79.6–97.8)
MONOCYTES # BLD: 1.1 K/UL (ref 0.1–1.3)
MONOCYTES NFR BLD: 10 % (ref 4–12)
NEUTS SEG # BLD: 4.9 K/UL (ref 1.7–8.2)
NEUTS SEG NFR BLD: 45 % (ref 43–78)
NRBC # BLD: 0.61 K/UL (ref 0–0.2)
PLATELET # BLD AUTO: 239 K/UL (ref 150–450)
PMV BLD AUTO: 10.8 FL (ref 9.4–12.3)
POTASSIUM SERPL-SCNC: 3.8 MMOL/L (ref 3.5–5.1)
PROT SERPL-MCNC: 7.3 G/DL (ref 6.3–8.2)
RBC # BLD AUTO: 2.02 M/UL (ref 4.23–5.6)
RETICS # AUTO: 0.13 M/UL (ref 0.03–0.1)
RETICS/RBC NFR AUTO: 6.4 % (ref 0.3–2)
SODIUM SERPL-SCNC: 138 MMOL/L (ref 136–145)
WBC # BLD AUTO: 11 K/UL (ref 4.3–11.1)

## 2018-10-13 PROCEDURE — 74011250636 HC RX REV CODE- 250/636: Performed by: NURSE PRACTITIONER

## 2018-10-13 PROCEDURE — 93971 EXTREMITY STUDY: CPT

## 2018-10-13 PROCEDURE — 74011000258 HC RX REV CODE- 258: Performed by: NURSE PRACTITIONER

## 2018-10-13 PROCEDURE — 85046 RETICYTE/HGB CONCENTRATE: CPT

## 2018-10-13 PROCEDURE — 85025 COMPLETE CBC W/AUTO DIFF WBC: CPT

## 2018-10-13 PROCEDURE — 77030011943

## 2018-10-13 PROCEDURE — 74011250637 HC RX REV CODE- 250/637: Performed by: NURSE PRACTITIONER

## 2018-10-13 PROCEDURE — 65270000029 HC RM PRIVATE

## 2018-10-13 PROCEDURE — 74011250636 HC RX REV CODE- 250/636: Performed by: FAMILY MEDICINE

## 2018-10-13 PROCEDURE — 80053 COMPREHEN METABOLIC PANEL: CPT

## 2018-10-13 PROCEDURE — 77030020253 HC SOL INJ D545NS .05 DEX .45 SAL

## 2018-10-13 RX ADMIN — HYDROMORPHONE HYDROCHLORIDE 1 MG: 1 INJECTION, SOLUTION INTRAMUSCULAR; INTRAVENOUS; SUBCUTANEOUS at 21:28

## 2018-10-13 RX ADMIN — HYDROXYUREA 500 MG: 500 CAPSULE ORAL at 17:47

## 2018-10-13 RX ADMIN — DEXTROSE MONOHYDRATE AND SODIUM CHLORIDE 125 ML/HR: 5; .45 INJECTION, SOLUTION INTRAVENOUS at 21:28

## 2018-10-13 RX ADMIN — ENOXAPARIN SODIUM 40 MG: 40 INJECTION, SOLUTION INTRAVENOUS; SUBCUTANEOUS at 09:44

## 2018-10-13 RX ADMIN — DEXTROSE MONOHYDRATE AND SODIUM CHLORIDE 125 ML/HR: 5; .45 INJECTION, SOLUTION INTRAVENOUS at 03:03

## 2018-10-13 RX ADMIN — HYDROMORPHONE HYDROCHLORIDE 1 MG: 1 INJECTION, SOLUTION INTRAMUSCULAR; INTRAVENOUS; SUBCUTANEOUS at 07:07

## 2018-10-13 RX ADMIN — HYDROMORPHONE HYDROCHLORIDE 1 MG: 1 INJECTION, SOLUTION INTRAMUSCULAR; INTRAVENOUS; SUBCUTANEOUS at 03:03

## 2018-10-13 RX ADMIN — OXYCODONE HYDROCHLORIDE 80 MG: 80 TABLET, FILM COATED, EXTENDED RELEASE ORAL at 20:26

## 2018-10-13 RX ADMIN — HYDROXYUREA 500 MG: 500 CAPSULE ORAL at 09:46

## 2018-10-13 RX ADMIN — OXYCODONE HYDROCHLORIDE 30 MG: 15 TABLET ORAL at 16:16

## 2018-10-13 RX ADMIN — FOLIC ACID 1 MG: 1 TABLET ORAL at 09:44

## 2018-10-13 RX ADMIN — LISINOPRIL 5 MG: 5 TABLET ORAL at 09:44

## 2018-10-13 RX ADMIN — Medication 10 ML: at 21:33

## 2018-10-13 RX ADMIN — Medication 10 ML: at 14:00

## 2018-10-13 RX ADMIN — OXYCODONE HYDROCHLORIDE 80 MG: 80 TABLET, FILM COATED, EXTENDED RELEASE ORAL at 09:44

## 2018-10-13 RX ADMIN — HYDROMORPHONE HYDROCHLORIDE 1 MG: 1 INJECTION, SOLUTION INTRAMUSCULAR; INTRAVENOUS; SUBCUTANEOUS at 17:50

## 2018-10-13 RX ADMIN — HYDROMORPHONE HYDROCHLORIDE 1 MG: 1 INJECTION, SOLUTION INTRAMUSCULAR; INTRAVENOUS; SUBCUTANEOUS at 14:39

## 2018-10-13 RX ADMIN — METOPROLOL TARTRATE 25 MG: 25 TABLET ORAL at 17:47

## 2018-10-13 RX ADMIN — HYDROMORPHONE HYDROCHLORIDE 1 MG: 1 INJECTION, SOLUTION INTRAMUSCULAR; INTRAVENOUS; SUBCUTANEOUS at 09:44

## 2018-10-13 RX ADMIN — METOPROLOL TARTRATE 25 MG: 25 TABLET ORAL at 09:44

## 2018-10-13 RX ADMIN — Medication 10 ML: at 05:25

## 2018-10-13 RX ADMIN — DEXTROSE MONOHYDRATE AND SODIUM CHLORIDE 125 ML/HR: 5; .45 INJECTION, SOLUTION INTRAVENOUS at 14:07

## 2018-10-13 NOTE — PROGRESS NOTES
Problem: Falls - Risk of 
Goal: *Absence of Falls Document Wisam Andrade Fall Risk and appropriate interventions in the flowsheet. Outcome: Progressing Towards Goal 
Fall Risk Interventions: 
  
 
  
 
Medication Interventions: Evaluate medications/consider consulting pharmacy, Teach patient to arise slowly

## 2018-10-13 NOTE — PROGRESS NOTES
Problem: Falls - Risk of 
Goal: *Absence of Falls Document Viviana Evans Fall Risk and appropriate interventions in the flowsheet. Outcome: Progressing Towards Goal 
Fall Risk Interventions: 
  
 
  
 
Medication Interventions: Evaluate medications/consider consulting pharmacy, Teach patient to arise slowly

## 2018-10-13 NOTE — PROGRESS NOTES
END OF SHIFT NOTE: 
 
Intake/Output 10/12 1901 - 10/13 0700 In: 3087 [P.O.:360; I.V.:2727] Out: -   
Voiding: YES Catheter: NO 
Drain:   
 
 
 
 
 
Stool:  0 occurrences. Emesis:  0 occurrences. VITAL SIGNS Patient Vitals for the past 12 hrs: 
 Temp Pulse Resp BP SpO2  
10/13/18 0307 98.8 °F (37.1 °C) 78 19 97/68 96 % 10/12/18 2325 98 °F (36.7 °C) 79 18 146/81 97 % 10/12/18 1925 98.8 °F (37.1 °C) 79 18 119/90 96 % Pain Assessment Pain 1 Pain Scale 1: Visual (10/13/18 0343) Pain Intensity 1: 0 (10/13/18 0343) Patient Stated Pain Goal: 2 (10/13/18 3160) Pain Reassessment 1: Patient sleeping (10/13/18 0343) Pain Onset 1: pta (10/13/18 0307) Pain Location 1: Arm;Leg (10/13/18 0307) Pain Orientation 1: Left;Right (10/13/18 0307) Pain Description 1: Aching (10/13/18 0307) Pain Intervention(s) 1: Medication (see MAR) (10/13/18 9829) Ambulating Yes Additional Information:  
IV dilaudid administered x3 during shift per pt's request for pain of all limbs. Pt resting quietly. No visible s/sx of distress. No needs/concerns voiced at this time. Pt's wife is at bedside. Shift report given to oncoming nurse at the bedside.  
 
Tien Rebollar, RN

## 2018-10-13 NOTE — PROGRESS NOTES
Hospitalist Progress Note 10/13/2018 Admit Date: 10/10/2018  8:38 PM  
NAME: Merline Anderson :  1975 MRN:  [id number] Attending: Cordell Duran MD 
PCP:  Soledad Rodriguez MD 
 
SUBJECTIVE:  
 
HPI: Pt is a 36 yo with pmh sickle cell disease, follows with Dr. Rehan Phillip at [facility]. He presented to ER 10/11 with arm and leg pain, uncontrolled with home pain regimen. He was admitted for sickle cell crisis and started on IV fluids and PRN Dilaudid. He reports last crisis prior to this was just one month ago. His home regimen Jadenu, Hydrea, Folic acid have been continued. Hematology consulted. 10/13/18: Pt states his pain is better today. Is trying to cut back on use of PRN DIlaudid, but states he is \"not quite there. \" He notes new LLE edema. States he has not had this before. Is curious if it is related to fluids he has been receiving. Pt states he has been trying to be OOB and ambulating in room. Has a good appetite. Review of Systems negative with exception of pertinent positives noted above PHYSICAL EXAM  
 
Visit Vitals  /82 (BP 1 Location: Right arm, BP Patient Position: At rest;Sitting)  Pulse 92  Temp 98.5 °F (36.9 °C)  Resp 18  Ht 5' 10\" (1.778 m)  Wt 77.7 kg (171 lb 6.4 oz)  SpO2 93%  BMI 24.59 kg/m2 Temp (24hrs), Av.4 °F (36.9 °C), Min:98 °F (36.7 °C), Max:98.8 °F (37.1 °C) Oxygen Therapy O2 Sat (%): 93 % (10/13/18 1140) Pulse via Oximetry: 71 beats per minute (10/11/18 0429) O2 Device: Room air (10/13/18 0815) O2 Flow Rate (L/min): 1 l/min (10/11/18 1132) Intake/Output Summary (Last 24 hours) at 10/13/18 1227 Last data filed at 10/13/18 1140 Gross per 24 hour Intake             3987 ml Output             1350 ml Net             2637 ml General: No acute distress   
Lungs:  CTA Bilaterally. Heart:  Regular rate and rhythm,  No murmur, rub, or gallop Abdomen: Soft, Non distended, Non tender, Positive bowel sounds Extremities: +1 pitting edema LLE with +Liza's sign, no edema RLE Neurologic:  No focal deficits ASSESSMENT Active Hospital Problems Diagnosis Date Noted  Leukocytosis 10/11/2018  Leg pain 06/24/2018  Sickle cell disease ([facility] 07/13/2017  Paroxysmal SVT (supraventricular tachycardia) ([facility] 07/09/2016  Sickle cell anemia ([facility] 04/06/2016  Sickle cell pain crisis ([facility] 10/25/2012  Essential hypertension, benign 06/23/2012 A/P: 
 
Sickle cell crisis- Cont D5 1/2 NS, PRN Dilaudid. Cont home regimen Jadenu, Hydrea, Folic Acid. Hgb stable. Pt requiring less PRN dilaudid. Anticipate d/c tomorrow. Will need outpatient follow up with Dr. Pastor Cordon as [facility]  
 
LLE swelling - will check LLE U/S. Likely due to fluids, but given pain in calf, will r/o DVT. HTN- Cont home regimen Lopressor DC planning- D/C home 10/14 Signed By: JEREMY Aguero October 13, 2018

## 2018-10-13 NOTE — PROGRESS NOTES
END OF SHIFT NOTE: 
 
Intake/Output 10/13 0701 - 10/13 1900 In: 740 [P.O.:740] Out: 650 [Urine:650] Voiding: YES Catheter: NO 
Drain:   
 
 
 
 
 
Stool:  0 occurrences. Emesis:  0 occurrences. VITAL SIGNS Patient Vitals for the past 12 hrs: 
 Temp Pulse Resp BP SpO2  
10/13/18 1600 99.1 °F (37.3 °C) 67 18 124/74 98 % 10/13/18 1140 98.5 °F (36.9 °C) 92 18 128/82 93 % 10/13/18 0756 98 °F (36.7 °C) 84 20 133/78 94 % Pain Assessment Pain 1 Pain Scale 1: Numeric (0 - 10) (10/13/18 1616) Pain Intensity 1: 7 (10/13/18 1616) Patient Stated Pain Goal: 2 (10/13/18 0130) Pain Reassessment 1: Yes (10/13/18 1530) Pain Onset 1: pta (10/13/18 0707) Pain Location 1: Leg (10/13/18 1616) Pain Orientation 1: Left;Right (10/13/18 1616) Pain Description 1: Aching (10/13/18 1616) Pain Intervention(s) 1: Medication (see MAR) (10/13/18 1616) Ambulating Yes Additional Information: B venous duplex-negative, pt c/o 7-8/10 pain- generally relieved by dilaudid, no other c/o Shift report given to oncoming nurse at the bedside. Reyes Rankin

## 2018-10-14 VITALS
SYSTOLIC BLOOD PRESSURE: 126 MMHG | DIASTOLIC BLOOD PRESSURE: 82 MMHG | HEIGHT: 70 IN | TEMPERATURE: 98.3 F | WEIGHT: 172.9 LBS | HEART RATE: 70 BPM | OXYGEN SATURATION: 100 % | RESPIRATION RATE: 16 BRPM | BODY MASS INDEX: 24.75 KG/M2

## 2018-10-14 LAB
ALBUMIN SERPL-MCNC: 3 G/DL (ref 3.5–5)
ALBUMIN/GLOB SERPL: 0.7 {RATIO} (ref 1.2–3.5)
ALP SERPL-CCNC: 70 U/L (ref 50–136)
ALT SERPL-CCNC: 40 U/L (ref 12–65)
ANION GAP SERPL CALC-SCNC: 5 MMOL/L (ref 7–16)
AST SERPL-CCNC: 30 U/L (ref 15–37)
BASOPHILS # BLD: 0 K/UL (ref 0–0.2)
BASOPHILS NFR BLD: 0 % (ref 0–2)
BILIRUB SERPL-MCNC: 0.7 MG/DL (ref 0.2–1.1)
BUN SERPL-MCNC: 5 MG/DL (ref 6–23)
CALCIUM SERPL-MCNC: 8.2 MG/DL (ref 8.3–10.4)
CHLORIDE SERPL-SCNC: 106 MMOL/L (ref 98–107)
CO2 SERPL-SCNC: 28 MMOL/L (ref 21–32)
CREAT SERPL-MCNC: 0.88 MG/DL (ref 0.8–1.5)
DIFFERENTIAL METHOD BLD: ABNORMAL
EOSINOPHIL # BLD: 0.2 K/UL (ref 0–0.8)
EOSINOPHIL NFR BLD: 2 % (ref 0.5–7.8)
GLOBULIN SER CALC-MCNC: 4.1 G/DL (ref 2.3–3.5)
GLUCOSE SERPL-MCNC: 150 MG/DL (ref 65–100)
HCT VFR BLD AUTO: 21.1 % (ref 41.1–50.3)
HGB BLD-MCNC: 7 G/DL (ref 13.6–17.2)
HGB RETIC QN AUTO: 43 PG (ref 29–35)
IMM GRANULOCYTES # BLD: 0.1 K/UL (ref 0–0.5)
IMM GRANULOCYTES NFR BLD AUTO: 1 % (ref 0–5)
IMM RETICS NFR: 24.5 % (ref 2.3–13.4)
LYMPHOCYTES # BLD: 4.5 K/UL (ref 0.5–4.6)
LYMPHOCYTES NFR BLD: 39 % (ref 13–44)
MCH RBC QN AUTO: 34.9 PG (ref 26.1–32.9)
MCHC RBC AUTO-ENTMCNC: 34.5 G/DL (ref 31.4–35)
MCV RBC AUTO: 101 FL (ref 79.6–97.8)
MONOCYTES # BLD: 1.2 K/UL (ref 0.1–1.3)
MONOCYTES NFR BLD: 10 % (ref 4–12)
NEUTS SEG # BLD: 5.6 K/UL (ref 1.7–8.2)
NEUTS SEG NFR BLD: 49 % (ref 43–78)
NRBC # BLD: 0.43 K/UL (ref 0–0.2)
PLATELET # BLD AUTO: 239 K/UL (ref 150–450)
PMV BLD AUTO: 10.8 FL (ref 9.4–12.3)
POTASSIUM SERPL-SCNC: 3.7 MMOL/L (ref 3.5–5.1)
PROT SERPL-MCNC: 7.1 G/DL (ref 6.3–8.2)
RBC # BLD AUTO: 1.95 M/UL (ref 4.23–5.6)
RETICS # AUTO: 0.12 M/UL (ref 0.03–0.1)
RETICS/RBC NFR AUTO: 6.3 % (ref 0.3–2)
SODIUM SERPL-SCNC: 139 MMOL/L (ref 136–145)
WBC # BLD AUTO: 11.5 K/UL (ref 4.3–11.1)

## 2018-10-14 PROCEDURE — 74011250636 HC RX REV CODE- 250/636: Performed by: NURSE PRACTITIONER

## 2018-10-14 PROCEDURE — 74011250636 HC RX REV CODE- 250/636: Performed by: FAMILY MEDICINE

## 2018-10-14 PROCEDURE — 85025 COMPLETE CBC W/AUTO DIFF WBC: CPT

## 2018-10-14 PROCEDURE — 74011250637 HC RX REV CODE- 250/637: Performed by: NURSE PRACTITIONER

## 2018-10-14 PROCEDURE — 74011000258 HC RX REV CODE- 258: Performed by: NURSE PRACTITIONER

## 2018-10-14 PROCEDURE — 80053 COMPREHEN METABOLIC PANEL: CPT

## 2018-10-14 PROCEDURE — 77030020253 HC SOL INJ D545NS .05 DEX .45 SAL

## 2018-10-14 PROCEDURE — 85046 RETICYTE/HGB CONCENTRATE: CPT

## 2018-10-14 RX ORDER — HEPARIN 100 UNIT/ML
500 SYRINGE INTRAVENOUS ONCE
Status: COMPLETED | OUTPATIENT
Start: 2018-10-14 | End: 2018-10-14

## 2018-10-14 RX ADMIN — Medication 10 ML: at 16:25

## 2018-10-14 RX ADMIN — HYDROMORPHONE HYDROCHLORIDE 1 MG: 1 INJECTION, SOLUTION INTRAMUSCULAR; INTRAVENOUS; SUBCUTANEOUS at 07:35

## 2018-10-14 RX ADMIN — FOLIC ACID 1 MG: 1 TABLET ORAL at 07:32

## 2018-10-14 RX ADMIN — SODIUM CHLORIDE, PRESERVATIVE FREE 500 UNITS: 5 INJECTION INTRAVENOUS at 16:25

## 2018-10-14 RX ADMIN — ENOXAPARIN SODIUM 40 MG: 40 INJECTION, SOLUTION INTRAVENOUS; SUBCUTANEOUS at 07:33

## 2018-10-14 RX ADMIN — DEXTROSE MONOHYDRATE AND SODIUM CHLORIDE 125 ML/HR: 5; .45 INJECTION, SOLUTION INTRAVENOUS at 05:47

## 2018-10-14 RX ADMIN — METOPROLOL TARTRATE 25 MG: 25 TABLET ORAL at 07:31

## 2018-10-14 RX ADMIN — LISINOPRIL 5 MG: 5 TABLET ORAL at 07:32

## 2018-10-14 RX ADMIN — Medication 10 ML: at 05:32

## 2018-10-14 RX ADMIN — OXYCODONE HYDROCHLORIDE 80 MG: 80 TABLET, FILM COATED, EXTENDED RELEASE ORAL at 07:32

## 2018-10-14 RX ADMIN — HYDROXYUREA 500 MG: 500 CAPSULE ORAL at 07:45

## 2018-10-14 RX ADMIN — HYDROMORPHONE HYDROCHLORIDE 1 MG: 1 INJECTION, SOLUTION INTRAMUSCULAR; INTRAVENOUS; SUBCUTANEOUS at 12:07

## 2018-10-14 RX ADMIN — HYDROMORPHONE HYDROCHLORIDE 1 MG: 1 INJECTION, SOLUTION INTRAMUSCULAR; INTRAVENOUS; SUBCUTANEOUS at 02:19

## 2018-10-14 NOTE — PROGRESS NOTES
Problem: Falls - Risk of 
Goal: *Absence of Falls Document Debria Check Fall Risk and appropriate interventions in the flowsheet. Outcome: Progressing Towards Goal 
Fall Risk Interventions: 
  
 
  
 
Medication Interventions: Evaluate medications/consider consulting pharmacy, Teach patient to arise slowly

## 2018-10-14 NOTE — PROGRESS NOTES
Written and verbal d/c instructions provided to pt. All questions answered. Pt already has a follow-up appt with his [facility] hematologist scheduled. Port flushed and de-accessed per policy. Pt waiting for his spouse to arrive to d/c.

## 2018-10-14 NOTE — PROGRESS NOTES
END OF SHIFT NOTE: 
 
Intake/Output 10/13 1901 - 10/14 0700 In: 8377 [P.O.:600; I.V.:2693] Out: 1575 [IDIHK:4768] Voiding: YES Catheter: NO 
Drain:   
 
 
 
 
 
Stool:  0 occurrences. Emesis:  0 occurrences. VITAL SIGNS Patient Vitals for the past 12 hrs: 
 Temp Pulse Resp BP SpO2  
10/14/18 0218 98.6 °F (37 °C) 75 16 118/68 96 % 10/13/18 2318 98.5 °F (36.9 °C) 81 17 111/58 98 % 10/13/18 1957 98.2 °F (36.8 °C) 84 18 118/76 99 % Pain Assessment Pain 1 Pain Scale 1: Visual (10/14/18 0304) Pain Intensity 1: 0 (10/14/18 0304) Patient Stated Pain Goal: 2 (10/14/18 2114) Pain Reassessment 1: Patient sleeping (10/14/18 0304) Pain Onset 1: pta (10/14/18 0218) Pain Location 1: Leg (10/14/18 0218) Pain Orientation 1: Left;Right (10/14/18 0218) Pain Description 1: Aching (10/14/18 9664) Pain Intervention(s) 1: Medication (see MAR) (10/14/18 9502) Ambulating Yes Additional Information: IV dilaudid given twice d/t BLE pain per pt's request. Pt resting quietly. No visible s/sx of distress. No needs/concerns voiced at this time. Wife is at bedside. Shift report given to oncoming nurse at the bedside.  
 
Ev Denton, RN

## 2018-10-14 NOTE — PROGRESS NOTES
Patient discharged home with family. Discharge instructions given by charge nurse. Port de accessed. All belongings with patient. Patient taken downstairs on wheelchair by PCT picked up by his wife.

## 2018-10-14 NOTE — DISCHARGE SUMMARY
Hospitalist Discharge Summary     Admit Date:  10/10/2018  8:38 PM   Name:  Pasha Goldstein   Age:  37 y.o.  :  1975   MRN:  [id number]   PCP:  Suzy Escobar MD  Treatment Team: Attending Provider: Shima Goyal MD; Consulting Provider: Nemo Morrison MD; : Lila Ruff; Utilization Review: YENNIFER Quintanilla-TERESA    PROBLEMS:    Sickle cell crisis with associated pain:  Improved on discharge  Intractable pain requiring IV meds  Leukocytosis  Leg pain, bilateral  Nausea and vomiting  Sickle cell anemia with Thalassemia  History of Paroxysmal ventricular tachycardia  Hypertension  History of iron overload due to repeated RBC transfusions    Hospital Course:  [facility] sickle cell with Thalassemia patient presented to ER 10/10 with complaints of three day history of generalized joint and muscle pain, worst in bilateral legs. Has port in leg due to lack of upper extremity venous access. Seen at [address] [place] [address] ER at onset of symptoms, treated with IVF, unkown IV meds and released. Now with 3 episodes of vomiting. No chest pain, shortness of breath or abdominal pain. Takes oxycodone 80 mg bid with 15 mg for breakthrough pain at baseline. Pain similar to previous sickle cell crises. Last crisis about a month ago. Admission Hgb 7.3, retic: 5.2. Hgb dropped to 6.8 on 10/12, avoiding transfusions where possible due to history of iron overload from prior transfusions. Seen by Oncology in consult 10/11. Slowly improved to near baseline. Requests discharge today for set appointment with Dr Beryl Josue, patient's regular Oncologist tomorrow am.  Mild left leg edema and soreness. U/S negative for DVT. Follow up instructions and discharge meds at bottom of this note. Plan was discussed with patient, spouse, and RN. All questions answered. Patient was stable at time of discharge.     Diagnostic Imaging/Tests:   LEFT LOWER EXTREMITY DOPPLER:  No evidence of deep venous thrombosis in the left lower extremity    INFLUENZA A AND B:  Negative    Labs: Results:       BMP, Mg, Phos Recent Labs      10/14/18   0230  10/13/18   0317  10/12/18   0242   NA  139  138  138   K  3.7  3.8  3.8   CL  106  104  105   CO2  28  24  26   AGAP  5*  10  7   BUN  5*  6  11   CREA  0.88  0.83  0.84   CA  8.2*  8.2*  8.2*   GLU  150*  180*  152*      CBC Recent Labs      10/14/18   0230  10/13/18   0317  10/12/18   0242   WBC  11.5*  11.0  10.7   RBC  1.95*  2.02*  2.03*   HGB  7.0*  7.2*  6.8*   HCT  21.1*  21.1*  21.0*   PLT  239  239  242   GRANS  49  45  44   LYMPH  39  42  42   EOS  2  3  2   MONOS  10  10  12   BASOS  0  0  0   IG  1  0  0   ANEU  5.6  4.9  4.7   ABL  4.5  4.7*  4.5   TENZIN  0.2  0.3  0.2   ABM  1.2  1.1  1.3   ABB  0.0  0.0  0.0   AIG  0.1  0.0  0.0      LFT Recent Labs      10/14/18   0230  10/13/18   0317  10/12/18   0242   SGOT  30  39*  37   ALT  40  48  44   AP  70  71  68   TP  7.1  7.3  7.2   ALB  3.0*  3.0*  3.0*   GLOB  4.1*  4.3*  4.2*   AGRAT  0.7*  0.7*  0.7*      Cardiac Testing Lab Results   Component Value Date/Time    BNP 77 07/22/2018 09:48 AM     (H) 04/26/2012 03:17 PM    CK 39 01/17/2017 03:35 AM    CK 42 01/17/2017 01:30 AM    CK 41 01/16/2017 09:05 PM    CK - MB 0.6 01/17/2017 03:35 AM    CK - MB <0.5 (L) 01/17/2017 01:30 AM    CK - MB <0.5 (L) 01/16/2017 09:05 PM    CK-MB Index 1.5 01/17/2017 03:35 AM    CK-MB Index CANNOT BE CALCULATED 01/17/2017 01:30 AM    CK-MB Index CANNOT BE CALCULATED 01/16/2017 09:05 PM    Troponin-I <0.05 04/26/2012 03:17 PM    Troponin-I, Qt. 0.02 07/22/2018 05:58 PM    Troponin-I, Qt. <0.02 (L) 05/28/2018 07:52 PM    Troponin-I, Qt. <0.02 (L) 08/10/2017 04:36 PM      Coagulation Tests Lab Results   Component Value Date/Time    Prothrombin time 11.6 02/08/2015 12:05 PM    Prothrombin time 11.9 (H) 10/24/2012 02:45 PM    INR 1.1 02/08/2015 12:05 PM    INR 1.1 10/24/2012 02:45 PM    aPTT 26.0 10/24/2012 02:45 PM      Most Recent UA Lab Results   Component Value Date/Time    Color YELLOW 04/02/2018 11:50 PM    Appearance CLEAR 04/02/2018 11:50 PM    Specific gravity 1.009 04/02/2018 11:50 PM    pH (UA) 7.0 04/02/2018 11:50 PM    Protein TRACE (A) 04/02/2018 11:50 PM    Glucose NEGATIVE  04/02/2018 11:50 PM    Ketone NEGATIVE  04/02/2018 11:50 PM    Bilirubin NEGATIVE  04/02/2018 11:50 PM    Blood TRACE (A) 04/02/2018 11:50 PM    Urobilinogen 0.2 04/02/2018 11:50 PM    Nitrites NEGATIVE  04/02/2018 11:50 PM    Leukocyte Esterase NEGATIVE  04/02/2018 11:50 PM    WBC 0-3 04/02/2018 11:50 PM    RBC 0-3 04/02/2018 11:50 PM    Epithelial cells 0-3 04/02/2018 11:50 PM    Bacteria 2+ (H) 04/02/2018 11:50 PM    Casts 0-3 04/02/2018 11:50 PM    Crystals, urine 0 03/13/2013 01:30 PM    Mucus 0 03/13/2013 01:30 PM        Allergies   Allergen Reactions    Compazine [Prochlorperazine Edisylate] Other (comments)     Also makes him feel funny    Morphine Other (comments)     Makes him feel funny    Reglan [Metoclopramide] Other (comments)     \"feel funny\"    Zofran [Ondansetron Hcl (Pf)] Other (comments)     Make him feel funny     Immunization History   Administered Date(s) Administered    Influenza Vaccine 09/10/2015    Influenza Vaccine Split 09/27/2012    ZZZ-RETIRED (DO NOT USE) Pneumococcal Vaccine (Unspecified Type) 09/21/2010     All Labs from Last 24 Hrs:  Recent Results (from the past 24 hour(s))   METABOLIC PANEL, COMPREHENSIVE    Collection Time: 10/14/18  2:30 AM   Result Value Ref Range    Sodium 139 136 - 145 mmol/L    Potassium 3.7 3.5 - 5.1 mmol/L    Chloride 106 98 - 107 mmol/L    CO2 28 21 - 32 mmol/L    Anion gap 5 (L) 7 - 16 mmol/L    Glucose 150 (H) 65 - 100 mg/dL    BUN 5 (L) 6 - 23 MG/DL    Creatinine 0.88 0.8 - 1.5 MG/DL    GFR est AA >60 >60 ml/min/1.73m2    GFR est non-AA >60 >60 ml/min/1.73m2    Calcium 8.2 (L) 8.3 - 10.4 MG/DL    Bilirubin, total 0.7 0.2 - 1.1 MG/DL    ALT (SGPT) 40 12 - 65 U/L    AST (SGOT) 30 15 - 37 U/L    Alk. phosphatase 70 50 - 136 U/L    Protein, total 7.1 6.3 - 8.2 g/dL    Albumin 3.0 (L) 3.5 - 5.0 g/dL    Globulin 4.1 (H) 2.3 - 3.5 g/dL    A-G Ratio 0.7 (L) 1.2 - 3.5     CBC WITH AUTOMATED DIFF    Collection Time: 10/14/18  2:30 AM   Result Value Ref Range    WBC 11.5 (H) 4.3 - 11.1 K/uL    RBC 1.95 (L) 4.23 - 5.6 M/uL    HGB 7.0 (L) 13.6 - 17.2 g/dL    HCT 21.1 (L) 41.1 - 50.3 %    .0 (H) 79.6 - 97.8 FL    MCH 34.9 (H) 26.1 - 32.9 PG    MCHC 34.5 31.4 - 35.0 g/dL    PLATELET 928 037 - 167 K/uL    MPV 10.8 9.4 - 12.3 FL    ABSOLUTE NRBC 0.43 (H) 0.0 - 0.2 K/uL    DF AUTOMATED      NEUTROPHILS 49 43 - 78 %    LYMPHOCYTES 39 13 - 44 %    MONOCYTES 10 4.0 - 12.0 %    EOSINOPHILS 2 0.5 - 7.8 %    BASOPHILS 0 0.0 - 2.0 %    IMMATURE GRANULOCYTES 1 0.0 - 5.0 %    ABS. NEUTROPHILS 5.6 1.7 - 8.2 K/UL    ABS. LYMPHOCYTES 4.5 0.5 - 4.6 K/UL    ABS. MONOCYTES 1.2 0.1 - 1.3 K/UL    ABS. EOSINOPHILS 0.2 0.0 - 0.8 K/UL    ABS. BASOPHILS 0.0 0.0 - 0.2 K/UL    ABS. IMM.  GRANS. 0.1 0.0 - 0.5 K/UL   RETICULOCYTE COUNT    Collection Time: 10/14/18  2:30 AM   Result Value Ref Range    Reticulocyte count 6.3 (H) 0.3 - 2.0 %    Absolute Retic Cnt. 0.1236 (H) 0.026 - 0.095 M/ul    Immature Retic Fraction 24.5 (H) 2.3 - 13.4 %    Retic Hgb Conc. 43 (H) 29 - 35 pg       Current Med List in Hospital:   Current Facility-Administered Medications   Medication Dose Route Frequency    sodium chloride (NS) flush 5-10 mL  5-10 mL IntraVENous Q8H    sodium chloride (NS) flush 5-10 mL  5-10 mL IntraVENous PRN    acetaminophen (TYLENOL) tablet 650 mg  650 mg Oral Q4H PRN    ondansetron (ZOFRAN) injection 4 mg  4 mg IntraVENous Q4H PRN    magnesium hydroxide (MILK OF MAGNESIA) 400 mg/5 mL oral suspension 30 mL  30 mL Oral DAILY PRN    enoxaparin (LOVENOX) injection 40 mg  40 mg SubCUTAneous Q24H    HYDROmorphone (PF) (DILAUDID) injection 1 mg  1 mg IntraVENous Q3H PRN    deferasirox (JADENU) tablet 1,800 mg (Patient Supplied) 1,800 mg Oral DAILY    folic acid (FOLVITE) tablet 1 mg  1 mg Oral DAILY    hydroxyurea (HYDREA) chemo cap 500 mg  500 mg Oral BID    dextrose 5 % - 0.45% NaCl infusion  125 mL/hr IntraVENous CONTINUOUS    promethazine (PHENERGAN) tablet 25 mg  25 mg Oral Q6H PRN    oxyCODONE ER (OxyCONTIN) tablet 80 mg  80 mg Oral Q12H    oxyCODONE IR (OXY-IR) immediate release tablet 30 mg  30 mg Oral Q4H PRN    metoprolol tartrate (LOPRESSOR) tablet 25 mg  25 mg Oral BID    lisinopril (PRINIVIL, ZESTRIL) tablet 5 mg  5 mg Oral DAILY       Discharge Exam:  Patient Vitals for the past 24 hrs:   Temp Pulse Resp BP SpO2   10/14/18 1545 98.3 °F (36.8 °C) 70 16 126/82 100 %   10/14/18 1209 98.2 °F (36.8 °C) 76 18 137/77 98 %   10/14/18 0741 98.7 °F (37.1 °C) 77 16 112/78 94 %   10/14/18 0218 98.6 °F (37 °C) 75 16 118/68 96 %   10/13/18 2318 98.5 °F (36.9 °C) 81 17 111/58 98 %   10/13/18 1957 98.2 °F (36.8 °C) 84 18 118/76 99 %     Oxygen Therapy  O2 Sat (%): 100 % (10/14/18 1545)  Pulse via Oximetry: 71 beats per minute (10/11/18 0429)  O2 Device: Room air (10/13/18 2030)  O2 Flow Rate (L/min): 1 l/min (10/11/18 1132)    Intake/Output Summary (Last 24 hours) at 10/14/18 1609  Last data filed at 10/14/18 1414   Gross per 24 hour   Intake             4633 ml   Output             3800 ml   Net              833 ml       General:    Well nourished. Alert. No acute distress. Eyes:   Normal sclera. Extraocular movements intact. ENT:  Normocephalic, atraumatic. Moist mucous membranes  CV:   Regular rate and rhythm. No murmur, rub, or gallop. Lungs:  Clear to auscultation bilaterally. No wheezing, rhonchi, or rales. Abdomen: Soft, nontender, nondistended. Bowel sounds normal.   Extremities: Warm and dry. No cyanosis or edema. Neurologic: CN II-XII grossly intact. Sensation intact. Skin:     No rashes or jaundice. Psych:  Normal mood and affect.       Discharge Info:   Current Discharge Medication List      CONTINUE these medications which have NOT CHANGED    Details   oxyCODONE ER (OXYCONTIN) 80 mg ER tablet Take 1 Tab by mouth every twelve (12) hours. Max Daily Amount: 160 mg.  Qty: 4 Tab, Refills: 0    Associated Diagnoses: Vasoocclusive sickle cell crisis (HCC)      oxyCODONE IR (ROXICODONE) 30 mg immediate release tablet Take 1 Tab by mouth every four (4) hours as needed. Max Daily Amount: 180 mg.  Qty: 8 Tab, Refills: 0    Associated Diagnoses: Vasoocclusive sickle cell crisis (HCC)      hydroxyurea (HYDREA) 500 mg capsule Take 1 Cap by mouth two (2) times a day. Takes in am at 0900. Star after follow up with your PCP. Qty: 60 Cap, Refills: 0      magnesium oxide (MAG-OX) 400 mg tablet Take 1 Tab by mouth daily. Qty: 10 Tab, Refills: 0      deferasirox (JADENU) 360 mg tab Take 5 Tabs by mouth daily. Qty: 150 Tab, Refills: 0      promethazine (PHENERGAN) 25 mg tablet Take 1 Tab by mouth every six (6) hours as needed. May substitute suppository if vomiting  Qty: 20 Tab, Refills: 0      metoprolol (LOPRESSOR) 25 mg tablet Take 12.5 mg by mouth two (2) times a day. Indications: hypertension      lisinopril (PRINIVIL, ZESTRIL) 5 mg tablet Take 5 mg by mouth daily. Indications: HYPERTENSION      folic acid (FOLVITE) 1 mg tablet Take 1 mg by mouth daily. Disposition: home    Activity: Activity as tolerated  Diet: DIET REGULAR    Follow-up Appointments   Procedures    FOLLOW UP VISIT:  WITH YOUR ONCOLOGIST TOMORROW AS PLANNED. Time spent in patient discharge planning and coordination 35 minutes.     Signed:  Mely Valadez NP

## 2018-10-14 NOTE — DISCHARGE INSTRUCTIONS
DISCHARGE SUMMARY from Nurse    PATIENT INSTRUCTIONS:    After intravenous sedation, for 24 hours or while taking prescription Narcotics:  · Limit your activities  · Do not drive and operate hazardous machinery  · Do not make important personal or business decisions  · Do  not drink alcoholic beverages  · If you have not urinated within 8 hours after discharge, please contact your physician on call. Report the following to your doctor:  · Excessive pain, swelling, redness or odor of or around the port area  · Temperature over 100.5  · Nausea and vomiting lasting longer than 4 hours or if unable to take medications  · Any signs of decreased circulation or nerve impairment to extremity: change in color, persistent  numbness, tingling, coldness or increase pain  · Any questions    What to do at Home:  Recommended activity: Activity as tolerated    Please call physician after your discharge if the following symptoms present:    1. Temperature greater than 100.5 (check temp every day)  2. Heart rate greater than 90 beats per minute  3. Increased respirations   4. Chills  5. Cough with any sputum (color: yellow, white, green, or bloody?)  6. Shortness of breath or change in breathing pattern  7. Bleeding:  Any prolonged bleeding presented from skin tears, cuts, bleeding from brushing teeth, nose bleed, bleeding noted in stool  8. Tenderness, swelling, or drainage from IV site or central line catheter    Diet: Regular     *  Please give a list of your current medications to your Primary Care Provider. *  Please update this list whenever your medications are discontinued, doses are      changed, or new medications (including over-the-counter products) are added. *  Please carry medication information at all times in case of emergency situations.     These are general instructions for a healthy lifestyle:    No smoking/ No tobacco products/ Avoid exposure to second hand smoke  Surgeon General's Warning: Quitting smoking now greatly reduces serious risk to your health. Obesity, smoking, and sedentary lifestyle greatly increases your risk for illness    A healthy diet, regular physical exercise & weight monitoring are important for maintaining a healthy lifestyle    You may be retaining fluid if you have a history of heart failure or if you experience any of the following symptoms:  Weight gain of 3 pounds or more overnight or 5 pounds in a week, increased swelling in our hands or feet or shortness of breath while lying flat in bed. Please call your doctor as soon as you notice any of these symptoms; do not wait until your next office visit. Recognize signs and symptoms of STROKE:    F-face looks uneven    A-arms unable to move or move unevenly    S-speech slurred or non-existent    T-time-call 911 as soon as signs and symptoms begin-DO NOT go       Back to bed or wait to see if you get better-TIME IS BRAIN. Warning Signs of HEART ATTACK     Call 911 if you have these symptoms:   Chest discomfort. Most heart attacks involve discomfort in the center of the chest that lasts more than a few minutes, or that goes away and comes back. It can feel like uncomfortable pressure, squeezing, fullness, or pain.  Discomfort in other areas of the upper body. Symptoms can include pain or discomfort in one or both arms, the back, neck, jaw, or stomach.  Shortness of breath with or without chest discomfort.  Other signs may include breaking out in a cold sweat, nausea, or lightheadedness. Don't wait more than five minutes to call 911 - MINUTES MATTER! Fast action can save your life. Calling 911 is almost always the fastest way to get lifesaving treatment. Emergency Medical Services staff can begin treatment when they arrive -- up to an hour sooner than if someone gets to the hospital by car. The discharge information has been reviewed with the patient. The patient verbalized understanding.   Discharge medications reviewed with the patient and appropriate educational materials and side effects teaching were provided.   ___________________________________________________________________________________________________________________________________

## 2018-10-14 NOTE — PROGRESS NOTES
END OF SHIFT NOTE: 
 
Intake/Output 10/14 0701 - 10/14 1900 In: 840 [P.O.:840] Out: 1625 [NROHF:3366] Voiding: YES Catheter: NO 
Drain:   
 
 
 
 
 
Stool:  0 occurrences. Emesis:  0 occurrences. VITAL SIGNS Patient Vitals for the past 12 hrs: 
 Temp Pulse Resp BP SpO2  
10/14/18 1209 98.2 °F (36.8 °C) 76 18 137/77 98 % 10/14/18 0741 98.7 °F (37.1 °C) 77 16 112/78 94 % Pain Assessment Pain 1 Pain Scale 1: Numeric (0 - 10) (10/14/18 1441) Pain Intensity 1: 5 (10/14/18 1441) Patient Stated Pain Goal: 0 (10/14/18 1254) Pain Reassessment 1: Yes (10/14/18 1254) Pain Onset 1: pta (10/14/18 0218) Pain Location 1: Leg (10/14/18 0218) Pain Orientation 1: Left;Right (10/14/18 0218) Pain Description 1: Aching (10/14/18 6217) Pain Intervention(s) 1: Distraction (10/14/18 1441) Ambulating Yes Additional Information:  
 
Shift report given to oncoming nurse at the bedside. Pascual Monroy

## 2018-10-15 ENCOUNTER — PATIENT OUTREACH (OUTPATIENT)
Dept: CASE MANAGEMENT | Age: 43
End: 2018-10-15

## 2018-10-15 NOTE — PROGRESS NOTES
This note will not be viewable in [address] Transition of Care Discharge Follow-up Questionnaire Date/Time of Call: 
 10/15/18 10:52am  
What was the patient hospitalized for? Sickle Cell Pain Crisis Does the patient understand his/her diagnosis and/or treatment and what happened during the hospitalization? Yes, spoke with patient, he states his understanding of diagnosis and treatment; and is agreeable to call. Patient states he is doing well;  Patient is very knowledgeable of diagnosis and treatment plan Did the patient receive discharge instructions? Yes   
CM Assessed Risk for Readmission:  
 
 
Patient stated Risk for Readmission:  
 
 High d/t chronic disease process Another sickle cell crisis Review any discharge instructions (see discharge instructions/AVS in [facility]). Ask patient if they understand these. Do they have any questions? Reviewed, understanding is stated, no questions at this time Were home services ordered (nursing, PT, OT, ST, etc.)? No  
If so, has the first visit occurred? If not, why? (Assist with coordination of services if necessary.) 
 N/A Was any DME ordered? No  
   
If so, has it been received? If not, why?  (Assist patient in obtaining DME orders &/or equipment if necessary.) N/A Complete a review of all medications (new, continued and discontinued meds per the D/C instructions and medication tab in [facility]). Completed Were all new prescriptions filled? If not, why?  (Assist patient in obtaining medications if necessary  escalate for CCM &/or SW if ongoing issues are verbalized by pt or anticipated) No new medications at discharge, patient states he has all medications at home Does the patient understand the purpose and dosing instructions for all medications? (If patient has questions, provide explanation and education.) Yes, understanding of medications Does the patient have any problems in performing ADLs? (If patient is unable to perform ADLs  what is the limiting factor(s)? Do they have a support system that can assist? If no support system is present, discuss possible assistance that they may be able to obtain. Escalate for CCM/SW if ongoing issues are verbalized by pt or anticipated) Independent with ADLs, family assistance available if needed Does the patient have all follow-up appointments scheduled? 7 day f/up with PCP?  
(f/up with PCP may be w/in 14 days if patient has a f/up with their specialist w/in 7 days) 7-14 day f/up with specialist?  
(or per discharge instructions) If f/up has not been made  what actions has the care coordinator made to accomplish this? Has transportation been arranged? Yes Dr. Kishore Rich- patient will call and schedule follow up Yes, no transportation issues Any other questions or concerns expressed by the patient? No further questions or concerns at this time. Payton Henson states his gratitude for follow up Contact information for [facility] was given, instructed to call with new questions or concerns. Schedule next appointment with JOSÉ TREVIZO Coordinator or refer to RN Case Manager/ per the workflow guidelines. When is care coordinators next follow-up call scheduled? If referred for CCM  what RN care manager was the referral assigned? Due to Chronic disease process that will likely necessitate unavoidable hospitalizations and medical interventions for remainder of patients life as well as patient and family knowledge of disease and treatment no further LALY outreach is needed. LALY Call Completed By: Carmela Nolasco LPN Community Care Coordinator

## 2018-11-04 ENCOUNTER — APPOINTMENT (OUTPATIENT)
Dept: GENERAL RADIOLOGY | Age: 43
DRG: 812 | End: 2018-11-04
Attending: EMERGENCY MEDICINE
Payer: MEDICARE

## 2018-11-04 ENCOUNTER — HOSPITAL ENCOUNTER (EMERGENCY)
Age: 43
Discharge: HOME | DRG: 812 | End: 2018-11-04
Attending: EMERGENCY MEDICINE
Payer: MEDICARE

## 2018-11-04 VITALS
TEMPERATURE: 98 F | WEIGHT: 146 LBS | RESPIRATION RATE: 16 BRPM | HEIGHT: 70 IN | SYSTOLIC BLOOD PRESSURE: 124 MMHG | OXYGEN SATURATION: 97 % | HEART RATE: 72 BPM | BODY MASS INDEX: 20.9 KG/M2 | DIASTOLIC BLOOD PRESSURE: 73 MMHG

## 2018-11-04 DIAGNOSIS — D57.00 HB-SS DISEASE WITH CRISIS (HCC): Primary | ICD-10-CM

## 2018-11-04 LAB
ALBUMIN SERPL-MCNC: 3.8 G/DL (ref 3.5–5)
ALBUMIN/GLOB SERPL: 0.7 {RATIO} (ref 1.2–3.5)
ALP SERPL-CCNC: 84 U/L (ref 50–136)
ALT SERPL-CCNC: 68 U/L (ref 12–65)
ANION GAP SERPL CALC-SCNC: 6 MMOL/L (ref 7–16)
AST SERPL-CCNC: 51 U/L (ref 15–37)
BASOPHILS # BLD: 0 K/UL (ref 0–0.2)
BASOPHILS NFR BLD: 0 % (ref 0–2)
BILIRUB DIRECT SERPL-MCNC: 0.2 MG/DL
BILIRUB SERPL-MCNC: 1 MG/DL (ref 0.2–1.1)
BUN SERPL-MCNC: 10 MG/DL (ref 6–23)
CALCIUM SERPL-MCNC: 9.2 MG/DL (ref 8.3–10.4)
CHLORIDE SERPL-SCNC: 104 MMOL/L (ref 98–107)
CO2 SERPL-SCNC: 27 MMOL/L (ref 21–32)
CREAT SERPL-MCNC: 0.89 MG/DL (ref 0.8–1.5)
DIFFERENTIAL METHOD BLD: ABNORMAL
EOSINOPHIL # BLD: 0 K/UL (ref 0–0.8)
EOSINOPHIL NFR BLD: 0 % (ref 0.5–7.8)
ERYTHROCYTE [DISTWIDTH] IN BLOOD BY AUTOMATED COUNT: 21.9 %
GLOBULIN SER CALC-MCNC: 5.2 G/DL (ref 2.3–3.5)
GLUCOSE SERPL-MCNC: 121 MG/DL (ref 65–100)
HCT VFR BLD AUTO: 27.2 % (ref 41.1–50.3)
HGB BLD-MCNC: 9.3 G/DL (ref 13.6–17.2)
HGB RETIC QN AUTO: 40 PG (ref 29–35)
IMM GRANULOCYTES # BLD: 0.1 K/UL (ref 0–0.5)
IMM GRANULOCYTES NFR BLD AUTO: 0 % (ref 0–5)
IMM RETICS NFR: 24.3 % (ref 2.3–13.4)
LDH SERPL L TO P-CCNC: 324 U/L (ref 100–190)
LYMPHOCYTES # BLD: 2.6 K/UL (ref 0.5–4.6)
LYMPHOCYTES NFR BLD: 20 % (ref 13–44)
MCH RBC QN AUTO: 32.3 PG (ref 26.1–32.9)
MCHC RBC AUTO-ENTMCNC: 34.2 G/DL (ref 31.4–35)
MCV RBC AUTO: 94.4 FL (ref 79.6–97.8)
MONOCYTES # BLD: 1 K/UL (ref 0.1–1.3)
MONOCYTES NFR BLD: 8 % (ref 4–12)
NEUTS SEG # BLD: 9.4 K/UL (ref 1.7–8.2)
NEUTS SEG NFR BLD: 72 % (ref 43–78)
NRBC # BLD: 0.03 K/UL (ref 0–0.2)
PLATELET # BLD AUTO: 276 K/UL (ref 150–450)
PMV BLD AUTO: 10.7 FL (ref 9.4–12.3)
POTASSIUM SERPL-SCNC: 3.8 MMOL/L (ref 3.5–5.1)
PROT SERPL-MCNC: 9 G/DL (ref 6.3–8.2)
RBC # BLD AUTO: 2.88 M/UL (ref 4.23–5.6)
RETICS # AUTO: 0.06 M/UL (ref 0.03–0.1)
RETICS/RBC NFR AUTO: 2.2 % (ref 0.3–2)
SODIUM SERPL-SCNC: 137 MMOL/L (ref 136–145)
WBC # BLD AUTO: 13.1 K/UL (ref 4.3–11.1)

## 2018-11-04 PROCEDURE — 80076 HEPATIC FUNCTION PANEL: CPT

## 2018-11-04 PROCEDURE — 96361 HYDRATE IV INFUSION ADD-ON: CPT | Performed by: EMERGENCY MEDICINE

## 2018-11-04 PROCEDURE — 80048 BASIC METABOLIC PNL TOTAL CA: CPT

## 2018-11-04 PROCEDURE — 74011250636 HC RX REV CODE- 250/636: Performed by: EMERGENCY MEDICINE

## 2018-11-04 PROCEDURE — 83615 LACTATE (LD) (LDH) ENZYME: CPT

## 2018-11-04 PROCEDURE — 85025 COMPLETE CBC W/AUTO DIFF WBC: CPT

## 2018-11-04 PROCEDURE — 96376 TX/PRO/DX INJ SAME DRUG ADON: CPT | Performed by: EMERGENCY MEDICINE

## 2018-11-04 PROCEDURE — 85046 RETICYTE/HGB CONCENTRATE: CPT

## 2018-11-04 PROCEDURE — 71045 X-RAY EXAM CHEST 1 VIEW: CPT

## 2018-11-04 PROCEDURE — 96374 THER/PROPH/DIAG INJ IV PUSH: CPT | Performed by: EMERGENCY MEDICINE

## 2018-11-04 PROCEDURE — 96375 TX/PRO/DX INJ NEW DRUG ADDON: CPT | Performed by: EMERGENCY MEDICINE

## 2018-11-04 PROCEDURE — 99284 EMERGENCY DEPT VISIT MOD MDM: CPT | Performed by: EMERGENCY MEDICINE

## 2018-11-04 RX ORDER — HEPARIN 100 UNIT/ML
300 SYRINGE INTRAVENOUS ONCE
Status: COMPLETED | OUTPATIENT
Start: 2018-11-04 | End: 2018-11-04

## 2018-11-04 RX ORDER — PROMETHAZINE HYDROCHLORIDE 25 MG/ML
25 INJECTION, SOLUTION INTRAMUSCULAR; INTRAVENOUS
Status: COMPLETED | OUTPATIENT
Start: 2018-11-04 | End: 2018-11-04

## 2018-11-04 RX ORDER — HYDROMORPHONE HYDROCHLORIDE 1 MG/ML
1 INJECTION, SOLUTION INTRAMUSCULAR; INTRAVENOUS; SUBCUTANEOUS ONCE
Status: COMPLETED | OUTPATIENT
Start: 2018-11-04 | End: 2018-11-04

## 2018-11-04 RX ADMIN — SODIUM CHLORIDE, PRESERVATIVE FREE 300 UNITS: 5 INJECTION INTRAVENOUS at 23:29

## 2018-11-04 RX ADMIN — SODIUM CHLORIDE 1000 ML: 900 INJECTION, SOLUTION INTRAVENOUS at 21:24

## 2018-11-04 RX ADMIN — PROMETHAZINE HYDROCHLORIDE 25 MG: 25 INJECTION INTRAMUSCULAR; INTRAVENOUS at 21:24

## 2018-11-04 RX ADMIN — HYDROMORPHONE HYDROCHLORIDE 1 MG: 1 INJECTION, SOLUTION INTRAMUSCULAR; INTRAVENOUS; SUBCUTANEOUS at 23:17

## 2018-11-04 RX ADMIN — HYDROMORPHONE HYDROCHLORIDE 1 MG: 1 INJECTION, SOLUTION INTRAMUSCULAR; INTRAVENOUS; SUBCUTANEOUS at 21:24

## 2018-11-04 NOTE — ED TRIAGE NOTES
Per patient arm and lower back pain x1 day. \"I'm having sickle cell crisis. \" patient states of nausea denies v/d.

## 2018-11-05 ENCOUNTER — HOSPITAL ENCOUNTER (INPATIENT)
Age: 43
LOS: 3 days | Discharge: HOME | DRG: 812 | End: 2018-11-08
Attending: EMERGENCY MEDICINE | Admitting: INTERNAL MEDICINE
Payer: MEDICARE

## 2018-11-05 ENCOUNTER — APPOINTMENT (OUTPATIENT)
Dept: GENERAL RADIOLOGY | Age: 43
DRG: 812 | End: 2018-11-05
Attending: EMERGENCY MEDICINE
Payer: MEDICARE

## 2018-11-05 DIAGNOSIS — D57.00 SICKLE CELL PAIN CRISIS (HCC): Primary | ICD-10-CM

## 2018-11-05 LAB
ALBUMIN SERPL-MCNC: 3.6 G/DL (ref 3.5–5)
ALBUMIN/GLOB SERPL: 0.8 {RATIO} (ref 1.2–3.5)
ALP SERPL-CCNC: 74 U/L (ref 50–136)
ALT SERPL-CCNC: 65 U/L (ref 12–65)
ANION GAP SERPL CALC-SCNC: 8 MMOL/L (ref 7–16)
AST SERPL-CCNC: 49 U/L (ref 15–37)
ATRIAL RATE: 76 BPM
BASOPHILS # BLD: 0 K/UL (ref 0–0.2)
BASOPHILS NFR BLD: 0 % (ref 0–2)
BILIRUB SERPL-MCNC: 1 MG/DL (ref 0.2–1.1)
BUN SERPL-MCNC: 12 MG/DL (ref 6–23)
CALCIUM SERPL-MCNC: 9.1 MG/DL (ref 8.3–10.4)
CALCULATED P AXIS, ECG09: 69 DEGREES
CALCULATED R AXIS, ECG10: 28 DEGREES
CALCULATED T AXIS, ECG11: 66 DEGREES
CHLORIDE SERPL-SCNC: 106 MMOL/L (ref 98–107)
CO2 SERPL-SCNC: 26 MMOL/L (ref 21–32)
CREAT SERPL-MCNC: 0.79 MG/DL (ref 0.8–1.5)
DIAGNOSIS, 93000: NORMAL
DIFFERENTIAL METHOD BLD: ABNORMAL
EOSINOPHIL # BLD: 0 K/UL (ref 0–0.8)
EOSINOPHIL NFR BLD: 0 % (ref 0.5–7.8)
ERYTHROCYTE [DISTWIDTH] IN BLOOD BY AUTOMATED COUNT: 22.1 %
GLOBULIN SER CALC-MCNC: 4.7 G/DL (ref 2.3–3.5)
GLUCOSE SERPL-MCNC: 128 MG/DL (ref 65–100)
HCT VFR BLD AUTO: 26.1 % (ref 41.1–50.3)
HGB BLD-MCNC: 8.9 G/DL (ref 13.6–17.2)
HGB RETIC QN AUTO: 43 PG (ref 29–35)
IMM GRANULOCYTES # BLD: 0.1 K/UL (ref 0–0.5)
IMM GRANULOCYTES NFR BLD AUTO: 1 % (ref 0–5)
IMM RETICS NFR: 24.5 % (ref 2.3–13.4)
LYMPHOCYTES # BLD: 2.4 K/UL (ref 0.5–4.6)
LYMPHOCYTES NFR BLD: 22 % (ref 13–44)
MCH RBC QN AUTO: 32.6 PG (ref 26.1–32.9)
MCHC RBC AUTO-ENTMCNC: 34.1 G/DL (ref 31.4–35)
MCV RBC AUTO: 95.6 FL (ref 79.6–97.8)
MONOCYTES # BLD: 1 K/UL (ref 0.1–1.3)
MONOCYTES NFR BLD: 9 % (ref 4–12)
NEUTS SEG # BLD: 7.5 K/UL (ref 1.7–8.2)
NEUTS SEG NFR BLD: 68 % (ref 43–78)
NRBC # BLD: 0.04 K/UL (ref 0–0.2)
P-R INTERVAL, ECG05: 160 MS
PLATELET # BLD AUTO: 273 K/UL (ref 150–450)
PMV BLD AUTO: 10.8 FL (ref 9.4–12.3)
POTASSIUM SERPL-SCNC: 3.7 MMOL/L (ref 3.5–5.1)
PROT SERPL-MCNC: 8.3 G/DL (ref 6.3–8.2)
Q-T INTERVAL, ECG07: 350 MS
QRS DURATION, ECG06: 74 MS
QTC CALCULATION (BEZET), ECG08: 393 MS
RBC # BLD AUTO: 2.73 M/UL (ref 4.23–5.6)
RETICS # AUTO: 0.06 M/UL (ref 0.03–0.1)
RETICS/RBC NFR AUTO: 2.3 % (ref 0.3–2)
SODIUM SERPL-SCNC: 140 MMOL/L (ref 136–145)
TROPONIN I BLD-MCNC: 0 NG/ML (ref 0.02–0.05)
VENTRICULAR RATE, ECG03: 76 BPM
WBC # BLD AUTO: 11 K/UL (ref 4.3–11.1)

## 2018-11-05 PROCEDURE — 96375 TX/PRO/DX INJ NEW DRUG ADDON: CPT | Performed by: EMERGENCY MEDICINE

## 2018-11-05 PROCEDURE — 65270000029 HC RM PRIVATE

## 2018-11-05 PROCEDURE — 74011250637 HC RX REV CODE- 250/637: Performed by: INTERNAL MEDICINE

## 2018-11-05 PROCEDURE — 77030020263 HC SOL INJ SOD CL0.9% LFCR 1000ML

## 2018-11-05 PROCEDURE — 71046 X-RAY EXAM CHEST 2 VIEWS: CPT

## 2018-11-05 PROCEDURE — 74011250636 HC RX REV CODE- 250/636: Performed by: EMERGENCY MEDICINE

## 2018-11-05 PROCEDURE — 96376 TX/PRO/DX INJ SAME DRUG ADON: CPT | Performed by: EMERGENCY MEDICINE

## 2018-11-05 PROCEDURE — 74011250636 HC RX REV CODE- 250/636: Performed by: INTERNAL MEDICINE

## 2018-11-05 PROCEDURE — 93005 ELECTROCARDIOGRAM TRACING: CPT | Performed by: EMERGENCY MEDICINE

## 2018-11-05 PROCEDURE — 99285 EMERGENCY DEPT VISIT HI MDM: CPT | Performed by: EMERGENCY MEDICINE

## 2018-11-05 PROCEDURE — 74011000250 HC RX REV CODE- 250: Performed by: EMERGENCY MEDICINE

## 2018-11-05 PROCEDURE — 80053 COMPREHEN METABOLIC PANEL: CPT

## 2018-11-05 PROCEDURE — 85025 COMPLETE CBC W/AUTO DIFF WBC: CPT

## 2018-11-05 PROCEDURE — 85046 RETICYTE/HGB CONCENTRATE: CPT

## 2018-11-05 PROCEDURE — 96374 THER/PROPH/DIAG INJ IV PUSH: CPT | Performed by: EMERGENCY MEDICINE

## 2018-11-05 PROCEDURE — 84484 ASSAY OF TROPONIN QUANT: CPT

## 2018-11-05 RX ORDER — ENOXAPARIN SODIUM 100 MG/ML
40 INJECTION SUBCUTANEOUS EVERY 24 HOURS
Status: DISCONTINUED | OUTPATIENT
Start: 2018-11-05 | End: 2018-11-08 | Stop reason: HOSPADM

## 2018-11-05 RX ORDER — FOLIC ACID 1 MG/1
1 TABLET ORAL DAILY
Status: DISCONTINUED | OUTPATIENT
Start: 2018-11-06 | End: 2018-11-08 | Stop reason: HOSPADM

## 2018-11-05 RX ORDER — HYDROXYUREA 500 MG/1
1000 CAPSULE ORAL DAILY
Status: DISCONTINUED | OUTPATIENT
Start: 2018-11-06 | End: 2018-11-08 | Stop reason: HOSPADM

## 2018-11-05 RX ORDER — OXYCODONE HCL 20 MG/1
20 TABLET, FILM COATED, EXTENDED RELEASE ORAL EVERY 12 HOURS
Status: DISCONTINUED | OUTPATIENT
Start: 2018-11-05 | End: 2018-11-08 | Stop reason: HOSPADM

## 2018-11-05 RX ORDER — ACETAMINOPHEN 325 MG/1
650 TABLET ORAL
Status: DISCONTINUED | OUTPATIENT
Start: 2018-11-05 | End: 2018-11-08 | Stop reason: HOSPADM

## 2018-11-05 RX ORDER — PROMETHAZINE HYDROCHLORIDE 25 MG/1
12.5 TABLET ORAL
Status: DISCONTINUED | OUTPATIENT
Start: 2018-11-05 | End: 2018-11-08 | Stop reason: HOSPADM

## 2018-11-05 RX ORDER — HYDROMORPHONE HYDROCHLORIDE 1 MG/ML
0.5 INJECTION, SOLUTION INTRAMUSCULAR; INTRAVENOUS; SUBCUTANEOUS
Status: DISCONTINUED | OUTPATIENT
Start: 2018-11-05 | End: 2018-11-06

## 2018-11-05 RX ORDER — DEFERASIROX 360 MG/1
1800 TABLET, FILM COATED ORAL DAILY
Status: DISCONTINUED | OUTPATIENT
Start: 2018-11-06 | End: 2018-11-08 | Stop reason: HOSPADM

## 2018-11-05 RX ORDER — SODIUM CHLORIDE 0.9 % (FLUSH) 0.9 %
5-10 SYRINGE (ML) INJECTION EVERY 8 HOURS
Status: DISCONTINUED | OUTPATIENT
Start: 2018-11-05 | End: 2018-11-08 | Stop reason: HOSPADM

## 2018-11-05 RX ORDER — NALOXONE HYDROCHLORIDE 0.4 MG/ML
0.4 INJECTION, SOLUTION INTRAMUSCULAR; INTRAVENOUS; SUBCUTANEOUS AS NEEDED
Status: DISCONTINUED | OUTPATIENT
Start: 2018-11-05 | End: 2018-11-08 | Stop reason: HOSPADM

## 2018-11-05 RX ORDER — LISINOPRIL 5 MG/1
5 TABLET ORAL DAILY
Status: DISCONTINUED | OUTPATIENT
Start: 2018-11-06 | End: 2018-11-08 | Stop reason: HOSPADM

## 2018-11-05 RX ORDER — HYDROMORPHONE HYDROCHLORIDE 1 MG/ML
0.5 INJECTION, SOLUTION INTRAMUSCULAR; INTRAVENOUS; SUBCUTANEOUS
Status: COMPLETED | OUTPATIENT
Start: 2018-11-05 | End: 2018-11-05

## 2018-11-05 RX ORDER — OXYCODONE HYDROCHLORIDE 15 MG/1
30 TABLET ORAL
Status: DISCONTINUED | OUTPATIENT
Start: 2018-11-05 | End: 2018-11-08 | Stop reason: HOSPADM

## 2018-11-05 RX ORDER — SODIUM CHLORIDE 0.9 % (FLUSH) 0.9 %
5-10 SYRINGE (ML) INJECTION AS NEEDED
Status: DISCONTINUED | OUTPATIENT
Start: 2018-11-05 | End: 2018-11-08 | Stop reason: HOSPADM

## 2018-11-05 RX ORDER — HYDROMORPHONE HYDROCHLORIDE 1 MG/ML
1 INJECTION, SOLUTION INTRAMUSCULAR; INTRAVENOUS; SUBCUTANEOUS
Status: COMPLETED | OUTPATIENT
Start: 2018-11-05 | End: 2018-11-05

## 2018-11-05 RX ORDER — SODIUM CHLORIDE 9 MG/ML
125 INJECTION, SOLUTION INTRAVENOUS CONTINUOUS
Status: DISCONTINUED | OUTPATIENT
Start: 2018-11-05 | End: 2018-11-06

## 2018-11-05 RX ADMIN — SODIUM CHLORIDE 12.5 MG: 9 INJECTION INTRAMUSCULAR; INTRAVENOUS; SUBCUTANEOUS at 13:33

## 2018-11-05 RX ADMIN — SODIUM CHLORIDE 125 ML/HR: 900 INJECTION, SOLUTION INTRAVENOUS at 19:06

## 2018-11-05 RX ADMIN — OXYCODONE HYDROCHLORIDE 20 MG: 20 TABLET, FILM COATED, EXTENDED RELEASE ORAL at 21:13

## 2018-11-05 RX ADMIN — HYDROMORPHONE HYDROCHLORIDE 0.5 MG: 1 INJECTION, SOLUTION INTRAMUSCULAR; INTRAVENOUS; SUBCUTANEOUS at 19:01

## 2018-11-05 RX ADMIN — Medication 10 ML: at 21:18

## 2018-11-05 RX ADMIN — SODIUM CHLORIDE 1000 ML: 900 INJECTION, SOLUTION INTRAVENOUS at 11:16

## 2018-11-05 RX ADMIN — PROMETHAZINE HYDROCHLORIDE 12.5 MG: 25 INJECTION INTRAMUSCULAR; INTRAVENOUS at 11:16

## 2018-11-05 RX ADMIN — HYDROMORPHONE HYDROCHLORIDE 0.5 MG: 1 INJECTION, SOLUTION INTRAMUSCULAR; INTRAVENOUS; SUBCUTANEOUS at 15:12

## 2018-11-05 RX ADMIN — PROMETHAZINE HYDROCHLORIDE 12.5 MG: 25 TABLET ORAL at 23:11

## 2018-11-05 RX ADMIN — HYDROMORPHONE HYDROCHLORIDE 0.5 MG: 1 INJECTION, SOLUTION INTRAMUSCULAR; INTRAVENOUS; SUBCUTANEOUS at 23:11

## 2018-11-05 RX ADMIN — HYDROMORPHONE HYDROCHLORIDE 1 MG: 1 INJECTION, SOLUTION INTRAMUSCULAR; INTRAVENOUS; SUBCUTANEOUS at 13:33

## 2018-11-05 RX ADMIN — ENOXAPARIN SODIUM 40 MG: 40 INJECTION, SOLUTION INTRAVENOUS; SUBCUTANEOUS at 21:13

## 2018-11-05 RX ADMIN — HYDROMORPHONE HYDROCHLORIDE 1 MG: 1 INJECTION, SOLUTION INTRAMUSCULAR; INTRAVENOUS; SUBCUTANEOUS at 11:15

## 2018-11-05 NOTE — DISCHARGE INSTRUCTIONS
Sickle Cell Crisis: Care Instructions  Your Care Instructions    Sickle cell crisis is a painful episode that may begin suddenly in a person with sickle cell disease. Sickle cell disease turns normal, round red blood cells into cells that look like kendall or crescent moons. The sickle-shaped cells can get stuck in blood vessels, blocking blood flow and causing severe pain. The pain can occur in the bones of the spine, the arms and legs, the chest, and the abdomen. An episode may be called a \"painful event\" or \"painful crisis. \" Some people who have sickle cell disease have many painful events, while others have few or none. Treatment depends on the level of pain and how long it lasts. Sometimes taking nonprescription pain relievers can help. Or you may need stronger pain relief medicine that is prescribed or given by a doctor. You may need to be treated in the hospital.  It isn't always possible to know what sets off a painful event. But triggers include being dehydrated, cold temperatures, infection, stress, and not getting enough oxygen. Follow-up care is a key part of your treatment and safety. Be sure to make and go to all appointments, and call your doctor if you are having problems. It's also a good idea to know your test results and keep a list of the medicines you take. How can you care for yourself at home? · Create a pain management plan with your doctor. This plan should include the types of medicines you can take and other actions you can take at home to relieve pain. · Drink plenty of fluids, enough so that your urine is light yellow or clear like water. If you have kidney, heart, or liver disease and have to limit fluids, talk with your doctor before you increase the amount of fluids you drink. · Take your medicines exactly as prescribed. Call your doctor if you think you are having a problem with your medicine. · Take pain medicines exactly as directed.   ? If the doctor gave you a prescription medicine for pain, take it as prescribed. ? If you are not taking a prescription pain medicine, ask your doctor if you can take an over-the-counter medicine. · Avoid alcohol. It can make you dehydrated. · Dress warmly in cold weather. The cold and windy weather can lead to severe pain. · Do not smoke. Smoking can reduce the amount of oxygen in your blood. · Get plenty of sleep. When should you call for help? Call 911 anytime you think you may need emergency care. For example, call if:    · You have symptoms of a severe problem from sickle cell.     · You have symptoms of a stroke. These may include:  ? Sudden numbness, tingling, weakness, or loss of movement in your face, arm, or leg, especially on only one side of your body. ? Sudden vision changes. ? Sudden trouble speaking. ? Sudden confusion or trouble understanding simple statements. ? Sudden problems with walking or balance. ? A sudden, severe headache that is different from past headaches.     · You are in severe pain.     · You have symptoms of a heart attack. These may include:  ? Chest pain or pressure, or a strange feeling in the chest.  ? Sweating. ? Shortness of breath. ? Nausea or vomiting. ? Pain, pressure, or a strange feeling in the back, neck, jaw, or upper belly or in one or both shoulders or arms. ? Lightheadedness or sudden weakness. ? A fast or irregular heartbeat. After you call 911, the  may tell you to chew 1 adult-strength or 2 to 4 low-dose aspirin. Wait for an ambulance. Do not try to drive yourself.    Call your doctor now or seek immediate medical care if:    · You have a fever.    Watch closely for changes in your health, and be sure to contact your doctor if you have any problems. Where can you learn more? Go to http://socorro-rosita.info/. Enter F104 in the search box to learn more about \"Sickle Cell Crisis: Care Instructions. \"  Current as of: September 10, 2017  Content Version: 11.8  © 1139-8469 [employer]wise, [employer]. Care instructions adapted under license by [employer] (which disclaims liability or warranty for this information). If you have questions about a medical condition or this instruction, always ask your healthcare professional. [address] any warranty or liability for your use of this information.

## 2018-11-05 NOTE — ED NOTES
Report received from [facility] to assume patient care at this time.

## 2018-11-05 NOTE — H&P
Hospitalist H&P Note Admit Date:  2018  9:40 AM  
Name:  Mak Rutherford Age:  37 y.o. 
:  1975 MRN:  [id number] PCP:  Gerber Desir MD 
Treatment Team: Attending Provider: Bubba Soni MD 
 
HPI:  
Mr. Cesario Mcwilliams is a 36 y/o AAM with a h/o SCD with chronic iron overload secondary to transfusions p/w LE pain for the past few days. Was here mid October with the same, was seen by heme/onc and discharged a few days later after IVFs and pain meds. He was seen by his hematologist, Dr. Selena Jacobsen, who he says decreased his OxyContin from 80mg BID to 20mg BID. Since then he's continued his Hydrea, folic acid and Jadenu. He came to ED on  with c/o LE pain and was discharged home, felt a little better but then his legs started hurting again so he came back. Hb tonight is 8.9 and was 9.3 yesterday. It was 7.0 at discharge on 10/14. He appears well and is asking for something to eat and drink. Bilirubin is normal. LDH last night was 324. Labs otherwise largely unremarkable. He has only required PLEX once for acute SC crisis and this was around .  
 
10 systems reviewed and negative except as noted in HPI. Past Medical History:  
Diagnosis Date  Chronic pain  HTN (hypertension)  Ill-defined condition   
 sickle cell  Iron overload due to repeated red blood cell transfusions 2017  Paroxysmal SVT (supraventricular tachycardia) ([facility] [address] 2016  Sickle cell disease ([facility] Past Surgical History:  
Procedure Laterality Date  HC PENILE IMPL DURA II POSITINBLE    
 HC PORT LIFE SNGLE LUMEN 5013    
 to L CW  
 HX CHOLECYSTECTOMY  HX OTHER SURGICAL    
 penile inplant  HX VASCULAR ACCESS Allergies Allergen Reactions  Compazine [Prochlorperazine Edisylate] Other (comments) Also makes him feel funny  Morphine Other (comments) Makes him feel funny  Reglan [Metoclopramide] Other (comments) \"feel funny\"  Zofran [Ondansetron Hcl (Pf)] Other (comments) Make him feel funny Social History Tobacco Use  Smoking status: Never Smoker  Smokeless tobacco: Never Used Substance Use Topics  Alcohol use: No  
  Alcohol/week: 0.0 oz Family History Problem Relation Age of Onset  Hypertension Other  Diabetes Father  Stroke Father  Sickle Cell Anemia Sister Immunization History Administered Date(s) Administered  Influenza Vaccine 09/10/2015  Influenza Vaccine Split 09/27/2012  ZZZ-RETIRED (DO NOT USE) Pneumococcal Vaccine (Unspecified Type) 09/21/2010 PTA Medications: 
Prior to Admission Medications Prescriptions Last Dose Informant Patient Reported? Taking? deferasirox (JADENU) 360 mg tab   No No  
Sig: Take 5 Tabs by mouth daily. folic acid (FOLVITE) 1 mg tablet  Self Yes No  
Sig: Take 1 mg by mouth daily. hydroxyurea (HYDREA) 500 mg capsule   No No  
Sig: Take 1 Cap by mouth two (2) times a day. Takes in am at 0900. Star after follow up with your PCP. Patient taking differently: Take 1,000 mg by mouth daily. Takes in am at 0900. Star after follow up with your PCP. lisinopril (PRINIVIL, ZESTRIL) 5 mg tablet   Yes No  
Sig: Take 5 mg by mouth daily. Indications: HYPERTENSION  
magnesium oxide (MAG-OX) 400 mg tablet   No No  
Sig: Take 1 Tab by mouth daily. metoprolol (LOPRESSOR) 25 mg tablet   Yes No  
Sig: Take 12.5 mg by mouth two (2) times a day. Indications: hypertension  
oxyCODONE ER (OXYCONTIN) 80 mg ER tablet   No No  
Sig: Take 1 Tab by mouth every twelve (12) hours. Max Daily Amount: 160 mg.  
oxyCODONE IR (ROXICODONE) 30 mg immediate release tablet   No No  
Sig: Take 1 Tab by mouth every four (4) hours as needed. Max Daily Amount: 180 mg.  
promethazine (PHENERGAN) 25 mg tablet   No No  
Sig: Take 1 Tab by mouth every six (6) hours as needed. May substitute suppository if vomiting Facility-Administered Medications: None Objective:  
 
Patient Vitals for the past 24 hrs: 
 Pulse BP SpO2  
11/05/18 1633 82 133/77 98 % 11/05/18 1505 81 132/79 99 % 11/05/18 1436 78 133/75 98 % 11/05/18 1405 73 118/70 96 % 11/05/18 1336 71 149/73 99 % 11/05/18 1306 67 167/69 99 % 11/05/18 1236 71 133/83 100 % 11/05/18 1205 73 139/85 100 % 11/05/18 1136 77 134/81 98 % 11/05/18 1105 68 146/79 98 % 11/05/18 1036 66 (!) 151/92 99 % 11/05/18 1027  (!) 146/93 100 % Oxygen Therapy O2 Sat (%): 98 % (11/05/18 1633) Pulse via Oximetry: 82 beats per minute (11/05/18 1633) Intake/Output Summary (Last 24 hours) at 11/5/2018 1749 Last data filed at 11/5/2018 1216 Gross per 24 hour Intake 1000 ml Output  Net 1000 ml Physical Exam: 
General:    Well nourished. Alert. Eyes:   Normal sclera. Extraocular movements intact. ENT:  Normocephalic, atraumatic. Moist mucous membranes CV:   RRR. No m/r/g. Peripheral pulses 2+. Capillary refill <2s. Lungs:  CTAB. No wheezing, rhonchi, or rales. Abdomen: Soft, nontender, nondistended. Extremities: Warm and dry. Tenderness to palpation of LEs but no swelling, erythema or palpable cording. Neurologic: CN II-XII grossly intact. Sensation intact. Skin:     No rashes or jaundice. Normal coloration Psych:  Normal mood and affect. I reviewed the labs, imaging, EKGs, telemetry, and other studies done this admission. Data Review:  
Recent Results (from the past 24 hour(s)) CBC WITH AUTOMATED DIFF Collection Time: 11/04/18  9:18 PM  
Result Value Ref Range WBC 13.1 (H) 4.3 - 11.1 K/uL  
 RBC 2.88 (L) 4.23 - 5.6 M/uL HGB 9.3 (L) 13.6 - 17.2 g/dL HCT 27.2 (L) 41.1 - 50.3 % MCV 94.4 79.6 - 97.8 FL  
 MCH 32.3 26.1 - 32.9 PG  
 MCHC 34.2 31.4 - 35.0 g/dL RDW 21.9 % PLATELET 453 580 - 088 K/uL MPV 10.7 9.4 - 12.3 FL ABSOLUTE NRBC 0.03 0.0 - 0.2 K/uL  
 DF AUTOMATED NEUTROPHILS 72 43 - 78 % LYMPHOCYTES 20 13 - 44 % MONOCYTES 8 4.0 - 12.0 % EOSINOPHILS 0 (L) 0.5 - 7.8 % BASOPHILS 0 0.0 - 2.0 % IMMATURE GRANULOCYTES 0 0.0 - 5.0 %  
 ABS. NEUTROPHILS 9.4 (H) 1.7 - 8.2 K/UL  
 ABS. LYMPHOCYTES 2.6 0.5 - 4.6 K/UL  
 ABS. MONOCYTES 1.0 0.1 - 1.3 K/UL  
 ABS. EOSINOPHILS 0.0 0.0 - 0.8 K/UL  
 ABS. BASOPHILS 0.0 0.0 - 0.2 K/UL  
 ABS. IMM. GRANS. 0.1 0.0 - 0.5 K/UL METABOLIC PANEL, BASIC Collection Time: 11/04/18  9:18 PM  
Result Value Ref Range Sodium 137 136 - 145 mmol/L Potassium 3.8 3.5 - 5.1 mmol/L Chloride 104 98 - 107 mmol/L  
 CO2 27 21 - 32 mmol/L Anion gap 6 (L) 7 - 16 mmol/L Glucose 121 (H) 65 - 100 mg/dL BUN 10 6 - 23 MG/DL Creatinine 0.89 0.8 - 1.5 MG/DL  
 GFR est AA >60 >60 ml/min/1.73m2 GFR est non-AA >60 >60 ml/min/1.73m2 Calcium 9.2 8.3 - 10.4 MG/DL  
RETICULOCYTE COUNT Collection Time: 11/04/18  9:18 PM  
Result Value Ref Range Reticulocyte count 2.2 (H) 0.3 - 2.0 % Absolute Retic Cnt. 0.0613 0.026 - 0.095 M/ul Immature Retic Fraction 24.3 (H) 2.3 - 13.4 % Retic Hgb Conc. 40 (H) 29 - 35 pg  
HEPATIC FUNCTION PANEL Collection Time: 11/04/18  9:18 PM  
Result Value Ref Range Protein, total 9.0 (H) 6.3 - 8.2 g/dL Albumin 3.8 3.5 - 5.0 g/dL Globulin 5.2 (H) 2.3 - 3.5 g/dL A-G Ratio 0.7 (L) 1.2 - 3.5 Bilirubin, total 1.0 0.2 - 1.1 MG/DL Bilirubin, direct 0.2 <0.4 MG/DL Alk. phosphatase 84 50 - 136 U/L  
 AST (SGOT) 51 (H) 15 - 37 U/L  
 ALT (SGPT) 68 (H) 12 - 65 U/L  
LD Collection Time: 11/04/18  9:18 PM  
Result Value Ref Range  (H) 100 - 190 U/L  
EKG, 12 LEAD, INITIAL Collection Time: 11/05/18  8:39 AM  
Result Value Ref Range Ventricular Rate 76 BPM  
 Atrial Rate 76 BPM  
 P-R Interval 160 ms QRS Duration 74 ms Q-T Interval 350 ms QTC Calculation (Bezet) 393 ms Calculated P Axis 69 degrees Calculated R Axis 28 degrees Calculated T Axis 66 degrees Diagnosis Sinus rhythm with Premature atrial complexes Voltage criteria for left ventricular hypertrophy Nonspecific T wave abnormality Abnormal ECG When compared with ECG of 22-JUL-2018 09:15, 
Premature ventricular complexes are no longer Present Premature atrial complexes are now Present Nonspecific T wave abnormality no longer evident in Anterior leads QT has shortened Confirmed by BEN MILLER (), Zayra Myers ([id number]) on 11/5/2018 3:29:31 PM 
  
CBC WITH AUTOMATED DIFF Collection Time: 11/05/18 11:15 AM  
Result Value Ref Range WBC 11.0 4.3 - 11.1 K/uL  
 RBC 2.73 (L) 4.23 - 5.6 M/uL HGB 8.9 (L) 13.6 - 17.2 g/dL HCT 26.1 (L) 41.1 - 50.3 % MCV 95.6 79.6 - 97.8 FL  
 MCH 32.6 26.1 - 32.9 PG  
 MCHC 34.1 31.4 - 35.0 g/dL RDW 22.1 % PLATELET 202 877 - 007 K/uL MPV 10.8 9.4 - 12.3 FL ABSOLUTE NRBC 0.04 0.0 - 0.2 K/uL  
 DF AUTOMATED NEUTROPHILS 68 43 - 78 % LYMPHOCYTES 22 13 - 44 % MONOCYTES 9 4.0 - 12.0 % EOSINOPHILS 0 (L) 0.5 - 7.8 % BASOPHILS 0 0.0 - 2.0 % IMMATURE GRANULOCYTES 1 0.0 - 5.0 %  
 ABS. NEUTROPHILS 7.5 1.7 - 8.2 K/UL  
 ABS. LYMPHOCYTES 2.4 0.5 - 4.6 K/UL  
 ABS. MONOCYTES 1.0 0.1 - 1.3 K/UL  
 ABS. EOSINOPHILS 0.0 0.0 - 0.8 K/UL  
 ABS. BASOPHILS 0.0 0.0 - 0.2 K/UL  
 ABS. IMM. GRANS. 0.1 0.0 - 0.5 K/UL METABOLIC PANEL, COMPREHENSIVE Collection Time: 11/05/18 11:15 AM  
Result Value Ref Range Sodium 140 136 - 145 mmol/L Potassium 3.7 3.5 - 5.1 mmol/L Chloride 106 98 - 107 mmol/L  
 CO2 26 21 - 32 mmol/L Anion gap 8 7 - 16 mmol/L Glucose 128 (H) 65 - 100 mg/dL BUN 12 6 - 23 MG/DL Creatinine 0.79 (L) 0.8 - 1.5 MG/DL  
 GFR est AA >60 >60 ml/min/1.73m2 GFR est non-AA >60 >60 ml/min/1.73m2 Calcium 9.1 8.3 - 10.4 MG/DL Bilirubin, total 1.0 0.2 - 1.1 MG/DL  
 ALT (SGPT) 65 12 - 65 U/L  
 AST (SGOT) 49 (H) 15 - 37 U/L Alk. phosphatase 74 50 - 136 U/L Protein, total 8.3 (H) 6.3 - 8.2 g/dL Albumin 3.6 3.5 - 5.0 g/dL Globulin 4.7 (H) 2.3 - 3.5 g/dL A-G Ratio 0.8 (L) 1.2 - 3.5 RETICULOCYTE COUNT Collection Time: 11/05/18 11:15 AM  
Result Value Ref Range Reticulocyte count 2.3 (H) 0.3 - 2.0 % Absolute Retic Cnt. 0.0628 0.026 - 0.095 M/ul Immature Retic Fraction 24.5 (H) 2.3 - 13.4 % Retic Hgb Conc. 43 (H) 29 - 35 pg  
POC TROPONIN-I Collection Time: 11/05/18 11:24 AM  
Result Value Ref Range Troponin-I (POC) 0 (L) 0.02 - 0.05 ng/ml All Micro Results None No current facility-administered medications for this encounter. Current Outpatient Medications Medication Sig  
 oxyCODONE ER (OXYCONTIN) 80 mg ER tablet Take 1 Tab by mouth every twelve (12) hours. Max Daily Amount: 160 mg.  
 oxyCODONE IR (ROXICODONE) 30 mg immediate release tablet Take 1 Tab by mouth every four (4) hours as needed. Max Daily Amount: 180 mg.  
 hydroxyurea (HYDREA) 500 mg capsule Take 1 Cap by mouth two (2) times a day. Takes in am at 0900. Star after follow up with your PCP. (Patient taking differently: Take 1,000 mg by mouth daily. Takes in am at 0900. Star after follow up with your PCP.)  magnesium oxide (MAG-OX) 400 mg tablet Take 1 Tab by mouth daily.  deferasirox (JADENU) 360 mg tab Take 5 Tabs by mouth daily.  promethazine (PHENERGAN) 25 mg tablet Take 1 Tab by mouth every six (6) hours as needed. May substitute suppository if vomiting  metoprolol (LOPRESSOR) 25 mg tablet Take 12.5 mg by mouth two (2) times a day. Indications: hypertension  lisinopril (PRINIVIL, ZESTRIL) 5 mg tablet Take 5 mg by mouth daily. Indications: HYPERTENSION  folic acid (FOLVITE) 1 mg tablet Take 1 mg by mouth daily. Other Studies: Xr Chest Pa Lat Result Date: 11/5/2018 Two view chest History: Left-sided chest pain times several hours. History of sickle cell. Comparison: 11/14/2018 Findings: The heart and mediastinal silhouette are normal in size and configuration.  Minor interstitial opacities are present at the lung bases. There are no confluent airspace consolidations or pleural effusions. The pulmonary vascularity is within normal limits. The visualized osseous structures are unremarkable. Relative lucency beneath the right hemidiaphragm is similar to that seen on several prior studies including CT imaging of the abdomen. This corresponds to perihepatic fat. Impression: Stable minor interstitial prominence at the lung bases most suggestive of scarring. Samreen Brasher Xr Chest [facility] Result Date: 2018 EXAM:  XR CHEST PORT INDICATION:  Chest pain COMPARISON:  2018 FINDINGS: A portable AP radiograph of the chest was obtained at 2131 hours. The patient is on a cardiac monitor. The lungs are clear. The cardiac and mediastinal contours and pulmonary vascularity are normal.  The bones and soft tissues are grossly within normal limits. IMPRESSION: Normal chest.  
 
 
Assessment and Plan:  
 
Hospital Problems as of 2018 Date Reviewed: 2018 Codes Class Noted - Resolved POA * (Principal) Sick[facility] crisis ([facility] ICD-10-CM: D57.00 ICD-9-CM: 282.62  2018 - Present Yes Iron overload due to repeated red blood cell transfusions ICD-10-CM: E83.111 ICD-9-CM: 275.02  2017 - Present Yes Sickle cell anemia (H[facility]) ICD-10-CM: D57.1 ICD-9-CM: 282.60  2016 - Present Yes Plan: # Acute sickle cell pain crisis 
 - IVFs; PO pain meds with IV for breakthrough - Hb stable - If no improvement 24-48hrs then may consider heme input # Chronic iron overload Alejandro Lopez pt may take his own supply once wife brings in # HTN 
 - Lisinopril, BB Discharge plannin-2d pending clinical improvement DVT ppx: Lovenox Code status:  Full Estimated LOS:  Greater than 2 midnights Risk:  high Signed: 
Adilson Astorga MD

## 2018-11-05 NOTE — ED NOTES
I have reviewed discharge instructions with the patient. The patient verbalized understanding. Patient left ED via Discharge Method: ambulatory to Home with wife, patient unable to drive and wife driving patient home. Opportunity for questions and clarification provided. Patient given 0 scripts. To continue your aftercare when you leave the hospital, you may receive an automated call from our care team to check in on how you are doing. This is a free service and part of our promise to provide the best care and service to meet your aftercare needs.  If you have questions, or wish to unsubscribe from this service please call [phone]. Thank you for Choosing our [employer] [facility] Emergency Department.  ;

## 2018-11-05 NOTE — ED NOTES
Venous access device de-accessed, port right thigh. Michael needle in tact, pt tolerated well. Heparin flush used.

## 2018-11-05 NOTE — PROGRESS NOTES
Patient arrived to floor without having been called report from the ED. Patient was also placed in room 502, a room that needed to be tested for C-DIFF. House supervisor stated she could not run the QC and to place the patient in the room without it being tested.

## 2018-11-05 NOTE — ED TRIAGE NOTES
Patient here yesterday for sickle cell crisis. States that he is still hurting today and unable to manage the pain at home. Patient has port in right leg that will need to be accessed.

## 2018-11-05 NOTE — ED PROVIDER NOTES
57-year-old -American female with history of sickle cell presents with pain involving his legs back and chest.  Was seen in the emergency department yesterday for the same thing. He felt fine when he left the hospital however symptoms returned around 5 AM this morning. Does report some nausea and vomiting. No fever or shortness of breath. No urinary changes. He reports his pain feels similar to previous sickle cell crises. The history is provided by the patient. Past Medical History:  
Diagnosis Date  Chronic pain  HTN (hypertension)  Ill-defined condition   
 sickle cell  Iron overload due to repeated red blood cell transfusions 1/18/2017  Paroxysmal SVT (supraventricular tachycardia) ([facility] [address] 7/9/2016  Sickle cell disease ([facility] [address] Past Surgical History:  
Procedure Laterality Date  HC PENILE IMPL DURA II POSITINBLE    
 HC PORT LIFE SNGLE LUMEN 5013    
 to L CW  
 HX CHOLECYSTECTOMY  HX OTHER SURGICAL    
 penile inplant  HX VASCULAR ACCESS Family History:  
Problem Relation Age of Onset  Hypertension Other  Diabetes Father  Stroke Father  Sickle Cell Anemia Sister Social History Socioeconomic History  Marital status:  Spouse name: Not on file  Number of children: Not on file  Years of education: Not on file  Highest education level: Not on file Social Needs  Financial resource strain: Not on file  Food insecurity - worry: Not on file  Food insecurity - inability: Not on file  Transportation needs - medical: Not on file  Transportation needs - non-medical: Not on file Occupational History  Not on file Tobacco Use  Smoking status: Never Smoker  Smokeless tobacco: Never Used Substance and Sexual Activity  Alcohol use: No  
  Alcohol/week: 0.0 oz  Drug use: No  
 Sexual activity: Yes Other Topics Concern  Not on file Social History Narrative  Not on file ALLERGIES: Compazine [prochlorperazine edisylate]; Morphine; Reglan [metoclopramide]; and Zofran [ondansetron hcl (pf)] Review of Systems Constitutional: Negative for fever. HENT: Negative for congestion. Eyes: Negative for visual disturbance. Respiratory: Negative for cough and shortness of breath. Cardiovascular: Negative for chest pain. Gastrointestinal: Positive for nausea and vomiting. Negative for abdominal pain. Genitourinary: Negative for dysuria. Musculoskeletal: Positive for back pain. Negative for neck pain. Skin: Negative for rash. Neurological: Negative for headaches. There were no vitals filed for this visit. Physical Exam  
Constitutional: He is oriented to person, place, and time. He appears well-developed and well-nourished. No distress. HENT:  
Head: Normocephalic and atraumatic. Mouth/Throat: Oropharynx is clear and moist.  
Eyes: Conjunctivae are normal. Pupils are equal, round, and reactive to light. Neck: Normal range of motion. Cardiovascular: Normal rate and regular rhythm. Pulmonary/Chest: Effort normal and breath sounds normal.  
Abdominal: Soft. He exhibits no distension and no mass. There is no tenderness. Musculoskeletal: Normal range of motion. He exhibits no edema. Neurological: He is alert and oriented to person, place, and time. No cranial nerve deficit. Skin: Skin is warm and dry. Psychiatric: He has a normal mood and affect. His behavior is normal.  
Nursing note and vitals reviewed. MDM Number of Diagnoses or Management Options Diagnosis management comments: Chest x-ray shows stable basilar scarring. Lab work unchanged from baseline. Patient has received 3 doses of Dilaudid and is still uncomfortable and does not feel as if he can go home. Hospitalist consulted for admission. Amount and/or Complexity of Data Reviewed Clinical lab tests: ordered and reviewed Tests in the radiology section of CPT®: ordered and reviewed Tests in the medicine section of CPT®: ordered and reviewed Independent visualization of images, tracings, or specimens: yes Risk of Complications, Morbidity, and/or Mortality Presenting problems: moderate Diagnostic procedures: moderate Management options: moderate Procedures

## 2018-11-05 NOTE — ED NOTES
Pt states he has a port for the sickle cell crisis he experiences every month. Pt states he has cp, BLE pain, and stomach pain with nausea. Pt denies fever, SOB, cough, and urinary s/sx. Pt was resting on stretcher when entered room. Pt is absent of guarding, grimacing, or diaphoresis.

## 2018-11-05 NOTE — ED PROVIDER NOTES
Patient with history of sickle cell disease. Complains of lower back pain and bilateral arm pain since yesterday. Similar previous episodes. Denies any chest pain. mild shortness breath. No cough. Also has had nausea at home unable take his home medications. The history is provided by the patient. No  was used. Back Pain This is a recurrent problem. The current episode started yesterday. The problem has been gradually worsening. The problem occurs constantly. Patient reports not work related injury. The pain is associated with no known injury. The pain is present in the lumbar spine. The quality of the pain is described as sharp, aching and similar to previous episodes. The pain does not radiate. The pain is moderate. Pertinent negatives include no chest pain, no fever, no numbness, no headaches, no abdominal pain, no abdominal swelling, no bowel incontinence, no perianal numbness, no bladder incontinence, no dysuria, no leg pain, no paresthesias and no weakness. He has tried nothing for the symptoms. Past Medical History:  
Diagnosis Date  Chronic pain  HTN (hypertension)  Ill-defined condition   
 sickle cell  Iron overload due to repeated red blood cell transfusions 1/18/2017  Paroxysmal SVT (supraventricular tachycardia) ([facility] 7/9/2016  Sickle cell disease ([facility] Past Surgical History:  
Procedure Laterality Date  HC PENILE IMPL DURA II POSITINBLE    
 HC PORT LIFE SNGLE LUMEN 5013    
 to L CW  
 HX CHOLECYSTECTOMY  HX OTHER SURGICAL    
 penile inplant  HX VASCULAR ACCESS Family History:  
Problem Relation Age of Onset  Hypertension Other  Diabetes Father  Stroke Father  Sickle Cell Anemia Sister Social History Socioeconomic History  Marital status:  Spouse name: Not on file  Number of children: Not on file  Years of education: Not on file  Highest education level: Not on file Social Needs  Financial resource strain: Not on file  Food insecurity - worry: Not on file  Food insecurity - inability: Not on file  Transportation needs - medical: Not on file  Transportation needs - non-medical: Not on file Occupational History  Not on file Tobacco Use  Smoking status: Never Smoker  Smokeless tobacco: Never Used Substance and Sexual Activity  Alcohol use: No  
  Alcohol/week: 0.0 oz  Drug use: No  
 Sexual activity: Yes Other Topics Concern  Not on file Social History Narrative  Not on file ALLERGIES: Compazine [prochlorperazine edisylate]; Morphine; Reglan [metoclopramide]; and Zofran [ondansetron hcl (pf)] Review of Systems Constitutional: Negative for chills and fever. HENT: Negative for rhinorrhea and sore throat. Eyes: Negative for pain and redness. Respiratory: Negative for chest tightness, shortness of breath and wheezing. Cardiovascular: Negative for chest pain and leg swelling. Gastrointestinal: Positive for nausea. Negative for abdominal pain, bowel incontinence, diarrhea and vomiting. Genitourinary: Negative for bladder incontinence, dysuria and hematuria. Musculoskeletal: Positive for back pain and myalgias. Negative for gait problem, neck pain and neck stiffness. Skin: Negative for color change and rash. Neurological: Negative for weakness, numbness, headaches and paresthesias. Vitals:  
 11/04/18 1845 BP: 145/69 Pulse: 73 Resp: 18 Temp: 98.8 °F (37.1 °C) SpO2: 96% Weight: 66.2 kg (146 lb) Height: 5' 10\" (1.778 m) Physical Exam  
Constitutional: He is oriented to person, place, and time. He appears well-developed and well-nourished. HENT:  
Head: Normocephalic and atraumatic. Neck: Normal range of motion. Neck supple. Cardiovascular: Normal rate and regular rhythm.   
Pulmonary/Chest: Effort normal and breath sounds normal.  
 Abdominal: Soft. Bowel sounds are normal. There is no tenderness. Musculoskeletal: Normal range of motion. He exhibits no edema. Neurological: He is alert and oriented to person, place, and time. Skin: Skin is warm and dry. MDM Number of Diagnoses or Management Options Diagnosis management comments: Blood work looks better than previous visits. Will discharge patient with follow-up with Dr. Cedric Casas. Amount and/or Complexity of Data Reviewed Clinical lab tests: ordered and reviewed Tests in the radiology section of CPT®: ordered and reviewed Tests in the medicine section of CPT®: ordered and reviewed Patient Progress Patient progress: stable Procedures Results Include: 
 
Recent Results (from the past 24 hour(s)) CBC WITH AUTOMATED DIFF Collection Time: 11/04/18  9:18 PM  
Result Value Ref Range WBC 13.1 (H) 4.3 - 11.1 K/uL  
 RBC 2.88 (L) 4.23 - 5.6 M/uL HGB 9.3 (L) 13.6 - 17.2 g/dL HCT 27.2 (L) 41.1 - 50.3 % MCV 94.4 79.6 - 97.8 FL  
 MCH 32.3 26.1 - 32.9 PG  
 MCHC 34.2 31.4 - 35.0 g/dL RDW 21.9 % PLATELET 069 174 - 681 K/uL MPV 10.7 9.4 - 12.3 FL ABSOLUTE NRBC 0.03 0.0 - 0.2 K/uL  
 DF AUTOMATED NEUTROPHILS 72 43 - 78 % LYMPHOCYTES 20 13 - 44 % MONOCYTES 8 4.0 - 12.0 % EOSINOPHILS 0 (L) 0.5 - 7.8 % BASOPHILS 0 0.0 - 2.0 % IMMATURE GRANULOCYTES 0 0.0 - 5.0 %  
 ABS. NEUTROPHILS 9.4 (H) 1.7 - 8.2 K/UL  
 ABS. LYMPHOCYTES 2.6 0.5 - 4.6 K/UL  
 ABS. MONOCYTES 1.0 0.1 - 1.3 K/UL  
 ABS. EOSINOPHILS 0.0 0.0 - 0.8 K/UL  
 ABS. BASOPHILS 0.0 0.0 - 0.2 K/UL  
 ABS. IMM. GRANS. 0.1 0.0 - 0.5 K/UL METABOLIC PANEL, BASIC Collection Time: 11/04/18  9:18 PM  
Result Value Ref Range Sodium 137 136 - 145 mmol/L Potassium 3.8 3.5 - 5.1 mmol/L Chloride 104 98 - 107 mmol/L  
 CO2 27 21 - 32 mmol/L Anion gap 6 (L) 7 - 16 mmol/L Glucose 121 (H) 65 - 100 mg/dL BUN 10 6 - 23 MG/DL  Creatinine 0.89 0.8 - 1.5 MG/DL  
 GFR est AA >60 >60 ml/min/1.73m2 GFR est non-AA >60 >60 ml/min/1.73m2 Calcium 9.2 8.3 - 10.4 MG/DL  
RETICULOCYTE COUNT Collection Time: 11/04/18  9:18 PM  
Result Value Ref Range Reticulocyte count 2.2 (H) 0.3 - 2.0 % Absolute Retic Cnt. 0.0613 0.026 - 0.095 M/ul Immature Retic Fraction 24.3 (H) 2.3 - 13.4 % Retic Hgb Conc. 40 (H) 29 - 35 pg  
HEPATIC FUNCTION PANEL Collection Time: 11/04/18  9:18 PM  
Result Value Ref Range Protein, total 9.0 (H) 6.3 - 8.2 g/dL Albumin 3.8 3.5 - 5.0 g/dL Globulin 5.2 (H) 2.3 - 3.5 g/dL A-G Ratio 0.7 (L) 1.2 - 3.5 Bilirubin, total 1.0 0.2 - 1.1 MG/DL Bilirubin, direct 0.2 <0.4 MG/DL Alk. phosphatase 84 50 - 136 U/L  
 AST (SGOT) 51 (H) 15 - 37 U/L  
 ALT (SGPT) 68 (H) 12 - 65 U/L  
LD Collection Time: 11/04/18  9:18 PM  
Result Value Ref Range   (H) 100 - 190 U/L

## 2018-11-05 NOTE — ED NOTES
Patient resting in bed. Wife at bedside. Right thigh port accessed, patient handled well. In no acute distress currently.

## 2018-11-06 LAB
ANION GAP SERPL CALC-SCNC: 5 MMOL/L (ref 7–16)
BUN SERPL-MCNC: 10 MG/DL (ref 6–23)
CALCIUM SERPL-MCNC: 8.5 MG/DL (ref 8.3–10.4)
CHLORIDE SERPL-SCNC: 108 MMOL/L (ref 98–107)
CO2 SERPL-SCNC: 26 MMOL/L (ref 21–32)
CREAT SERPL-MCNC: 0.75 MG/DL (ref 0.8–1.5)
ERYTHROCYTE [DISTWIDTH] IN BLOOD BY AUTOMATED COUNT: 22.5 %
GLUCOSE SERPL-MCNC: 119 MG/DL (ref 65–100)
HCT VFR BLD AUTO: 22.7 % (ref 41.1–50.3)
HGB BLD-MCNC: 7.6 G/DL (ref 13.6–17.2)
MCH RBC QN AUTO: 32.3 PG (ref 26.1–32.9)
MCHC RBC AUTO-ENTMCNC: 33.5 G/DL (ref 31.4–35)
MCV RBC AUTO: 96.6 FL (ref 79.6–97.8)
NRBC # BLD: 0.03 K/UL (ref 0–0.2)
PLATELET # BLD AUTO: 243 K/UL (ref 150–450)
PMV BLD AUTO: 11 FL (ref 9.4–12.3)
POTASSIUM SERPL-SCNC: 3.6 MMOL/L (ref 3.5–5.1)
RBC # BLD AUTO: 2.35 M/UL (ref 4.23–5.6)
SODIUM SERPL-SCNC: 139 MMOL/L (ref 136–145)
WBC # BLD AUTO: 13.8 K/UL (ref 4.3–11.1)

## 2018-11-06 PROCEDURE — 74011250637 HC RX REV CODE- 250/637: Performed by: INTERNAL MEDICINE

## 2018-11-06 PROCEDURE — 80048 BASIC METABOLIC PNL TOTAL CA: CPT

## 2018-11-06 PROCEDURE — 74011250637 HC RX REV CODE- 250/637: Performed by: FAMILY MEDICINE

## 2018-11-06 PROCEDURE — 74011250636 HC RX REV CODE- 250/636: Performed by: INTERNAL MEDICINE

## 2018-11-06 PROCEDURE — 74011000258 HC RX REV CODE- 258: Performed by: NURSE PRACTITIONER

## 2018-11-06 PROCEDURE — 77030020253 HC SOL INJ D545NS .05 DEX .45 SAL

## 2018-11-06 PROCEDURE — 36591 DRAW BLOOD OFF VENOUS DEVICE: CPT

## 2018-11-06 PROCEDURE — 77030020263 HC SOL INJ SOD CL0.9% LFCR 1000ML

## 2018-11-06 PROCEDURE — 65270000029 HC RM PRIVATE

## 2018-11-06 PROCEDURE — 85027 COMPLETE CBC AUTOMATED: CPT

## 2018-11-06 RX ORDER — HYDROMORPHONE HYDROCHLORIDE 1 MG/ML
1 INJECTION, SOLUTION INTRAMUSCULAR; INTRAVENOUS; SUBCUTANEOUS
Status: DISCONTINUED | OUTPATIENT
Start: 2018-11-06 | End: 2018-11-08 | Stop reason: HOSPADM

## 2018-11-06 RX ORDER — DEXTROSE MONOHYDRATE AND SODIUM CHLORIDE 5; .45 G/100ML; G/100ML
150 INJECTION, SOLUTION INTRAVENOUS CONTINUOUS
Status: DISCONTINUED | OUTPATIENT
Start: 2018-11-06 | End: 2018-11-08

## 2018-11-06 RX ORDER — METOPROLOL TARTRATE 25 MG/1
12.5 TABLET, FILM COATED ORAL EVERY 12 HOURS
Status: DISCONTINUED | OUTPATIENT
Start: 2018-11-06 | End: 2018-11-08 | Stop reason: HOSPADM

## 2018-11-06 RX ADMIN — Medication 10 ML: at 14:15

## 2018-11-06 RX ADMIN — METOPROLOL TARTRATE 12.5 MG: 25 TABLET ORAL at 16:15

## 2018-11-06 RX ADMIN — ENOXAPARIN SODIUM 40 MG: 40 INJECTION, SOLUTION INTRAVENOUS; SUBCUTANEOUS at 21:47

## 2018-11-06 RX ADMIN — FOLIC ACID 1 MG: 1 TABLET ORAL at 08:50

## 2018-11-06 RX ADMIN — OXYCODONE HYDROCHLORIDE 30 MG: 15 TABLET ORAL at 21:47

## 2018-11-06 RX ADMIN — DEXTROSE MONOHYDRATE AND SODIUM CHLORIDE 150 ML/HR: 5; .45 INJECTION, SOLUTION INTRAVENOUS at 14:11

## 2018-11-06 RX ADMIN — OXYCODONE HYDROCHLORIDE 30 MG: 15 TABLET ORAL at 02:59

## 2018-11-06 RX ADMIN — OXYCODONE HYDROCHLORIDE 20 MG: 20 TABLET, FILM COATED, EXTENDED RELEASE ORAL at 08:50

## 2018-11-06 RX ADMIN — OXYCODONE HYDROCHLORIDE 30 MG: 15 TABLET ORAL at 07:45

## 2018-11-06 RX ADMIN — OXYCODONE HYDROCHLORIDE 30 MG: 15 TABLET ORAL at 17:19

## 2018-11-06 RX ADMIN — Medication 10 ML: at 05:16

## 2018-11-06 RX ADMIN — LISINOPRIL 5 MG: 5 TABLET ORAL at 08:50

## 2018-11-06 RX ADMIN — HYDROMORPHONE HYDROCHLORIDE 0.5 MG: 1 INJECTION, SOLUTION INTRAMUSCULAR; INTRAVENOUS; SUBCUTANEOUS at 10:57

## 2018-11-06 RX ADMIN — Medication 10 ML: at 21:48

## 2018-11-06 RX ADMIN — OXYCODONE HYDROCHLORIDE 20 MG: 20 TABLET, FILM COATED, EXTENDED RELEASE ORAL at 21:47

## 2018-11-06 RX ADMIN — PROMETHAZINE HYDROCHLORIDE 12.5 MG: 25 TABLET ORAL at 06:06

## 2018-11-06 RX ADMIN — OXYCODONE HYDROCHLORIDE 30 MG: 15 TABLET ORAL at 12:59

## 2018-11-06 RX ADMIN — SODIUM CHLORIDE 125 ML/HR: 900 INJECTION, SOLUTION INTRAVENOUS at 03:05

## 2018-11-06 RX ADMIN — SODIUM CHLORIDE 125 ML/HR: 900 INJECTION, SOLUTION INTRAVENOUS at 10:57

## 2018-11-06 RX ADMIN — HYDROMORPHONE HYDROCHLORIDE 0.5 MG: 1 INJECTION, SOLUTION INTRAMUSCULAR; INTRAVENOUS; SUBCUTANEOUS at 14:46

## 2018-11-06 RX ADMIN — HYDROMORPHONE HYDROCHLORIDE 1 MG: 1 INJECTION, SOLUTION INTRAMUSCULAR; INTRAVENOUS; SUBCUTANEOUS at 19:18

## 2018-11-06 RX ADMIN — HYDROMORPHONE HYDROCHLORIDE 0.5 MG: 1 INJECTION, SOLUTION INTRAMUSCULAR; INTRAVENOUS; SUBCUTANEOUS at 06:00

## 2018-11-06 RX ADMIN — HYDROXYUREA 1000 MG: 500 CAPSULE ORAL at 09:16

## 2018-11-06 NOTE — CONSULTS
[employer] [facility]        Inpatient Hematology / Oncology Consult Note    Reason for Consult:  Sickle cell crisis [facility])  Referring Physician:  Neri Hazel MD    History of Present Illness:  Mr. Edith Mckeon is a 37 y.o. male admitted on 11/05/2018. He is a known patient of Dr. Leretha Dakin at [facility] with sickle/beta+ thal, admitted for leg pain crisis. He presented to ED with c/o  leg pain x 2 days, unrelieved with pain meds at home. Hgb 8.9 on admission and retic 2.3. Today hgb 7.6 after fluids. He takes Jadenu, Hydrea, and folic acid daily. We were consulted for recommendations. Review of Systems:  Constitutional Denies fever, chills, weight loss, appetite changes, fatigue   HEENT Denies trauma, blurry vision, hearing loss, ear pain, nosebleeds, sore throat, neck pain   Skin Denies lesions or rashes. Lungs Denies dyspnea, cough, sputum production or hemoptysis. Cardiovascular Denies chest pain, palpitations, or lower extremity edema. Neuro Denies headaches, visual changes or ataxia. Denies dizziness, tingling, tremors, sensory change, speech change, focal weakness or headaches. MSK BLE leg pain     Psychiatric/Behavioral The patient is not nervous/anxious.          Allergies   Allergen Reactions    Compazine [Prochlorperazine Edisylate] Other (comments)     Also makes him feel funny    Morphine Other (comments)     Makes him feel funny    Reglan [Metoclopramide] Other (comments)     \"feel funny\"    Zofran [Ondansetron Hcl (Pf)] Other (comments)     Make him feel funny     Past Medical History:   Diagnosis Date    Chronic pain     HTN (hypertension)     Ill-defined condition     sickle cell    Iron overload due to repeated red blood cell transfusions 1/18/2017    Paroxysmal SVT (supraventricular tachycardia) ([facility] 7/9/2016    Sickle cell disease ([facility]      Past Surgical History:   Procedure Laterality Date    HC PENILE IMPL DURA II POSITINBLE      HC PORT LIFE SNGLE LUMEN 5587 to L CW    HX CHOLECYSTECTOMY      HX OTHER SURGICAL      penile inplant    HX VASCULAR ACCESS       Family History   Problem Relation Age of Onset    Hypertension Other     Diabetes Father     Stroke Father     Sickle Cell Anemia Sister      Social History     Socioeconomic History    Marital status:      Spouse name: Not on file    Number of children: Not on file    Years of education: Not on file    Highest education level: Not on file   Social Needs    Financial resource strain: Not on file    Food insecurity - worry: Not on file    Food insecurity - inability: Not on file   [employer] needs - medical: Not on file   [employer] needs - non-medical: Not on file   Occupational History    Not on file   Tobacco Use    Smoking status: Never Smoker    Smokeless tobacco: Never Used   Substance and Sexual Activity    Alcohol use: No     Alcohol/week: 0.0 oz    Drug use: No    Sexual activity: Yes   Other Topics Concern    Not on file   Social History Narrative    Not on file     Current Facility-Administered Medications   Medication Dose Route Frequency Provider Last Rate Last Dose    deferasirox (JADENU) tablet 1,800 mg (Patient Supplied)  1,800 mg Oral DAILY Angelica Aguilar MD   Stopped at 94/90/96 7355    folic acid (FOLVITE) tablet 1 mg  1 mg Oral DAILY Angelica Aguilar MD   1 mg at 11/06/18 0850    hydroxyurea (HYDREA) chemo cap 1,000 mg  1,000 mg Oral DAILY Angelica Aguilar MD   1,000 mg at 11/06/18 0916    lisinopril (PRINIVIL, ZESTRIL) tablet 5 mg  5 mg Oral DAILY Angelica Aguilar MD   5 mg at 11/06/18 0850    oxyCODONE ER (OxyCONTIN) tablet 20 mg  20 mg Oral Q12H Angelica Aguilar MD   20 mg at 11/06/18 0850    sodium chloride (NS) flush 5-10 mL  5-10 mL IntraVENous Q8H Angelica Aguilar MD   10 mL at 11/06/18 0516    sodium chloride (NS) flush 5-10 mL  5-10 mL IntraVENous PRN Angelica Aguilar MD        acetaminophen (TYLENOL) tablet 650 mg  650 mg Oral Q4H PRN Brendan Alexander MD        HYDROmorphone (PF) (DILAUDID) injection 0.5 mg  0.5 mg IntraVENous Q4H PRN Brendan Alexander MD   0.5 mg at 18 1057    naloxone [facility]) injection 0.4 mg  0.4 mg IntraVENous PRN Brendan Alexander MD        promethazine (PHENERGAN) tablet 12.5 mg  12.5 mg Oral Q6H PRN Brendan Alexander MD   12.5 mg at 18 0606    enoxaparin (LOVENOX) injection 40 mg  40 mg SubCUTAneous Q24H Brendan Alexander MD   40 mg at 18 2113    0.9% sodium chloride infusion  125 mL/hr IntraVENous CONTINUOUS Brendan Alexander  mL/hr at 18 1057 125 mL/hr at 18 1057    oxyCODONE IR (OXY-IR) immediate release tablet 30 mg  30 mg Oral Q4H PRN Sowmya Perez MD   30 mg at 18 1259       OBJECTIVE:  Patient Vitals for the past 8 hrs:   BP Temp Pulse Resp SpO2   18 1050 129/74 98.8 °F (37.1 °C) 85 18 91 %   18 0710 136/70 98.8 °F (37.1 °C) (!) 58 16 95 %     Temp (24hrs), Av.7 °F (37.1 °C), Min:97.8 °F (36.6 °C), Max:99.4 °F (37.4 °C)     07 -  1900  In: 480 [P.O.:480]  Out: -     Physical Exam:  Constitutional: Well developed, well nourished male in no acute distress, sitting comfortably in the hospital bed. HEENT: Normocephalic and atraumatic. Oropharynx is clear, mucous membranes are moist. Extraocular muscles are intact. Sclerae anicteric. Skin Warm and dry. No bruising and no rash noted. No erythema. No pallor. Respiratory Lungs are clear to auscultation bilaterally without wheezes, rales or rhonchi, normal air exchange without accessory muscle use. CVS Normal rate, regular rhythm and normal S1 and S2. No murmurs, gallops, or rubs. Abdomen Soft, nontender and nondistended, normoactive bowel sounds. Neuro Grossly nonfocal with no obvious sensory or motor deficits. MSK Normal range of motion in general.  No edema and no tenderness. Psych Appropriate mood and affect.         Labs:    Recent Results (from the past 24 hour(s))   METABOLIC PANEL, BASIC    Collection Time: 11/06/18  3:16 AM   Result Value Ref Range    Sodium 139 136 - 145 mmol/L    Potassium 3.6 3.5 - 5.1 mmol/L    Chloride 108 (H) 98 - 107 mmol/L    CO2 26 21 - 32 mmol/L    Anion gap 5 (L) 7 - 16 mmol/L    Glucose 119 (H) 65 - 100 mg/dL    BUN 10 6 - 23 MG/DL    Creatinine 0.75 (L) 0.8 - 1.5 MG/DL    GFR est AA >60 >60 ml/min/1.73m2    GFR est non-AA >60 >60 ml/min/1.73m2    Calcium 8.5 8.3 - 10.4 MG/DL   CBC W/O DIFF    Collection Time: 11/06/18  3:16 AM   Result Value Ref Range    WBC 13.8 (H) 4.3 - 11.1 K/uL    RBC 2.35 (L) 4.23 - 5.6 M/uL    HGB 7.6 (L) 13.6 - 17.2 g/dL    HCT 22.7 (L) 41.1 - 50.3 %    MCV 96.6 79.6 - 97.8 FL    MCH 32.3 26.1 - 32.9 PG    MCHC 33.5 31.4 - 35.0 g/dL    RDW 22.5 %    PLATELET 152 108 - 222 K/uL    MPV 11.0 9.4 - 12.3 FL    ABSOLUTE NRBC 0.03 0.0 - 0.2 K/uL       Imaging:  [unfilled]    ASSESSMENT:  Problem List  Date Reviewed: 7/22/2018          Codes Class Noted    * (Principal) Sickle cell crisis ([facility] ICD-10-CM: D57.00  ICD-9-CM: 282.62  11/5/2018        Leukocytosis ICD-10-CM: Y27.622  ICD-9-CM: 288.60  10/11/2018        Volume overload ICD-10-CM: E87.70  ICD-9-CM: 276.69  6/28/2018        Leg pain ICD-10-CM: M79.606  ICD-9-CM: 729.5  6/24/2018        Sickle cell disease ([facility] ICD-10-CM: D57.1  ICD-9-CM: 282.60  7/13/2017        Iron overload due to repeated red blood cell transfusions ICD-10-CM: E83.111  ICD-9-CM: 275.02  1/18/2017        Paroxysmal SVT (supraventricular tachycardia) (HCC) ICD-10-CM: I47.1  ICD-9-CM: 427.0  7/9/2016        Sickle cell anemia (HCC) ICD-10-CM: D57.1  ICD-9-CM: 282.60  4/6/2016        Sickle cell pain crisis ([facility] ICD-10-CM: D57.00  ICD-9-CM: 282.62  10/25/2012        Essential hypertension, benign ICD-10-CM: I10  ICD-9-CM: 401.1  6/23/2012        Precordial pain ICD-10-CM: R07.2  ICD-9-CM: 786.51  3/24/2012    Overview Signed 3/24/2012  2:53 PM by Cindy PLUMMER     Acute chest syndrome unlikely. RECOMMENDATIONS:  Sickle/Beta+ Thal with arm/leg pain crisis  - Hgb 7.4 (BL 7.5-8.5)  - Change IVF to D5 1/2 NS  - Home pain medications: OxyContin 20 q 8 hours and Roxicodone 30 mg q 4 hours  - Currently on Oxy IR 30 mg q 4 hours and IV Dilaudid 0.5 mg q 4 hours  - Continue jadenu, hydrea, and folic acid     Lab studies and imaging studies were personally reviewed. Thank you for allowing us to participate in the care of Mr. Cabrera. He will need to f/u with Dr. Estrella Siu at [facility] after discharge. We will sign off. Please call with any questions.             Vy Cotton NP   [facility]  [id number] [place] [address]  Office : [phone]  Fax : [phone]

## 2018-11-06 NOTE — PROGRESS NOTES
END OF SHIFT NOTE: 
 
Intake/Output 11/06 0701 - 11/06 1900 In: 2107 [P.O.:960; I.V.:1147] Out: -   
Voiding: YES Catheter: NO 
Drain:   
 
 
 
 
Stool:  0 occurrences. Emesis:  0 occurrences. VITAL SIGNS Patient Vitals for the past 12 hrs: 
 Temp Pulse Resp BP SpO2  
11/06/18 1440 97.9 °F (36.6 °C) 82 18 (!) 141/109 93 % 11/06/18 1050 98.8 °F (37.1 °C) 85 18 129/74 91 % 11/06/18 0710 98.8 °F (37.1 °C) (!) 58 16 136/70 95 % Pain Assessment Pain 1 Pain Scale 1: Numeric (0 - 10) (11/06/18 1820) Pain Intensity 1: 6 (11/06/18 1820) Patient Stated Pain Goal: 0 (11/06/18 0341) Pain Reassessment 1: Yes (11/06/18 1820) Pain Onset 1: PTA (11/06/18 1720) Pain Location 1: Generalized (11/06/18 1720) Pain Orientation 1: Inner (11/06/18 1720) Pain Description 1: Aching (11/06/18 1720) Pain Intervention(s) 1: Medication (see MAR) (11/06/18 1720) Ambulating Yes Additional Information: Pt pain med mg increased. Wife still needs to bring in [place]. Metoprolol added. VSS. No needs stated at this time. Shift report given to oncoming nurse at the bedside. Daniel Fowler

## 2018-11-06 NOTE — PROGRESS NOTES
Problem: Falls - Risk of 
Goal: *Absence of Falls Document Margaert Livingston Fall Risk and appropriate interventions in the flowsheet. Outcome: Progressing Towards Goal 
Fall Risk Interventions: 
  
 
  
 
Medication Interventions: Patient to call before getting OOB, Teach patient to arise slowly

## 2018-11-06 NOTE — PROGRESS NOTES
11/05/18 1815 Dual Skin Pressure Injury Assessment Dual Skin Pressure Injury Assessment WDL Second Care Provider (Based on [address] [place] [address]) Po Loya RN

## 2018-11-06 NOTE — PROGRESS NOTES
END OF SHIFT NOTE: 
 
Intake/Output 11/05 1901 - 11/06 0700 In: 1819 [P.O.:480; I.V.:1339] Out: -   
Voiding: YES Catheter: NO 
Drain:   
 
 
 
 
Stool:  1 occurrences. Emesis:  0 occurrences. VITAL SIGNS Patient Vitals for the past 12 hrs: 
 Temp Pulse Resp BP SpO2  
11/06/18 0341 98.7 °F (37.1 °C) 77 14 138/68 96 % 11/05/18 2320 97.8 °F (36.6 °C) 66 14 139/82 95 % Pain Assessment Pain 1 Pain Scale 1: Numeric (0 - 10) (11/06/18 0341) Pain Intensity 1: 4 (11/06/18 0341) Patient Stated Pain Goal: 0 (11/06/18 0341) Pain Reassessment 1: Yes (11/06/18 0341) Pain Location 1: Leg (11/06/18 0259) Pain Orientation 1: Left;Right (11/06/18 0259) Pain Description 1: Aching (11/06/18 0259) Pain Intervention(s) 1: Medication (see MAR) (11/06/18 0259) Ambulating Yes Additional Information: pt safety maintained; pt has complained of pain and nausea only a few times this shift; VSS No acute issues overnight Shift report given to oncoming nurse at the bedside. [facility]

## 2018-11-06 NOTE — PROGRESS NOTES
Problem: Falls - Risk of 
Goal: *Absence of Falls Document Salud Alvarado Fall Risk and appropriate interventions in the flowsheet. Outcome: Progressing Towards Goal 
Fall Risk Interventions: 
  
 
  
 
Medication Interventions: Teach patient to arise slowly, Patient to call before getting OOB, Assess postural VS orthostatic hypotension

## 2018-11-06 NOTE — PROGRESS NOTES
made initial visit. Pt was alert and verbal appearing comfortable with no pain level expressed or observed. Pt's family was present.  welcomed them to DT and shared information about  services.  provided spiritual care through presence, pastoral conversation, and assurance of prayer.

## 2018-11-06 NOTE — PROGRESS NOTES
Hospitalist Progress Note   
2018 Admit Date: 2018  9:40 AM  
NAME: Cody Mosley :  1975 MRN:  [id number] Attending: Akash Armstrong MD 
PCP:  Carli De La Torre MD 
 
SUBJECTIVE:  
Patient 42y/o AAm with h/o SCD with chronic iron overload secondary to transfusions presented with LE pain. Pt was admitted in October for sickle cell crisis. Last seen by hematologist Dr Consuelo Mendiola with decrease in oxycontin from 80mg bid to 20mg bid. He has continued hydrea, folic acid and Jadenu. Admitting hgb was 8.9 (9.3 day prior). . Patient admitted for [facility]. Started on IVF and IVF pain meds.  - still with pain in b/l LE's. Request increase dose of dilaudid. Denies chest pain, dyspnea, abd pain. Review of Systems negative with exception of pertinent positives noted above PHYSICAL EXAM  
 
Visit Vitals BP (!) 141/109 (BP 1 Location: Left arm) Pulse 82 Temp 97.9 °F (36.6 °C) Resp 18 Wt 73.1 kg (161 lb 3.2 oz) SpO2 93% BMI 23.13 kg/m² Temp (24hrs), Av.6 °F (37 °C), Min:97.8 °F (36.6 °C), Max:99.4 °F (37.4 °C) Oxygen Therapy O2 Sat (%): 93 % (18 1440) Pulse via Oximetry: 82 beats per minute (18 1633) O2 Device: Room air (18 1259) Intake/Output Summary (Last 24 hours) at 2018 1621 Last data filed at 2018 1324 Gross per 24 hour Intake 2539 ml Output  Net 2539 ml General: Fidgeting some in the bed but otherwise not in distress   
Lungs:  CTA Bilaterally. Heart:  Regular rate and rhythm,  No murmur, rub, or gallop Abdomen: Soft, Non distended, Non tender, Positive bowel sounds Extremities: No cyanosis, clubbing or edema Neurologic:  No focal deficits ASSESSMENT Active Hospital Problems Diagnosis Date Noted  Sickle cell crisis ([facility] 2018  Iron overload due to repeated red blood cell transfusions 2017  Sickle cell anemia ([facility] 2016 A/P 
 - Acute sickle cell pain crisis-  Continue IVF, px mgmt, folic acid, hydrea. Given increasing pain will request hematology eval.  
 hgb 7.6. Cont to monitor - chronic iron overload - continue jadenu 
- HTN - resume home meds DVT Prophylaxis: lovenox Dispo - anticipate home at discharge - 2-3 days? Signed By: Cresencio Garduno DO November 6, 2018

## 2018-11-07 LAB
ANION GAP SERPL CALC-SCNC: 8 MMOL/L (ref 7–16)
BUN SERPL-MCNC: 7 MG/DL (ref 6–23)
CALCIUM SERPL-MCNC: 8 MG/DL (ref 8.3–10.4)
CHLORIDE SERPL-SCNC: 105 MMOL/L (ref 98–107)
CO2 SERPL-SCNC: 25 MMOL/L (ref 21–32)
CREAT SERPL-MCNC: 0.8 MG/DL (ref 0.8–1.5)
ERYTHROCYTE [DISTWIDTH] IN BLOOD BY AUTOMATED COUNT: 22.2 %
EST. AVERAGE GLUCOSE BLD GHB EST-MCNC: 154 MG/DL
GLUCOSE SERPL-MCNC: 175 MG/DL (ref 65–100)
HBA1C MFR BLD: 7 % (ref 4.8–6)
HCT VFR BLD AUTO: 21.9 % (ref 41.1–50.3)
HGB BLD-MCNC: 7.4 G/DL (ref 13.6–17.2)
HGB RETIC QN AUTO: 39 PG (ref 29–35)
IMM RETICS NFR: 22.5 % (ref 2.3–13.4)
LDH SERPL L TO P-CCNC: 409 U/L (ref 100–190)
MCH RBC QN AUTO: 33.2 PG (ref 26.1–32.9)
MCHC RBC AUTO-ENTMCNC: 33.8 G/DL (ref 31.4–35)
MCV RBC AUTO: 98.2 FL (ref 79.6–97.8)
NRBC # BLD: 0.04 K/UL (ref 0–0.2)
PLATELET # BLD AUTO: 227 K/UL (ref 150–450)
PMV BLD AUTO: 11.2 FL (ref 9.4–12.3)
POTASSIUM SERPL-SCNC: 3.7 MMOL/L (ref 3.5–5.1)
RBC # BLD AUTO: 2.23 M/UL (ref 4.23–5.6)
RETICS # AUTO: 0.08 M/UL (ref 0.03–0.1)
RETICS/RBC NFR AUTO: 3.4 % (ref 0.3–2)
SODIUM SERPL-SCNC: 138 MMOL/L (ref 136–145)
WBC # BLD AUTO: 11.9 K/UL (ref 4.3–11.1)

## 2018-11-07 PROCEDURE — 74011250636 HC RX REV CODE- 250/636: Performed by: INTERNAL MEDICINE

## 2018-11-07 PROCEDURE — 77030020253 HC SOL INJ D545NS .05 DEX .45 SAL

## 2018-11-07 PROCEDURE — 83036 HEMOGLOBIN GLYCOSYLATED A1C: CPT

## 2018-11-07 PROCEDURE — 85046 RETICYTE/HGB CONCENTRATE: CPT

## 2018-11-07 PROCEDURE — 85027 COMPLETE CBC AUTOMATED: CPT

## 2018-11-07 PROCEDURE — 65270000029 HC RM PRIVATE

## 2018-11-07 PROCEDURE — 74011000258 HC RX REV CODE- 258: Performed by: NURSE PRACTITIONER

## 2018-11-07 PROCEDURE — 74011250637 HC RX REV CODE- 250/637: Performed by: INTERNAL MEDICINE

## 2018-11-07 PROCEDURE — 74011250637 HC RX REV CODE- 250/637: Performed by: FAMILY MEDICINE

## 2018-11-07 PROCEDURE — 80048 BASIC METABOLIC PNL TOTAL CA: CPT

## 2018-11-07 PROCEDURE — 99222 1ST HOSP IP/OBS MODERATE 55: CPT | Performed by: INTERNAL MEDICINE

## 2018-11-07 PROCEDURE — 83615 LACTATE (LD) (LDH) ENZYME: CPT

## 2018-11-07 RX ADMIN — Medication 10 ML: at 22:02

## 2018-11-07 RX ADMIN — METOPROLOL TARTRATE 12.5 MG: 25 TABLET ORAL at 09:21

## 2018-11-07 RX ADMIN — OXYCODONE HYDROCHLORIDE 20 MG: 20 TABLET, FILM COATED, EXTENDED RELEASE ORAL at 21:46

## 2018-11-07 RX ADMIN — HYDROMORPHONE HYDROCHLORIDE 1 MG: 1 INJECTION, SOLUTION INTRAMUSCULAR; INTRAVENOUS; SUBCUTANEOUS at 23:21

## 2018-11-07 RX ADMIN — HYDROMORPHONE HYDROCHLORIDE 1 MG: 1 INJECTION, SOLUTION INTRAMUSCULAR; INTRAVENOUS; SUBCUTANEOUS at 00:59

## 2018-11-07 RX ADMIN — FOLIC ACID 1 MG: 1 TABLET ORAL at 09:21

## 2018-11-07 RX ADMIN — Medication 10 ML: at 14:44

## 2018-11-07 RX ADMIN — OXYCODONE HYDROCHLORIDE 20 MG: 20 TABLET, FILM COATED, EXTENDED RELEASE ORAL at 09:21

## 2018-11-07 RX ADMIN — DEXTROSE MONOHYDRATE AND SODIUM CHLORIDE 150 ML/HR: 5; .45 INJECTION, SOLUTION INTRAVENOUS at 23:25

## 2018-11-07 RX ADMIN — METOPROLOL TARTRATE 12.5 MG: 25 TABLET ORAL at 21:46

## 2018-11-07 RX ADMIN — DEXTROSE MONOHYDRATE AND SODIUM CHLORIDE 150 ML/HR: 5; .45 INJECTION, SOLUTION INTRAVENOUS at 06:30

## 2018-11-07 RX ADMIN — DEXTROSE MONOHYDRATE AND SODIUM CHLORIDE 150 ML/HR: 5; .45 INJECTION, SOLUTION INTRAVENOUS at 00:07

## 2018-11-07 RX ADMIN — HYDROXYUREA 1000 MG: 500 CAPSULE ORAL at 09:21

## 2018-11-07 RX ADMIN — LISINOPRIL 5 MG: 5 TABLET ORAL at 09:21

## 2018-11-07 RX ADMIN — PROMETHAZINE HYDROCHLORIDE 12.5 MG: 25 TABLET ORAL at 14:46

## 2018-11-07 RX ADMIN — Medication 10 ML: at 11:02

## 2018-11-07 RX ADMIN — HYDROMORPHONE HYDROCHLORIDE 1 MG: 1 INJECTION, SOLUTION INTRAMUSCULAR; INTRAVENOUS; SUBCUTANEOUS at 11:02

## 2018-11-07 RX ADMIN — ENOXAPARIN SODIUM 40 MG: 40 INJECTION, SOLUTION INTRAVENOUS; SUBCUTANEOUS at 20:01

## 2018-11-07 RX ADMIN — HYDROMORPHONE HYDROCHLORIDE 1 MG: 1 INJECTION, SOLUTION INTRAMUSCULAR; INTRAVENOUS; SUBCUTANEOUS at 20:00

## 2018-11-07 RX ADMIN — HYDROMORPHONE HYDROCHLORIDE 1 MG: 1 INJECTION, SOLUTION INTRAMUSCULAR; INTRAVENOUS; SUBCUTANEOUS at 06:27

## 2018-11-07 RX ADMIN — OXYCODONE HYDROCHLORIDE 30 MG: 15 TABLET ORAL at 14:06

## 2018-11-07 RX ADMIN — HYDROMORPHONE HYDROCHLORIDE 1 MG: 1 INJECTION, SOLUTION INTRAMUSCULAR; INTRAVENOUS; SUBCUTANEOUS at 15:49

## 2018-11-07 NOTE — PROGRESS NOTES
Hospitalist Progress Note 2018 Admit Date: 2018  9:40 AM  
NAME: Mora Dominique :  1975 MRN:  [id number] Attending: Angelica Aguilar MD 
PCP:  Radha Buchanan MD 
 
SUBJECTIVE:  
Patient 44y/o AAm with h/o SCD with chronic iron overload secondary to transfusions presented with LE pain. Pt was admitted in October for sickle cell crisis. Last seen by hematologist Dr Dipika Ramirez with decrease in oxycontin from 80mg bid to 20mg bid. He has continued hydrea, folic acid and Jadenu. Admitting hgb was 8.9 (9.3 day prior). . Patient admitted for [facility]. Started on IVF and IVF pain meds.  - pain unchanged from yesterday. Aching LE's and some UE joint pain Review of Systems negative with exception of pertinent positives noted above PHYSICAL EXAM  
 
Visit Vitals /76 (BP 1 Location: Left arm) Pulse 60 Temp 98.8 °F (37.1 °C) Resp 18 Wt 75.4 kg (166 lb 4.8 oz) SpO2 93% BMI 23.86 kg/m² Temp (24hrs), Av.6 °F (37 °C), Min:98.2 °F (36.8 °C), Max:99 °F (37.2 °C) Oxygen Therapy O2 Sat (%): 93 % (18 1449) Pulse via Oximetry: 82 beats per minute (18 1633) O2 Device: Room air (18 1259) Intake/Output Summary (Last 24 hours) at 2018 1554 Last data filed at 2018 1441 Gross per 24 hour Intake 5135 ml Output 3070 ml Net 2065 ml General: Does not appear in distress Lungs:  CTA Bilaterally. Heart:  Regular rate and rhythm,  No murmur, rub, or gallop Abdomen: Soft, Non distended, Non tender, Positive bowel sounds Extremities: No cyanosis, clubbing or edema Neurologic:  No focal deficits ASSESSMENT Active Hospital Problems Diagnosis Date Noted  Sickle cell crisis ([facility] 2018  Iron overload due to repeated red blood cell transfusions 2017  Sickle cell anemia ([address] 2016 A/P 
- Acute sickle cell pain crisis-  Continue IVF, px mgmt, folic acid, briana. Heme has consulted and signed off. hgb 7.4. Cont to monitor - chronic iron overload - continue jadenu 
- HTN - resume home meds DVT Prophylaxis: lovenox Dispo - anticipate home at discharge - ?tomorrow Signed By: Abraham Sutherland DO November 7, 2018

## 2018-11-07 NOTE — PROGRESS NOTES
END OF SHIFT NOTE: 
 
Intake/Output 11/07 0701 - 11/07 1900 In: 1997 [P.O.:980; I.V.:1017] Out: 1145 [EOOBP:8517] Voiding: YES Catheter: NO 
Drain:   
 
 
 
 
Stool:  0 occurrences. Emesis:  0 occurrences. VITAL SIGNS Patient Vitals for the past 12 hrs: 
 Temp Pulse Resp BP SpO2  
11/07/18 1449 98.8 °F (37.1 °C) 60 18 150/76 93 % 11/07/18 1100 99 °F (37.2 °C) (!) 59 16 123/89 93 % 11/07/18 0700 98.5 °F (36.9 °C) 73 16 (!) 147/92 93 % Pain Assessment Pain 1 Pain Scale 1: Numeric (0 - 10) (11/07/18 1441) Pain Intensity 1: 7 (11/07/18 1441) Patient Stated Pain Goal: 0 (11/07/18 1441) Pain Reassessment 1: Yes (11/07/18 1441) Pain Onset 1: PTA (11/07/18 1102) Pain Location 1: Leg (11/07/18 1406) Pain Orientation 1: Right;Left (11/07/18 1406) Pain Description 1: Aching (11/07/18 1102) Pain Intervention(s) 1: Medication (see MAR) (11/07/18 1406) Ambulating Yes Additional Information: Pt still complaining of pain. Pain medication given around the clock. Pt states the regimen works, but when it wears off its really bad. No complaints noted at this time. Shift report given to [address] [place] [address], RN oncoming nurse at the bedside.  
 
Skye Sarabia RN

## 2018-11-07 NOTE — PROGRESS NOTES
END OF SHIFT NOTE: 
 
Intake/Output 11/06 1901 - 11/07 0700 In: 1991 [P.O.:360; I.V.:1631] Out: 6889 [[facility]:9735] Voiding: YES Catheter: NO 
Drain:   
 
 
 
 
Stool:  0 occurrences. Emesis:  0 occurrences. VITAL SIGNS Patient Vitals for the past 12 hrs: 
 Temp Pulse Resp BP SpO2  
11/07/18 0344 98.2 °F (36.8 °C) 71 14 130/88 96 % 11/07/18 0017 98.3 °F (36.8 °C) 74 14 125/75 94 % 11/06/18 1951 98.5 °F (36.9 °C) 80 16 133/89 95 % Pain Assessment Pain 1 Pain Scale 1: Numeric (0 - 10) (11/07/18 2588) Pain Intensity 1: 7 (11/07/18 5982) Patient Stated Pain Goal: 0 (11/07/18 5760) Pain Reassessment 1: Patient sleeping (11/07/18 0159) Pain Onset 1: PTA (11/06/18 1918) Pain Location 1: Generalized (11/07/18 9658) Pain Orientation 1: Left;Right (11/07/18 0059) Pain Description 1: Aching (11/07/18 0059) Pain Intervention(s) 1: Medication (see MAR) (11/07/18 0231) Ambulating Yes Additional Information: pt safety maintained; pt received pain meds more frequently last night than the previous night. VSS. No acute issues overnight Shift report given to oncoming nurse at the bedside. Carli Farrell

## 2018-11-07 NOTE — PROGRESS NOTES
Problem: Falls - Risk of 
Goal: *Absence of Falls Document Nelson Kent Fall Risk and appropriate interventions in the flowsheet. Outcome: Progressing Towards Goal 
Fall Risk Interventions: 
  
 
  
 
Medication Interventions: Teach patient to arise slowly

## 2018-11-07 NOTE — PROGRESS NOTES
Problem: Falls - Risk of 
Goal: *Absence of Falls Document Raúl Whiting Fall Risk and appropriate interventions in the flowsheet. Outcome: Progressing Towards Goal 
Fall Risk Interventions: 
  
 
  
 
Medication Interventions: Teach patient to arise slowly

## 2018-11-08 ENCOUNTER — PATIENT OUTREACH (OUTPATIENT)
Dept: CASE MANAGEMENT | Age: 43
End: 2018-11-08

## 2018-11-08 VITALS
SYSTOLIC BLOOD PRESSURE: 129 MMHG | HEART RATE: 64 BPM | BODY MASS INDEX: 24.28 KG/M2 | TEMPERATURE: 98.7 F | WEIGHT: 169.2 LBS | RESPIRATION RATE: 16 BRPM | DIASTOLIC BLOOD PRESSURE: 72 MMHG | OXYGEN SATURATION: 100 %

## 2018-11-08 LAB
ANION GAP SERPL CALC-SCNC: 8 MMOL/L (ref 7–16)
BUN SERPL-MCNC: 5 MG/DL (ref 6–23)
CALCIUM SERPL-MCNC: 8.5 MG/DL (ref 8.3–10.4)
CHLORIDE SERPL-SCNC: 107 MMOL/L (ref 98–107)
CO2 SERPL-SCNC: 25 MMOL/L (ref 21–32)
CREAT SERPL-MCNC: 0.72 MG/DL (ref 0.8–1.5)
ERYTHROCYTE [DISTWIDTH] IN BLOOD BY AUTOMATED COUNT: 22.3 %
GLUCOSE SERPL-MCNC: 161 MG/DL (ref 65–100)
HCT VFR BLD AUTO: 22.1 % (ref 41.1–50.3)
HGB BLD-MCNC: 7.5 G/DL (ref 13.6–17.2)
HGB RETIC QN AUTO: 40 PG (ref 29–35)
IMM RETICS NFR: 26.3 % (ref 2.3–13.4)
LDH SERPL L TO P-CCNC: 323 U/L (ref 100–190)
MCH RBC QN AUTO: 33.2 PG (ref 26.1–32.9)
MCHC RBC AUTO-ENTMCNC: 33.9 G/DL (ref 31.4–35)
MCV RBC AUTO: 97.8 FL (ref 79.6–97.8)
NRBC # BLD: 0.05 K/UL (ref 0–0.2)
PLATELET # BLD AUTO: 237 K/UL (ref 150–450)
PMV BLD AUTO: 10.6 FL (ref 9.4–12.3)
POTASSIUM SERPL-SCNC: 3.7 MMOL/L (ref 3.5–5.1)
RBC # BLD AUTO: 2.26 M/UL (ref 4.23–5.6)
RETICS # AUTO: 0.07 M/UL (ref 0.03–0.1)
RETICS/RBC NFR AUTO: 2.9 % (ref 0.3–2)
SODIUM SERPL-SCNC: 140 MMOL/L (ref 136–145)
WBC # BLD AUTO: 10.6 K/UL (ref 4.3–11.1)

## 2018-11-08 PROCEDURE — 74011250637 HC RX REV CODE- 250/637: Performed by: INTERNAL MEDICINE

## 2018-11-08 PROCEDURE — 85027 COMPLETE CBC AUTOMATED: CPT

## 2018-11-08 PROCEDURE — 77030020253 HC SOL INJ D545NS .05 DEX .45 SAL

## 2018-11-08 PROCEDURE — 77030020263 HC SOL INJ SOD CL0.9% LFCR 1000ML

## 2018-11-08 PROCEDURE — 74011250636 HC RX REV CODE- 250/636: Performed by: INTERNAL MEDICINE

## 2018-11-08 PROCEDURE — 74011000258 HC RX REV CODE- 258: Performed by: NURSE PRACTITIONER

## 2018-11-08 PROCEDURE — 80048 BASIC METABOLIC PNL TOTAL CA: CPT

## 2018-11-08 PROCEDURE — 74011250637 HC RX REV CODE- 250/637: Performed by: FAMILY MEDICINE

## 2018-11-08 PROCEDURE — 85046 RETICYTE/HGB CONCENTRATE: CPT

## 2018-11-08 PROCEDURE — 83615 LACTATE (LD) (LDH) ENZYME: CPT

## 2018-11-08 RX ORDER — SODIUM CHLORIDE 9 MG/ML
150 INJECTION, SOLUTION INTRAVENOUS CONTINUOUS
Status: DISCONTINUED | OUTPATIENT
Start: 2018-11-08 | End: 2018-11-08 | Stop reason: HOSPADM

## 2018-11-08 RX ORDER — LANCETS
EACH MISCELLANEOUS
Qty: 1 EACH | Refills: 11 | Status: SHIPPED | OUTPATIENT
Start: 2018-11-08

## 2018-11-08 RX ORDER — METFORMIN HYDROCHLORIDE 500 MG/1
500 TABLET ORAL
Qty: 30 TAB | Refills: 0 | Status: SHIPPED | OUTPATIENT
Start: 2018-11-08

## 2018-11-08 RX ORDER — HEPARIN 100 UNIT/ML
500 SYRINGE INTRAVENOUS ONCE
Status: COMPLETED | OUTPATIENT
Start: 2018-11-08 | End: 2018-11-08

## 2018-11-08 RX ORDER — METFORMIN HYDROCHLORIDE 500 MG/1
500 TABLET ORAL
Status: DISCONTINUED | OUTPATIENT
Start: 2018-11-08 | End: 2018-11-08 | Stop reason: HOSPADM

## 2018-11-08 RX ORDER — INSULIN PUMP SYRINGE, 3 ML
EACH MISCELLANEOUS
Qty: 1 KIT | Refills: 0 | Status: SHIPPED | OUTPATIENT
Start: 2018-11-08

## 2018-11-08 RX ADMIN — OXYCODONE HYDROCHLORIDE 30 MG: 15 TABLET ORAL at 10:19

## 2018-11-08 RX ADMIN — OXYCODONE HYDROCHLORIDE 30 MG: 15 TABLET ORAL at 06:06

## 2018-11-08 RX ADMIN — Medication 10 ML: at 06:07

## 2018-11-08 RX ADMIN — HYDROXYUREA 1000 MG: 500 CAPSULE ORAL at 09:11

## 2018-11-08 RX ADMIN — Medication 10 ML: at 13:47

## 2018-11-08 RX ADMIN — FOLIC ACID 1 MG: 1 TABLET ORAL at 08:14

## 2018-11-08 RX ADMIN — DEXTROSE MONOHYDRATE AND SODIUM CHLORIDE 150 ML/HR: 5; .45 INJECTION, SOLUTION INTRAVENOUS at 06:11

## 2018-11-08 RX ADMIN — OXYCODONE HYDROCHLORIDE 20 MG: 20 TABLET, FILM COATED, EXTENDED RELEASE ORAL at 08:14

## 2018-11-08 RX ADMIN — HYDROMORPHONE HYDROCHLORIDE 1 MG: 1 INJECTION, SOLUTION INTRAMUSCULAR; INTRAVENOUS; SUBCUTANEOUS at 04:06

## 2018-11-08 RX ADMIN — HYDROMORPHONE HYDROCHLORIDE 1 MG: 1 INJECTION, SOLUTION INTRAMUSCULAR; INTRAVENOUS; SUBCUTANEOUS at 08:14

## 2018-11-08 RX ADMIN — METOPROLOL TARTRATE 12.5 MG: 25 TABLET ORAL at 08:14

## 2018-11-08 RX ADMIN — SODIUM CHLORIDE, PRESERVATIVE FREE 500 UNITS: 5 INJECTION INTRAVENOUS at 13:47

## 2018-11-08 RX ADMIN — DEXTROSE MONOHYDRATE AND SODIUM CHLORIDE 150 ML/HR: 5; .45 INJECTION, SOLUTION INTRAVENOUS at 08:18

## 2018-11-08 RX ADMIN — LISINOPRIL 5 MG: 5 TABLET ORAL at 08:14

## 2018-11-08 RX ADMIN — SODIUM CHLORIDE 150 ML/HR: 900 INJECTION, SOLUTION INTRAVENOUS at 10:18

## 2018-11-08 RX ADMIN — METFORMIN HYDROCHLORIDE 500 MG: 500 TABLET ORAL at 10:19

## 2018-11-08 RX ADMIN — OXYCODONE HYDROCHLORIDE 30 MG: 15 TABLET ORAL at 14:03

## 2018-11-08 NOTE — PROGRESS NOTES
Written and verbal d/c instructions reviewed with and provided to pt. All questions answered. Prescriptions given to pt. Care management to reach out to pt tomorrow to set up OP follow-up.

## 2018-11-08 NOTE — PROGRESS NOTES
Patient being discharged from hospital to home today Patient was admitted for sickle cell crisis PLAN: 
Will outreach to patient tomorrow for JOSÉ TREVIZO This note will not be viewable in [address]

## 2018-11-08 NOTE — PROGRESS NOTES
SW met with pt at bedside. He is AAOx4 and able to make needs known. Demographics verified. Pt lives at home with his spouse. He does not work. He is independent with ADL's and no DME. No other needs at this time. SW will continue to monitor and follow up. Care Management Interventions PCP Verified by CM: Yes Mode of Transport at Discharge: Self Transition of Care Consult (CM Consult): Discharge Planning Discharge Durable Medical Equipment: No 
Physical Therapy Consult: No 
Occupational Therapy Consult: No 
Speech Therapy Consult: No 
Current Support Network: Lives with Spouse, Own Home Confirm Follow Up Transport: Family Plan discussed with Pt/Family/Caregiver: Yes Freedom of Choice Offered: Yes Discharge Location Discharge Placement: Home

## 2018-11-08 NOTE — DIABETES MGMT
Patient admitted 16 with sickle cell crisis seen by RNMIRANDA. New diagnosis DM. Blood glucose 161 today. HbA1c 7.0 (eA). Creatinine: 0.72. GFR: >60. Pt states positive family history of DM, father. History of paroxysmal SVT, HTN, and chronic pain. Pt given educational material, \"Diabetes Self-Management: A Patient Teaching Guide\", which was reviewed with pt. Explained basic physiology of diabetes, as well as causes, s/s, and treatments for hypoglycemia and hyperglycemia. Described the effects of poor glycemic control on the development of long-term complications such as renal, eye, nerve, and cardiovascular disease. Described proper diabetic foot care and the importance of checking feet qd. Pt states that he does like to eat \"Sweets and drink sweet tea and gatorade. Discussed alternative unsweetened beverages. Educated re: effects of carbohydrates on blood glucose, the \"plate method\" of healthy meal planning, basics of healthy meal plan, and Consistent Carbohydrate Diet. Also explained the relationship between hyperglycemia and infection. Discussed target goals for blood glucose and A1C. Pt verbalizes understanding of teaching. Explained the importance of blood glucose monitoring. Recommended frequency of qid ac, hs, and to record in log book to take to PCP appointment. Provided blood glucose meter, strips, and instructed pt in use of meter. Pt was able to return demonstrate correct use of meter. Educated pt on current diabetes regimen: Metformin 500 mg daily with breakfast. Discussed that he should take the medication with food. Pt verbalizes understanding. Pt would likely benefit from continued outpatient diabetes education. Contact information given for [facility]. Pt is agreeable to attend. Pt states that his wife is also a type 2 diabetic. Pt will need a new PCP. His PCP has moved.  Discussed with case management and they are assisting with discharge planning. Pt has no further questions at this time. Pt will need prescriptions for lancet and blood glucose test strips at discharge.

## 2018-11-08 NOTE — PROGRESS NOTES
Problem: Falls - Risk of 
Goal: *Absence of Falls Document Di Kemi Fall Risk and appropriate interventions in the flowsheet. Outcome: Progressing Towards Goal 
Fall Risk Interventions: 
  
 
  
 
Medication Interventions: Evaluate medications/consider consulting pharmacy

## 2018-11-08 NOTE — PROGRESS NOTES
END OF SHIFT NOTE: 
 
Intake/Output 11/07 1901 - 11/08 0700 In: 1905 [I.V.:1905] Out: 1000 [Urine:1000] Voiding: YES Catheter: NO 
Drain:   
 
 
 
 
Stool:  1 occurrences. Emesis:  0 occurrences. VITAL SIGNS Patient Vitals for the past 12 hrs: 
 Temp Pulse Resp BP SpO2  
11/08/18 0613 98.4 °F (36.9 °C) 64 14 140/79 98 % 11/08/18 0358 98.2 °F (36.8 °C) 65 14 126/74 97 % 11/07/18 2337 98.5 °F (36.9 °C) 66 14 (!) 136/92 95 % 11/07/18 1926 98.1 °F (36.7 °C) 67 16 143/82 97 % Pain Assessment Pain 1 Pain Scale 1: Numeric (0 - 10) (11/07/18 2323) Pain Intensity 1: 6 (11/07/18 2323) Patient Stated Pain Goal: 0 (11/07/18 1441) Pain Reassessment 1: Yes (11/07/18 1441) Pain Onset 1: PTA (11/07/18 1102) Pain Location 1: Leg (11/07/18 1406) Pain Orientation 1: Right;Left (11/07/18 1406) Pain Description 1: Aching (11/07/18 1102) Pain Intervention(s) 1: Medication (see MAR) (11/07/18 2323) Ambulating Yes Additional Information: pt safety maintained. VSS; no acute events overnight. Pain 6/10 and pain meds given as needed Shift report given to oncoming nurse at the bedside. Jean Claude Moment

## 2018-11-08 NOTE — DISCHARGE INSTRUCTIONS
Sickle Cell Crisis: Care Instructions  Your Care Instructions    Sickle cell crisis is a painful episode that may begin suddenly in a person with sickle cell disease. Sickle cell disease turns normal, round red blood cells into cells that look like kendall or crescent moons. The sickle-shaped cells can get stuck in blood vessels, blocking blood flow and causing severe pain. The pain can occur in the bones of the spine, the arms and legs, the chest, and the abdomen. An episode may be called a \"painful event\" or \"painful crisis. \" Some people who have sickle cell disease have many painful events, while others have few or none. Treatment depends on the level of pain and how long it lasts. Sometimes taking nonprescription pain relievers can help. Or you may need stronger pain relief medicine that is prescribed or given by a doctor. You may need to be treated in the hospital.  It isn't always possible to know what sets off a painful event. But triggers include being dehydrated, cold temperatures, infection, stress, and not getting enough oxygen. Follow-up care is a key part of your treatment and safety. Be sure to make and go to all appointments, and call your doctor if you are having problems. It's also a good idea to know your test results and keep a list of the medicines you take. How can you care for yourself at home? · Create a pain management plan with your doctor. This plan should include the types of medicines you can take and other actions you can take at home to relieve pain. · Drink plenty of fluids, enough so that your urine is light yellow or clear like water. If you have kidney, heart, or liver disease and have to limit fluids, talk with your doctor before you increase the amount of fluids you drink. · Take your medicines exactly as prescribed. Call your doctor if you think you are having a problem with your medicine. · Take pain medicines exactly as directed.   ? If the doctor gave you a prescription medicine for pain, take it as prescribed. ? If you are not taking a prescription pain medicine, ask your doctor if you can take an over-the-counter medicine. · Avoid alcohol. It can make you dehydrated. · Dress warmly in cold weather. The cold and windy weather can lead to severe pain. · Do not smoke. Smoking can reduce the amount of oxygen in your blood. · Get plenty of sleep. When should you call for help? Call 911 anytime you think you may need emergency care. For example, call if:    · You have symptoms of a severe problem from sickle cell.     · You have symptoms of a stroke. These may include:  ? Sudden numbness, tingling, weakness, or loss of movement in your face, arm, or leg, especially on only one side of your body. ? Sudden vision changes. ? Sudden trouble speaking. ? Sudden confusion or trouble understanding simple statements. ? Sudden problems with walking or balance. ? A sudden, severe headache that is different from past headaches.     · You are in severe pain.     · You have symptoms of a heart attack. These may include:  ? Chest pain or pressure, or a strange feeling in the chest.  ? Sweating. ? Shortness of breath. ? Nausea or vomiting. ? Pain, pressure, or a strange feeling in the back, neck, jaw, or upper belly or in one or both shoulders or arms. ? Lightheadedness or sudden weakness. ? A fast or irregular heartbeat. After you call 911, the  may tell you to chew 1 adult-strength or 2 to 4 low-dose aspirin. Wait for an ambulance. Do not try to drive yourself.    Call your doctor now or seek immediate medical care if:    · You have a fever.    Watch closely for changes in your health, and be sure to contact your doctor if you have any problems. Where can you learn more? Go to http://socorro-rosita.info/. Enter F104 in the search box to learn more about \"Sickle Cell Crisis: Care Instructions. \"  Current as of: September 10, 2017  Content Version: 11.8  © 3985-4976 [employer]. Care instructions adapted under license by [employer] (which disclaims liability or warranty for this information). If you have questions about a medical condition or this instruction, always ask your healthcare professional. [address] any warranty or liability for your use of this information. DISCHARGE SUMMARY from Nurse    PATIENT INSTRUCTIONS:    After general anesthesia or intravenous sedation, for 24 hours or while taking prescription Narcotics:  · Limit your activities  · Do not drive and operate hazardous machinery  · Do not make important personal or business decisions  · Do  not drink alcoholic beverages  · If you have not urinated within 8 hours after discharge, please contact your doctor on call. Report the following to your surgeon:  · Excessive pain, swelling, redness or odor of or around the port area  · Temperature over 100.5  · Nausea and vomiting lasting longer than 4 hours or if unable to take medications  · Any signs of decreased circulation or nerve impairment to extremity: change in color, persistent  numbness, tingling, coldness or increase pain  · Any questions    What to do at Home:  Recommended activity: Activity as tolerated,     If you experience any of the following symptoms uncontrollable pain, fever >100. 5 please follow up with your PCP. *  Please give a list of your current medications to your Primary Care Provider. *  Please update this list whenever your medications are discontinued, doses are      changed, or new medications (including over-the-counter products) are added. *  Please carry medication information at all times in case of emergency situations.     These are general instructions for a healthy lifestyle:    No smoking/ No tobacco products/ Avoid exposure to second hand smoke  Surgeon General's Warning:  Quitting smoking now greatly reduces serious risk to your health. Obesity, smoking, and sedentary lifestyle greatly increases your risk for illness    A healthy diet, regular physical exercise & weight monitoring are important for maintaining a healthy lifestyle    You may be retaining fluid if you have a history of heart failure or if you experience any of the following symptoms:  Weight gain of 3 pounds or more overnight or 5 pounds in a week, increased swelling in our hands or feet or shortness of breath while lying flat in bed. Please call your doctor as soon as you notice any of these symptoms; do not wait until your next office visit. Recognize signs and symptoms of STROKE:    F-face looks uneven    A-arms unable to move or move unevenly    S-speech slurred or non-existent    T-time-call 911 as soon as signs and symptoms begin-DO NOT go       Back to bed or wait to see if you get better-TIME IS BRAIN. Warning Signs of HEART ATTACK     Call 911 if you have these symptoms:   Chest discomfort. Most heart attacks involve discomfort in the center of the chest that lasts more than a few minutes, or that goes away and comes back. It can feel like uncomfortable pressure, squeezing, fullness, or pain.  Discomfort in other areas of the upper body. Symptoms can include pain or discomfort in one or both arms, the back, neck, jaw, or stomach.  Shortness of breath with or without chest discomfort.  Other signs may include breaking out in a cold sweat, nausea, or lightheadedness. Don't wait more than five minutes to call 911 - MINUTES MATTER! Fast action can save your life. Calling 911 is almost always the fastest way to get lifesaving treatment. Emergency Medical Services staff can begin treatment when they arrive -- up to an hour sooner than if someone gets to the hospital by car. The discharge information has been reviewed with the patient. The patient verbalized understanding.   Discharge medications reviewed with the patient and appropriate educational materials and side effects teaching were provided. ___________________________________________________________________________________________________________________________________     Learning About Diabetes Food Guidelines  Your Care Instructions    Meal planning is important to manage diabetes. It helps keep your blood sugar at a target level (which you set with your doctor). You don't have to eat special foods. You can eat what your family eats, including sweets once in a while. But you do have to pay attention to how often you eat and how much you eat of certain foods. You may want to work with a dietitian or a certified diabetes educator (CDE) to help you plan meals and snacks. A dietitian or CDE can also help you lose weight if that is one of your goals. What should you know about eating carbs? Managing the amount of carbohydrate (carbs) you eat is an important part of healthy meals when you have diabetes. Carbohydrate is found in many foods. · Learn which foods have carbs. And learn the amounts of carbs in different foods. ? Bread, cereal, pasta, and rice have about 15 grams of carbs in a serving. A serving is 1 slice of bread (1 ounce), ½ cup of cooked cereal, or 1/3 cup of cooked pasta or rice. ? Fruits have 15 grams of carbs in a serving. A serving is 1 small fresh fruit, such as an apple or orange; ½ of a banana; ½ cup of cooked or canned fruit; ½ cup of fruit juice; 1 cup of melon or raspberries; or 2 tablespoons of dried fruit. ? Milk and no-sugar-added yogurt have 15 grams of carbs in a serving. A serving is 1 cup of milk or 2/3 cup of no-sugar-added yogurt. ? Starchy vegetables have 15 grams of carbs in a serving. A serving is ½ cup of mashed potatoes or sweet potato; 1 cup winter squash; ½ of a small baked potato; ½ cup of cooked beans; or ½ cup cooked corn or green peas.   · Learn how much carbs to eat each day and at each meal. A dietitian or CDE can teach you how to keep track of the amount of carbs you eat. This is called carbohydrate counting. · If you are not sure how to count carbohydrate grams, use the Plate Method to plan meals. It is a good, quick way to make sure that you have a balanced meal. It also helps you spread carbs throughout the day. ? Divide your plate by types of foods. Put non-starchy vegetables on half the plate, meat or other protein food on one-quarter of the plate, and a grain or starchy vegetable in the final quarter of the plate. To this you can add a small piece of fruit and 1 cup of milk or yogurt, depending on how many carbs you are supposed to eat at a meal.  · Try to eat about the same amount of carbs at each meal. Do not \"save up\" your daily allowance of carbs to eat at one meal.  · Proteins have very little or no carbs per serving. Examples of proteins are beef, chicken, turkey, fish, eggs, tofu, cheese, cottage cheese, and peanut butter. A serving size of meat is 3 ounces, which is about the size of a deck of cards. Examples of meat substitute serving sizes (equal to 1 ounce of meat) are 1/4 cup of cottage cheese, 1 egg, 1 tablespoon of peanut butter, and ½ cup of tofu. How can you eat out and still eat healthy? · Learn to estimate the serving sizes of foods that have carbohydrate. If you measure food at home, it will be easier to estimate the amount in a serving of restaurant food. · If the meal you order has too much carbohydrate (such as potatoes, corn, or baked beans), ask to have a low-carbohydrate food instead. Ask for a salad or green vegetables. · If you use insulin, check your blood sugar before and after eating out to help you plan how much to eat in the future. · If you eat more carbohydrate at a meal than you had planned, take a walk or do other exercise. This will help lower your blood sugar. What else should you know? · Limit saturated fat, such as the fat from meat and dairy products.  This is a healthy choice because people who have diabetes are at higher risk of heart disease. So choose lean cuts of meat and nonfat or low-fat dairy products. Use olive or canola oil instead of butter or shortening when cooking. · Don't skip meals. Your blood sugar may drop too low if you skip meals and take insulin or certain medicines for diabetes. · Check with your doctor before you drink alcohol. Alcohol can cause your blood sugar to drop too low. Alcohol can also cause a bad reaction if you take certain diabetes medicines. Follow-up care is a key part of your treatment and safety. Be sure to make and go to all appointments, and call your doctor if you are having problems. It's also a good idea to know your test results and keep a list of the medicines you take. Where can you learn more? Go to http://socorro-rosita.info/. Enter P958 in the search box to learn more about \"Learning About Diabetes Food Guidelines. \"  Current as of: December 7, 2017  Content Version: 11.8  © 8944-2523 [employer]. Care instructions adapted under license by [employer] (which disclaims liability or warranty for this information). If you have questions about a medical condition or this instruction, always ask your healthcare professional. [address] any warranty or liability for your use of this information.

## 2018-11-08 NOTE — DISCHARGE SUMMARY
Hospitalist Discharge Summary     Patient ID:  Omid Nair  [id number]  87 y.o.  1975  Admit date: 11/5/2018  9:40 AM  Discharge date and time: 11/8/2018  Attending: Rip Morris MD  PCP:  Lexie Bear MD  Treatment Team: Attending Provider: Rip Morris MD; Utilization Review: Saji Hernandez; : Breana ADAME    Principal Diagnosis Sickle cell crisis [facility])   Principal Problem:    Sickle cell crisis ([facility] [address] (11/5/2018)    Active Problems:    [facility] ([facility] (4/6/2016)      Iron overload due to repeated red blood cell transfusions (1/18/2017)             Hospital Course:  Please refer to the admission H&P for details of presentation. In summary, the patient is  42y/o AAm with h/o SCD with chronic iron overload secondary to transfusions presented with LE pain. Pt was admitted in October for sickle cell crisis. Last seen by hematologist Dr Karina Rodriguez with decrease in oxycontin from 80mg bid to 20mg bid. He has continued hydrea, folic acid and Jadenu. Admitting hgb was 8.9 (9.3 day prior). . Patient admitted for [facility]. Started on IVF and IVF pain meds. Clinically improved. On review of labs noted AM Blood glucose on BMP of 130-150's. Ordered HA1C resulted 7.0. This could theoretically be falsely low in presence of sickle cell crisis although it seems to correlate with BG levels seen here. He admits to eating lots of sweets and has family hx of diabetes. Will start metformin and requested diabetes educator prior to discharge.        Significant Diagnostic Studies:   Final [99] 11/05/2018 09:08 11/05/2018 09:10   Study Result     Two view chest     History: Left-sided chest pain times several hours. History of sickle cell.      Comparison: 11/14/2018     Findings: The heart and mediastinal silhouette are normal in size and  configuration. Minor interstitial opacities are present at the lung bases.  There  are no confluent airspace consolidations or pleural effusions. The pulmonary  vascularity is within normal limits. The visualized osseous structures are  unremarkable. Relative lucency beneath the right hemidiaphragm is similar to  that seen on several prior studies including CT imaging of the abdomen. This  corresponds to perihepatic fat.     IMPRESSION  Impression: Stable minor interstitial prominence at the lung bases most  suggestive of scarring. .        Final [99] 11/04/2018 21:37 11/04/2018 21:42   Study Result     EXAM:  XR CHEST PORT     INDICATION:  Chest pain     COMPARISON:  7/22/2018     FINDINGS: A portable AP radiograph of the chest was obtained at 2131 hours. The  patient is on a cardiac monitor. The lungs are clear. The cardiac and  mediastinal contours and pulmonary vascularity are normal.  The bones and soft  tissues are grossly within normal limits.      IMPRESSION  IMPRESSION: Normal chest.            Labs: Results:       Chemistry Recent Labs     11/08/18 0354 11/07/18 0347 11/06/18 0316 11/05/18  1115   * 175* 119* 128*    138 139 140   K 3.7 3.7 3.6 3.7    105 108* 106   CO2 25 25 26 26   BUN 5* 7 10 12   CREA 0.72* 0.80 0.75* 0.79*   CA 8.5 8.0* 8.5 9.1   AGAP 8 8 5* 8   AP  --   --   --  74   TP  --   --   --  8.3*   ALB  --   --   --  3.6   GLOB  --   --   --  4.7*   AGRAT  --   --   --  0.8*      CBC w/Diff Recent Labs     11/08/18 0354 11/07/18 0347 11/06/18 0316 11/05/18  1115   WBC 10.6 11.9* 13.8* 11.0   RBC 2.26* 2.23* 2.35* 2.73*   HGB 7.5* 7.4* 7.6* 8.9*   HCT 22.1* 21.9* 22.7* 26.1*    227 243 273   GRANS  --   --   --  68   LYMPH  --   --   --  22   EOS  --   --   --  0*      Cardiac Enzymes No results for input(s): CPK, CKND1, ELO in the last 72 hours. No lab exists for component: CKRMB, TROIP   Coagulation No results for input(s): PTP, INR, APTT in the last 72 hours.     No lab exists for component: INREXT, INREXT    Lipid Panel No results found for: CHOL, CHOLPOCT, CHOLX, CHLST, CHOLV, 721933, HDL, LDL, LDLC, DLDLP, 532097, VLDLC, VLDL, TGLX, TRIGL, TRIGP, TGLPOCT, CHHD, CHHDX   BNP No results for input(s): BNPP in the last 72 hours. Liver Enzymes Recent Labs     11/05/18  1115   TP 8.3*   ALB 3.6   AP 74   SGOT 49*      Thyroid Studies No results found for: T4, T3U, TSH, TSHEXT, TSHEXT         Discharge Exam:  Visit Vitals  /79 (BP 1 Location: Right arm, BP Patient Position: At rest)   Pulse 64   Temp 98.4 °F (36.9 °C)   Resp 14   Wt 76.7 kg (169 lb 3.2 oz)   SpO2 98%   BMI 24.28 kg/m²     General appearance: alert, cooperative, no distress, appears stated age  Lungs: clear to auscultation bilaterally  Heart: regular rate and rhythm, S1, S2 normal, no murmur, click, rub or gallop  Abdomen: soft, non-tender. Bowel sounds normal. No masses,  no organomegaly  Extremities: no cyanosis or edema  Neurologic: Grossly normal    Disposition: home  Discharge Condition: stable  Patient Instructions:   Current Discharge Medication List      START taking these medications    Details   metFORMIN (GLUCOPHAGE) 500 mg tablet Take 1 Tab by mouth daily (with breakfast). Qty: 30 Tab, Refills: 0         CONTINUE these medications which have NOT CHANGED    Details   oxyCODONE ER (OXYCONTIN) 80 mg ER tablet Take 1 Tab by mouth every twelve (12) hours. Max Daily Amount: 160 mg.  Qty: 4 Tab, Refills: 0    Associated Diagnoses: Vasoocclusive sickle cell crisis (HCC)      oxyCODONE IR (ROXICODONE) 30 mg immediate release tablet Take 1 Tab by mouth every four (4) hours as needed. Max Daily Amount: 180 mg.  Qty: 8 Tab, Refills: 0    Associated Diagnoses: Vasoocclusive sickle cell crisis (HCC)      hydroxyurea (HYDREA) 500 mg capsule Take 1 Cap by mouth two (2) times a day. Takes in am at 0900. Star after follow up with your PCP. Qty: 60 Cap, Refills: 0      deferasirox (JADENU) 360 mg tab Take 5 Tabs by mouth daily.   Qty: 150 Tab, Refills: 0      promethazine (PHENERGAN) 25 mg tablet Take 1 Tab by mouth every six (6) hours as needed. May substitute suppository if vomiting  Qty: 20 Tab, Refills: 0      metoprolol (LOPRESSOR) 25 mg tablet Take 12.5 mg by mouth two (2) times a day. Indications: hypertension      lisinopril (PRINIVIL, ZESTRIL) 5 mg tablet Take 5 mg by mouth daily. Indications: HYPERTENSION      folic acid (FOLVITE) 1 mg tablet Take 1 mg by mouth daily. magnesium oxide (MAG-OX) 400 mg tablet Take 1 Tab by mouth daily.   Qty: 10 Tab, Refills: 0             Activity: as tolerated  Diet: diabetic      Follow-up  · PCP, Oncology 1 week    Time spent to discharge patient 35 minutes  Signed:  Beltran Matthews DO  11/8/2018  9:13 AM

## 2018-11-09 ENCOUNTER — PATIENT OUTREACH (OUTPATIENT)
Dept: CASE MANAGEMENT | Age: 43
End: 2018-11-09

## 2018-11-09 NOTE — PROGRESS NOTES
Attempted outreach to patient 2 x's today to begin [facility] assessment - patient discharged yesterday UTR at this time no answer PLAN: 
Will attempt outreach again next week for LALY This note will not be viewable in [address]

## 2018-11-19 ENCOUNTER — PATIENT OUTREACH (OUTPATIENT)
Dept: CASE MANAGEMENT | Age: 43
End: 2018-11-19

## 2018-12-06 ENCOUNTER — HOSPITAL ENCOUNTER (INPATIENT)
Age: 43
LOS: 4 days | Discharge: HOME | DRG: 812 | End: 2018-12-10
Attending: EMERGENCY MEDICINE | Admitting: INTERNAL MEDICINE
Payer: MEDICARE

## 2018-12-06 DIAGNOSIS — D57.00 VASOOCCLUSIVE SICKLE CELL CRISIS (HCC): ICD-10-CM

## 2018-12-06 DIAGNOSIS — D57.00 SICKLE CELL PAIN CRISIS (HCC): Primary | ICD-10-CM

## 2018-12-06 LAB
ALBUMIN SERPL-MCNC: 3.9 G/DL (ref 3.5–5)
ALBUMIN/GLOB SERPL: 0.9 {RATIO} (ref 1.2–3.5)
ALP SERPL-CCNC: 72 U/L (ref 50–136)
ALT SERPL-CCNC: 51 U/L (ref 12–65)
ANION GAP SERPL CALC-SCNC: 7 MMOL/L (ref 7–16)
AST SERPL-CCNC: 37 U/L (ref 15–37)
BASOPHILS # BLD: 0 K/UL (ref 0–0.2)
BASOPHILS NFR BLD: 0 % (ref 0–2)
BILIRUB SERPL-MCNC: 1.3 MG/DL (ref 0.2–1.1)
BUN SERPL-MCNC: 10 MG/DL (ref 6–23)
CALCIUM SERPL-MCNC: 9.2 MG/DL (ref 8.3–10.4)
CHLORIDE SERPL-SCNC: 107 MMOL/L (ref 98–107)
CO2 SERPL-SCNC: 24 MMOL/L (ref 21–32)
CREAT SERPL-MCNC: 0.88 MG/DL (ref 0.8–1.5)
DIFFERENTIAL METHOD BLD: ABNORMAL
EOSINOPHIL # BLD: 0.1 K/UL (ref 0–0.8)
EOSINOPHIL NFR BLD: 0 % (ref 0.5–7.8)
ERYTHROCYTE [DISTWIDTH] IN BLOOD BY AUTOMATED COUNT: 28 % (ref 11.9–14.6)
GLOBULIN SER CALC-MCNC: 4.4 G/DL (ref 2.3–3.5)
GLUCOSE BLD STRIP.AUTO-MCNC: 143 MG/DL (ref 65–100)
GLUCOSE SERPL-MCNC: 106 MG/DL (ref 65–100)
HCT VFR BLD AUTO: 16.9 % (ref 41.1–50.3)
HCT VFR BLD AUTO: 21.1 % (ref 41.1–50.3)
HGB BLD-MCNC: 5.8 G/DL (ref 13.6–17.2)
HGB BLD-MCNC: 7 G/DL (ref 13.6–17.2)
HGB RETIC QN AUTO: 43 PG (ref 29–35)
IMM GRANULOCYTES # BLD: 0.1 K/UL (ref 0–0.5)
IMM GRANULOCYTES NFR BLD AUTO: 0 % (ref 0–5)
IMM RETICS NFR: 30.9 % (ref 2.3–13.4)
LYMPHOCYTES # BLD: 3.3 K/UL (ref 0.5–4.6)
LYMPHOCYTES NFR BLD: 26 % (ref 13–44)
MCH RBC QN AUTO: 35.9 PG (ref 26.1–32.9)
MCHC RBC AUTO-ENTMCNC: 34.5 G/DL (ref 31.4–35)
MCV RBC AUTO: 104.1 FL (ref 79.6–97.8)
MONOCYTES # BLD: 1.2 K/UL (ref 0.1–1.3)
MONOCYTES NFR BLD: 10 % (ref 4–12)
NEUTS SEG # BLD: 7.9 K/UL (ref 1.7–8.2)
NEUTS SEG NFR BLD: 63 % (ref 43–78)
NRBC # BLD: 0.36 K/UL (ref 0–0.2)
PLATELET # BLD AUTO: 250 K/UL (ref 150–450)
PMV BLD AUTO: 10.2 FL (ref 9.4–12.3)
POTASSIUM SERPL-SCNC: 3.8 MMOL/L (ref 3.5–5.1)
PROT SERPL-MCNC: 8.3 G/DL (ref 6.3–8.2)
RBC # BLD AUTO: 1.95 M/UL (ref 4.23–5.6)
RETICS # AUTO: 0.16 M/UL (ref 0.03–0.1)
RETICS/RBC NFR AUTO: 8.1 % (ref 0.3–2)
SODIUM SERPL-SCNC: 138 MMOL/L (ref 136–145)
WBC # BLD AUTO: 12.5 K/UL (ref 4.3–11.1)

## 2018-12-06 PROCEDURE — 86920 COMPATIBILITY TEST SPIN: CPT

## 2018-12-06 PROCEDURE — 96374 THER/PROPH/DIAG INJ IV PUSH: CPT | Performed by: EMERGENCY MEDICINE

## 2018-12-06 PROCEDURE — 77030020263 HC SOL INJ SOD CL0.9% LFCR 1000ML

## 2018-12-06 PROCEDURE — 74011250637 HC RX REV CODE- 250/637: Performed by: EMERGENCY MEDICINE

## 2018-12-06 PROCEDURE — 36415 COLL VENOUS BLD VENIPUNCTURE: CPT

## 2018-12-06 PROCEDURE — 96375 TX/PRO/DX INJ NEW DRUG ADDON: CPT | Performed by: EMERGENCY MEDICINE

## 2018-12-06 PROCEDURE — 86901 BLOOD TYPING SEROLOGIC RH(D): CPT

## 2018-12-06 PROCEDURE — 85046 RETICYTE/HGB CONCENTRATE: CPT

## 2018-12-06 PROCEDURE — 80053 COMPREHEN METABOLIC PANEL: CPT

## 2018-12-06 PROCEDURE — 74011250636 HC RX REV CODE- 250/636: Performed by: EMERGENCY MEDICINE

## 2018-12-06 PROCEDURE — 96376 TX/PRO/DX INJ SAME DRUG ADON: CPT | Performed by: EMERGENCY MEDICINE

## 2018-12-06 PROCEDURE — 74011250637 HC RX REV CODE- 250/637: Performed by: INTERNAL MEDICINE

## 2018-12-06 PROCEDURE — 85018 HEMOGLOBIN: CPT

## 2018-12-06 PROCEDURE — 82962 GLUCOSE BLOOD TEST: CPT

## 2018-12-06 PROCEDURE — 99284 EMERGENCY DEPT VISIT MOD MDM: CPT | Performed by: EMERGENCY MEDICINE

## 2018-12-06 PROCEDURE — 74011000250 HC RX REV CODE- 250: Performed by: EMERGENCY MEDICINE

## 2018-12-06 PROCEDURE — 65270000029 HC RM PRIVATE

## 2018-12-06 PROCEDURE — 85025 COMPLETE CBC W/AUTO DIFF WBC: CPT

## 2018-12-06 PROCEDURE — 74011250636 HC RX REV CODE- 250/636: Performed by: INTERNAL MEDICINE

## 2018-12-06 RX ORDER — DIPHENHYDRAMINE HCL 25 MG
25 CAPSULE ORAL
Status: DISCONTINUED | OUTPATIENT
Start: 2018-12-06 | End: 2018-12-10 | Stop reason: HOSPADM

## 2018-12-06 RX ORDER — SODIUM CHLORIDE 9 MG/ML
125 INJECTION, SOLUTION INTRAVENOUS CONTINUOUS
Status: DISCONTINUED | OUTPATIENT
Start: 2018-12-06 | End: 2018-12-07

## 2018-12-06 RX ORDER — METOPROLOL TARTRATE 25 MG/1
12.5 TABLET, FILM COATED ORAL 2 TIMES DAILY
Status: DISCONTINUED | OUTPATIENT
Start: 2018-12-06 | End: 2018-12-10 | Stop reason: HOSPADM

## 2018-12-06 RX ORDER — HYDROMORPHONE HYDROCHLORIDE 1 MG/ML
1 INJECTION, SOLUTION INTRAMUSCULAR; INTRAVENOUS; SUBCUTANEOUS ONCE
Status: COMPLETED | OUTPATIENT
Start: 2018-12-06 | End: 2018-12-06

## 2018-12-06 RX ORDER — LISINOPRIL 5 MG/1
5 TABLET ORAL DAILY
Status: DISCONTINUED | OUTPATIENT
Start: 2018-12-07 | End: 2018-12-10 | Stop reason: HOSPADM

## 2018-12-06 RX ORDER — HYDROMORPHONE HYDROCHLORIDE 1 MG/ML
1 INJECTION, SOLUTION INTRAMUSCULAR; INTRAVENOUS; SUBCUTANEOUS
Status: DISCONTINUED | OUTPATIENT
Start: 2018-12-06 | End: 2018-12-10 | Stop reason: HOSPADM

## 2018-12-06 RX ORDER — SODIUM CHLORIDE 9 MG/ML
250 INJECTION, SOLUTION INTRAVENOUS AS NEEDED
Status: DISCONTINUED | OUTPATIENT
Start: 2018-12-06 | End: 2018-12-10 | Stop reason: HOSPADM

## 2018-12-06 RX ORDER — LORAZEPAM 2 MG/ML
1 INJECTION INTRAMUSCULAR
Status: DISCONTINUED | OUTPATIENT
Start: 2018-12-06 | End: 2018-12-10 | Stop reason: HOSPADM

## 2018-12-06 RX ORDER — SODIUM CHLORIDE 0.9 % (FLUSH) 0.9 %
5-10 SYRINGE (ML) INJECTION AS NEEDED
Status: DISCONTINUED | OUTPATIENT
Start: 2018-12-06 | End: 2018-12-10 | Stop reason: HOSPADM

## 2018-12-06 RX ORDER — OXYCODONE HYDROCHLORIDE 15 MG/1
30 TABLET ORAL
Status: DISCONTINUED | OUTPATIENT
Start: 2018-12-06 | End: 2018-12-08

## 2018-12-06 RX ORDER — DIPHENHYDRAMINE HYDROCHLORIDE 50 MG/ML
25 INJECTION, SOLUTION INTRAMUSCULAR; INTRAVENOUS
Status: DISCONTINUED | OUTPATIENT
Start: 2018-12-06 | End: 2018-12-06

## 2018-12-06 RX ORDER — DEFERASIROX 360 MG/1
1800 TABLET, FILM COATED ORAL DAILY
Status: DISCONTINUED | OUTPATIENT
Start: 2018-12-07 | End: 2018-12-10 | Stop reason: HOSPADM

## 2018-12-06 RX ORDER — ONDANSETRON 2 MG/ML
4 INJECTION INTRAMUSCULAR; INTRAVENOUS
Status: CANCELLED | OUTPATIENT
Start: 2018-12-06

## 2018-12-06 RX ORDER — HYDROXYUREA 500 MG/1
1000 CAPSULE ORAL DAILY
Status: DISCONTINUED | OUTPATIENT
Start: 2018-12-07 | End: 2018-12-10 | Stop reason: HOSPADM

## 2018-12-06 RX ORDER — ACETAMINOPHEN 325 MG/1
650 TABLET ORAL
Status: DISCONTINUED | OUTPATIENT
Start: 2018-12-06 | End: 2018-12-10 | Stop reason: HOSPADM

## 2018-12-06 RX ORDER — INSULIN LISPRO 100 [IU]/ML
0-10 INJECTION, SOLUTION INTRAVENOUS; SUBCUTANEOUS
Status: DISCONTINUED | OUTPATIENT
Start: 2018-12-06 | End: 2018-12-10 | Stop reason: HOSPADM

## 2018-12-06 RX ORDER — DIPHENHYDRAMINE HCL 25 MG
25 CAPSULE ORAL
Status: COMPLETED | OUTPATIENT
Start: 2018-12-06 | End: 2018-12-06

## 2018-12-06 RX ORDER — FOLIC ACID 1 MG/1
1 TABLET ORAL DAILY
Status: DISCONTINUED | OUTPATIENT
Start: 2018-12-07 | End: 2018-12-10 | Stop reason: HOSPADM

## 2018-12-06 RX ORDER — SODIUM CHLORIDE 0.9 % (FLUSH) 0.9 %
5-10 SYRINGE (ML) INJECTION EVERY 8 HOURS
Status: DISCONTINUED | OUTPATIENT
Start: 2018-12-06 | End: 2018-12-10 | Stop reason: HOSPADM

## 2018-12-06 RX ADMIN — METOPROLOL TARTRATE 12.5 MG: 25 TABLET ORAL at 17:49

## 2018-12-06 RX ADMIN — Medication 1 AMPULE: at 20:58

## 2018-12-06 RX ADMIN — DIPHENHYDRAMINE HYDROCHLORIDE 25 MG: 25 CAPSULE ORAL at 15:01

## 2018-12-06 RX ADMIN — PROMETHAZINE HYDROCHLORIDE 12.5 MG: 25 INJECTION INTRAMUSCULAR; INTRAVENOUS at 15:02

## 2018-12-06 RX ADMIN — Medication 10 ML: at 21:06

## 2018-12-06 RX ADMIN — HYDROMORPHONE HYDROCHLORIDE 1 MG: 1 INJECTION, SOLUTION INTRAMUSCULAR; INTRAVENOUS; SUBCUTANEOUS at 17:48

## 2018-12-06 RX ADMIN — HYDROMORPHONE HYDROCHLORIDE 1 MG: 1 INJECTION, SOLUTION INTRAMUSCULAR; INTRAVENOUS; SUBCUTANEOUS at 21:48

## 2018-12-06 RX ADMIN — HYDROMORPHONE HYDROCHLORIDE 1 MG: 1 INJECTION, SOLUTION INTRAMUSCULAR; INTRAVENOUS; SUBCUTANEOUS at 15:48

## 2018-12-06 RX ADMIN — OXYCODONE HYDROCHLORIDE 30 MG: 15 TABLET ORAL at 18:38

## 2018-12-06 RX ADMIN — HYDROMORPHONE HYDROCHLORIDE 1 MG: 1 INJECTION, SOLUTION INTRAMUSCULAR; INTRAVENOUS; SUBCUTANEOUS at 15:02

## 2018-12-06 RX ADMIN — Medication 10 ML: at 17:49

## 2018-12-06 RX ADMIN — SODIUM CHLORIDE 125 ML/HR: 900 INJECTION, SOLUTION INTRAVENOUS at 17:44

## 2018-12-06 RX ADMIN — SODIUM CHLORIDE 1000 ML: 900 INJECTION, SOLUTION INTRAVENOUS at 15:50

## 2018-12-06 RX ADMIN — PROMETHAZINE HYDROCHLORIDE 12.5 MG: 25 INJECTION INTRAMUSCULAR; INTRAVENOUS at 15:49

## 2018-12-06 NOTE — ED TRIAGE NOTES
Patient reports a sickle cell crisis with nausea. Symptoms started 2 days ago. Has port, declines IV stick in triage

## 2018-12-06 NOTE — H&P
Hospitalist H&P Note Admit Date:  2018  2:07 PM  
Name:  Malinda Canales Age:  37 y.o. 
:  1975 MRN:  [id number] PCP:  Dylan Cohen MD 
Treatment Team: Primary Nurse: Kayode Mcguire RN 
 
HPI:  
 
Pt is a 36 yo male with pmh sickle cell disease who follows with Dr. Lonny Segura at [facility] who presents to ER this afternoon with sickle cell pain crisis. Pt reports worsening bilat leg pain x 1 week despite IV fluids at hematology office earlier this week. Hgb 7.0. He was started on IV fluids and Dilaudid. He denies CP or SOB. 10 systems reviewed and negative except as noted in HPI. Past Medical History:  
Diagnosis Date  Chronic pain  HTN (hypertension)  Ill-defined condition   
 sickle cell  Iron overload due to repeated red blood cell transfusions 2017  Paroxysmal SVT (supraventricular tachycardia) ([facility] [address] 2016  Sickle cell disease ([facility] [address] Past Surgical History:  
Procedure Laterality Date  HC PENILE IMPL DURA II POSITINBLE    
 HC PORT LIFE SNGLE LUMEN 5013    
 to L CW  
 HX CHOLECYSTECTOMY  HX OTHER SURGICAL    
 penile inplant  HX VASCULAR ACCESS Allergies Allergen Reactions  Compazine [Prochlorperazine Edisylate] Other (comments) Also makes him feel funny  Morphine Other (comments) Makes him feel funny  Reglan [Metoclopramide] Other (comments) \"feel funny\"  Zofran [Ondansetron Hcl (Pf)] Other (comments) Make him feel funny Social History Tobacco Use  Smoking status: Never Smoker  Smokeless tobacco: Never Used Substance Use Topics  Alcohol use: No  
  Alcohol/week: 0.0 oz Family History Problem Relation Age of Onset  Hypertension Other  Diabetes Father  Stroke Father  Sickle Cell Anemia Sister Immunization History Administered Date(s) Administered  Influenza Vaccine 09/10/2015  Influenza Vaccine Split 2012  ZZZ-RETIRED (DO NOT USE) Pneumococcal Vaccine (Unspecified Type) 09/21/2010 PTA Medications: 
Prior to Admission Medications Prescriptions Last Dose Informant Patient Reported? Taking? Blood-Glucose Meter monitoring kit   No No  
Sig: Check blood glucose qac Lancets misc   No No  
Sig: Check BG qac. Dx new onset DM2  
deferasirox (JADENU) 360 mg tab   No No  
Sig: Take 5 Tabs by mouth daily. folic acid (FOLVITE) 1 mg tablet  Self Yes No  
Sig: Take 1 mg by mouth daily. glucose blood VI test strips (ASCENSIA AUTODISC VI, ONE TOUCH ULTRA TEST VI) strip   No No  
Sig: Check BG qac. Dx new onset DM2  
hydroxyurea (HYDREA) 500 mg capsule   No No  
Sig: Take 1 Cap by mouth two (2) times a day. Takes in am at 0900. Star after follow up with your PCP. Patient taking differently: Take 1,000 mg by mouth daily. Takes in am at 0900. Star after follow up with your PCP. lisinopril (PRINIVIL, ZESTRIL) 5 mg tablet   Yes No  
Sig: Take 5 mg by mouth daily. Indications: HYPERTENSION  
magnesium oxide (MAG-OX) 400 mg tablet   No No  
Sig: Take 1 Tab by mouth daily. metFORMIN (GLUCOPHAGE) 500 mg tablet   No No  
Sig: Take 1 Tab by mouth daily (with breakfast). metoprolol (LOPRESSOR) 25 mg tablet   Yes No  
Sig: Take 12.5 mg by mouth two (2) times a day. Indications: hypertension  
oxyCODONE ER (OXYCONTIN) 80 mg ER tablet   No No  
Sig: Take 1 Tab by mouth every twelve (12) hours. Max Daily Amount: 160 mg.  
oxyCODONE IR (ROXICODONE) 30 mg immediate release tablet   No No  
Sig: Take 1 Tab by mouth every four (4) hours as needed. Max Daily Amount: 180 mg.  
promethazine (PHENERGAN) 25 mg tablet   No No  
Sig: Take 1 Tab by mouth every six (6) hours as needed. May substitute suppository if vomiting Facility-Administered Medications: None Objective:  
 
Patient Vitals for the past 24 hrs: 
 Temp Pulse Resp BP SpO2  
12/06/18 1620  87  119/74 99 % 12/06/18 1600  81  125/71 96 % 12/06/18 1541  79  119/72 98 % 12/06/18 1520  78  118/72 98 % 12/06/18 1500  85  139/82 99 % 12/06/18 1441  66  143/79 100 % 12/06/18 1420  73  134/67 100 % 12/06/18 1413  87  149/71 (!) 88 % 12/06/18 1308 98.9 °F (37.2 °C) 68 16 155/79 95 % Oxygen Therapy O2 Sat (%): 99 % (12/06/18 1620) Pulse via Oximetry: 87 beats per minute (12/06/18 1620) O2 Device: Room air (12/06/18 1308) No intake or output data in the 24 hours ending 12/06/18 1652 Physical Exam: 
General:    Well nourished. Alert. Eyes:   Normal sclera. Extraocular movements intact. ENT:  Normocephalic, atraumatic. Moist mucous membranes CV:   RRR. No m/r/g. Peripheral pulses 2+. Capillary refill <2s. Lungs:  CTAB. No wheezing, rhonchi, or rales. Abdomen: Soft, nontender, nondistended. Extremities: Warm and dry. No cyanosis or edema. Neurologic: CN II-XII grossly intact. Sensation intact. Skin:     No rashes or jaundice. Normal coloration Psych:  Normal mood and affect. I reviewed the labs, imaging, EKGs, telemetry, and other studies done this admission. Data Review:  
Recent Results (from the past 24 hour(s)) CBC WITH AUTOMATED DIFF Collection Time: 12/06/18  2:41 PM  
Result Value Ref Range WBC 12.5 (H) 4.3 - 11.1 K/uL  
 RBC 1.95 (L) 4.23 - 5.6 M/uL HGB 7.0 (L) 13.6 - 17.2 g/dL HCT 21.1 (L) 41.1 - 50.3 % .1 (H) 79.6 - 97.8 FL  
 MCH 35.9 (H) 26.1 - 32.9 PG  
 MCHC 34.5 31.4 - 35.0 g/dL RDW 28.0 (H) 11.9 - 14.6 % PLATELET 983 473 - 084 K/uL MPV 10.2 9.4 - 12.3 FL ABSOLUTE NRBC 0.36 (H) 0.0 - 0.2 K/uL  
 DF AUTOMATED NEUTROPHILS 63 43 - 78 % LYMPHOCYTES 26 13 - 44 % MONOCYTES 10 4.0 - 12.0 % EOSINOPHILS 0 (L) 0.5 - 7.8 % BASOPHILS 0 0.0 - 2.0 % IMMATURE GRANULOCYTES 0 0.0 - 5.0 %  
 ABS. NEUTROPHILS 7.9 1.7 - 8.2 K/UL  
 ABS. LYMPHOCYTES 3.3 0.5 - 4.6 K/UL  
 ABS. MONOCYTES 1.2 0.1 - 1.3 K/UL  
 ABS. EOSINOPHILS 0.1 0.0 - 0.8 K/UL ABS. BASOPHILS 0.0 0.0 - 0.2 K/UL  
 ABS. IMM. GRANS. 0.1 0.0 - 0.5 K/UL METABOLIC PANEL, COMPREHENSIVE Collection Time: 12/06/18  2:41 PM  
Result Value Ref Range Sodium 138 136 - 145 mmol/L Potassium 3.8 3.5 - 5.1 mmol/L Chloride 107 98 - 107 mmol/L  
 CO2 24 21 - 32 mmol/L Anion gap 7 7 - 16 mmol/L Glucose 106 (H) 65 - 100 mg/dL BUN 10 6 - 23 MG/DL Creatinine 0.88 0.8 - 1.5 MG/DL  
 GFR est AA >60 >60 ml/min/1.73m2 GFR est non-AA >60 >60 ml/min/1.73m2 Calcium 9.2 8.3 - 10.4 MG/DL Bilirubin, total 1.3 (H) 0.2 - 1.1 MG/DL  
 ALT (SGPT) 51 12 - 65 U/L  
 AST (SGOT) 37 15 - 37 U/L Alk. phosphatase 72 50 - 136 U/L Protein, total 8.3 (H) 6.3 - 8.2 g/dL Albumin 3.9 3.5 - 5.0 g/dL Globulin 4.4 (H) 2.3 - 3.5 g/dL A-G Ratio 0.9 (L) 1.2 - 3.5 RETICULOCYTE COUNT Collection Time: 12/06/18  2:41 PM  
Result Value Ref Range Reticulocyte count 8.1 (H) 0.3 - 2.0 % Absolute Retic Cnt. 0.1580 (H) 0.026 - 0.095 M/ul Immature Retic Fraction 30.9 (H) 2.3 - 13.4 % Retic Hgb Conc. 43 (H) 29 - 35 pg All Micro Results None No current facility-administered medications for this encounter. Current Outpatient Medications Medication Sig  
 metFORMIN (GLUCOPHAGE) 500 mg tablet Take 1 Tab by mouth daily (with breakfast).  Blood-Glucose Meter monitoring kit Check blood glucose qac  glucose blood VI test strips (ASCENSIA AUTODISC VI, ONE TOUCH ULTRA TEST VI) strip Check BG qac. Dx new onset DM2  Lancets misc Check BG qac. Dx new onset DM2  
 oxyCODONE ER (OXYCONTIN) 80 mg ER tablet Take 1 Tab by mouth every twelve (12) hours. Max Daily Amount: 160 mg.  
 oxyCODONE IR (ROXICODONE) 30 mg immediate release tablet Take 1 Tab by mouth every four (4) hours as needed. Max Daily Amount: 180 mg.  
 hydroxyurea (HYDREA) 500 mg capsule Take 1 Cap by mouth two (2) times a day. Takes in am at 0900. Star after follow up with your PCP.  (Patient taking differently: Take 1,000 mg by mouth daily. Takes in am at 0900. Star after follow up with your PCP.)  magnesium oxide (MAG-OX) 400 mg tablet Take 1 Tab by mouth daily.  deferasirox (JADENU) 360 mg tab Take 5 Tabs by mouth daily.  promethazine (PHENERGAN) 25 mg tablet Take 1 Tab by mouth every six (6) hours as needed. May substitute suppository if vomiting  metoprolol (LOPRESSOR) 25 mg tablet Take 12.5 mg by mouth two (2) times a day. Indications: hypertension  lisinopril (PRINIVIL, ZESTRIL) 5 mg tablet Take 5 mg by mouth daily. Indications: HYPERTENSION  folic acid (FOLVITE) 1 mg tablet Take 1 mg by mouth daily. Other Studies: No results found. Assessment and Plan:  
 
Hospital Problems as of 12/6/2018 Date Reviewed: 7/22/2018 Codes Class Noted - Resolved POA [facility] ICD-10-CM: D57.00 ICD-9-CM: 282.62  10/25/2012 - Present Unknown Plan: 
 
Sickle cell pain crisis- Cont folic acid, hydrea. Follow H&H daily, no need for transfusion currently. NS @ 125, cont home regimen Oxycontin 20 mg q 12 hrs, PRN Dilaudid, Ativan, Phenergan. Nursing to update med list (per last [facility] note pt not taking jadenu or extended release oxy). Consult oncology tomorrow. DM- Hold metformin while inpatient, plan to resume on d/c  
 
DC planning- [facility] in a few days when medically stable DVT ppx: SCDs Code status:  Full Estimated LOS:  Greater than 2 midnights Risk:  high Signed: 
Rosalind Barboza NP

## 2018-12-06 NOTE — ED PROVIDER NOTES
15-year-old male with history of Hb SS presents with complaint of sickle cell pain crisis that began around 2 days ago. States she's been taking his oxycodone at home with no improvement of symptoms. States pain is localized to bilateral legs. States that this is typical for his sickle cell pain crises. Denies chest pain, shortness of breath, fever, chills, leg swelling, nausea, vomiting, numbness, tingling or weakness. The history is provided by the patient. No  was used. Leg Pain This is a new problem. The current episode started 2 days ago. The problem occurs constantly. The problem has not changed since onset. The pain is present in the left lower leg, left upper leg, right upper leg and right lower leg. The quality of the pain is described as sharp. The pain is at a severity of 5/10. The pain is moderate. Pertinent negatives include no numbness, full range of motion, no stiffness, no tingling, no itching, no back pain and no neck pain. He has tried nothing for the symptoms. There has been no history of extremity trauma. Past Medical History:  
Diagnosis Date  Chronic pain  HTN (hypertension)  Ill-defined condition   
 sickle cell  Iron overload due to repeated red blood cell transfusions 1/18/2017  Paroxysmal SVT (supraventricular tachycardia) ([facility] [address] 7/9/2016  Sickle cell disease ([facility] [address] Past Surgical History:  
Procedure Laterality Date  HC PENILE IMPL DURA II POSITINBLE    
 HC PORT LIFE SNGLE LUMEN 5013    
 to L CW  
 HX CHOLECYSTECTOMY  HX OTHER SURGICAL    
 penile inplant  HX VASCULAR ACCESS Family History:  
Problem Relation Age of Onset  Hypertension Other  Diabetes Father  Stroke Father  Sickle Cell Anemia Sister Social History Socioeconomic History  Marital status:  Spouse name: Not on file  Number of children: Not on file  Years of education: Not on file  Highest education level: Not on file Social Needs  Financial resource strain: Not on file  Food insecurity - worry: Not on file  Food insecurity - inability: Not on file  Transportation needs - medical: Not on file  Transportation needs - non-medical: Not on file Occupational History  Not on file Tobacco Use  Smoking status: Never Smoker  Smokeless tobacco: Never Used Substance and Sexual Activity  Alcohol use: No  
  Alcohol/week: 0.0 oz  Drug use: No  
 Sexual activity: Yes Other Topics Concern  Not on file Social History Narrative  Not on file ALLERGIES: Compazine [prochlorperazine edisylate]; Morphine; Reglan [metoclopramide]; and Zofran [ondansetron hcl (pf)] Review of Systems Constitutional: Negative for chills, fatigue and fever. Respiratory: Negative for cough and shortness of breath. Cardiovascular: Negative for chest pain and leg swelling. Gastrointestinal: Negative for abdominal pain, nausea and vomiting. Musculoskeletal: Positive for myalgias. Negative for back pain, neck pain, neck stiffness and stiffness. Skin: Negative for itching, rash and wound. Neurological: Negative for dizziness, tingling, weakness, light-headedness and numbness. Psychiatric/Behavioral: Negative for confusion. Vitals:  
 12/06/18 1308 BP: 155/79 Pulse: 68 Resp: 16 Temp: 98.9 °F (37.2 °C) SpO2: 95% Weight: 76.7 kg (169 lb) Height: 5' 10\" (1.778 m) Physical Exam  
Constitutional: He is oriented to person, place, and time. He appears well-developed. Patient well-appearing and in no acute distress. HENT:  
Head: Normocephalic. MMM. Neck: Neck supple. No JVD present. Cardiovascular: Normal rate, regular rhythm and normal heart sounds. Radial pulses 2+ and equal bilaterally. Pulmonary/Chest: Effort normal.  
CTAB. Abdominal: Soft. Bowel sounds are normal.  
Soft, NTND. Musculoskeletal: Normal range of motion. He exhibits no edema. No LE edema or calf TTP. Neurological: He is alert and oriented to person, place, and time. No cranial nerve deficit or sensory deficit. Strength 5/5 throughout. Normal sensory exam.   
Skin: Skin is warm and dry. No rash. Psychiatric: He has a normal mood and affect. Nursing note and vitals reviewed. MDM Number of Diagnoses or Management Options Sickle cell pain crisis [facility]): new and requires workup Diagnosis management comments: 75-year-old male with history of Hb SS presents with complaint of 6 a pain crisis. Patient anemic at baseline. Hemoglobin 7. Patient given multiple rounds of pain  Medication, Phenergan, Benadryl. Patient states that after 2 rounds of pain meds that his pain is not controlled and that he will require admission. Hospitalist consulted for admission. Amount and/or Complexity of Data Reviewed Clinical lab tests: ordered and reviewed Tests in the medicine section of CPT®: ordered and reviewed Review and summarize past medical records: yes Risk of Complications, Morbidity, and/or Mortality Presenting problems: moderate Diagnostic procedures: moderate Management options: moderate Patient Progress Patient progress: stable Procedures Results Include: 
 
Recent Results (from the past 24 hour(s)) CBC WITH AUTOMATED DIFF Collection Time: 12/06/18  2:41 PM  
Result Value Ref Range WBC 12.5 (H) 4.3 - 11.1 K/uL  
 RBC 1.95 (L) 4.23 - 5.6 M/uL HGB 7.0 (L) 13.6 - 17.2 g/dL HCT 21.1 (L) 41.1 - 50.3 % .1 (H) 79.6 - 97.8 FL  
 MCH 35.9 (H) 26.1 - 32.9 PG  
 MCHC 34.5 31.4 - 35.0 g/dL RDW 28.0 (H) 11.9 - 14.6 % PLATELET 692 557 - 344 K/uL MPV 10.2 9.4 - 12.3 FL ABSOLUTE NRBC 0.36 (H) 0.0 - 0.2 K/uL  
 DF AUTOMATED NEUTROPHILS 63 43 - 78 % LYMPHOCYTES 26 13 - 44 % MONOCYTES 10 4.0 - 12.0 % EOSINOPHILS 0 (L) 0.5 - 7.8 % BASOPHILS 0 0.0 - 2.0 % IMMATURE GRANULOCYTES 0 0.0 - 5.0 %  
 ABS. NEUTROPHILS 7.9 1.7 - 8.2 K/UL  
 ABS. LYMPHOCYTES 3.3 0.5 - 4.6 K/UL  
 ABS. MONOCYTES 1.2 0.1 - 1.3 K/UL  
 ABS. EOSINOPHILS 0.1 0.0 - 0.8 K/UL  
 ABS. BASOPHILS 0.0 0.0 - 0.2 K/UL  
 ABS. IMM. GRANS. 0.1 0.0 - 0.5 K/UL METABOLIC PANEL, COMPREHENSIVE Collection Time: 12/06/18  2:41 PM  
Result Value Ref Range Sodium 138 136 - 145 mmol/L Potassium 3.8 3.5 - 5.1 mmol/L Chloride 107 98 - 107 mmol/L  
 CO2 24 21 - 32 mmol/L Anion gap 7 7 - 16 mmol/L Glucose 106 (H) 65 - 100 mg/dL BUN 10 6 - 23 MG/DL Creatinine 0.88 0.8 - 1.5 MG/DL  
 GFR est AA >60 >60 ml/min/1.73m2 GFR est non-AA >60 >60 ml/min/1.73m2 Calcium 9.2 8.3 - 10.4 MG/DL Bilirubin, total 1.3 (H) 0.2 - 1.1 MG/DL  
 ALT (SGPT) 51 12 - 65 U/L  
 AST (SGOT) 37 15 - 37 U/L Alk. phosphatase 72 50 - 136 U/L Protein, total 8.3 (H) 6.3 - 8.2 g/dL Albumin 3.9 3.5 - 5.0 g/dL Globulin 4.4 (H) 2.3 - 3.5 g/dL A-G Ratio 0.9 (L) 1.2 - 3.5 RETICULOCYTE COUNT Collection Time: 12/06/18  2:41 PM  
Result Value Ref Range Reticulocyte count 8.1 (H) 0.3 - 2.0 % Absolute Retic Cnt. 0.1580 (H) 0.026 - 0.095 M/ul Immature Retic Fraction 30.9 (H) 2.3 - 13.4 % Retic Hgb Conc. 43 (H) 29 - 35 pg  
 
 
 
 Pavel Gibson MD; 12/6/2018 @3:00 PM Voice dictation software was used during the making of this note. This software is not perfect and grammatical and other typographical errors may be present.   This note has not been proofread for errors. 
===================================================================

## 2018-12-06 NOTE — PROGRESS NOTES
Primary Nurse Valery Arevalo and Aleksandr Jacob RN performed a dual skin assessment on this patient No impairment noted Evaristo score is 23 
 
 
 12/06/18 1719 Skin Integumentary Skin Integumentary (WDL) WDL Skin Color Appropriate for ethnicity Skin Condition/Temp Warm Skin Integrity Scars (comment) (old port scars on right/left upper chest/all healed)

## 2018-12-06 NOTE — ROUTINE PROCESS
TRANSFER - OUT REPORT: 
 
Verbal report given to Whitney Laughlin, rn(name) on Vivian Bahena  being transferred to [facility](unit) for routine progression of care Report consisted of patients Situation, Background, Assessment and  
Recommendations(SBAR). Information from the following report(s) SBAR was reviewed with the receiving nurse. Lines:    
 
Opportunity for questions and clarification was provided. Patient transported with: 
 [employer]

## 2018-12-06 NOTE — PROGRESS NOTES
Primary Nurse Meka Wilcox and Noah Mark, ANYI performed a dual skin assessment on this patient No impairment noted Evaristo score is 23 
 
 
 12/06/18 1719 Skin Integumentary Skin Integumentary (WDL) WDL Skin Color Appropriate for ethnicity Skin Condition/Temp Warm Skin Integrity Scars (comment) (old port scars on right/left upper chest/all healed)

## 2018-12-06 NOTE — PROGRESS NOTES
TRANSFER - IN REPORT: 
 
Verbal report received from Trevor Brasher, [facility] on Delia Cerna  being received from ED for routine progression of care Report consisted of patients Situation, Background, Assessment and  
Recommendations(SBAR). Information from the following report(s) SBAR, ED Summary, [facility] and Recent Results was reviewed with the receiving nurse. Opportunity for questions and clarification was provided. Assessment completed upon patients arrival to unit and care assumed.

## 2018-12-07 LAB
ANION GAP SERPL CALC-SCNC: 6 MMOL/L (ref 7–16)
BUN SERPL-MCNC: 8 MG/DL (ref 6–23)
CALCIUM SERPL-MCNC: 8 MG/DL (ref 8.3–10.4)
CHLORIDE SERPL-SCNC: 107 MMOL/L (ref 98–107)
CO2 SERPL-SCNC: 26 MMOL/L (ref 21–32)
CREAT SERPL-MCNC: 0.75 MG/DL (ref 0.8–1.5)
ERYTHROCYTE [DISTWIDTH] IN BLOOD BY AUTOMATED COUNT: 27.6 % (ref 11.9–14.6)
GLUCOSE BLD STRIP.AUTO-MCNC: 107 MG/DL (ref 65–100)
GLUCOSE BLD STRIP.AUTO-MCNC: 126 MG/DL (ref 65–100)
GLUCOSE BLD STRIP.AUTO-MCNC: 146 MG/DL (ref 65–100)
GLUCOSE BLD STRIP.AUTO-MCNC: 150 MG/DL (ref 65–100)
GLUCOSE SERPL-MCNC: 111 MG/DL (ref 65–100)
HCT VFR BLD AUTO: 16.9 % (ref 41.1–50.3)
HCT VFR BLD AUTO: 22.8 % (ref 41.1–50.3)
HGB BLD-MCNC: 5.8 G/DL (ref 13.6–17.2)
HGB BLD-MCNC: 7.8 G/DL (ref 13.6–17.2)
MCH RBC QN AUTO: 35.6 PG (ref 26.1–32.9)
MCHC RBC AUTO-ENTMCNC: 34.3 G/DL (ref 31.4–35)
MCV RBC AUTO: 103.7 FL (ref 79.6–97.8)
NRBC # BLD: 0.43 K/UL (ref 0–0.2)
PLATELET # BLD AUTO: 201 K/UL (ref 150–450)
PMV BLD AUTO: 10.4 FL (ref 9.4–12.3)
POTASSIUM SERPL-SCNC: 4 MMOL/L (ref 3.5–5.1)
RBC # BLD AUTO: 1.63 M/UL (ref 4.23–5.6)
SODIUM SERPL-SCNC: 139 MMOL/L (ref 136–145)
WBC # BLD AUTO: 12.9 K/UL (ref 4.3–11.1)

## 2018-12-07 PROCEDURE — 74011250636 HC RX REV CODE- 250/636: Performed by: INTERNAL MEDICINE

## 2018-12-07 PROCEDURE — 77030020263 HC SOL INJ SOD CL0.9% LFCR 1000ML

## 2018-12-07 PROCEDURE — 74011000250 HC RX REV CODE- 250: Performed by: INTERNAL MEDICINE

## 2018-12-07 PROCEDURE — 74011250637 HC RX REV CODE- 250/637: Performed by: INTERNAL MEDICINE

## 2018-12-07 PROCEDURE — 30233N1 TRANSFUSION OF NONAUTOLOGOUS RED BLOOD CELLS INTO PERIPHERAL VEIN, PERCUTANEOUS APPROACH: ICD-10-PCS | Performed by: INTERNAL MEDICINE

## 2018-12-07 PROCEDURE — 65270000029 HC RM PRIVATE

## 2018-12-07 PROCEDURE — 99222 1ST HOSP IP/OBS MODERATE 55: CPT | Performed by: INTERNAL MEDICINE

## 2018-12-07 PROCEDURE — P9040 RBC LEUKOREDUCED IRRADIATED: HCPCS

## 2018-12-07 PROCEDURE — 82962 GLUCOSE BLOOD TEST: CPT

## 2018-12-07 PROCEDURE — 74011000258 HC RX REV CODE- 258: Performed by: NURSE PRACTITIONER

## 2018-12-07 PROCEDURE — 77030039270 HC TU BLD FLTR CARD -A

## 2018-12-07 PROCEDURE — 77030020253 HC SOL INJ D545NS .05 DEX .45 SAL

## 2018-12-07 PROCEDURE — 36430 TRANSFUSION BLD/BLD COMPNT: CPT

## 2018-12-07 PROCEDURE — 86902 BLOOD TYPE ANTIGEN DONOR EA: CPT

## 2018-12-07 PROCEDURE — 85027 COMPLETE CBC AUTOMATED: CPT

## 2018-12-07 PROCEDURE — 85018 HEMOGLOBIN: CPT

## 2018-12-07 PROCEDURE — 86644 CMV ANTIBODY: CPT

## 2018-12-07 PROCEDURE — 80048 BASIC METABOLIC PNL TOTAL CA: CPT

## 2018-12-07 RX ORDER — SODIUM CHLORIDE 9 MG/ML
250 INJECTION, SOLUTION INTRAVENOUS AS NEEDED
Status: DISCONTINUED | OUTPATIENT
Start: 2018-12-07 | End: 2018-12-10 | Stop reason: HOSPADM

## 2018-12-07 RX ORDER — DEXTROSE MONOHYDRATE AND SODIUM CHLORIDE 5; .45 G/100ML; G/100ML
100 INJECTION, SOLUTION INTRAVENOUS CONTINUOUS
Status: DISCONTINUED | OUTPATIENT
Start: 2018-12-07 | End: 2018-12-10 | Stop reason: HOSPADM

## 2018-12-07 RX ADMIN — DEXTROSE MONOHYDRATE AND SODIUM CHLORIDE 150 ML/HR: 5; .45 INJECTION, SOLUTION INTRAVENOUS at 09:45

## 2018-12-07 RX ADMIN — ACETAMINOPHEN 650 MG: 325 TABLET, FILM COATED ORAL at 04:12

## 2018-12-07 RX ADMIN — METOPROLOL TARTRATE 12.5 MG: 25 TABLET ORAL at 08:02

## 2018-12-07 RX ADMIN — Medication 1 AMPULE: at 08:02

## 2018-12-07 RX ADMIN — HYDROMORPHONE HYDROCHLORIDE 1 MG: 1 INJECTION, SOLUTION INTRAMUSCULAR; INTRAVENOUS; SUBCUTANEOUS at 16:15

## 2018-12-07 RX ADMIN — HYDROMORPHONE HYDROCHLORIDE 1 MG: 1 INJECTION, SOLUTION INTRAMUSCULAR; INTRAVENOUS; SUBCUTANEOUS at 20:35

## 2018-12-07 RX ADMIN — Medication 10 ML: at 05:23

## 2018-12-07 RX ADMIN — PROMETHAZINE HYDROCHLORIDE 12.5 MG: 25 INJECTION INTRAMUSCULAR; INTRAVENOUS at 20:35

## 2018-12-07 RX ADMIN — Medication 10 ML: at 22:33

## 2018-12-07 RX ADMIN — METOPROLOL TARTRATE 12.5 MG: 25 TABLET ORAL at 17:24

## 2018-12-07 RX ADMIN — DEXTROSE MONOHYDRATE AND SODIUM CHLORIDE 150 ML/HR: 5; .45 INJECTION, SOLUTION INTRAVENOUS at 16:20

## 2018-12-07 RX ADMIN — SODIUM CHLORIDE 125 ML/HR: 900 INJECTION, SOLUTION INTRAVENOUS at 02:04

## 2018-12-07 RX ADMIN — DIPHENHYDRAMINE HYDROCHLORIDE 25 MG: 25 CAPSULE ORAL at 04:12

## 2018-12-07 RX ADMIN — PROMETHAZINE HYDROCHLORIDE 12.5 MG: 25 INJECTION INTRAMUSCULAR; INTRAVENOUS at 11:35

## 2018-12-07 RX ADMIN — OXYCODONE HYDROCHLORIDE 30 MG: 15 TABLET ORAL at 08:03

## 2018-12-07 RX ADMIN — Medication 10 ML: at 11:52

## 2018-12-07 RX ADMIN — HYDROMORPHONE HYDROCHLORIDE 1 MG: 1 INJECTION, SOLUTION INTRAMUSCULAR; INTRAVENOUS; SUBCUTANEOUS at 06:02

## 2018-12-07 RX ADMIN — FOLIC ACID 1 MG: 1 TABLET ORAL at 08:02

## 2018-12-07 RX ADMIN — Medication 1 AMPULE: at 20:35

## 2018-12-07 RX ADMIN — HYDROMORPHONE HYDROCHLORIDE 1 MG: 1 INJECTION, SOLUTION INTRAMUSCULAR; INTRAVENOUS; SUBCUTANEOUS at 11:26

## 2018-12-07 RX ADMIN — HYDROXYUREA 1000 MG: 500 CAPSULE ORAL at 09:41

## 2018-12-07 RX ADMIN — HYDROMORPHONE HYDROCHLORIDE 1 MG: 1 INJECTION, SOLUTION INTRAMUSCULAR; INTRAVENOUS; SUBCUTANEOUS at 02:08

## 2018-12-07 RX ADMIN — LISINOPRIL 5 MG: 5 TABLET ORAL at 08:02

## 2018-12-07 NOTE — PROGRESS NOTES
END OF SHIFT NOTE: 
 
Intake/Output 12/07 0701 - 12/07 1900 In: 633.3 [P.O.:250] Out: -   
Voiding: YES Catheter: NO 
Drain:   
 
 
 
 
Stool:  0 occurrences. Emesis:  0 occurrences. VITAL SIGNS Patient Vitals for the past 12 hrs: 
 Temp Pulse Resp BP SpO2  
12/07/18 1145 98.3 °F (36.8 °C) 67 18 129/73 97 % 12/07/18 0758 97.9 °F (36.6 °C) 66 16 134/69 98 % 12/07/18 0638 98.5 °F (36.9 °C) 76 16 129/69 100 % 12/07/18 0537 98.2 °F (36.8 °C) 70 17 133/77 99 % 12/07/18 0447 98.5 °F (36.9 °C) 71 16 122/81 98 % 12/07/18 0439 98.3 °F (36.8 °C) 78 16 114/80 94 % 12/07/18 0419 98.1 °F (36.7 °C) 70 16 117/74 97 % 12/07/18 0315 97.9 °F (36.6 °C) 65 16 110/55 94 % Pain Assessment Pain 1 Pain Scale 1: Visual (12/07/18 1429) Pain Intensity 1: 0 (12/07/18 1429) Patient Stated Pain Goal: 0 (12/07/18 1429) Pain Reassessment 1: Yes (12/07/18 1214) Pain Onset 1: PTA  (12/06/18 1826) Pain Location 1: Leg (12/07/18 0803) Pain Orientation 1: Right;Left (12/07/18 0803) Pain Description 1: Aching (12/06/18 2148) Pain Intervention(s) 1: Medication (see MAR) (12/07/18 1126) Ambulating Yes Additional Information: Hgb 7.8 after one unit of blood. Shift report given to oncoming nurse at the bedside. Stephany Clarity

## 2018-12-07 NOTE — PROGRESS NOTES
Hospitalist Progress Note 2018 Admit Date: 2018  2:07 PM  
NAME: Kaylynn Aleman :  1975 DOS:              18 MRN:  [id number] Attending: Yenny Mcduffie MD 
PCP:  Doug Thompson MD 
Treatment Team: Attending Provider: Mayco Byrd DO; Consulting Provider: Angelica Dunlap MD; Utilization Review: Dvaon Dacosta Full Code SUBJECTIVE: As previously documented: 36 yo male with pmh sickle cell disease who follows with Dr. Maico Becerra at [facility] who presented to ER with sickle cell pain crisis. In the ED  Hgb 7.0. He was started on IV fluids and Dilaudid. 18    Kaylynn Aleman stated pain is controlled with pain medications. Patient denies dysuria or cough. 10+ ROS reviewed and negative except for positive in HPI. Allergies Allergen Reactions  Compazine [Prochlorperazine Edisylate] Other (comments) Also makes him feel funny  Morphine Other (comments) Makes him feel funny  Reglan [Metoclopramide] Other (comments) \"feel funny\"  Zofran [Ondansetron Hcl (Pf)] Other (comments) Make him feel funny Current Facility-Administered Medications Medication Dose Route Frequency  0.9% sodium chloride infusion 250 mL  250 mL IntraVENous PRN  
 dextrose 5 % - 0.45% NaCl infusion  150 mL/hr IntraVENous CONTINUOUS  
 lisinopril (PRINIVIL, ZESTRIL) tablet 5 mg  5 mg Oral DAILY  metoprolol tartrate (LOPRESSOR) tablet 12.5 mg  12.5 mg Oral BID  Jadenu (deferasirox) 360mg [Patient must supply] (Patient Supplied)  1,800 mg Oral DAILY  folic acid (FOLVITE) tablet 1 mg  1 mg Oral DAILY  hydroxyurea (HYDREA) chemo cap 1,000 mg  1,000 mg Oral DAILY  sodium chloride (NS) flush 5-10 mL  5-10 mL IntraVENous Q8H  
 sodium chloride (NS) flush 5-10 mL  5-10 mL IntraVENous PRN  
 acetaminophen (TYLENOL) tablet 650 mg  650 mg Oral Q4H PRN  
 HYDROmorphone (PF) (DILAUDID) injection 1 mg  1 mg IntraVENous Q4H PRN  
  oxyCODONE IR (OXY-IR) immediate release tablet 30 mg  30 mg Oral Q12H PRN  promethazine (PHENERGAN) with saline injection 12.5 mg  12.5 mg IntraVENous Q6H PRN  
 LORazepam (ATIVAN) injection 1 mg  1 mg IntraVENous Q6H PRN  
 alcohol 62% (NOZIN) nasal  1 Ampule  1 Ampule Topical Q12H  
 insulin lispro (HUMALOG) injection 0-10 Units  0-10 Units SubCUTAneous AC&HS  
 0.9% sodium chloride infusion 250 mL  250 mL IntraVENous PRN  
 diphenhydrAMINE (BENADRYL) capsule 25 mg  25 mg Oral Q4H PRN Immunization History Administered Date(s) Administered  Influenza Vaccine 09/10/2015  Influenza Vaccine Split 2012  ZZZ-RETIRED (DO NOT USE) Pneumococcal Vaccine (Unspecified Type) 2010 Objective:  
 
Patient Vitals for the past 24 hrs: 
 Temp Pulse Resp BP SpO2  
18 0758 97.9 °F (36.6 °C) 66 16 134/69 98 % 18 0638 98.5 °F (36.9 °C) 76 16 129/69 100 % 18 0537 98.2 °F (36.8 °C) 70 17 133/77 99 % 18 0447 98.5 °F (36.9 °C) 71 16 122/81 98 % 18 0439 98.3 °F (36.8 °C) 78 16 114/80 94 % 18 0419 98.1 °F (36.7 °C) 70 16 117/74 97 % 18 0315 97.9 °F (36.6 °C) 65 16 110/55 94 % 18 2308 97.9 °F (36.6 °C) 80 16 113/65 94 % 18 1942 98.4 °F (36.9 °C) 91 18 113/64 95 % 18 1715 98.6 °F (37 °C) 88 18 123/74 97 % 18 1620  87  119/74 99 % 18 1600  81  125/71 96 % 18 1541  79  119/72 98 % 18 1520  78  118/72 98 % 18 1500  85  139/82 99 % 18 1441  66  143/79 100 % 18 1420  73  134/67 100 % 18 1413  87  149/71 (!) 88 % 18 1308 98.9 °F (37.2 °C) 68 16 155/79 95 % Temp (24hrs), Av.3 °F (36.8 °C), Min:97.9 °F (36.6 °C), Max:98.9 °F (37.2 °C) Oxygen Therapy O2 Sat (%): 98 % (18 0758) Pulse via Oximetry: 87 beats per minute (18 1620) O2 Device: Room air (18 1308) Oxygen Therapy O2 Sat (%): 98 % (18 0758) Pulse via Oximetry: 87 beats per minute (12/06/18 1620) O2 Device: Room air (12/06/18 1308) Physical Exam: 
General:         Alert, cooperative, no distress HEENT:               NCAT. No obvious deformity. Lungs: No wheezing/rhonchi/rales Cardiovascular:   RRR. No m/r/g. No pedal edema b/l. Abdomen:       S/nt/nd. Bowel sounds normal. .  
Skin:         No rashes or lesions. Not Jaundiced Neurologic:     No gross focal deficit. Psychiatric:         Good mood. Normal affect. DIAGNOSTIC STUDIES Data Review:  
Recent Results (from the past 24 hour(s)) CBC WITH AUTOMATED DIFF Collection Time: 12/06/18  2:41 PM  
Result Value Ref Range WBC 12.5 (H) 4.3 - 11.1 K/uL  
 RBC 1.95 (L) 4.23 - 5.6 M/uL HGB 7.0 (L) 13.6 - 17.2 g/dL HCT 21.1 (L) 41.1 - 50.3 % .1 (H) 79.6 - 97.8 FL  
 MCH 35.9 (H) 26.1 - 32.9 PG  
 MCHC 34.5 31.4 - 35.0 g/dL RDW 28.0 (H) 11.9 - 14.6 % PLATELET 157 221 - 425 K/uL MPV 10.2 9.4 - 12.3 FL ABSOLUTE NRBC 0.36 (H) 0.0 - 0.2 K/uL  
 DF AUTOMATED NEUTROPHILS 63 43 - 78 % LYMPHOCYTES 26 13 - 44 % MONOCYTES 10 4.0 - 12.0 % EOSINOPHILS 0 (L) 0.5 - 7.8 % BASOPHILS 0 0.0 - 2.0 % IMMATURE GRANULOCYTES 0 0.0 - 5.0 %  
 ABS. NEUTROPHILS 7.9 1.7 - 8.2 K/UL  
 ABS. LYMPHOCYTES 3.3 0.5 - 4.6 K/UL  
 ABS. MONOCYTES 1.2 0.1 - 1.3 K/UL  
 ABS. EOSINOPHILS 0.1 0.0 - 0.8 K/UL  
 ABS. BASOPHILS 0.0 0.0 - 0.2 K/UL  
 ABS. IMM. GRANS. 0.1 0.0 - 0.5 K/UL METABOLIC PANEL, COMPREHENSIVE Collection Time: 12/06/18  2:41 PM  
Result Value Ref Range Sodium 138 136 - 145 mmol/L Potassium 3.8 3.5 - 5.1 mmol/L Chloride 107 98 - 107 mmol/L  
 CO2 24 21 - 32 mmol/L Anion gap 7 7 - 16 mmol/L Glucose 106 (H) 65 - 100 mg/dL BUN 10 6 - 23 MG/DL Creatinine 0.88 0.8 - 1.5 MG/DL  
 GFR est AA >60 >60 ml/min/1.73m2 GFR est non-AA >60 >60 ml/min/1.73m2  Calcium 9.2 8.3 - 10.4 MG/DL  
 Bilirubin, total 1.3 (H) 0.2 - 1.1 MG/DL  
 ALT (SGPT) 51 12 - 65 U/L  
 AST (SGOT) 37 15 - 37 U/L Alk. phosphatase 72 50 - 136 U/L Protein, total 8.3 (H) 6.3 - 8.2 g/dL Albumin 3.9 3.5 - 5.0 g/dL Globulin 4.4 (H) 2.3 - 3.5 g/dL A-G Ratio 0.9 (L) 1.2 - 3.5 RETICULOCYTE COUNT Collection Time: 12/06/18  2:41 PM  
Result Value Ref Range Reticulocyte count 8.1 (H) 0.3 - 2.0 % Absolute Retic Cnt. 0.1580 (H) 0.026 - 0.095 M/ul Immature Retic Fraction 30.9 (H) 2.3 - 13.4 % Retic Hgb Conc. 43 (H) 29 - 35 pg  
HGB & HCT Collection Time: 12/06/18  9:04 PM  
Result Value Ref Range HGB 5.8 (LL) 13.6 - 17.2 g/dL HCT 16.9 (LL) 41.1 - 50.3 % GLUCOSE, POC Collection Time: 12/06/18  9:26 PM  
Result Value Ref Range Glucose (POC) 143 (H) 65 - 100 mg/dL TYPE & SCREEN Collection Time: 12/06/18 10:40 PM  
Result Value Ref Range Crossmatch Expiration 12/09/2018 ABO/Rh(D) A POSITIVE Antibody screen NEG Unit number [id number] Blood component type [facility] Unit division 00 Status of unit ISSUED ANTIGEN/ANTIBODY INFO    
  C NEGATIVE, 
E NEGATIVE, ALETA NEGATIVE, 
FY(A) NEGATIVE, 
FY(B) NEGATIVE, SICKLEDEX NEGATIVE Crossmatch result Compatible METABOLIC PANEL, BASIC Collection Time: 12/07/18  3:24 AM  
Result Value Ref Range Sodium 139 136 - 145 mmol/L Potassium 4.0 3.5 - 5.1 mmol/L Chloride 107 98 - 107 mmol/L  
 CO2 26 21 - 32 mmol/L Anion gap 6 (L) 7 - 16 mmol/L Glucose 111 (H) 65 - 100 mg/dL BUN 8 6 - 23 MG/DL Creatinine 0.75 (L) 0.8 - 1.5 MG/DL  
 GFR est AA >60 >60 ml/min/1.73m2 GFR est non-AA >60 >60 ml/min/1.73m2 Calcium 8.0 (L) 8.3 - 10.4 MG/DL  
CBC W/O DIFF Collection Time: 12/07/18  3:24 AM  
Result Value Ref Range WBC 12.9 (H) 4.3 - 11.1 K/uL  
 RBC 1.63 (L) 4.23 - 5.6 M/uL HGB 5.8 (LL) 13.6 - 17.2 g/dL HCT 16.9 (LL) 41.1 - 50.3 %  .7 (H) 79.6 - 97.8 FL  
 MCH 35.6 (H) 26.1 - 32.9 PG  
 MCHC 34.3 31.4 - 35.0 g/dL RDW 27.6 (H) 11.9 - 14.6 % PLATELET 581 637 - 470 K/uL MPV 10.4 9.4 - 12.3 FL ABSOLUTE NRBC 0.43 (H) 0.0 - 0.2 K/uL GLUCOSE, POC Collection Time: 12/07/18  7:31 AM  
Result Value Ref Range Glucose (POC) 107 (H) 65 - 100 mg/dL All Micro Results None Imaging Inis Ogren /Studies: CXR Results  (Last 48 hours) None CT Results  (Last 48 hours) None No results found. No results found for this visit on 12/06/18. Labs and Studies from previous 24 hours have been personally reviewed by myself   
ASSESSMENT Active Hospital Problems Diagnosis Date Noted  Sickle cell disease ([facility].) 07/13/2017  Sickle cell pain crisis ([facility].) 10/25/2012 Hospital Problems as of 12/7/2018 Date Reviewed: 7/22/2018 Codes Class Noted - Resolved POA Sickle cell disease ([facility].) ICD-10-CM: D57.1 ICD-9-CM: 282.60  7/13/2017 - Present Yes * (Principal) Sickle cell pain crisis ([facility].) ICD-10-CM: D57.00 ICD-9-CM: 282.62  10/25/2012 - Present Unknown A/P: 
 
-Sick cell crisis Drop in hb today to 5, ?hemodilution transfusion already ordered Hem evaluation pending Cont IVF and pain medications Repeat hb post transfusion 
 
 
-DM On metformin at home ISS while inpatient DVT Prophylaxis: SCDs, start chemical prophylaxis when hb more stable CODE Status: Full Plan of Care Discussed with: patient. Care team. 
 
 
Librado Hayden MD 
12/07/18

## 2018-12-07 NOTE — PROGRESS NOTES
Problem: Falls - Risk of 
Goal: *Absence of Falls Document Suzanne Miranda Fall Risk and appropriate interventions in the flowsheet. Outcome: Progressing Towards Goal 
Fall Risk Interventions: 
  
 
  
 
Medication Interventions: Teach patient to arise slowly

## 2018-12-07 NOTE — PROGRESS NOTES
Initial visit by  to convey care and concern and encourage patient that  services are available if desired. No needs were voiced during the visit. Provided business card for future reference. Lillian Diez MDiv Board Certified [employer]

## 2018-12-07 NOTE — PROGRESS NOTES
END OF SHIFT NOTE: 
 
Intake/Output 12/06 1901 - 12/07 0700 In: 250 [P.O.:250] Out: 550 [Urine:550] Voiding: YES Catheter: NO 
Drain:   
 
 
 
 
Stool:  0 occurrences. Emesis:  0 occurrences. VITAL SIGNS Patient Vitals for the past 12 hrs: 
 Temp Pulse Resp BP SpO2  
12/07/18 0537 98.2 °F (36.8 °C) 70 17 133/77 99 % 12/07/18 0447 98.5 °F (36.9 °C) 71 16 122/81 98 % 12/07/18 0439 98.3 °F (36.8 °C) 78 16 114/80 94 % 12/07/18 0419 98.1 °F (36.7 °C) 70 16 117/74 97 % 12/07/18 0315 97.9 °F (36.6 °C) 65 16 110/55 94 % 12/06/18 2308 97.9 °F (36.6 °C) 80 16 113/65 94 % 12/06/18 1942 98.4 °F (36.9 °C) 91 18 113/64 95 % Pain Assessment Pain 1 Pain Scale 1: Visual (12/07/18 0445) Pain Intensity 1: 0 (12/07/18 0445) Patient Stated Pain Goal: 2 (12/06/18 1826) Pain Reassessment 1: Patient sleeping (12/07/18 0445) Pain Onset 1: PTA  (12/06/18 1826) Pain Location 1: Leg (12/06/18 2148) Pain Orientation 1: Right;Left (12/06/18 2148) Pain Description 1: Aching (12/06/18 2148) Pain Intervention(s) 1: Medication (see MAR) (12/06/18 2148) Ambulating Yes Additional Information: Patient updated on the POC. Assessment, education, and medications completed per documentation. VSS. Hbg-5.8, 1 unit of blood transfused. Will defer addition transfusions to Hematology Doc this AM. Dilaudid 1mg q 3 hours per leg pain. Shift report given to oncoming nurse at the bedside. Fransisco Prasad

## 2018-12-07 NOTE — PROGRESS NOTES
Pt's Hgb reported back at 5.8. Ivania Lopez, primary RN notified hospitalist on call; ordered 3 units of PRBCs. This RN called Dr. Ritchie Muro to verify order and to mention hematology is consulted in AM, could we transfuse 1 unit and defer other 2 to hematology since pt is a Sickle Cell pt and well known to us. Orders received to proceed at mentioned. Will notify primary RN of new plan.

## 2018-12-07 NOTE — CONSULTS
[facility]        Inpatient Hematology / Oncology Consult Note    Reason for Consult:  Sickle cell pain crisis [facility])  Referring Physician:  Rosalia Mathews DO    History of Present Illness:  Mr. Cabrera is a 37 y. o. male admitted on 12/07/2018. Omkar Raymundo is a known patient of Dr. Rehan Phillip at [facility] with sickle/beta+ thal, admitted for leg pain crisis. Omkar Raymundo presented to ED with c/o  leg pain x 2 days, unrelieved with pain meds at home. Hgb 7.0 on admission and retic 8.1. Today hgb 5.8 after fluids. He has received one unit of blood. Recheck hgb pending. Omkar Raymundo takes Jadenu, Hydrea, and folic acid daily.  We were consulted for recommendations.     Review of Systems:  Constitutional Denies fever, chills, weight loss, appetite changes, fatigue, night sweats. HEENT Denies trauma, blurry vision, hearing loss, ear pain, nosebleeds, sore throat, neck pain   Skin Denies lesions or rashes. Lungs Denies dyspnea, cough, sputum production or hemoptysis. Cardiovascular Denies chest pain, palpitations, or lower extremity edema. Neuro Denies headaches, visual changes or ataxia. Denies dizziness, tingling, tremors, sensory change, speech change, focal weakness or headaches. MSK Bilateral leg pain     Psychiatric/Behavioral The patient is not nervous/anxious.          Allergies   Allergen Reactions    Compazine [Prochlorperazine Edisylate] Other (comments)     Also makes him feel funny    Morphine Other (comments)     Makes him feel funny    Reglan [Metoclopramide] Other (comments)     \"feel funny\"    Zofran [Ondansetron Hcl (Pf)] Other (comments)     Make him feel funny     Past Medical History:   Diagnosis Date    Chronic pain     HTN (hypertension)     Ill-defined condition     sickle cell    Iron overload due to repeated red blood cell transfusions 1/18/2017    Paroxysmal SVT [facility]supraventricular tachycardia) ([facility] 7/9/2016    Sickle cell disease ([facility]      Past Surgical History:   Procedure Laterality Date    HC PENILE IMPL DURA II POSITINBLE      HC PORT LIFE SNGLE LUMEN 5013      to L CW    HX CHOLECYSTECTOMY      HX OTHER SURGICAL      penile inplant    HX VASCULAR ACCESS       Family History   Problem Relation Age of Onset    Hypertension Other     Diabetes Father     Stroke Father     Sickle Cell Anemia Sister      Social History     Socioeconomic History    Marital status:      Spouse name: Not on file    Number of children: Not on file    Years of education: Not on file    Highest education level: Not on file   Social Needs    Financial resource strain: Not on file    Food insecurity - worry: Not on file    Food insecurity - inability: Not on file   [employer] needs - medical: Not on file   [employer] needs - non-medical: Not on file   Occupational History    Not on file   Tobacco Use    Smoking status: Never Smoker    Smokeless tobacco: Never Used   Substance and Sexual Activity    Alcohol use: No     Alcohol/week: 0.0 oz    Drug use: No    Sexual activity: Yes   Other Topics Concern    Not on file   Social History Narrative    Not on file     Current Facility-Administered Medications   Medication Dose Route Frequency Provider Last Rate Last Dose    0.9% sodium chloride infusion 250 mL  250 mL IntraVENous PRN Azam Bledsoe MD        lisinopril (PRINIVIL, ZESTRIL) tablet 5 mg  5 mg Oral DAILY Veronica Sloan, DO   5 mg at 12/07/18 0802    metoprolol tartrate (LOPRESSOR) tablet 12.5 mg  12.5 mg Oral BID HudsonJayson Snellen C, DO   12.5 mg at 12/07/18 0802    Jadenu (deferasirox) 360mg [Patient must supply] (Patient Supplied)  1,800 mg Oral DAILY Veronica Sloan, DO        folic acid (FOLVITE) tablet 1 mg  1 mg Oral DAILY Veronica Sloan, DO   1 mg at 12/07/18 0802    hydroxyurea (HYDREA) chemo cap 1,000 mg  1,000 mg Oral DAILY Veronica Sloan, DO        sodium chloride (NS) flush 5-10 mL  5-10 mL IntraVENous Q8H Veronica Solan, DO 10 mL at 18 0523    sodium chloride (NS) flush 5-10 mL  5-10 mL IntraVENous PRN [place] Lute C, DO        acetaminophen (TYLENOL) tablet 650 mg  650 mg Oral Q4H PRN Dejan Saltreuben C, DO   650 mg at 18 2143    HYDROmorphone (PF) (DILAUDID) injection 1 mg  1 mg IntraVENous Q4H PRN Feli Sloan, DO   1 mg at 18 0602    0.9% sodium chloride infusion  125 mL/hr IntraVENous CONTINUOUS Graylon Beady,  mL/hr at 18 0204 125 mL/hr at 18 0204    oxyCODONE IR (OXY-IR) immediate release tablet 30 mg  30 mg Oral Q12H PRN [place] Lute C, DO   30 mg at 18 0803    promethazine (PHENERGAN) with saline injection 12.5 mg  12.5 mg IntraVENous Q6H PRN Feli Sloan, DO        LORazepam (ATIVAN) injection 1 mg  1 mg IntraVENous Q6H PRN [place] C, DO        alcohol 62% (NOZIN) nasal  1 Ampule  1 Ampule Topical Q12H Feli Sloan, DO   1 Ampule at 18 0802    insulin lispro (HUMALOG) injection 0-10 Units  0-10 Units SubCUTAneous AC&HS Brenda Hopedale, DO   Stopped at 18 2200    0.9% sodium chloride infusion 250 mL  250 mL IntraVENous PRN Brenda [place], DO        diphenhydrAMINE (BENADRYL) capsule 25 mg  25 mg Oral Q4H PRN Veronica Sloan, DO   25 mg at 18 1487       OBJECTIVE:  Patient Vitals for the past 8 hrs:   BP Temp Pulse Resp SpO2   18 0758 134/69 97.9 °F (36.6 °C) 66 16 98 %   18 0638 129/69 98.5 °F (36.9 °C) 76 16 100 %   18 0537 133/77 98.2 °F (36.8 °C) 70 17 99 %   18 0447 122/81 98.5 °F (36.9 °C) 71 16 98 %   18 0439 114/80 98.3 °F (36.8 °C) 78 16 94 %   18 0419 117/74 98.1 °F (36.7 °C) 70 16 97 %   18 0315 110/55 97.9 °F (36.6 °C) 65 16 94 %     Temp (24hrs), Av.3 °F (36.8 °C), Min:97.9 °F (36.6 °C), Max:98.9 °F (37.2 °C)    701 - 1900  In: 633.3 [P.O.:250]  Out: -     Physical Exam:  Constitutional: Well developed, well nourished male in no acute distress, sitting comfortably in the hospital bed. HEENT: Normocephalic and atraumatic. Oropharynx is clear, mucous membranes are moist. Extraocular muscles are intact. Sclerae anicteric. Skin Warm and dry. No bruising and no rash noted. No erythema. No pallor. Respiratory Lungs are clear to auscultation bilaterally without wheezes, rales or rhonchi, normal air exchange without accessory muscle use. CVS Normal rate, regular rhythm and normal S1 and S2. No murmurs, gallops, or rubs. Abdomen Soft, nontender and nondistended, normoactive bowel sounds. No palpable mass. No hepatosplenomegaly. Neuro Grossly nonfocal with no obvious sensory or motor deficits. MSK Normal range of motion in general.  No edema and no tenderness. Psych Appropriate mood and affect. Labs:    Recent Results (from the past 24 hour(s))   CBC WITH AUTOMATED DIFF    Collection Time: 12/06/18  2:41 PM   Result Value Ref Range    WBC 12.5 (H) 4.3 - 11.1 K/uL    RBC 1.95 (L) 4.23 - 5.6 M/uL    HGB 7.0 (L) 13.6 - 17.2 g/dL    HCT 21.1 (L) 41.1 - 50.3 %    .1 (H) 79.6 - 97.8 FL    MCH 35.9 (H) 26.1 - 32.9 PG    MCHC 34.5 31.4 - 35.0 g/dL    RDW 28.0 (H) 11.9 - 14.6 %    PLATELET 353 079 - 287 K/uL    MPV 10.2 9.4 - 12.3 FL    ABSOLUTE NRBC 0.36 (H) 0.0 - 0.2 K/uL    DF AUTOMATED      NEUTROPHILS 63 43 - 78 %    LYMPHOCYTES 26 13 - 44 %    MONOCYTES 10 4.0 - 12.0 %    EOSINOPHILS 0 (L) 0.5 - 7.8 %    BASOPHILS 0 0.0 - 2.0 %    IMMATURE GRANULOCYTES 0 0.0 - 5.0 %    ABS. NEUTROPHILS 7.9 1.7 - 8.2 K/UL    ABS. LYMPHOCYTES 3.3 0.5 - 4.6 K/UL    ABS. MONOCYTES 1.2 0.1 - 1.3 K/UL    ABS. EOSINOPHILS 0.1 0.0 - 0.8 K/UL    ABS. BASOPHILS 0.0 0.0 - 0.2 K/UL    ABS. IMM.  GRANS. 0.1 0.0 - 0.5 K/UL   METABOLIC PANEL, COMPREHENSIVE    Collection Time: 12/06/18  2:41 PM   Result Value Ref Range    Sodium 138 136 - 145 mmol/L    Potassium 3.8 3.5 - 5.1 mmol/L    Chloride 107 98 - 107 mmol/L    CO2 24 21 - 32 mmol/L    Anion gap 7 7 - 16 mmol/L    Glucose 106 (H) 65 - 100 mg/dL    BUN 10 6 - 23 MG/DL    Creatinine 0.88 0.8 - 1.5 MG/DL    GFR est AA >60 >60 ml/min/1.73m2    GFR est non-AA >60 >60 ml/min/1.73m2    Calcium 9.2 8.3 - 10.4 MG/DL    Bilirubin, total 1.3 (H) 0.2 - 1.1 MG/DL    ALT (SGPT) 51 12 - 65 U/L    AST (SGOT) 37 15 - 37 U/L    Alk.  phosphatase 72 50 - 136 U/L    Protein, total 8.3 (H) 6.3 - 8.2 g/dL    Albumin 3.9 3.5 - 5.0 g/dL    Globulin 4.4 (H) 2.3 - 3.5 g/dL    A-G Ratio 0.9 (L) 1.2 - 3.5     RETICULOCYTE COUNT    Collection Time: 12/06/18  2:41 PM   Result Value Ref Range    Reticulocyte count 8.1 (H) 0.3 - 2.0 %    Absolute Retic Cnt. 0.1580 (H) 0.026 - 0.095 M/ul    Immature Retic Fraction 30.9 (H) 2.3 - 13.4 %    Retic Hgb Conc. 43 (H) 29 - 35 pg   HGB & HCT    Collection Time: 12/06/18  9:04 PM   Result Value Ref Range    HGB 5.8 (LL) 13.6 - 17.2 g/dL    HCT 16.9 (LL) 41.1 - 50.3 %   GLUCOSE, POC    Collection Time: 12/06/18  9:26 PM   Result Value Ref Range    Glucose (POC) 143 (H) 65 - 100 mg/dL   TYPE & SCREEN    Collection Time: 12/06/18 10:40 PM   Result Value Ref Range    Crossmatch Expiration 12/09/2018     ABO/Rh(D) A POSITIVE     Antibody screen NEG     Unit number [id number]     Blood component type  LRIR     Unit division 00     Status of unit ISSUED     ANTIGEN/ANTIBODY INFO       C NEGATIVE,  E NEGATIVE,  ALETA NEGATIVE,  FY(A) NEGATIVE,  FY(B) NEGATIVE,  SICKLEDEX NEGATIVE      Crossmatch result Compatible    METABOLIC PANEL, BASIC    Collection Time: 12/07/18  3:24 AM   Result Value Ref Range    Sodium 139 136 - 145 mmol/L    Potassium 4.0 3.5 - 5.1 mmol/L    Chloride 107 98 - 107 mmol/L    CO2 26 21 - 32 mmol/L    Anion gap 6 (L) 7 - 16 mmol/L    Glucose 111 (H) 65 - 100 mg/dL    BUN 8 6 - 23 MG/DL    Creatinine 0.75 (L) 0.8 - 1.5 MG/DL    GFR est AA >60 >60 ml/min/1.73m2    GFR est non-AA >60 >60 ml/min/1.73m2    Calcium 8.0 (L) 8.3 - 10.4 MG/DL   CBC W/O DIFF    Collection Time: 12/07/18 3:24 AM   Result Value Ref Range    WBC 12.9 (H) 4.3 - 11.1 K/uL    RBC 1.63 (L) 4.23 - 5.6 M/uL    HGB 5.8 (LL) 13.6 - 17.2 g/dL    HCT 16.9 (LL) 41.1 - 50.3 %    .7 (H) 79.6 - 97.8 FL    MCH 35.6 (H) 26.1 - 32.9 PG    MCHC 34.3 31.4 - 35.0 g/dL    RDW 27.6 (H) 11.9 - 14.6 %    PLATELET 307 197 - 165 K/uL    MPV 10.4 9.4 - 12.3 FL    ABSOLUTE NRBC 0.43 (H) 0.0 - 0.2 K/uL   GLUCOSE, POC    Collection Time: 12/07/18  7:31 AM   Result Value Ref Range    Glucose (POC) 107 (H) 65 - 100 mg/dL       Imaging:  [unfilled]    ASSESSMENT:  Problem List  Date Reviewed: 7/22/2018          Codes Class Noted    Sickle cell crisis ([facility] ICD-10-CM: D57.00  ICD-9-CM: 282.62  11/5/2018        Leukocytosis ICD-10-CM: G84.118  ICD-9-CM: 288.60  10/11/2018        Volume overload ICD-10-CM: E87.70  ICD-9-CM: 276.69  6/28/2018        Leg pain ICD-10-CM: M79.606  ICD-9-CM: 729.5  6/24/2018        Sickle cell disease ([facility] ICD-10-CM: D57.1  ICD-9-CM: 282.60  7/13/2017        Iron overload due to repeated red blood cell transfusions ICD-10-CM: E83.111  ICD-9-CM: 275.02  1/18/2017        Paroxysmal SVT (supraventricular tachycardia) (HCC) ICD-10-CM: I47.1  ICD-9-CM: 427.0  7/9/2016        Sickle cell anemia (HCC) ICD-10-CM: D57.1  ICD-9-CM: 282.60  4/6/2016        * (Principal) Sickle cell pain crisis ([facility] ICD-10-CM: D57.00  ICD-9-CM: 282.62  10/25/2012        Essential hypertension, benign ICD-10-CM: I10  ICD-9-CM: 401.1  6/23/2012        Precordial pain ICD-10-CM: R07.2  ICD-9-CM: 786.51  3/24/2012    Overview Signed 3/24/2012  2:53 PM by Jr PLUMMER     Acute chest syndrome unlikely. RECOMMENDATIONS:  Sickle/Beta+ Thal with arm/leg pain crisis  - Hgb 5.8 (BL 7.5-8.5) prior to transfusion/recheck 7.8. No need for further transfusion.  - Change IVF to D5 1/2 NS and add oxygen at 2 liters  - Continue jadenu, hydrea, and folic acid     Lab studies and imaging studies were personally reviewed.  Thank you for allowing us to participate in the care of Mr. Cabrera.  He will need to f/u with Dr. Raul Marsh at [facility] after discharge. We will sign off. Please call with any questions. [facility]  [id number] [id number] [facility]  Office : [phone]  Fax : [phone]       Attending Addendum:  I have personally performed a face to face diagnostic evaluation on this patient. I have reviewed and agree with the care plan as documented by Viet Guerra N.P.  My findings are as follows:  He has Sickle/Beta-Thal with pain crisis, appears weak and anxious, heart rate regular without murmurs, abdomen is non-tender, bowel sounds are positive, please change his IV fluids to D5-1/2NS and continue analgesics and his home meds, he should also be on supplemental oxygen, we will sign off.               Wilene Harada, MD [facility]  [id number] [id number] [facility]  Office : [phone]  Fax : [phone]

## 2018-12-08 LAB
ABO + RH BLD: NORMAL
ANTIGENS PRESENT RBC DONR: NORMAL
BLD PROD TYP BPU: NORMAL
BLOOD GROUP ANTIBODIES SERPL: NORMAL
BPU ID: NORMAL
CROSSMATCH RESULT,%XM: NORMAL
ERYTHROCYTE [DISTWIDTH] IN BLOOD BY AUTOMATED COUNT: 26.1 % (ref 11.9–14.6)
GLUCOSE BLD STRIP.AUTO-MCNC: 108 MG/DL (ref 65–100)
GLUCOSE BLD STRIP.AUTO-MCNC: 109 MG/DL (ref 65–100)
GLUCOSE BLD STRIP.AUTO-MCNC: 144 MG/DL (ref 65–100)
GLUCOSE BLD STRIP.AUTO-MCNC: 160 MG/DL (ref 65–100)
HCT VFR BLD AUTO: 22.9 % (ref 41.1–50.3)
HGB BLD-MCNC: 8.1 G/DL (ref 13.6–17.2)
MCH RBC QN AUTO: 35.1 PG (ref 26.1–32.9)
MCHC RBC AUTO-ENTMCNC: 35.4 G/DL (ref 31.4–35)
MCV RBC AUTO: 99.1 FL (ref 79.6–97.8)
NRBC # BLD: 0.92 K/UL (ref 0–0.2)
PLATELET # BLD AUTO: 216 K/UL (ref 150–450)
PMV BLD AUTO: 10.1 FL (ref 9.4–12.3)
RBC # BLD AUTO: 2.31 M/UL (ref 4.23–5.6)
SPECIMEN EXP DATE BLD: NORMAL
STATUS OF UNIT,%ST: NORMAL
UNIT DIVISION, %UDIV: 0
WBC # BLD AUTO: 10 K/UL (ref 4.3–11.1)

## 2018-12-08 PROCEDURE — 74011000258 HC RX REV CODE- 258: Performed by: NURSE PRACTITIONER

## 2018-12-08 PROCEDURE — 74011250637 HC RX REV CODE- 250/637: Performed by: INTERNAL MEDICINE

## 2018-12-08 PROCEDURE — 82962 GLUCOSE BLOOD TEST: CPT

## 2018-12-08 PROCEDURE — 74011000258 HC RX REV CODE- 258: Performed by: INTERNAL MEDICINE

## 2018-12-08 PROCEDURE — 65270000029 HC RM PRIVATE

## 2018-12-08 PROCEDURE — 77030020253 HC SOL INJ D545NS .05 DEX .45 SAL

## 2018-12-08 PROCEDURE — 74011250636 HC RX REV CODE- 250/636: Performed by: INTERNAL MEDICINE

## 2018-12-08 PROCEDURE — 85027 COMPLETE CBC AUTOMATED: CPT

## 2018-12-08 PROCEDURE — 74011000250 HC RX REV CODE- 250: Performed by: INTERNAL MEDICINE

## 2018-12-08 PROCEDURE — 74011636637 HC RX REV CODE- 636/637: Performed by: FAMILY MEDICINE

## 2018-12-08 RX ORDER — OXYCODONE HYDROCHLORIDE 15 MG/1
30 TABLET ORAL
Status: DISCONTINUED | OUTPATIENT
Start: 2018-12-08 | End: 2018-12-10 | Stop reason: HOSPADM

## 2018-12-08 RX ADMIN — LISINOPRIL 5 MG: 5 TABLET ORAL at 08:18

## 2018-12-08 RX ADMIN — Medication 10 ML: at 06:11

## 2018-12-08 RX ADMIN — OXYCODONE HYDROCHLORIDE 30 MG: 15 TABLET ORAL at 21:48

## 2018-12-08 RX ADMIN — HYDROMORPHONE HYDROCHLORIDE 1 MG: 1 INJECTION, SOLUTION INTRAMUSCULAR; INTRAVENOUS; SUBCUTANEOUS at 18:13

## 2018-12-08 RX ADMIN — HYDROMORPHONE HYDROCHLORIDE 1 MG: 1 INJECTION, SOLUTION INTRAMUSCULAR; INTRAVENOUS; SUBCUTANEOUS at 13:16

## 2018-12-08 RX ADMIN — HYDROMORPHONE HYDROCHLORIDE 1 MG: 1 INJECTION, SOLUTION INTRAMUSCULAR; INTRAVENOUS; SUBCUTANEOUS at 00:26

## 2018-12-08 RX ADMIN — PROMETHAZINE HYDROCHLORIDE 12.5 MG: 25 INJECTION INTRAMUSCULAR; INTRAVENOUS at 08:15

## 2018-12-08 RX ADMIN — INSULIN LISPRO 2 UNITS: 100 INJECTION, SOLUTION INTRAVENOUS; SUBCUTANEOUS at 22:33

## 2018-12-08 RX ADMIN — Medication 10 ML: at 22:37

## 2018-12-08 RX ADMIN — HYDROXYUREA 1000 MG: 500 CAPSULE ORAL at 08:20

## 2018-12-08 RX ADMIN — HYDROMORPHONE HYDROCHLORIDE 1 MG: 1 INJECTION, SOLUTION INTRAMUSCULAR; INTRAVENOUS; SUBCUTANEOUS at 08:15

## 2018-12-08 RX ADMIN — HYDROMORPHONE HYDROCHLORIDE 1 MG: 1 INJECTION, SOLUTION INTRAMUSCULAR; INTRAVENOUS; SUBCUTANEOUS at 04:19

## 2018-12-08 RX ADMIN — FOLIC ACID 1 MG: 1 TABLET ORAL at 08:19

## 2018-12-08 RX ADMIN — HYDROMORPHONE HYDROCHLORIDE 1 MG: 1 INJECTION, SOLUTION INTRAMUSCULAR; INTRAVENOUS; SUBCUTANEOUS at 22:33

## 2018-12-08 RX ADMIN — Medication 10 ML: at 14:00

## 2018-12-08 RX ADMIN — METOPROLOL TARTRATE 12.5 MG: 25 TABLET ORAL at 08:18

## 2018-12-08 RX ADMIN — METOPROLOL TARTRATE 12.5 MG: 25 TABLET ORAL at 16:49

## 2018-12-08 RX ADMIN — DEXTROSE MONOHYDRATE AND SODIUM CHLORIDE 150 ML/HR: 5; .45 INJECTION, SOLUTION INTRAVENOUS at 00:15

## 2018-12-08 RX ADMIN — OXYCODONE HYDROCHLORIDE 30 MG: 15 TABLET ORAL at 15:19

## 2018-12-08 RX ADMIN — Medication 1 AMPULE: at 08:19

## 2018-12-08 RX ADMIN — Medication 1 AMPULE: at 21:49

## 2018-12-08 RX ADMIN — PROMETHAZINE HYDROCHLORIDE 12.5 MG: 25 INJECTION INTRAMUSCULAR; INTRAVENOUS at 18:13

## 2018-12-08 RX ADMIN — DEXTROSE MONOHYDRATE AND SODIUM CHLORIDE 125 ML/HR: 5; .45 INJECTION, SOLUTION INTRAVENOUS at 15:19

## 2018-12-08 RX ADMIN — OXYCODONE HYDROCHLORIDE 30 MG: 15 TABLET ORAL at 11:35

## 2018-12-08 NOTE — PROGRESS NOTES
0720-Bedside report received from Yuly Ragland RN. Resting in bed. No needs voiced. No s/s of acute distress. 1810-END OF SHIFT NOTE: 
Pt's VSS and is in no acute distress. Pt receiving IV dilauded and oxycodone PRN for pain in tk legs. Intake/Output 12/08 0701 - 12/08 1900 In: 9139 [P.O.:720; I.V.:2606] Out: 1350 [MFYOQ:7010] Voiding: YES Catheter: NO 
Drain:   
Stool:  0 occurrences. Stool Assessment Stool Color: Brown(per pt.) (12/08/18 1546) Stool Appearance: Formed;Soft(per pt.) (12/08/18 1546) Emesis:  0 occurrences. VITAL SIGNS Patient Vitals for the past 12 hrs: 
 Temp Pulse Resp BP SpO2  
12/08/18 1542 98.6 °F (37 °C) 79 18 115/83 100 % 12/08/18 1135 98.4 °F (36.9 °C) 67 16 (!) 125/92 99 % 12/08/18 0802 98.5 °F (36.9 °C) 67 16 (!) 125/95 100 % Pain Assessment Pain 1 Pain Scale 1: Numeric (0 - 10) (12/08/18 1604) Pain Intensity 1: 0 (12/08/18 1604) Patient Stated Pain Goal: 0 (12/08/18 0456) Pain Reassessment 1: Yes (12/08/18 1604) Pain Onset 1: pta (12/08/18 0420) Pain Location 1: Leg (12/08/18 1519) Pain Orientation 1: Left;Right (12/08/18 1519) Pain Description 1: Aching (12/08/18 1519) Pain Intervention(s) 1: Medication (see MAR) (12/08/18 1519) Ambulating Yes Tashi Geronimo 1912-Bedside shift change report given to Yuly Ragland RN (oncoming nurse) by Constantin Perez RN (offgoing nurse). Report included the following information SBAR, Kardex, Intake/Output, MAR and Recent Results.

## 2018-12-08 NOTE — PROGRESS NOTES
Hospitalist Progress Note   
2018 Admit Date: 2018  2:07 PM  
NAME: Jj Griffith :  1975 DOS:              18 MRN:  [id number] Attending: Shanna Evans MD 
PCP:  Elo Mcgraw MD 
Treatment Team: Attending Provider: Evita Jasmine DO; Utilization Review: Mort Marrow Full Code SUBJECTIVE: As previously documented: 36 yo male with pmh sickle cell disease who follows with Dr. Kelly Gardner at [facility] who presented to ER with sickle cell pain crisis. In the ED  Hgb 7.0. He was started on IV fluids and Dilaudid. Hem recommended to continue current treatment. Patient was transfused 1 PRBC for anemia. 18    Jj Griffith stated pain is improving. Patient requested Oxycodone to be adjusted to home dose. 10+ ROS reviewed and negative except for positive in HPI. Allergies Allergen Reactions  Compazine [Prochlorperazine Edisylate] Other (comments) Also makes him feel funny  Morphine Other (comments) Makes him feel funny  Reglan [Metoclopramide] Other (comments) \"feel funny\"  Zofran [Ondansetron Hcl (Pf)] Other (comments) Make him feel funny Current Facility-Administered Medications Medication Dose Route Frequency  oxyCODONE IR (OXY-IR) immediate release tablet 30 mg  30 mg Oral Q4H PRN  
 0.9% sodium chloride infusion 250 mL  250 mL IntraVENous PRN  
 dextrose 5 % - 0.45% NaCl infusion  125 mL/hr IntraVENous CONTINUOUS  
 lisinopril (PRINIVIL, ZESTRIL) tablet 5 mg  5 mg Oral DAILY  metoprolol tartrate (LOPRESSOR) tablet 12.5 mg  12.5 mg Oral BID  Jadenu (deferasirox) 360mg [Patient must supply] (Patient Supplied)  1,800 mg Oral DAILY  folic acid (FOLVITE) tablet 1 mg  1 mg Oral DAILY  hydroxyurea (HYDREA) chemo cap 1,000 mg  1,000 mg Oral DAILY  sodium chloride (NS) flush 5-10 mL  5-10 mL IntraVENous Q8H  
 sodium chloride (NS) flush 5-10 mL  5-10 mL IntraVENous PRN  
  acetaminophen (TYLENOL) tablet 650 mg  650 mg Oral Q4H PRN  
 HYDROmorphone (PF) (DILAUDID) injection 1 mg  1 mg IntraVENous Q4H PRN  promethazine (PHENERGAN) with saline injection 12.5 mg  12.5 mg IntraVENous Q6H PRN  
 LORazepam (ATIVAN) injection 1 mg  1 mg IntraVENous Q6H PRN  
 alcohol 62% (NOZIN) nasal  1 Ampule  1 Ampule Topical Q12H  
 insulin lispro (HUMALOG) injection 0-10 Units  0-10 Units SubCUTAneous AC&HS  
 0.9% sodium chloride infusion 250 mL  250 mL IntraVENous PRN  
 diphenhydrAMINE (BENADRYL) capsule 25 mg  25 mg Oral Q4H PRN Immunization History Administered Date(s) Administered  Influenza Vaccine 09/10/2015  Influenza Vaccine Split 2012  ZZZ-RETIRED (DO NOT USE) Pneumococcal Vaccine (Unspecified Type) 2010 Objective:  
 
Patient Vitals for the past 24 hrs: 
 Temp Pulse Resp BP SpO2  
18 0802 98.5 °F (36.9 °C) 67 16 (!) 125/95 100 % 18 0408 98.2 °F (36.8 °C) 65 18 131/73 98 % 18 2354 98.4 °F (36.9 °C) 78 16 134/76 95 % 18 1942 98.6 °F (37 °C) 70 16 129/69 97 % 18 1500 98.4 °F (36.9 °C) 65 18 120/65 98 % 18 1145 98.3 °F (36.8 °C) 67 18 129/73 97 % Temp (24hrs), Av.4 °F (36.9 °C), Min:98.2 °F (36.8 °C), Max:98.6 °F (37 °C) Oxygen Therapy O2 Sat (%): 100 % (18 08) Pulse via Oximetry: 87 beats per minute (18 1620) O2 Device: Room air (18 08) Oxygen Therapy O2 Sat (%): 100 % (18 08) Pulse via Oximetry: 87 beats per minute (18 1620) O2 Device: Room air (18 0802) Physical Exam: 
General:         Alert, cooperative, no distress HEENT:               NCAT. No obvious deformity. Lungs: No wheezing/rhonchi/rales Cardiovascular:   RRR. No m/r/g. No pedal edema b/l. Abdomen:       S/nt/nd. Bowel sounds normal. .  
Skin:         No rashes or lesions. Not Jaundiced Neurologic:     No gross focal deficit. Psychiatric:         Good mood. Normal affect. DIAGNOSTIC STUDIES Data Review:  
Recent Results (from the past 24 hour(s)) GLUCOSE, POC Collection Time: 12/07/18 11:29 AM  
Result Value Ref Range Glucose (POC) 146 (H) 65 - 100 mg/dL GLUCOSE, POC Collection Time: 12/07/18  4:20 PM  
Result Value Ref Range Glucose (POC) 126 (H) 65 - 100 mg/dL GLUCOSE, POC Collection Time: 12/07/18  9:10 PM  
Result Value Ref Range Glucose (POC) 150 (H) 65 - 100 mg/dL CBC W/O DIFF Collection Time: 12/08/18  4:21 AM  
Result Value Ref Range WBC 10.0 4.3 - 11.1 K/uL  
 RBC 2.31 (L) 4.23 - 5.6 M/uL HGB 8.1 (L) 13.6 - 17.2 g/dL HCT 22.9 (L) 41.1 - 50.3 % MCV 99.1 (H) 79.6 - 97.8 FL  
 MCH 35.1 (H) 26.1 - 32.9 PG  
 MCHC 35.4 (H) 31.4 - 35.0 g/dL RDW 26.1 (H) 11.9 - 14.6 % PLATELET 061 166 - 006 K/uL MPV 10.1 9.4 - 12.3 FL ABSOLUTE NRBC 0.92 (H) 0.0 - 0.2 K/uL GLUCOSE, POC Collection Time: 12/08/18  8:03 AM  
Result Value Ref Range Glucose (POC) 144 (H) 65 - 100 mg/dL All Micro Results None Imaging Lynsey Xiong /Studies: CXR Results  (Last 48 hours) None CT Results  (Last 48 hours) None No results found. No results found for this visit on 12/06/18. Labs and Studies from previous 24 hours have been personally reviewed by myself   
ASSESSMENT Active Hospital Problems Diagnosis Date Noted  Sickle cell disease ([facility] 07/13/2017  Sickle cell pain crisis [facility] 10/25/2012 Hospital Problems as of 12/8/2018 Date Reviewed: 7/22/2018 Codes Class Noted - Resolved POA Sickle cell disease ([facility] ICD-10-CM: D57.1 ICD-9-CM: 282.60  7/13/2017 - Present Yes * (Principal) Sickle cell pain crisis ([facility] ICD-10-CM: D57.00 ICD-9-CM: 282.62  10/25/2012 - Present Unknown A/P: 
 
-Sick cell pain crisis Hb trending up Hem evaluation appreciated Cont IVF, O2 therapy and pain medications Home when clinically improve 
 
-DM On metformin at home ISS while inpatient DVT Prophylaxis: SCDs, start chemical prophylaxis when hb more stable CODE Status: Full Plan of Care Discussed with: patient. Care team. 
 
 
Tasia Rene MD 
12/08/18

## 2018-12-08 NOTE — PROGRESS NOTES
Problem: Falls - Risk of 
Goal: *Absence of Falls Document Jose Alvarado Fall Risk and appropriate interventions in the flowsheet. Outcome: Progressing Towards Goal 
Fall Risk Interventions: 
  
 
  
 
Medication Interventions: Evaluate medications/consider consulting pharmacy, Teach patient to arise slowly

## 2018-12-08 NOTE — PROGRESS NOTES
END OF SHIFT NOTE: 
 
Intake/Output 12/07 1901 - 12/08 0700 In: 4202 [P.O.:240; I.V.:3078] Out: 600 [Urine:600] Voiding: YES Catheter: NO 
Drain:   
 
 
 
 
Stool:  0 occurrences. Emesis:  0 occurrences. VITAL SIGNS Patient Vitals for the past 12 hrs: 
 Temp Pulse Resp BP SpO2  
12/08/18 0408 98.2 °F (36.8 °C) 65 18 131/73 98 % 12/07/18 2354 98.4 °F (36.9 °C) 78 16 134/76 95 % 12/07/18 1942 98.6 °F (37 °C) 70 16 129/69 97 % Pain Assessment Pain 1 Pain Scale 1: Visual (12/08/18 0456) Pain Intensity 1: 0 (12/08/18 0456) Patient Stated Pain Goal: 0 (12/08/18 0456) Pain Reassessment 1: Patient sleeping (12/08/18 0456) Pain Onset 1: pta (12/08/18 0420) Pain Location 1: Leg (12/08/18 0420) Pain Orientation 1: Right;Left (12/08/18 0420) Pain Description 1: Aching (12/08/18 0420) Pain Intervention(s) 1: Medication (see MAR) (12/08/18 0420) Ambulating Yes Additional Information:  
IV dilaudid given prn for pain control per pt's request BLE pain. Hgb 8.1 this morning Awaiting hematology consult Pt resting quietly. No visible s/sx of distress. No needs/concerns voiced at this time. Pt's wife is at bedside. Shift report given to oncoming nurse at the bedside.  
 
Jayleen Mcelroy RN

## 2018-12-09 LAB
ERYTHROCYTE [DISTWIDTH] IN BLOOD BY AUTOMATED COUNT: 26.6 % (ref 11.9–14.6)
GLUCOSE BLD STRIP.AUTO-MCNC: 118 MG/DL (ref 65–100)
GLUCOSE BLD STRIP.AUTO-MCNC: 128 MG/DL (ref 65–100)
GLUCOSE BLD STRIP.AUTO-MCNC: 132 MG/DL (ref 65–100)
GLUCOSE BLD STRIP.AUTO-MCNC: 156 MG/DL (ref 65–100)
HCT VFR BLD AUTO: 22.8 % (ref 41.1–50.3)
HGB BLD-MCNC: 7.8 G/DL (ref 13.6–17.2)
MCH RBC QN AUTO: 33.9 PG (ref 26.1–32.9)
MCHC RBC AUTO-ENTMCNC: 34.2 G/DL (ref 31.4–35)
MCV RBC AUTO: 99.1 FL (ref 79.6–97.8)
NRBC # BLD: 1.4 K/UL (ref 0–0.2)
PLATELET # BLD AUTO: 199 K/UL (ref 150–450)
PMV BLD AUTO: 10.5 FL (ref 9.4–12.3)
RBC # BLD AUTO: 2.3 M/UL (ref 4.23–5.6)
WBC # BLD AUTO: 10.7 K/UL (ref 4.3–11.1)

## 2018-12-09 PROCEDURE — 74011000250 HC RX REV CODE- 250: Performed by: INTERNAL MEDICINE

## 2018-12-09 PROCEDURE — 74011250636 HC RX REV CODE- 250/636: Performed by: INTERNAL MEDICINE

## 2018-12-09 PROCEDURE — 77030020253 HC SOL INJ D545NS .05 DEX .45 SAL

## 2018-12-09 PROCEDURE — 65270000029 HC RM PRIVATE

## 2018-12-09 PROCEDURE — 74011250637 HC RX REV CODE- 250/637: Performed by: INTERNAL MEDICINE

## 2018-12-09 PROCEDURE — 74011000258 HC RX REV CODE- 258: Performed by: INTERNAL MEDICINE

## 2018-12-09 PROCEDURE — 82962 GLUCOSE BLOOD TEST: CPT

## 2018-12-09 PROCEDURE — 85027 COMPLETE CBC AUTOMATED: CPT

## 2018-12-09 RX ORDER — ENOXAPARIN SODIUM 100 MG/ML
40 INJECTION SUBCUTANEOUS EVERY 24 HOURS
Status: DISCONTINUED | OUTPATIENT
Start: 2018-12-09 | End: 2018-12-10 | Stop reason: HOSPADM

## 2018-12-09 RX ADMIN — OXYCODONE HYDROCHLORIDE 30 MG: 15 TABLET ORAL at 09:22

## 2018-12-09 RX ADMIN — Medication 10 ML: at 14:00

## 2018-12-09 RX ADMIN — PROMETHAZINE HYDROCHLORIDE 12.5 MG: 25 INJECTION INTRAMUSCULAR; INTRAVENOUS at 08:30

## 2018-12-09 RX ADMIN — Medication 10 ML: at 05:17

## 2018-12-09 RX ADMIN — PROMETHAZINE HYDROCHLORIDE 12.5 MG: 25 INJECTION INTRAMUSCULAR; INTRAVENOUS at 15:59

## 2018-12-09 RX ADMIN — DEXTROSE MONOHYDRATE AND SODIUM CHLORIDE 100 ML/HR: 5; .45 INJECTION, SOLUTION INTRAVENOUS at 11:25

## 2018-12-09 RX ADMIN — HYDROMORPHONE HYDROCHLORIDE 1 MG: 1 INJECTION, SOLUTION INTRAMUSCULAR; INTRAVENOUS; SUBCUTANEOUS at 11:25

## 2018-12-09 RX ADMIN — PROMETHAZINE HYDROCHLORIDE 12.5 MG: 25 INJECTION INTRAMUSCULAR; INTRAVENOUS at 02:59

## 2018-12-09 RX ADMIN — Medication 10 ML: at 22:52

## 2018-12-09 RX ADMIN — HYDROMORPHONE HYDROCHLORIDE 1 MG: 1 INJECTION, SOLUTION INTRAMUSCULAR; INTRAVENOUS; SUBCUTANEOUS at 20:46

## 2018-12-09 RX ADMIN — METOPROLOL TARTRATE 12.5 MG: 25 TABLET ORAL at 16:00

## 2018-12-09 RX ADMIN — LISINOPRIL 5 MG: 5 TABLET ORAL at 09:22

## 2018-12-09 RX ADMIN — DEXTROSE MONOHYDRATE AND SODIUM CHLORIDE 100 ML/HR: 5; .45 INJECTION, SOLUTION INTRAVENOUS at 20:57

## 2018-12-09 RX ADMIN — DEXTROSE MONOHYDRATE AND SODIUM CHLORIDE 100 ML/HR: 5; .45 INJECTION, SOLUTION INTRAVENOUS at 01:09

## 2018-12-09 RX ADMIN — METOPROLOL TARTRATE 12.5 MG: 25 TABLET ORAL at 09:22

## 2018-12-09 RX ADMIN — OXYCODONE HYDROCHLORIDE 30 MG: 15 TABLET ORAL at 14:46

## 2018-12-09 RX ADMIN — FOLIC ACID 1 MG: 1 TABLET ORAL at 09:22

## 2018-12-09 RX ADMIN — HYDROXYUREA 1000 MG: 500 CAPSULE ORAL at 09:23

## 2018-12-09 RX ADMIN — HYDROMORPHONE HYDROCHLORIDE 1 MG: 1 INJECTION, SOLUTION INTRAMUSCULAR; INTRAVENOUS; SUBCUTANEOUS at 02:59

## 2018-12-09 RX ADMIN — HYDROMORPHONE HYDROCHLORIDE 1 MG: 1 INJECTION, SOLUTION INTRAMUSCULAR; INTRAVENOUS; SUBCUTANEOUS at 15:59

## 2018-12-09 RX ADMIN — Medication 1 AMPULE: at 09:23

## 2018-12-09 RX ADMIN — Medication 1 AMPULE: at 20:46

## 2018-12-09 RX ADMIN — ENOXAPARIN SODIUM 40 MG: 40 INJECTION SUBCUTANEOUS at 09:23

## 2018-12-09 RX ADMIN — HYDROMORPHONE HYDROCHLORIDE 1 MG: 1 INJECTION, SOLUTION INTRAMUSCULAR; INTRAVENOUS; SUBCUTANEOUS at 07:10

## 2018-12-09 NOTE — PROGRESS NOTES
Problem: Falls - Risk of 
Goal: *Absence of Falls Document Hay Shadow Fall Risk and appropriate interventions in the flowsheet. Outcome: Progressing Towards Goal 
Fall Risk Interventions: 
  
 
  
 
Medication Interventions: Evaluate medications/consider consulting pharmacy, Teach patient to arise slowly

## 2018-12-09 NOTE — PROGRESS NOTES
Hospitalist Progress Note   
2018 Admit Date: 2018  2:07 PM  
NAME: Mary Granger :  1975 DOS:              18 MRN:  [id number] Attending: Ninoska Irizarry MD 
PCP:  Lubna Tristan MD 
Treatment Team: Attending Provider: Fenrando Wood DO; Utilization Review: Chasity Figueroa Full Code SUBJECTIVE: As previously documented: 36 yo male with pmh sickle cell disease who follows with Dr. Marvin Sequeira at [facility] who presented to ER with sickle cell pain crisis. In the ED  Hgb 7.0. He was started on IV fluids and Dilaudid. Hem recommended to continue current treatment. Patient was transfused 1 PRBC for anemia. 18    Mary Granger stated pain is improving but is not around his baseline yet. Patient denies SOB or any other complaints. 10+ ROS reviewed and negative except for positive in HPI. Allergies Allergen Reactions  Compazine [Prochlorperazine Edisylate] Other (comments) Also makes him feel funny  Morphine Other (comments) Makes him feel funny  Reglan [Metoclopramide] Other (comments) \"feel funny\"  Zofran [Ondansetron Hcl (Pf)] Other (comments) Make him feel funny Current Facility-Administered Medications Medication Dose Route Frequency  oxyCODONE IR (OXY-IR) immediate release tablet 30 mg  30 mg Oral Q4H PRN  
 0.9% sodium chloride infusion 250 mL  250 mL IntraVENous PRN  
 dextrose 5 % - 0.45% NaCl infusion  100 mL/hr IntraVENous CONTINUOUS  
 lisinopril (PRINIVIL, ZESTRIL) tablet 5 mg  5 mg Oral DAILY  metoprolol tartrate (LOPRESSOR) tablet 12.5 mg  12.5 mg Oral BID  Jadenu (deferasirox) 360mg [Patient must supply] (Patient Supplied)  1,800 mg Oral DAILY  folic acid (FOLVITE) tablet 1 mg  1 mg Oral DAILY  hydroxyurea (HYDREA) chemo cap 1,000 mg  1,000 mg Oral DAILY  sodium chloride (NS) flush 5-10 mL  5-10 mL IntraVENous Q8H  
 sodium chloride (NS) flush 5-10 mL  5-10 mL IntraVENous PRN  
  acetaminophen (TYLENOL) tablet 650 mg  650 mg Oral Q4H PRN  
 HYDROmorphone (PF) (DILAUDID) injection 1 mg  1 mg IntraVENous Q4H PRN  promethazine (PHENERGAN) with saline injection 12.5 mg  12.5 mg IntraVENous Q6H PRN  
 LORazepam (ATIVAN) injection 1 mg  1 mg IntraVENous Q6H PRN  
 alcohol 62% (NOZIN) nasal  1 Ampule  1 Ampule Topical Q12H  
 insulin lispro (HUMALOG) injection 0-10 Units  0-10 Units SubCUTAneous AC&HS  
 0.9% sodium chloride infusion 250 mL  250 mL IntraVENous PRN  
 diphenhydrAMINE (BENADRYL) capsule 25 mg  25 mg Oral Q4H PRN Immunization History Administered Date(s) Administered  Influenza Vaccine 09/10/2015  Influenza Vaccine Split 2012  ZZZ-RETIRED (DO NOT USE) Pneumococcal Vaccine (Unspecified Type) 2010 Objective:  
 
Patient Vitals for the past 24 hrs: 
 Temp Pulse Resp BP SpO2  
18 0740 98.1 °F (36.7 °C) 74 18 128/88 96 % 18 0259 98.3 °F (36.8 °C) 78 19 126/77 98 % 18 2318 97.8 °F (36.6 °C) 64 18 125/85 97 % 18 1935 98.3 °F (36.8 °C) 60 18 112/74 93 % 18 1542 98.6 °F (37 °C) 79 18 115/83 100 % 18 1135 98.4 °F (36.9 °C) 67 16 (!) 125/92 99 % Temp (24hrs), Av.3 °F (36.8 °C), Min:97.8 °F (36.6 °C), Max:98.6 °F (37 °C) Oxygen Therapy O2 Sat (%): 96 % (18 0740) Pulse via Oximetry: 87 beats per minute (18 1620) O2 Device: Room air (18) Oxygen Therapy O2 Sat (%): 96 % (18 0740) Pulse via Oximetry: 87 beats per minute (18 1620) O2 Device: Room air (18) Physical Exam: 
General:         Alert, cooperative, no distress HEENT:               NCAT. No obvious deformity. Lungs: No wheezing/rhonchi/rales Cardiovascular:   RRR. No m/r/g. No pedal edema b/l. Abdomen:       S/nt/nd. Bowel sounds normal. .  
Skin:         No rashes or lesions. Not Jaundiced Neurologic:     No gross focal deficit. Psychiatric:         Good mood. Normal affect. DIAGNOSTIC STUDIES Data Review:  
Recent Results (from the past 24 hour(s)) GLUCOSE, POC Collection Time: 12/08/18 11:37 AM  
Result Value Ref Range Glucose (POC) 108 (H) 65 - 100 mg/dL GLUCOSE, POC Collection Time: 12/08/18  4:52 PM  
Result Value Ref Range Glucose (POC) 109 (H) 65 - 100 mg/dL GLUCOSE, POC Collection Time: 12/08/18  8:56 PM  
Result Value Ref Range Glucose (POC) 160 (H) 65 - 100 mg/dL CBC W/O DIFF Collection Time: 12/09/18  3:15 AM  
Result Value Ref Range WBC 10.7 4.3 - 11.1 K/uL  
 RBC 2.30 (L) 4.23 - 5.6 M/uL HGB 7.8 (L) 13.6 - 17.2 g/dL HCT 22.8 (L) 41.1 - 50.3 % MCV 99.1 (H) 79.6 - 97.8 FL  
 MCH 33.9 (H) 26.1 - 32.9 PG  
 MCHC 34.2 31.4 - 35.0 g/dL RDW 26.6 (H) 11.9 - 14.6 % PLATELET 350 553 - 796 K/uL MPV 10.5 9.4 - 12.3 FL ABSOLUTE NRBC 1.40 (H) 0.0 - 0.2 K/uL GLUCOSE, POC Collection Time: 12/09/18  7:16 AM  
Result Value Ref Range Glucose (POC) 132 (H) 65 - 100 mg/dL All Micro Results None Imaging Rachel Staple /Studies: CXR Results  (Last 48 hours) None CT Results  (Last 48 hours) None No results found. No results found for this visit on 12/06/18. Labs and Studies from previous 24 hours have been personally reviewed by myself   
ASSESSMENT Active Hospital Problems Diagnosis Date Noted  Sickle cell disease ([facility].) 07/13/2017  Sickle cell pain crisis ([facility] 10/25/2012 Hospital Problems as of 12/9/2018 Date Reviewed: 7/22/2018 Codes Class Noted - Resolved POA Sickle cell disease ([facility] ICD-10-CM: D57.1 ICD-9-CM: 282.60  7/13/2017 - Present Yes * (Principal) Sickle cell pain crisis ([facility] ICD-10-CM: D57.00 ICD-9-CM: 282.62  10/25/2012 - Present Unknown A/P: 
 
-Sick cell pain crisis Hb stable Hem evaluation appreciated Cont IVF, O2 therapy and pain medications Home when clinically improve 
 
-DM On metformin at home ISS while inpatient DVT Prophylaxis: lovenox CODE Status: Full Plan of Care Discussed with: patient. Care team. 
 
 
Debra Rudolph MD 
12/09/18

## 2018-12-09 NOTE — PROGRESS NOTES
END OF SHIFT NOTE: 
 
Intake/Output 12/08 1901 - 12/09 0700 In: 883 [I.V.:883] Out: 800 [Urine:800] Voiding: YES Catheter: NO 
Drain:   
 
 
 
 
Stool:  0 occurrences. Stool Assessment Stool Color: Brown(per pt.) (12/08/18 1546) Stool Appearance: Formed;Soft(per pt.) (12/08/18 1546) Emesis:  0 occurrences. VITAL SIGNS Patient Vitals for the past 12 hrs: 
 Temp Pulse Resp BP SpO2  
12/09/18 0259 98.3 °F (36.8 °C) 78 19 126/77 98 % 12/08/18 2318 97.8 °F (36.6 °C) 64 18 125/85 97 % 12/08/18 1935 98.3 °F (36.8 °C) 60 18 112/74 93 % Pain Assessment Pain 1 Pain Scale 1: Visual (12/09/18 0330) Pain Intensity 1: 0 (12/09/18 0330) Patient Stated Pain Goal: 0 (12/09/18 0259) Pain Reassessment 1: Patient sleeping (12/09/18 0330) Pain Onset 1: pta (12/09/18 0259) Pain Location 1: Leg (12/09/18 0259) Pain Orientation 1: Left;Right (12/09/18 0259) Pain Description 1: Aching (12/09/18 0259) Pain Intervention(s) 1: Medication (see MAR) (12/09/18 0259) Ambulating Yes Additional Information:  
IV dilaudid and PO oxy given per pt's request for BLE pain. Pt resting quietly. No visible s/sx of distress. No needs/concerns voiced at this time. Shift report given to oncoming nurse at the bedside.  
 
Marianne Khoury RN

## 2018-12-09 NOTE — PROGRESS NOTES
0612-Bedside report received from Rubia Liu RN. Resting in bed. No needs voiced. No s/s of acute distress. 1805-END OF SHIFT NOTE: 
Pt's VSS and is in no acute distress. Intake/Output 12/09 0701 - 12/09 1900 In: 2419 [P.O.:1140; I.V.:1279] Out: 3249 [Urine:2425] Voiding: YES Catheter: NO 
Drain:   
Stool:  0 occurrences. Stool Assessment Stool Color: Brown(per pt.) (12/08/18 1546) Stool Appearance: Formed;Soft(per pt.) (12/08/18 1546) Emesis:  0 occurrences. VITAL SIGNS Patient Vitals for the past 12 hrs: 
 Temp Pulse Resp BP SpO2  
12/09/18 1554 97.9 °F (36.6 °C) 71 16 134/75 98 % 12/09/18 1148 97.9 °F (36.6 °C) 80 16 113/58 91 % 12/09/18 0740 98.1 °F (36.7 °C) 74 18 128/88 96 % Pain Assessment Pain 1 Pain Scale 1: Numeric (0 - 10) (12/09/18 1645) Pain Intensity 1: 0 (12/09/18 1645) Patient Stated Pain Goal: 0 (12/09/18 0259) Pain Reassessment 1: Yes (12/09/18 1645) Pain Onset 1: pta (12/09/18 0259) Pain Location 1: Leg (12/09/18 1554) Pain Orientation 1: Left;Right (12/09/18 1554) Pain Description 1: Aching (12/09/18 1554) Pain Intervention(s) 1: Medication (see MAR) (12/09/18 1554) Ambulating Yes Fady Geronimo 1855-Bedside shift change report given to Rubia Liu RN (oncoming nurse) by Laura Parson RN (offgoing nurse). Report included the following information SBAR, Kardex, Intake/Output, MAR and Recent Results.

## 2018-12-10 VITALS
HEART RATE: 75 BPM | BODY MASS INDEX: 24.05 KG/M2 | TEMPERATURE: 98.6 F | DIASTOLIC BLOOD PRESSURE: 90 MMHG | WEIGHT: 168 LBS | RESPIRATION RATE: 18 BRPM | SYSTOLIC BLOOD PRESSURE: 147 MMHG | HEIGHT: 70 IN | OXYGEN SATURATION: 98 %

## 2018-12-10 LAB
ERYTHROCYTE [DISTWIDTH] IN BLOOD BY AUTOMATED COUNT: 26.5 % (ref 11.9–14.6)
GLUCOSE BLD STRIP.AUTO-MCNC: 133 MG/DL (ref 65–100)
HCT VFR BLD AUTO: 22.3 % (ref 41.1–50.3)
HGB BLD-MCNC: 7.7 G/DL (ref 13.6–17.2)
MCH RBC QN AUTO: 34.4 PG (ref 26.1–32.9)
MCHC RBC AUTO-ENTMCNC: 34.5 G/DL (ref 31.4–35)
MCV RBC AUTO: 99.6 FL (ref 79.6–97.8)
NRBC # BLD: 1.21 K/UL (ref 0–0.2)
PLATELET # BLD AUTO: 189 K/UL (ref 150–450)
PMV BLD AUTO: 10.6 FL (ref 9.4–12.3)
RBC # BLD AUTO: 2.24 M/UL (ref 4.23–5.6)
WBC # BLD AUTO: 9.2 K/UL (ref 4.3–11.1)

## 2018-12-10 PROCEDURE — 77030020253 HC SOL INJ D545NS .05 DEX .45 SAL

## 2018-12-10 PROCEDURE — 74011000250 HC RX REV CODE- 250: Performed by: INTERNAL MEDICINE

## 2018-12-10 PROCEDURE — 74011250636 HC RX REV CODE- 250/636: Performed by: INTERNAL MEDICINE

## 2018-12-10 PROCEDURE — 82962 GLUCOSE BLOOD TEST: CPT

## 2018-12-10 PROCEDURE — 74011250637 HC RX REV CODE- 250/637: Performed by: INTERNAL MEDICINE

## 2018-12-10 PROCEDURE — 85027 COMPLETE CBC AUTOMATED: CPT

## 2018-12-10 RX ORDER — OXYCODONE HYDROCHLORIDE 30 MG/1
30 TABLET ORAL
Qty: 20 TAB | Refills: 0 | Status: ON HOLD | OUTPATIENT
Start: 2018-12-10 | End: 2019-03-18 | Stop reason: SDUPTHER

## 2018-12-10 RX ORDER — HEPARIN 100 UNIT/ML
500 SYRINGE INTRAVENOUS ONCE
Status: COMPLETED | OUTPATIENT
Start: 2018-12-10 | End: 2018-12-10

## 2018-12-10 RX ADMIN — LISINOPRIL 5 MG: 5 TABLET ORAL at 08:31

## 2018-12-10 RX ADMIN — Medication 1 AMPULE: at 08:32

## 2018-12-10 RX ADMIN — SODIUM CHLORIDE, PRESERVATIVE FREE 500 UNITS: 5 INJECTION INTRAVENOUS at 09:36

## 2018-12-10 RX ADMIN — PROMETHAZINE HYDROCHLORIDE 12.5 MG: 25 INJECTION INTRAMUSCULAR; INTRAVENOUS at 02:33

## 2018-12-10 RX ADMIN — Medication 10 ML: at 05:16

## 2018-12-10 RX ADMIN — METOPROLOL TARTRATE 12.5 MG: 25 TABLET ORAL at 08:31

## 2018-12-10 RX ADMIN — HYDROMORPHONE HYDROCHLORIDE 1 MG: 1 INJECTION, SOLUTION INTRAMUSCULAR; INTRAVENOUS; SUBCUTANEOUS at 02:32

## 2018-12-10 RX ADMIN — FOLIC ACID 1 MG: 1 TABLET ORAL at 08:31

## 2018-12-10 RX ADMIN — OXYCODONE HYDROCHLORIDE 30 MG: 15 TABLET ORAL at 09:19

## 2018-12-10 RX ADMIN — HYDROMORPHONE HYDROCHLORIDE 1 MG: 1 INJECTION, SOLUTION INTRAMUSCULAR; INTRAVENOUS; SUBCUTANEOUS at 07:02

## 2018-12-10 RX ADMIN — HYDROXYUREA 1000 MG: 500 CAPSULE ORAL at 08:30

## 2018-12-10 NOTE — PROGRESS NOTES
Written and verbal d/c instructions provided to and reviewed with pt and spouse. All questions answered. Rx given to pt. Port flushed and de-accessed per policy. Pt escorted via wheelchair to lobby and discharged home.

## 2018-12-10 NOTE — DISCHARGE INSTRUCTIONS
DISCHARGE SUMMARY from Nurse    PATIENT INSTRUCTIONS:    While taking prescription Narcotics:  · Limit your activities  · Do not drive and operate hazardous machinery  · Do not make important personal or business decisions  · Do  not drink alcoholic beverages  · If you have not urinated within 8 hours after discharge, please contact your doctor on call. Report the following to your surgeon:  · Excessive pain, swelling, redness or odor of or around the port area  · Temperature over 100.5  · Nausea and vomiting lasting longer than 4 hours or if unable to take medications  · Any signs of decreased circulation or nerve impairment to extremity: change in color, persistent  numbness, tingling, coldness or increase pain  · Any questions    What to do at Home:  Recommended activity: Activity as tolerated and No driving while on analgesics    Please call physician after your discharge if the following symptoms present:    1. Temperature greater than 100.5 (check temp every day)  2. Heart rate greater than 90 beats per minute  3. Increased respirations   4. Chills  5. Cough with any sputum (color: yellow, white, green, or bloody?)  6. Shortness of breath or change in breathing pattern  7. Bleeding:  Any prolonged bleeding presented from skin tears, cuts, bleeding from brushing teeth, nose bleed, bleeding noted in stool  8. Tenderness, swelling, or drainage from IV site or central line catheter    Diet: Regular    *  Please give a list of your current medications to your Primary Care Provider. *  Please update this list whenever your medications are discontinued, doses are      changed, or new medications (including over-the-counter products) are added. *  Please carry medication information at all times in case of emergency situations.     These are general instructions for a healthy lifestyle:    No smoking/ No tobacco products/ Avoid exposure to second hand smoke  Surgeon General's Warning:  Quitting smoking now greatly reduces serious risk to your health. Obesity, smoking, and sedentary lifestyle greatly increases your risk for illness    A healthy diet, regular physical exercise & weight monitoring are important for maintaining a healthy lifestyle    You may be retaining fluid if you have a history of heart failure or if you experience any of the following symptoms:  Weight gain of 3 pounds or more overnight or 5 pounds in a week, increased swelling in our hands or feet or shortness of breath while lying flat in bed. Please call your doctor as soon as you notice any of these symptoms; do not wait until your next office visit. Recognize signs and symptoms of STROKE:    F-face looks uneven    A-arms unable to move or move unevenly    S-speech slurred or non-existent    T-time-call 911 as soon as signs and symptoms begin-DO NOT go       Back to bed or wait to see if you get better-TIME IS BRAIN. Warning Signs of HEART ATTACK     Call 911 if you have these symptoms:   Chest discomfort. Most heart attacks involve discomfort in the center of the chest that lasts more than a few minutes, or that goes away and comes back. It can feel like uncomfortable pressure, squeezing, fullness, or pain.  Discomfort in other areas of the upper body. Symptoms can include pain or discomfort in one or both arms, the back, neck, jaw, or stomach.  Shortness of breath with or without chest discomfort.  Other signs may include breaking out in a cold sweat, nausea, or lightheadedness. Don't wait more than five minutes to call 911 - MINUTES MATTER! Fast action can save your life. Calling 911 is almost always the fastest way to get lifesaving treatment. Emergency Medical Services staff can begin treatment when they arrive -- up to an hour sooner than if someone gets to the hospital by car. The discharge information has been reviewed with the patient. The patient verbalized understanding.   Discharge medications reviewed with the patient and appropriate educational materials and side effects teaching were provided.   ___________________________________________________________________________________________________________________________________

## 2018-12-10 NOTE — PROGRESS NOTES
Problem: Falls - Risk of 
Goal: *Absence of Falls Document Laura Ritchie Fall Risk and appropriate interventions in the flowsheet. Outcome: Progressing Towards Goal 
Fall Risk Interventions: 
  
 
  
 
Medication Interventions: Evaluate medications/consider consulting pharmacy, Teach patient to arise slowly History of Falls Interventions: Consult care management for discharge planning, Evaluate medications/consider consulting pharmacy

## 2018-12-10 NOTE — PROGRESS NOTES
END OF SHIFT NOTE: 
 
Intake/Output 12/09 1901 - 12/10 0700 In: 1050 [I.V.:1050] Out: -   
Voiding: YES Catheter: NO 
Drain:   
 
 
 
 
Stool:  0 occurrences. Stool Assessment Stool Color: Brown(per pt.) (12/08/18 1546) Stool Appearance: Formed;Soft(per pt.) (12/08/18 1546) Emesis:  0 occurrences. VITAL SIGNS Patient Vitals for the past 12 hrs: 
 Temp Pulse Resp BP SpO2  
12/10/18 0231 98.6 °F (37 °C) 74 17 143/65 98 % 12/09/18 2310 98.4 °F (36.9 °C) 77 18 139/53 96 % 12/09/18 1905 98.1 °F (36.7 °C) 81 18 110/75 95 % Pain Assessment Pain 1 Pain Scale 1: Numeric (0 - 10) (12/10/18 0231) Pain Intensity 1: 7 (12/10/18 0231) Patient Stated Pain Goal: 0 (12/09/18 2130) Pain Reassessment 1: Yes (12/09/18 2130) Pain Onset 1: pta (12/09/18 2044) Pain Location 1: Leg (12/09/18 2044) Pain Orientation 1: Right;Left (12/09/18 2044) Pain Description 1: Aching (12/09/18 1554) Pain Intervention(s) 1: Medication (see MAR) (12/10/18 0231) Ambulating Yes Additional Information:  
IV dilaudid given prn for BLE pain per pt's request. 
Pt resting quietly. No visible s/sx of distress. No needs/concerns voiced at this time. Shift report given to oncoming nurse at the bedside.  
 
Hilton Aguero, RN

## 2018-12-10 NOTE — DISCHARGE SUMMARY
Hospitalist Discharge Summary     Admit Date:  2018  2:07 PM   Name:  Vy Kahn   Age:  37 y.o.  :  1975   MRN:  [id number]   PCP:  Del Claire MD  Treatment Team: Attending Provider: Kassidy Abraham DO; Utilization Review: Elzbieta Barr    Problem List for this Hospitalization:  Hospital Problems as of 12/10/2018 Date Reviewed: 2018          Codes Class Noted - Resolved POA    Sickle cell disease ([facility] ICD-10-CM: D57.1  ICD-9-CM: 282.60  2017 - Present Yes        * (Principal) Sickle cell pain crisis [facility]) ICD-10-CM: D57.00  ICD-9-CM: 282.62  10/25/2012 - Present Unknown                Admission HPI from 2018:    \" Please refer to HPI for more details. \"    Hospital Course:     38 yo male with pmh sickle cell disease who follows with Dr. Vicente Blandon at [facility] who presented to ER with sickle cell pain crisis. In the ED  Hgb 7.0.  He was started on IV fluids and Dilaudid. Hem recommended to continue current treatment and follow up with Hem upon discharge. Patient was transfused 1 PRBC for anemia and hb remained stable during hospital stay. Patient clinically improved and now is stable to be discharged home. Patient was advised to continue current medications for sickle cell disease and to follow up his appointments as scheduled. Follow up instructions below. Plan was discussed with patient/careteam.  All questions answered. Patient was stable at time of discharge and was instructed to call or return if there are any concerns or recurrence of symptoms. Diagnostic Imaging/Tests: All Micro Results     None          Labs: Results:       BMP, Mg, Phos No results for input(s): NA, K, CL, CO2, AGAP, BUN, CREA, CA, GLU, MG, PHOS in the last 72 hours.    CBC Recent Labs     12/10/18  0242 18  0315 18  0421   WBC 9.2 10.7 10.0   RBC 2.24* 2.30* 2.31*   HGB 7.7* 7.8* 8.1*   HCT 22.3* 22.8* 22.9*    199 216      LFT No results for input(s): SGOT, ALT, TBIL, AP, TP, ALB, GLOB, AGRAT, GPT in the last 72 hours.    Cardiac Testing Lab Results   Component Value Date/Time    BNP 77 07/22/2018 09:48 AM     (H) 04/26/2012 03:17 PM    CK 39 01/17/2017 03:35 AM    CK 42 01/17/2017 01:30 AM    CK 41 01/16/2017 09:05 PM    CK - MB 0.6 01/17/2017 03:35 AM    CK - MB <0.5 (L) 01/17/2017 01:30 AM    CK - MB <0.5 (L) 01/16/2017 09:05 PM    CK-MB Index 1.5 01/17/2017 03:35 AM    CK-MB Index CANNOT BE CALCULATED 01/17/2017 01:30 AM    CK-MB Index CANNOT BE CALCULATED 01/16/2017 09:05 PM    Troponin-I <0.05 04/26/2012 03:17 PM    Troponin-I, Qt. 0.02 07/22/2018 05:58 PM    Troponin-I, Qt. <0.02 (L) 05/28/2018 07:52 PM    Troponin-I, Qt. <0.02 (L) 08/10/2017 04:36 PM      Coagulation Tests Lab Results   Component Value Date/Time    Prothrombin time 11.6 02/08/2015 12:05 PM    Prothrombin time 11.9 (H) 10/24/2012 02:45 PM    INR 1.1 02/08/2015 12:05 PM    INR 1.1 10/24/2012 02:45 PM    aPTT 26.0 10/24/2012 02:45 PM      A1c Lab Results   Component Value Date/Time    Hemoglobin A1c 7.0 (H) 11/07/2018 03:47 AM      Lipid Panel No results found for: CHOL, CHOLPOCT, CHOLX, CHLST, CHOLV, 486626, HDL, LDL, LDLC, DLDLP, 607363, VLDLC, VLDL, TGLX, TRIGL, TRIGP, TGLPOCT, CHHD, CHHDX   Thyroid Panel No results found for: T4, T3U, TSH, TSHEXT, TSHEXT     Most Recent UA Lab Results   Component Value Date/Time    Color YELLOW 04/02/2018 11:50 PM    Appearance CLEAR 04/02/2018 11:50 PM    Specific gravity 1.009 04/02/2018 11:50 PM    pH (UA) 7.0 04/02/2018 11:50 PM    Protein TRACE (A) 04/02/2018 11:50 PM    Glucose NEGATIVE  04/02/2018 11:50 PM    Ketone NEGATIVE  04/02/2018 11:50 PM    Bilirubin NEGATIVE  04/02/2018 11:50 PM    Blood TRACE (A) 04/02/2018 11:50 PM    Urobilinogen 0.2 04/02/2018 11:50 PM    Nitrites NEGATIVE  04/02/2018 11:50 PM    Leukocyte Esterase NEGATIVE  04/02/2018 11:50 PM        Allergies   Allergen Reactions    Compazine [Prochlorperazine Edisylate] Other (comments)     Also makes him feel funny    Morphine Other (comments)     Makes him feel funny    Reglan [Metoclopramide] Other (comments)     \"feel funny\"    Zofran [Ondansetron Hcl (Pf)] Other (comments)     Make him feel funny     Immunization History   Administered Date(s) Administered    Influenza Vaccine 09/10/2015    Influenza Vaccine Split 09/27/2012    ZZZ-RETIRED (DO NOT USE) Pneumococcal Vaccine (Unspecified Type) 09/21/2010       All Labs from Last 24 Hrs:  Recent Results (from the past 24 hour(s))   GLUCOSE, POC    Collection Time: 12/09/18 11:52 AM   Result Value Ref Range    Glucose (POC) 128 (H) 65 - 100 mg/dL   GLUCOSE, POC    Collection Time: 12/09/18  5:03 PM   Result Value Ref Range    Glucose (POC) 118 (H) 65 - 100 mg/dL   GLUCOSE, POC    Collection Time: 12/09/18  9:08 PM   Result Value Ref Range    Glucose (POC) 156 (H) 65 - 100 mg/dL   CBC W/O DIFF    Collection Time: 12/10/18  2:42 AM   Result Value Ref Range    WBC 9.2 4.3 - 11.1 K/uL    RBC 2.24 (L) 4.23 - 5.6 M/uL    HGB 7.7 (L) 13.6 - 17.2 g/dL    HCT 22.3 (L) 41.1 - 50.3 %    MCV 99.6 (H) 79.6 - 97.8 FL    MCH 34.4 (H) 26.1 - 32.9 PG    MCHC 34.5 31.4 - 35.0 g/dL    RDW 26.5 (H) 11.9 - 14.6 %    PLATELET 099 153 - 578 K/uL    MPV 10.6 9.4 - 12.3 FL    ABSOLUTE NRBC 1.21 (H) 0.0 - 0.2 K/uL   GLUCOSE, POC    Collection Time: 12/10/18  7:27 AM   Result Value Ref Range    Glucose (POC) 133 (H) 65 - 100 mg/dL       Discharge Exam:  Patient Vitals for the past 24 hrs:   Temp Pulse Resp BP SpO2   12/10/18 0646 98.6 °F (37 °C) 75 18 147/90 98 %   12/10/18 0231 98.6 °F (37 °C) 74 17 143/65 98 %   12/09/18 2310 98.4 °F (36.9 °C) 77 18 139/53 96 %   12/09/18 1905 98.1 °F (36.7 °C) 81 18 110/75 95 %   12/09/18 1554 97.9 °F (36.6 °C) 71 16 134/75 98 %   12/09/18 1148 97.9 °F (36.6 °C) 80 16 113/58 91 %     Oxygen Therapy  O2 Sat (%): 98 % (12/10/18 0646)  Pulse via Oximetry: 87 beats per minute (12/06/18 1620)  O2 Device: Room air (12/09/18 2046)    Intake/Output Summary (Last 24 hours) at 12/10/2018 0914  Last data filed at 12/10/2018 0841  Gross per 24 hour   Intake 3309 ml   Output 2225 ml   Net 1084 ml       Patient states still having generalized pain but is well controlled. Case discussed with patient's wife over patient's phone. General:    Well nourished. Alert. No distress. Eyes:   Normal sclera. Extraocular movements intact. ENT:  Normocephalic, atraumatic. Moist mucous membranes  CV:   Regular rate and rhythm. No murmur, rub, or gallop. Lungs:  Clear to auscultation bilaterally. Abdomen: Soft, nontender, nondistended. Bowel sounds normal.   Extremities: Warm and dry. No cyanosis or edema. Neurologic: CN II-XII grossly intact. No gross focal deficits   Skin:     No rashes or jaundice. No wounds. Psych:  Normal mood and affect. Discharge Info:   Current Discharge Medication List      CONTINUE these medications which have CHANGED    Details   oxyCODONE IR (ROXICODONE) 30 mg immediate release tablet Take 1 Tab by mouth every four (4) hours as needed. Max Daily Amount: 180 mg.  Qty: 20 Tab, Refills: 0    Associated Diagnoses: Vasoocclusive sickle cell crisis ([facility]         CONTINUE these medications which have NOT CHANGED    Details   metFORMIN (GLUCOPHAGE) 500 mg tablet Take 1 Tab by mouth daily (with breakfast). Qty: 30 Tab, Refills: 0      glucose blood VI test strips (ASCENSIA AUTODISC VI, ONE TOUCH ULTRA TEST VI) strip Check BG qac. Dx new onset DM2  Qty: 48 Strip, Refills: 0      Lancets misc Check BG qac. Dx new onset DM2  Qty: 1 Each, Refills: 11      hydroxyurea (HYDREA) 500 mg capsule Take 1 Cap by mouth two (2) times a day. Takes in am at 0900. Star after follow up with your PCP. Qty: 60 Cap, Refills: 0      deferasirox (JADENU) 360 mg tab Take 5 Tabs by mouth daily. Qty: 150 Tab, Refills: 0      promethazine (PHENERGAN) 25 mg tablet Take 1 Tab by mouth every six (6) hours as needed.  May substitute suppository if vomiting  Qty: 20 Tab, Refills: 0      metoprolol (LOPRESSOR) 25 mg tablet Take 12.5 mg by mouth two (2) times a day. Indications: hypertension      lisinopril (PRINIVIL, ZESTRIL) 5 mg tablet Take 5 mg by mouth daily. Indications: HYPERTENSION      folic acid (FOLVITE) 1 mg tablet Take 1 mg by mouth daily. Blood-Glucose Meter monitoring kit Check blood glucose qac  Qty: 1 Kit, Refills: 0      magnesium oxide (MAG-OX) 400 mg tablet Take 1 Tab by mouth daily. Qty: 10 Tab, Refills: 0         STOP taking these medications       oxyCODONE ER (OXYCONTIN) 80 mg ER tablet Comments:   Reason for Stopping:                 Disposition: home  Activity: Activity as tolerated  Diet: Diabetic Diet    Follow-up Information     Follow up With Specialties Details Why Contact Info    Ellyn Quiros MD [address]  [phone]                  Signed:   Dwaine Armijo MD

## 2018-12-11 ENCOUNTER — PATIENT OUTREACH (OUTPATIENT)
Dept: CASE MANAGEMENT | Age: 43
End: 2018-12-11

## 2018-12-11 NOTE — PROGRESS NOTES
This note will not be viewable in [address] Date/Time of Call:   12/11/18 1229pm   What was the patient hospitalized for? Sickle Cell Pain Crisis   Consent for JOSÉ TREVIZO Call       Does the patient understand his/her diagnosis and/or treatment and what happened during the hospitalization? Called and spoke with patient, he agrees to the call. Yes, patient states that he understands her diagnosis and treatment from the hospital    Did the patient receive discharge instructions? Yes   CM Assessed Risk for Readmission:       Patient stated Risk for Readmission:      Patient is a high risk for readmission per Care Coordinator assessment     Patient states that he is not concerned for readmission at this time, even though he is sure he will be back in at some point   Review any discharge instructions (see discharge instructions/AVS in [address]). Ask patient if they understand these. Do they have any questions? DC instructions reviewed     Were home services ordered (nursing, PT, OT, ST, etc.)? No    If so, has the first visit occurred? If not, why? (Assist with coordination of services if necessary. )   NA   Was any DME ordered? Yes   If so, has it been received? If not, why?  (Assist patient in obtaining DME orders &/or equipment if necessary. ) NA   Complete a review of all medications (new, continued and discontinued meds per the D/C instructions and medication tab in [facility]). Medications reviewed    Were all new prescriptions filled? If not, why?  (Assist patient in obtaining medications if necessary  escalate for CCM &/or SW if ongoing issues are verbalized by pt or anticipated)   NA   Does the patient understand the purpose and dosing instructions for all medications?   (If patient has questions, provide explanation and education.)   Patient states that he understands his medication dosages, and purposes for all medications   Does the patient have any problems in performing ADLs? (If patient is unable to perform ADLs  what is the limiting factor(s)? Do they have a support system that can assist? If no support system is present, discuss possible assistance that they may be able to obtain. Escalate for CCM/SW if ongoing issues are verbalized by pt or anticipated)   Patient states that he is independent with ADLs and that his family will assist if needed    Does the patient have all follow-up appointments scheduled? 7 day f/up with PCP?   (f/up with PCP may be w/in 14 days if patient has a f/up with their specialist w/in 7 days)    7-14 day f/up with specialist?   (or per discharge instructions)    If f/up has not been made  what actions has the care coordinator made to accomplish this? Has transportation been arranged? Yes      NA        Hematology with [facility] on 12/24/18       Explained the importance of following up with his PCP. He verbalizes understanding stating that he will call and schedule an appointment      Patient states that he will transport himself to his appointments. Any other questions or concerns expressed by the patient? Patient denies any questions or concerns at this time. Care Coordinator contact information provided should any questions or concerns arise that the patient needs assistance with. Schedule next appointment with JOSÉ TREVIZO Coordinator or refer to RN Case Manager/ per the workflow guidelines. When is care coordinators next follow-up call scheduled? If referred for CCM  what RN care manager was the referral assigned? Within 30 daysdiscussed CCM with the patient. He declines at this time stating that he and his family are very knowledgeable of his condition.        Within 30 days        NA    LALY Call Completed By: Simran Santiago, [facility] Coordinator

## 2018-12-31 ENCOUNTER — PATIENT OUTREACH (OUTPATIENT)
Dept: CASE MANAGEMENT | Age: 43
End: 2018-12-31

## 2018-12-31 NOTE — PROGRESS NOTES
This note will not be viewable in [address] Called and spoke with patient for 30 day FU. He states that he is doing good. He has all of his medications. Patient denies any questions or concerns at this time. Episode closed as no further needs are identified at this time.

## 2019-01-01 NOTE — PROGRESS NOTES
Admission database complete. Patient's wife is to bring in patient's medication. New patient packet given and reviewed with patient/family. Patient/family oriented to room and call system. SBAR handoff given to primary nurse. Management of prematurity

## 2019-03-09 ENCOUNTER — HOSPITAL ENCOUNTER (EMERGENCY)
Age: 44
Discharge: HOME | End: 2019-03-09
Attending: EMERGENCY MEDICINE
Payer: MEDICARE

## 2019-03-09 VITALS
OXYGEN SATURATION: 98 % | WEIGHT: 163 LBS | SYSTOLIC BLOOD PRESSURE: 129 MMHG | BODY MASS INDEX: 23.34 KG/M2 | HEIGHT: 70 IN | TEMPERATURE: 98.6 F | DIASTOLIC BLOOD PRESSURE: 78 MMHG | HEART RATE: 92 BPM | RESPIRATION RATE: 18 BRPM

## 2019-03-09 DIAGNOSIS — D57.00 SICKLE CELL CRISIS (HCC): Primary | ICD-10-CM

## 2019-03-09 LAB
ALBUMIN SERPL-MCNC: 3.6 G/DL (ref 3.5–5)
ALBUMIN/GLOB SERPL: 0.8 {RATIO} (ref 1.2–3.5)
ALP SERPL-CCNC: 73 U/L (ref 50–136)
ALT SERPL-CCNC: 66 U/L (ref 12–65)
ANION GAP SERPL CALC-SCNC: 7 MMOL/L (ref 7–16)
AST SERPL-CCNC: 53 U/L (ref 15–37)
BASOPHILS # BLD: 0.1 K/UL (ref 0–0.2)
BASOPHILS NFR BLD: 1 % (ref 0–2)
BILIRUB SERPL-MCNC: 1.5 MG/DL (ref 0.2–1.1)
BUN SERPL-MCNC: 11 MG/DL (ref 6–23)
CALCIUM SERPL-MCNC: 8.8 MG/DL (ref 8.3–10.4)
CHLORIDE SERPL-SCNC: 106 MMOL/L (ref 98–107)
CO2 SERPL-SCNC: 26 MMOL/L (ref 21–32)
CREAT SERPL-MCNC: 0.96 MG/DL (ref 0.8–1.5)
DIFFERENTIAL METHOD BLD: ABNORMAL
EOSINOPHIL # BLD: 0.5 K/UL (ref 0–0.8)
EOSINOPHIL NFR BLD: 5 % (ref 0.5–7.8)
ERYTHROCYTE [DISTWIDTH] IN BLOOD BY AUTOMATED COUNT: 21.4 % (ref 11.9–14.6)
GLOBULIN SER CALC-MCNC: 4.7 G/DL (ref 2.3–3.5)
GLUCOSE SERPL-MCNC: 119 MG/DL (ref 65–100)
HCT VFR BLD AUTO: 20.6 % (ref 41.1–50.3)
HGB BLD-MCNC: 7.3 G/DL (ref 13.6–17.2)
HGB RETIC QN AUTO: 46 PG (ref 29–35)
IMM GRANULOCYTES # BLD AUTO: 0 K/UL (ref 0–0.5)
IMM GRANULOCYTES NFR BLD AUTO: 0 % (ref 0–5)
IMM RETICS NFR: 39.2 % (ref 2.3–13.4)
LYMPHOCYTES # BLD: 2.2 K/UL (ref 0.5–4.6)
LYMPHOCYTES NFR BLD: 23 % (ref 13–44)
MCH RBC QN AUTO: 43.7 PG (ref 26.1–32.9)
MCHC RBC AUTO-ENTMCNC: 35.4 G/DL (ref 31.4–35)
MCV RBC AUTO: 123.4 FL (ref 79.6–97.8)
MONOCYTES # BLD: 1 K/UL (ref 0.1–1.3)
MONOCYTES NFR BLD: 10 % (ref 4–12)
NEUTS SEG # BLD: 6.1 K/UL (ref 1.7–8.2)
NEUTS SEG NFR BLD: 62 % (ref 43–78)
NRBC # BLD: 1.47 K/UL (ref 0–0.2)
PLATELET # BLD AUTO: 198 K/UL (ref 150–450)
PMV BLD AUTO: 10.8 FL (ref 9.4–12.3)
POTASSIUM SERPL-SCNC: 4 MMOL/L (ref 3.5–5.1)
PROT SERPL-MCNC: 8.3 G/DL (ref 6.3–8.2)
RBC # BLD AUTO: 1.67 M/UL (ref 4.23–5.6)
RETICS # AUTO: 0.12 M/UL (ref 0.03–0.1)
RETICS/RBC NFR AUTO: 7.4 % (ref 0.3–2)
SODIUM SERPL-SCNC: 139 MMOL/L (ref 136–145)
WBC # BLD AUTO: 9.8 K/UL (ref 4.3–11.1)

## 2019-03-09 PROCEDURE — 96376 TX/PRO/DX INJ SAME DRUG ADON: CPT | Performed by: EMERGENCY MEDICINE

## 2019-03-09 PROCEDURE — 80053 COMPREHEN METABOLIC PANEL: CPT

## 2019-03-09 PROCEDURE — 85025 COMPLETE CBC W/AUTO DIFF WBC: CPT

## 2019-03-09 PROCEDURE — 85046 RETICYTE/HGB CONCENTRATE: CPT

## 2019-03-09 PROCEDURE — 96375 TX/PRO/DX INJ NEW DRUG ADDON: CPT | Performed by: EMERGENCY MEDICINE

## 2019-03-09 PROCEDURE — 74011250636 HC RX REV CODE- 250/636: Performed by: EMERGENCY MEDICINE

## 2019-03-09 PROCEDURE — 99284 EMERGENCY DEPT VISIT MOD MDM: CPT | Performed by: EMERGENCY MEDICINE

## 2019-03-09 PROCEDURE — 96374 THER/PROPH/DIAG INJ IV PUSH: CPT | Performed by: EMERGENCY MEDICINE

## 2019-03-09 PROCEDURE — 74011000250 HC RX REV CODE- 250: Performed by: EMERGENCY MEDICINE

## 2019-03-09 RX ORDER — HYDROMORPHONE HYDROCHLORIDE 1 MG/ML
2 INJECTION, SOLUTION INTRAMUSCULAR; INTRAVENOUS; SUBCUTANEOUS ONCE
Status: COMPLETED | OUTPATIENT
Start: 2019-03-09 | End: 2019-03-09

## 2019-03-09 RX ORDER — HYDROMORPHONE HYDROCHLORIDE 1 MG/ML
2 INJECTION, SOLUTION INTRAMUSCULAR; INTRAVENOUS; SUBCUTANEOUS
Status: COMPLETED | OUTPATIENT
Start: 2019-03-09 | End: 2019-03-09

## 2019-03-09 RX ORDER — HEPARIN 100 UNIT/ML
100 SYRINGE INTRAVENOUS AS NEEDED
Status: DISCONTINUED | OUTPATIENT
Start: 2019-03-09 | End: 2019-03-09 | Stop reason: HOSPADM

## 2019-03-09 RX ADMIN — PROMETHAZINE HYDROCHLORIDE 25 MG: 25 INJECTION INTRAMUSCULAR; INTRAVENOUS at 08:30

## 2019-03-09 RX ADMIN — SODIUM CHLORIDE, PRESERVATIVE FREE 100 UNITS: 5 INJECTION INTRAVENOUS at 11:52

## 2019-03-09 RX ADMIN — PROMETHAZINE HYDROCHLORIDE 25 MG: 25 INJECTION INTRAMUSCULAR; INTRAVENOUS at 11:52

## 2019-03-09 RX ADMIN — HYDROMORPHONE HYDROCHLORIDE 2 MG: 1 INJECTION, SOLUTION INTRAMUSCULAR; INTRAVENOUS; SUBCUTANEOUS at 10:00

## 2019-03-09 RX ADMIN — SODIUM CHLORIDE 1000 ML: 900 INJECTION, SOLUTION INTRAVENOUS at 08:22

## 2019-03-09 RX ADMIN — HYDROMORPHONE HYDROCHLORIDE 2 MG: 1 INJECTION, SOLUTION INTRAMUSCULAR; INTRAVENOUS; SUBCUTANEOUS at 08:31

## 2019-03-09 RX ADMIN — HYDROMORPHONE HYDROCHLORIDE 2 MG: 1 INJECTION, SOLUTION INTRAMUSCULAR; INTRAVENOUS; SUBCUTANEOUS at 11:52

## 2019-03-09 NOTE — DISCHARGE INSTRUCTIONS
Patient Education        Sickle Cell Crisis: Care Instructions  Your Care Instructions    Sickle cell crisis is a painful episode that may begin suddenly in a person with sickle cell disease. Sickle cell disease turns normal, round red blood cells into cells that look like kendall or crescent moons. The sickle-shaped cells can get stuck in blood vessels, blocking blood flow and causing severe pain. The pain can occur in the bones of the spine, the arms and legs, the chest, and the abdomen. An episode may be called a \"painful event\" or \"painful crisis. \" Some people who have sickle cell disease have many painful events, while others have few or none. Treatment depends on the level of pain and how long it lasts. Sometimes taking nonprescription pain relievers can help. Or you may need stronger pain relief medicine that is prescribed or given by a doctor. You may need to be treated in the hospital.  It isn't always possible to know what sets off a painful event. But triggers include being dehydrated, cold temperatures, infection, stress, and not getting enough oxygen. Follow-up care is a key part of your treatment and safety. Be sure to make and go to all appointments, and call your doctor if you are having problems. It's also a good idea to know your test results and keep a list of the medicines you take. How can you care for yourself at home? · Create a pain management plan with your doctor. This plan should include the types of medicines you can take and other actions you can take at home to relieve pain. · Drink plenty of fluids, enough so that your urine is light yellow or clear like water. If you have kidney, heart, or liver disease and have to limit fluids, talk with your doctor before you increase the amount of fluids you drink. · Take your medicines exactly as prescribed. Call your doctor if you think you are having a problem with your medicine. · Take pain medicines exactly as directed.   ? If the doctor gave you a prescription medicine for pain, take it as prescribed. ? If you are not taking a prescription pain medicine, ask your doctor if you can take an over-the-counter medicine. · Avoid alcohol. It can make you dehydrated. · Dress warmly in cold weather. The cold and windy weather can lead to severe pain. · Do not smoke. Smoking can reduce the amount of oxygen in your blood. · Get plenty of sleep. When should you call for help? Call 911 anytime you think you may need emergency care. For example, call if:    · You have symptoms of a severe problem from sickle cell.     · You have symptoms of a stroke. These may include:  ? Sudden numbness, tingling, weakness, or loss of movement in your face, arm, or leg, especially on only one side of your body. ? Sudden vision changes. ? Sudden trouble speaking. ? Sudden confusion or trouble understanding simple statements. ? Sudden problems with walking or balance. ? A sudden, severe headache that is different from past headaches.     · You are in severe pain.     · You have symptoms of a heart attack. These may include:  ? Chest pain or pressure, or a strange feeling in the chest.  ? Sweating. ? Shortness of breath. ? Nausea or vomiting. ? Pain, pressure, or a strange feeling in the back, neck, jaw, or upper belly or in one or both shoulders or arms. ? Lightheadedness or sudden weakness. ? A fast or irregular heartbeat. After you call 911, the  may tell you to chew 1 adult-strength or 2 to 4 low-dose aspirin. Wait for an ambulance. Do not try to drive yourself.    Call your doctor now or seek immediate medical care if:    · You have a fever.    Watch closely for changes in your health, and be sure to contact your doctor if you have any problems. Where can you learn more? Go to http://socorro-rosita.info/. Enter F104 in the search box to learn more about \"Sickle Cell Crisis: Care Instructions. \"  Current as of:  May 6, 2018  Content Version: 11.9  © 7614-7973 [employer]. Care instructions adapted under license by [employer] (which disclaims liability or warranty for this information). If you have questions about a medical condition or this instruction, always ask your healthcare professional. [address] any warranty or liability for your use of this information.

## 2019-03-09 NOTE — ED TRIAGE NOTES
Pt states he is having a sickle cell crisis, c/o pain all over. Symptoms x2-3 days. Nausea/vomiting/diarrhae. Last crisis x1 month ago.

## 2019-03-09 NOTE — ED NOTES
Pt resting on stretcher at this time. Pt maintaining airway on 2LNC without difficulty. Pt continues to be absent of s/sx of distress.

## 2019-03-09 NOTE — ED PROVIDER NOTES
Patient is a 57-year-old male who is coming in with sickle cell crisis. He has a history of having frequent crisis native required emergency department visits. He last had a crisis about a month ago. He states he takes oxycodone 30 mg every 4 hours at home only whenever he is having a pain crisis. He denies any chest pain, shortness of breath, fevers. States this feels like a typical crisis pain is mainly in both legs diffusely. He has had some nausea and vomiting.              Past Medical History:   Diagnosis Date    Chronic pain     HTN (hypertension)     Ill-defined condition     sickle cell    Iron overload due to repeated red blood cell transfusions 1/18/2017    Paroxysmal SV[facility]ra[facility]ricular [facility]cardia[facility]) 7/9/2016    Sickle cell disease [facility] [address]        Past Surgical History:   Procedure Laterality Date    HC PENILE IMPL DURA II POSITINBLE      HC PORT LIFE SNGLE LUMEN 5013      to L CW    HX CHOLECYSTECTOMY      HX OTHER SURGICAL      penile inplant    HX VASCULAR ACCESS           Family History:   Problem Relation Age of Onset    Hypertension Other     Diabetes Father     Stroke Father     Sickle Cell Anemia Sister        Social History     Socioeconomic History    Marital status:      Spouse name: Not on file    Number of children: Not on file    Years of education: Not on file    Highest education level: Not on file   Social Needs    Financial resource strain: Not on file    Food insecurity - worry: Not on file    Food insecurity - inability: Not on file   [employer] needs - medical: Not on file   [employer] needs - non-medical: Not on file   Occupational History    Not on file   Tobacco Use    Smoking status: Never Smoker    Smokeless tobacco: Never Used   Substance and Sexual Activity    Alcohol use: No     Alcohol/week: 0.0 oz    Drug use: No    Sexual activity: Yes   Other Topics Concern    Not on file   Social History Narrative    Not on file ALLERGIES: Compazine [prochlorperazine edisylate]; Morphine; Reglan [metoclopramide]; and Zofran [ondansetron hcl (pf)]    Review of Systems   Constitutional: Negative for chills and fever. Respiratory: Negative for cough, shortness of breath, wheezing and stridor. Cardiovascular: Negative for chest pain and palpitations. Gastrointestinal: Negative for abdominal pain, diarrhea, nausea and vomiting. Musculoskeletal: Positive for arthralgias and myalgias. Negative for back pain, neck pain and neck stiffness. Skin: Negative for color change, pallor and wound. Neurological: Negative for weakness and numbness. Vitals:    03/09/19 0749   BP: 152/82   Pulse: 92   Resp: 18   Temp: 98.6 °F (37 °C)   SpO2: 97%   Weight: 73.9 kg (163 lb)   Height: 5' 10\" (1.778 m)            Physical Exam   Constitutional: He is oriented to person, place, and time. He appears well-developed and well-nourished. No distress. HENT:   Head: Normocephalic and atraumatic. Eyes: Conjunctivae and EOM are normal. Pupils are equal, round, and reactive to light. Right eye exhibits no discharge. Left eye exhibits no discharge. Neck: Neck supple. Cardiovascular: Normal rate, regular rhythm and normal heart sounds. Exam reveals no gallop and no friction rub. No murmur heard. Pulmonary/Chest: Effort normal and breath sounds normal. No stridor. No respiratory distress. He has no wheezes. He has no rales. Abdominal: Soft. He exhibits no distension and no mass. There is no tenderness. There is no guarding. Musculoskeletal: Normal range of motion. He exhibits no edema, tenderness or deformity. Neurological: He is alert and oriented to person, place, and time. No focal weakness speech normal   Skin: Skin is warm and dry. Capillary refill takes less than 2 seconds. He is not diaphoretic. Psychiatric: He has a normal mood and affect. His behavior is normal.   Nursing note and vitals reviewed.        MDM  Number of Diagnoses or Management Options  Diagnosis management comments: Patient has typical sickle cell crisis. No red flags. He does have extremely high opiate tolerance. He is feeling better after 2 doses requests one more dose and states he would like to go home. Ronnie Infante MD; 3/9/2019 @10:59 AM Voice dictation software was used during the making of this note. This software is not perfect and grammatical and other typographical errors may be present.   This note has not been proofread for errors.  ===================================================================        Amount and/or Complexity of Data Reviewed  Clinical lab tests: ordered and reviewed (Results for orders placed or performed during the hospital encounter of 03/09/19  -CBC WITH AUTOMATED DIFF       Result                      Value             Ref Range           WBC                         9.8               4.3 - 11.1 K*       RBC                         1.67 (L)          4.23 - 5.6 M*       HGB                         7.3 (L)           13.6 - 17.2 *       HCT                         20.6 (LL)         41.1 - 50.3 %       MCV                         123.4 (H)         79.6 - 97.8 *       MCH                         43.7 (H)          26.1 - 32.9 *       MCHC                        35.4 (H)          31.4 - 35.0 *       RDW                         21.4 (H)          11.9 - 14.6 %       PLATELET                    198               150 - 450 K/*       MPV                         10.8              9.4 - 12.3 FL       ABSOLUTE NRBC               1.47 (H)          0.0 - 0.2 K/*       DF                          AUTOMATED                             NEUTROPHILS                 62                43 - 78 %           LYMPHOCYTES                 23                13 - 44 %           MONOCYTES                   10                4.0 - 12.0 %        EOSINOPHILS                 5                 0.5 - 7.8 %         BASOPHILS                   1 0.0 - 2.0 %         IMMATURE GRANULOCYTES       0                 0.0 - 5.0 %         ABS. NEUTROPHILS            6.1               1.7 - 8.2 K/*       ABS. LYMPHOCYTES            2.2               0.5 - 4.6 K/*       ABS. MONOCYTES              1.0               0.1 - 1.3 K/*       ABS. EOSINOPHILS            0.5               0.0 - 0.8 K/*       ABS. BASOPHILS              0.1               0.0 - 0.2 K/*       ABS. IMM. GRANS.            0.0               0.0 - 0.5 K/*  -METABOLIC PANEL, COMPREHENSIVE       Result                      Value             Ref Range           Sodium                      139               136 - 145 mm*       Potassium                   4.0               3.5 - 5.1 mm*       Chloride                    106               98 - 107 mmo*       CO2                         26                21 - 32 mmol*       Anion gap                   7                 7 - 16 mmol/L       Glucose                     119 (H)           65 - 100 mg/*       BUN                         11                6 - 23 MG/DL        Creatinine                  0.96              0.8 - 1.5 MG*       GFR est AA                  >60               >60 ml/min/1*       GFR est non-AA              >60               >60 ml/min/1*       Calcium                     8.8               8.3 - 10.4 M*       Bilirubin, total            1.5 (H)           0.2 - 1.1 MG*       ALT (SGPT)                  66 (H)            12 - 65 U/L         AST (SGOT)                  53 (H)            15 - 37 U/L         Alk.  phosphatase            73                50 - 136 U/L        Protein, total              8.3 (H)           6.3 - 8.2 g/*       Albumin                     3.6               3.5 - 5.0 g/*       Globulin                    4.7 (H)           2.3 - 3.5 g/*       A-G Ratio                   0.8 (L)           1.2 - 3.5      -RETICULOCYTE COUNT       Result                      Value             Ref Range           Reticulocyte count 7.4 (H)           0.3 - 2.0 %         Absolute Retic Cnt.         0.1234 (H)        0.026 - 0.09*       Immature Retic Fraction     39.2 (H)          2.3 - 13.4 %        Retic Hgb Conc.             46 (H)            29 - 35 pg    )           Procedures

## 2019-03-09 NOTE — ED NOTES
Pt resting on stretcher at this time and appears absent of distress.  Pt has no guarding or grimacing present at this time

## 2019-03-09 NOTE — ED NOTES
I have reviewed discharge instructions with the patient. The patient verbalized understanding. Patient left ED via Discharge Method: ambulatory to Home with self    Opportunity for questions and clarification provided. Patient given 0 scripts. To continue your aftercare when you leave the hospital, you may receive an automated call from our care team to check in on how you are doing. This is a free service and part of our promise to provide the best care and service to meet your aftercare needs.  If you have questions, or wish to unsubscribe from this service please call [phone]. Thank you for Choosing our [facility] Emergency Department.

## 2019-03-09 NOTE — ED NOTES
Pt states his wife brought him. Pt educated if he is d/c that his wife will need to come in and sign him out.  Pt verbalizes understanding

## 2019-03-09 NOTE — ED NOTES
Pt placed on [facility] for decline in SpO2 after dilaudid administered.

## 2019-03-15 ENCOUNTER — HOSPITAL ENCOUNTER (EMERGENCY)
Age: 44
Discharge: HOME | DRG: 812 | End: 2019-03-16
Attending: EMERGENCY MEDICINE
Payer: MEDICARE

## 2019-03-15 DIAGNOSIS — D57.00 SICKLE CELL ANEMIA WITH CRISIS (HCC): Primary | ICD-10-CM

## 2019-03-15 LAB
ALBUMIN SERPL-MCNC: 3.7 G/DL (ref 3.5–5)
ALBUMIN/GLOB SERPL: 0.8 {RATIO} (ref 1.2–3.5)
ALP SERPL-CCNC: 72 U/L (ref 50–136)
ALT SERPL-CCNC: 51 U/L (ref 12–65)
ANION GAP SERPL CALC-SCNC: 7 MMOL/L (ref 7–16)
AST SERPL-CCNC: 45 U/L (ref 15–37)
BASOPHILS # BLD: 0.1 K/UL (ref 0–0.2)
BASOPHILS NFR BLD: 1 % (ref 0–2)
BILIRUB SERPL-MCNC: 1.4 MG/DL (ref 0.2–1.1)
BUN SERPL-MCNC: 14 MG/DL (ref 6–23)
CALCIUM SERPL-MCNC: 8.5 MG/DL (ref 8.3–10.4)
CHLORIDE SERPL-SCNC: 105 MMOL/L (ref 98–107)
CO2 SERPL-SCNC: 25 MMOL/L (ref 21–32)
CREAT SERPL-MCNC: 1.14 MG/DL (ref 0.8–1.5)
DIFFERENTIAL METHOD BLD: ABNORMAL
EOSINOPHIL # BLD: 0.6 K/UL (ref 0–0.8)
EOSINOPHIL NFR BLD: 5 % (ref 0.5–7.8)
ERYTHROCYTE [DISTWIDTH] IN BLOOD BY AUTOMATED COUNT: 19.9 % (ref 11.9–14.6)
GLOBULIN SER CALC-MCNC: 4.6 G/DL (ref 2.3–3.5)
GLUCOSE SERPL-MCNC: 107 MG/DL (ref 65–100)
HCT VFR BLD AUTO: 20.7 % (ref 41.1–50.3)
HGB BLD-MCNC: 7.5 G/DL (ref 13.6–17.2)
HGB RETIC QN AUTO: 45 PG (ref 29–35)
IMM GRANULOCYTES # BLD AUTO: 0.1 K/UL (ref 0–0.5)
IMM GRANULOCYTES NFR BLD AUTO: 1 % (ref 0–5)
IMM RETICS NFR: 42.9 % (ref 2.3–13.4)
LYMPHOCYTES # BLD: 3.4 K/UL (ref 0.5–4.6)
LYMPHOCYTES NFR BLD: 28 % (ref 13–44)
MCH RBC QN AUTO: 44.6 PG (ref 26.1–32.9)
MCHC RBC AUTO-ENTMCNC: 36.2 G/DL (ref 31.4–35)
MCV RBC AUTO: 123.2 FL (ref 79.6–97.8)
MONOCYTES # BLD: 1.2 K/UL (ref 0.1–1.3)
MONOCYTES NFR BLD: 10 % (ref 4–12)
NEUTS SEG # BLD: 6.8 K/UL (ref 1.7–8.2)
NEUTS SEG NFR BLD: 56 % (ref 43–78)
NRBC # BLD: 1.03 K/UL (ref 0–0.2)
PLATELET # BLD AUTO: 211 K/UL (ref 150–450)
PMV BLD AUTO: 11 FL (ref 9.4–12.3)
POTASSIUM SERPL-SCNC: 4 MMOL/L (ref 3.5–5.1)
PROT SERPL-MCNC: 8.3 G/DL (ref 6.3–8.2)
RBC # BLD AUTO: 1.68 M/UL (ref 4.23–5.6)
RETICS # AUTO: 0.14 M/UL (ref 0.03–0.1)
RETICS/RBC NFR AUTO: 8.2 % (ref 0.3–2)
SODIUM SERPL-SCNC: 137 MMOL/L (ref 136–145)
WBC # BLD AUTO: 12.2 K/UL (ref 4.3–11.1)

## 2019-03-15 PROCEDURE — 96374 THER/PROPH/DIAG INJ IV PUSH: CPT | Performed by: EMERGENCY MEDICINE

## 2019-03-15 PROCEDURE — 85025 COMPLETE CBC W/AUTO DIFF WBC: CPT

## 2019-03-15 PROCEDURE — 85046 RETICYTE/HGB CONCENTRATE: CPT

## 2019-03-15 PROCEDURE — 74011250636 HC RX REV CODE- 250/636: Performed by: EMERGENCY MEDICINE

## 2019-03-15 PROCEDURE — 96376 TX/PRO/DX INJ SAME DRUG ADON: CPT | Performed by: EMERGENCY MEDICINE

## 2019-03-15 PROCEDURE — 80053 COMPREHEN METABOLIC PANEL: CPT

## 2019-03-15 PROCEDURE — 99283 EMERGENCY DEPT VISIT LOW MDM: CPT | Performed by: EMERGENCY MEDICINE

## 2019-03-15 PROCEDURE — 74011000250 HC RX REV CODE- 250: Performed by: EMERGENCY MEDICINE

## 2019-03-15 PROCEDURE — 96375 TX/PRO/DX INJ NEW DRUG ADDON: CPT | Performed by: EMERGENCY MEDICINE

## 2019-03-15 RX ORDER — HYDROMORPHONE HYDROCHLORIDE 1 MG/ML
2 INJECTION, SOLUTION INTRAMUSCULAR; INTRAVENOUS; SUBCUTANEOUS ONCE
Status: COMPLETED | OUTPATIENT
Start: 2019-03-15 | End: 2019-03-15

## 2019-03-15 RX ORDER — HYDROMORPHONE HYDROCHLORIDE 1 MG/ML
1 INJECTION, SOLUTION INTRAMUSCULAR; INTRAVENOUS; SUBCUTANEOUS ONCE
Status: COMPLETED | OUTPATIENT
Start: 2019-03-15 | End: 2019-03-15

## 2019-03-15 RX ADMIN — HYDROMORPHONE HYDROCHLORIDE 2 MG: 1 INJECTION, SOLUTION INTRAMUSCULAR; INTRAVENOUS; SUBCUTANEOUS at 22:31

## 2019-03-15 RX ADMIN — SODIUM CHLORIDE 1000 ML: 900 INJECTION, SOLUTION INTRAVENOUS at 22:31

## 2019-03-15 RX ADMIN — HYDROMORPHONE HYDROCHLORIDE 1 MG: 1 INJECTION, SOLUTION INTRAMUSCULAR; INTRAVENOUS; SUBCUTANEOUS at 23:37

## 2019-03-15 RX ADMIN — PROMETHAZINE HYDROCHLORIDE 12.5 MG: 25 INJECTION INTRAMUSCULAR; INTRAVENOUS at 22:32

## 2019-03-16 ENCOUNTER — HOSPITAL ENCOUNTER (INPATIENT)
Age: 44
LOS: 1 days | Discharge: HOME | DRG: 812 | End: 2019-03-18
Attending: EMERGENCY MEDICINE | Admitting: INTERNAL MEDICINE
Payer: MEDICARE

## 2019-03-16 ENCOUNTER — APPOINTMENT (OUTPATIENT)
Dept: GENERAL RADIOLOGY | Age: 44
DRG: 812 | End: 2019-03-16
Attending: FAMILY MEDICINE
Payer: MEDICARE

## 2019-03-16 VITALS
HEART RATE: 88 BPM | DIASTOLIC BLOOD PRESSURE: 65 MMHG | SYSTOLIC BLOOD PRESSURE: 130 MMHG | OXYGEN SATURATION: 98 % | TEMPERATURE: 98.8 F | HEIGHT: 70 IN | RESPIRATION RATE: 18 BRPM | BODY MASS INDEX: 23.62 KG/M2 | WEIGHT: 165 LBS

## 2019-03-16 DIAGNOSIS — M79.605 PAIN IN BOTH LOWER EXTREMITIES: ICD-10-CM

## 2019-03-16 DIAGNOSIS — M79.604 PAIN IN BOTH LOWER EXTREMITIES: ICD-10-CM

## 2019-03-16 DIAGNOSIS — E83.111 IRON OVERLOAD DUE TO REPEATED RED BLOOD CELL TRANSFUSIONS: ICD-10-CM

## 2019-03-16 DIAGNOSIS — D57.00 VASOOCCLUSIVE SICKLE CELL CRISIS (HCC): ICD-10-CM

## 2019-03-16 DIAGNOSIS — D57.00 HB-SS DISEASE WITH CRISIS (HCC): ICD-10-CM

## 2019-03-16 DIAGNOSIS — D57.00 SICKLE CELL CRISIS (HCC): Primary | ICD-10-CM

## 2019-03-16 DIAGNOSIS — D64.89 ANEMIA DUE TO OTHER CAUSE, NOT CLASSIFIED: ICD-10-CM

## 2019-03-16 PROBLEM — E87.70 VOLUME OVERLOAD: Status: RESOLVED | Noted: 2018-06-28 | Resolved: 2019-03-16

## 2019-03-16 LAB
ALBUMIN SERPL-MCNC: 3.4 G/DL (ref 3.5–5)
ALBUMIN/GLOB SERPL: 0.8 {RATIO} (ref 1.2–3.5)
ALP SERPL-CCNC: 65 U/L (ref 50–136)
ALT SERPL-CCNC: 41 U/L (ref 12–65)
ANION GAP SERPL CALC-SCNC: 8 MMOL/L (ref 7–16)
AST SERPL-CCNC: 43 U/L (ref 15–37)
BASOPHILS # BLD: 0 K/UL (ref 0–0.2)
BASOPHILS NFR BLD: 0 % (ref 0–2)
BILIRUB SERPL-MCNC: 1.3 MG/DL (ref 0.2–1.1)
BUN SERPL-MCNC: 12 MG/DL (ref 6–23)
CALCIUM SERPL-MCNC: 8.5 MG/DL (ref 8.3–10.4)
CHLORIDE SERPL-SCNC: 106 MMOL/L (ref 98–107)
CO2 SERPL-SCNC: 26 MMOL/L (ref 21–32)
CREAT SERPL-MCNC: 0.95 MG/DL (ref 0.8–1.5)
DIFFERENTIAL METHOD BLD: ABNORMAL
EOSINOPHIL # BLD: 0.6 K/UL (ref 0–0.8)
EOSINOPHIL NFR BLD: 5 % (ref 0.5–7.8)
ERYTHROCYTE [DISTWIDTH] IN BLOOD BY AUTOMATED COUNT: 20 % (ref 11.9–14.6)
GLOBULIN SER CALC-MCNC: 4.2 G/DL (ref 2.3–3.5)
GLUCOSE BLD STRIP.AUTO-MCNC: 117 MG/DL (ref 65–100)
GLUCOSE SERPL-MCNC: 113 MG/DL (ref 65–100)
HCT VFR BLD AUTO: 19.3 % (ref 41.1–50.3)
HGB BLD-MCNC: 7 G/DL (ref 13.6–17.2)
HGB RETIC QN AUTO: 45 PG (ref 29–35)
IMM GRANULOCYTES # BLD AUTO: 0 K/UL (ref 0–0.5)
IMM GRANULOCYTES NFR BLD AUTO: 0 % (ref 0–5)
IMM RETICS NFR: 31.3 % (ref 2.3–13.4)
LYMPHOCYTES # BLD: 3 K/UL (ref 0.5–4.6)
LYMPHOCYTES NFR BLD: 27 % (ref 13–44)
MCH RBC QN AUTO: 45.8 PG (ref 26.1–32.9)
MCHC RBC AUTO-ENTMCNC: 36.3 G/DL (ref 31.4–35)
MCV RBC AUTO: 126.1 FL (ref 79.6–97.8)
MONOCYTES # BLD: 0.9 K/UL (ref 0.1–1.3)
MONOCYTES NFR BLD: 8 % (ref 4–12)
NEUTS SEG # BLD: 6.7 K/UL (ref 1.7–8.2)
NEUTS SEG NFR BLD: 60 % (ref 43–78)
NRBC # BLD: 0.76 K/UL (ref 0–0.2)
PLATELET # BLD AUTO: 201 K/UL (ref 150–450)
PLATELET COMMENTS,PCOM: ADEQUATE
PMV BLD AUTO: 10.7 FL (ref 9.4–12.3)
POTASSIUM SERPL-SCNC: 4.1 MMOL/L (ref 3.5–5.1)
PROT SERPL-MCNC: 7.6 G/DL (ref 6.3–8.2)
RBC # BLD AUTO: 1.53 M/UL (ref 4.23–5.6)
RBC MORPH BLD: ABNORMAL
RETICS # AUTO: 0.13 M/UL (ref 0.03–0.1)
RETICS/RBC NFR AUTO: 8.6 % (ref 0.3–2)
SODIUM SERPL-SCNC: 140 MMOL/L (ref 136–145)
WBC # BLD AUTO: 11.2 K/UL (ref 4.3–11.1)
WBC MORPH BLD: ABNORMAL

## 2019-03-16 PROCEDURE — 77010033678 HC OXYGEN DAILY

## 2019-03-16 PROCEDURE — 74011250636 HC RX REV CODE- 250/636: Performed by: FAMILY MEDICINE

## 2019-03-16 PROCEDURE — 96374 THER/PROPH/DIAG INJ IV PUSH: CPT | Performed by: EMERGENCY MEDICINE

## 2019-03-16 PROCEDURE — 85046 RETICYTE/HGB CONCENTRATE: CPT

## 2019-03-16 PROCEDURE — 94760 N-INVAS EAR/PLS OXIMETRY 1: CPT

## 2019-03-16 PROCEDURE — 96375 TX/PRO/DX INJ NEW DRUG ADDON: CPT | Performed by: EMERGENCY MEDICINE

## 2019-03-16 PROCEDURE — 74011000250 HC RX REV CODE- 250: Performed by: EMERGENCY MEDICINE

## 2019-03-16 PROCEDURE — 80053 COMPREHEN METABOLIC PANEL: CPT

## 2019-03-16 PROCEDURE — 74011250637 HC RX REV CODE- 250/637: Performed by: FAMILY MEDICINE

## 2019-03-16 PROCEDURE — 82962 GLUCOSE BLOOD TEST: CPT

## 2019-03-16 PROCEDURE — 74011250636 HC RX REV CODE- 250/636: Performed by: EMERGENCY MEDICINE

## 2019-03-16 PROCEDURE — 65270000029 HC RM PRIVATE

## 2019-03-16 PROCEDURE — 85025 COMPLETE CBC W/AUTO DIFF WBC: CPT

## 2019-03-16 PROCEDURE — 99283 EMERGENCY DEPT VISIT LOW MDM: CPT | Performed by: EMERGENCY MEDICINE

## 2019-03-16 PROCEDURE — 30233N1 TRANSFUSION OF NONAUTOLOGOUS RED BLOOD CELLS INTO PERIPHERAL VEIN, PERCUTANEOUS APPROACH: ICD-10-PCS | Performed by: FAMILY MEDICINE

## 2019-03-16 PROCEDURE — 71046 X-RAY EXAM CHEST 2 VIEWS: CPT

## 2019-03-16 PROCEDURE — 77030020263 HC SOL INJ SOD CL0.9% LFCR 1000ML

## 2019-03-16 PROCEDURE — 96376 TX/PRO/DX INJ SAME DRUG ADON: CPT | Performed by: EMERGENCY MEDICINE

## 2019-03-16 RX ORDER — SODIUM CHLORIDE 0.9 % (FLUSH) 0.9 %
5-40 SYRINGE (ML) INJECTION AS NEEDED
Status: DISCONTINUED | OUTPATIENT
Start: 2019-03-16 | End: 2019-03-18 | Stop reason: HOSPADM

## 2019-03-16 RX ORDER — HYDROMORPHONE HYDROCHLORIDE 1 MG/ML
2 INJECTION, SOLUTION INTRAMUSCULAR; INTRAVENOUS; SUBCUTANEOUS
Status: DISCONTINUED | OUTPATIENT
Start: 2019-03-16 | End: 2019-03-18 | Stop reason: HOSPADM

## 2019-03-16 RX ORDER — OXYCODONE HCL 20 MG/1
20 TABLET, FILM COATED, EXTENDED RELEASE ORAL EVERY 12 HOURS
Status: ON HOLD | COMMUNITY
End: 2019-03-18 | Stop reason: SDUPTHER

## 2019-03-16 RX ORDER — SODIUM CHLORIDE 0.9 % (FLUSH) 0.9 %
5-40 SYRINGE (ML) INJECTION EVERY 8 HOURS
Status: DISCONTINUED | OUTPATIENT
Start: 2019-03-16 | End: 2019-03-18 | Stop reason: HOSPADM

## 2019-03-16 RX ORDER — HYDROMORPHONE HYDROCHLORIDE 1 MG/ML
2 INJECTION, SOLUTION INTRAMUSCULAR; INTRAVENOUS; SUBCUTANEOUS
Status: COMPLETED | OUTPATIENT
Start: 2019-03-16 | End: 2019-03-16

## 2019-03-16 RX ORDER — LISINOPRIL 5 MG/1
5 TABLET ORAL DAILY
Status: DISCONTINUED | OUTPATIENT
Start: 2019-03-17 | End: 2019-03-18 | Stop reason: HOSPADM

## 2019-03-16 RX ORDER — HYDROXYUREA 500 MG/1
1000 CAPSULE ORAL DAILY
COMMUNITY

## 2019-03-16 RX ORDER — SODIUM CHLORIDE 9 MG/ML
75 INJECTION, SOLUTION INTRAVENOUS CONTINUOUS
Status: DISCONTINUED | OUTPATIENT
Start: 2019-03-16 | End: 2019-03-17

## 2019-03-16 RX ORDER — HYDROXYUREA 500 MG/1
1000 CAPSULE ORAL DAILY
Status: DISCONTINUED | OUTPATIENT
Start: 2019-03-17 | End: 2019-03-18 | Stop reason: HOSPADM

## 2019-03-16 RX ORDER — HYDROMORPHONE HYDROCHLORIDE 1 MG/ML
1 INJECTION, SOLUTION INTRAMUSCULAR; INTRAVENOUS; SUBCUTANEOUS
Status: COMPLETED | OUTPATIENT
Start: 2019-03-16 | End: 2019-03-16

## 2019-03-16 RX ORDER — HEPARIN 100 UNIT/ML
100 SYRINGE INTRAVENOUS AS NEEDED
Status: DISCONTINUED | OUTPATIENT
Start: 2019-03-16 | End: 2019-03-16 | Stop reason: HOSPADM

## 2019-03-16 RX ORDER — ENOXAPARIN SODIUM 100 MG/ML
40 INJECTION SUBCUTANEOUS EVERY 24 HOURS
Status: DISCONTINUED | OUTPATIENT
Start: 2019-03-16 | End: 2019-03-18 | Stop reason: HOSPADM

## 2019-03-16 RX ORDER — ONDANSETRON 2 MG/ML
4 INJECTION INTRAMUSCULAR; INTRAVENOUS
Status: DISCONTINUED | OUTPATIENT
Start: 2019-03-16 | End: 2019-03-18 | Stop reason: HOSPADM

## 2019-03-16 RX ORDER — METOPROLOL TARTRATE 25 MG/1
12.5 TABLET, FILM COATED ORAL 2 TIMES DAILY
Status: DISCONTINUED | OUTPATIENT
Start: 2019-03-16 | End: 2019-03-18 | Stop reason: HOSPADM

## 2019-03-16 RX ORDER — ADHESIVE BANDAGE
30 BANDAGE TOPICAL DAILY PRN
Status: DISCONTINUED | OUTPATIENT
Start: 2019-03-16 | End: 2019-03-18 | Stop reason: HOSPADM

## 2019-03-16 RX ORDER — METFORMIN HYDROCHLORIDE 500 MG/1
500 TABLET ORAL
Status: DISCONTINUED | OUTPATIENT
Start: 2019-03-17 | End: 2019-03-17

## 2019-03-16 RX ORDER — HYDROXYUREA 500 MG/1
500 CAPSULE ORAL 2 TIMES DAILY
Status: DISCONTINUED | OUTPATIENT
Start: 2019-03-16 | End: 2019-03-16

## 2019-03-16 RX ORDER — ACETAMINOPHEN 325 MG/1
650 TABLET ORAL
Status: DISCONTINUED | OUTPATIENT
Start: 2019-03-16 | End: 2019-03-18 | Stop reason: HOSPADM

## 2019-03-16 RX ORDER — LANOLIN ALCOHOL/MO/W.PET/CERES
400 CREAM (GRAM) TOPICAL DAILY
Status: DISCONTINUED | OUTPATIENT
Start: 2019-03-17 | End: 2019-03-18 | Stop reason: HOSPADM

## 2019-03-16 RX ORDER — FOLIC ACID 1 MG/1
1 TABLET ORAL DAILY
Status: DISCONTINUED | OUTPATIENT
Start: 2019-03-17 | End: 2019-03-18 | Stop reason: HOSPADM

## 2019-03-16 RX ORDER — DEFERASIROX 360 MG/1
1800 TABLET, FILM COATED ORAL DAILY
Status: DISCONTINUED | OUTPATIENT
Start: 2019-03-17 | End: 2019-03-18 | Stop reason: HOSPADM

## 2019-03-16 RX ADMIN — SODIUM CHLORIDE 1000 ML: 900 INJECTION, SOLUTION INTRAVENOUS at 17:28

## 2019-03-16 RX ADMIN — METOPROLOL TARTRATE 12.5 MG: 25 TABLET ORAL at 20:48

## 2019-03-16 RX ADMIN — SODIUM CHLORIDE 75 ML/HR: 900 INJECTION, SOLUTION INTRAVENOUS at 20:27

## 2019-03-16 RX ADMIN — ENOXAPARIN SODIUM 40 MG: 40 INJECTION SUBCUTANEOUS at 20:47

## 2019-03-16 RX ADMIN — HYDROMORPHONE HYDROCHLORIDE 2 MG: 1 INJECTION, SOLUTION INTRAMUSCULAR; INTRAVENOUS; SUBCUTANEOUS at 22:45

## 2019-03-16 RX ADMIN — PROMETHAZINE HYDROCHLORIDE 12.5 MG: 25 INJECTION INTRAMUSCULAR; INTRAVENOUS at 22:58

## 2019-03-16 RX ADMIN — Medication 10 ML: at 22:00

## 2019-03-16 RX ADMIN — Medication 1 AMPULE: at 20:47

## 2019-03-16 RX ADMIN — PROMETHAZINE HYDROCHLORIDE 12.5 MG: 25 INJECTION INTRAMUSCULAR; INTRAVENOUS at 17:28

## 2019-03-16 RX ADMIN — HYDROMORPHONE HYDROCHLORIDE 2 MG: 1 INJECTION, SOLUTION INTRAMUSCULAR; INTRAVENOUS; SUBCUTANEOUS at 18:50

## 2019-03-16 RX ADMIN — HYDROMORPHONE HYDROCHLORIDE 2 MG: 1 INJECTION, SOLUTION INTRAMUSCULAR; INTRAVENOUS; SUBCUTANEOUS at 20:47

## 2019-03-16 RX ADMIN — HYDROMORPHONE HYDROCHLORIDE 1 MG: 1 INJECTION, SOLUTION INTRAMUSCULAR; INTRAVENOUS; SUBCUTANEOUS at 17:28

## 2019-03-16 RX ADMIN — PROMETHAZINE HYDROCHLORIDE 12.5 MG: 25 INJECTION INTRAMUSCULAR; INTRAVENOUS at 18:50

## 2019-03-16 NOTE — ED TRIAGE NOTES
Pt ambulatory to triage with family member and without complications. Pt states he is taking his pain medication at home and it is not helping him. Pt was just d/c from hospital last week and was seen here last night. Pt rates pain 9/10 and describes as achy pain. Pt states he has an appt with hematology on 04/01/2019, but hasn't seen them recently for better pain mgmt.  Pt has port in right leg, not accessed

## 2019-03-16 NOTE — ROUTINE PROCESS
TRANSFER - OUT REPORT:    Verbal report given to Edgardo Shaw on Alvan Fothergill  being transferred to [address] [place] [address] for routine progression of care       Report consisted of patients Situation, Background, Assessment and   Recommendations(SBAR). Information from the following report(s) SBAR, ED Summary, Recent Results and Med Rec Status was reviewed with the receiving nurse. Lines:   Venous Access Device Power Port in pts right thigh 03/16/19 Other (comment) (Active)        Opportunity for questions and clarification was provided.       Patient transported with:   [facility]

## 2019-03-16 NOTE — H&P
HOSPITALIST INITIAL HISTORY AND PHYSICAL    NAME:  Mora Dominique   Age:  53 y.o.  :   1975   MRN:   [id number]  PCP: Radha Buchanan MD  Consulting MD:  Treatment Team: Attending Provider: Rhonda Meza MD; Primary Nurse: Amadou Groves    CHIEF COMPLAINT: leg pain    HISTORY OF PRESENT ILLNESS:   Mora Dominique is a 53 y.o. male with a past medical history of sickle cell disease who presents to the ER with complaint of bilateral leg pain for the past several days. He presented to the ER last night with the same complaint and was improved somewhat with IV pain medicine before being discharged home. However, today the pain has become even more intense. He reports no chest pain, admits to some nausea and diarrhea. Denies fevers. REVIEW OF SYSTEMS: Comprehensive ROS performed and negative except as stated in HPI. Past Medical History:   Diagnosis Date    Chronic pain     HTN (hypertension)     Ill-defined condition     sickle cell    Iron overload due to repeated red blood cell transfusions 2017    Paroxysmal [facility]) 2016    Sickle cell disease ([facility] [address]         Past Surgical History:   Procedure Laterality Date    HC PENILE IMPL DURA II POSITINBLE      HC PORT LIFE SNGLE LUMEN 5013      to L CW    HX CHOLECYSTECTOMY      HX OTHER SURGICAL      penile inplant    HX VASCULAR ACCESS         Prior to Admission Medications   Prescriptions Last Dose Informant Patient Reported? Taking? Blood-Glucose Meter monitoring kit   No No   Sig: Check blood glucose qac   Lancets misc   No No   Sig: Check BG qac. Dx new onset DM2   deferasirox (JADENU) 360 mg tab   No No   Sig: Take 5 Tabs by mouth daily. folic acid (FOLVITE) 1 mg tablet  Self Yes No   Sig: Take 1 mg by mouth daily. glucose blood VI test strips (ASCENSIA AUTODISC VI, ONE TOUCH ULTRA TEST VI) strip   No No   Sig: Check BG qac.  Dx new onset DM2   hydroxyurea (HYDREA) 500 mg capsule   No No   Sig: Take 1 Cap by mouth two (2) times a day. Takes in am at 0900. Star after follow up with your PCP. Patient taking differently: Take 1,000 mg by mouth daily. Takes in am at 0900. Star after follow up with your PCP. lisinopril (PRINIVIL, ZESTRIL) 5 mg tablet   Yes No   Sig: Take 5 mg by mouth daily. Indications: HYPERTENSION   magnesium oxide (MAG-OX) 400 mg tablet   No No   Sig: Take 1 Tab by mouth daily. metFORMIN (GLUCOPHAGE) 500 mg tablet   No No   Sig: Take 1 Tab by mouth daily (with breakfast). metoprolol (LOPRESSOR) 25 mg tablet   Yes No   Sig: Take 12.5 mg by mouth two (2) times a day. Indications: hypertension   oxyCODONE IR (ROXICODONE) 30 mg immediate release tablet   No No   Sig: Take 1 Tab by mouth every four (4) hours as needed. Max Daily Amount: 180 mg.   promethazine (PHENERGAN) 25 mg tablet   No No   Sig: Take 1 Tab by mouth every six (6) hours as needed. May substitute suppository if vomiting      Facility-Administered Medications: None       Allergies   Allergen Reactions    Compazine [Prochlorperazine Edisylate] Other (comments)     Also makes him feel funny    Morphine Other (comments)     Makes him feel funny    Reglan [Metoclopramide] Other (comments)     \"feel funny\"    Zofran [Ondansetron Hcl (Pf)] Other (comments)     Make him feel funny       FAMILY HISTORY: Reviewed.  Negative except   Family History   Problem Relation Age of Onset    Hypertension Other     Diabetes Father     Stroke Father     Sickle Cell Anemia Sister        Social History     Tobacco Use    Smoking status: Never Smoker    Smokeless tobacco: Never Used   Substance Use Topics    Alcohol use: No     Alcohol/week: 0.0 oz         Objective:     Visit Vitals  /74 (BP 1 Location: Left arm)   Pulse 96   Temp 98.6 °F (37 °C)   Resp 16   Ht 5' 10\" (1.778 m)   Wt 74.8 kg (165 lb)   SpO2 96%   BMI 23.68 kg/m²      Temp (24hrs), Av.7 °F (37.1 °C), Min:98.6 °F (37 °C), Max:98.8 °F (37.1 °C)    Oxygen Therapy  O2 Sat (%): 96 % (03/16/19 1851)  O2 Device: Room air (03/16/19 1851)  Physical Exam:  General:    The patient is a pleasant middle aged male in no acute distress. Head:   Normocephalic/atraumatic. Eyes:  No palpebral pallor or scleral icterus. ENT:  External auricular and nasal exam within normal limits. Mucous membranes are moist.  Neck:  Supple, non-tender, no JVD. Lungs:   Clear to auscultation bilaterally without wheezes or crackles. No respiratory distress or accessory muscle use. Heart:   Regular rate and rhythm, without murmurs, rubs, or gallops. Abdomen:   Soft, non-tender, non-distended with normoactive bowel sounds. Genitourinary: No tenderness over the bladder or bilateral CVAs. Extremities: Without clubbing, cyanosis, or edema. Skin:     Normal color, texture, and turgor. No rashes, lesions, or jaundice. Pulses: Radial and dorsalis pedis pulses present 2+ bilaterally. Capillary refill <2s. Neurologic: CN II-XII grossly intact and symmetrical.     Moving all four extremities well with no focal deficits. Psychiatric: Pleasant demeanor, appropriate affect.  Alert and oriented x 3    Data Review:   Recent Results (from the past 24 hour(s))   RETICULOCYTE COUNT    Collection Time: 03/15/19  9:46 PM   Result Value Ref Range    Reticulocyte count 8.2 (H) 0.3 - 2.0 %    Absolute Retic Cnt. 0.1379 (H) 0.026 - 0.095 M/ul    Immature Retic Fraction 42.9 (H) 2.3 - 13.4 %    Retic Hgb Conc. 45 (H) 29 - 35 pg   CBC WITH AUTOMATED DIFF    Collection Time: 03/15/19  9:46 PM   Result Value Ref Range    WBC 12.2 (H) 4.3 - 11.1 K/uL    RBC 1.68 (L) 4.23 - 5.6 M/uL    HGB 7.5 (L) 13.6 - 17.2 g/dL    HCT 20.7 (LL) 41.1 - 50.3 %    .2 (H) 79.6 - 97.8 FL    MCH 44.6 (H) 26.1 - 32.9 PG    MCHC 36.2 (H) 31.4 - 35.0 g/dL    RDW 19.9 (H) 11.9 - 14.6 %    PLATELET 424 133 - 154 K/uL    MPV 11.0 9.4 - 12.3 FL    ABSOLUTE NRBC 1.03 (H) 0.0 - 0.2 K/uL    DF AUTOMATED NEUTROPHILS 56 43 - 78 %    LYMPHOCYTES 28 13 - 44 %    MONOCYTES 10 4.0 - 12.0 %    EOSINOPHILS 5 0.5 - 7.8 %    BASOPHILS 1 0.0 - 2.0 %    IMMATURE GRANULOCYTES 1 0.0 - 5.0 %    ABS. NEUTROPHILS 6.8 1.7 - 8.2 K/UL    ABS. LYMPHOCYTES 3.4 0.5 - 4.6 K/UL    ABS. MONOCYTES 1.2 0.1 - 1.3 K/UL    ABS. EOSINOPHILS 0.6 0.0 - 0.8 K/UL    ABS. BASOPHILS 0.1 0.0 - 0.2 K/UL    ABS. IMM. GRANS. 0.1 0.0 - 0.5 K/UL   METABOLIC PANEL, COMPREHENSIVE    Collection Time: 03/15/19  9:46 PM   Result Value Ref Range    Sodium 137 136 - 145 mmol/L    Potassium 4.0 3.5 - 5.1 mmol/L    Chloride 105 98 - 107 mmol/L    CO2 25 21 - 32 mmol/L    Anion gap 7 7 - 16 mmol/L    Glucose 107 (H) 65 - 100 mg/dL    BUN 14 6 - 23 MG/DL    Creatinine 1.14 0.8 - 1.5 MG/DL    GFR est AA >60 >60 ml/min/1.73m2    GFR est non-AA >60 >60 ml/min/1.73m2    Calcium 8.5 8.3 - 10.4 MG/DL    Bilirubin, total 1.4 (H) 0.2 - 1.1 MG/DL    ALT (SGPT) 51 12 - 65 U/L    AST (SGOT) 45 (H) 15 - 37 U/L    Alk. phosphatase 72 50 - 136 U/L    Protein, total 8.3 (H) 6.3 - 8.2 g/dL    Albumin 3.7 3.5 - 5.0 g/dL    Globulin 4.6 (H) 2.3 - 3.5 g/dL    A-G Ratio 0.8 (L) 1.2 - 3.5     CBC WITH AUTOMATED DIFF    Collection Time: 03/16/19  5:22 PM   Result Value Ref Range    WBC 11.2 (H) 4.3 - 11.1 K/uL    RBC 1.53 (L) 4.23 - 5.6 M/uL    HGB 7.0 (L) 13.6 - 17.2 g/dL    HCT 19.3 (LL) 41.1 - 50.3 %    .1 (H) 79.6 - 97.8 FL    MCH 45.8 (H) 26.1 - 32.9 PG    MCHC 36.3 (H) 31.4 - 35.0 g/dL    RDW 20.0 (H) 11.9 - 14.6 %    PLATELET 538 074 - 080 K/uL    MPV 10.7 9.4 - 12.3 FL    ABSOLUTE NRBC 0.76 (H) 0.0 - 0.2 K/uL    NEUTROPHILS 60 43 - 78 %    LYMPHOCYTES 27 13 - 44 %    MONOCYTES 8 4.0 - 12.0 %    EOSINOPHILS 5 0.5 - 7.8 %    BASOPHILS 0 0.0 - 2.0 %    IMMATURE GRANULOCYTES 0 0.0 - 5.0 %    ABS. NEUTROPHILS 6.7 1.7 - 8.2 K/UL    ABS. LYMPHOCYTES 3.0 0.5 - 4.6 K/UL    ABS. MONOCYTES 0.9 0.1 - 1.3 K/UL    ABS. EOSINOPHILS 0.6 0.0 - 0.8 K/UL    ABS.  BASOPHILS 0.0 0.0 - 0.2 K/UL ABS. IMM. GRANS. 0.0 0.0 - 0.5 K/UL    RBC COMMENTS MODERATE  ANISOCYTOSIS + POIKILOCYTOSIS        RBC COMMENTS SLIGHT  HYPOCHROMIA        RBC COMMENTS SLIGHT  POLYCHROMASIA        RBC COMMENTS SLIGHT  OVALOCYTES        RBC COMMENTS SLIGHT  SCHISTOCYTES        RBC COMMENTS RARE  SICKLE CELLS        RBC COMMENTS OCCASIONAL  TARGET CELLS        WBC COMMENTS Result Confirmed By Smear      PLATELET COMMENTS ADEQUATE      DF AUTOMATED     METABOLIC PANEL, COMPREHENSIVE    Collection Time: 03/16/19  5:22 PM   Result Value Ref Range    Sodium 140 136 - 145 mmol/L    Potassium 4.1 3.5 - 5.1 mmol/L    Chloride 106 98 - 107 mmol/L    CO2 26 21 - 32 mmol/L    Anion gap 8 7 - 16 mmol/L    Glucose 113 (H) 65 - 100 mg/dL    BUN 12 6 - 23 MG/DL    Creatinine 0.95 0.8 - 1.5 MG/DL    GFR est AA >60 >60 ml/min/1.73m2    GFR est non-AA >60 >60 ml/min/1.73m2    Calcium 8.5 8.3 - 10.4 MG/DL    Bilirubin, total 1.3 (H) 0.2 - 1.1 MG/DL    ALT (SGPT) 41 12 - 65 U/L    AST (SGOT) 43 (H) 15 - 37 U/L    Alk. phosphatase 65 50 - 136 U/L    Protein, total 7.6 6.3 - 8.2 g/dL    Albumin 3.4 (L) 3.5 - 5.0 g/dL    Globulin 4.2 (H) 2.3 - 3.5 g/dL    A-G Ratio 0.8 (L) 1.2 - 3.5     RETICULOCYTE COUNT    Collection Time: 03/16/19  5:22 PM   Result Value Ref Range    Reticulocyte count 8.6 (H) 0.3 - 2.0 %    Absolute Retic Cnt. 0.1316 (H) 0.026 - 0.095 M/ul    Immature Retic Fraction 31.3 (H) 2.3 - 13.4 %    Retic Hgb Conc. 45 (H) 29 - 35 pg       Imaging /Procedures /Studies:  No results found. Assessment and Plan:     Principal Problem:    Sickle cell pain crisis ([address] (10/25/2012)    IVF, IV dilaudid PCA. Nausea control. Active Problems:    Leg pain (6/24/2018)    Per above. Sickle cell anemia (HCC) (4/6/2016)    Hemoglobin stable at 7.0. FOllow CBC      Sickle cell disease ([facility] (7/13/2017)    Per above. Leukocytosis (10/11/2018)    Stable. Paroxysmal SVT (supraventricular tachycardia) ([facility] (7/9/2016)    Stable. Essential hypertension, benign (6/23/2012)    Stable, continue home meds      DM type II    Stable, continue home meds        DVT Prophylaxis: Lovenox      Code Status: FULL CODE      Disposition: Admit to med/surg for evaluation and treatment as per above.       Anticipated discharge: 2-3 days     Signed By: Tobias Ramirez MD     March 16, 2019

## 2019-03-16 NOTE — ED NOTES
I have reviewed discharge instructions with the patient. The patient verbalized understanding. Patient left ED via Discharge Method: ambulatory to Home with family. Opportunity for questions and clarification provided. Patient given 0 scripts. Pt in no acute distress at time of discharge         To continue your aftercare when you leave the hospital, you may receive an automated call from our care team to check in on how you are doing. This is a free service and part of our promise to provide the best care and service to meet your aftercare needs.  If you have questions, or wish to unsubscribe from this service please call [phone]. Thank you for Choosing our [facility] Emergency Department.

## 2019-03-16 NOTE — ED NOTES
[place] [facility] Cait DE SANTIAGO to assume care of this pt.

## 2019-03-16 NOTE — ED NOTES
Power port to R thigh accessed with sterile technique. Blood drawn and sent. Patient restful awaiting further from physician.

## 2019-03-16 NOTE — ED PROVIDER NOTES
57-year-old male history of sickle cell presents with sickle cell crisis. He was seen in the emergency department last night for the same. He is unable to control his pain. Pain is mostly to bilateral legs, but also has abdominal discomfort. He has nausea and diarrhea without vomiting. He has been taking oxycodone without improvement. Denies fever or cough. No pain with urination. The history is provided by the patient. Sickle Cell Crisis    Associated symptoms include chest pain and abdominal pain. Pertinent negatives include no fever, no headaches, no dysuria and no weakness.         Past Medical History:   Diagnosis Date    Chronic pain     HTN (hypertension)     Ill-defined condition     sickle cell    Iron overload due to repeated red blood cell transfusions 1/18/2017    [facility]ox[facility]mal [facility] 7/9/2016    Sickle cell disease ([address]        Past Surgical History:   Procedure Laterality Date    HC PENILE IMPL DURA II POSITINBLE      HC PORT LIFE SNGLE LUMEN 5013      to L CW    HX CHOLECYSTECTOMY      HX OTHER SURGICAL      penile inplant    HX VASCULAR ACCESS           Family History:   Problem Relation Age of Onset    Hypertension Other     Diabetes Father     Stroke Father     Sickle Cell Anemia Sister        Social History     Socioeconomic History    Marital status:      Spouse name: Not on file    Number of children: Not on file    Years of education: Not on file    Highest education level: Not on file   Social Needs    Financial resource strain: Not on file    Food insecurity - worry: Not on file    Food insecurity - inability: Not on file   [employer] needs - medical: Not on file   [employer] needs - non-medical: Not on file   Occupational History    Not on file   Tobacco Use    Smoking status: Never Smoker    Smokeless tobacco: Never Used   Substance and Sexual Activity    Alcohol use: No     Alcohol/week: 0.0 oz    Drug use: No    Sexual activity: Yes   Other Topics Concern    Not on file   Social History Narrative    Not on file         ALLERGIES: Compazine [prochlorperazine edisylate]; Morphine; Reglan [metoclopramide]; and Zofran [ondansetron hcl (pf)]    Review of Systems   Constitutional: Positive for fatigue. Negative for chills and fever. HENT: Negative for hearing loss. Eyes: Negative for visual disturbance. Respiratory: Negative for cough and shortness of breath. Cardiovascular: Positive for chest pain. Negative for palpitations. Gastrointestinal: Positive for abdominal pain, diarrhea and nausea. Negative for blood in stool and vomiting. Genitourinary: Negative for dysuria. Musculoskeletal: Positive for arthralgias, back pain and myalgias. Skin: Negative for rash. Neurological: Negative for weakness and headaches. Psychiatric/Behavioral: Negative for confusion. Vitals:    03/16/19 1605   BP: (!) 153/94   Pulse: 82   Resp: 16   Temp: 98.6 °F (37 °C)   SpO2: 98%   Weight: 74.8 kg (165 lb)   Height: 5' 10\" (1.778 m)            Physical Exam   Constitutional: He appears well-developed and well-nourished. HENT:   Head: Normocephalic and atraumatic. Right Ear: External ear normal.   Left Ear: External ear normal.   Nose: Nose normal.   Mouth/Throat: Oropharynx is clear and moist.   Eyes: Conjunctivae are normal. Pupils are equal, round, and reactive to light. Neck: Normal range of motion. Neck supple. Cardiovascular: Regular rhythm, normal heart sounds and intact distal pulses. Pulmonary/Chest: Effort normal and breath sounds normal. No respiratory distress. He has no wheezes. Abdominal: Soft. Bowel sounds are normal. He exhibits no distension. There is generalized tenderness. There is no rigidity and no guarding. Musculoskeletal: Normal range of motion. He exhibits no edema. Right upper leg: He exhibits tenderness. Left upper leg: He exhibits tenderness. Legs:  Neurological: He is alert. Skin: Skin is warm and dry. Psychiatric: Judgment normal.   Nursing note and vitals reviewed. MDM  Number of Diagnoses or Management Options  Diagnosis management comments: Parts of this document were created using dragon voice recognition software. The chart has been reviewed but errors may still be present. 6:35 PM  Sickling worsened from yesterday. Will discuss with hospitalist for admission.        Amount and/or Complexity of Data Reviewed  Clinical lab tests: ordered and reviewed (Results for orders placed or performed during the hospital encounter of 03/16/19  -CBC WITH AUTOMATED DIFF       Result                      Value             Ref Range           WBC                         11.2 (H)          4.3 - 11.1 K*       RBC                         1.53 (L)          4.23 - 5.6 M*       HGB                         7.0 (L)           13.6 - 17.2 *       HCT                         19.3 (LL)         41.1 - 50.3 %       MCV                         126.1 (H)         79.6 - 97.8 *       MCH                         45.8 (H)          26.1 - 32.9 *       MCHC                        36.3 (H)          31.4 - 35.0 *       RDW                         20.0 (H)          11.9 - 14.6 %       PLATELET                    201               150 - 450 K/*       MPV                         10.7              9.4 - 12.3 FL       ABSOLUTE NRBC               0.76 (H)          0.0 - 0.2 K/*       DF                          PENDING                          -METABOLIC PANEL, COMPREHENSIVE       Result                      Value             Ref Range           Sodium                      140               136 - 145 mm*       Potassium                   4.1               3.5 - 5.1 mm*       Chloride                    106               98 - 107 mmo*       CO2                         26                21 - 32 mmol*       Anion gap                   8                 7 - 16 mmol/L       Glucose 113 (H)           65 - 100 mg/*       BUN                         12                6 - 23 MG/DL        Creatinine                  0.95              0.8 - 1.5 MG*       GFR est AA                  >60               >60 ml/min/1*       GFR est non-AA              >60               >60 ml/min/1*       Calcium                     8.5               8.3 - 10.4 M*       Bilirubin, total            1.3 (H)           0.2 - 1.1 MG*       ALT (SGPT)                  41                12 - 65 U/L         AST (SGOT)                  43 (H)            15 - 37 U/L         Alk.  phosphatase            65                50 - 136 U/L        Protein, total              7.6               6.3 - 8.2 g/*       Albumin                     3.4 (L)           3.5 - 5.0 g/*       Globulin                    4.2 (H)           2.3 - 3.5 g/*       A-G Ratio                   0.8 (L)           1.2 - 3.5      -RETICULOCYTE COUNT       Result                      Value             Ref Range           Reticulocyte count          8.6 (H)           0.3 - 2.0 %         Absolute Retic Cnt.         0.1316 (H)        0.026 - 0.09*       Immature Retic Fraction     31.3 (H)          2.3 - 13.4 %        Retic Hgb Conc.             45 (H)            29 - 35 pg     )  Tests in the medicine section of CPT®: ordered and reviewed           Procedures

## 2019-03-16 NOTE — ED PROVIDER NOTES
Patient is a 47-year-old male with history of hypertension, diabetes, sickle cell who comes to the emergency department today complaining of sickle cell crisis pain in bilateral legs. He was here earlier in the week for the same thing. He states his home oxycodone is not helping the pain. The history is provided by the patient. Sickle Cell Crisis    This is a new problem. The current episode started more than 2 days ago. The problem has not changed since onset. The problem occurs constantly. Patient reports not work related injury. The pain is associated with no known injury. Pain location: bilateral lower legs. The quality of the pain is described as cramping and similar to previous episodes. The pain is severe. Associated symptoms include leg pain. Pertinent negatives include no chest pain, no fever, no headaches, no abdominal pain, no dysuria and no weakness. Treatments tried: his chronic pain medication. The treatment provided no relief.         Past Medical History:   Diagnosis Date    Chronic pain     HTN (hypertension)     Ill-defined condition     sickle cell    Iron overload due to repeated red blood cell transfusions 1/18/2017    Paroxysmal [facility]) 7/9/2016    Sickle cell disease ([facility] [address]        Past Surgical History:   Procedure Laterality Date    HC PENILE IMPL DURA II POSITINBLE      HC PORT LIFE SNGLE LUMEN 5013      to L CW    HX CHOLECYSTECTOMY      HX OTHER SURGICAL      penile inplant    HX VASCULAR ACCESS           Family History:   Problem Relation Age of Onset    Hypertension Other     Diabetes Father     Stroke Father     Sickle Cell Anemia Sister        Social History     Socioeconomic History    Marital status:      Spouse name: Not on file    Number of children: Not on file    Years of education: Not on file    Highest education level: Not on file   Social Needs    Financial resource strain: Not on file    Food insecurity - worry: Not on file    Food insecurity - inability: Not on file    Transportation needs - medical: Not on file   [employer] needs - non-medical: Not on file   Occupational History    Not on file   Tobacco Use    Smoking status: Never Smoker    Smokeless tobacco: Never Used   Substance and Sexual Activity    Alcohol use: No     Alcohol/week: 0.0 oz    Drug use: No    Sexual activity: Yes   Other Topics Concern    Not on file   Social History Narrative    Not on file         ALLERGIES: Compazine [prochlorperazine edisylate]; Morphine; Reglan [metoclopramide]; and Zofran [ondansetron hcl (pf)]    Review of Systems   Constitutional: Negative for chills, fatigue and fever. HENT: Negative for congestion, rhinorrhea and sore throat. Eyes: Negative for pain, discharge and visual disturbance. Respiratory: Negative for cough and shortness of breath. Cardiovascular: Negative for chest pain and palpitations. Gastrointestinal: Negative for abdominal pain, diarrhea and nausea. Endocrine: Negative for polydipsia and polyuria. Genitourinary: Negative for dysuria, frequency and urgency. Musculoskeletal: Negative for back pain and neck pain. Skin: Negative for rash. Neurological: Negative for seizures, syncope, weakness and headaches. Hematological: Negative. Vitals:    03/15/19 2041 03/15/19 2213   BP: 155/75 135/64   Pulse: 83    Resp: 18    Temp: 98.8 °F (37.1 °C)    SpO2: 97%    Weight: 74.8 kg (165 lb)    Height: 5' 10\" (1.778 m)             Physical Exam   Constitutional: He is oriented to person, place, and time. He appears well-developed and well-nourished. HENT:   Head: Normocephalic and atraumatic. Eyes: Conjunctivae and EOM are normal. Pupils are equal, round, and reactive to light. Neck: Normal range of motion. Neck supple. Cardiovascular: Normal rate, regular rhythm and intact distal pulses. Pulmonary/Chest: Effort normal and breath sounds normal.   Abdominal: Soft. There is no tenderness. There is no rebound and no guarding. Musculoskeletal: Normal range of motion. He exhibits no edema, tenderness or deformity. Port accessed on the right anterior thigh   Lymphadenopathy:     He has no cervical adenopathy. Neurological: He is alert and oriented to person, place, and time. He has normal strength. No cranial nerve deficit or sensory deficit. GCS eye subscore is 4. GCS verbal subscore is 5. GCS motor subscore is 6. Skin: Skin is warm and dry. No rash noted. Nursing note and vitals reviewed. MDM  Number of Diagnoses or Management Options  Diagnosis management comments: Records are reviewed and he was here for similar pain a few days ago and got multiple rounds of IV Dilaudid. 11:15 PM  Hemoglobin 7.5 is actually slightly improved from values last week. He is asking for an additional round of medications. I then plan on discharging him to home and advised That he follow-up with his hematologist.  And continue with his chronic oxycodone. Voice dictation software was used during the making of this note. This software is not perfect and grammatical and other typographical errors may be present. This note has been proofread, but may still contain errors.   Sharri Lopez MD; 3/15/2019 @11:16 PM   ===================================================================         Amount and/or Complexity of Data Reviewed  Clinical lab tests: ordered and reviewed  Review and summarize past medical records: yes  Independent visualization of images, tracings, or specimens: yes    Risk of Complications, Morbidity, and/or Mortality  Presenting problems: moderate  Diagnostic procedures: low  Management options: low    Patient Progress  Patient progress: improved         Procedures

## 2019-03-16 NOTE — ED TRIAGE NOTES
Pt c/o pain in his arms and legs since wednesday. Pt also c/o nausea. Pt denies v/d and abd pain. Pt states he has sickle cell and was seen here Saturday for the same reason.

## 2019-03-16 NOTE — ED NOTES
Pt refused blood draw in triage. Pt states he has a port that he would like to be used for blood work.

## 2019-03-17 LAB
ANION GAP SERPL CALC-SCNC: 5 MMOL/L (ref 7–16)
BASOPHILS # BLD: 0.1 K/UL (ref 0–0.2)
BASOPHILS NFR BLD: 1 % (ref 0–2)
BUN SERPL-MCNC: 9 MG/DL (ref 6–23)
CALCIUM SERPL-MCNC: 8 MG/DL (ref 8.3–10.4)
CHLORIDE SERPL-SCNC: 108 MMOL/L (ref 98–107)
CO2 SERPL-SCNC: 26 MMOL/L (ref 21–32)
CREAT SERPL-MCNC: 0.84 MG/DL (ref 0.8–1.5)
DIFFERENTIAL METHOD BLD: ABNORMAL
EOSINOPHIL # BLD: 0.6 K/UL (ref 0–0.8)
EOSINOPHIL NFR BLD: 5 % (ref 0.5–7.8)
ERYTHROCYTE [DISTWIDTH] IN BLOOD BY AUTOMATED COUNT: 19.9 % (ref 11.9–14.6)
GLUCOSE BLD STRIP.AUTO-MCNC: 111 MG/DL (ref 65–100)
GLUCOSE BLD STRIP.AUTO-MCNC: 123 MG/DL (ref 65–100)
GLUCOSE BLD STRIP.AUTO-MCNC: 133 MG/DL (ref 65–100)
GLUCOSE BLD STRIP.AUTO-MCNC: 138 MG/DL (ref 65–100)
GLUCOSE SERPL-MCNC: 103 MG/DL (ref 65–100)
HCT VFR BLD AUTO: 19 % (ref 41.1–50.3)
HCT VFR BLD AUTO: 23.1 % (ref 41.1–50.3)
HGB BLD-MCNC: 6.5 G/DL (ref 13.6–17.2)
HGB BLD-MCNC: 8 G/DL (ref 13.6–17.2)
HGB RETIC QN AUTO: 43 PG (ref 29–35)
IMM GRANULOCYTES # BLD AUTO: 0.1 K/UL (ref 0–0.5)
IMM GRANULOCYTES NFR BLD AUTO: 0 % (ref 0–5)
IMM RETICS NFR: 21.6 % (ref 2.3–13.4)
LYMPHOCYTES # BLD: 4.8 K/UL (ref 0.5–4.6)
LYMPHOCYTES NFR BLD: 37 % (ref 13–44)
MCH RBC QN AUTO: 43.8 PG (ref 26.1–32.9)
MCHC RBC AUTO-ENTMCNC: 35 G/DL (ref 31.4–35)
MCV RBC AUTO: 125 FL (ref 79.6–97.8)
MONOCYTES # BLD: 1.3 K/UL (ref 0.1–1.3)
MONOCYTES NFR BLD: 10 % (ref 4–12)
NEUTS SEG # BLD: 6.3 K/UL (ref 1.7–8.2)
NEUTS SEG NFR BLD: 48 % (ref 43–78)
NRBC # BLD: 0.51 K/UL (ref 0–0.2)
PLATELET # BLD AUTO: 212 K/UL (ref 150–450)
PMV BLD AUTO: 10.8 FL (ref 9.4–12.3)
POTASSIUM SERPL-SCNC: 4.3 MMOL/L (ref 3.5–5.1)
RBC # BLD AUTO: 1.44 M/UL (ref 4.23–5.6)
RETICS # AUTO: 0.1 M/UL (ref 0.03–0.1)
RETICS/RBC NFR AUTO: 7.1 % (ref 0.3–2)
SODIUM SERPL-SCNC: 139 MMOL/L (ref 136–145)
WBC # BLD AUTO: 13.1 K/UL (ref 4.3–11.1)

## 2019-03-17 PROCEDURE — 36591 DRAW BLOOD OFF VENOUS DEVICE: CPT

## 2019-03-17 PROCEDURE — 36430 TRANSFUSION BLD/BLD COMPNT: CPT

## 2019-03-17 PROCEDURE — 77030020257 HC SOL INJ SOD CL 0.45% 1000ML BG

## 2019-03-17 PROCEDURE — 74011250636 HC RX REV CODE- 250/636: Performed by: FAMILY MEDICINE

## 2019-03-17 PROCEDURE — 85046 RETICYTE/HGB CONCENTRATE: CPT

## 2019-03-17 PROCEDURE — 80048 BASIC METABOLIC PNL TOTAL CA: CPT

## 2019-03-17 PROCEDURE — 99214 OFFICE O/P EST MOD 30 MIN: CPT | Performed by: INTERNAL MEDICINE

## 2019-03-17 PROCEDURE — P9040 RBC LEUKOREDUCED IRRADIATED: HCPCS

## 2019-03-17 PROCEDURE — 74011250637 HC RX REV CODE- 250/637: Performed by: FAMILY MEDICINE

## 2019-03-17 PROCEDURE — 85025 COMPLETE CBC W/AUTO DIFF WBC: CPT

## 2019-03-17 PROCEDURE — 99218 HC RM OBSERVATION: CPT

## 2019-03-17 PROCEDURE — 74011000258 HC RX REV CODE- 258: Performed by: INTERNAL MEDICINE

## 2019-03-17 PROCEDURE — 74011000250 HC RX REV CODE- 250: Performed by: FAMILY MEDICINE

## 2019-03-17 PROCEDURE — 86900 BLOOD TYPING SEROLOGIC ABO: CPT

## 2019-03-17 PROCEDURE — 82962 GLUCOSE BLOOD TEST: CPT

## 2019-03-17 PROCEDURE — 77030039270 HC TU BLD FLTR CARD -A

## 2019-03-17 PROCEDURE — 86920 COMPATIBILITY TEST SPIN: CPT

## 2019-03-17 PROCEDURE — 77030020256 HC SOL INJ NACL 0.9%  500ML

## 2019-03-17 PROCEDURE — 86644 CMV ANTIBODY: CPT

## 2019-03-17 PROCEDURE — 86902 BLOOD TYPE ANTIGEN DONOR EA: CPT

## 2019-03-17 PROCEDURE — 85018 HEMOGLOBIN: CPT

## 2019-03-17 PROCEDURE — 83021 HEMOGLOBIN CHROMOTOGRAPHY: CPT

## 2019-03-17 RX ORDER — AMOXICILLIN 250 MG
2 CAPSULE ORAL DAILY
Status: DISCONTINUED | OUTPATIENT
Start: 2019-03-17 | End: 2019-03-18 | Stop reason: HOSPADM

## 2019-03-17 RX ORDER — NALOXONE HYDROCHLORIDE 0.4 MG/ML
0.4 INJECTION, SOLUTION INTRAMUSCULAR; INTRAVENOUS; SUBCUTANEOUS AS NEEDED
Status: DISCONTINUED | OUTPATIENT
Start: 2019-03-17 | End: 2019-03-18 | Stop reason: HOSPADM

## 2019-03-17 RX ORDER — OXYCODONE HYDROCHLORIDE 5 MG/1
5 TABLET ORAL
Status: DISCONTINUED | OUTPATIENT
Start: 2019-03-17 | End: 2019-03-18 | Stop reason: HOSPADM

## 2019-03-17 RX ORDER — SODIUM CHLORIDE 9 MG/ML
250 INJECTION, SOLUTION INTRAVENOUS AS NEEDED
Status: DISCONTINUED | OUTPATIENT
Start: 2019-03-17 | End: 2019-03-18 | Stop reason: HOSPADM

## 2019-03-17 RX ORDER — SODIUM CHLORIDE 450 MG/100ML
125 INJECTION, SOLUTION INTRAVENOUS CONTINUOUS
Status: DISCONTINUED | OUTPATIENT
Start: 2019-03-17 | End: 2019-03-18

## 2019-03-17 RX ADMIN — HYDROMORPHONE HYDROCHLORIDE 2 MG: 1 INJECTION, SOLUTION INTRAMUSCULAR; INTRAVENOUS; SUBCUTANEOUS at 16:08

## 2019-03-17 RX ADMIN — Medication 10 ML: at 06:00

## 2019-03-17 RX ADMIN — SODIUM CHLORIDE 125 ML/HR: 450 INJECTION, SOLUTION INTRAVENOUS at 08:49

## 2019-03-17 RX ADMIN — PROMETHAZINE HYDROCHLORIDE 12.5 MG: 25 INJECTION INTRAMUSCULAR; INTRAVENOUS at 13:31

## 2019-03-17 RX ADMIN — HYDROMORPHONE HYDROCHLORIDE 2 MG: 1 INJECTION, SOLUTION INTRAMUSCULAR; INTRAVENOUS; SUBCUTANEOUS at 13:32

## 2019-03-17 RX ADMIN — PROMETHAZINE HYDROCHLORIDE 12.5 MG: 25 INJECTION INTRAMUSCULAR; INTRAVENOUS at 22:46

## 2019-03-17 RX ADMIN — LISINOPRIL 5 MG: 5 TABLET ORAL at 08:34

## 2019-03-17 RX ADMIN — PROMETHAZINE HYDROCHLORIDE 12.5 MG: 25 INJECTION INTRAMUSCULAR; INTRAVENOUS at 18:13

## 2019-03-17 RX ADMIN — Medication 10 ML: at 21:42

## 2019-03-17 RX ADMIN — ENOXAPARIN SODIUM 40 MG: 40 INJECTION SUBCUTANEOUS at 20:21

## 2019-03-17 RX ADMIN — FOLIC ACID 1 MG: 1 TABLET ORAL at 08:36

## 2019-03-17 RX ADMIN — METOPROLOL TARTRATE 12.5 MG: 25 TABLET ORAL at 17:40

## 2019-03-17 RX ADMIN — HYDROMORPHONE HYDROCHLORIDE 2 MG: 1 INJECTION, SOLUTION INTRAMUSCULAR; INTRAVENOUS; SUBCUTANEOUS at 20:21

## 2019-03-17 RX ADMIN — PROMETHAZINE HYDROCHLORIDE 12.5 MG: 25 INJECTION INTRAMUSCULAR; INTRAVENOUS at 03:14

## 2019-03-17 RX ADMIN — HYDROMORPHONE HYDROCHLORIDE 2 MG: 1 INJECTION, SOLUTION INTRAMUSCULAR; INTRAVENOUS; SUBCUTANEOUS at 18:13

## 2019-03-17 RX ADMIN — SODIUM CHLORIDE 125 ML/HR: 450 INJECTION, SOLUTION INTRAVENOUS at 20:32

## 2019-03-17 RX ADMIN — Medication 1 AMPULE: at 20:21

## 2019-03-17 RX ADMIN — HYDROMORPHONE HYDROCHLORIDE 2 MG: 1 INJECTION, SOLUTION INTRAMUSCULAR; INTRAVENOUS; SUBCUTANEOUS at 03:13

## 2019-03-17 RX ADMIN — ACETAMINOPHEN 650 MG: 325 TABLET, FILM COATED ORAL at 13:40

## 2019-03-17 RX ADMIN — Medication 1 AMPULE: at 08:36

## 2019-03-17 RX ADMIN — HYDROMORPHONE HYDROCHLORIDE 2 MG: 1 INJECTION, SOLUTION INTRAMUSCULAR; INTRAVENOUS; SUBCUTANEOUS at 11:31

## 2019-03-17 RX ADMIN — Medication 10 ML: at 15:10

## 2019-03-17 RX ADMIN — HYDROMORPHONE HYDROCHLORIDE 2 MG: 1 INJECTION, SOLUTION INTRAMUSCULAR; INTRAVENOUS; SUBCUTANEOUS at 08:35

## 2019-03-17 RX ADMIN — HYDROXYUREA 1000 MG: 500 CAPSULE ORAL at 08:48

## 2019-03-17 RX ADMIN — PROMETHAZINE HYDROCHLORIDE 12.5 MG: 25 INJECTION INTRAMUSCULAR; INTRAVENOUS at 08:35

## 2019-03-17 RX ADMIN — HYDROMORPHONE HYDROCHLORIDE 2 MG: 1 INJECTION, SOLUTION INTRAMUSCULAR; INTRAVENOUS; SUBCUTANEOUS at 22:46

## 2019-03-17 RX ADMIN — METOPROLOL TARTRATE 12.5 MG: 25 TABLET ORAL at 08:35

## 2019-03-17 RX ADMIN — Medication 400 MG: at 08:34

## 2019-03-17 NOTE — PROGRESS NOTES
Progress Note    Patient: Elvia Ashford MRN: [id number]  SSN: [id number]    YOB: 1975  Age: 53 y.o. Sex: male      Admit Date: 3/16/2019    LOS: 1 day     Subjective:   Mr Elvia Ashford is a 53 y.o. male with a past medical history of sickle cell disease who presents to the ER with complaint of bilateral leg pain for the past several days. He visited the ER 1 day prior his admission for the same complain, he did not improve on pain medications given. He is being managed for sickle cell pain crisis on Dilaudid and roxicodone. IVFs also initiated. He had a Hb of 6.5 ( baseline 8-9 ), and had elevate absolute reticulocyte count. Hemoglobin electrophoresis taken. Plan for 1 PRBC transfusion. Hematology consulted, appreciate recommendations. Objective: On my assessment today he was found in no distress, he stated his pain is controlled on current med regimen. Vitals:    03/16/19 2025 03/16/19 2326 03/17/19 0310 03/17/19 0645   BP: 147/77 134/74 138/84 120/55   Pulse: 60 68 72 72   Resp: 16 16 18 18   Temp: 98 °F (36.7 °C) 98 °F (36.7 °C) 98 °F (36.7 °C) 98.1 °F (36.7 °C)   SpO2: 91% 99% 100% 99%   Weight: 73.3 kg (161 lb 11.2 oz)      Height: 5' 10\" (1.778 m)           Intake and Output:  Current Shift: No intake/output data recorded. Last three shifts: 03/15 1901 - 03/17 0700  In: 1376 [P.O.:720; I.V.:656]  Out: 500 [Urine:500]    Physical Exam:   GENERAL: alert, cooperative, no distress, appears stated age  EYE: negative  LYMPHATIC: Cervical, supraclavicular, and axillary nodes normal.   THROAT & NECK: normal and no erythema or exudates noted. LUNG: clear to auscultation bilaterally  HEART: regular rate and rhythm, S1, S2 normal, no murmur, click, rub or gallop  ABDOMEN: soft, non-tender.  Bowel sounds normal. No masses,  no organomegaly  EXTREMITIES:  extremities normal, atraumatic, no cyanosis or edema  SKIN: Normal.  NEUROLOGIC: negative  PSYCHIATRIC: non focal    Lab/Data Review: All lab results for the last 24 hours reviewed. Assessment:     Principal Problem:    Sickle cell pain crisis ([facility] [address] (10/25/2012)    Active Problems:    Essential hypertension, benign (6/23/2012)      Sickle cell anemia (HCC) (4/6/2016)      Paroxysmal SVT (supraventricular tachycardia) (HCC) (7/9/2016)      Sickle cell disease ([facility] [address] (7/13/2017)      Leg pain (6/24/2018)      Leukocytosis (10/11/2018)        Plan:     -Sickle cell pain crisis:   Continue dilaudid, roxicodone  IVF: 0.45% NS   Hydroxyurea, folic acid and jadenu  Appreciate hematology input.      -Anemia:  Transfuse 1 prbc     -Paroxysmal SVT: stable      -Essential hypertension: home meds      -DM type II: stable, insulin          DVT Prophylaxis: Lovenox       Code Status: FULL CODE       Disposition: home tomorrow if clinically stable     PATIENTS STATUS CHANGED TO OBSERVATION.  ROS, PMH, SOCIAL HX, FAMILY HX UNCHANGED FROM HPI DONE ON 3/16/19     Signed By: Maxine Deleon MD     March 17, 2019

## 2019-03-17 NOTE — PROGRESS NOTES
Problem: Falls - Risk of  Goal: *Absence of Falls  Outcome: Progressing Towards Goal  Fall Risk Interventions:            Medication Interventions: Patient to call before getting OOB, Teach patient to arise slowly, Evaluate medications/consider consulting pharmacy

## 2019-03-17 NOTE — PROGRESS NOTES
2022- pt arrived on floor via transport, taken to room 504. TRANSFER - IN REPORT:    Verbal report received from [address] RN(name) on Margie Causey  being received from ED(unit) for routine progression of care      Report consisted of patients Situation, Background, Assessment and   Recommendations(SBAR). Information from the following report(s) SBAR, ED Summary and MAR was reviewed with the receiving nurse. Opportunity for questions and clarification was provided. Assessment completed upon patients arrival to unit and care assumed.

## 2019-03-17 NOTE — PROGRESS NOTES
END OF SHIFT NOTE:    Intake/Output  03/17 0701 - 03/17 1900  In: 4906 [P.O.:960; I.V.:445]  Out: 400 [Urine:400]   Voiding: YES  Catheter: NO  Drain:              Stool:  1 occurrences. Emesis:  0 occurrences. VITAL SIGNS  Patient Vitals for the past 12 hrs:   Temp Pulse Resp BP SpO2   03/17/19 1615 98.7 °F (37.1 °C) 85 16 102/62 97 %   03/17/19 1500 98.6 °F (37 °C) 89 16 109/70 95 %   03/17/19 1400 98.5 °F (36.9 °C) 89 16 129/61 98 %   03/17/19 1335 98.1 °F (36.7 °C) 88 16 113/61 97 %   03/17/19 1043 98.4 °F (36.9 °C) 69 18 134/68 92 %   03/17/19 0645 98.1 °F (36.7 °C) 72 18 120/55 99 %       Pain Assessment  Pain 1  Pain Scale 1: Numeric (0 - 10) (03/17/19 1813)  Pain Intensity 1: 9 (03/17/19 1813)  Patient Stated Pain Goal: 0 (03/17/19 1638)  Pain Reassessment 1: Patient sleeping (03/17/19 1638)  Pain Onset 1: PTA (03/16/19 2030)  Pain Location 1: Leg (03/17/19 1813)  Pain Orientation 1: Right;Left (03/17/19 1813)  Pain Description 1: Aching (03/17/19 1813)  Pain Intervention(s) 1: Medication (see MAR) (03/17/19 1813)    Ambulating  Yes    Additional Information:   -1 unit PRBCs given. Hgb now 8.0  -Dilaudid IV and phenergan given   -Ambulated to bathroom   -Does not have Jadenu with him   -VSS    Shift report given to oncoming nurse at the bedside.     Lashonda Casas

## 2019-03-17 NOTE — PROGRESS NOTES
2037- MD on call, Dr. Marilyn Strauss, paged about not having PCA in stock. Order changed to Dil 2mg q2hrs instead. Will place and administer.

## 2019-03-17 NOTE — PROGRESS NOTES
18- Dr. Albert Nino notified of pt's allergy to Zofran - order changed to Phenergan instead. Will place and administer.

## 2019-03-17 NOTE — CONSULTS
[employer] [facility]ematology & On[facility]        Inpatient Hematology / Oncology Consult Note    Reason for Consult:  Sickle cell pain crisis [facility]) [D57.00]  Referring Physician:  Serafin Ball MD    History of Present Illness:   Mak Rutherford is a 53 y.o. male with a past medical history of sickle cell disease who presents to the ER with complaint of bilateral leg pain for the past several days. He presented to the ER last night with the same complaint and was improved somewhat with IV pain medicine before being discharged home. However, today the pain has become even more intense. He reports no chest pain, admits to some nausea and diarrhea. Denies fevers. Hgb 6.5 on admission (yesterday 7.0, and day prior to that 7.5) and retic 7.1 trended this is high for him. He takes Jadenu, Hydrea, and folic acid daily.  We were consulted for recommendations. Review of Systems:  Constitutional Denies fever, chills, weight loss, appetite changes, fatigue, night sweats. HEENT Denies trauma, blurry vision, hearing loss, ear pain, nosebleeds, sore throat, neck pain    Skin Denies lesions or rashes. Lungs Denies dyspnea, cough, sputum production or hemoptysis. Cardiovascular Denies chest pain, palpitations, or lower extremity edema. Gastrointestinal Denies nausea, vomiting, changes in bowel habits, bloody or black stools, abdominal pain.  Denies dysuria, frequency or hesitancy of urination. Neuro Denies headaches, visual changes or ataxia. Denies dizziness, tingling, tremors, sensory change, speech change, focal weakness or headaches. MSK Knee pain     Psychiatric/Behavioral The patient is not nervous/anxious.          Allergies   Allergen Reactions    Compazine [Prochlorperazine Edisylate] Other (comments)     Also makes him feel funny    Morphine Other (comments)     Makes him feel funny    Reglan [Metoclopramide] Other (comments)     \"feel funny\"    Zofran [Ondansetron Hcl (Pf)] Other (comments)     Make him feel funny     Past Medical History:   Diagnosis Date    Chronic pain     HTN (hypertension)     Ill-defined condition     sickle cell    Iron overload due to repeated red blood cell transfusions 1/18/2017    Paroxysmal SVT [facility]supraventricular tachycardia) ([facility] 7/9/2016    Sickle cell disease ([facility] [address]      Past Surgical History:   Procedure Laterality Date    HC PENILE IMPL DURA II POSITINBLE      HC PORT LIFE SNGLE LUMEN 5013      to L CW    HX CHOLECYSTECTOMY      HX OTHER SURGICAL      penile inplant    HX VASCULAR ACCESS       Family History   Problem Relation Age of Onset    Hypertension Other     Diabetes Father     Stroke Father     Sickle Cell Anemia Sister      Social History     Socioeconomic History    Marital status:      Spouse name: Not on file    Number of children: Not on file    Years of education: Not on file    Highest education level: Not on file   Social Needs    Financial resource strain: Not on file    Food insecurity - worry: Not on file    Food insecurity - inability: Not on file   [employer] needs - medical: Not on file   [email or web address] needs - non-medical: Not on file   Occupational History    Not on file   Tobacco Use    Smoking status: Never Smoker    Smokeless tobacco: Never Used   Substance and Sexual Activity    Alcohol use: No     Alcohol/week: 0.0 oz    Drug use: No    Sexual activity: Yes   Other Topics Concern    Not on file   Social History Narrative    Not on file     Current Facility-Administered Medications   Medication Dose Route Frequency Provider Last Rate Last Dose    0.45% sodium chloride infusion  125 mL/hr IntraVENous CONTINUOUS Fish Brink  mL/hr at 03/17/19 0849 125 mL/hr at 03/17/19 0849    0.9% sodium chloride infusion 250 mL  250 mL IntraVENous PRN Fish Brink MD        naloxone [facility]) injection 0.4 mg  0.4 mg IntraVENous PRN Chuy Coy MD        oxyCODONE IR (ROXICODONE) tablet 5 mg  5 mg Oral Q4H PRN Dusty Ross MD        sodium chloride (NS) flush 5-40 mL  5-40 mL IntraVENous PRN Chanda BARKER MD        deferasirox (JADENU) tablet 1,800 mg (Patient Supplied)  1,800 mg Oral DAILY Navya Cheung MD   Stopped at 24/69/41 5551    folic acid (FOLVITE) tablet 1 mg  1 mg Oral DAILY Navya Cheung MD   1 mg at 03/17/19 0836    insulin regular (NOVOLIN R, HUMULIN R) injection 0-15 Units  0-15 Units SubCUTAneous AC&HS Navya Cheung MD   Stopped at 03/16/19 2200    lisinopril (PRINIVIL, ZESTRIL) tablet 5 mg  5 mg Oral DAILY Reynaldo LAKE MD   5 mg at 03/17/19 0834    magnesium oxide (MAG-OX) tablet 400 mg  400 mg Oral DAILY Reynaldo LAKE MD   400 mg at 03/17/19 0834    metoprolol tartrate (LOPRESSOR) tablet 12.5 mg  12.5 mg Oral BID Reynaldo LAKE MD   12.5 mg at 03/17/19 0835    sodium chloride (NS) flush 5-40 mL  5-40 mL IntraVENous Q8H Reynaldo LAKE MD   10 mL at 03/17/19 0600    sodium chloride (NS) flush 5-40 mL  5-40 mL IntraVENous PRN Navya Cheung MD        acetaminophen (TYLENOL) tablet 650 mg  650 mg Oral Q4H PRN Navya Cheung MD        ondansetron [facility]) injection 4 mg  4 mg IntraVENous Q4H PRN Navya Cheung MD        magnesium hydroxide (MILK OF MAGNESIA) 400 mg/5 mL oral suspension 30 mL  30 mL Oral DAILY PRN Navya Cheung MD        enoxaparin (LOVENOX) injection 40 mg  40 mg SubCUTAneous Q24H Reynaldo LAKE MD   40 mg at 03/16/19 2047    alcohol 62% (NOZIN) nasal  1 Ampule  1 Ampule Topical Q12H Navya Cheung MD   1 Ampule at 03/17/19 0836    HYDROmorphone (PF) (DILAUDID) injection 2 mg  2 mg IntraVENous Q2H PRN Reynaldo LAKE MD   2 mg at 03/17/19 1131    hydroxyurea (HYDREA) chemo cap 1,000 mg  1,000 mg Oral DAILY Reynaldo LAKE MD   1,000 mg at 03/17/19 0848    promethazine (PHENERGAN) with saline injection 12.5 mg  12.5 mg IntraVENous Q4H PRN Navya Cheung MD   12.5 mg at 03/17/19 5675       OBJECTIVE:  Patient Vitals for the past 8 hrs:   BP Temp Pulse Resp SpO2   19 1043 134/68 98.4 °F (36.9 °C) 69 18 92 %   19 0645 120/55 98.1 °F (36.7 °C) 72 18 99 %     Temp (24hrs), Av.2 °F (36.8 °C), Min:98 °F (36.7 °C), Max:98.6 °F (37 °C)     07 -  1900  In: 358 [P.O.:240; I.V.:258]  Out: 400 [Urine:400]    Physical Exam:  Constitutional: Well developed, well nourished male in no acute distress, sitting comfortably in the hospital bed. HEENT: Normocephalic and atraumatic. Oropharynx is clear, mucous membranes are moist.  Pupils are equal, round, and reactive to light. Extraocular muscles are intact. Sclerae anicteric. Skin Warm and dry. No bruising and no rash noted. No erythema. No pallor. Respiratory Lungs are clear to auscultation bilaterally without wheezes, rales or rhonchi, normal air exchange without accessory muscle use. CVS Normal rate, regular rhythm and normal S1 and S2. No murmurs, gallops, or rubs. Abdomen Soft, nontender and nondistended, normoactive bowel sounds. Neuro Grossly nonfocal with no obvious sensory or motor deficits. MSK Normal range of motion in general.  No edema and no tenderness. Psych Appropriate mood and affect. Labs:    Recent Results (from the past 24 hour(s))   CBC WITH AUTOMATED DIFF    Collection Time: 19  5:22 PM   Result Value Ref Range    WBC 11.2 (H) 4.3 - 11.1 K/uL    RBC 1.53 (L) 4.23 - 5.6 M/uL    HGB 7.0 (L) 13.6 - 17.2 g/dL    HCT 19.3 (LL) 41.1 - 50.3 %    .1 (H) 79.6 - 97.8 FL    MCH 45.8 (H) 26.1 - 32.9 PG    MCHC 36.3 (H) 31.4 - 35.0 g/dL    RDW 20.0 (H) 11.9 - 14.6 %    PLATELET 835 337 - 853 K/uL    MPV 10.7 9.4 - 12.3 FL    ABSOLUTE NRBC 0.76 (H) 0.0 - 0.2 K/uL    NEUTROPHILS 60 43 - 78 %    LYMPHOCYTES 27 13 - 44 %    MONOCYTES 8 4.0 - 12.0 %    EOSINOPHILS 5 0.5 - 7.8 %    BASOPHILS 0 0.0 - 2.0 %    IMMATURE GRANULOCYTES 0 0.0 - 5.0 %    ABS. NEUTROPHILS 6.7 1.7 - 8.2 K/UL    ABS.  LYMPHOCYTES 3.0 0.5 - 4.6 K/UL ABS. MONOCYTES 0.9 0.1 - 1.3 K/UL    ABS. EOSINOPHILS 0.6 0.0 - 0.8 K/UL    ABS. BASOPHILS 0.0 0.0 - 0.2 K/UL    ABS. IMM. GRANS. 0.0 0.0 - 0.5 K/UL    RBC COMMENTS MODERATE  ANISOCYTOSIS + POIKILOCYTOSIS        RBC COMMENTS SLIGHT  HYPOCHROMIA        RBC COMMENTS SLIGHT  POLYCHROMASIA        RBC COMMENTS SLIGHT  OVALOCYTES        RBC COMMENTS SLIGHT  SCHISTOCYTES        RBC COMMENTS RARE  SICKLE CELLS        RBC COMMENTS OCCASIONAL  TARGET CELLS        WBC COMMENTS Result Confirmed By Smear      PLATELET COMMENTS ADEQUATE      DF AUTOMATED     METABOLIC PANEL, COMPREHENSIVE    Collection Time: 03/16/19  5:22 PM   Result Value Ref Range    Sodium 140 136 - 145 mmol/L    Potassium 4.1 3.5 - 5.1 mmol/L    Chloride 106 98 - 107 mmol/L    CO2 26 21 - 32 mmol/L    Anion gap 8 7 - 16 mmol/L    Glucose 113 (H) 65 - 100 mg/dL    BUN 12 6 - 23 MG/DL    Creatinine 0.95 0.8 - 1.5 MG/DL    GFR est AA >60 >60 ml/min/1.73m2    GFR est non-AA >60 >60 ml/min/1.73m2    Calcium 8.5 8.3 - 10.4 MG/DL    Bilirubin, total 1.3 (H) 0.2 - 1.1 MG/DL    ALT (SGPT) 41 12 - 65 U/L    AST (SGOT) 43 (H) 15 - 37 U/L    Alk.  phosphatase 65 50 - 136 U/L    Protein, total 7.6 6.3 - 8.2 g/dL    Albumin 3.4 (L) 3.5 - 5.0 g/dL    Globulin 4.2 (H) 2.3 - 3.5 g/dL    A-G Ratio 0.8 (L) 1.2 - 3.5     RETICULOCYTE COUNT    Collection Time: 03/16/19  5:22 PM   Result Value Ref Range    Reticulocyte count 8.6 (H) 0.3 - 2.0 %    Absolute Retic Cnt. 0.1316 (H) 0.026 - 0.095 M/ul    Immature Retic Fraction 31.3 (H) 2.3 - 13.4 %    Retic Hgb Conc. 45 (H) 29 - 35 pg   GLUCOSE, POC    Collection Time: 03/16/19  8:59 PM   Result Value Ref Range    Glucose (POC) 117 (H) 65 - 621 mg/dL   METABOLIC PANEL, BASIC    Collection Time: 03/17/19  2:50 AM   Result Value Ref Range    Sodium 139 136 - 145 mmol/L    Potassium 4.3 3.5 - 5.1 mmol/L    Chloride 108 (H) 98 - 107 mmol/L    CO2 26 21 - 32 mmol/L    Anion gap 5 (L) 7 - 16 mmol/L    Glucose 103 (H) 65 - 100 mg/dL BUN 9 6 - 23 MG/DL    Creatinine 0.84 0.8 - 1.5 MG/DL    GFR est AA >60 >60 ml/min/1.73m2    GFR est non-AA >60 >60 ml/min/1.73m2    Calcium 8.0 (L) 8.3 - 10.4 MG/DL   CBC WITH AUTOMATED DIFF    Collection Time: 03/17/19  2:50 AM   Result Value Ref Range    WBC 13.1 (H) 4.3 - 11.1 K/uL    RBC 1.44 (L) 4.23 - 5.6 M/uL    HGB 6.5 (LL) 13.6 - 17.2 g/dL    HCT 19.0 (LL) 41.1 - 50.3 %    .0 (H) 79.6 - 97.8 FL    MCH 43.8 (H) 26.1 - 32.9 PG    MCHC 35.0 31.4 - 35.0 g/dL    RDW 19.9 (H) 11.9 - 14.6 %    PLATELET 505 299 - 137 K/uL    MPV 10.8 9.4 - 12.3 FL    ABSOLUTE NRBC 0.51 (H) 0.0 - 0.2 K/uL    DF AUTOMATED      NEUTROPHILS 48 43 - 78 %    LYMPHOCYTES 37 13 - 44 %    MONOCYTES 10 4.0 - 12.0 %    EOSINOPHILS 5 0.5 - 7.8 %    BASOPHILS 1 0.0 - 2.0 %    IMMATURE GRANULOCYTES 0 0.0 - 5.0 %    ABS. NEUTROPHILS 6.3 1.7 - 8.2 K/UL    ABS. LYMPHOCYTES 4.8 (H) 0.5 - 4.6 K/UL    ABS. MONOCYTES 1.3 0.1 - 1.3 K/UL    ABS. EOSINOPHILS 0.6 0.0 - 0.8 K/UL    ABS. BASOPHILS 0.1 0.0 - 0.2 K/UL    ABS. IMM.  GRANS. 0.1 0.0 - 0.5 K/UL   GLUCOSE, POC    Collection Time: 03/17/19  6:50 AM   Result Value Ref Range    Glucose (POC) 123 (H) 65 - 100 mg/dL   RETICULOCYTE COUNT    Collection Time: 03/17/19  9:14 AM   Result Value Ref Range    Reticulocyte count 7.1 (H) 0.3 - 2.0 %    Absolute Retic Cnt. 0.1014 (H) 0.026 - 0.095 M/ul    Immature Retic Fraction 21.6 (H) 2.3 - 13.4 %    Retic Hgb Conc. 43 (H) 29 - 35 pg   TYPE + CROSSMATCH    Collection Time: 03/17/19  9:14 AM   Result Value Ref Range    Crossmatch Expiration 03/20/2019     ABO/Rh(D) A POSITIVE     Antibody screen NEG     Unit number [id number]     Blood component type  LRIR     Unit division 00     Status of unit ALLOCATED     ANTIGEN/ANTIBODY INFO       C NEGATIVE,  E NEGATIVE,  ALETA NEGATIVE,  FY(A) NEGATIVE,  FY(B) NEGATIVE,  SICKLEDEX NEGATIVE      Crossmatch result Compatible    GLUCOSE, POC    Collection Time: 03/17/19 11:17 AM   Result Value Ref Range Glucose (POC) 133 (H) 65 - 100 mg/dL         ASSESSMENT:  Problem List  Date Reviewed: 7/22/2018          Codes Class Noted    Leukocytosis ICD-10-CM: D72.829  ICD-9-CM: 288.60  10/11/2018        Leg pain ICD-10-CM: M79.606  ICD-9-CM: 729.5  6/24/2018        Sickle cell disease ([facility] ICD-10-CM: D57.1  ICD-9-CM: 282.60  7/13/2017        Iron overload due to repeated red blood cell transfusions ICD-10-CM: E83.111  ICD-9-CM: 275.02  1/18/2017        Paroxysmal SVT (supraventricular tachycardia) (HCC) ICD-10-CM: I47.1  ICD-9-CM: 427.0  7/9/2016        Sickle cell anemia ([facility] ICD-10-CM: D57.1  ICD-9-CM: 282.60  4/6/2016        * (Principal) Sickle cell pain crisis ([facility] ICD-10-CM: D57.00  ICD-9-CM: 282.62  10/25/2012        Essential hypertension, benign ICD-10-CM: I10  ICD-9-CM: 401.1  6/23/2012                RECOMMENDATIONS:  Sickle/Beta+ Thal with arm/leg pain crisis  - Hgb 6.5 (BL 7.5-8.5). Check hgb electrophoresis then transfuse one unit prbc  - Continue IVF D5 1/2 NS and oxygen  - Continue jadenu, hydrea, and folic acid     Lab studies and imaging studies were personally reviewed. Thank you for allowing us to participate in the care of Mr. Cabrera.  He will need to f/u with Dr. Leretha Dakin at [facility] after discharge. We will sign off. Please call with any questions.          Ken Limon NP   [facility] Hematology & Oncology  [address] [address]  Office : [phone]  Fax : [phone]     Attending Addendum:  Patient seen with NP. Mr Edith Mckeon is a very pleasant 49yo man with known Sickle/B+ Thal.  He presented to the ED for sickle pain. He was treated and discharged with improvement in his pain. He returned to the ED with worsening LE pain, he stated typical for his crises. He denied JAVED/Chest pain or recent fevers. He reported nausea and some diarrhea that is resolved. Hb on arrival down to 6.5 with increased retic. I personally performed a face to face diagnostic evaluation on this patient. My findings are as follows: A&ox3, lungs clear, heart regular, abdomen benign and no LE edema. Of note, he is not wearing supplemental oxygen and when asked to put it on, removed it shortly after wearing it. Will give 1 unit of PRBC for Hb of 6.5. Will check Hb electrophoresis to see his status prior to transfusion. On IVF. Advised to wear supplemental O2. Pain management with IV Dilaudid 2mg IV q2H for severe pain and 5mg PO oxycodone q4H for moderate pain. This regimen has worked for Mr Thony Argueta during prior admissions. Will c/w Hydrea, folic H+ and Jadenu. Bowel regimen. C/w supportive care. He will f/u with Dr Case Pappas at [facility] at discharge. Thank you for the opportunity to take care of Mr Thony Argueta along with you. I have reviewed and agree with the care plan.               Deirdre Padilla MD  [address] [place] [address] Hematology and Oncology  [address] [place] [address]  Office : [phone]  Fax : [phone]

## 2019-03-17 NOTE — PROGRESS NOTES
2030- Skin assessment done upon admission with Edilma Briggs RN. Skin is intact and without lesions, abrasions, or ecchymosis. No breakdown or excoriation noted. No abnormalities.

## 2019-03-17 NOTE — PROGRESS NOTES
END OF SHIFT NOTE:    Pt medicated with Dilauid 2mg IV x3 and Phenergan 12.5 IV x2 for complaints of pain/nausea. IVF infusing per MAR. Hgb this AM at 6.5/Hct 19.0. - per Dr. Jonah Bianchi, don't transfuse yet. Pt is ambulatory and gets up ad rizwana. No further needs expressed. Intake/Output  03/16 1901 - 03/17 0700  In: 3142 [P.O.:720; I.V.:656]  Out: 500 [Urine:500]   Voiding: YES  Catheter: NO  Drain:              Stool:  0 occurrences. Emesis:  0 occurrences. VITAL SIGNS  Patient Vitals for the past 12 hrs:   Temp Pulse Resp BP SpO2   03/17/19 0310 98 °F (36.7 °C) 72 18 138/84 100 %   03/16/19 2326 98 °F (36.7 °C) 68 16 134/74 99 %   03/16/19 2025 98 °F (36.7 °C) 60 16 147/77 91 %   03/16/19 1949 98.3 °F (36.8 °C) 71 16 123/74 100 %   03/16/19 1851  96 16 123/74 96 %       Pain Assessment  Pain 1  Pain Scale 1: Visual (03/17/19 0402)  Pain Intensity 1: 0 (03/17/19 0402)  Patient Stated Pain Goal: 0 (03/17/19 0402)  Pain Reassessment 1: Patient sleeping (03/17/19 0402)  Pain Onset 1: PTA (03/16/19 2030)  Pain Location 1: Leg (03/17/19 0312)  Pain Orientation 1: Left;Right; Lower (03/17/19 0743)  Pain Description 1: Aching (03/17/19 9835)  Pain Intervention(s) 1: Medication (see MAR) (03/17/19 8016)    Ambulating  Yes    Additional Information:     Shift report will be given to oncoming nurse at the bedside.     Bassem Bowens RN

## 2019-03-17 NOTE — PROGRESS NOTES
0- Dr. Levin Boxer paged regarding pt's critical hgb/hct level of 6.5/19.0. No orders received for blood products.

## 2019-03-18 VITALS
SYSTOLIC BLOOD PRESSURE: 123 MMHG | TEMPERATURE: 98.4 F | DIASTOLIC BLOOD PRESSURE: 63 MMHG | WEIGHT: 168.1 LBS | HEIGHT: 70 IN | HEART RATE: 87 BPM | RESPIRATION RATE: 16 BRPM | BODY MASS INDEX: 24.07 KG/M2 | OXYGEN SATURATION: 98 %

## 2019-03-18 PROBLEM — D57.00 SICKLE CELL CRISIS (HCC): Status: ACTIVE | Noted: 2019-03-18

## 2019-03-18 LAB
ABO + RH BLD: NORMAL
ANION GAP SERPL CALC-SCNC: 5 MMOL/L (ref 7–16)
ANTIGENS PRESENT RBC DONR: NORMAL
BASOPHILS # BLD: 0.1 K/UL (ref 0–0.2)
BASOPHILS NFR BLD: 1 % (ref 0–2)
BLD PROD TYP BPU: NORMAL
BLOOD GROUP ANTIBODIES SERPL: NORMAL
BPU ID: NORMAL
BUN SERPL-MCNC: 13 MG/DL (ref 6–23)
CALCIUM SERPL-MCNC: 8.1 MG/DL (ref 8.3–10.4)
CHLORIDE SERPL-SCNC: 107 MMOL/L (ref 98–107)
CO2 SERPL-SCNC: 26 MMOL/L (ref 21–32)
CREAT SERPL-MCNC: 0.78 MG/DL (ref 0.8–1.5)
CROSSMATCH RESULT,%XM: NORMAL
DIFFERENTIAL METHOD BLD: ABNORMAL
EOSINOPHIL # BLD: 0.9 K/UL (ref 0–0.8)
EOSINOPHIL NFR BLD: 9 % (ref 0.5–7.8)
GLUCOSE BLD STRIP.AUTO-MCNC: 117 MG/DL (ref 65–100)
GLUCOSE SERPL-MCNC: 134 MG/DL (ref 65–100)
HCT VFR BLD AUTO: 22.3 % (ref 41.1–50.3)
HGB BLD-MCNC: 7.8 G/DL (ref 13.6–17.2)
IMM GRANULOCYTES # BLD AUTO: 0 K/UL (ref 0–0.5)
IMM GRANULOCYTES NFR BLD AUTO: 0 % (ref 0–5)
LYMPHOCYTES # BLD: 4.1 K/UL (ref 0.5–4.6)
LYMPHOCYTES NFR BLD: 42 % (ref 13–44)
MCH RBC QN AUTO: 39.2 PG (ref 26.1–32.9)
MCHC RBC AUTO-ENTMCNC: 35 G/DL (ref 31.4–35)
MCV RBC AUTO: 112.1 FL (ref 79.6–97.8)
MONOCYTES # BLD: 0.9 K/UL (ref 0.1–1.3)
MONOCYTES NFR BLD: 9 % (ref 4–12)
NEUTS SEG # BLD: 3.8 K/UL (ref 1.7–8.2)
NEUTS SEG NFR BLD: 39 % (ref 43–78)
NRBC # BLD: 0.26 K/UL (ref 0–0.2)
PLATELET # BLD AUTO: 203 K/UL (ref 150–450)
PMV BLD AUTO: 10.7 FL (ref 9.4–12.3)
POTASSIUM SERPL-SCNC: 4.6 MMOL/L (ref 3.5–5.1)
RBC # BLD AUTO: 1.99 M/UL (ref 4.23–5.6)
SODIUM SERPL-SCNC: 138 MMOL/L (ref 136–145)
SPECIMEN EXP DATE BLD: NORMAL
STATUS OF UNIT,%ST: NORMAL
UNIT DIVISION, %UDIV: 0
WBC # BLD AUTO: 9.8 K/UL (ref 4.3–11.1)

## 2019-03-18 PROCEDURE — 85025 COMPLETE CBC W/AUTO DIFF WBC: CPT

## 2019-03-18 PROCEDURE — 65270000029 HC RM PRIVATE

## 2019-03-18 PROCEDURE — 99232 SBSQ HOSP IP/OBS MODERATE 35: CPT | Performed by: INTERNAL MEDICINE

## 2019-03-18 PROCEDURE — 74011000258 HC RX REV CODE- 258: Performed by: INTERNAL MEDICINE

## 2019-03-18 PROCEDURE — 77030020257 HC SOL INJ SOD CL 0.45% 1000ML BG

## 2019-03-18 PROCEDURE — 80048 BASIC METABOLIC PNL TOTAL CA: CPT

## 2019-03-18 PROCEDURE — 99218 HC RM OBSERVATION: CPT

## 2019-03-18 PROCEDURE — 36591 DRAW BLOOD OFF VENOUS DEVICE: CPT

## 2019-03-18 PROCEDURE — 74011250637 HC RX REV CODE- 250/637: Performed by: FAMILY MEDICINE

## 2019-03-18 PROCEDURE — 74011250637 HC RX REV CODE- 250/637: Performed by: INTERNAL MEDICINE

## 2019-03-18 PROCEDURE — 74011000250 HC RX REV CODE- 250: Performed by: FAMILY MEDICINE

## 2019-03-18 PROCEDURE — 74011250636 HC RX REV CODE- 250/636: Performed by: FAMILY MEDICINE

## 2019-03-18 PROCEDURE — 82962 GLUCOSE BLOOD TEST: CPT

## 2019-03-18 RX ORDER — OXYCODONE HYDROCHLORIDE 30 MG/1
30 TABLET ORAL
Qty: 20 TAB | Refills: 0 | Status: SHIPPED | OUTPATIENT
Start: 2019-03-18 | End: 2019-03-22

## 2019-03-18 RX ORDER — POLYETHYLENE GLYCOL 3350 17 G/17G
17 POWDER, FOR SOLUTION ORAL DAILY
Qty: 1 PACKET | Refills: 5 | Status: SHIPPED | OUTPATIENT
Start: 2019-03-18

## 2019-03-18 RX ORDER — OXYCODONE HCL 20 MG/1
20 TABLET, FILM COATED, EXTENDED RELEASE ORAL EVERY 12 HOURS
Qty: 8 TAB | Refills: 0 | Status: SHIPPED | OUTPATIENT
Start: 2019-03-18 | End: 2019-03-22

## 2019-03-18 RX ADMIN — SODIUM CHLORIDE 125 ML/HR: 450 INJECTION, SOLUTION INTRAVENOUS at 04:42

## 2019-03-18 RX ADMIN — HYDROXYUREA 1000 MG: 500 CAPSULE ORAL at 08:37

## 2019-03-18 RX ADMIN — FOLIC ACID 1 MG: 1 TABLET ORAL at 08:00

## 2019-03-18 RX ADMIN — PROMETHAZINE HYDROCHLORIDE 12.5 MG: 25 INJECTION INTRAMUSCULAR; INTRAVENOUS at 04:38

## 2019-03-18 RX ADMIN — HYDROMORPHONE HYDROCHLORIDE 2 MG: 1 INJECTION, SOLUTION INTRAMUSCULAR; INTRAVENOUS; SUBCUTANEOUS at 03:05

## 2019-03-18 RX ADMIN — HYDROMORPHONE HYDROCHLORIDE 2 MG: 1 INJECTION, SOLUTION INTRAMUSCULAR; INTRAVENOUS; SUBCUTANEOUS at 08:00

## 2019-03-18 RX ADMIN — Medication 10 ML: at 04:42

## 2019-03-18 RX ADMIN — PROMETHAZINE HYDROCHLORIDE 12.5 MG: 25 INJECTION INTRAMUSCULAR; INTRAVENOUS at 08:11

## 2019-03-18 RX ADMIN — OXYCODONE HYDROCHLORIDE 5 MG: 5 TABLET ORAL at 04:37

## 2019-03-18 RX ADMIN — METOPROLOL TARTRATE 12.5 MG: 25 TABLET ORAL at 08:00

## 2019-03-18 RX ADMIN — Medication 400 MG: at 08:00

## 2019-03-18 RX ADMIN — Medication 1 AMPULE: at 08:00

## 2019-03-18 RX ADMIN — LISINOPRIL 5 MG: 5 TABLET ORAL at 08:00

## 2019-03-18 NOTE — PROGRESS NOTES
Discharge instructions and prescriptions provided and explained to patient, patient voiced understanding. Medication side effect sheet reviewed with pt. No home meds or valuables to return. Opportunity for questions provided. Primary RN to yurypranav donald. Pt waiting on his spouse. Instructed to call once ready to leave the floor.

## 2019-03-18 NOTE — PROGRESS NOTES
Pt is being discharged today no needs from CM at time of discharge    Milestones Met    Care Management Interventions  PCP Verified by CM: Yes  Mode of Transport at Discharge: Self  Transition of Care Consult (CM Consult): Discharge Planning  Discharge Durable Medical Equipment: No  Physical Therapy Consult: No  Occupational Therapy Consult: No  Speech Therapy Consult: No  Current Support Network: Own Home, Family Lives Nearby  Confirm Follow Up Transport: Family  Plan discussed with Pt/Family/Caregiver: Yes  Freedom of Choice Offered:  Yes

## 2019-03-18 NOTE — PHYSICIAN ADVISORY
Letter of Determination: Upgrade from Observation to Inpatient Status    This patient was originally hospitalized as Outpatient Status with Observation Services on 3/16/2019 for sickle cell chrisis. This patient now meets for Inpatient Admission in accordance with CMS regulation Section 43 .3. Specifically, patient's stay is now over Two Midnights and was medically necessary. The patient's stay was medically necessary based on hemoglobin of 6.5 mg/dl, and more than 4 doses of intravenous dilaudid for pain control. Consistent with CMS guidelines, patient meets for inpatient status. It is our recommendation that this patient's hospitalization status should be upgraded from OBSERVATION to INPATIENT status.      The final decision regarding the patient's hospitalization status depends on the attending physician's judgement.     Romy Herron MD, LEANDER,   Physician Jair Earl.

## 2019-03-18 NOTE — DISCHARGE SUMMARY
Discharge Summary     Patient: Valentine Thompson MRN: [id number]  SSN: [id number]    YOB: 1975  Age: 53 y.o.   Sex: male       Admit Date: 3/16/2019    Discharge Date: 3/18/2019      Admission Diagnoses: Sickle cell pain crisis ([facility] [D57.00]  Sickle cell pain crisis ([facility] [D57.00]  Sickle cell crisis ([facility] [D57.00]    Discharge Diagnoses:   Problem List as of 3/18/2019 Date Reviewed: 7/22/2018          Codes Class Noted - Resolved    Sickle cell crisis ([place] [address].) ICD-10-CM: D57.00  ICD-9-CM: 282.62  3/18/2019 - Present        Leukocytosis ICD-10-CM: D72.829  ICD-9-CM: 288.60  10/11/2018 - Present        Leg pain ICD-10-CM: M79.606  ICD-9-CM: 729.5  6/24/2018 - Present        Sickle cell disease ([facility] ICD-10-CM: D57.1  ICD-9-CM: 282.60  7/13/2017 - Present        Iron overload due to repeated red blood cell transfusions ICD-10-CM: E83.111  ICD-9-CM: 275.02  1/18/2017 - Present        Paroxysmal SVT (supraventricular tachycardia) (HCC) ICD-10-CM: I47.1  ICD-9-CM: 427.0  7/9/2016 - Present        Sickle cell anemia (HCC) ICD-10-CM: D57.1  ICD-9-CM: 282.60  4/6/2016 - Present        * (Principal) Sickle cell pain crisis ([facility] ICD-10-CM: D57.00  ICD-9-CM: 282.62  10/25/2012 - Present        Essential hypertension, benign ICD-10-CM: I10  ICD-9-CM: 401.1  6/23/2012 - Present        RESOLVED: Sickle cell crisis ([facility] ICD-10-CM: D57.00  ICD-9-CM: 282.62  11/5/2018 - 3/16/2019        RESOLVED: Volume overload ICD-10-CM: E87.70  ICD-9-CM: 276.69  6/28/2018 - 3/16/2019        RESOLVED: Vasoocclusive sickle cell crisis ([facility] ICD-10-CM: D57.00  ICD-9-CM: 282.62  6/24/2018 - 10/11/2018        RESOLVED: Dehydration ICD-10-CM: E86.0  ICD-9-CM: 276.51  5/1/2018 - 6/24/2018        RESOLVED: Generalized weakness ICD-10-CM: R53.1  ICD-9-CM: 780.79  5/1/2018 - 6/24/2018        RESOLVED: Body aches ICD-10-CM: R52  ICD-9-CM: 780.96  5/1/2018 - 6/24/2018        RESOLVED: Anemia ICD-10-CM: D64.9  ICD-9-CM: 285.9  11/12/2017 - 6/24/2018        RESOLVED: Sickle cell anemia with crisis [facility]) ICD-10-CM: D57.00  ICD-9-CM: 282.62  1/16/2017 - 6/24/2018        RESOLVED: Hypomagnesemia ICD-10-CM: E83.42  ICD-9-CM: 275.2  7/9/2016 - 6/24/2018        RESOLVED: Sickle cell crisis ([facility] ICD-10-CM: D57.00  ICD-9-CM: 282.62  4/5/2016 - 6/24/2018        RESOLVED: leukocytosis - most likely reactive ICD-10-CM: D72.829  ICD-9-CM: 288.60  1/30/2016 - 10/11/2018        RESOLVED: Diarrhea ICD-10-CM: R19.7  ICD-9-CM: 787.91  9/27/2014 - 6/24/2018        RESOLVED: Macrocytosis ICD-10-CM: D75.89  ICD-9-CM: 289.89  9/27/2014 - 1/30/2016        RESOLVED: Thrombocytopenia, unspecified ([facility] ICD-10-CM: D69.6  ICD-9-CM: 287.5  10/12/2013 - 1/30/2016        RESOLVED: Hyperbilirubinemia ICD-10-CM: E80.6  ICD-9-CM: 782.4  9/20/2012 - 6/24/2018    Overview Signed 9/20/2012  1:37 PM by Nitesh Taylor     Related to sickle cell crisis             RESOLVED: Hemolytic crisis ([facility] ICD-10-CM: D65  ICD-9-CM: 286.6  3/24/2012 - 6/24/2018    Overview Signed 3/24/2012  2:53 PM by Fabiana PLUMMER     Sickle cell with anemia             RESOLVED: Precordial pain ICD-10-CM: R07.2  ICD-9-CM: 786.51  3/24/2012 - 3/16/2019    Overview Signed 3/24/2012  2:53 PM by Anirudh Blue     Acute chest syndrome unlikely. RESOLVED: HTN (hypertension) (Chronic) ICD-10-CM: I10  ICD-9-CM: 401.9  3/2/2012 - 6/24/2018        RESOLVED: Leukocytosis - chronic reactive ICD-10-CM: T64.988  ICD-9-CM: 288.60  9/16/2011 - 6/24/2018               Discharge Condition: Good    Hospital Course:   Mr Raul Burciaga a 53 y. o. male with a past medical history of sickle cell disease who presents to the ER with complaint of bilateral leg pain for the past several days. He visited the ER 1 day prior his admission for the same complain, he did not improve on pain medications given. He is being managed for sickle cell pain crisis on Dilaudid and roxicodone. IVFs also initiated.  He had a Hb of 6.5 ( baseline 8-9 ), and had elevate absolute reticulocyte count. Hemoglobin electrophoresis taken. He received 1 PRBC transfusion, his hh improved to 7.8 grs. Hematology consulted, appreciate recommendations. plan to continue folic acid, hydroxyurea and jadenu. Patients pain has improved. He will be discharged and continue outpatient management. He has an upcoming appointment with his hematologist in 4 days. Physical Exam:   GENERAL: alert, cooperative, no distress, appears stated age  EYE: negative  LYMPHATIC: Cervical, supraclavicular, and axillary nodes normal.   THROAT & NECK: normal and no erythema or exudates noted. LUNG: clear to auscultation bilaterally  HEART: regular rate and rhythm, S1, S2 normal, no murmur, click, rub or gallop  ABDOMEN: soft, non-tender. Bowel sounds normal. No masses,  no organomegaly  EXTREMITIES:  extremities normal, atraumatic, no cyanosis or edema  SKIN: Normal.  NEUROLOGIC: negative  PSYCHIATRIC: non focal    Consults: Hematology/Oncology    Significant Diagnostic Studies: see note     Disposition: home    Discharge Medications:   Current Discharge Medication List      START taking these medications    Details   polyethylene glycol (MIRALAX) 17 gram packet Take 1 Packet by mouth daily. Qty: 1 Packet, Refills: 5         CONTINUE these medications which have CHANGED    Details   oxyCODONE ER (OXYCONTIN) 20 mg ER tablet Take 1 Tab by mouth every twelve (12) hours for 4 days. Max Daily Amount: 40 mg.  Qty: 8 Tab, Refills: 0    Associated Diagnoses: Sickle cell crisis (HCC)      oxyCODONE IR (ROXICODONE) 30 mg immediate release tablet Take 1 Tab by mouth every four (4) hours as needed for Pain for up to 4 days. Max Daily Amount: 180 mg.  Qty: 20 Tab, Refills: 0    Associated Diagnoses: Vasoocclusive sickle cell crisis ([facility]         CONTINUE these medications which have NOT CHANGED    Details   hydroxyurea (HYDREA) 500 mg capsule Take 1,000 mg by mouth daily.       metFORMIN (GLUCOPHAGE) 500 mg tablet Take 1 Tab by mouth daily (with breakfast). Qty: 30 Tab, Refills: 0      Blood-Glucose Meter monitoring kit Check blood glucose qac  Qty: 1 Kit, Refills: 0      deferasirox (JADENU) 360 mg tab Take 5 Tabs by mouth daily. Qty: 150 Tab, Refills: 0      promethazine (PHENERGAN) 25 mg tablet Take 1 Tab by mouth every six (6) hours as needed. May substitute suppository if vomiting  Qty: 20 Tab, Refills: 0      metoprolol (LOPRESSOR) 25 mg tablet Take 12.5 mg by mouth two (2) times a day. Indications: hypertension      lisinopril (PRINIVIL, ZESTRIL) 5 mg tablet Take 5 mg by mouth daily. Indications: HYPERTENSION      folic acid (FOLVITE) 1 mg tablet Take 1 mg by mouth daily. glucose blood VI test strips (ASCENSIA AUTODISC VI, ONE TOUCH ULTRA TEST VI) strip Check BG qac. Dx new onset DM2  Qty: 48 Strip, Refills: 0      Lancets misc Check BG qac. Dx new onset DM2  Qty: 1 Each, Refills: 11      magnesium oxide (MAG-OX) 400 mg tablet Take 1 Tab by mouth daily. Qty: 10 Tab, Refills: 0             Activity: Activity as tolerated  Diet: Regular Diet  Wound Care: None needed    Follow-up Appointments   Procedures    FOLLOW UP VISIT Appointment in: One Week pcp     pcp     Standing Status:   Standing     Number of Occurrences:   1     Order Specific Question:   Appointment in     Answer:    One Week       Signed By: Micah Molina MD     March 18, 2019

## 2019-03-18 NOTE — PROGRESS NOTES
Initial visit by  to convey care and concern and encourage patient that  services are available if desired. No needs were voiced during the visit. Mr. Vinh Stock was preparing for hospital discharge.   Maricel August 66  Board Certified

## 2019-03-18 NOTE — DISCHARGE INSTRUCTIONS
Patient Education     DISCHARGE SUMMARY from Nurse    PATIENT INSTRUCTIONS:    After general anesthesia or intravenous sedation, for 24 hours or while taking prescription Narcotics:  · Limit your activities  · Do not drive and operate hazardous machinery  · Do not make important personal or business decisions  · Do  not drink alcoholic beverages  · If you have not urinated within 8 hours after discharge, please contact your surgeon on call. Report the following to your surgeon:  · Excessive pain, swelling, redness or odor of or around the surgical area  · Temperature over 100.5  · Nausea and vomiting lasting longer than 4 hours or if unable to take medications  · Any signs of decreased circulation or nerve impairment to extremity: change in color, persistent  numbness, tingling, coldness or increase pain  · Any questions    What to do at Home:  Recommended activity: Activity as tolerated,     If you experience any of the following symptoms fever, new or unrelieved pain, persistent nausea or vomiting, or any other worrisome symptoms, please follow up with PCP. *  Please give a list of your current medications to your Primary Care Provider. *  Please update this list whenever your medications are discontinued, doses are      changed, or new medications (including over-the-counter products) are added. *  Please carry medication information at all times in case of emergency situations. These are general instructions for a healthy lifestyle:    No smoking/ No tobacco products/ Avoid exposure to second hand smoke  Surgeon General's Warning:  Quitting smoking now greatly reduces serious risk to your health.     Obesity, smoking, and sedentary lifestyle greatly increases your risk for illness    A healthy diet, regular physical exercise & weight monitoring are important for maintaining a healthy lifestyle    You may be retaining fluid if you have a history of heart failure or if you experience any of the following symptoms:  Weight gain of 3 pounds or more overnight or 5 pounds in a week, increased swelling in our hands or feet or shortness of breath while lying flat in bed. Please call your doctor as soon as you notice any of these symptoms; do not wait until your next office visit. Recognize signs and symptoms of STROKE:    F-face looks uneven    A-arms unable to move or move unevenly    S-speech slurred or non-existent    T-time-call 911 as soon as signs and symptoms begin-DO NOT go       Back to bed or wait to see if you get better-TIME IS BRAIN. Warning Signs of HEART ATTACK     Call 911 if you have these symptoms:   Chest discomfort. Most heart attacks involve discomfort in the center of the chest that lasts more than a few minutes, or that goes away and comes back. It can feel like uncomfortable pressure, squeezing, fullness, or pain.  Discomfort in other areas of the upper body. Symptoms can include pain or discomfort in one or both arms, the back, neck, jaw, or stomach.  Shortness of breath with or without chest discomfort.  Other signs may include breaking out in a cold sweat, nausea, or lightheadedness. Don't wait more than five minutes to call 911 - MINUTES MATTER! Fast action can save your life. Calling 911 is almost always the fastest way to get lifesaving treatment. Emergency Medical Services staff can begin treatment when they arrive -- up to an hour sooner than if someone gets to the hospital by car. The discharge information has been reviewed with the patient. The patient verbalized understanding. Discharge medications reviewed with the patient and appropriate educational materials and side effects teaching were provided.   ___________________________________________________________________________________________________________________________________     Sickle Cell Crisis: Care Instructions  Your Care Instructions    Sickle cell crisis is a painful episode that may begin suddenly in a person with sickle cell disease. Sickle cell disease turns normal, round red blood cells into cells that look like kendall or crescent moons. The sickle-shaped cells can get stuck in blood vessels, blocking blood flow and causing severe pain. The pain can occur in the bones of the spine, the arms and legs, the chest, and the abdomen. An episode may be called a \"painful event\" or \"painful crisis. \" Some people who have sickle cell disease have many painful events, while others have few or none. Treatment depends on the level of pain and how long it lasts. Sometimes taking nonprescription pain relievers can help. Or you may need stronger pain relief medicine that is prescribed or given by a doctor. You may need to be treated in the hospital.  It isn't always possible to know what sets off a painful event. But triggers include being dehydrated, cold temperatures, infection, stress, and not getting enough oxygen. Follow-up care is a key part of your treatment and safety. Be sure to make and go to all appointments, and call your doctor if you are having problems. It's also a good idea to know your test results and keep a list of the medicines you take. How can you care for yourself at home? · Create a pain management plan with your doctor. This plan should include the types of medicines you can take and other actions you can take at home to relieve pain. · Drink plenty of fluids, enough so that your urine is light yellow or clear like water. If you have kidney, heart, or liver disease and have to limit fluids, talk with your doctor before you increase the amount of fluids you drink. · Take your medicines exactly as prescribed. Call your doctor if you think you are having a problem with your medicine. · Take pain medicines exactly as directed. ? If the doctor gave you a prescription medicine for pain, take it as prescribed.   ? If you are not taking a prescription pain medicine, ask your doctor if you can take an over-the-counter medicine. · Avoid alcohol. It can make you dehydrated. · Dress warmly in cold weather. The cold and windy weather can lead to severe pain. · Do not smoke. Smoking can reduce the amount of oxygen in your blood. · Get plenty of sleep. When should you call for help? Call 911 anytime you think you may need emergency care. For example, call if:    · You have symptoms of a severe problem from sickle cell.     · You have symptoms of a stroke. These may include:  ? Sudden numbness, tingling, weakness, or loss of movement in your face, arm, or leg, especially on only one side of your body. ? Sudden vision changes. ? Sudden trouble speaking. ? Sudden confusion or trouble understanding simple statements. ? Sudden problems with walking or balance. ? A sudden, severe headache that is different from past headaches.     · You are in severe pain.     · You have symptoms of a heart attack. These may include:  ? Chest pain or pressure, or a strange feeling in the chest.  ? Sweating. ? Shortness of breath. ? Nausea or vomiting. ? Pain, pressure, or a strange feeling in the back, neck, jaw, or upper belly or in one or both shoulders or arms. ? Lightheadedness or sudden weakness. ? A fast or irregular heartbeat. After you call 911, the  may tell you to chew 1 adult-strength or 2 to 4 low-dose aspirin. Wait for an ambulance. Do not try to drive yourself.    Call your doctor now or seek immediate medical care if:    · You have a fever.    Watch closely for changes in your health, and be sure to contact your doctor if you have any problems. Where can you learn more? Go to http://socorro-rosita.info/. Enter F104 in the search box to learn more about \"Sickle Cell Crisis: Care Instructions. \"  Current as of: May 6, 2018  Content Version: 11.9  © 4512-3061 [employer].  Care instructions adapted under license by [employer] (which disclaims liability or warranty for this information). If you have questions about a medical condition or this instruction, always ask your healthcare professional. [place] [address] any warranty or liability for your use of this information.

## 2019-03-18 NOTE — PROGRESS NOTES
[facility]emat[facility] & On[facility]        Inpatient Hematology / Oncology Progress Note      Admission Date: 3/16/2019  4:16 PM  Reason for Admission/Hospital Course: Sickle cell pain crisis (HCC) [D57.00]  Sickle cell pain crisis (HCC) [D57.00]  Sickle cell crisis (HCC) [D57.00]      24 Hour Events:  Pain improved  Hgb 7.8 after transfusion  Likely discharge today    ROS:  Constitutional: negative for fever, chills, weakness, malaise, fatigue. CV: negative for chest pain, palpitations, edema. Respiratory: negative for dyspnea, cough, wheezing. GI: negative for nausea, abdominal pain, diarrhea. 10 point review of systems is otherwise negative with the exception of the elements mentioned above in the HPI. Allergies   Allergen Reactions    Compazine [Prochlorperazine Edisylate] Other (comments)     Also makes him feel funny    Morphine Other (comments)     Makes him feel funny    Reglan [Metoclopramide] Other (comments)     \"feel funny\"    Zofran [Ondansetron Hcl (Pf)] Other (comments)     Make him feel funny       OBJECTIVE:  Patient Vitals for the past 8 hrs:   BP Temp Pulse Resp SpO2   19 0800 123/63 98.4 °F (36.9 °C) 87 16 98 %   19 0305 117/57 98.2 °F (36.8 °C) 71 12 98 %     Temp (24hrs), Av.3 °F (36.8 °C), Min:97.8 °F (36.6 °C), Max:98.7 °F (37.1 °C)     0701 -  1900  In: 120 [P.O.:120]  Out: -     Physical Exam:  Constitutional: Well developed, well nourished male in no acute distress, sitting comfortably in the hospital bed. HEENT: Normocephalic and atraumatic. Oropharynx is clear, mucous membranes are moist.Extraocular muscles are intact. Sclerae anicteric. Skin Warm and dry. No bruising and no rash noted. No erythema. No pallor. Respiratory Lungs are clear to auscultation bilaterally without wheezes, rales or rhonchi, normal air exchange without accessory muscle use. CVS Normal rate, regular rhythm and normal S1 and S2.   No murmurs, gallops, or rubs.   Abdomen Soft, nontender and nondistended, normoactive bowel sounds   Neuro Grossly nonfocal with no obvious sensory or motor deficits. MSK Normal range of motion in general.  No edema and no tenderness. Psych Appropriate mood and affect.         Labs:      Recent Labs     03/18/19  0313 03/17/19  1755 03/17/19  0250 03/16/19  1722 03/15/19  2146   WBC 9.8  --  13.1* 11.2* 12.2*   RBC 1.99*  --  1.44* 1.53* 1.68*   HGB 7.8* 8.0* 6.5* 7.0* 7.5*   HCT 22.3* 23.1* 19.0* 19.3* 20.7*   .1*  --  125.0* 126.1* 123.2*   MCH 39.2*  --  43.8* 45.8* 44.6*   MCHC 35.0  --  35.0 36.3* 36.2*   RDW  --   --  19.9* 20.0* 19.9*     --  212 201 211   GRANS 39*  --  48 60 56   LYMPH 42  --  37 27 28   MONOS 9  --  10 8 10   EOS 9*  --  5 5 5   BASOS 1  --  1 0 1   IG 0  --  0 0 1   DF AUTOMATED  --  AUTOMATED AUTOMATED AUTOMATED   ANEU 3.8  --  6.3 6.7 6.8   ABL 4.1  --  4.8* 3.0 3.4   ABM 0.9  --  1.3 0.9 1.2   TENZIN 0.9*  --  0.6 0.6 0.6   ABB 0.1  --  0.1 0.0 0.1   AIG 0.0  --  0.1 0.0 0.1        Recent Labs     03/18/19  0313 03/17/19  0250 03/16/19  1722 03/15/19  2146    139 140 137   K 4.6 4.3 4.1 4.0    108* 106 105   CO2 26 26 26 25   AGAP 5* 5* 8 7   * 103* 113* 107*   BUN 13 9 12 14   CREA 0.78* 0.84 0.95 1.14   GFRAA >60 >60 >60 >60   GFRNA >60 >60 >60 >60   CA 8.1* 8.0* 8.5 8.5   SGOT  --   --  43* 45*   AP  --   --  65 72   TP  --   --  7.6 8.3*   ALB  --   --  3.4* 3.7   GLOB  --   --  4.2* 4.6*   AGRAT  --   --  0.8* 0.8*         Medications:  Current Facility-Administered Medications   Medication Dose Route Frequency    0.9% sodium chloride infusion 250 mL  250 mL IntraVENous PRN    naloxone (NARCAN) injection 0.4 mg  0.4 mg IntraVENous PRN    oxyCODONE IR (ROXICODONE) tablet 5 mg  5 mg Oral Q4H PRN    senna-docusate (PERICOLACE) 8.6-50 mg per tablet 2 Tab  2 Tab Oral DAILY    sodium chloride (NS) flush 5-40 mL  5-40 mL IntraVENous PRN    deferasirox (JADENU) tablet 1,800 mg (Patient Supplied)  1,800 mg Oral DAILY    folic acid (FOLVITE) tablet 1 mg  1 mg Oral DAILY    insulin regular (NOVOLIN R, HUMULIN R) injection 0-15 Units  0-15 Units SubCUTAneous AC&HS    lisinopril (PRINIVIL, ZESTRIL) tablet 5 mg  5 mg Oral DAILY    magnesium oxide (MAG-OX) tablet 400 mg  400 mg Oral DAILY    metoprolol tartrate (LOPRESSOR) tablet 12.5 mg  12.5 mg Oral BID    sodium chloride (NS) flush 5-40 mL  5-40 mL IntraVENous Q8H    sodium chloride (NS) flush 5-40 mL  5-40 mL IntraVENous PRN    acetaminophen (TYLENOL) tablet 650 mg  650 mg Oral Q4H PRN    ondansetron (ZOFRAN) injection 4 mg  4 mg IntraVENous Q4H PRN    magnesium hydroxide (MILK OF MAGNESIA) 400 mg/5 mL oral suspension 30 mL  30 mL Oral DAILY PRN    enoxaparin (LOVENOX) injection 40 mg  40 mg SubCUTAneous Q24H    alcohol 62% (NOZIN) nasal  1 Ampule  1 Ampule Topical Q12H    HYDROmorphone (PF) (DILAUDID) injection 2 mg  2 mg IntraVENous Q2H PRN    hydroxyurea (HYDREA) chemo cap 1,000 mg  1,000 mg Oral DAILY    promethazine (PHENERGAN) with saline injection 12.5 mg  12.5 mg IntraVENous Q4H PRN         ASSESSMENT:    Problem List  Date Reviewed: 7/22/2018          Codes Class Noted    Sickle cell crisis ([facility] ICD-10-CM: D57.00  ICD-9-CM: 282.62  3/18/2019        Leukocytosis ICD-10-CM: B70.366  ICD-9-CM: 288.60  10/11/2018        Leg pain ICD-10-CM: M79.606  ICD-9-CM: 729.5  6/24/2018        Sickle cell disease ([facility] ICD-10-CM: D57.1  ICD-9-CM: 282.60  7/13/2017        Iron overload due to repeated red blood cell transfusions ICD-10-CM: E83.111  ICD-9-CM: 275.02  1/18/2017        Paroxysmal SVT (supraventricular tachycardia) (HCC) ICD-10-CM: I47.1  ICD-9-CM: 427.0  7/9/2016        Sickle cell anemia (HCC) ICD-10-CM: D57.1  ICD-9-CM: 282.60  4/6/2016        * (Principal) Sickle cell pain crisis ([facility] ICD-10-CM: D57.00  ICD-9-CM: 282.62  10/25/2012        Essential hypertension, benign ICD-10-CM: I10  ICD-9-CM: 401.1 6/23/2012                PLAN:  Sickle/Beta+ Thal with arm/leg pain crisis  - Hgb 6.5 (BL 7.5-8.5). Transfuse one unit prbc  - Continue IVF D5 1/2 NS and oxygen  - Continue jadenu, hydrea, and folic acid  8/44 hgb 7.8. Likely discharge today.      Lab studies and imaging studies were personally reviewed. Thank you for allowing us to participate in the care of Mr. Cabrera.  He will need to f/u with Dr. Estrella Siu at [facility] after discharge. We will sign off.  Please call with any questions.               Vy Cotton, NAVEED   [facility] On[facility]  [address] [address]  Office : [phone]  Fax : [phone]

## 2019-03-18 NOTE — PROGRESS NOTES
END OF SHIFT NOTE:    Intake/Output  03/17 1901 - 03/18 0700  In: 4270 [P.O.:480; I.V.:1293]  Out: 800 [Urine:800]   Voiding: YES  Catheter: NO  Drain:              Stool:  0 occurrences. Emesis:  0 occurrences. VITAL SIGNS  Patient Vitals for the past 12 hrs:   Temp Pulse Resp BP SpO2   03/18/19 0305 98.2 °F (36.8 °C) 71 12 117/57 98 %   03/17/19 2315 98.4 °F (36.9 °C) 79 10 131/57 96 %   03/17/19 1915 97.8 °F (36.6 °C) 79 22 126/71 98 %       Pain Assessment  Pain 1  Pain Scale 1: Visual (03/18/19 0338)  Pain Intensity 1: 0 (03/18/19 0338)  Patient Stated Pain Goal: 0 (03/18/19 0306)  Pain Reassessment 1: Patient sleeping (03/18/19 0338)  Pain Onset 1: PTA (03/17/19 2246)  Pain Location 1: Leg (03/18/19 0306)  Pain Orientation 1: Left;Right (03/18/19 0306)  Pain Description 1: Aching (03/18/19 0306)  Pain Intervention(s) 1: Medication (see MAR) (03/18/19 0306)    Ambulating  Yes    Additional Information:   Dilaudid IV given x3 for leg pain, Phenergan IV given x2 for nausea with good relief. Hgb 7.8 VSS. No other needs at this time. Shift report given to oncoming nurse at the bedside.     Alex Henley RN

## 2019-03-19 ENCOUNTER — PATIENT OUTREACH (OUTPATIENT)
Dept: CASE MANAGEMENT | Age: 44
End: 2019-03-19

## 2019-03-19 NOTE — PROGRESS NOTES
This note will not be viewable in [address] Date/Time of Call: 
 03/19/19 
 1143am  
What was the patient hospitalized for? Sickle Cell Pain Crisis Consent for JOSÉ TREVIZO Call Does the patient understand his/her diagnosis and/or treatment and what happened during the hospitalization? Patient agrees to call Yes, he verbalizes understanding of hospital stay, states that he is doing good Did the patient receive discharge instructions? Yes  
CM Assessed Risk for Readmission:  
 
 
Patient stated Risk for Readmission:  Patient is a high risk for readmission due to diagnoses and/ or comorbidities No concerns voiced at this time Review any discharge instructions (see discharge instructions/AVS in [facility]). Ask patient if they understand these. Do they have any questions? Reviewed Were home services ordered (nursing, PT, OT, ST, etc.)? No  
If so, has the first visit occurred? If not, why? (Assist with coordination of services if necessary. ) 
 NA Was any DME ordered? No   
If so, has it been received? If not, why?  (Assist patient in obtaining DME orders &/or equipment if necessary. ) NA Complete a review of all medications (new, continued and discontinued meds per the D/C instructions and medication tab in [address] [place] [address]). Medications reviewed START taking: 
polyethylene glycol 17 gram packet (MIRALAX) CHANGE how you take: 
oxyCODONE ER 20 mg ER tablet (OxyCONTIN) oxyCODONE IR 30 mg immediate release tablet (ROXICODONE) Were all new prescriptions filled? If not, why?  (Assist patient in obtaining medications if necessary  escalate for CCM &/or SW if ongoing issues are verbalized by pt or anticipated) 
 patient verbalizes understanding of medications Does the patient understand the purpose and dosing instructions for all medications? (If patient has questions, provide explanation and education.) Yes Does the patient have any problems in performing ADLs? (If patient is unable to perform ADLs  what is the limiting factor(s)? Do they have a support system that can assist? If no support system is present, discuss possible assistance that they may be able to obtain. Escalate for CCM/SW if ongoing issues are verbalized by pt or anticipated) Patient is independent with ADLs Does the patient have all follow-up appointments scheduled? 7 day f/up with PCP?  
(f/up with PCP may be w/in 14 days if patient has a f/up with their specialist w/in 7 days) 7-14 day f/up with specialist?  
(or per discharge instructions) If f/up has not been made  what actions has the care coordinator made to accomplish this? Has transportation been arranged? yes  
 
 
03/25/19 at 920am 
 
 
 
NA Reviewed appointment information with patient Patient transports self Any other questions or concerns expressed by the patient? No questions or concerns are voiced at this time. Care Coordinator contact information provided should any needs arise. Schedule next appointment with JOSÉ Adams or refer to RN Case Manager/ per the workflow guidelines. When is care coordinators next follow-up call scheduled? If referred for CCM  what RN care manager was the referral assigned? Within 30 days Within 30 days RIAZ  
LALY Call Completed By: Milind Pardo CMA Care Coordinator

## 2019-03-22 LAB
DEPRECATED HGB OTHER BLD-IMP: 0 %
HGB A MFR BLD: 0 % (ref 96.4–98.8)
HGB A2 MFR BLD COLUMN CHROM: 3.6 % (ref 1.8–3.2)
HGB C MFR BLD: 0 %
HGB F MFR BLD: 23.9 % (ref 0–2)
HGB FRACT BLD-IMP: ABNORMAL
HGB S BLD QL SOLY: POSITIVE
HGB S MFR BLD: 72.5 %

## 2019-04-09 ENCOUNTER — PATIENT OUTREACH (OUTPATIENT)
Dept: CASE MANAGEMENT | Age: 44
End: 2019-04-09

## 2019-04-09 NOTE — PROGRESS NOTES
This note will not be viewable in [address] Transitions of Care  Follow up Outreach Note Outreach type Phone call: Spoke with patient Date/Time of Outreach: 04/09/19 128pm  
 
Has patient attended PCP or specialist follow-up appointments since last contact? What was outcome of appointment? When is next follow-up scheduled? Patient has completed FUs with PCP and Oncology/Hematology with FUs scheduled appropriately. He states that he is doing good Review medications. Any medication changes since last outreach? Does patient have any questions or issues related to their medications? Patient has medications, no significant changes, no questions or concerns Home health active? If yes  any issue? Progress? NA Referrals needed? 
(CM, SW, HH, etc.) 
 NA Other issues/Miscellaneous? (Transportation, access to meals, ability to perform ADLs, adequate caregiver support, etc.) Patients denies any questions or concerns. Patient has medications and transportation to appointments. Independent with ADLs. Next Outreach Scheduled? Graduation from program? 
 NA Yes Next Steps/Goals (if applicable): 
 NA Outreach completed by: 
 Anirudh Cotton CMA Community Care Coordinator

## 2020-04-17 NOTE — DISCHARGE SUMMARY
Hospitalist Discharge Summary     Admit Date:  3/6/2018  3:07 PM   Name:  Marbella Forrest   Age:  37 y.o.  :  1975   MRN:  [id number]   PCP:  Chevy Simeon MD  Treatment Team: Attending Provider: Kelvin Chin MD; Utilization Review: Osmar Radha    Problem List for this Hospitalization:  Hospital Problems as of 3/9/2018  Date Reviewed: 3/5/2012          Codes Class Noted - Resolved POA    * (Principal)Sickle cell crisis [facility]) ICD-10-CM: D57.00  ICD-9-CM: 282.62  2016 - Present Unknown        Essential hypertension, benign ICD-10-CM: I10  ICD-9-CM: 401.1  2012 - Present Yes                Admission HPI from 3/6/2018:    \"  Mr. Tessa Bullock is a 38 yo male with PMH of sickle cell and B-thalassemia followed by oncology [facility] evaluated with limb pain for 24 hours refractory to chronic narcotics. Denies chest pain, dyspnea, fever, BM change or dysuria. CXR negative, HGB 9.6.    \"    Hospital Course:     Mr. Tessa Bullock is a 38 yo male with PMH of sickle cell and B-thalassemia followed by oncology [facility] evaluated with limb pain for 24 hours refractory to chronic narcotics. His hgb was 9.6, retic 7.2. 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 He is on Jadenu 1800 mg daily and Hydrea 500 mg bid at home. His home narcotics consist of OxyContin 80 bid and Roxicodone 30 1-1.5 mg q 4 hours. He was diagnosed with sickle cell pain crisis and was given IV fluids and analgesia inpatient. He clinically improved and was discharged home on 3/9/2018 with PCP follow up within 1 week. Follow up instructions below. Plan was discussed with the patient. All questions answered. Patient was stable at time of discharge and was instructed to call or return if there are any concerns or recurrence of symptoms.     Diagnostic Imaging/Tests:   Xr Chest Port    Result Date: 3/6/2018  EXAM:  XR CHEST PORT INDICATION:  chest pain COMPARISON:  2017 FINDINGS: A portable AP radiograph of the chest was obtained at 1505 hours. The patient is on a cardiac monitor. The lungs are clear. The cardiac and mediastinal contours and pulmonary vascularity are normal.  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 Mavis Colvin IMPRESSION: No acute cardiomegaly disease. 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 Echocardiogram results:  No results found for this visit on 03/06/18.       All Micro Results     None          Labs: Results:       BMP, Mg, Phos Recent Labs      03/09/18 0358 03/08/18 0405 03/07/18 0407 03/06/18   2030   NA  138  141  139   --    K  4.0  4.1  4.3   --    CL  105  108*  105   --    CO2  26  25  27   --    AGAP  7  8  7   --    BUN  9  8  9   --    CREA  0.84  0.69*  0.79*   --    CA  8.5  8.2*  8.4   --    GLU  172*  107*  103*   --    MG   --    --    --   1.7*      CBC Recent Labs      03/09/18 0358 03/08/18 0405 03/07/18 0407   WBC  12.3*  12.1*  12.7*   RBC  2.53*  2.46*  2.51*   HGB  7.7*  7.3*  7.5*   HCT  23.0*  22.4*  22.5*   PLT  223  200  198   GRANS  47  41*  47   LYMPH  38  44  40   EOS  3  2  1   MONOS  11  13*  12   BASOS  1  0  0   IG  0  0  0   ANEU  5.8  5.0  5.9   ABL  4.7*  5.3*  5.0*   TENZIN  0.4  0.2  0.2   ABM  1.3  1.5*  1.6*   ABB  0.1  0.0  0.0   AIG  0.0  0.0  0.0      LFT Recent Labs      03/09/18 0358 03/08/18 0405 03/07/18 0407   SGOT  54*  40*  49*   ALT  51  43  49   AP  79  73  72   TP  7.5  7.1  7.4   ALB  3.2*  3.0*  3.2*   GLOB  4.3*  4.1*  4.2*   AGRAT  0.7*  0.7*  0.8*      Cardiac Testing Lab Results   Component Value Date/Time     (H) 04/26/2012 03:17 PM    CK 39 01/17/2017 03:35 AM    CK 42 01/17/2017 01:30 AM    CK 41 01/16/2017 09:05 PM    CK - MB 0.6 01/17/2017 03:35 AM    CK - MB <0.5 (L) 01/17/2017 01:30 AM    CK - MB <0.5 (L) 01/16/2017 09:05 PM    CK-MB Index 1.5 01/17/2017 03:35 AM    CK-MB Index CANNOT BE CALCULATED 01/17/2017 01:30 AM    CK-MB Index CANNOT BE CALCULATED 01/16/2017 09:05 PM    Troponin-I <0.05 04/26/2012 03:17 PM    Troponin-I, Qt. <0.02 (L) 08/10/2017 04:36 PM    Troponin-I, Qt. <0.02 (L) 01/17/2017 03:35 AM    Troponin-I, Qt. <0.02 (L) 01/17/2017 01:30 AM      Coagulation Tests Lab Results   Component Value Date/Time    Prothrombin time 11.6 02/08/2015 12:05 PM    Prothrombin time 11.9 (H) 10/24/2012 02:45 PM    INR 1.1 02/08/2015 12:05 PM    INR 1.1 10/24/2012 02:45 PM    aPTT 26.0 10/24/2012 02:45 PM      A1c No results found for: HBA1C, HGBE8, ETZ2TRNT   Lipid Panel No results found for: CHOL, CHOLPOCT, CHOLX, CHLST, CHOLV, 947365, HDL, LDL, LDLC, DLDLP, 349387, VLDLC, VLDL, TGLX, TRIGL, TRIGP, TGLPOCT, CHHD, CHHDX   Thyroid Panel No results found for: T4, T3U, TSH, TSHEXT     Most Recent UA Lab Results   Component Value Date/Time    Color YELLOW 03/06/2018 08:15 PM    Appearance CLEAR 03/06/2018 08:15 PM    Specific gravity 1.009 03/06/2018 08:15 PM    pH (UA) 7.5 03/06/2018 08:15 PM    Protein NEGATIVE  03/06/2018 08:15 PM    Glucose NEGATIVE  03/06/2018 08:15 PM    Ketone NEGATIVE  03/06/2018 08:15 PM    Bilirubin NEGATIVE  03/06/2018 08:15 PM    Blood NEGATIVE  03/06/2018 08:15 PM    Urobilinogen 1.0 03/06/2018 08:15 PM    Nitrites NEGATIVE  03/06/2018 08:15 PM    Leukocyte Esterase NEGATIVE  03/06/2018 08:15 PM        Allergies   Allergen Reactions    Compazine [Prochlorperazine Edisylate] Other (comments)     Also makes him feel funny    Morphine Other (comments)     Makes him feel funny    Reglan [Metoclopramide] Other (comments)     \"feel funny\"    Zofran [Ondansetron Hcl (Pf)] Other (comments)     Make him feel funny     Immunization History   Administered Date(s) Administered    Influenza Vaccine 09/10/2015    Influenza Vaccine Split 09/27/2012    ZZZ-RETIRED (DO NOT USE) Pneumococcal Vaccine (Unspecified Type) 09/21/2010       All Labs from Last 24 Hrs:  Recent Results (from the past 24 hour(s))   METABOLIC PANEL, COMPREHENSIVE    Collection Time: 03/09/18  3:58 AM   Result Value Ref Range    Sodium 138 136 - 145 mmol/L    Potassium 4.0 3.5 - 5.1 mmol/L    Chloride 105 98 - 107 mmol/L    CO2 26 21 - 32 mmol/L    Anion gap 7 7 - 16 mmol/L    Glucose 172 (H) 65 - 100 mg/dL    BUN 9 6 - 23 MG/DL    Creatinine 0.84 0.8 - 1.5 MG/DL    GFR est AA >60 >60 ml/min/1.73m2    GFR est non-AA >60 >60 ml/min/1.73m2    Calcium 8.5 8.3 - 10.4 MG/DL    Bilirubin, total 1.2 (H) 0.2 - 1.1 MG/DL    ALT (SGPT) 51 12 - 65 U/L    AST (SGOT) 54 (H) 15 - 37 U/L    Alk. phosphatase 79 50 - 136 U/L    Protein, total 7.5 6.3 - 8.2 g/dL    Albumin 3.2 (L) 3.5 - 5.0 g/dL    Globulin 4.3 (H) 2.3 - 3.5 g/dL    A-G Ratio 0.7 (L) 1.2 - 3.5     CBC WITH AUTOMATED DIFF    Collection Time: 03/09/18  3:58 AM   Result Value Ref Range    WBC 12.3 (H) 4.3 - 11.1 K/uL    RBC 2.53 (L) 4.23 - 5.67 M/uL    HGB 7.7 (L) 13.6 - 17.2 g/dL    HCT 23.0 (L) 41.1 - 50.3 %    MCV 90.9 79.6 - 97.8 FL    MCH 30.4 26.1 - 32.9 PG    MCHC 33.5 31.4 - 35.0 g/dL    RDW 27.8 (H) 11.9 - 14.6 %    PLATELET 443 293 - 807 K/uL    MPV 9.9 (L) 10.8 - 14.1 FL    DF AUTOMATED      NEUTROPHILS 47 43 - 78 %    LYMPHOCYTES 38 13 - 44 %    MONOCYTES 11 4.0 - 12.0 %    EOSINOPHILS 3 0.5 - 7.8 %    BASOPHILS 1 0.0 - 2.0 %    IMMATURE GRANULOCYTES 0 0.0 - 5.0 %    ABS. NEUTROPHILS 5.8 1.7 - 8.2 K/UL    ABS. LYMPHOCYTES 4.7 (H) 0.5 - 4.6 K/UL    ABS. MONOCYTES 1.3 0.1 - 1.3 K/UL    ABS. EOSINOPHILS 0.4 0.0 - 0.8 K/UL    ABS. BASOPHILS 0.1 0.0 - 0.2 K/UL    ABS. IMM.  GRANS. 0.0 0.0 - 0.5 K/UL       Discharge Exam:  Patient Vitals for the past 24 hrs:   Temp Pulse Resp BP SpO2   03/09/18 0731 98.5 °F (36.9 °C) 67 18 142/80 98 %   03/09/18 0335 98.5 °F (36.9 °C) 73 18 121/74 94 %   03/09/18 0033 99 °F (37.2 °C) 67 18 118/73 95 %   03/08/18 1935 99.2 °F (37.3 °C) 76 18 135/70 95 %   03/08/18 1555 99.3 °F (37.4 °C) 67 18 127/81 93 %   03/08/18 1304 98.2 °F (36.8 °C) 62 18 130/78 93 %     Oxygen Therapy  O2 Sat (%): 98 % (03/09/18 0731)  Pulse via Oximetry: 78 beats per DISPLAY PLAN FREE TEXT minute (03/06/18 1839)  O2 Device: Nasal cannula (03/06/18 2220)  O2 Flow Rate (L/min): 2 l/min (03/06/18 2220)    Intake/Output Summary (Last 24 hours) at 03/09/18 0914  Last data filed at 03/09/18 0731   Gross per 24 hour   Intake             1809 ml   Output             1875 ml   Net              -66 ml       General:    Well nourished. Alert. No distress. Eyes:   Normal sclera. Extraocular movements intact. ENT:  Normocephalic, atraumatic. Moist mucous membranes  CV:   Regular rate and rhythm. No murmur, rub, or gallop. Lungs:  Clear to auscultation bilaterally. No wheezing, rhonchi, or rales. Abdomen: Soft, nontender, nondistended. Bowel sounds normal.   Extremities: Warm and dry. No cyanosis or edema. Neurologic: CN II-XII grossly intact. Sensation intact. Skin:     No rashes or jaundice. Psych:  Normal mood and affect. Discharge Info:   Current Discharge Medication List      CONTINUE these medications which have NOT CHANGED    Details   hydroxyurea (HYDREA) 500 mg capsule Take 500 mg by mouth two (2) times a day. Takes in am at 0900      magnesium oxide (MAG-OX) 400 mg tablet Take 1 Tab by mouth daily. Qty: 10 Tab, Refills: 0      deferasirox (JADENU) 360 mg tab Take 5 Tabs by mouth daily. Qty: 150 Tab, Refills: 0      promethazine (PHENERGAN) 25 mg tablet Take 1 Tab by mouth every six (6) hours as needed. May substitute suppository if vomiting  Qty: 20 Tab, Refills: 0      oxyCODONE IR (OXY-IR) 30 mg immediate release tablet Take 1 Tab by mouth every four (4) hours as needed for Pain. Max Daily Amount: 180 mg.  Qty: 12 Tab, Refills: 0      oxyCODONE ER (OXYCONTIN) 80 mg ER tablet Take 1 Tab by mouth every twelve (12) hours. Max Daily Amount: 160 mg.  Qty: 6 Tab, Refills: 0      metoprolol (LOPRESSOR) 25 mg tablet Take 25 mg by mouth two (2) times a day. Indications: HYPERTENSION      lisinopril (PRINIVIL, ZESTRIL) 5 mg tablet Take 5 mg by mouth daily.  Indications: HYPERTENSION folic acid (FOLVITE) 1 mg tablet Take 1 mg by mouth daily. Disposition: home    Activity: Activity as tolerated  Diet: DIET REGULAR    Follow-up Information     Follow up With Details Comments [address] III, MD  PATIENT TO CALL AND SCHEDULE A FOLLOW UP APPOINTMENT I WAS UNABLE TO REACH ANYONE [facility]  [phone]              Time spent in patient discharge planning and coordination 45 minutes.     Signed:  Sherrill Dunham MD

## 2022-03-18 PROBLEM — D72.829 LEUKOCYTOSIS: Status: ACTIVE | Noted: 2018-10-11

## 2022-03-19 PROBLEM — M79.606 LEG PAIN: Status: ACTIVE | Noted: 2018-06-24

## 2022-03-19 PROBLEM — E83.111 IRON OVERLOAD DUE TO REPEATED RED BLOOD CELL TRANSFUSIONS: Status: ACTIVE | Noted: 2017-01-18

## 2022-03-19 PROBLEM — D57.1 SICKLE CELL DISEASE (HCC): Status: ACTIVE | Noted: 2017-07-13

## 2022-03-19 PROBLEM — D57.00 SICKLE CELL CRISIS (HCC): Status: ACTIVE | Noted: 2019-03-18

## 2022-04-05 NOTE — PROGRESS NOTES
Dilaudid 1 mg IV given for pain. Detail Level: Detailed Depth Of Biopsy: dermis Was A Bandage Applied: Yes Size Of Lesion In Cm: 0.6 X Size Of Lesion In Cm: 0 Biopsy Type: H and E Biopsy Method: Dermablade Anesthesia Type: 2% lidocaine with epinephrine Anesthesia Volume In Cc (Will Not Render If 0): 0.5 Hemostasis: Drysol Wound Care: Vaseline Dressing: bandage Destruction After The Procedure: No Type Of Destruction Used: Curettage Curettage Text: The wound bed was treated with curettage after the biopsy was performed. Cryotherapy Text: The wound bed was treated with cryotherapy after the biopsy was performed. Electrodesiccation Text: The wound bed was treated with electrodesiccation after the biopsy was performed. Electrodesiccation And Curettage Text: The wound bed was treated with electrodesiccation and curettage after the biopsy was performed. Silver Nitrate Text: The wound bed was treated with silver nitrate after the biopsy was performed. Lab: 6 Lab Facility: 3 Consent: Written consent was obtained and risks were reviewed including but not limited to scarring, infection, bleeding, scabbing, incomplete removal, nerve damage and allergy to anesthesia. Post-Care Instructions: I reviewed with the patient in detail post-care instructions. Patient is to keep the biopsy site dry overnight, and then apply bacitracin twice daily until healed. Patient may apply hydrogen peroxide soaks to remove any crusting. Notification Instructions: Patient will be notified of biopsy results. However, patient instructed to call the office if not contacted within 2 weeks. Billing Type: Third-Party Bill Information: Selecting Yes will display possible errors in your note based on the variables you have selected. This validation is only offered as a suggestion for you. PLEASE NOTE THAT THE VALIDATION TEXT WILL BE REMOVED WHEN YOU FINALIZE YOUR NOTE. IF YOU WANT TO FAX A PRELIMINARY NOTE YOU WILL NEED TO TOGGLE THIS TO 'NO' IF YOU DO NOT WANT IT IN YOUR FAXED NOTE.

## 2024-02-02 NOTE — PROGRESS NOTES
Progress Note    Patient: Aminah Harrington MRN: [id number]  SSN: [id number]    YOB: 1975  Age: 38 y.o. Sex: male      Admit Date: 7/13/2017    LOS: 3 days     Subjective:     37 YO male patient admitted with sickle cell crises. Hgb has remained stable after transfusion. Pain is improving. Hopefully will be discharged soon. Objective:     Vitals:    07/16/17 0725 07/16/17 1131 07/16/17 1430 07/16/17 1444   BP: 147/72 (!) 143/94 135/77    Pulse: 68 66 63    Resp: 19 19 19    Temp: 98.6 °F (37 °C) 98.9 °F (37.2 °C) 98.6 °F (37 °C)    SpO2: 96% 98% 98% 98%        Intake and Output:  Current Shift: 07/16 0701 - 07/16 1900  In: 3933 [P.O.:720; I.V.:1038]  Out: -   Last three shifts: 07/14 1901 - 07/16 0700  In: 5517 [I.V.:5517]  Out: -     Physical Exam:   GENERAL: alert, cooperative, no distress, appears stated age  EYE: conjunctivae/corneas clear. PERRL, EOM's intact. Fundi benign  LYMPHATIC: Cervical, supraclavicular, and axillary nodes normal.   THROAT & NECK: normal and no erythema or exudates noted. LUNG: clear to auscultation bilaterally  HEART: regular rate and rhythm, S1, S2 normal, no murmur, click, rub or gallop  ABDOMEN: soft, non-tender. Bowel sounds normal. No masses,  no organomegaly  EXTREMITIES:  Tenderness of the upper and lower extremities   SKIN: Normal.  NEUROLOGIC: AOx3. Gait normal. Reflexes and motor strength normal and symmetric. Cranial nerves 2-12 and sensation grossly intact. PSYCHIATRIC: non focal    Lab/Data Review:  Lab results reviewed. For significant abnormal values and values requiring intervention, see assessment and plan. Assessment:     Active Problems:    Sickle cell disease ([facility] [address] (7/13/2017)        Plan:     # continue IV fluids   #continue O2 by nasal cannula   #appreciate hem/onc recommendations   #1 U of pRBCs transfused 2 days ago. Hgb went up to 8.1.  Hopefully for d/c tomorrow    Signed By: Iraida Sim MD     July 16, 2017 No

## 2024-09-03 ENCOUNTER — HOSPITAL ENCOUNTER (EMERGENCY)
Age: 49
Discharge: HOME | End: 2024-09-03

## 2025-02-07 NOTE — PROGRESS NOTES
0650-Bedside report received from Nohemi Grant RN. Resting in bed. No needs voiced. No s/s of acute distress. 1310-END OF SHIFT NOTE:  Pt's VSS and is in no acute distress. Pt's morphine PCA discontinued and pt started on dilauded and oxy PRN. Intake/Output  06/28 0701 - 06/28 1900  In: 118 [P.O.:118]  Out: -    Voiding: YES  Catheter: NO  Drain:        Stool:  0 occurrences. Emesis:  0 occurrences. VITAL SIGNS  Patient Vitals for the past 12 hrs:   Temp Pulse Resp BP SpO2   06/28/18 1130 98.8 °F (37.1 °C) 71 18 92/59 96 %   06/28/18 0830 - 87 - - -   06/28/18 0745 99 °F (37.2 °C) (!) 140 20 114/84 95 %   06/28/18 0740 - (!) 135 - - -   06/28/18 0420 98.4 °F (36.9 °C) 69 18 122/73 91 %       Pain Assessment  Pain 1  Pain Scale 1: Numeric (0 - 10) (06/28/18 0920)  Pain Intensity 1: 3 (06/28/18 0920)  Patient Stated Pain Goal: 0 (06/28/18 0300)  Pain Reassessment 1: Yes (06/28/18 0920)  Pain Onset 1: pta (06/27/18 1139)  Pain Location 1: Leg (06/28/18 0830)  Pain Orientation 1: Left;Right (06/27/18 1745)  Pain Description 1: Aching (06/28/18 0830)  Pain Intervention(s) 1: Medication (see MAR) (06/28/18 0830)    Ambulating  Yes    Bedside shift change report given to Osito Curtis (oncoming nurse) by Aleksandr Crisostomo RN (offgoing nurse). Report included the following information SBAR, Kardex, Intake/Output, MAR and Recent Results.        [employer] Sherron Geronimo Yes

## 2025-03-12 ENCOUNTER — OFFICE VISIT (OUTPATIENT)
Age: 50
End: 2025-03-12
Payer: MEDICARE

## 2025-03-12 VITALS — HEIGHT: 70 IN | BODY MASS INDEX: 20.47 KG/M2 | WEIGHT: 143 LBS

## 2025-03-12 DIAGNOSIS — M79.605 PAIN OF LEFT LOWER EXTREMITY: Primary | ICD-10-CM

## 2025-03-12 DIAGNOSIS — M25.552 PAIN OF LEFT HIP: ICD-10-CM

## 2025-03-12 DIAGNOSIS — M25.562 LEFT KNEE PAIN, UNSPECIFIED CHRONICITY: ICD-10-CM

## 2025-03-12 PROCEDURE — 3017F COLORECTAL CA SCREEN DOC REV: CPT | Performed by: ORTHOPAEDIC SURGERY

## 2025-03-12 PROCEDURE — 1036F TOBACCO NON-USER: CPT | Performed by: ORTHOPAEDIC SURGERY

## 2025-03-12 PROCEDURE — G8420 CALC BMI NORM PARAMETERS: HCPCS | Performed by: ORTHOPAEDIC SURGERY

## 2025-03-12 PROCEDURE — 99204 OFFICE O/P NEW MOD 45 MIN: CPT | Performed by: ORTHOPAEDIC SURGERY

## 2025-03-12 PROCEDURE — G8427 DOCREV CUR MEDS BY ELIG CLIN: HCPCS | Performed by: ORTHOPAEDIC SURGERY

## 2025-03-12 RX ORDER — AMLODIPINE BESYLATE 5 MG/1
5 TABLET ORAL DAILY
COMMUNITY

## 2025-03-12 RX ORDER — OXYCODONE HYDROCHLORIDE 30 MG/1
TABLET, FILM COATED, EXTENDED RELEASE ORAL
COMMUNITY

## 2025-03-12 RX ORDER — ROSUVASTATIN CALCIUM 10 MG/1
TABLET, COATED ORAL
COMMUNITY

## 2025-03-12 RX ORDER — OXYCODONE HYDROCHLORIDE 30 MG/1
30 TABLET ORAL EVERY 4 HOURS PRN
COMMUNITY
Start: 2025-02-20

## 2025-03-12 RX ORDER — FOLIC ACID 0.8 MG
800 TABLET ORAL DAILY
COMMUNITY

## 2025-03-12 RX ORDER — METOPROLOL SUCCINATE 25 MG/1
12.5 TABLET, EXTENDED RELEASE ORAL DAILY
COMMUNITY

## 2025-03-12 RX ORDER — PROMETHAZINE HYDROCHLORIDE 12.5 MG/1
12.5 TABLET ORAL EVERY 8 HOURS PRN
COMMUNITY
Start: 2025-02-13 | End: 2025-03-15

## 2025-03-12 RX ORDER — NORTRIPTYLINE HYDROCHLORIDE 50 MG/1
50 CAPSULE ORAL NIGHTLY
COMMUNITY

## 2025-03-12 RX ORDER — ASCORBIC ACID 500 MG
500 TABLET ORAL DAILY
COMMUNITY

## 2025-03-12 RX ORDER — CALCIUM ACETATE 667 MG/1
667 CAPSULE ORAL
COMMUNITY
Start: 2024-08-27

## 2025-03-12 RX ORDER — HYDROXYUREA 500 MG/1
500 CAPSULE ORAL DAILY
COMMUNITY

## 2025-03-12 RX ORDER — PANTOPRAZOLE SODIUM 40 MG/1
40 TABLET, DELAYED RELEASE ORAL DAILY
COMMUNITY
Start: 2024-09-10

## 2025-03-12 RX ORDER — DEFERASIROX 360 MG/1
TABLET, FILM COATED ORAL
COMMUNITY
Start: 2025-02-13

## 2025-03-12 RX ORDER — OXYCODONE HYDROCHLORIDE 15 MG/1
TABLET, FILM COATED, EXTENDED RELEASE ORAL
COMMUNITY

## 2025-03-12 NOTE — PROGRESS NOTES
osteophytes. No destructive lesion. No evidence for instability. Bone quality appears normal.    Radiographic Impression:    Lumbar spondylosis    MRI of the lumbar spine images were reviewed and interpreted: MRI lumbar spine does not show any severe central or foraminal stenosis.  No disc herniation seen    X-rays of the femur show significant collapse of the femoral head.  Diagnosis:      ICD-10-CM    1. Pain of left lower extremity  M79.605 XR FEMUR LEFT (MIN 2 VIEWS)      2. Pain of left hip  M25.552 CANCELED: XR HIP LEFT (2-3 VIEWS)      3. Left knee pain, unspecified chronicity  M25.562 CANCELED: XR KNEE LEFT (1-2 VIEWS)          Assessment/Plan:      -I discussed with the patient the diagnosis as well as the imaging findings.  At this time I recommend referral to one of our joint specialist regarding.  I discussed with the patient I do not think his symptoms are coming from his lumbar spine given his MRI is relatively benign.  Patient was in agreement with the plan.    -Follow-up as needed    Andrew Smith MD  Orthopaedic Spine Surgery     03/12/25  10:49 AM    All or part of this note was transcribed using dictation software.  Although the note was proofread, grammatical and spelling errors may occur.

## 2025-03-25 ENCOUNTER — TELEPHONE (OUTPATIENT)
Dept: ORTHOPEDIC SURGERY | Age: 50
End: 2025-03-25

## 2025-03-25 ENCOUNTER — OFFICE VISIT (OUTPATIENT)
Dept: ORTHOPEDIC SURGERY | Age: 50
End: 2025-03-25
Payer: MEDICARE

## 2025-03-25 VITALS — BODY MASS INDEX: 20.53 KG/M2 | WEIGHT: 143.4 LBS | HEIGHT: 70 IN

## 2025-03-25 DIAGNOSIS — M16.12 PRIMARY OSTEOARTHRITIS OF LEFT HIP: ICD-10-CM

## 2025-03-25 DIAGNOSIS — M87.052 AVASCULAR NECROSIS OF BONE OF HIP, LEFT (HCC): ICD-10-CM

## 2025-03-25 DIAGNOSIS — D57.00 SICKLE CELL CRISIS (HCC): ICD-10-CM

## 2025-03-25 DIAGNOSIS — M25.552 PAIN OF LEFT HIP: Primary | ICD-10-CM

## 2025-03-25 PROCEDURE — 1036F TOBACCO NON-USER: CPT | Performed by: ORTHOPAEDIC SURGERY

## 2025-03-25 PROCEDURE — G8427 DOCREV CUR MEDS BY ELIG CLIN: HCPCS | Performed by: ORTHOPAEDIC SURGERY

## 2025-03-25 PROCEDURE — 3017F COLORECTAL CA SCREEN DOC REV: CPT | Performed by: ORTHOPAEDIC SURGERY

## 2025-03-25 PROCEDURE — G8420 CALC BMI NORM PARAMETERS: HCPCS | Performed by: ORTHOPAEDIC SURGERY

## 2025-03-25 PROCEDURE — 99205 OFFICE O/P NEW HI 60 MIN: CPT | Performed by: ORTHOPAEDIC SURGERY

## 2025-03-25 RX ORDER — TERBUTALINE SULFATE 5 MG/1
5 TABLET ORAL 2 TIMES DAILY PRN
COMMUNITY
Start: 2024-12-31

## 2025-03-25 NOTE — TELEPHONE ENCOUNTER
Spoke with Leeann and informed her that we need a letter of cardiac clearance. Contact information was given so information can be faxed.  Leeann verbalized understanding and voiced no other concerns at this time.

## 2025-03-25 NOTE — PROGRESS NOTES
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 avascular necrosis left  hip    Plan:   The patient has avascular necrosis of the left hipwith severe worsening pain which has not improved despite non operative treatments.    X-rays demonstrate avascular necrosis of the left hip.  They have tried non operative treatments as outlined per HPI and have declined additional non-surgical care due to worsening pain and limited mobility including, additional NSAIDs, cortisone injections, physical therapy, assistive devices, and pain management.    The symptoms have worsened and ambulation has become difficulty.  After discussion involving shared decision making of different treatment options, they have elected to proceed with a  left total hip arthroplasty and this is medically necessary for failed non operative treatment of avascular necrosis.  They will start with the left side first    The surgery was discussed  in detail with a model including the technical aspects of the surgery. with Depuy components  They are an excellent candidate for a press-fit  with a direct anterior approach.  We will plan for the surgery at [facility].  They will be 23h observation.      The risks, benefits, and alternatives were discussed.  The risks include but are not limited to bleeding, vascular injury, nerve injury with risk of a foot drop or paresthesia, deep vein thrombosis/pulmonary embolism, infection, dislocation, leg length discrepancy, fracture, implant loosening/wear/failure, persistent pain, and leg length discrepancy.  The benefits are pain relief and improved mobility.  The alternatives would include continued non-operative treatment.    Prior to surgery we will arrange for:   1) medical optimization:  +  2) total joint class:  +  3) nicotine testing:  no  4) cardiac optimization:  +  5) sickle cell doctor mgmt with

## 2025-03-29 ENCOUNTER — PREP FOR PROCEDURE (OUTPATIENT)
Dept: ORTHOPEDIC SURGERY | Age: 50
End: 2025-03-29

## 2025-03-29 DIAGNOSIS — M16.12 PRIMARY OSTEOARTHRITIS OF LEFT HIP: Primary | ICD-10-CM

## 2025-03-29 RX ORDER — SODIUM CHLORIDE 0.9 % (FLUSH) 0.9 %
5-40 SYRINGE (ML) INJECTION EVERY 12 HOURS SCHEDULED
Status: CANCELLED | OUTPATIENT
Start: 2025-03-29

## 2025-03-29 RX ORDER — SODIUM CHLORIDE 9 MG/ML
INJECTION, SOLUTION INTRAVENOUS PRN
Status: CANCELLED | OUTPATIENT
Start: 2025-03-29

## 2025-03-29 RX ORDER — SODIUM CHLORIDE 0.9 % (FLUSH) 0.9 %
5-40 SYRINGE (ML) INJECTION PRN
Status: CANCELLED | OUTPATIENT
Start: 2025-03-29

## 2025-03-29 RX ORDER — ACETAMINOPHEN 325 MG/1
1000 TABLET ORAL ONCE
Status: CANCELLED | OUTPATIENT
Start: 2025-03-29 | End: 2025-03-29

## 2025-03-29 NOTE — H&P
Patient ID:    Sha Lynn  [id number]  50 y.o.  1975    Today: March 29, 2025          Chief Complaint: Left hip pain      Patient returns to office today with chief complaint of left hip pain.  He has significant history for sickle cell disease with multiple sickle cell crises on hydroxyurea.  He is also on a substantial narcotic pain medication regimen with oxycodone IR 5 mg every 4 hours followed by oxycodone ER.  This is prescribed by sickle cell physician.  He notes groin pain that is weightbearing in nature predominate both her left hip.  He has tried anti-inflammatory medicines as well as assistive device use.  He denies any smoking history, he is a nondiabetic however he does have A-fib and is on Eliquis    Past Medical History:  Past Medical History:   Diagnosis Date    Hypercholesteremia     Hypertension     Sickle cell anemia (HCC)        Past Surgical History:  Past Surgical History:   Procedure Laterality Date    CARDIAC DEFIBRILLATOR PLACEMENT      CHOLECYSTECTOMY      PENILE PROSTHESIS          Medications:     Prior to Admission medications    Medication Sig Start Date End Date Taking? Authorizing Provider   terbutaline (BRETHINE) 5 MG tablet Take 1 tablet by mouth 2 times daily as needed 12/31/24   ProviderBarrington MD Misc. Devices (CPAP MACHINE) MISC Use as Instructed    Barrington Contreras MD   Cholecalciferol (VITAMIN D3) 25 MCG CAPS Take 1 capsule by mouth daily    Barrington Contreras MD   Multiple Vitamins-Minerals (MENS MULTIVITAMIN PO) Take by mouth    Barrington Contreras MD   amLODIPine (NORVASC) 5 MG tablet Take 1 tablet by mouth daily    Barrington Contreras MD   apixaban (ELIQUIS) 5 MG TABS tablet Take 1 tablet by mouth 2 times daily 10/1/24 10/1/25  Barrington Contreras MD   ascorbic acid (VITAMIN C) 500 MG tablet Take 1 tablet by mouth daily    Barrington Contreras MD   calcium acetate (PHOSLO) 667 MG CAPS capsule Take 1 capsule by mouth 3 times daily

## 2025-04-07 ENCOUNTER — TELEPHONE (OUTPATIENT)
Dept: SURGERY | Age: 50
End: 2025-04-07

## 2025-04-07 ENCOUNTER — HOSPITAL ENCOUNTER (OUTPATIENT)
Dept: SURGERY | Age: 50
Discharge: HOME | End: 2025-04-10
Payer: MEDICARE

## 2025-04-07 ENCOUNTER — HOSPITAL ENCOUNTER (OUTPATIENT)
Dept: REHABILITATION | Age: 50
Discharge: HOME | End: 2025-04-10
Payer: MEDICARE

## 2025-04-07 VITALS
DIASTOLIC BLOOD PRESSURE: 84 MMHG | TEMPERATURE: 98.2 F | WEIGHT: 138.6 LBS | BODY MASS INDEX: 19.84 KG/M2 | HEART RATE: 86 BPM | RESPIRATION RATE: 16 BRPM | HEIGHT: 70 IN | SYSTOLIC BLOOD PRESSURE: 142 MMHG

## 2025-04-07 DIAGNOSIS — M16.12 PRIMARY OSTEOARTHRITIS OF LEFT HIP: ICD-10-CM

## 2025-04-07 LAB
ALBUMIN SERPL-MCNC: 3.8 G/DL (ref 3.5–5)
ALBUMIN/GLOB SERPL: 0.7 (ref 1–1.9)
ALP SERPL-CCNC: 117 U/L (ref 40–129)
ALT SERPL-CCNC: 48 U/L (ref 8–55)
ANION GAP SERPL CALC-SCNC: 14 MMOL/L (ref 7–16)
AST SERPL-CCNC: 52 U/L (ref 15–37)
BASOPHILS # BLD: 0.07 K/UL (ref 0–0.2)
BASOPHILS NFR BLD: 1.2 % (ref 0–2)
BILIRUB SERPL-MCNC: 0.6 MG/DL (ref 0–1.2)
BUN SERPL-MCNC: 63 MG/DL (ref 6–23)
CALCIUM SERPL-MCNC: 8.6 MG/DL (ref 8.8–10.2)
CHLORIDE SERPL-SCNC: 100 MMOL/L (ref 98–107)
CO2 SERPL-SCNC: 24 MMOL/L (ref 20–29)
CREAT SERPL-MCNC: 4.65 MG/DL (ref 0.8–1.3)
DIFFERENTIAL METHOD BLD: ABNORMAL
EOSINOPHIL # BLD: 0.18 K/UL (ref 0–0.8)
EOSINOPHIL NFR BLD: 3 % (ref 0.5–7.8)
ERYTHROCYTE [DISTWIDTH] IN BLOOD BY AUTOMATED COUNT: 15.5 % (ref 11.9–14.6)
EST. AVERAGE GLUCOSE BLD GHB EST-MCNC: 213 MG/DL
GLOBULIN SER CALC-MCNC: 5.1 G/DL (ref 2.3–3.5)
GLUCOSE SERPL-MCNC: 110 MG/DL (ref 70–99)
HBA1C MFR BLD: 9.1 % (ref 0–5.6)
HCT VFR BLD AUTO: 33.8 % (ref 41.1–50.3)
HGB BLD-MCNC: 10.4 G/DL (ref 13.6–17.2)
IMM GRANULOCYTES # BLD AUTO: 0.01 K/UL (ref 0–0.5)
IMM GRANULOCYTES NFR BLD AUTO: 0.2 % (ref 0–5)
INR PPP: 1.4
LYMPHOCYTES # BLD: 2.37 K/UL (ref 0.5–4.6)
LYMPHOCYTES NFR BLD: 39.6 % (ref 13–44)
MCH RBC QN AUTO: 27.5 PG (ref 26.1–32.9)
MCHC RBC AUTO-ENTMCNC: 30.8 G/DL (ref 31.4–35)
MCV RBC AUTO: 89.4 FL (ref 82–102)
MONOCYTES # BLD: 0.45 K/UL (ref 0.1–1.3)
MONOCYTES NFR BLD: 7.5 % (ref 4–12)
MRSA DNA SPEC QL NAA+PROBE: DETECTED
NEUTS SEG # BLD: 2.91 K/UL (ref 1.7–8.2)
NEUTS SEG NFR BLD: 48.5 % (ref 43–78)
NRBC # BLD: 0 K/UL (ref 0–0.2)
PLATELET # BLD AUTO: 102 K/UL (ref 150–450)
PMV BLD AUTO: 11.5 FL (ref 9.4–12.3)
POTASSIUM SERPL-SCNC: 4.5 MMOL/L (ref 3.5–5.1)
PROT SERPL-MCNC: 8.9 G/DL (ref 6.3–8.2)
PROTHROMBIN TIME: 17.3 SEC (ref 11.3–14.9)
RBC # BLD AUTO: 3.78 M/UL (ref 4.23–5.6)
S AUREUS CPE NOSE QL NAA+PROBE: DETECTED
SODIUM SERPL-SCNC: 138 MMOL/L (ref 136–145)
WBC # BLD AUTO: 6 K/UL (ref 4.3–11.1)

## 2025-04-07 PROCEDURE — 85610 PROTHROMBIN TIME: CPT

## 2025-04-07 PROCEDURE — 87641 MR-STAPH DNA AMP PROBE: CPT

## 2025-04-07 PROCEDURE — 83036 HEMOGLOBIN GLYCOSYLATED A1C: CPT

## 2025-04-07 PROCEDURE — 97161 PT EVAL LOW COMPLEX 20 MIN: CPT

## 2025-04-07 PROCEDURE — 85025 COMPLETE CBC W/AUTO DIFF WBC: CPT

## 2025-04-07 PROCEDURE — 80053 COMPREHEN METABOLIC PANEL: CPT

## 2025-04-07 ASSESSMENT — PROMIS GLOBAL HEALTH SCALE
IN GENERAL, HOW WOULD YOU RATE YOUR SATISFACTION WITH YOUR SOCIAL ACTIVITIES AND RELATIONSHIPS [ON A SCALE OF 1 (POOR) TO 5 (EXCELLENT)]?: GOOD
IN GENERAL, WOULD YOU SAY YOUR QUALITY OF LIFE IS...[ON A SCALE OF 1 (POOR) TO 5 (EXCELLENT)]: FAIR
WHO IS THE PERSON COMPLETING THE PROMIS V1.1 SURVEY?: SURROGATE
IN THE PAST 7 DAYS, HOW WOULD YOU RATE YOUR FATIGUE ON AVERAGE [ON A SCALE FROM 1 (NONE) TO 5 (VERY SEVERE)]?: MODERATE
IN GENERAL, HOW WOULD YOU RATE YOUR PHYSICAL HEALTH [ON A SCALE OF 1 (POOR) TO 5 (EXCELLENT)]?: POOR
IN THE PAST 7 DAYS, HOW WOULD YOU RATE YOUR PAIN ON AVERAGE [ON A SCALE FROM 0 (NO PAIN) TO 10 (WORST IMAGINABLE PAIN)]?: 8
SUM OF RESPONSES TO QUESTIONS 2, 4, 5, & 10: 10
TO WHAT EXTENT ARE YOU ABLE TO CARRY OUT YOUR EVERYDAY PHYSICAL ACTIVITIES SUCH AS WALKING, CLIMBING STAIRS, CARRYING GROCERIES, OR MOVING A CHAIR [ON A SCALE OF 1 (NOT AT ALL) TO 5 (COMPLETELY)]?: NOT AT ALL
HOW IS THE PROMIS V1.1 BEING ADMINISTERED?: PAPER
IN GENERAL, WOULD YOU SAY YOUR HEALTH IS...[ON A SCALE OF 1 (POOR) TO 5 (EXCELLENT)]: POOR
IN GENERAL, HOW WOULD YOU RATE YOUR MENTAL HEALTH, INCLUDING YOUR MOOD AND YOUR ABILITY TO THINK [ON A SCALE OF 1 (POOR) TO 5 (EXCELLENT)]?: FAIR
SUM OF RESPONSES TO QUESTIONS 3, 6, 7, & 8: 13
IN THE PAST 7 DAYS, HOW OFTEN HAVE YOU BEEN BOTHERED BY EMOTIONAL PROBLEMS, SUCH AS FEELING ANXIOUS, DEPRESSED, OR IRRITABLE [ON A SCALE FROM 1 (NEVER) TO 5 (ALWAYS)]?: SOMETIMES
IN GENERAL, PLEASE RATE HOW WELL YOU CARRY OUT YOUR USUAL SOCIAL ACTIVITIES (INCLUDES ACTIVITIES AT HOME, AT WORK, AND IN YOUR COMMUNITY, AND RESPONSIBILITIES AS A PARENT, CHILD, SPOUSE, EMPLOYEE, FRIEND, ETC) [ON A SCALE OF 1 (POOR) TO 5 (EXCELLENT)]?: GOOD

## 2025-04-07 ASSESSMENT — PAIN DESCRIPTION - LOCATION
LOCATION: HIP
LOCATION: HIP

## 2025-04-07 ASSESSMENT — HOOS JR
WALKING ON UNEVEN SURFACE: SEVERE
RISING FROM SITTING: EXTREME
HOOS JR TOTAL INTERVAL SCORE: 25.103
GOING UP OR DOWN STAIRS: EXTREME
HOOS JR RAW SCORE: 20
BENDING TO THE FLOOR TO PICK UP OBJECT: SEVERE
HOOS JR RAW SCORE: 20
LYING IN BED (TURNING OVER, MAINTAINING HIP POSITION): SEVERE
SITTING: SEVERE

## 2025-04-07 ASSESSMENT — PAIN SCALES - GENERAL: PAINLEVEL_OUTOF10: 6

## 2025-04-07 ASSESSMENT — PAIN DESCRIPTION - DESCRIPTORS: DESCRIPTORS: ACHING;SHARP;STABBING;THROBBING

## 2025-04-07 ASSESSMENT — PAIN DESCRIPTION - ORIENTATION: ORIENTATION: LEFT

## 2025-04-07 NOTE — TELEPHONE ENCOUNTER
Please coordinate with hematology/oncology MD re plan of care prior to surgery  transfusions to keep hgb at or above 10.  His next appointment with you is 4/16/25.

## 2025-04-07 NOTE — CARE COORDINATION
Joint Camp Case Management note:  Patient screened in Prehab for discharge planning needs. Patient scheduled for a future total joint replacement.  We discussed Home Health and equipment needed after surgery. List of Home Health providers offered.  Patient w/o preference towards provider.  We will arrange Home health on the day of surgery.  Pt with hx of Sickle Cell.  Has good support system.   Has a walker

## 2025-04-07 NOTE — PROGRESS NOTES
CARDIAC CLEARANCE  Surgical, Procedural, or Medication Clearance    Physician or Practice Requesting Clearance: [facility]  : Cindy Quintana RN  Contact Phone Number: [phone]  Contact Fax Number: [phone]  Date of Surgery/Procedure: 4/24/25  Type of Surgery or Procedure: left total hip replacement  Type of Anesthesia: SPINAL  Medication to hold: Eliquis  Requested # of days to hold: 72 hours    
  Patient verified name and .    Order for consent was not found in EHR and unable to match consent with case posting; patient verified.     Type 3 surgery, joint camp assessment complete.    Labs per surgeon: CBC,CMP, PT/INR results show BUN 63, Creatinine 4.65; AST 52; PT 17.3~will have anesthesia review per protocol; all results routed via Flowboard to ordering physician ; results A1C 9.1; labs routed via fax to pcp Dr Morales  for review.   Labs per anesthesia protocol: no additional  EKG:completed 3/18/25~abnormal; will have anesthesia review along via procedure prep message~ with NIKI 25; ECHO 24; Device check 3/27/25; cardiology office note per Dr Sprague 24~all available in Care Everywhere for reference.    MRSA/MSSA swab collected per policy. MD to consult pharmacy to dose Vanc if appropriate.     Hospital approved surgical skin cleanser and instructions to return bottle on DOS given per hospital policy.    Patient provided with handouts including Guide to Surgery, Pain Management, Preventing Surgical Site Infections, and [facility] Brochure.    Patient answered medical/surgical history questions at their best of ability. All prior to admission medications documented in Epic. Original medication prescription bottle was not visualized during patient appointment.     Patient instructed to hold all vitamins 3 weeks prior to surgery and NSAIDS 5 days prior to surgery.     Patient teach back successful and patient demonstrates knowledge of instruction.     
 Latest Reference Range & Units 04/07/25 07:53   Sodium 136 - 145 mmol/L 138   Potassium 3.5 - 5.1 mmol/L 4.5   Chloride 98 - 107 mmol/L 100   CARBON DIOXIDE 20 - 29 mmol/L 24   BUN,BUNPL 6 - 23 MG/DL 63 (H)   Creatinine 0.80 - 1.30 MG/DL 4.65 (H)   Anion Gap 7 - 16 mmol/L 14   Est, Glom Filt Rate >60 ml/min/1.73m2 14 (L)   Glucose 70 - 99 mg/dL 110 (H)   Calcium 8.8 - 10.2 MG/DL 8.6 (L)   Albumin/Globulin Ratio 1.0 - 1.9   0.7 (L)   Total Protein 6.3 - 8.2 g/dL 8.9 (H)   Albumin 3.5 - 5.0 g/dL 3.8   Globulin 2.3 - 3.5 g/dL 5.1 (H)   Alkaline Phosphatase 40 - 129 U/L 117   ALT 8 - 55 U/L 48   AST 15 - 37 U/L 52 (H)   Total Bilirubin 0.0 - 1.2 MG/DL 0.6   Hemoglobin A1C 0 - 5.6 % 9.1 (H)   eAG (mg/dL) mg/dL 213   WBC 4.3 - 11.1 K/uL 6.0   RBC 4.23 - 5.6 M/uL 3.78 (L)   Hemoglobin Quant 13.6 - 17.2 g/dL 10.4 (L)   Hematocrit 41.1 - 50.3 % 33.8 (L)   MCV 82.0 - 102.0 FL 89.4   MCH 26.1 - 32.9 PG 27.5   MCHC 31.4 - 35.0 g/dL 30.8 (L)   MPV 9.4 - 12.3 FL 11.5   RDW 11.9 - 14.6 % 15.5 (H)   Platelet Count 150 - 450 K/uL 102 (L)   Neutrophils % 43.0 - 78.0 % 48.5   Lymphocyte % 13.0 - 44.0 % 39.6   Monocytes % 4.0 - 12.0 % 7.5   Eosinophils % 0.5 - 7.8 % 3.0   Basophils % 0.0 - 2.0 % 1.2   Neutrophils Absolute 1.70 - 8.20 K/UL 2.91   Lymphocytes Absolute 0.50 - 4.60 K/UL 2.37   Monocytes Absolute 0.10 - 1.30 K/UL 0.45   Eosinophils Absolute 0.00 - 0.80 K/UL 0.18   Basophils Absolute 0.00 - 0.20 K/UL 0.07   Differential Type -   AUTOMATED   Immature Granulocytes % 0.0 - 5.0 % 0.2   Nucleated Red Blood Cells 0.0 - 0.2 K/uL 0.00   Immature Granulocytes Absolute 0.0 - 0.5 K/UL 0.01   Prothrombin Time 11.3 - 14.9 sec 17.3 (H)   INR -   1.4   (H): Data is abnormally high  (L): Data is abnormally low  
 Latest Reference Range & Units 04/07/25 07:53   Sodium 136 - 145 mmol/L 138   Potassium 3.5 - 5.1 mmol/L 4.5   Chloride 98 - 107 mmol/L 100   CARBON DIOXIDE 20 - 29 mmol/L 24   BUN,BUNPL 6 - 23 MG/DL 63 (H)   Creatinine 0.80 - 1.30 MG/DL 4.65 (H)   Anion Gap 7 - 16 mmol/L 14   Est, Glom Filt Rate >60 ml/min/1.73m2 14 (L)   Glucose 70 - 99 mg/dL 110 (H)   Calcium 8.8 - 10.2 MG/DL 8.6 (L)   Albumin/Globulin Ratio 1.0 - 1.9   0.7 (L)   Total Protein 6.3 - 8.2 g/dL 8.9 (H)   Albumin 3.5 - 5.0 g/dL 3.8   Globulin 2.3 - 3.5 g/dL 5.1 (H)   Alkaline Phosphatase 40 - 129 U/L 117   ALT 8 - 55 U/L 48   AST 15 - 37 U/L 52 (H)   Total Bilirubin 0.0 - 1.2 MG/DL 0.6   WBC 4.3 - 11.1 K/uL 6.0   RBC 4.23 - 5.6 M/uL 3.78 (L)   Hemoglobin Quant 13.6 - 17.2 g/dL 10.4 (L)   Hematocrit 41.1 - 50.3 % 33.8 (L)   MCV 82.0 - 102.0 FL 89.4   MCH 26.1 - 32.9 PG 27.5   MCHC 31.4 - 35.0 g/dL 30.8 (L)   MPV 9.4 - 12.3 FL 11.5   RDW 11.9 - 14.6 % 15.5 (H)   Platelet Count 150 - 450 K/uL 102 (L)   Neutrophils % 43.0 - 78.0 % 48.5   Lymphocyte % 13.0 - 44.0 % 39.6   Monocytes % 4.0 - 12.0 % 7.5   Eosinophils % 0.5 - 7.8 % 3.0   Basophils % 0.0 - 2.0 % 1.2   Neutrophils Absolute 1.70 - 8.20 K/UL 2.91   Lymphocytes Absolute 0.50 - 4.60 K/UL 2.37   Monocytes Absolute 0.10 - 1.30 K/UL 0.45   Eosinophils Absolute 0.00 - 0.80 K/UL 0.18   Basophils Absolute 0.00 - 0.20 K/UL 0.07   Differential Type -   AUTOMATED   Immature Granulocytes % 0.0 - 5.0 % 0.2   Nucleated Red Blood Cells 0.0 - 0.2 K/uL 0.00   Immature Granulocytes Absolute 0.0 - 0.5 K/UL 0.01   Prothrombin Time 11.3 - 14.9 sec 17.3 (H)   INR -   1.4   (H): Data is abnormally high  (L): Data is abnormally low  
During assessment the patient was found to have a pacemaker and a surgery that is either positioned prone or lateral and has an ICD or is ipsilateral, upper extremity, or a chest surgery. This RN will obtain the following and place on chart for review per anesthesia protocol: last cardiology visit, most recent pacer interrogation,  EKG, ECHO, Stress Test, Cath report.     Pacemaker card obtained and copy placed into Electronic Health Record.    Patient states no to the question \"Are you pacer dependent?\"    Patient states they have not been shocked in the past month.     Call placed to the pacemaker rep Jasmine Brown at [employer] for the following information.   Is this patient pacemaker dependent? no  How often is the patient paced? not  What will happen if a magnet is used? It will turn off defibrillator while being placed  Does it reset the rate or change the program mode from DDD to VOO? Yes will go back to normal function    Anesthesia to review the responses on this note and advise if a representative will be needed on the day of surgery.     
No pacemaker/defib rep needed for day of surgery; reviewed cardiac studies. Ok to proceed per Dr Duque. Telephone encounter note sent to Dr Overton's office re coordination of care prior to surgery with hematology/oncology.  
PLEASE CONTINUE TAKING ALL PRESCRIPTION MEDICATIONS UP TO THE DAY OF SURGERY UNLESS OTHERWISE DIRECTED BELOW.    DISCONTINUE all vitamins, herbals and supplements 3 weeks prior to surgery. DISCONTINUE Non-Steroidal Anti-Inflammatory (NSAIDS) such as Advil, Ibuprofen, Motrin, Naproxen and Aleve 5 days prior to surgery.       Home Medications to take  the day of surgery    Oxycodone 30 mg     Metoprolol       Amlodipine   Jadenu                 Protonix   Hydrea     Home Medications to Hold- please continue all other medications except these.    Hold Eliquis for 72 hours prior to surgery        Comments   On the day before surgery please take Acetaminophen 1000 mg in the morning and then again before bed. You may substitute for Tylenol 650 mg.      Bring Dynahex wash and Incentive Spirometer with you to hospital on the day of surgery.     BRING CPAP machine       Please do not bring home medications with you on the day of surgery unless otherwise directed by your nurse.  If you are instructed to bring home medications, please give them to your nurse as they will be administered by the nursing staff.    If you have any questions, please call [facility] [phone].    A copy of this note was provided to the patient for reference.    
spirometer when you aren't using it c

## 2025-04-10 ENCOUNTER — TELEPHONE (OUTPATIENT)
Dept: ORTHOPEDIC SURGERY | Age: 50
End: 2025-04-10

## 2025-04-10 NOTE — TELEPHONE ENCOUNTER
Spoke with Blanche, Dr. Whitfield's nurse (hematology/oncology) who states that Dr. Whitfield is in [place] for the next 2 weeks, but would get the ball rolling regarding is transfusions for his surgery.  Blanche verbalized understanding and voiced no other concerns at this time.

## 2025-04-11 NOTE — TELEPHONE ENCOUNTER
Spoke with Sridevi at Hematology/oncology who states that the patient will need IV fluids preferably 1 liter of lactated ringers and hemoglobin to be above 10 which they will get that done prior to surgery. They are going to schedule him to come in to their office for an appointment prior to surgery.

## 2025-04-14 ENCOUNTER — TELEPHONE (OUTPATIENT)
Dept: ORTHOPEDIC SURGERY | Age: 50
End: 2025-04-14

## 2025-04-14 NOTE — TELEPHONE ENCOUNTER
He is scheduled for surgery April 24. She is calling again to say she prefers that he is done as an inpatient. Please call her if you have any questions.

## 2025-04-18 ENCOUNTER — OFFICE VISIT (OUTPATIENT)
Dept: ORTHOPEDIC SURGERY | Age: 50
End: 2025-04-18
Payer: MEDICARE

## 2025-04-18 VITALS — HEIGHT: 70 IN | WEIGHT: 141.2 LBS | BODY MASS INDEX: 20.22 KG/M2

## 2025-04-18 DIAGNOSIS — M16.12 PRIMARY OSTEOARTHRITIS OF LEFT HIP: Primary | ICD-10-CM

## 2025-04-18 PROCEDURE — 3017F COLORECTAL CA SCREEN DOC REV: CPT | Performed by: ORTHOPAEDIC SURGERY

## 2025-04-18 PROCEDURE — 1036F TOBACCO NON-USER: CPT | Performed by: ORTHOPAEDIC SURGERY

## 2025-04-18 PROCEDURE — G8420 CALC BMI NORM PARAMETERS: HCPCS | Performed by: ORTHOPAEDIC SURGERY

## 2025-04-18 PROCEDURE — 99214 OFFICE O/P EST MOD 30 MIN: CPT | Performed by: ORTHOPAEDIC SURGERY

## 2025-04-18 PROCEDURE — G8427 DOCREV CUR MEDS BY ELIG CLIN: HCPCS | Performed by: ORTHOPAEDIC SURGERY

## 2025-04-18 RX ORDER — CELECOXIB 200 MG/1
200 CAPSULE ORAL 2 TIMES DAILY
Qty: 60 CAPSULE | Refills: 1 | Status: SHIPPED | OUTPATIENT
Start: 2025-04-18 | End: 2025-04-18 | Stop reason: SINTOL

## 2025-04-18 RX ORDER — SENNA AND DOCUSATE SODIUM 50; 8.6 MG/1; MG/1
1 TABLET, FILM COATED ORAL DAILY
Qty: 30 TABLET | Refills: 1 | Status: SHIPPED | OUTPATIENT
Start: 2025-04-18 | End: 2025-04-18 | Stop reason: CLARIF

## 2025-04-18 RX ORDER — ASPIRIN 81 MG/1
81 TABLET ORAL EVERY 12 HOURS
Qty: 70 TABLET | Refills: 0 | Status: SHIPPED | OUTPATIENT
Start: 2025-04-18 | End: 2025-04-18 | Stop reason: CLARIF

## 2025-04-18 RX ORDER — ACETAMINOPHEN 325 MG/1
975 TABLET ORAL EVERY 8 HOURS
Qty: 60 TABLET | Refills: 2 | Status: SHIPPED | OUTPATIENT
Start: 2025-04-18 | End: 2025-04-18 | Stop reason: CLARIF

## 2025-04-18 NOTE — PROGRESS NOTES
Patient ID:    Sha CHRISTENSEN Leah  [id number]  50 y.o.  1975    Today: April 18, 2025          Chief Complaint: Left hip pain      Patient returns to office today with chief complaint of left hip pain.  This is his preop visit.  Significant medical history which was completed conveyed as preop visit.  In addition to sickle cell anemia and avascular porosis, DM,and chronic pain with significant opiate use he has a history of end-stage renal disease on dialysis peritoneal times weekly as well as an ICD A-fib on Eliquis.  He is at extremely high risk of postoperative complication including infection secondary to his anemia which is 10.5 on labs.  He does have a normal albumin which encouraging however he is at moderate risk for cardiac complication based on his cardiac notes and this is not taking to consideration his prosthetic joint infection risk    Past Medical History:  Past Medical History:   Diagnosis Date    Arthritis     Avascular necrosis of bone of hip (HCC)     Borderline diabetes     Chronic pain     chronic pain regimen that includes OxyContin in combination with as needed oxycodone    ESRD on dialysis ([facility])     receives peritoneal dialysis 3 times weekly    Functional asplenia     GERD (gastroesophageal reflux disease)     History of blood transfusion     History of echocardiogram 02/07/2025    NIKI:    Normal chamber sizes.    The left ventricular systolic function is normal (55-65%).    The right ventricular systolic function is normal.    There is no thrombus present in the left atrium. There is no thrombus  in the left atrial appendage.    No significant valvular abnormalities.    Hypercholesteremia     Hypertension     ICD (implantable cardioverter-defibrillator) in place     [employer] cardio; [employer]~defibrillator only~placed 4/2/22; had one shock    Iron overload     NSVT (nonsustained ventricular tachycardia) ([facility])     PAF (paroxysmal atrial fibrillation) ([facility])     had shocks prior